# Patient Record
Sex: FEMALE | Race: WHITE | NOT HISPANIC OR LATINO | Employment: UNEMPLOYED | URBAN - METROPOLITAN AREA
[De-identification: names, ages, dates, MRNs, and addresses within clinical notes are randomized per-mention and may not be internally consistent; named-entity substitution may affect disease eponyms.]

---

## 2017-02-14 ENCOUNTER — ALLSCRIPTS OFFICE VISIT (OUTPATIENT)
Dept: OTHER | Facility: OTHER | Age: 37
End: 2017-02-14

## 2017-02-14 DIAGNOSIS — E04.1 NONTOXIC SINGLE THYROID NODULE: ICD-10-CM

## 2017-03-22 ENCOUNTER — HOSPITAL ENCOUNTER (OUTPATIENT)
Dept: ULTRASOUND IMAGING | Facility: HOSPITAL | Age: 37
Discharge: HOME | End: 2017-03-22
Payer: COMMERCIAL

## 2017-03-22 DIAGNOSIS — E04.1 NONTOXIC SINGLE THYROID NODULE: ICD-10-CM

## 2017-03-22 PROCEDURE — 76536 US EXAM OF HEAD AND NECK: CPT

## 2017-03-24 ENCOUNTER — GENERIC CONVERSION - ENCOUNTER (OUTPATIENT)
Dept: OTHER | Facility: OTHER | Age: 37
End: 2017-03-24

## 2017-03-25 ENCOUNTER — GENERIC CONVERSION - ENCOUNTER (OUTPATIENT)
Dept: OTHER | Facility: OTHER | Age: 37
End: 2017-03-25

## 2017-04-05 ENCOUNTER — GENERIC CONVERSION - ENCOUNTER (OUTPATIENT)
Dept: OTHER | Facility: OTHER | Age: 37
End: 2017-04-05

## 2017-04-05 LAB
AMBIG ABBREV CMP14 DEFAULT (HISTORICAL): NORMAL
AMBIG ABBREV LP DEFAULT (HISTORICAL): NORMAL
BASOPHILS # BLD AUTO: 0 %
BASOPHILS # BLD AUTO: 0 X10E3/UL (ref 0–0.2)
DEPRECATED RDW RBC AUTO: 13.6 % (ref 12.3–15.4)
EOSINOPHIL # BLD AUTO: 0.2 X10E3/UL (ref 0–0.4)
EOSINOPHIL # BLD AUTO: 2 %
HCT VFR BLD AUTO: 43.6 % (ref 34–46.6)
HGB BLD-MCNC: 15 G/DL (ref 11.1–15.9)
IMM.GRANULOCYTES (CD4/8) (HISTORICAL): 0 %
IMM.GRANULOCYTES (CD4/8) (HISTORICAL): 0 X10E3/UL (ref 0–0.1)
LYMPHOCYTES # BLD AUTO: 1.4 X10E3/UL (ref 0.7–3.1)
LYMPHOCYTES # BLD AUTO: 20 %
MCH RBC QN AUTO: 29.9 PG (ref 26.6–33)
MCHC RBC AUTO-ENTMCNC: 34.4 G/DL (ref 31.5–35.7)
MCV RBC AUTO: 87 FL (ref 79–97)
MONOCYTES # BLD AUTO: 0.4 X10E3/UL (ref 0.1–0.9)
MONOCYTES (HISTORICAL): 6 %
NEUTROPHILS # BLD AUTO: 5 X10E3/UL (ref 1.4–7)
NEUTROPHILS # BLD AUTO: 72 %
PLATELET # BLD AUTO: 172 X10E3/UL (ref 150–379)
RBC (HISTORICAL): 5.02 X10E6/UL (ref 3.77–5.28)
WBC # BLD AUTO: 7 X10E3/UL (ref 3.4–10.8)

## 2017-04-06 ENCOUNTER — GENERIC CONVERSION - ENCOUNTER (OUTPATIENT)
Dept: OTHER | Facility: OTHER | Age: 37
End: 2017-04-06

## 2017-04-06 LAB
25(OH)D3 SERPL-MCNC: 51.2 NG/ML (ref 30–100)
A/G RATIO (HISTORICAL): 2.5 (ref 1.2–2.2)
ALBUMIN SERPL BCP-MCNC: 4.8 G/DL (ref 3.5–5.5)
ALP SERPL-CCNC: 59 IU/L (ref 39–117)
ALT SERPL W P-5'-P-CCNC: 36 IU/L (ref 0–32)
AST SERPL W P-5'-P-CCNC: 24 IU/L (ref 0–40)
BILIRUB SERPL-MCNC: 0.6 MG/DL (ref 0–1.2)
BUN SERPL-MCNC: 9 MG/DL (ref 6–20)
BUN/CREA RATIO (HISTORICAL): 12 (ref 9–23)
CALCIUM SERPL-MCNC: 9.6 MG/DL (ref 8.7–10.2)
CHLORIDE SERPL-SCNC: 102 MMOL/L (ref 96–106)
CHOLEST SERPL-MCNC: 187 MG/DL (ref 100–199)
CO2 SERPL-SCNC: 22 MMOL/L (ref 18–29)
CREAT SERPL-MCNC: 0.74 MG/DL (ref 0.57–1)
EGFR AFRICAN AMERICAN (HISTORICAL): 120 ML/MIN/1.73
EGFR-AMERICAN CALC (HISTORICAL): 104 ML/MIN/1.73
GLUCOSE SERPL-MCNC: 96 MG/DL (ref 65–99)
HDLC SERPL-MCNC: 43 MG/DL
LDLC SERPL CALC-MCNC: 101 MG/DL (ref 0–99)
POTASSIUM SERPL-SCNC: 4 MMOL/L (ref 3.5–5.2)
SODIUM SERPL-SCNC: 144 MMOL/L (ref 134–144)
T4 FREE SERPL-MCNC: 7.4 UG/DL (ref 4.5–12)
TOT. GLOBULIN, SERUM (HISTORICAL): 1.9 G/DL (ref 1.5–4.5)
TOTAL PROTEIN (HISTORICAL): 6.7 G/DL (ref 6–8.5)
TRIGL SERPL-MCNC: 215 MG/DL (ref 0–149)
TSH SERPL DL<=0.05 MIU/L-ACNC: 1.21 UIU/ML (ref 0.45–4.5)
VLDLC SERPL CALC-MCNC: 43 MG/DL (ref 5–40)

## 2017-04-10 ENCOUNTER — ALLSCRIPTS OFFICE VISIT (OUTPATIENT)
Dept: OTHER | Facility: OTHER | Age: 37
End: 2017-04-10

## 2017-04-10 DIAGNOSIS — E55.9 VITAMIN D DEFICIENCY: ICD-10-CM

## 2017-04-10 DIAGNOSIS — E78.5 HYPERLIPIDEMIA: ICD-10-CM

## 2017-04-10 DIAGNOSIS — I10 ESSENTIAL (PRIMARY) HYPERTENSION: ICD-10-CM

## 2017-04-10 DIAGNOSIS — R53.83 OTHER FATIGUE: ICD-10-CM

## 2017-04-11 ENCOUNTER — GENERIC CONVERSION - ENCOUNTER (OUTPATIENT)
Dept: OTHER | Facility: OTHER | Age: 37
End: 2017-04-11

## 2017-05-08 ENCOUNTER — ALLSCRIPTS OFFICE VISIT (OUTPATIENT)
Dept: OTHER | Facility: OTHER | Age: 37
End: 2017-05-08

## 2017-05-11 ENCOUNTER — GENERIC CONVERSION - ENCOUNTER (OUTPATIENT)
Dept: OTHER | Facility: OTHER | Age: 37
End: 2017-05-11

## 2017-05-12 ENCOUNTER — ALLSCRIPTS OFFICE VISIT (OUTPATIENT)
Dept: OTHER | Facility: OTHER | Age: 37
End: 2017-05-12

## 2017-05-16 ENCOUNTER — ANESTHESIA EVENT (OUTPATIENT)
Dept: PERIOP | Facility: HOSPITAL | Age: 37
End: 2017-05-16
Payer: COMMERCIAL

## 2017-05-16 ENCOUNTER — APPOINTMENT (OUTPATIENT)
Dept: PREADMISSION TESTING | Facility: HOSPITAL | Age: 37
End: 2017-05-16
Payer: COMMERCIAL

## 2017-05-16 VITALS
RESPIRATION RATE: 16 BRPM | HEIGHT: 67 IN | BODY MASS INDEX: 41.44 KG/M2 | SYSTOLIC BLOOD PRESSURE: 118 MMHG | WEIGHT: 264 LBS | DIASTOLIC BLOOD PRESSURE: 70 MMHG | HEART RATE: 83 BPM | TEMPERATURE: 98 F

## 2017-05-16 RX ORDER — IBUPROFEN 200 MG
200 TABLET ORAL EVERY 6 HOURS PRN
COMMUNITY
End: 2018-02-23 | Stop reason: HOSPADM

## 2017-05-16 RX ORDER — ASCORBIC ACID 500 MG
500 TABLET ORAL DAILY
COMMUNITY
End: 2018-02-13 | Stop reason: SDUPTHER

## 2017-05-16 RX ORDER — DIAZEPAM 5 MG/1
5 TABLET ORAL EVERY 6 HOURS PRN
Status: ON HOLD | COMMUNITY
End: 2017-05-24

## 2017-05-16 RX ORDER — ALBUTEROL SULFATE 90 UG/1
2 AEROSOL, METERED RESPIRATORY (INHALATION) EVERY 6 HOURS PRN
COMMUNITY
End: 2018-07-12

## 2017-05-16 RX ORDER — SODIUM CHLORIDE 9 MG/ML
125 INJECTION, SOLUTION INTRAVENOUS CONTINUOUS
Status: CANCELLED | OUTPATIENT
Start: 2017-05-16

## 2017-05-16 RX ORDER — PANTOPRAZOLE SODIUM 20 MG/1
20 TABLET, DELAYED RELEASE ORAL DAILY
COMMUNITY
End: 2018-02-13 | Stop reason: SDUPTHER

## 2017-05-16 RX ORDER — ATORVASTATIN CALCIUM 10 MG/1
10 TABLET, FILM COATED ORAL DAILY
COMMUNITY
End: 2018-02-13 | Stop reason: SDUPTHER

## 2017-05-16 RX ORDER — MELATONIN
1000 DAILY
COMMUNITY
End: 2018-02-13 | Stop reason: SDUPTHER

## 2017-05-16 RX ORDER — METOPROLOL SUCCINATE 25 MG/1
25 TABLET, EXTENDED RELEASE ORAL DAILY
COMMUNITY
End: 2018-02-13 | Stop reason: SDUPTHER

## 2017-05-16 RX ORDER — AMITRIPTYLINE HYDROCHLORIDE 25 MG/1
25 TABLET, FILM COATED ORAL
COMMUNITY
End: 2018-02-13 | Stop reason: SDUPTHER

## 2017-05-16 RX ORDER — HYDROCORTISONE AND ACETIC ACID 20.75; 10.375 MG/ML; MG/ML
5 SOLUTION AURICULAR (OTIC) 3 TIMES DAILY
COMMUNITY
End: 2017-05-24 | Stop reason: HOSPADM

## 2017-05-16 RX ORDER — LANOLIN ALCOHOL/MO/W.PET/CERES
1000 CREAM (GRAM) TOPICAL DAILY
COMMUNITY
End: 2018-02-13 | Stop reason: SDUPTHER

## 2017-05-16 RX ORDER — SCOLOPAMINE TRANSDERMAL SYSTEM 1 MG/1
1 PATCH, EXTENDED RELEASE TRANSDERMAL ONCE AS NEEDED
Status: CANCELLED | OUTPATIENT
Start: 2017-05-16

## 2017-05-19 ENCOUNTER — GENERIC CONVERSION - ENCOUNTER (OUTPATIENT)
Dept: OTHER | Facility: OTHER | Age: 37
End: 2017-05-19

## 2017-05-24 ENCOUNTER — HOSPITAL ENCOUNTER (OUTPATIENT)
Facility: HOSPITAL | Age: 37
Setting detail: OUTPATIENT SURGERY
Discharge: HOME | End: 2017-05-24
Attending: OTOLARYNGOLOGY | Admitting: OTOLARYNGOLOGY
Payer: COMMERCIAL

## 2017-05-24 ENCOUNTER — ANESTHESIA (OUTPATIENT)
Dept: PERIOP | Facility: HOSPITAL | Age: 37
End: 2017-05-24
Payer: COMMERCIAL

## 2017-05-24 VITALS
RESPIRATION RATE: 18 BRPM | TEMPERATURE: 98.7 F | SYSTOLIC BLOOD PRESSURE: 106 MMHG | HEIGHT: 67 IN | BODY MASS INDEX: 41.44 KG/M2 | DIASTOLIC BLOOD PRESSURE: 67 MMHG | HEART RATE: 82 BPM | OXYGEN SATURATION: 100 % | WEIGHT: 264 LBS

## 2017-05-24 LAB — EXT PREGNANCY TEST URINE: NEGATIVE

## 2017-05-24 PROCEDURE — A9270 NON-COVERED ITEM OR SERVICE: HCPCS | Performed by: ANESTHESIOLOGY

## 2017-05-24 PROCEDURE — A9270 NON-COVERED ITEM OR SERVICE: HCPCS | Performed by: OTOLARYNGOLOGY

## 2017-05-24 PROCEDURE — 81025 URINE PREGNANCY TEST: CPT | Performed by: OTOLARYNGOLOGY

## 2017-05-24 RX ORDER — OXYCODONE HYDROCHLORIDE AND ACETAMINOPHEN 5; 325 MG/1; MG/1
2 TABLET ORAL EVERY 4 HOURS PRN
Qty: 28 TABLET | Refills: 0 | Status: SHIPPED | OUTPATIENT
Start: 2017-05-24 | End: 2017-06-03

## 2017-05-24 RX ORDER — ONDANSETRON 2 MG/ML
INJECTION INTRAMUSCULAR; INTRAVENOUS AS NEEDED
Status: DISCONTINUED | OUTPATIENT
Start: 2017-05-24 | End: 2017-05-24 | Stop reason: SURG

## 2017-05-24 RX ORDER — PROPOFOL 10 MG/ML
INJECTION, EMULSION INTRAVENOUS CONTINUOUS PRN
Status: DISCONTINUED | OUTPATIENT
Start: 2017-05-24 | End: 2017-05-24

## 2017-05-24 RX ORDER — FENTANYL CITRATE 50 UG/ML
INJECTION, SOLUTION INTRAMUSCULAR; INTRAVENOUS AS NEEDED
Status: DISCONTINUED | OUTPATIENT
Start: 2017-05-24 | End: 2017-05-24 | Stop reason: SURG

## 2017-05-24 RX ORDER — METOCLOPRAMIDE HYDROCHLORIDE 5 MG/ML
10 INJECTION INTRAMUSCULAR; INTRAVENOUS ONCE
Status: DISCONTINUED | OUTPATIENT
Start: 2017-05-24 | End: 2017-05-24 | Stop reason: HOSPADM

## 2017-05-24 RX ORDER — DEXTROSE MONOHYDRATE AND SODIUM CHLORIDE 5; .45 G/100ML; G/100ML
125 INJECTION, SOLUTION INTRAVENOUS CONTINUOUS
Status: DISCONTINUED | OUTPATIENT
Start: 2017-05-24 | End: 2017-05-24 | Stop reason: HOSPADM

## 2017-05-24 RX ORDER — SODIUM CHLORIDE 9 MG/ML
125 INJECTION, SOLUTION INTRAVENOUS CONTINUOUS
Status: DISCONTINUED | OUTPATIENT
Start: 2017-05-24 | End: 2017-05-24 | Stop reason: HOSPADM

## 2017-05-24 RX ORDER — SCOLOPAMINE TRANSDERMAL SYSTEM 1 MG/1
1 PATCH, EXTENDED RELEASE TRANSDERMAL ONCE AS NEEDED
Status: DISCONTINUED | OUTPATIENT
Start: 2017-05-24 | End: 2017-05-24 | Stop reason: HOSPADM

## 2017-05-24 RX ORDER — ONDANSETRON 2 MG/ML
4 INJECTION INTRAMUSCULAR; INTRAVENOUS EVERY 6 HOURS PRN
Status: DISCONTINUED | OUTPATIENT
Start: 2017-05-24 | End: 2017-05-24 | Stop reason: HOSPADM

## 2017-05-24 RX ORDER — LIDOCAINE HYDROCHLORIDE AND EPINEPHRINE 10; 10 MG/ML; UG/ML
INJECTION, SOLUTION INFILTRATION; PERINEURAL AS NEEDED
Status: DISCONTINUED | OUTPATIENT
Start: 2017-05-24 | End: 2017-05-24 | Stop reason: HOSPADM

## 2017-05-24 RX ORDER — ALBUTEROL SULFATE 2.5 MG/3ML
2.5 SOLUTION RESPIRATORY (INHALATION) ONCE AS NEEDED
Status: DISCONTINUED | OUTPATIENT
Start: 2017-05-24 | End: 2017-05-24 | Stop reason: HOSPADM

## 2017-05-24 RX ORDER — OXYCODONE HYDROCHLORIDE AND ACETAMINOPHEN 5; 325 MG/1; MG/1
2 TABLET ORAL EVERY 4 HOURS PRN
Status: DISCONTINUED | OUTPATIENT
Start: 2017-05-24 | End: 2017-05-24 | Stop reason: HOSPADM

## 2017-05-24 RX ORDER — CLINDAMYCIN PHOSPHATE 150 MG/ML
INJECTION, SOLUTION INTRAVENOUS AS NEEDED
Status: DISCONTINUED | OUTPATIENT
Start: 2017-05-24 | End: 2017-05-24 | Stop reason: SURG

## 2017-05-24 RX ORDER — CLINDAMYCIN PHOSPHATE 900 MG/50ML
900 INJECTION INTRAVENOUS ONCE
Status: DISCONTINUED | OUTPATIENT
Start: 2017-05-24 | End: 2017-05-24 | Stop reason: HOSPADM

## 2017-05-24 RX ORDER — ONDANSETRON 2 MG/ML
4 INJECTION INTRAMUSCULAR; INTRAVENOUS ONCE
Status: COMPLETED | OUTPATIENT
Start: 2017-05-24 | End: 2017-05-24

## 2017-05-24 RX ORDER — ACETAMINOPHEN 325 MG/1
650 TABLET ORAL EVERY 4 HOURS PRN
Status: DISCONTINUED | OUTPATIENT
Start: 2017-05-24 | End: 2017-05-24 | Stop reason: HOSPADM

## 2017-05-24 RX ORDER — EPHEDRINE SULFATE 50 MG/ML
INJECTION, SOLUTION INTRAVENOUS AS NEEDED
Status: DISCONTINUED | OUTPATIENT
Start: 2017-05-24 | End: 2017-05-24 | Stop reason: SURG

## 2017-05-24 RX ORDER — GLYCOPYRROLATE 0.2 MG/ML
INJECTION INTRAMUSCULAR; INTRAVENOUS AS NEEDED
Status: DISCONTINUED | OUTPATIENT
Start: 2017-05-24 | End: 2017-05-24 | Stop reason: SURG

## 2017-05-24 RX ORDER — MAGNESIUM HYDROXIDE 1200 MG/15ML
LIQUID ORAL AS NEEDED
Status: DISCONTINUED | OUTPATIENT
Start: 2017-05-24 | End: 2017-05-24 | Stop reason: HOSPADM

## 2017-05-24 RX ORDER — MIDAZOLAM HYDROCHLORIDE 1 MG/ML
INJECTION INTRAMUSCULAR; INTRAVENOUS AS NEEDED
Status: DISCONTINUED | OUTPATIENT
Start: 2017-05-24 | End: 2017-05-24 | Stop reason: SURG

## 2017-05-24 RX ORDER — ROCURONIUM BROMIDE 10 MG/ML
INJECTION, SOLUTION INTRAVENOUS AS NEEDED
Status: DISCONTINUED | OUTPATIENT
Start: 2017-05-24 | End: 2017-05-24 | Stop reason: SURG

## 2017-05-24 RX ORDER — PROPOFOL 10 MG/ML
INJECTION, EMULSION INTRAVENOUS AS NEEDED
Status: DISCONTINUED | OUTPATIENT
Start: 2017-05-24 | End: 2017-05-24 | Stop reason: SURG

## 2017-05-24 RX ORDER — FENTANYL CITRATE/PF 50 MCG/ML
50 SYRINGE (ML) INJECTION
Status: DISCONTINUED | OUTPATIENT
Start: 2017-05-24 | End: 2017-05-24 | Stop reason: HOSPADM

## 2017-05-24 RX ADMIN — SODIUM CHLORIDE 125 ML/HR: 0.9 INJECTION, SOLUTION INTRAVENOUS at 07:16

## 2017-05-24 RX ADMIN — ONDANSETRON HYDROCHLORIDE 4 MG: 2 INJECTION, SOLUTION INTRAVENOUS at 09:48

## 2017-05-24 RX ADMIN — FENTANYL CITRATE 100 MCG: 50 INJECTION, SOLUTION INTRAMUSCULAR; INTRAVENOUS at 08:28

## 2017-05-24 RX ADMIN — ONDANSETRON 4 MG: 2 INJECTION INTRAMUSCULAR; INTRAVENOUS at 10:29

## 2017-05-24 RX ADMIN — ROCURONIUM BROMIDE 35 MG: 10 INJECTION, SOLUTION INTRAVENOUS at 08:28

## 2017-05-24 RX ADMIN — EPHEDRINE SULFATE 10 MG: 50 INJECTION, SOLUTION INTRAMUSCULAR; INTRAVENOUS; SUBCUTANEOUS at 09:06

## 2017-05-24 RX ADMIN — EPHEDRINE SULFATE 10 MG: 50 INJECTION, SOLUTION INTRAMUSCULAR; INTRAVENOUS; SUBCUTANEOUS at 09:00

## 2017-05-24 RX ADMIN — MIDAZOLAM HYDROCHLORIDE 2 MG: 1 INJECTION, SOLUTION INTRAMUSCULAR; INTRAVENOUS at 08:18

## 2017-05-24 RX ADMIN — EPHEDRINE SULFATE 5 MG: 50 INJECTION, SOLUTION INTRAMUSCULAR; INTRAVENOUS; SUBCUTANEOUS at 08:50

## 2017-05-24 RX ADMIN — GLYCOPYRROLATE 0.4 MG: 0.2 INJECTION, SOLUTION INTRAMUSCULAR; INTRAVENOUS at 09:53

## 2017-05-24 RX ADMIN — NEOSTIGMINE METHYLSULFATE 2.5 MG: 1 INJECTION, SOLUTION INTRAMUSCULAR; INTRAVENOUS; SUBCUTANEOUS at 09:53

## 2017-05-24 RX ADMIN — FENTANYL CITRATE 50 MCG: 50 INJECTION, SOLUTION INTRAMUSCULAR; INTRAVENOUS at 10:40

## 2017-05-24 RX ADMIN — OXYCODONE HYDROCHLORIDE AND ACETAMINOPHEN 2 TABLET: 5; 325 TABLET ORAL at 11:23

## 2017-05-24 RX ADMIN — CLINDAMYCIN PHOSPHATE 900 MG: 150 INJECTION, SOLUTION INTRAMUSCULAR; INTRAVENOUS at 08:34

## 2017-05-24 RX ADMIN — SCOPALAMINE 1 PATCH: 1 PATCH, EXTENDED RELEASE TRANSDERMAL at 07:35

## 2017-05-24 RX ADMIN — DEXAMETHASONE SODIUM PHOSPHATE 8 MG: 10 INJECTION INTRAMUSCULAR; INTRAVENOUS at 09:46

## 2017-05-24 RX ADMIN — PROPOFOL 200 MG: 10 INJECTION, EMULSION INTRAVENOUS at 08:28

## 2017-05-24 RX ADMIN — ONDANSETRON HYDROCHLORIDE 4 MG: 2 INJECTION, SOLUTION INTRAVENOUS at 09:25

## 2017-05-24 RX ADMIN — EPHEDRINE SULFATE 10 MG: 50 INJECTION, SOLUTION INTRAMUSCULAR; INTRAVENOUS; SUBCUTANEOUS at 08:55

## 2017-05-24 RX ADMIN — PROPOFOL 50 MG: 10 INJECTION, EMULSION INTRAVENOUS at 09:40

## 2017-05-30 ENCOUNTER — GENERIC CONVERSION - ENCOUNTER (OUTPATIENT)
Dept: OTHER | Facility: OTHER | Age: 37
End: 2017-05-30

## 2017-06-02 ENCOUNTER — TRANSCRIBE ORDERS (OUTPATIENT)
Dept: ADMINISTRATIVE | Facility: HOSPITAL | Age: 37
End: 2017-06-02

## 2017-06-02 DIAGNOSIS — E04.1 NONTOXIC UNINODULAR GOITER: Primary | ICD-10-CM

## 2017-06-09 ENCOUNTER — HOSPITAL ENCOUNTER (OUTPATIENT)
Dept: ULTRASOUND IMAGING | Facility: HOSPITAL | Age: 37
Discharge: HOME | End: 2017-06-09
Attending: INTERNAL MEDICINE | Admitting: RADIOLOGY
Payer: COMMERCIAL

## 2017-06-09 DIAGNOSIS — E04.1 NONTOXIC UNINODULAR GOITER: ICD-10-CM

## 2017-06-09 PROCEDURE — 10022 HB FNA W/IMAGE: CPT

## 2017-06-09 PROCEDURE — 76942 ECHO GUIDE FOR BIOPSY: CPT

## 2017-06-09 PROCEDURE — 88173 CYTOPATH EVAL FNA REPORT: CPT | Performed by: RADIOLOGY

## 2017-06-09 RX ORDER — LIDOCAINE HYDROCHLORIDE 10 MG/ML
5 INJECTION, SOLUTION INFILTRATION; PERINEURAL ONCE
Status: COMPLETED | OUTPATIENT
Start: 2017-06-09 | End: 2017-06-09

## 2017-06-09 RX ADMIN — LIDOCAINE HYDROCHLORIDE 5 ML: 10 INJECTION, SOLUTION INFILTRATION; PERINEURAL at 12:55

## 2017-06-20 ENCOUNTER — ALLSCRIPTS OFFICE VISIT (OUTPATIENT)
Dept: OTHER | Facility: OTHER | Age: 37
End: 2017-06-20

## 2017-06-26 ENCOUNTER — ALLSCRIPTS OFFICE VISIT (OUTPATIENT)
Dept: OTHER | Facility: OTHER | Age: 37
End: 2017-06-26

## 2017-07-13 ENCOUNTER — ALLSCRIPTS OFFICE VISIT (OUTPATIENT)
Dept: OTHER | Facility: OTHER | Age: 37
End: 2017-07-13

## 2017-07-25 ENCOUNTER — GENERIC CONVERSION - ENCOUNTER (OUTPATIENT)
Dept: OTHER | Facility: OTHER | Age: 37
End: 2017-07-25

## 2017-07-27 ENCOUNTER — GENERIC CONVERSION - ENCOUNTER (OUTPATIENT)
Dept: OTHER | Facility: OTHER | Age: 37
End: 2017-07-27

## 2017-07-27 RX ORDER — NALTREXONE HYDROCHLORIDE 50 MG/1
50 TABLET, FILM COATED ORAL DAILY
Status: ON HOLD | COMMUNITY
End: 2017-08-02

## 2017-07-27 RX ORDER — CETIRIZINE HYDROCHLORIDE 10 MG/1
10 TABLET ORAL DAILY
COMMUNITY
End: 2019-09-27 | Stop reason: ALTCHOICE

## 2017-07-28 ENCOUNTER — GENERIC CONVERSION - ENCOUNTER (OUTPATIENT)
Dept: OTHER | Facility: OTHER | Age: 37
End: 2017-07-28

## 2017-08-01 ENCOUNTER — ANESTHESIA EVENT (OUTPATIENT)
Dept: PERIOP | Facility: HOSPITAL | Age: 37
End: 2017-08-01
Payer: COMMERCIAL

## 2017-08-01 RX ORDER — SODIUM CHLORIDE, SODIUM LACTATE, POTASSIUM CHLORIDE, CALCIUM CHLORIDE 600; 310; 30; 20 MG/100ML; MG/100ML; MG/100ML; MG/100ML
125 INJECTION, SOLUTION INTRAVENOUS CONTINUOUS
Status: CANCELLED | OUTPATIENT
Start: 2017-08-01

## 2017-08-02 ENCOUNTER — GENERIC CONVERSION - ENCOUNTER (OUTPATIENT)
Dept: OTHER | Facility: OTHER | Age: 37
End: 2017-08-02

## 2017-08-02 ENCOUNTER — HOSPITAL ENCOUNTER (OUTPATIENT)
Facility: HOSPITAL | Age: 37
Setting detail: OUTPATIENT SURGERY
Discharge: HOME | End: 2017-08-02
Attending: INTERNAL MEDICINE | Admitting: INTERNAL MEDICINE
Payer: COMMERCIAL

## 2017-08-02 ENCOUNTER — ANESTHESIA (OUTPATIENT)
Dept: PERIOP | Facility: HOSPITAL | Age: 37
End: 2017-08-02
Payer: COMMERCIAL

## 2017-08-02 VITALS
RESPIRATION RATE: 19 BRPM | SYSTOLIC BLOOD PRESSURE: 117 MMHG | DIASTOLIC BLOOD PRESSURE: 77 MMHG | HEIGHT: 67 IN | TEMPERATURE: 98.6 F | BODY MASS INDEX: 40.81 KG/M2 | HEART RATE: 76 BPM | OXYGEN SATURATION: 100 % | WEIGHT: 260 LBS

## 2017-08-02 DIAGNOSIS — K59.00 CONSTIPATION: ICD-10-CM

## 2017-08-02 DIAGNOSIS — K62.5 BRBPR (BRIGHT RED BLOOD PER RECTUM): ICD-10-CM

## 2017-08-02 DIAGNOSIS — K62.89 RECTAL PAIN: ICD-10-CM

## 2017-08-02 LAB — EXT PREGNANCY TEST URINE: NEGATIVE

## 2017-08-02 PROCEDURE — 81025 URINE PREGNANCY TEST: CPT | Performed by: NURSE ANESTHETIST, CERTIFIED REGISTERED

## 2017-08-02 PROCEDURE — 88305 TISSUE EXAM BY PATHOLOGIST: CPT | Performed by: INTERNAL MEDICINE

## 2017-08-02 RX ORDER — SODIUM CHLORIDE, SODIUM LACTATE, POTASSIUM CHLORIDE, CALCIUM CHLORIDE 600; 310; 30; 20 MG/100ML; MG/100ML; MG/100ML; MG/100ML
125 INJECTION, SOLUTION INTRAVENOUS CONTINUOUS
Status: DISCONTINUED | OUTPATIENT
Start: 2017-08-02 | End: 2017-08-02 | Stop reason: HOSPADM

## 2017-08-02 RX ORDER — PROPOFOL 10 MG/ML
INJECTION, EMULSION INTRAVENOUS AS NEEDED
Status: DISCONTINUED | OUTPATIENT
Start: 2017-08-02 | End: 2017-08-02 | Stop reason: SURG

## 2017-08-02 RX ORDER — LIDOCAINE HYDROCHLORIDE 10 MG/ML
INJECTION, SOLUTION INFILTRATION; PERINEURAL AS NEEDED
Status: DISCONTINUED | OUTPATIENT
Start: 2017-08-02 | End: 2017-08-02 | Stop reason: SURG

## 2017-08-02 RX ADMIN — PROPOFOL 100 MG: 10 INJECTION, EMULSION INTRAVENOUS at 08:25

## 2017-08-02 RX ADMIN — LIDOCAINE HYDROCHLORIDE 50 MG: 10 INJECTION, SOLUTION INFILTRATION; PERINEURAL at 08:25

## 2017-08-02 RX ADMIN — PROPOFOL 100 MG: 10 INJECTION, EMULSION INTRAVENOUS at 08:28

## 2017-08-02 RX ADMIN — PROPOFOL 50 MG: 10 INJECTION, EMULSION INTRAVENOUS at 08:32

## 2017-08-02 RX ADMIN — SODIUM CHLORIDE, SODIUM LACTATE, POTASSIUM CHLORIDE, AND CALCIUM CHLORIDE 125 ML/HR: .6; .31; .03; .02 INJECTION, SOLUTION INTRAVENOUS at 07:32

## 2017-08-07 ENCOUNTER — GENERIC CONVERSION - ENCOUNTER (OUTPATIENT)
Dept: OTHER | Facility: OTHER | Age: 37
End: 2017-08-07

## 2017-08-10 ENCOUNTER — ALLSCRIPTS OFFICE VISIT (OUTPATIENT)
Dept: OTHER | Facility: OTHER | Age: 37
End: 2017-08-10

## 2017-09-05 ENCOUNTER — GENERIC CONVERSION - ENCOUNTER (OUTPATIENT)
Dept: OTHER | Facility: OTHER | Age: 37
End: 2017-09-05

## 2017-10-28 ENCOUNTER — OFFICE VISIT (OUTPATIENT)
Dept: URGENT CARE | Facility: CLINIC | Age: 37
End: 2017-10-28
Payer: COMMERCIAL

## 2017-10-28 PROCEDURE — S9083 URGENT CARE CENTER GLOBAL: HCPCS

## 2017-10-28 PROCEDURE — G0382 LEV 3 HOSP TYPE B ED VISIT: HCPCS

## 2017-10-28 PROCEDURE — 99283 EMERGENCY DEPT VISIT LOW MDM: CPT

## 2017-10-30 NOTE — PROGRESS NOTES
Assessment  1  Acute frontal sinusitis (461 1) (J01 10)    Plan  Acute frontal sinusitis    · Azithromycin 250 MG Oral Tablet (Zithromax Z-Randal); TAKE 2 TABLETS ON DAY 1  THEN TAKE 1 TABLET A DAY FOR 4 DAYS    Discussion/Summary  Discussion Summary:   Discussed dx of sinusitis and will treat with zithromax instructed to follow up with PCP  Medication Side Effects Reviewed: Possible side effects of new medications were reviewed with the patient/guardian today  Understands and agrees with treatment plan: The treatment plan was reviewed with the patient/guardian  The patient/guardian understands and agrees with the treatment plan   Counseling Documentation With Imm: The patient was counseled regarding instructions for management,-- patient and family education,-- importance of compliance with treatment  total time of encounter was 25 minutes-- and-- 10 minutes was spent counseling  Follow Up Instructions: Follow Up with your Primary Care Provider in 1-2 days  If your symptoms worsen, go to the nearest [facility]  Chief Complaint  1  Cold Symptoms  Chief Complaint Free Text Note Form: C/o sinus pain, congestion, and headache,with sore throat x 1 week  History of Present Illness  HPI: 40year old female at urgent care today with chief complaint of sinus congestion, sinus pressure headaches bilaterally and sore throat and follow up with PCP in 1-2 days   Hospital Based Practices Required Assessment:   Pain Assessment   the patient states they have pain  The pain is located in the sinus  The patient describes the pain as aching  (on a scale of 0 to 10, the patient rates the pain at 2 )   Abuse And Domestic Violence Screen    Yes, the patient is safe at home  -- The patient states no one is hurting them  Depression And Suicide Screen  No, the patient has not had thoughts of hurting themself  No, the patient has not felt depressed in the past 7 days     Readiness To Learn: Receptive  Barriers To Learning: none  Preferred Learning: verbal   Education Completed: disease/condition-- and-- medications   Teaching Method: verbal   Person Taught: patient   Evaluation Of Learning: verbalized/demonstrated understanding   Cold Symptoms: Katia Moore presents with complaints of cold symptoms  Associated symptoms include nasal congestion,-- runny nose,-- post nasal drainage,-- scratchy throat,-- sore throat,-- facial pressure-- and-- headache, but-- no sneezing,-- no hoarseness,-- no dry cough,-- no productive cough,-- no facial pain,-- no plugged ear(s),-- no ear pain,-- no swollen lymph nodes,-- no wheezing,-- no shortness of breath,-- no fatigue,-- no weakness,-- no nausea,-- no vomiting,-- no diarrhea,-- no fever-- and-- no chills  Review of Systems  Focused-Female:   Constitutional: No fever, no chills, feels well, no tiredness, no recent weight gain or loss  ENT: sore throat-- and-- nasal discharge, but-- as noted in HPI  Cardiovascular: no complaints of slow or fast heart rate, no chest pain, no palpitations, no leg claudication or lower extremity edema  Respiratory: no complaints of shortness of breath, no wheezing, no dyspnea on exertion, no orthopnea or PND  Breasts: no complaints of breast pain, breast lump or nipple discharge  Gastrointestinal: no complaints of abdominal pain, no constipation, no nausea or diarrhea, no vomiting, no bloody stools  Genitourinary: no complaints of dysuria, no incontinence, no pelvic pain, no dysmenorrhea, no vaginal discharge or abnormal vaginal bleeding  Musculoskeletal: no complaints of arthralgia, no myalgia, no joint swelling or stiffness, no limb pain or swelling  Integumentary: no complaints of skin rash or lesion, no itching or dry skin, no skin wounds  Neurological: headache, but-- as noted in HPI  ROS Reviewed:   ROS reviewed  Active Problems  1   Acute exacerbation of chronic low back pain (724 2,338 19,338 29) (M54 5,G89 29)   2  Arthritis (716 90) (M19 90)   3  Asthma, mild intermittent (493 90) (J45 20)   4  Benign essential hypertension (401 1) (I10)   5  Breast hypertrophy (611 1) (N62)   6  Bright red blood per rectum (569 3) (K62 5)   7  Bulging lumbar disc (722 10) (M51 26)   8  Carpal tunnel syndrome, bilateral (354 0) (G56 03)   9  Cellulitis of trunk (682 2) (L03 319)   10  Chest pressure (786 59) (R07 89)   11  Chronic low back pain (724 2,338 29) (M54 5,G89 29)   12  Constipation, unspecified constipation type (564 00) (K59 00)   13  Depression (311) (F32 9)   14  Depression with anxiety (300 4) (F41 8)   15  Dermatitis, eczematoid (692 9) (L30 9)   16  Dysfunctional uterine bleeding (626 8) (N93 8)   17  Esophageal reflux (530 81) (K21 9)   18  Esophageal spasm (530 5) (K22 4)   19  Eustachian tube dysfunction (381 81) (H69 80)   20  Fatigue (780 79) (R53 83)   21  Fibromyalgia (729 1) (M79 7)   22  Flu vaccine need (V04 81) (Z23)   23  Hemorrhoids (455 6) (K64 9)   24  Hidradenitis suppurativa (705 83) (L73 2)   25  Hypercholesterolemia (272 0) (E78 00)   26  Hyperlipidemia (272 4) (E78 5)   27  Hypertension (401 9) (I10)   28  Irritable bowel syndrome (564 1) (K58 9)   29  Left ear pain (388 70) (H92 02)   30  Lumbosacral neuritis (724 4) (M54 17)   31  Mammographic microcalcification (793 81) (R92 0)   32  Morbid obesity (278 01) (E66 01)   33  Mouth ulcers (528 9) (K12 1)   34  Neck pain (723 1) (M54 2)   35  Need for diphtheria-tetanus-pertussis (Tdap) vaccine, adult/adolescent (V06 1) (Z23)   36  Nevus of back (216 5) (D22 5)   37  Obstructive sleep apnea (327 23) (G47 33)   38  Preop examination (V72 84) (Z01 818)   39  RBBB (right bundle branch block) (426 4) (I45 10)   40  Rectal pain (569 42) (K62 89)   41  Recurrent cellulitis (682 9) (L03 90)   42  Sleep disturbances (780 50) (G47 9)   43  Spondylolysis (738 4) (M43 00)   44  Thyroid disorder (246 9) (E07 9)   45   Thyroid nodule (241 0) (E04 1)   46  Vertigo (780 4) (R42)   47  Visit for screening mammogram (V76 12) (Z12 31)   48  Vitamin D insufficiency (268 9) (E55 9)    Past Medical History  1  History of Abscess of left arm (682 3) (L02 414)   2  History of Abscess of right breast (611 0) (N61 1)   3  History of Acute bronchitis (466 0) (J20 9)   4  History of Acute bronchitis, unspecified organism (466 0) (J20 9)   5  History of Acute bronchospasm (519 11) (J98 01)   6  History of Acute frontal sinusitis (461 1) (J01 10)   7  History of Acute maxillary sinusitis (461 0) (J01 00)   8  History of Acute maxillary sinusitis (461 0) (J01 00)   9  History of Acute recurrent maxillary sinusitis (461 0) (J01 01)   10  History of Acute sinusitis (461 9) (J01 90)   11  History of Acute upper respiratory infection (465 9) (J06 9)   12  History of Ankle pain, left (719 47) (M25 572)   13  History of Back pain (724 5) (M54 9)   14  History of Bronchitis (490) (J40)   15  History of Chest pain (786 50) (R07 9)   16  History of Chest tightness (786 59) (R07 89)   17  History of Chronic cough (786 2) (R05)   18  History of Chronic diarrhea (787 91) (K52 9)   19  History of Cough (786 2) (R05)   20  History of Edema of eyelid, right (374 82) (H02 843)   21  History of Epidermoid cyst of skin of shoulder (706 2) (L72 0)   22  History of Eustachian tube disorder (381 9) (H69 90)   23  History of H/O laminectomy (V45 89) (Z98 890)   24  History of acute pharyngitis (V12 69) (Z87 09)   25  History of allergic rhinitis (V12 69) (Z87 09)   26  History of chronic sinusitis (V12 69) (Z87 09)   27  History of pregnancy (V13 29)   28  History of sebaceous cyst (V13 3) (Z87 2)   29  History of sinusitis (V12 69) (Z87 09)   30  History of thyroid nodule (V12 29) (Z86 39)   31  History of varicella (V12 09) (Z86 19)   32  History of Hordeolum externum of right eye (373 11) (H00 013)   33  History of Influenza A (487 1) (J10 1)   34   History of Insomnia (780 52) (G47 00)   35  History of Low back pain (724 2) (M54 5)   36  History of Lyme disease (088 81) (A69 20)   37  History of Mastodynia (611 71) (N64 4)   38  History of Menarche (V21 8)   39  History of Otitis externa (380 10) (H60 90)   40  History of Otitis media, left (382 9) (H66 92)   41  History of Pain, joint, shoulder (719 41) (M25 519)   42  History of ROM (right otitis media) (382 9) (H66 91)   43  History of Upper respiratory infection (465 9) (J06 9)  Active Problems And Past Medical History Reviewed: The active problems and past medical history were reviewed and updated today  Family History  Mother    1  Family history of Hypertension (V17 49)   2  Family history of Pure Hypercholesterolemia  Father    3  Family history of    4  Family history of cardiac disorder (V17 49) (Z82 49)   5  Family history of hyperlipidemia (V18 19) (Z83 49)   6  Family history of hypertension (V17 49) (Z82 49)   7  Family history of Myocardial Infarction Arrhythmias  Brother    8  Family history of Crohn's (Granulomatous) Colitis   9  Family history of arthritis (V17 7) (Z82 61)  Paternal Grandmother    8  Family history of thyroid disease (V18 19) (Z83 49)  Family History Reviewed: The family history was reviewed and updated today  Social History   · Always uses seat belt   · Being A Social Drinker   · Marital History - Currently    · Never a smoker   · No drug use   · Sexually Active  Social History Reviewed: The social history was reviewed and updated today  The social history was reviewed and is unchanged  Surgical History  1  History of Back Surgery   2  History of Breast Surgery Puncture Aspiration Of Cyst   3  History of  Section   4  History of Cholecystectomy Laparoscopic   5  History of Shoulder Surgery Right   6  History of Tympanoplasty  Surgical History Reviewed: The surgical history was reviewed and updated today  Current Meds   1   Atorvastatin Calcium 40 MG Oral Tablet; TAKE 1 TABLET DAILY; Therapy: 72Ruz3195 to (Evaluate:06Feb2018)  Requested for: 10Aug2017; Last   Rx:10Aug2017 Ordered   2  Depo-Provera 400 MG/ML Intramuscular Suspension Recorded   3  Elavil 10 MG TABS; TAKE 1 TABLET AT BEDTIME; Therapy: (Sarah Greenberg) to Recorded   4  Hydrocortisone 2 5 % Rectal Cream; INSERT 1 APPLICATORFUL RECTALLY 3 TIMES   DAILY; Therapy: 76RVS6259 to (Last Rx:32Izc2169)  Requested for: 57Clc5573 Ordered   5  Linzess 145 MCG Oral Capsule; Take 145 mcg by mouth daily  Take 30 minutes before   her first meal of the day; Therapy: 84DMP8946 to Recorded   6  Metoprolol Succinate ER 25 MG Oral Tablet Extended Release 24 Hour; Take 1 tablet   daily; Therapy: 26JBE7931 to (Evaluate:06Feb2018)  Requested for: 10Aug2017; Last   Rx:10Aug2017 Ordered   7  Multi-Day Oral Tablet; Therapy: (Recorded:05Jun2014) to Recorded   8  OxyCODONE HCl - 5 MG Oral Tablet; Therapy: 99AXQ6051 to Recorded   9  Pantoprazole Sodium 40 MG Oral Tablet Delayed Release; TAKE 1 TABLET ONE TIME   DAILY AS DIRECTED; Therapy: 28TUN5386 to (Evaluate:06Feb2018)  Requested for: 10Aug2017; Last   Rx:10Aug2017 Ordered   10  ProAir  (90 Base) MCG/ACT Inhalation Aerosol Solution; INHALE 1 TO 2 PUFFS    EVERY 4 TO 6 HOURS AS NEEDED; Therapy: 53RJE5110 to (Yudelka Huerta)  Requested for: 28DUN3842; Last    Rx:10Xol9318 Ordered   11  PROzac 20 MG Oral Capsule; TAKE 3 CAPSULES DAILY; Therapy: 05HOU9057 to Recorded   12  Robaxin-750 750 MG Oral Tablet; Therapy: 49GKI4897 to Recorded   13  Valium 5 MG Oral Tablet; Therapy: 61SZT3374 to Recorded   14  Vitamin B-12 1000 MCG Oral Tablet; TAKE 3 TABLET Twice daily; Therapy: (Recorded:74Kid1798) to Recorded   15  Vitamin C 500 MG Oral Capsule; TAKE 1 CAPSULE DAILY; Therapy: (Recorded:50Uls2175) to Recorded   16  Vitamin D3 5000 UNIT Oral Capsule; Take 1 capsule twice daily; Therapy: (Recorded:01Idp9473) to Recorded   17   ZyrTEC Allergy 10 MG Oral Tablet Disintegrating; Therapy: (Recorded:65Ous0517) to Recorded  Medication List Reviewed: The medication list was reviewed and updated today  Allergies  1  Amoxicillin TABS   2  Neurontin CAPS   3  Amoxicillin CAPS   4  doxycycline   5  Penicillins    Physical Exam    Constitutional   General appearance: No acute distress, well appearing and well nourished  Eyes   Conjunctiva and lids: No swelling, erythema or discharge  Pupils and irises: Equal, round and reactive to light  Ears, Nose, Mouth, and Throat   External inspection of ears and nose: Normal     Otoscopic examination: Tympanic membranes translucent with normal light reflex  Canals patent without erythema  Nasal mucosa, septum, and turbinates: Abnormal   normal nasal septum,-- no intranasal masses or polyps-- and-- normal nasal turbinates  There was a purulent discharge from both nares  The bilateral nasal mucosa was boggy-- and-- edematous  Oropharynx: Abnormal  -- PND  Pulmonary   Respiratory effort: No increased work of breathing or signs of respiratory distress  Auscultation of lungs: Clear to auscultation  Cardiovascular   Palpation of heart: Normal PMI, no thrills  Auscultation of heart: Normal rate and rhythm, normal S1 and S2, without murmurs      Musculoskeletal   Gait and station: Normal     Psychiatric   Orientation to person, place, and time: Normal     Mood and affect: Normal        Signatures   Electronically signed by : Katie Mendez NP; Oct 28 2017 10:27AM EST                       (Author)    Electronically signed by : RACHID Spence ; Oct 29 2017  9:03AM EST                       (Co-author)

## 2017-11-28 ENCOUNTER — APPOINTMENT (OUTPATIENT)
Dept: LAB | Facility: CLINIC | Age: 37
End: 2017-11-28
Payer: COMMERCIAL

## 2017-11-28 ENCOUNTER — OFFICE VISIT (OUTPATIENT)
Dept: URGENT CARE | Facility: CLINIC | Age: 37
End: 2017-11-28
Payer: COMMERCIAL

## 2017-11-28 DIAGNOSIS — E78.5 HYPERLIPIDEMIA: ICD-10-CM

## 2017-11-28 DIAGNOSIS — J02.9 ACUTE PHARYNGITIS: ICD-10-CM

## 2017-11-28 DIAGNOSIS — E66.01 MORBID (SEVERE) OBESITY DUE TO EXCESS CALORIES (HCC): ICD-10-CM

## 2017-11-28 DIAGNOSIS — M65.30 TRIGGER FINGER: ICD-10-CM

## 2017-11-28 PROCEDURE — 87070 CULTURE OTHR SPECIMN AEROBIC: CPT

## 2017-11-28 PROCEDURE — S9083 URGENT CARE CENTER GLOBAL: HCPCS

## 2017-11-28 PROCEDURE — G0382 LEV 3 HOSP TYPE B ED VISIT: HCPCS

## 2017-11-28 PROCEDURE — 99283 EMERGENCY DEPT VISIT LOW MDM: CPT

## 2017-11-30 LAB — BACTERIA THROAT CULT: NORMAL

## 2017-12-05 NOTE — PROGRESS NOTES
Assessment    1  Acute sinus infection (461 9) (J01 90)    Plan  Acute sinus infection    · Sulfamethoxazole-Trimethoprim 800-160 MG Oral Tablet; TAKE 1 TABLET TWICE  DAILY WITH FOOD    Discussion/Summary  Discussion Summary:   Will start antibiotic intake as directed  Recommend over-the-counter Mucinex to help with symptoms  Drink plenty of fluids and stay hydrated  If symptoms are not improving over the next week, follow up with PCP  Medication Side Effects Reviewed: Possible side effects of new medications were reviewed with the patient/guardian today  Understands and agrees with treatment plan: The treatment plan was reviewed with the patient/guardian  The patient/guardian understands and agrees with the treatment plan   Follow Up Instructions: Follow Up with your Primary Care Provider in 7 days  If your symptoms worsen, go to the nearest [facility]  Chief Complaint    1  Sore Throat  Chief Complaint Free Text Note Form: Pt c/o a sore throat and bilateral ear pain  History of Present Illness  HPI: 49-year-old female here with complaint of ongoing sore throat, ear pain and sinus pressure  Patient was treated for sinusitis about a month ago with Zithromax and feels that she did not get any better  Denies any fever or chills  Sore Throat: Sheridan Raman presents with complaints of sore throat  Associated symptoms include dysphagia, nasal congestion, postnasal drainage, swollen glands, headache and cough, but no fever and no chills  Review of Systems  Focused-Female:   Constitutional: feeling poorly and feeling tired, but no fever and no chills  ENT: sore throat and nasal discharge  Cardiovascular: no complaints of slow or fast heart rate, no chest pain, no palpitations, no leg claudication or lower extremity edema  Respiratory: cough  ROS Reviewed:   ROS reviewed  Active Problems    1   Acute exacerbation of chronic low back pain (724 2,338 19,149 29) (M54 5,G89 29)   2  Acute frontal sinusitis (461 1) (J01 10)   3  Arthritis (716 90) (M19 90)   4  Asthma, mild intermittent (493 90) (J45 20)   5  Benign essential hypertension (401 1) (I10)   6  Breast hypertrophy (611 1) (N62)   7  Bright red blood per rectum (569 3) (K62 5)   8  Bulging lumbar disc (722 10) (M51 26)   9  Carpal tunnel syndrome, bilateral (354 0) (G56 03)   10  Cellulitis of trunk (682 2) (L03 319)   11  Chest pressure (786 59) (R07 89)   12  Chronic low back pain (724 2,338 29) (M54 5,G89 29)   13  Constipation, unspecified constipation type (564 00) (K59 00)   14  Depression (311) (F32 9)   15  Depression with anxiety (300 4) (F41 8)   16  Dermatitis, eczematoid (692 9) (L30 9)   17  Dysfunctional uterine bleeding (626 8) (N93 8)   18  Esophageal reflux (530 81) (K21 9)   19  Esophageal spasm (530 5) (K22 4)   20  Eustachian tube dysfunction (381 81) (H69 80)   21  Fatigue (780 79) (R53 83)   22  Fibromyalgia (729 1) (M79 7)   23  Flu vaccine need (V04 81) (Z23)   24  Hemorrhoids (455 6) (K64 9)   25  Hidradenitis suppurativa (705 83) (L73 2)   26  Hypercholesterolemia (272 0) (E78 00)   27  Hyperlipidemia (272 4) (E78 5)   28  Hypertension (401 9) (I10)   29  Irritable bowel syndrome (564 1) (K58 9)   30  Left ear pain (388 70) (H92 02)   31  Lumbosacral neuritis (724 4) (M54 17)   32  Mammographic microcalcification (793 81) (R92 0)   33  Morbid obesity (278 01) (E66 01)   34  Mouth ulcers (528 9) (K12 1)   35  Neck pain (723 1) (M54 2)   36  Need for diphtheria-tetanus-pertussis (Tdap) vaccine, adult/adolescent (V06 1) (Z23)   37  Nevus of back (216 5) (D22 5)   38  Obstructive sleep apnea (327 23) (G47 33)   39  Preop examination (V72 84) (Z01 818)   40  RBBB (right bundle branch block) (426 4) (I45 10)   41  Rectal pain (569 42) (K62 89)   42  Recurrent cellulitis (682 9) (L03 90)   43  Sleep disturbances (780 50) (G47 9)   44  Spondylolysis (738 4) (M43 00)   45   Thyroid disorder (246 9) (E07 9)   46  Thyroid nodule (241 0) (E04 1)   47  Vertigo (780 4) (R42)   48  Visit for screening mammogram (V76 12) (Z12 31)   49  Vitamin D insufficiency (268 9) (E55 9)    Past Medical History    1  History of Abscess of left arm (682 3) (L02 414)   2  History of Abscess of right breast (611 0) (N61 1)   3  History of Acute bronchitis (466 0) (J20 9)   4  History of Acute bronchitis, unspecified organism (466 0) (J20 9)   5  History of Acute bronchospasm (519 11) (J98 01)   6  History of Acute frontal sinusitis (461 1) (J01 10)   7  History of Acute maxillary sinusitis (461 0) (J01 00)   8  History of Acute maxillary sinusitis (461 0) (J01 00)   9  History of Acute recurrent maxillary sinusitis (461 0) (J01 01)   10  History of Acute sinusitis (461 9) (J01 90)   11  History of Acute upper respiratory infection (465 9) (J06 9)   12  History of Ankle pain, left (719 47) (M25 572)   13  History of Back pain (724 5) (M54 9)   14  History of Bronchitis (490) (J40)   15  History of Chest pain (786 50) (R07 9)   16  History of Chest tightness (786 59) (R07 89)   17  History of Chronic cough (786 2) (R05)   18  History of Chronic diarrhea (787 91) (K52 9)   19  History of Cough (786 2) (R05)   20  History of Edema of eyelid, right (374 82) (H02 843)   21  History of Epidermoid cyst of skin of shoulder (706 2) (L72 0)   22  History of Eustachian tube disorder (381 9) (H69 90)   23  History of H/O laminectomy (V45 89) (Z98 890)   24  History of acute pharyngitis (V12 69) (Z87 09)   25  History of allergic rhinitis (V12 69) (Z87 09)   26  History of chronic sinusitis (V12 69) (Z87 09)   27  History of pregnancy (V13 29)   28  History of sebaceous cyst (V13 3) (Z87 2)   29  History of sinusitis (V12 69) (Z87 09)   30  History of thyroid nodule (V12 29) (Z86 39)   31  History of varicella (V12 09) (Z86 19)   32  History of Hordeolum externum of right eye (373 11) (H00 013)   33  History of Influenza A (487 1) (J10 1)   34  History of Insomnia (780 52) (G47 00)   35  History of Low back pain (724 2) (M54 5)   36  History of Lyme disease (088 81) (A69 20)   37  History of Mastodynia (611 71) (N64 4)   38  History of Menarche (V21 8)   39  History of Otitis externa (380 10) (H60 90)   40  History of Otitis media, left (382 9) (H66 92)   41  History of Pain, joint, shoulder (719 41) (M25 519)   42  History of ROM (right otitis media) (382 9) (H66 91)   43  History of Upper respiratory infection (465 9) (J06 9)  Active Problems And Past Medical History Reviewed: The active problems and past medical history were reviewed and updated today  Family History  Mother    1  Family history of Hypertension (V17 49)   2  Family history of Pure Hypercholesterolemia  Father    3  Family history of    4  Family history of cardiac disorder (V17 49) (Z82 49)   5  Family history of hyperlipidemia (V18 19) (Z83 49)   6  Family history of hypertension (V17 49) (Z82 49)   7  Family history of Myocardial Infarction Arrhythmias  Brother    8  Family history of Crohn's (Granulomatous) Colitis   9  Family history of arthritis (V17 7) (Z82 61)  Paternal Grandmother    8  Family history of thyroid disease (V18 19) (Z83 49)  Family History Reviewed: The family history was reviewed and updated today  Social History    · Always uses seat belt   · Being A Social Drinker   · Marital History - Currently    · Never a smoker   · No drug use   · Non-smoker (V49 89) (Z78 9)   · Sexually Active  Social History Reviewed: The social history was reviewed and updated today  The social history was reviewed and is unchanged  Surgical History    1  History of Back Surgery   2  History of Breast Surgery Puncture Aspiration Of Cyst   3  History of  Section   4  History of Cholecystectomy Laparoscopic   5  History of Shoulder Surgery Right   6  History of Tympanoplasty  Surgical History Reviewed:    The surgical history was reviewed and updated today  Current Meds   1  Atorvastatin Calcium 40 MG Oral Tablet; TAKE 1 TABLET DAILY; Therapy: 83Lbo5264 to (Evaluate:33Awy5376)  Requested for: 10Aug2017; Last   Rx:10Aug2017 Ordered   2  Depo-Provera 400 MG/ML Intramuscular Suspension Recorded   3  Elavil 10 MG TABS; TAKE 1 TABLET AT BEDTIME; Therapy: (Soto Kong) to Recorded   4  Hydrocortisone 2 5 % Rectal Cream; INSERT 1 APPLICATORFUL RECTALLY 3 TIMES   DAILY; Therapy: 15EBJ2015 to (Last Rx:28Gqd5724)  Requested for: 44Apj3013 Ordered   5  Linzess 145 MCG Oral Capsule; Take 145 mcg by mouth daily  Take 30 minutes before   her first meal of the day; Therapy: 68IXF6590 to Recorded   6  Metoprolol Succinate ER 25 MG Oral Tablet Extended Release 24 Hour; Take 1 tablet   daily; Therapy: 68ITP1950 to (Evaluate:40Djt9725)  Requested for: 10Aug2017; Last   Rx:10Aug2017 Ordered   7  Multi-Day Oral Tablet; Therapy: (Recorded:05Jun2014) to Recorded   8  OxyCODONE HCl - 5 MG Oral Tablet; Therapy: 09WCZ3742 to Recorded   9  Pantoprazole Sodium 40 MG Oral Tablet Delayed Release; TAKE 1 TABLET ONE TIME   DAILY AS DIRECTED; Therapy: 91ZWD7647 to (Evaluate:11Dgd5572)  Requested for: 10Aug2017; Last   Rx:10Aug2017 Ordered   10  ProAir  (90 Base) MCG/ACT Inhalation Aerosol Solution; INHALE 1 TO 2 PUFFS    EVERY 4 TO 6 HOURS AS NEEDED; Therapy: 77FTT5568 to (Alexis Valencia)  Requested for: 13YCO3712; Last    Rx:62Olw6088 Ordered   11  PROzac 20 MG Oral Capsule; TAKE 3 CAPSULES DAILY; Therapy: 77GQS0818 to Recorded   12  Robaxin-750 750 MG Oral Tablet; Therapy: 83VII9072 to Recorded   13  Valium 5 MG Oral Tablet; Therapy: 24LOQ9179 to Recorded   14  Vitamin B-12 1000 MCG Oral Tablet; TAKE 3 TABLET Twice daily; Therapy: (Recorded:67Fty6073) to Recorded   15  Vitamin C 500 MG Oral Capsule; TAKE 1 CAPSULE DAILY; Therapy: (Recorded:04Wfk5141) to Recorded   16  Vitamin D3 5000 UNIT Oral Capsule;  Take 1 capsule twice daily; Therapy: (Recorded:14Cif9106) to Recorded   17  ZyrTEC Allergy 10 MG Oral Tablet Disintegrating; Therapy: (Recorded:01Fjg4508) to Recorded  Medication List Reviewed: The medication list was reviewed and updated today  Allergies    1  Amoxicillin TABS   2  Neurontin CAPS   3  Amoxicillin CAPS   4  doxycycline   5  Penicillins    Vitals  Signs   Recorded: 60GKE8080 12:11PM   Temperature: 98 4 F  Heart Rate: 81  Respiration: 18  Systolic: 689  Diastolic: 68  Weight: 576 lb 15 10 oz  BMI Calculated: 43 03  BSA Calculated: 2 29  O2 Saturation: 100    Physical Exam    Constitutional   General appearance: No acute distress, well appearing and well nourished  Ears, Nose, Mouth, and Throat   External inspection of ears and nose: Normal     Otoscopic examination: Tympanic membranes translucent with normal light reflex  Canals patent without erythema  Nasal mucosa, septum, and turbinates: Abnormal   There was a mucoid discharge from both nares  The bilateral nasal mucosa was edematous and red  Oropharynx: Abnormal   The posterior pharynx was erythematous, but did not have an exudate  Pulmonary   Respiratory effort: No increased work of breathing or signs of respiratory distress  Auscultation of lungs: Clear to auscultation  Cardiovascular   Auscultation of heart: Normal rate and rhythm, normal S1 and S2, without murmurs         Signatures   Electronically signed by : Tati Jules, [facility]; Nov 28 2017 12:44PM EST                       (Author)

## 2017-12-15 ENCOUNTER — GENERIC CONVERSION - ENCOUNTER (OUTPATIENT)
Dept: OTHER | Facility: OTHER | Age: 37
End: 2017-12-15

## 2017-12-20 DIAGNOSIS — E04.1 NONTOXIC SINGLE THYROID NODULE: ICD-10-CM

## 2017-12-20 DIAGNOSIS — R30.0 DYSURIA: ICD-10-CM

## 2017-12-26 ENCOUNTER — OFFICE VISIT (OUTPATIENT)
Dept: URGENT CARE | Facility: CLINIC | Age: 37
End: 2017-12-26
Payer: COMMERCIAL

## 2017-12-26 PROCEDURE — 99213 OFFICE O/P EST LOW 20 MIN: CPT

## 2018-01-10 ENCOUNTER — OFFICE VISIT (OUTPATIENT)
Dept: URGENT CARE | Facility: CLINIC | Age: 38
End: 2018-01-10
Payer: COMMERCIAL

## 2018-01-10 PROCEDURE — S9083 URGENT CARE CENTER GLOBAL: HCPCS

## 2018-01-10 PROCEDURE — G0382 LEV 3 HOSP TYPE B ED VISIT: HCPCS

## 2018-01-10 PROCEDURE — 99283 EMERGENCY DEPT VISIT LOW MDM: CPT

## 2018-01-11 NOTE — RESULT NOTES
Verified Results  (1) CBC/PLT/DIFF 05Apr2017 10:34AM [facility] courtesy copy of this report has been sent to  [facility], [phone]  Test Name Result Flag Reference   WBC 7 0 x10E3/uL  3 4-10 8   RBC 5 02 x10E6/uL  3 77-5 28   Hemoglobin 15 0 g/dL  11 1-15 9   Hematocrit 43 6 %  34 0-46  6   MCV 87 fL  79-97   MCH 29 9 pg  26 6-33 0   MCHC 34 4 g/dL  31 5-35 7   RDW 13 6 %  12 3-15 4   Platelets 509 C51L6/OE  150-379   Neutrophils 72 %     Lymphs 20 %     Monocytes 6 %     Eos 2 %     Basos 0 %     Neutrophils (Absolute) 5 0 x10E3/uL  1 4-7 0   Lymphs (Absolute) 1 4 x10E3/uL  0 7-3 1   Monocytes(Absolute) 0 4 x10E3/uL  0 1-0 9   Eos (Absolute) 0 2 x10E3/uL  0 0-0 4   Baso (Absolute) 0 0 x10E3/uL  0 0-0 2   Immature Granulocytes 0 %     Immature Grans (Abs) 0 0 x10E3/uL  0 0-0 1     (1) COMPREHENSIVE METABOLIC PANEL 11VYW7775 99:40MJ Litzy Oneill     Test Name Result Flag Reference   Glucose, Serum 96 mg/dL  65-99   BUN 9 mg/dL  6-20   Creatinine, Serum 0 74 mg/dL  0 57-1 00   eGFR If NonAfricn Am 104 mL/min/1 73  >59   eGFR If Africn Am 120 mL/min/1 73  >59   BUN/Creatinine Ratio 12  9-23   **Please note reference interval change**   Sodium, Serum 144 mmol/L  134-144   Potassium, Serum 4 0 mmol/L  3 5-5 2   Chloride, Serum 102 mmol/L     Carbon Dioxide, Total 22 mmol/L  18-29   Calcium, Serum 9 6 mg/dL  8 7-10 2   Protein, Total, Serum 6 7 g/dL  6 0-8 5   Albumin, Serum 4 8 g/dL  3 5-5 5   Globulin, Total 1 9 g/dL  1 5-4 5   A/G Ratio 2 5 H 1 2-2 2   **Please note reference interval change**   Bilirubin, Total 0 6 mg/dL  0 0-1 2   Alkaline Phosphatase, S 59 IU/L     AST (SGOT) 24 IU/L  0-40   ALT (SGPT) 36 IU/L H 0-32     (LC) Lipid Panel 01ZXH3975 10:34AM Litzy Oneill     Test Name Result Flag Reference   Cholesterol, Total 187 mg/dL  100-199   Triglycerides 215 mg/dL H 0-149   HDL Cholesterol 43 mg/dL  >39   VLDL Cholesterol John 43 mg/dL H 5-40   LDL Cholesterol Calc 101 mg/dL H 0-99     (1) TSH 05Apr2017 10:34AM [place] Rumpf     Test Name Result Flag Reference   TSH 1 210 uIU/mL  0 450-4 500     (1) VITAMIN D 25-HYDROXY 05Apr2017 10:34AM [place] [facility]     Test Name Result Flag Reference   Vitamin D, 25-Hydroxy 51 2 ng/mL  30 0-100 0   Vitamin D deficiency has been defined by the [address] practice guideline as a  level of serum 25-OH vitamin D less than 20 ng/mL (1,2)  The Endocrine Society went on to further define vitamin D  insufficiency as a level between 21 and 29 ng/mL (2)  1  IOM ([place] of [employer])  2010  Dietary reference     intakes for calcium and D  [facility]: The     Matchup  2  Shahida MF, Carolina PATTERSON, Vinicio KAY, et al      Evaluation, treatment, and prevention of vitamin D     deficiency: an Endocrine Society clinical practice     guideline  JC  2011 Jul; 96(7):1911-30  (1) THYROXINE 05Apr2017 10:34AM [place] [facility]     Test Name Result Flag Reference   Thyroxine (T4) 7 4 ug/dL  4 5-12 0     [facility]) Mathew Forrester HLX51 Default [id number] 10:34AM [place]     Test Name Result Flag Reference   Mathew Forrester CMP14 Default Comment     A hand-written panel/profile was received from your office  In  accordance with the [facility] Ambiguous Test Code Policy dated July 7845, we have completed your order by using the closest currently  or formerly recognized AMA panel  We have assigned Comprehensive  Metabolic Panel (14), Test Code #727039 to this request   If this  is not the testing you wished to receive on this specimen, please  contact the [address] [place] [address] Client Inquiry/Technical Services Department  to clarify the test order  We appreciate your business  [facility]) Mathew Forrester LP Default [id number] 10:34AM [place] [facility]     Test Name Result Flag Reference   Paolo Parks LP Default Comment     A hand-written panel/profile was received from your office   In  accordance with the [facility] Ambiguous Test Code Policy dated July 0397, we have completed your order by using the closest currently  or formerly recognized AMA panel  We have assigned Lipid Panel,  Test Code #704815 to this request  If this is not the testing you  wished to receive on this specimen, please contact the [address] [place] [address]  Client Inquiry/Technical Services Department to clarify the test  order  We appreciate your business

## 2018-01-11 NOTE — RESULT NOTES
Verified Results  [address] [place] [address] [id number] 02:23PM Maribel Motley Order Number: [id number]    - Patient Instructions: To schedule this appointment, please contact Central Scheduling at [phone]  Test Name Result Flag Reference   US THYROID (Report)     THYROID ULTRASOUND     INDICATION: Follow-up thyroid nodule  COMPARISON: 3/19/2015  TECHNIQUE:  Ultrasound of the thyroid was performed with a high frequency linear transducer in transverse and sagittal planes including volumetric imaging sweeps as well as traditional still imaging technique  FINDINGS:   Normal homogeneous smooth echotexture  Right gland: 6 1 x 1 6 x 2 1 cm  Slight interval increase in size of right lower pole nodule is noted now measuring 15 x 13 x 9 mm compared to 12 x 7 x 13 mm previously  Left gland: 5 6 x 1 5 x 1 8 cm  No dominant nodules  Isthmus: The isthmus is 0 4 cm in AP dimension  IMPRESSION:      Slight interval increase in size of right lower pole nodule  Workstation performed: [id number]     Signed by:    Mirian Hernandez MD   3/24/17

## 2018-01-12 ENCOUNTER — GENERIC CONVERSION - ENCOUNTER (OUTPATIENT)
Dept: OTHER | Facility: OTHER | Age: 38
End: 2018-01-12

## 2018-01-13 VITALS
HEIGHT: 67 IN | DIASTOLIC BLOOD PRESSURE: 58 MMHG | WEIGHT: 258 LBS | SYSTOLIC BLOOD PRESSURE: 106 MMHG | BODY MASS INDEX: 40.49 KG/M2

## 2018-01-13 VITALS
HEIGHT: 67 IN | BODY MASS INDEX: 40.89 KG/M2 | HEART RATE: 84 BPM | WEIGHT: 260.5 LBS | SYSTOLIC BLOOD PRESSURE: 112 MMHG | DIASTOLIC BLOOD PRESSURE: 80 MMHG

## 2018-01-13 VITALS
TEMPERATURE: 99.1 F | SYSTOLIC BLOOD PRESSURE: 122 MMHG | DIASTOLIC BLOOD PRESSURE: 80 MMHG | HEART RATE: 94 BPM | WEIGHT: 263.38 LBS | HEIGHT: 67 IN | OXYGEN SATURATION: 98 % | BODY MASS INDEX: 41.34 KG/M2

## 2018-01-13 VITALS
RESPIRATION RATE: 16 BRPM | SYSTOLIC BLOOD PRESSURE: 126 MMHG | WEIGHT: 258 LBS | DIASTOLIC BLOOD PRESSURE: 84 MMHG | HEIGHT: 67 IN | BODY MASS INDEX: 40.49 KG/M2 | TEMPERATURE: 98.4 F | HEART RATE: 96 BPM

## 2018-01-13 VITALS
TEMPERATURE: 99.2 F | WEIGHT: 257.25 LBS | BODY MASS INDEX: 40.38 KG/M2 | OXYGEN SATURATION: 99 % | SYSTOLIC BLOOD PRESSURE: 110 MMHG | HEIGHT: 67 IN | DIASTOLIC BLOOD PRESSURE: 90 MMHG | HEART RATE: 84 BPM

## 2018-01-13 NOTE — PROGRESS NOTES
Assessment   1  Chronic sinusitis, unspecified location (473 9) (J32 9)   2  Chronic sinusitis (473 9) (J32 9)    Plan   Chronic sinusitis    · PredniSONE 10 MG Oral Tablet; 3 tabs BID X 2 days, 2 Tabs BID X 2 days, 1 Tab    BID X 2 Days, then 1 Tab daily X 2 days    Discussion/Summary   Discussion Summary:    I am not convinced that this is a bacterial infection  Will try prednisone to reduce inflammation in the sinuses  Recommend follow up with ENT and possible CT scan of the sinuses  Medication Side Effects Reviewed: Possible side effects of new medications were reviewed with the patient/guardian today  Understands and agrees with treatment plan: The treatment plan was reviewed with the patient/guardian  The patient/guardian understands and agrees with the treatment plan      Chief Complaint   1  Cough  Chief Complaint Free Text Note Form: C/o cough x 3 weeks  History of Present Illness   HPI: 77-year-old female here with ongoing cough  Patient states that she has been treated with numerous antibiotics over the last couple months for sinus infection and feels that it gets a little bit better but never completely clears up  Last treatment was with Levaquin  Her Levaquin has been finished and cough has been ongoing  Patient takes Allegra at night before bed and has been taking Sudafed throughout the day  Cough: Scarlet Palma presents with complaints of cough  Associated symptoms include stuffy nose-- and-- postnasal drainage, but-- no dyspnea,-- no wheezing,-- no chills-- and-- no fever  Review of Systems   Focused-Female:      Constitutional: feeling poorly, but-- no fever-- and-- no chills  ENT: sore throat-- and-- nasal discharge, but-- as noted in HPI  Cardiovascular: no complaints of slow or fast heart rate, no chest pain, no palpitations, no leg claudication or lower extremity edema  Respiratory: cough, but-- as noted in HPI,-- no shortness of breath-- and-- no wheezing  ROS Reviewed:    ROS reviewed  Active Problems   1  Acute exacerbation of chronic low back pain (724 2,338 19,338 29) (M54 5,G89 29)   2  Acute frontal sinusitis (461 1) (J01 10)   3  Acute sinus infection (461 9) (J01 90)   4  Arthritis (716 90) (M19 90)   5  Asthma, mild intermittent (493 90) (J45 20)   6  Benign essential hypertension (401 1) (I10)   7  Breast hypertrophy (611 1) (N62)   8  Bright red blood per rectum (569 3) (K62 5)   9  Bulging lumbar disc (722 10) (M51 26)   10  Carpal tunnel syndrome, bilateral (354 0) (G56 03)   11  Cellulitis of trunk (682 2) (L03 319)   12  Chest pressure (786 59) (R07 89)   13  Chronic low back pain (724 2,338 29) (M54 5,G89 29)   14  Constipation, unspecified constipation type (564 00) (K59 00)   15  Depression (311) (F32 9)   16  Depression with anxiety (300 4) (F41 8)   17  Dermatitis, eczematoid (692 9) (L30 9)   18  Dysfunctional uterine bleeding (626 8) (N93 8)   19  Dysuria (788 1) (R30 0)   20  Esophageal reflux (530 81) (K21 9)   21  Esophageal spasm (530 5) (K22 4)   22  Eustachian tube dysfunction (381 81) (H69 80)   23  Fatigue (780 79) (R53 83)   24  Fibromyalgia (729 1) (M79 7)   25  Flu vaccine need (V04 81) (Z23)   26  Hemorrhoids (455 6) (K64 9)   27  Hidradenitis suppurativa (705 83) (L73 2)   28  Hypercholesterolemia (272 0) (E78 00)   29  Hyperlipidemia (272 4) (E78 5)   30  Hypertension (401 9) (I10)   31  Irritable bowel syndrome (564 1) (K58 9)   32  Joint pain in fingers of right hand (719 44) (M25 541)   33  Left ear pain (388 70) (H92 02)   34  Lipid screening (V77 91) (Z13 220)   35  Locking finger joint (718 94) (M24 849)   36  Lumbosacral neuritis (724 4) (M54 17)   37  Mammographic microcalcification (793 81) (R92 0)   38  Morbid obesity (278 01) (E66 01)   39  Mouth ulcers (528 9) (K12 1)   40  Neck pain (723 1) (M54 2)   41  Need for diphtheria-tetanus-pertussis (Tdap) vaccine, adult/adolescent (V06 1) (Z23)   42   Nevus of back (216 5) (D22 5)   43  Obstructive sleep apnea (327 23) (G47 33)   44  Preop examination (V72 84) (Z01 818)   45  RBBB (right bundle branch block) (426 4) (I45 10)   46  Rectal pain (569 42) (K62 89)   47  Recurrent cellulitis (682 9) (L03 90)   48  Sinusitis (473 9) (J32 9)   49  Sleep disturbances (780 50) (G47 9)   50  Sore throat (462) (J02 9)   51  Spondylolysis (738 4) (M43 00)   52  Thyroid disorder (246 9) (E07 9)   53  Thyroid nodule (241 0) (E04 1)   54  Vertigo (780 4) (R42)   55  Visit for screening mammogram (V76 12) (Z12 31)   56  Vitamin D insufficiency (268 9) (E55 9)    Past Medical History   1  History of Abscess of left arm (682 3) (L02 414)   2  History of Abscess of right breast (611 0) (N61 1)   3  History of Acute bronchitis (466 0) (J20 9)   4  History of Acute bronchitis, unspecified organism (466 0) (J20 9)   5  History of Acute bronchospasm (519 11) (J98 01)   6  History of Acute frontal sinusitis (461 1) (J01 10)   7  History of Acute maxillary sinusitis (461 0) (J01 00)   8  History of Acute maxillary sinusitis (461 0) (J01 00)   9  History of Acute recurrent maxillary sinusitis (461 0) (J01 01)   10  History of Acute sinusitis (461 9) (J01 90)   11  History of Acute upper respiratory infection (465 9) (J06 9)   12  History of Ankle pain, left (719 47) (M25 572)   13  History of Back pain (724 5) (M54 9)   14  History of Bronchitis (490) (J40)   15  History of Chest pain (786 50) (R07 9)   16  History of Chest tightness (786 59) (R07 89)   17  History of Chronic cough (786 2) (R05)   18  History of Chronic diarrhea (787 91) (K52 9)   19  History of Cough (786 2) (R05)   20  History of Edema of eyelid, right (374 82) (H02 843)   21  History of Epidermoid cyst of skin of shoulder (706 2) (L72 0)   22  History of Eustachian tube disorder (381 9) (H69 90)   23  History of H/O laminectomy (V45 89) (Z98 890)   24  History of acute pharyngitis (V12 69) (Z87 09)   25   History of allergic rhinitis (V12 69) (Z87 09)   26  History of pregnancy (V13 29)   27  History of sebaceous cyst (V13 3) (Z87 2)   28  History of sinusitis (V12 69) (Z87 09)   29  History of thyroid nodule (V12 29) (Z86 39)   30  History of varicella (V12 09) (Z86 19)   31  History of Hordeolum externum of right eye (373 11) (H00 013)   32  History of Influenza A (487 1) (J10 1)   33  History of Insomnia (780 52) (G47 00)   34  History of Low back pain (724 2) (M54 5)   35  History of Lyme disease (088 81) (A69 20)   36  History of Mastodynia (611 71) (N64 4)   37  History of Menarche (V21 8)   38  History of Otitis externa (380 10) (H60 90)   39  History of Otitis media, left (382 9) (H66 92)   40  History of Pain, joint, shoulder (719 41) (M25 519)   41  History of ROM (right otitis media) (382 9) (H66 91)   42  History of Upper respiratory infection (465 9) (J06 9)  Active Problems And Past Medical History Reviewed: The active problems and past medical history were reviewed and updated today  Family History   Mother    1  Family history of Hypertension (V17 49)   2  Family history of Pure Hypercholesterolemia  Father    3  Family history of    4  Family history of cardiac disorder (V17 49) (Z82 49)   5  Family history of hyperlipidemia (V18 19) (Z83 49)   6  Family history of hypertension (V17 49) (Z82 49)   7  Family history of Myocardial Infarction Arrhythmias  Brother    8  Family history of Crohn's (Granulomatous) Colitis   9  Family history of arthritis (V17 7) (Z82 61)  Paternal Grandmother    8  Family history of thyroid disease (V18 19) (Z83 49)  Family History Reviewed: The family history was reviewed and updated today  Social History    · Always uses seat belt   · Being A Social Drinker   · Marital History - Currently    · Never a smoker   · No drug use   · Non-smoker (V49 89) (Z78 9)   · Sexually Active  Social History Reviewed: The social history was reviewed and updated today   The social history was reviewed and is unchanged  Surgical History   1  History of Back Surgery   2  History of Breast Surgery Puncture Aspiration Of Cyst   3  History of  Section   4  History of Cholecystectomy Laparoscopic   5  History of Shoulder Surgery Right   6  History of Tympanoplasty  Surgical History Reviewed: The surgical history was reviewed and updated today  Current Meds    1  Atorvastatin Calcium 40 MG Oral Tablet; TAKE 1 TABLET DAILY; Therapy: 33Qff0838 to (Evaluate:13Xid0383)  Requested for: 51YAN4078; Last     Rx:2017 Ordered   2  Depo-Provera 400 MG/ML Intramuscular Suspension Recorded   3  Elavil 10 MG TABS; TAKE 1 TABLET AT BEDTIME; Therapy: (Lonne Plain) to Recorded   4  LevoFLOXacin 500 MG Oral Tablet; TAKE 1 TABLET DAILY UNTIL FINISHED; Therapy: 32WGY8722 to )  Requested for: 04Hxo8956; Last     Rx:20Qtx5304 Ordered   5  Linzess 145 MCG Oral Capsule; Take 145 mcg by mouth daily  Take 30 minutes before     her first meal of the day; Therapy: 76QFR7925 to Recorded   6  Metoprolol Succinate ER 25 MG Oral Tablet Extended Release 24 Hour; Take 1 tablet     daily; Therapy: 78EQI2792 to (Evaluate:2018)  Requested for: 37NSR2457; Last     Rx:2017 Ordered   7  Multi-Day Oral Tablet; Therapy: (Recorded:2014) to Recorded   8  OxyCODONE HCl - 5 MG Oral Tablet; Therapy: 96UHE5435 to Recorded   9  Pantoprazole Sodium 40 MG Oral Tablet Delayed Release; TAKE 1 TABLET ONE TIME     DAILY AS DIRECTED; Therapy: 27GSI5685 to (Evaluate:2018)  Requested for: 36GDV4145; Last     Rx:2017 Ordered   10  ProAir  (90 Base) MCG/ACT Inhalation Aerosol Solution; INHALE 1 TO 2 PUFFS      EVERY 4 TO 6 HOURS AS NEEDED; Therapy: 87YSU9455 to ([place])  Requested for: 67IYV3319; Last      Rx:78Ubs5403 Ordered   11  PROzac 20 MG Oral Capsule; TAKE 3 CAPSULES DAILY; Therapy: 00SHM4663 to Recorded   12  Robaxin-750 750 MG Oral Tablet; Therapy: 54SRQ2980 to Recorded   13  Valium 5 MG Oral Tablet; Therapy: 59AAL9634 to Recorded   14  Vitamin B-12 1000 MCG Oral Tablet; TAKE 3 TABLET Twice daily; Therapy: (Recorded:79Mek0108) to Recorded   15  Vitamin C 500 MG Oral Capsule; TAKE 1 CAPSULE DAILY; Therapy: (Recorded:28Gcd8236) to Recorded   16  Vitamin D3 5000 UNIT Oral Capsule; Take 1 capsule twice daily; Therapy: (Recorded:50Ohv2065) to Recorded   17  ZyrTEC Allergy 10 MG Oral Tablet Disintegrating; Therapy: (Recorded:73Egn8892) to Recorded  Medication List Reviewed: The medication list was reviewed and updated today  Allergies   1  Amoxicillin TABS   2  Neurontin CAPS   3  Amoxicillin CAPS   4  doxycycline   5  Penicillins    Vitals   Signs   Recorded: 09ACW1974 07:37PM   Temperature: 98 8 F  Heart Rate: 96  Respiration: 20  Systolic: 431  Diastolic: 60  BP Cuff Size: Large  Height: 5 ft 7 in  Weight: 270 lb   BMI Calculated: 42 29  BSA Calculated: 2 3  O2 Saturation: 100    Physical Exam        Constitutional      General appearance: No acute distress, well appearing and well nourished  Ears, Nose, Mouth, and Throat      External inspection of ears and nose: Normal        Otoscopic examination: Tympanic membranes translucent with normal light reflex  Canals patent without erythema  Nasal mucosa, septum, and turbinates: Abnormal   There was a mucoid discharge from both nares  The bilateral nasal mucosa was edematous  Oropharynx: Abnormal   The posterior pharynx was not erythematous-- and-- did not have an exudate  Pulmonary      Respiratory effort: No increased work of breathing or signs of respiratory distress  Auscultation of lungs: Clear to auscultation  Cardiovascular      Auscultation of heart: Normal rate and rhythm, normal S1 and S2, without murmurs         Future Appointments      Date/Time Provider Specialty Site   01/12/2018 11:20 AM Winter Perez, DO Orthopedic Surgery [facility] [address]     Signatures    Electronically signed by : Summer Nogueira, [facility]; Feliciano 10 2018  7:54PM EST                       (Author)     Electronically signed by : RACHID Altamirano ; Jan 12 2018 10:22AM EST                       (Co-author)

## 2018-01-14 VITALS
TEMPERATURE: 99.3 F | HEART RATE: 112 BPM | RESPIRATION RATE: 16 BRPM | OXYGEN SATURATION: 99 % | SYSTOLIC BLOOD PRESSURE: 110 MMHG | WEIGHT: 260 LBS | HEIGHT: 67 IN | BODY MASS INDEX: 40.81 KG/M2 | DIASTOLIC BLOOD PRESSURE: 70 MMHG

## 2018-01-14 VITALS
OXYGEN SATURATION: 98 % | SYSTOLIC BLOOD PRESSURE: 100 MMHG | BODY MASS INDEX: 41.79 KG/M2 | DIASTOLIC BLOOD PRESSURE: 70 MMHG | WEIGHT: 266.25 LBS | RESPIRATION RATE: 16 BRPM | TEMPERATURE: 97.7 F | HEIGHT: 67 IN | HEART RATE: 100 BPM

## 2018-01-14 VITALS
DIASTOLIC BLOOD PRESSURE: 80 MMHG | HEIGHT: 67 IN | WEIGHT: 258 LBS | HEART RATE: 96 BPM | BODY MASS INDEX: 40.49 KG/M2 | SYSTOLIC BLOOD PRESSURE: 122 MMHG

## 2018-01-14 NOTE — MISCELLANEOUS
Message   Recorded as Task   Date: 12/28/2016 11:32 AM, Created By: Jori Torres   Task Name: Call Back   Assigned To: [facility],Team   Regarding Patient: Suresh Orozco, Status: Active   CommentSerena Pillow - 28 Dec 2016 11:32 AM     TASK CREATED  Please call with results  Appears to be "foreign body reaction" without identified foreign body  Negative for any cancer  Kathy Faith - 28 Dec 2016 2:17 PM     TASK EDITED  Called and spoke to patient  Pathology results were given and understood  Confirmed tomorrows popv visit        Active Problems    1  Abscess of left arm (682 3) (L02 414)   2  Acute maxillary sinusitis (461 0) (J01 00)   3  Allergic rhinitis (477 9) (J30 9)   4  Arthritis (716 90) (M19 90)   5  Asthma (493 90) (J45 909)   6  Benign essential hypertension (401 1) (I10)   7  Breast hypertrophy (611 1) (N62)   8  Carpal tunnel syndrome, bilateral (354 0) (G56 03)   9  Chest pressure (786 59) (R07 89)   10  Chest tightness (786 59) (R07 89)   11  Chronic low back pain (724 2,338 29) (M54 5,G89 29)   12  Chronic sinusitis (473 9) (J32 9)   13  Depression (311) (F32 9)   14  Depression with anxiety (300 4) (F41 8)   15  Dermatitis, eczematoid (692 9) (L30 9)   16  Dysfunctional uterine bleeding (626 8) (N93 8)   17  Epidermoid cyst of skin of shoulder (706 2) (L72 0)   18  Esophageal reflux (530 81) (K21 9)   19  Esophageal spasm (530 5) (K22 4)   20  Eustachian tube dysfunction (381 81) (H69 80)   21  Fatigue (780 79) (R53 83)   22  Fibromyalgia (729 1) (M79 7)   23  Flu vaccine need (V04 81) (Z23)   24  Hidradenitis suppurativa (705 83) (L73 2)   25  Hypercholesterolemia (272 0) (E78 00)   26  Hyperlipidemia (272 4) (E78 5)   27  Hypertension (401 9) (I10)   28  Irritable bowel syndrome (564 1) (K58 9)   29  Left ear pain (388 70) (H92 02)   30  Mammographic microcalcification (793 81) (R92 0)   31  Morbid obesity (278 01) (E66 01)   32  Mouth ulcers (528 9) (K12 1)   33   Neck pain (723 1) (M54 2)   34  Need for diphtheria-tetanus-pertussis (Tdap) vaccine, adult/adolescent (V06 1) (Z23)   35  Nevus of back (216 5) (D22 5)   36  Obstructive sleep apnea (327 23) (G47 33)   37  Preop examination (V72 84) (Z01 818)   38  RBBB (right bundle branch block) (426 4) (I45 10)   39  Sebaceous cyst (706 2) (L72 3)   40  Sleep disturbances (780 50) (G47 9)   41  Thyroid disorder (246 9) (E07 9)   42  Thyroid nodule (241 0) (E04 1)   43  Vertigo (780 4) (R42)   44  Visit for screening mammogram (V76 12) (Z12 31)   45  Vitamin D insufficiency (268 9) (E55 9)    Current Meds   1  Atorvastatin Calcium 40 MG Oral Tablet; TAKE ONE TABLET BY MOUTH ONCE DAILY; Therapy: 54Hzz6658 to (Last Rx:01Aug2016)  Requested for: 01Aug2016 Ordered   2  Clindamycin HCl - 300 MG Oral Capsule; take 1 capsule 4 times daily; Therapy: 78JWL5055 to (Evaluate:21Dec2016)  Requested for: 31KPE5863; Last   Rx:48Nmw1758 Ordered   3  DrRx Medrol Dose Pack 4 MG #21; Therapy: (Recorded:28Nov2016) to Recorded   4  Elavil 10 MG TABS (Amitriptyline HCl); TAKE 1 TABLET AT BEDTIME; Therapy: (Veena Mace) to Recorded   5  Metoprolol Succinate ER 25 MG Oral Tablet Extended Release 24 Hour; TAKE 1 TABLET   ONCE DAILY; Therapy: 67HCC3716 to (Evaluate:28Jan2017)  Requested for: 01Aug2016; Last   Rx:01Aug2016 Ordered   6  Multi-Day Oral Tablet; Therapy: (Recorded:05Jun2014) to Recorded   7  Naltrexone HCl Powder; Therapy: (Recorded:28Nov2016) to Recorded   8  Pantoprazole Sodium 40 MG Oral Tablet Delayed Release (Protonix); Take 1 tablet   daily; Therapy: 16BFY9158 to (Evaluate:28Jan2017)  Requested for: 15Tsc5443; Last   Rx:01Aug2016 Ordered   9  ProAir  (90 Base) MCG/ACT Inhalation Aerosol Solution; INHALE 1 TO 2 PUFFS   EVERY 4 TO 6 HOURS AS NEEDED; Therapy: 13ADI2951 to (Benji Hathaway)  Requested for: 44FCE7374; Last   Rx:39Jhr7851 Ordered   10   PROzac 20 MG Oral Capsule (FLUoxetine HCl); TAKE 3 CAPSULES DAILY; Therapy: 81FBS6249 to Recorded   11  Vitamin B-12 1000 MCG Oral Tablet; TAKE 3 TABLET Twice daily; Therapy: (Recorded:24Hyl3215) to Recorded   12  Vitamin C 500 MG Oral Capsule; TAKE 1 CAPSULE DAILY; Therapy: (Recorded:49Cvv6902) to Recorded   13  Vitamin D3 5000 UNIT Oral Capsule; Take 1 capsule twice daily; Therapy: (Recorded:40Tzh4185) to Recorded    Allergies    1  Amoxicillin TABS   2  Neurontin CAPS   3  Amoxicillin CAPS   4  doxycycline   5   Penicillins    Signatures   Electronically signed by : Claudette Galan, ; Dec 28 2016  2:17PM EST                       (Author)

## 2018-01-14 NOTE — MISCELLANEOUS
History of Present Illness  TCM Communication  Luke: The patient is being contacted for follow-up after hospitalization and Spoke with patient on 9/5/2017  She is doing OK post spinal fusion  She will f/u with Neurosurgeon in 4 weeks  She was hospitalized at [facility]  The dates of hospitalization: 08/30/2017 till 9/02/2017, date of admission: 08/30/2017, date of discharge: 09/02/2017  Diagnosis: LS Neuritis  Surgery : Left L5/S1 TLIF  She was discharged to home  Medications reviewed and updated today  She did not schedule a follow up appointment  She refused a follow up appointment due to Patient has been advised to limit care travel at this time due to recent surgical procedure  Follow-up appointments with other specialists: Patient has appt to see Dr Oz Mosher in 4 weeks  Symptoms: fever and back pain left side, but no weakness, no dizziness, no headache, no fatigue, no cough, no shortness of breath, no chest pain, no back pain on right side, no arm pain left side, no arm pain on right side, no leg pain on left side, no leg pain on right side, no upper abdominal pain, no middle abdominal pain, no lower abdominal pain, no anorexia, no nausea, no vomiting, no loose stools, no constipation, no pain with urinating and no incisional pain  Patient states that she has been running a low grade fever and has been in touch with the neurosurgeon's staff  Counseling was provided to the patient  Communication performed and completed by Tk Nazario      Active Problems   1  Acute exacerbation of chronic low back pain (724 2,338 19,338 29) (M54 5,G89 29)  2  Arthritis (716 90) (M19 90)  3  Asthma, mild intermittent (493 90) (J45 20)  4  Benign essential hypertension (401 1) (I10)  5  Breast hypertrophy (611 1) (N62)  6  Bright red blood per rectum (569 3) (K62 5)  7  Bulging lumbar disc (722 10) (M51 26)  8  Carpal tunnel syndrome, bilateral (354 0) (G56 03)  9   Cellulitis of trunk (682  2) (L03 319)  10  Chest pressure (786 59) (R07 89)  11  Chronic low back pain (724 2,338 29) (M54 5,G89 29)  12  Constipation, unspecified constipation type (564 00) (K59 00)  13  Depression (311) (F32 9)  14  Depression with anxiety (300 4) (F41 8)  15  Dermatitis, eczematoid (692 9) (L30 9)  16  Dysfunctional uterine bleeding (626 8) (N93 8)  17  Esophageal reflux (530 81) (K21 9)  18  Esophageal spasm (530 5) (K22 4)  19  Eustachian tube dysfunction (381 81) (H69 80)  20  Fatigue (780 79) (R53 83)  21  Fibromyalgia (729 1) (M79 7)  22  Flu vaccine need (V04 81) (Z23)  23  Hemorrhoids (455 6) (K64 9)  24  Hidradenitis suppurativa (705 83) (L73 2)  25  Hypercholesterolemia (272 0) (E78 00)  26  Hyperlipidemia (272 4) (E78 5)  27  Hypertension (401 9) (I10)  28  Irritable bowel syndrome (564 1) (K58 9)  29  Left ear pain (388 70) (H92 02)  30  Lumbosacral neuritis (724 4) (M54 17)  31  Mammographic microcalcification (793 81) (R92 0)  32  Morbid obesity (278 01) (E66 01)  33  Mouth ulcers (528 9) (K12 1)  34  Neck pain (723 1) (M54 2)  35  Need for diphtheria-tetanus-pertussis (Tdap) vaccine, adult/adolescent (V06 1) (Z23)  36  Nevus of back (216 5) (D22 5)  37  Obstructive sleep apnea (327 23) (G47 33)  38  Preop examination (V72 84) (Z01 818)  39  RBBB (right bundle branch block) (426 4) (I45 10)  40  Rectal pain (569 42) (K62 89)  41  Recurrent cellulitis (682 9) (L03 90)  42  Sleep disturbances (780 50) (G47 9)  43  Thyroid disorder (246 9) (E07 9)  44  Thyroid nodule (241 0) (E04 1)  45  Vertigo (780 4) (R42)  46  Visit for screening mammogram (V76 12) (Z12 31)  47  Vitamin D insufficiency (268 9) (E55 9)    Past Medical History   1  History of Abscess of left arm (682 3) (L02 414)  2  History of Abscess of right breast (611 0) (N61 1)  3  History of Acute bronchitis (466 0) (J20 9)  4  History of Acute bronchitis, unspecified organism (466 0) (J20 9)  5  History of Acute bronchospasm (519 11) (J98 01)  6  History of Acute frontal sinusitis (461 1) (J01 10)  7  History of Acute maxillary sinusitis (461 0) (J01 00)  8  History of Acute maxillary sinusitis (461 0) (J01 00)  9  History of Acute recurrent maxillary sinusitis (461 0) (J01 01)  10  History of Acute sinusitis (461 9) (J01 90)  11  History of Acute upper respiratory infection (465 9) (J06 9)  12  History of Ankle pain, left (719 47) (M25 572)  13  History of Back pain (724 5) (M54 9)  14  History of Bronchitis (490) (J40)  15  History of Chest pain (786 50) (R07 9)  16  History of Chest tightness (786 59) (R07 89)  17  History of Chronic cough (786 2) (R05)  18  History of Chronic diarrhea (787 91) (K52 9)  19  History of Cough (786 2) (R05)  20  History of Edema of eyelid, right (374 82) (H02 843)  21  History of Epidermoid cyst of skin of shoulder (706 2) (L72 0)  22  History of Eustachian tube disorder (381 9) (H69 90)  23  History of acute pharyngitis (V12 69) (Z87 09)  24  History of allergic rhinitis (V12 69) (Z87 09)  25  History of chronic sinusitis (V12 69) (Z87 09)  26  History of pregnancy (V13 29)  27  History of sebaceous cyst (V13 3) (Z87 2)  28  History of sinusitis (V12 69) (Z87 09)  29  History of thyroid nodule (V12 29) (Z86 39)  30  History of varicella (V12 09) (Z86 19)  31  History of Hordeolum externum of right eye (373 11) (H00 013)  32  History of Influenza A (487 1) (J10 1)  33  History of Insomnia (780 52) (G47 00)  34  History of Low back pain (724 2) (M54 5)  35  History of Lyme disease (088 81) (A69 20)  36  History of Mastodynia (611 71) (N64 4)  37  History of Menarche (V21 8)  38  History of Otitis externa (380 10) (H60 90)  39  History of Otitis media, left (382 9) (H66 92)  40  History of Pain, joint, shoulder (719 41) (M25 519)  41  History of ROM (right otitis media) (382 9) (H66 91)  42  History of Upper respiratory infection (465 9) (J06 9)    Surgical History   1  History of Back Surgery  2   History of Breast Surgery Puncture Aspiration Of Cyst  3  History of  Section  4  History of Cholecystectomy Laparoscopic  5  History of Shoulder Surgery Right  6  History of Tympanoplasty    Family History  Mother   1  Family history of Hypertension (V17 49)  2  Family history of Pure Hypercholesterolemia  Father   3  Family history of   4  Family history of cardiac disorder (V17 49) (Z82 49)  5  Family history of hyperlipidemia (V18 19) (Z83 49)  6  Family history of hypertension (V17 49) (Z82 49)  7  Family history of Myocardial Infarction Arrhythmias  Brother   8  Family history of Crohn's (Granulomatous) Colitis  9  Family history of arthritis (V17 7) (Z82 61)  Paternal Grandmother   8  Family history of thyroid disease (V18 19) (Z83 49)    Social History    · Always uses seat belt   · Being A Social Drinker   · Marital History - Currently    · Never a smoker   · No drug use   · Sexually Active    Current Meds  1  Atorvastatin Calcium 40 MG Oral Tablet; TAKE 1 TABLET DAILY; Therapy: 97Ggi9301 to (Evaluate:87Ghb4716)  Requested for: 2017; Last   Rx:2017 Ordered  2  Depo-Provera 400 MG/ML Intramuscular Suspension Recorded  3  Elavil 10 MG TABS; TAKE 1 TABLET AT BEDTIME; Therapy: (Johanny Gloria) to Recorded  4  Hydrocortisone 2 5 % Rectal Cream; INSERT 1 APPLICATORFUL RECTALLY 3 TIMES   DAILY; Therapy: 14HNF9064 to (Last Rx:50Zcx3618)  Requested for: 19Mqn5570 Ordered  5  Linzess 145 MCG Oral Capsule; Take 145 mcg by mouth daily  Take 30 minutes before   her first meal of the day; Therapy: 91NKL1359 to Recorded  6  Metoprolol Succinate ER 25 MG Oral Tablet Extended Release 24 Hour; Take 1 tablet   daily; Therapy: 59FEX2089 to (Evaluate:70Mog7319)  Requested for: 2017; Last   Rx:2017 Ordered  7  Multi-Day Oral Tablet; Therapy: (Recorded:2014) to Recorded  8  OxyCODONE HCl - 5 MG Oral Tablet; Therapy: 32ODK7962 to Recorded  9   Pantoprazole Sodium 40 MG Oral Tablet Delayed Release; TAKE 1 TABLET ONE TIME   DAILY AS DIRECTED; Therapy: 41ITZ1887 to (Evaluate:90Lod4330)  Requested for: 10Aug2017; Last   Rx:10Aug2017 Ordered  10  ProAir  (90 Base) MCG/ACT Inhalation Aerosol Solution; INHALE 1 TO 2 PUFFS    EVERY 4 TO 6 HOURS AS NEEDED; Therapy: 67ZSQ1720 to (Ema Cheney)  Requested for: 78LFG5263; Last    Rx:57Qow3561 Ordered  11  PROzac 20 MG Oral Capsule; TAKE 3 CAPSULES DAILY; Therapy: 41DTW2633 to Recorded  12  Robaxin-750 750 MG Oral Tablet; Therapy: 01YKT6798 to Recorded  13  Valium 5 MG Oral Tablet; Therapy: 58JKE0664 to Recorded  14  Vitamin B-12 1000 MCG Oral Tablet; TAKE 3 TABLET Twice daily; Therapy: (Recorded:66Hec5348) to Recorded  15  Vitamin C 500 MG Oral Capsule; TAKE 1 CAPSULE DAILY; Therapy: (Recorded:35Cbo9928) to Recorded  16  Vitamin D3 5000 UNIT Oral Capsule; Take 1 capsule twice daily; Therapy: (Recorded:54Kqa3965) to Recorded  17  ZyrTEC Allergy 10 MG Oral Tablet Disintegrating; Therapy: (Recorded:80Wav0966) to Recorded    Allergies   1  Amoxicillin TABS  2  Neurontin CAPS  3  Amoxicillin CAPS  4  doxycycline  5  Penicillins    Health Management  Constipation, unspecified constipation type   COLONOSCOPY; every 5 years; Last 02Aug2017; Next Due: 36Vdd4263;  Active    Signatures   Electronically signed by : Ravindra Castle RN; Sep  5 2017  4:16PM EST                       (Author)    Electronically signed by : Luba Abdi DO; Sep  5 2017  7:37PM EST                       (Author)    Electronically signed by : Luba Abdi DO; Sep  5 2017  7:37PM EST                       (Author)

## 2018-01-15 NOTE — RESULT NOTES
Verified Results  ([address] [facility]) UA/M w/rflx Culture, Routine [id number] 10:14AM Iain Dusky     Test Name Result Flag Reference   Specific Gravity 1 007  1 005-1 030   pH 6 5  5 0-7 5   Urine-Color Yellow  Yellow   Appearance Clear  Clear   WBC Esterase 1+ A Negative   Protein Negative  Negative/Trace   Glucose Negative  Negative   Ketones Negative  Negative   Occult Blood Trace A Negative   Bilirubin Negative  Negative   Urobilinogen,Semi-Qn 0 2 EU/dL  0 2-1 0   Nitrite, Urine Negative  Negative   Microscopic Examination See below:     Urinalysis Reflex Comment     This specimen has reflexed to a Urine Culture  WBC 0-5 /hpf  0 -  5   RBC 0-2 /hpf  0 -  2   Epithelial Cells (non renal) 0-10 /hpf  0 - 10   Bacteria Few  None seen/Few   Urine Culture, Routine Final report     Result 1 Comment     Mixed urogenital anupama  10,000-25,000 colony forming units per mL     (1) CBC/PLT/DIFF 28Oct2016 10:14AM Iain Dusky     Test Name Result Flag Reference   WBC 4 9 x10E3/uL  3 4-10 8   RBC 4 56 x10E6/uL  3 77-5 28   Hemoglobin 13 6 g/dL  11 1-15 9   Hematocrit 39 1 %  34 0-46  6   MCV 86 fL  79-97   MCH 29 8 pg  26 6-33 0   MCHC 34 8 g/dL  31 5-35 7   RDW 14 0 %  12 3-15 4   Platelets 101 S84Y6/QQ  150-379   Neutrophils 63 %     Lymphs 27 %     Monocytes 6 %     Eos 3 %     Basos 1 %     Neutrophils (Absolute) 3 1 x10E3/uL  1 4-7 0   Lymphs (Absolute) 1 3 x10E3/uL  0 7-3 1   Monocytes(Absolute) 0 3 x10E3/uL  0 1-0 9   Eos (Absolute) 0 1 x10E3/uL  0 0-0 4   Baso (Absolute) 0 0 x10E3/uL  0 0-0 2   Immature Granulocytes 0 %     Immature Grans (Abs) 0 0 x10E3/uL  0 0-0 1     (1) COMPREHENSIVE METABOLIC PANEL 44MLC0945 66:52ZJ Iain Dusky     Test Name Result Flag Reference   Glucose, Serum 96 mg/dL  65-99   BUN 9 mg/dL  6-20   Creatinine, Serum 0 65 mg/dL  0 57-1 00   eGFR If NonAfricn Am 115 mL/min/1 73  >59   eGFR If Africn Am 132 mL/min/1 73  >59   BUN/Creatinine Ratio 14  8-20   Sodium, Serum 144 mmol/L  136-144   **Please note reference interval change**   Potassium, Serum 3 7 mmol/L  3 5-5 2   **Please note reference interval change**   Chloride, Serum 102 mmol/L     **Please note reference interval change**   Carbon Dioxide, Total 22 mmol/L  18-29   Calcium, Serum 9 4 mg/dL  8 7-10 2   Protein, Total, Serum 6 3 g/dL  6 0-8 5   Albumin, Serum 4 6 g/dL  3 5-5 5   Globulin, Total 1 7 g/dL  1 5-4 5   A/G Ratio 2 7 H 1 1-2 5   Bilirubin, Total 0 6 mg/dL  0 0-1 2   Alkaline Phosphatase, S 54 IU/L     AST (SGOT) 21 IU/L  0-40   ALT (SGPT) 25 IU/L  0-32     (LC) Lipid Panel 28Oct2016 10:14AM [facility]     Test Name Result Flag Reference   Cholesterol, Total 161 mg/dL  100-199   Triglycerides 132 mg/dL  0-149   HDL Cholesterol 38 mg/dL L >39   According to ATP-III Guidelines, HDL-C >59 mg/dL is considered a  negative risk factor for CHD  VLDL Cholesterol John 26 mg/dL  5-40   LDL Cholesterol Calc 97 mg/dL  0-99     (1) TSH 28Oct2016 10:14AM [facility]     Test Name Result Flag Reference   TSH 1 320 uIU/mL  0 450-4 500     (1) VITAMIN D 25-[facility]DROXY [facility]2016 10:[facility]     Test Name Result Flag Reference   Vitamin D, 25-Hydroxy 46 6 ng/mL  30 0-100 0   Vitamin D deficiency has been defined by the [address] [facility] [address] practice guideline as a  level of serum 25-OH vitamin D less than 20 ng/mL (1,2)  The Endocrine Society went on to further define vitamin D  insufficiency as a level between 21 and 29 ng/mL (2)  1  IOM ([place] of Medicine)  2010  Dietary reference     intakes for calcium and D  [address] [facility] The     [place] Overnight  2  Shahida MF, Caorlina PATTERSON, Vinicio KAY, et al      Evaluation, treatment, and prevention of vitamin D     deficiency: an Endocrine Society clinical practice     guideline  JCEM  2011 Jul; 96(7):1911-30       (1) THYROXINE 28Oct2016 10:14AM [facility]     Test Name Result Flag Reference   Thyroxine (T4) 7 0 ug/dL  4 5-12 0     (LC) Ambig Elle Has AIZ33 Default [id number] 10:14AM Yanes Dirk     Test Name Result Flag Reference   Clifford Posadas CMP14 Default Comment     A hand-written panel/profile was received from your office  In  accordance with the [facility] Ambiguous Test Code Policy dated July 4911, we have completed your order by using the closest currently  or formerly recognized AMA panel  We have assigned Comprehensive  Metabolic Panel (14), Test Code #373680 to this request   If this  is not the testing you wished to receive on this specimen, please  contact the [address] [place] [address] Client Inquiry/Technical Services Department  to clarify the test order  We appreciate your business  [facility]) Clifford Posadas LP Default [id number] 10:14AM Yanes Dirk     Test Name Result Flag Reference   Paolo Parks LP Default Comment     A hand-written panel/profile was received from your office  In  accordance with the [facility] Ambiguous Test Code Policy dated July 4297, we have completed your order by using the closest currently  or formerly recognized AMA panel  We have assigned Lipid Panel,  Test Code #470724 to this request  If this is not the testing you  wished to receive on this specimen, please contact the [address] [place] [address]  Client Inquiry/Technical Services Department to clarify the test  order  We appreciate your business

## 2018-01-16 NOTE — PROGRESS NOTES
Assessment    1  Acute recurrent maxillary sinusitis (461 0) (J01 01)   2  Acute bronchitis, unspecified organism (466 0) (J20 9)   3  Benign essential hypertension (401 1) (I10)    Plan  Acute recurrent maxillary sinusitis    · Azithromycin 250 MG Oral Tablet; TAKE 2 TABLETS ON DAY 1 THEN TAKE 1  TABLET A DAY FOR 4 DAYS   · Call [phone] if: The sinus pain is not better in 1 week ; Status:Complete;   Done:  [id number]   · Seek Immediate Medical Attention if: You have a severe headache that will not go away ;  Status:Complete;   Done: 03ZSO2732     Return in 7-10 days if not improved     Discussion/Summary    I advised the patient to increase fluids and rest  She can take over-the-counter medications but should avoid those with decongestants  A cough medicine such as Delsym is advised to cough worsens  I started her on antibiotic and if she is not improved in 7-10 days she will return to be a this for further evaluation  Possible side effects of new medications were reviewed with the patient/guardian today  The treatment plan was reviewed with the patient/guardian  The patient/guardian understands and agrees with the treatment plan      Chief Complaint    1  Cough   2  Ear Pain   3  Nasal Symptoms  Patient states that symptoms started 2 days ago  She says that pain is in both ears and in the glands of her neck  History of Present Illness  HPI: Sick for one day with sore throat, tickle, pressure in both ears and a new cough today  No fever  Night sweats for the past few weeks  Took Sudafed  No change  Her blood pressure is well developed well today considering that she is taking a decongestant   Nasal Symptoms:   Jalil Reyes presents with complaints of gradual onset of constant episodes of bilateral nasal symptoms  Episodes started about 2 days ago  Her symptoms are caused by no known event  Symptoms are made worse by bending forward  Symptoms are worsening     Associated symptoms include nasal congestion, clear nasal discharge, itchy nose, postnasal drainage, sneezing, cough, ear discomfort, facial pain, headache, hoarseness and sore throat, but no fever  Review of Systems    Constitutional: feeling poorly and feeling tired, but no fever and no chills  ENT: earache, sore throat, nasal discharge and hoarseness  Cardiovascular: no chest pain and no palpitations  Respiratory: cough  Gastrointestinal: no abdominal pain, no nausea and no diarrhea  Musculoskeletal: no arthralgias and no myalgias  Neurological: headache and dizziness  Active Problems    1  Acute maxillary sinusitis (461 0) (J01 00)   2  Acute pharyngitis (462) (J02 9)   3  Acute upper respiratory infection (465 9) (J06 9)   4  Allergic rhinitis (477 9) (J30 9)   5  Arthritis (716 90) (M19 90)   6  Asthma (493 90) (J45 909)   7  Breast hypertrophy (611 1) (N62)   8  Carpal tunnel syndrome, bilateral (354 0) (G56 01,G56 02)   9  Chest pressure (786 59) (R07 89)   10  Chest tightness (786 59) (R07 89)   11  Chronic low back pain (724 2,338 29) (M54 5,G89 29)   12  Chronic sinusitis (473 9) (J32 9)   13  Depression (311) (F32 9)   14  Depression with anxiety (300 4) (F41 8)   15  Dermatitis, eczematoid (692 9) (L30 9)   16  Dysfunctional uterine bleeding (626 8) (N93 8)   17  Esophageal reflux (530 81) (K21 9)   18  Esophageal spasm (530 5) (K22 4)   19  Eustachian tube dysfunction (381 81) (H69 80)   20  Fatigue (780 79) (R53 83)   21  Flu vaccine need (V04 81) (Z23)   22  Hidradenitis suppurativa (705 83) (L73 2)   23  Hypercholesterolemia (272 0) (E78 0)   24  Hyperlipidemia (272 4) (E78 5)   25  Hypertension (401 9) (I10)   26  Irritable bowel syndrome (564 1) (K58 9)   27  Mammographic microcalcification (793 81) (R92 0)   28  Morbid obesity (278 01) (E66 01)   29  Neck pain (723 1) (M54 2)   30  Obstructive sleep apnea (327 23) (G47 33)   31  Otitis externa (380 10) (H60 90)   32   RBBB (right bundle branch block) (426 4) (I45 10)   33  ROM (right otitis media) (382 9) (H66 91)   34  Sleep disturbances (780 50) (G47 9)   35  Thyroid disorder (246 9) (E07 9)   36  Thyroid nodule (241 0) (E04 1)   37  Vertigo (780 4) (R42)    Past Medical History    1  History of Acute bronchitis (466 0) (J20 9)   2  History of Acute bronchospasm (519 11) (J98 01)   3  History of Acute frontal sinusitis (461 1) (J01 10)   4  History of Acute sinusitis (461 9) (J01 90)   5  History of Ankle pain, left (719 47) (M25 572)   6  History of Back pain (724 5) (M54 9)   7  History of Bronchitis (490) (J40)   8  History of Cellulitis of trunk (682 2) (L03 319)   9  History of Chest pain (786 50) (R07 9)   10  History of Chronic cough (786 2) (R05)   11  History of Chronic diarrhea (787 91) (K52 9)   12  History of Cough (786 2) (R05)   13  History of Eustachian tube disorder (381 9) (H69 90)   14  History of pregnancy (V13 29)   15  History of sinusitis (V12 69) (Z87 09)   16  History of thyroid nodule (V12 29) (Z86 39)   17  History of varicella (V12 09) (Z86 19)   18  History of Influenza A (487 1) (J10 1)   19  History of Insomnia (780 52) (G47 00)   20  History of Low back pain (724 2) (M54 5)   21  History of Lyme disease (088 81) (A69 20)   22  History of Mastodynia (611 71) (N64 4)   23  History of Menarche (V21 8)   24  History of Otitis media, left (382 9) (H66 92)   25  History of Pain, joint, shoulder (719 41) (M25 519)   26  History of Upper respiratory infection (465 9) (J06 9)  Active Problems And Past Medical History Reviewed: The active problems and past medical history were reviewed and updated today  Family History    1  Family history of Hypercholesterolemia   2  Family history of Hypertension (V17 49)    3  Family history of    4  Family history of cardiac disorder (V17 49) (Z82 49)   5  Family history of hyperlipidemia (V18 19) (Z83 49)   6  Family history of hypertension (V17 49) (Z82 49)   7   Family history of Myocardial Infarction Arrhythmias    8  Family history of Crohn's (Granulomatous) Colitis   9  Family history of arthritis (V17 7) (Z82 61)  Family History Reviewed: The family history was reviewed and updated today  Social History    · Always uses seat belt   · Being A Social Drinker   · Marital History - Currently    · Never A Smoker   · No drug use   · Sexually Active  The social history was reviewed and updated today  The social history was reviewed and is unchanged  Surgical History    1  History of Breast Surgery Puncture Aspiration Of Cyst   2  History of  Section   3  History of Cholecystectomy Laparoscopic  Surgical History Reviewed: The surgical history was reviewed and updated today  Current Meds   1  Atorvastatin Calcium 40 MG Oral Tablet; TAKE ONE TABLET BY MOUTH ONCE DAILY; Therapy: 05Kpw7288 to (Last Job Kylah)  Requested for: 50Pxa1213 Ordered   2  Cyclobenzaprine HCl - 10 MG Oral Tablet; TAKE 1 TABLET 3 TIMES DAILY AS NEEDED; Therapy: 18JAZ8456 to (Melanie Garcia)  Requested for: 97ZBT9421; Last   Rx:2015 Ordered   3  Desoximetasone 0 25 % External Ointment; APPLY AND GENTLY MASSAGE INTO   AFFECTED AREA(S) TWICE DAILY; Therapy: 86Ggi7730 to (Last Job Kylah)  Requested for: 82Kju5553 Ordered   4  Metoprolol Succinate ER 25 MG Oral Tablet Extended Release 24 Hour; TAKE ONE   TABLET BY MOUTH ONCE DAILY; Therapy: 87CNI5518 to (Last Rx:2015)  Requested for: 38XEP6666 Ordered   5  Multi-Day Oral Tablet; Therapy: (Recorded:2014) to Recorded   6  Pantoprazole Sodium 40 MG Oral Tablet Delayed Release; Take 1 tablet daily; Therapy: 62VIX6702 to (Evaluate:50Wci3042)  Requested for: 40Xrl5581; Last   Rx:56Krg3578 Ordered   7  ProAir  (90 Base) MCG/ACT Inhalation Aerosol Solution; INHALE 1 TO 2 PUFFS   EVERY 4 TO 6 HOURS AS NEEDED; Therapy: 42RYH9039 to (Blanca Prudent)  Requested for: 39VQP5387; Last   Rx:04Hph0317 Ordered   8   PROzac 20 MG Oral Capsule; TAKE 1 CAPSULE Daily; Therapy: 88LPG9799 to Recorded   9  Vitamin B-12 1000 MCG Oral Tablet; TAKE 3 TABLET Twice daily; Therapy: (Recorded:35Tcz0864) to Recorded   10  Vitamin C 500 MG Oral Capsule; TAKE 1 CAPSULE DAILY; Therapy: (Recorded:22Cii9477) to Recorded   11  Vitamin D3 5000 UNIT Oral Capsule; Take 1 capsule twice daily; Therapy: (Recorded:73Tjd9981) to Recorded    The medication list was reviewed and updated today  Allergies    1  Amoxicillin TABS   2  Neurontin CAPS   3  Amoxicillin CAPS   4  doxycycline   5  Penicillins    Vitals   Recorded: 12CNT6172 01:39PM   Temperature 97 2 F   Heart Rate 76   Systolic 135   Diastolic 84   Height 5 ft 7 in   Weight 250 lb 6 08 oz   BMI Calculated 39 21   BSA Calculated 2 22   O2 Saturation 98     Physical Exam    Constitutional   General appearance: No acute distress, well appearing and well nourished  Head and Face   Head and face: Normal     Palpation of the face and sinuses: Abnormal   Examination of the Sinuses: right maxillary tenderness, left maxillary tenderness, right ethmoid tenderness and left ethmoid tenderness  Eyes   Conjunctiva and lids: No swelling, erythema or discharge  Pupils and irises: Equal, round, reactive to light  Ears, Nose, Mouth, and Throat   External inspection of ears and nose: Normal     Otoscopic examination: Tympanic membranes translucent with normal light reflex  Canals patent without erythema  Nasal mucosa, septum, and turbinates: Normal without edema or erythema  Lips, teeth, and gums: Normal, good dentition  Oropharynx: Normal with no erythema, edema, exudate or lesions  Neck   Neck: Abnormal   Pain on the lateral right neck  Pulmonary   Auscultation of lungs: Clear to auscultation  Cardiovascular   Auscultation of heart: Normal rate and rhythm, normal S1 and S2, no murmurs  Abdomen   Abdomen: Non-tender, no masses  Liver and spleen: No hepatomegaly or splenomegaly  Lymphatic   Palpation of lymph nodes in neck: No lymphadenopathy         Signatures   Electronically signed by : Dick Rodriguez DO; Jan 21 2016 11:12AM EST                       (Author)

## 2018-01-23 VITALS
DIASTOLIC BLOOD PRESSURE: 60 MMHG | RESPIRATION RATE: 20 BRPM | SYSTOLIC BLOOD PRESSURE: 123 MMHG | TEMPERATURE: 99.3 F | HEIGHT: 67 IN | HEIGHT: 67 IN | WEIGHT: 270 LBS | BODY MASS INDEX: 42.38 KG/M2 | SYSTOLIC BLOOD PRESSURE: 160 MMHG | TEMPERATURE: 98.8 F | HEART RATE: 96 BPM | HEART RATE: 122 BPM | OXYGEN SATURATION: 98 % | RESPIRATION RATE: 20 BRPM | DIASTOLIC BLOOD PRESSURE: 86 MMHG | BODY MASS INDEX: 42.38 KG/M2 | WEIGHT: 270 LBS | OXYGEN SATURATION: 100 %

## 2018-01-24 VITALS
HEIGHT: 67 IN | WEIGHT: 269.25 LBS | SYSTOLIC BLOOD PRESSURE: 122 MMHG | OXYGEN SATURATION: 98 % | TEMPERATURE: 98.4 F | RESPIRATION RATE: 17 BRPM | DIASTOLIC BLOOD PRESSURE: 86 MMHG | HEART RATE: 90 BPM | BODY MASS INDEX: 42.26 KG/M2

## 2018-01-24 VITALS
HEART RATE: 88 BPM | WEIGHT: 272 LBS | DIASTOLIC BLOOD PRESSURE: 74 MMHG | SYSTOLIC BLOOD PRESSURE: 106 MMHG | BODY MASS INDEX: 42.6 KG/M2

## 2018-01-24 NOTE — PROGRESS NOTES
Assessment    1  Benign essential hypertension (401 1) (I10)   2  Hyperlipidemia (272 4) (E78 5)   3  Thyroid disorder (246 9) (E07 9)   4  History of Epidermoid cyst of skin of shoulder (706 2) (L72 0)    Plan   Flu vaccine need    · Fluzone Quadrivalent 0 5 ML Intramuscular Suspension  PMH: Epidermoid cyst of skin of shoulder    · 2 - Velna Barker  (General Surgery) Physician Referral  Consult  Status: Active   Requested for: 54MMN5092  Care Summary provided  : Yes    Epidermoid cyst of skin of shoulder (706 2) (L72 0)          Discussion/Summary  The treatment plan was reviewed with the patient/guardian  The patient/guardian understands and agrees with the treatment plan   The patient was counseled regarding instructions for management, patient and family education, impressions  Chief Complaint  Pt presents today with her daughter for f/u visit for HTN, hyperlipidemia, fatigue thyroid disorder and review lab results  History of Present Illness  follow up visit  noticed lump in L arm/shoulder where she got tetanus booster shot in the summer, but past 2 weeks seems that it got bigger and has been hurting  states same thing happened in same arm years ago with tetanus booster and had to have the cyst removed  interval hx- going to have EMG testing of lower extremities for her chronic back problems, was told probably needs surgery, but is too young to do it now, so they are trying other means of managing sx      Review of Systems    Constitutional: No fever, no chills, feels well, no tiredness, no recent weight gain or weight loss  Eyes: No complaints of eye pain, no red eyes, no eyesight problems, no discharge, no dry eyes, no itching of eyes     ENT: constant pain and pressure in R ear, at times tinnitus, 6 months sinc etube came out of that ear and hole not closing, ENT wants to watch for about 6 months more and then perform surgery if not better, but no nosebleeds, no sore throat and no hoarseness  Cardiovascular: No complaints of slow heart rate, no fast heart rate, no chest pain, no palpitations, no leg claudication, no lower extremity edema  Respiratory: No complaints of shortness of breath, no wheezing, no cough, no SOB on exertion, no orthopnea, no PND  Gastrointestinal: No complaints of abdominal pain, no constipation, no nausea or vomiting, no diarrhea, no bloody stools  Musculoskeletal: as noted in HPI, no joint swelling, no limb pain and no limb swelling  Integumentary: No complaints of skin rash or lesions, no itching, no skin wounds, no breast pain or lump  Neurological: No complaints of headache, no confusion, no convulsions, no numbness, no dizziness or fainting, no tingling, no limb weakness, no difficulty walking  Psychiatric: Not suicidal, no sleep disturbance, no anxiety or depression, no change in personality, no emotional problems  Endocrine: No complaints of proptosis, no hot flashes, no muscle weakness, no deepening of the voice, no feelings of weakness  Hematologic/Lymphatic: No complaints of swollen glands, no swollen glands in the neck, does not bleed easily, does not bruise easily  Active Problems    1  Allergic rhinitis (477 9) (J30 9)   2  Arthritis (716 90) (M19 90)   3  Asthma (493 90) (J45 909)   4  Benign essential hypertension (401 1) (I10)   5  Breast hypertrophy (611 1) (N62)   6  Carpal tunnel syndrome, bilateral (354 0) (G56 03)   7  Chest pressure (786 59) (R07 89)   8  Chest tightness (786 59) (R07 89)   9  Chronic low back pain (724 2,338 29) (M54 5,G89 29)   10  Chronic sinusitis (473 9) (J32 9)   11  Depression (311) (F32 9)   12  Depression with anxiety (300 4) (F41 8)   13  Dermatitis, eczematoid (692 9) (L30 9)   14  Dysfunctional uterine bleeding (626 8) (N93 8)   15  Esophageal reflux (530 81) (K21 9)   16  Esophageal spasm (530 5) (K22 4)   17  Eustachian tube dysfunction (381 81) (H69 80)   18  Fatigue (780 79) (R53 83)   19  Fibromyalgia (729 1) (M79 7)   20  Flu vaccine need (V04 81) (Z23)   21  Hidradenitis suppurativa (705 83) (L73 2)   22  Hypercholesterolemia (272 0) (E78 00)   23  Hyperlipidemia (272 4) (E78 5)   24  Hypertension (401 9) (I10)   25  Irritable bowel syndrome (564 1) (K58 9)   26  Left ear pain (388 70) (H92 02)   27  Mammographic microcalcification (793 81) (R92 0)   28  Morbid obesity (278 01) (E66 01)   29  Mouth ulcers (528 9) (K12 1)   30  Neck pain (723 1) (M54 2)   31  Need for diphtheria-tetanus-pertussis (Tdap) vaccine, adult/adolescent (V06 1) (Z23)   32  Nevus of back (216 5) (D22 5)   33  Obstructive sleep apnea (327 23) (G47 33)   34  Preop examination (V72 84) (Z01 818)   35  RBBB (right bundle branch block) (426 4) (I45 10)   36  Sleep disturbances (780 50) (G47 9)   37  Thyroid disorder (246 9) (E07 9)   38  Thyroid nodule (241 0) (E04 1)   39  Vertigo (780 4) (R42)   40  Visit for screening mammogram (V76 12) (Z12 31)   41  Vitamin D insufficiency (268 9) (E55 9)    Past Medical History    1  History of Acute bronchitis (466 0) (J20 9)   2  History of Acute bronchitis, unspecified organism (466 0) (J20 9)   3  History of Acute bronchospasm (519 11) (J98 01)   4  History of Acute frontal sinusitis (461 1) (J01 10)   5  History of Acute maxillary sinusitis (461 0) (J01 00)   6  History of Acute recurrent maxillary sinusitis (461 0) (J01 01)   7  History of Acute sinusitis (461 9) (J01 90)   8  History of Acute upper respiratory infection (465 9) (J06 9)   9  History of Ankle pain, left (719 47) (M25 572)   10  History of Back pain (724 5) (M54 9)   11  History of Bronchitis (490) (J40)   12  History of Cellulitis of trunk (682 2) (L03 319)   13  History of Chest pain (786 50) (R07 9)   14  History of Chronic cough (786 2) (R05)   15  History of Chronic diarrhea (787 91) (K52 9)   16  History of Cough (786 2) (R05)   17  History of Edema of eyelid, right (374 82) (H02 843)   18   History of Eustachian tube disorder (381 9) (H69 90)   19  History of acute pharyngitis (V12 69) (Z87 09)   20  History of pregnancy (V13 29)   21  History of sinusitis (V12 69) (Z87 09)   22  History of thyroid nodule (V12 29) (Z86 39)   23  History of varicella (V12 09) (Z86 19)   24  History of Hordeolum externum of right eye (373 11) (H00 013)   25  History of Influenza A (487 1) (J10 1)   26  History of Insomnia (780 52) (G47 00)   27  History of Low back pain (724 2) (M54 5)   28  History of Lyme disease (088 81) (A69 20)   29  History of Mastodynia (611 71) (N64 4)   30  History of Menarche (V21 8)   31  History of Otitis externa (380 10) (H60 90)   32  History of Otitis media, left (382 9) (H66 92)   33  History of Pain, joint, shoulder (719 41) (M25 519)   34  History of ROM (right otitis media) (382 9) (H66 91)   35  History of Upper respiratory infection (465 9) (J06 9)    The active problems and past medical history were reviewed and updated today  Surgical History    1  History of Breast Surgery Puncture Aspiration Of Cyst   2  History of  Section   3  History of Cholecystectomy Laparoscopic   4  History of Shoulder Surgery Right    The surgical history was reviewed and updated today  Family History  Mother    1  Family history of Hypertension (V17 49)   2  Family history of Pure Hypercholesterolemia  Father    3  Family history of    4  Family history of cardiac disorder (V17 49) (Z82 49)   5  Family history of hyperlipidemia (V18 19) (Z83 49)   6  Family history of hypertension (V17 49) (Z82 49)   7  Family history of Myocardial Infarction Arrhythmias  Brother    8  Family history of Crohn's (Granulomatous) Colitis   9  Family history of arthritis (V17 7) (Z82 61)    The family history was reviewed and updated today         Social History    · Always uses seat belt   · Being A Social Drinker   · Marital History - Currently    · Never a smoker   · No drug use   · Sexually Active  The social history was reviewed and updated today  Current Meds   1  Atorvastatin Calcium 40 MG Oral Tablet; TAKE ONE TABLET BY MOUTH ONCE DAILY; Therapy: 65Yib1207 to (Last Rx:99Xqu0772)  Requested for: 01Aug2016 Ordered   2  DrRx Medrol Dose Pack 4 MG #21; Therapy: (Recorded:28Nov2016) to Recorded   3  Elavil 10 MG TABS; TAKE 1 TABLET AT BEDTIME; Therapy: (Paula Byers) to Recorded   4  Metoprolol Succinate ER 25 MG Oral Tablet Extended Release 24 Hour; TAKE 1 TABLET   ONCE DAILY; Therapy: 27ZPT7005 to (Evaluate:28Jan2017)  Requested for: 01Aug2016; Last   Rx:01Aug2016 Ordered   5  Multi-Day Oral Tablet; Therapy: (Recorded:05Jun2014) to Recorded   6  Naltrexone HCl Powder; Therapy: (Recorded:28Nov2016) to Recorded   7  Pantoprazole Sodium 40 MG Oral Tablet Delayed Release; Take 1 tablet daily; Therapy: 81YFR4868 to (Evaluate:28Jan2017)  Requested for: 01Aug2016; Last   Rx:01Aug2016 Ordered   8  ProAir  (90 Base) MCG/ACT Inhalation Aerosol Solution; INHALE 1 TO 2 PUFFS   EVERY 4 TO 6 HOURS AS NEEDED; Therapy: 71DUV1073 to (Brendon Cavanaugh)  Requested for: 48EWR0230; Last   Rx:35Qoa9221 Ordered   9  PROzac 20 MG Oral Capsule; TAKE 1 CAPSULE Daily; Therapy: 12BPY8545 to Recorded   10  Vitamin B-12 1000 MCG Oral Tablet; TAKE 3 TABLET Twice daily; Therapy: (Recorded:73Kbt9540) to Recorded   11  Vitamin C 500 MG Oral Capsule; TAKE 1 CAPSULE DAILY; Therapy: (Recorded:57Llz5897) to Recorded   12  Vitamin D3 5000 UNIT Oral Capsule; Take 1 capsule twice daily; Therapy: (Recorded:82Cdn1924) to Recorded    The medication list was reviewed and updated today  Allergies    1  Amoxicillin TABS   2  Neurontin CAPS   3  Amoxicillin CAPS   4  doxycycline   5   Penicillins    Vitals  Vital Signs    Recorded: 48RBD8020 12:59PM   Temperature 97 6 F   Heart Rate 93   Systolic 976   Diastolic 80   Height 5 ft 7 in   Weight 256 lb    BMI Calculated 40 1   BSA Calculated 2 25   O2 Saturation 98 Physical Exam    Constitutional   General appearance: Abnormal   appears healthy, comfortable, morbidly obese, rested, not exhausted, well hydrated and appearance reflects stated age  Eyes   Conjunctiva and lids: No swelling, erythema or discharge  Pupils and irises: Equal, round and reactive to light  Ears, Nose, Mouth, and Throat   External inspection of ears and nose: Normal     Otoscopic examination: Tympanic membranes translucent with normal light reflex  Canals patent without erythema  Oropharynx: Normal with no erythema, edema, exudate or lesions  Pulmonary   Respiratory effort: No increased work of breathing or signs of respiratory distress  Auscultation of lungs: Clear to auscultation  Cardiovascular   Auscultation of heart: Normal rate and rhythm, normal S1 and S2, without murmurs  Examination of extremities for edema and/or varicosities: Normal     Carotid pulses: Normal     Abdomen   Abdomen: Non-tender, no masses  Liver and spleen: No hepatomegaly or splenomegaly  Lymphatic   Palpation of lymph nodes in neck: No lymphadenopathy  Musculoskeletal   Gait and station: Normal     Digits and nails: Normal without clubbing or cyanosis  Skin   Skin and subcutaneous tissue: Normal without rashes or lesions  Examination of the skin for lesions: Abnormal   A single cyst(s)  on the left shoulder  Neurologic   Reflexes: 2+ and symmetric  Sensation: No sensory loss      Psychiatric   Mood and affect: Normal          Results/Data  (LC) UA/M w/rflx Culture, Routine 28Oct2016 10:14AM Sailaja He     Test Name Result Flag Reference   Specific Gravity 1 007  1 005-1 030   pH 6 5  5 0-7 5   Urine-Color Yellow  Yellow   Appearance Clear  Clear   WBC Esterase 1+ A Negative   Protein Negative  Negative/Trace   Glucose Negative  Negative   Ketones Negative  Negative   Occult Blood Trace A Negative   Bilirubin Negative  Negative   Urobilinogen,Semi-Qn 0 2 EU/dL  0 2-1 0   Nitrite, Urine Negative  Negative   Microscopic Examination See below:     Urinalysis Reflex Comment     This specimen has reflexed to a Urine Culture  WBC 0-5 /hpf  0 -  5   RBC 0-2 /hpf  0 -  2   Epithelial Cells (non renal) 0-10 /hpf  0 - 10   Bacteria Few  None seen/Few   Urine Culture, Routine Final report     Result 1 Comment     Mixed urogenital anupama  10,000-25,000 colony forming units per mL     (1) CBC/PLT/DIFF 28Oct2016 10:14AM [facility]     Test Name Result Flag Reference   WBC 4 9 x10E3/uL  3 4-10 8   RBC 4 56 x10E6/uL  3 77-5 28   Hemoglobin 13 6 g/dL  11 1-15 9   Hematocrit 39 1 %  34 0-46  6   MCV 86 fL  79-97   MCH 29 8 pg  26 6-33 0   MCHC 34 8 g/dL  31 5-35 7   RDW 14 0 %  12 3-15 4   Platelets 600 W72N0/EC  150-379   Neutrophils 63 %     Lymphs 27 %     Monocytes 6 %     Eos 3 %     Basos 1 %     Neutrophils (Absolute) 3 1 x10E3/uL  1 4-7 0   Lymphs (Absolute) 1 3 x10E3/uL  0 7-3 1   Monocytes(Absolute) 0 3 x10E3/uL  0 1-0 9   Eos (Absolute) 0 1 x10E3/uL  0 0-0 4   Baso (Absolute) 0 0 x10E3/uL  0 0-0 2   Immature Granulocytes 0 %     Immature Grans (Abs) 0 0 x10E3/uL  0 0-0 1     (1) COMPREHENSIVE METABOLIC PANEL [id number] 36:10MF [facility]     Test Name Result Flag Reference   Glucose, Serum 96 mg/dL  65-99   BUN 9 mg/dL  6-20   Creatinine, Serum 0 65 mg/dL  0 57-1 00   eGFR If NonAfricn Am 115 mL/min/1 73  >59   eGFR If Africn Am 132 mL/min/1 73  >59   BUN/Creatinine Ratio 14  8-20   Sodium, Serum 144 mmol/L  136-144   **Please note reference interval change**   Potassium, Serum 3 7 mmol/L  3 5-5 2   **Please note reference interval change**   Chloride, Serum 102 mmol/L     **Please note reference interval change**   Carbon Dioxide, Total 22 mmol/L  18-29   Calcium, Serum 9 4 mg/dL  8 7-10 2   Protein, Total, Serum 6 3 g/dL  6 0-8 5   Albumin, Serum 4 6 g/dL  3 5-5 5   Globulin, Total 1 7 g/dL  1 5-4 5   A/G Ratio 2 7 H 1 1-2 5   Bilirubin, Total 0 6 mg/dL  0 0-1 2   Alkaline Phosphatase, S 54 IU/L     AST (SGOT) 21 IU/L  0-40   ALT (SGPT) 25 IU/L  0-32     (LC) Lipid Panel 28Oct2016 10:14AM Samantha Christian     Test Name Result Flag Reference   Cholesterol, Total 161 mg/dL  100-199   Triglycerides 132 mg/dL  0-149   HDL Cholesterol 38 mg/dL L >39   According to ATP-III Guidelines, HDL-C >59 mg/dL is considered a  negative risk factor for CHD  VLDL Cholesterol John 26 mg/dL  5-40   LDL Cholesterol Calc 97 mg/dL  0-99     (1) TSH 28Oct2016 10:14AM [facility]st[facility] Coon     Test Name Result Flag Reference   TSH 1 320 uIU/mL  0 450-4 500     (1) VITAMIN D 25-HYDROXY 28Oct2016 10:14[facility]     Test Name Result Flag Reference   Vitamin D, 25-Hydroxy 46 6 ng/mL  30 0-100 0   Vitamin D deficiency has been defined by the [address] practice guideline as a  level of serum 25-OH vitamin D less than 20 ng/mL (1,2)  The Endocrine Society went on to further define vitamin D  insufficiency as a level between 21 and 29 ng/mL (2)  1  IOM ([place] of Medicine)  2010  Dietary reference     intakes for calcium and D  [facility]: The     [employer]  2  Shahida MF, Carolina PATTERSON, Vinicio KAY, et al      Evaluation, treatment, and prevention of vitamin D     deficiency: an Endocrine Society clinical practice     guideline  JCEM  2011 Jul; 96(7):1911-30  (1) THYROXINE 28Oct2016 10:14AM [facility] Coon     Test Name Result Flag Reference   Thyroxine (T4) 7 0 ug/dL  4 5-12 0     [facility]) Kamlesh Search CMP14 Default [id number] 10:14[facility]     Test Name Result Flag Reference   Kamlesh Search CMP14 Default Comment     A hand-written panel/profile was received from your office  In  accordance with the [facility] Ambiguous Test Code Policy dated July 6477, we have completed your order by using the closest currently  or formerly recognized AMA panel    We have assigned Comprehensive  Metabolic Panel (14), Test Code #547347 to this request   If this  is not the testing you wished to receive on this specimen, please  contact the [address] [place] [address] Client Inquiry/Technical Services Department  to clarify the test order  We appreciate your business  [facility]) Corina Wilmer  Default [id number] 10:14AM Derek Sharma     Test Name Result Flag Reference   Paolo Parks LP Default Comment     A hand-written panel/profile was received from your office  In  accordance with the [facility] Ambiguous Test Code Policy dated July 2689, we have completed your order by using the closest currently  or formerly recognized AMA panel  We have assigned Lipid Panel,  Test Code #606650 to this request  If this is not the testing you  wished to receive on this specimen, please contact the [address] [place] [address]  Client Inquiry/Technical Services Department to clarify the test  order  We appreciate your business       Future Appointments    Date/Time Provider Specialty Site   01/18/2017 03:30 PM Derek Sharma DO Family Medicine [facility]     Signatures   Electronically signed by : Sarmad Ramsay DO; Jan 2 2017 11:02PM EST                       (Author)

## 2018-01-24 NOTE — PROGRESS NOTES
Assessment    1  Sinusitis (473 9) (J32 9)    Plan  Dysuria    · (1) URINE CULTURE; Source:Urine, Clean Catch; Status:Canceled;    · Urine Dip Non-Automated- POC; Status:Resulted - Requires Verification,Retrospective  By Protocol Authorization;   Done: [id number] 07:06PM  Sinusitis    · LevoFLOXacin 500 MG Oral Tablet (Levaquin); TAKE 1 TABLET DAILY UNTIL  FINISHED    Discussion/Summary  Discussion Summary:   Patient is unable to take penicillin derivatives and therefore we placed on Levaquin  We discussed Using Z-Randal however she feels that this antibiotic does not help her when she gets the above sinus infection  should be recheck by her family doctor in 1-2 days to make sure progress is being made  Medication Side Effects Reviewed: Possible side effects of new medications were reviewed with the patient/guardian today  Understands and agrees with treatment plan: The treatment plan was reviewed with the patient/guardian  The patient/guardian understands and agrees with the treatment plan   Counseling Documentation With Imm: The patient was counseled regarding instructions for management, prognosis  Follow Up Instructions: Follow Up with your Primary Care Provider in 2-3 days  If your symptoms worsen, go to the nearest [place] [address] Emergency Department  Chief Complaint    1  Sore Throat  Chief Complaint Free Text Note Form: C/o sore throat and cough with congestion x 2 days  History of Present Illness  HPI: Patient is a 40-year-old white female who began on Friday with sinus pain and then sore throat and swollen lymph nodes  She has been to her family doctor and has gotten worse since that time but initially was not given antibiotics  At the present time she is complaining of sore throat along with sinus pressure in the frontal and maxillary sinuses she denies chills or fever   Vitals here are show temperature 99 1degrees patient is allergic to multiple medications including amoxicillin Neurontin doxycycline and penicillin   Hospital Based Practices Required Assessment:   Pain Assessment   the patient states they have pain  The pain is located in the bilateral frontal and maxillary sinuses  (on a scale of 0 to 10, the patient rates the pain at 6 )    Readiness To Learn: Receptive  Barriers To Learning: none  Education Completed: disease/condition, medications and treatment/procedure   Teaching Method: verbal   Person Taught: patient   Evaluation Of Learning: verbalized/demonstrated understanding      Review of Systems  Focused-Female:   Constitutional: feeling poorly and feeling tired, but no fever and no chills  ENT: hoarseness, but no earache and no nosebleeds  Cardiovascular: no complaints of slow or fast heart rate, no chest pain, no palpitations, no leg claudication or lower extremity edema  Respiratory: cough, but no shortness of breath, no orthopnea, no wheezing, no shortness of breath during exertion and no PND  Gastrointestinal: no complaints of abdominal pain, no constipation, no nausea or diarrhea, no vomiting, no bloody stools  Integumentary: no complaints of skin rash or lesion, no itching or dry skin, no skin wounds  Neurological: no complaints of headache, no confusion, no numbness or tingling, no dizziness or fainting  Active Problems    1  Acute exacerbation of chronic low back pain (724 2,338 19,338 29) (M54 5,G89 29)   2  Acute frontal sinusitis (461 1) (J01 10)   3  Acute sinus infection (461 9) (J01 90)   4  Arthritis (716 90) (M19 90)   5  Asthma, mild intermittent (493 90) (J45 20)   6  Benign essential hypertension (401 1) (I10)   7  Breast hypertrophy (611 1) (N62)   8  Bright red blood per rectum (569 3) (K62 5)   9  Bulging lumbar disc (722 10) (M51 26)   10  Carpal tunnel syndrome, bilateral (354 0) (G56 03)   11  Cellulitis of trunk (682 2) (L03 319)   12  Chest pressure (786 59) (R07 89)   13  Chronic low back pain (724 2,338 29) (M54 5,G89 29)   14  Constipation, unspecified constipation type (564 00) (K59 00)   15  Depression (311) (F32 9)   16  Depression with anxiety (300 4) (F41 8)   17  Dermatitis, eczematoid (692 9) (L30 9)   18  Dysfunctional uterine bleeding (626 8) (N93 8)   19  Esophageal reflux (530 81) (K21 9)   20  Esophageal spasm (530 5) (K22 4)   21  Eustachian tube dysfunction (381 81) (H69 80)   22  Fatigue (780 79) (R53 83)   23  Fibromyalgia (729 1) (M79 7)   24  Flu vaccine need (V04 81) (Z23)   25  Hemorrhoids (455 6) (K64 9)   26  Hidradenitis suppurativa (705 83) (L73 2)   27  Hypercholesterolemia (272 0) (E78 00)   28  Hyperlipidemia (272 4) (E78 5)   29  Hypertension (401 9) (I10)   30  Irritable bowel syndrome (564 1) (K58 9)   31  Left ear pain (388 70) (H92 02)   32  Lipid screening (V77 91) (Z13 220)   33  Lumbosacral neuritis (724 4) (M54 17)   34  Mammographic microcalcification (793 81) (R92 0)   35  Morbid obesity (278 01) (E66 01)   36  Mouth ulcers (528 9) (K12 1)   37  Neck pain (723 1) (M54 2)   38  Need for diphtheria-tetanus-pertussis (Tdap) vaccine, adult/adolescent (V06 1) (Z23)   39  Nevus of back (216 5) (D22 5)   40  Obstructive sleep apnea (327 23) (G47 33)   41  Preop examination (V72 84) (Z01 818)   42  RBBB (right bundle branch block) (426 4) (I45 10)   43  Rectal pain (569 42) (K62 89)   44  Recurrent cellulitis (682 9) (L03 90)   45  Sleep disturbances (780 50) (G47 9)   46  Sore throat (462) (J02 9)   47  Spondylolysis (738 4) (M43 00)   48  Thyroid disorder (246 9) (E07 9)   49  Thyroid nodule (241 0) (E04 1)   50  Trigger finger, acquired (727 03) (M65 30)   51  Vertigo (780 4) (R42)   52  Visit for screening mammogram (V76 12) (Z12 31)   53  Vitamin D insufficiency (268 9) (E55 9)    Past Medical History    1  History of Abscess of left arm (682 3) (L02 414)   2  History of Abscess of right breast (611 0) (N61 1)   3  History of Acute bronchitis (466 0) (J20 9)   4   History of Acute bronchitis, unspecified organism (466 0) (J20 9)   5  History of Acute bronchospasm (519 11) (J98 01)   6  History of Acute frontal sinusitis (461 1) (J01 10)   7  History of Acute maxillary sinusitis (461 0) (J01 00)   8  History of Acute maxillary sinusitis (461 0) (J01 00)   9  History of Acute recurrent maxillary sinusitis (461 0) (J01 01)   10  History of Acute sinusitis (461 9) (J01 90)   11  History of Acute upper respiratory infection (465 9) (J06 9)   12  History of Ankle pain, left (719 47) (M25 572)   13  History of Back pain (724 5) (M54 9)   14  History of Bronchitis (490) (J40)   15  History of Chest pain (786 50) (R07 9)   16  History of Chest tightness (786 59) (R07 89)   17  History of Chronic cough (786 2) (R05)   18  History of Chronic diarrhea (787 91) (K52 9)   19  History of Cough (786 2) (R05)   20  History of Edema of eyelid, right (374 82) (H02 843)   21  History of Epidermoid cyst of skin of shoulder (706 2) (L72 0)   22  History of Eustachian tube disorder (381 9) (H69 90)   23  History of H/O laminectomy (V45 89) (Z98 890)   24  History of acute pharyngitis (V12 69) (Z87 09)   25  History of allergic rhinitis (V12 69) (Z87 09)   26  History of chronic sinusitis (V12 69) (Z87 09)   27  History of pregnancy (V13 29)   28  History of sebaceous cyst (V13 3) (Z87 2)   29  History of sinusitis (V12 69) (Z87 09)   30  History of thyroid nodule (V12 29) (Z86 39)   31  History of varicella (V12 09) (Z86 19)   32  History of Hordeolum externum of right eye (373 11) (H00 013)   33  History of Influenza A (487 1) (J10 1)   34  History of Insomnia (780 52) (G47 00)   35  History of Low back pain (724 2) (M54 5)   36  History of Lyme disease (088 81) (A69 20)   37  History of Mastodynia (611 71) (N64 4)   38  History of Menarche (V21 8)   39  History of Otitis externa (380 10) (H60 90)   40  History of Otitis media, left (382 9) (H66 92)   41  History of Pain, joint, shoulder (719 41) (M25 519)   42   History of ROM (right otitis media) (382 9) (H66 91)   43  History of Upper respiratory infection (465 9) (J06 9)  Active Problems And Past Medical History Reviewed: The active problems and past medical history were reviewed and updated today  Family History  Mother    1  Family history of Hypertension (V17 49)   2  Family history of Pure Hypercholesterolemia  Father    3  Family history of    4  Family history of cardiac disorder (V17 49) (Z82 49)   5  Family history of hyperlipidemia (V18 19) (Z83 49)   6  Family history of hypertension (V17 49) (Z82 49)   7  Family history of Myocardial Infarction Arrhythmias  Brother    8  Family history of Crohn's (Granulomatous) Colitis   9  Family history of arthritis (V17 7) (Z82 61)  Paternal Grandmother    8  Family history of thyroid disease (V18 19) (Z83 49)  Family History Reviewed: The family history was reviewed and updated today  Social History    · Always uses seat belt   · Being A Social Drinker   · Marital History - Currently    · Never a smoker   · No drug use   · Non-smoker (V49 89) (Z78 9)   · Sexually Active  Social History Reviewed: The social history was reviewed and updated today  Surgical History    1  History of Back Surgery   2  History of Breast Surgery Puncture Aspiration Of Cyst   3  History of  Section   4  History of Cholecystectomy Laparoscopic   5  History of Shoulder Surgery Right   6  History of Tympanoplasty  Surgical History Reviewed: The surgical history was reviewed and updated today  Current Meds   1  Atorvastatin Calcium 40 MG Oral Tablet; TAKE 1 TABLET DAILY; Therapy: 69Tlj8159 to (Evaluate:15Adw7325)  Requested for: 2017; Last   Rx:2017 Ordered   2  Depo-Provera 400 MG/ML Intramuscular Suspension Recorded   3  Elavil 10 MG TABS; TAKE 1 TABLET AT BEDTIME; Therapy: (Gin Littlejohn) to Recorded   4  Linzess 145 MCG Oral Capsule; Take 145 mcg by mouth daily   Take 30 minutes before   her first meal of the day; Therapy: 06GNJ5985 to Recorded   5  Metoprolol Succinate ER 25 MG Oral Tablet Extended Release 24 Hour; Take 1 tablet   daily; Therapy: 63WZC1054 to (Evaluate:06Feb2018)  Requested for: 10Aug2017; Last   Rx:10Aug2017 Ordered   6  Multi-Day Oral Tablet; Therapy: (Recorded:05Jun2014) to Recorded   7  OxyCODONE HCl - 5 MG Oral Tablet; Therapy: 90AGU0375 to Recorded   8  Pantoprazole Sodium 40 MG Oral Tablet Delayed Release; TAKE 1 TABLET ONE TIME   DAILY AS DIRECTED; Therapy: 94NXK8093 to (Evaluate:06Feb2018)  Requested for: 10Aug2017; Last   Rx:10Aug2017 Ordered   9  ProAir  (90 Base) MCG/ACT Inhalation Aerosol Solution; INHALE 1 TO 2 PUFFS   EVERY 4 TO 6 HOURS AS NEEDED; Therapy: 45SAX5028 to (Penny Hollis)  Requested for: 65ZUH2769; Last   Rx:11Feb2015 Ordered   10  PROzac 20 MG Oral Capsule; TAKE 3 CAPSULES DAILY; Therapy: 86LKQ1172 to Recorded   11  Robaxin-750 750 MG Oral Tablet; Therapy: 92PIR9652 to Recorded   12  Valium 5 MG Oral Tablet; Therapy: 86MUR0475 to Recorded   13  Vitamin B-12 1000 MCG Oral Tablet; TAKE 3 TABLET Twice daily; Therapy: (Recorded:91Syf3155) to Recorded   14  Vitamin C 500 MG Oral Capsule; TAKE 1 CAPSULE DAILY; Therapy: (Recorded:97Pyt1105) to Recorded   15  Vitamin D3 5000 UNIT Oral Capsule; Take 1 capsule twice daily; Therapy: (Recorded:01Qwl9769) to Recorded   16  ZyrTEC Allergy 10 MG Oral Tablet Disintegrating; Therapy: (Recorded:69Prj0787) to Recorded    Allergies    1  Amoxicillin TABS   2  Neurontin CAPS   3  Amoxicillin CAPS   4  doxycycline   5   Penicillins    Vitals  Signs   Recorded: 82KHO8008 07:02PM   Temperature: 99 3 F  Heart Rate: 122  Respiration: 20  Blood Pressure: 160 mm Hg  Blood Pressure: 86 mm Hg  BP Cuff Size: Large  Height: 5 ft 7 in  Weight: 270 lb   BMI Calculated: 42 29 kg/m2  BSA Calculated: 2 3 m2  O2 Saturation: 98    Physical Exam    Constitutional   General appearance: No acute distress, well appearing and well nourished  Eyes   Conjunctiva and lids: No swelling, erythema or discharge  Pupils and irises: Equal, round and reactive to light  Ears, Nose, Mouth, and Throat   External inspection of ears and nose: Normal   percussion of the frontal and maxillary sinuses produces exquisite pain  Otoscopic examination: Tympanic membranes translucent with normal light reflex  Canals patent without erythema  Nasal mucosa, septum, and turbinates: Normal without edema or erythema  Oropharynx: Abnormal   mild injection posteriorly without exudate  Profuse postnasal drainage is noted  Pulmonary   Auscultation of lungs: Clear to auscultation  Cardiovascular   Auscultation of heart: Normal rate and rhythm, normal S1 and S2, without murmurs  Skin   Skin and subcutaneous tissue: Normal without rashes or lesions      Psychiatric   Orientation to person, place, and time: Normal     Mood and affect: Normal        Results/Data  Urine Dip Non-Automated- POC 76DUQ1264 07:06PM Keena Koch     Test Name Result Flag Reference   Color Yellow     Clarity Hazy     Leukocytes LG     Nitrite NEG     Blood LG     Bilirubin NEG     Urobilinogen 0 2     Protein NEG     Ph 7 5     Specific Gravity 1 010     Ketone NEG     Glucose NEG         Future Appointments    Date/Time Provider Specialty Site   01/12/2018 11:20 AM Casimiro Llanes DO Orthopedic Surgery [place] [address] [facility]     Signatures   Electronically signed by : Holland Crowe DO; Dec 26 2017  7:15PM EST                       (Author)

## 2018-02-13 DIAGNOSIS — I10 HYPERTENSION, UNSPECIFIED TYPE: ICD-10-CM

## 2018-02-13 DIAGNOSIS — K21.9 GASTROESOPHAGEAL REFLUX DISEASE WITHOUT ESOPHAGITIS: ICD-10-CM

## 2018-02-13 DIAGNOSIS — E78.5 HYPERLIPIDEMIA, UNSPECIFIED HYPERLIPIDEMIA TYPE: Primary | ICD-10-CM

## 2018-02-13 RX ORDER — PANTOPRAZOLE SODIUM 40 MG/1
TABLET, DELAYED RELEASE ORAL
COMMUNITY
Start: 2014-10-01 | End: 2018-02-13 | Stop reason: SDUPTHER

## 2018-02-13 RX ORDER — ALBUTEROL SULFATE 90 UG/1
2 AEROSOL, METERED RESPIRATORY (INHALATION) EVERY 6 HOURS
COMMUNITY
End: 2018-02-19 | Stop reason: SDUPTHER

## 2018-02-13 RX ORDER — LANOLIN ALCOHOL/MO/W.PET/CERES
1000 CREAM (GRAM) TOPICAL DAILY
COMMUNITY

## 2018-02-13 RX ORDER — CETIRIZINE HYDROCHLORIDE 10 MG/1
TABLET ORAL
COMMUNITY
End: 2018-02-13 | Stop reason: SDUPTHER

## 2018-02-13 RX ORDER — MULTIVIT-MIN/IRON/FOLIC ACID/K 18-600-40
1 CAPSULE ORAL DAILY
COMMUNITY

## 2018-02-13 RX ORDER — AMITRIPTYLINE HYDROCHLORIDE 10 MG/1
1 TABLET, FILM COATED ORAL
COMMUNITY
End: 2019-01-15

## 2018-02-13 RX ORDER — MEDROXYPROGESTERONE ACETATE 150 MG/ML
INJECTION, SUSPENSION INTRAMUSCULAR
COMMUNITY
Start: 2017-11-07 | End: 2018-02-13 | Stop reason: SDUPTHER

## 2018-02-13 RX ORDER — MAG HYDROX/ALUMINUM HYD/SIMETH 400-400-40
1 SUSPENSION, ORAL (FINAL DOSE FORM) ORAL DAILY
COMMUNITY

## 2018-02-13 RX ORDER — ATORVASTATIN CALCIUM 40 MG/1
1 TABLET, FILM COATED ORAL DAILY
COMMUNITY
Start: 2014-09-12 | End: 2018-02-13 | Stop reason: SDUPTHER

## 2018-02-13 RX ORDER — METOPROLOL SUCCINATE 25 MG/1
1 TABLET, EXTENDED RELEASE ORAL DAILY
COMMUNITY
Start: 2015-08-15 | End: 2018-02-13 | Stop reason: SDUPTHER

## 2018-02-13 RX ORDER — METHOCARBAMOL 750 MG/1
TABLET, FILM COATED ORAL
COMMUNITY
Start: 2017-09-05 | End: 2018-02-26

## 2018-02-13 RX ORDER — DIAZEPAM 5 MG
TABLET ORAL
COMMUNITY
Start: 2017-09-05 | End: 2018-02-26

## 2018-02-13 RX ORDER — FLUOXETINE HYDROCHLORIDE 20 MG/1
3 CAPSULE ORAL DAILY
COMMUNITY
Start: 2015-01-28 | End: 2018-07-12 | Stop reason: SDUPTHER

## 2018-02-13 RX ORDER — LINACLOTIDE 145 UG/1
CAPSULE, GELATIN COATED ORAL
COMMUNITY
Start: 2017-07-13 | End: 2018-02-26

## 2018-02-13 RX ORDER — METOPROLOL SUCCINATE 25 MG/1
25 TABLET, EXTENDED RELEASE ORAL DAILY
Qty: 90 TABLET | Refills: 0 | Status: SHIPPED | OUTPATIENT
Start: 2018-02-13 | End: 2018-03-16 | Stop reason: SDUPTHER

## 2018-02-13 RX ORDER — PANTOPRAZOLE SODIUM 40 MG/1
40 TABLET, DELAYED RELEASE ORAL DAILY
Qty: 90 TABLET | Refills: 0 | Status: SHIPPED | OUTPATIENT
Start: 2018-02-13 | End: 2018-03-16 | Stop reason: SDUPTHER

## 2018-02-13 RX ORDER — MEDROXYPROGESTERONE ACETATE 400 MG/ML
INJECTION, SUSPENSION INTRAMUSCULAR
COMMUNITY
End: 2019-11-13

## 2018-02-13 RX ORDER — ATORVASTATIN CALCIUM 40 MG/1
40 TABLET, FILM COATED ORAL DAILY
Qty: 90 TABLET | Refills: 0 | Status: SHIPPED | OUTPATIENT
Start: 2018-02-13 | End: 2018-03-16 | Stop reason: SDUPTHER

## 2018-02-19 ENCOUNTER — OFFICE VISIT (OUTPATIENT)
Dept: ENDOCRINOLOGY | Facility: CLINIC | Age: 38
End: 2018-02-19
Payer: COMMERCIAL

## 2018-02-19 VITALS
WEIGHT: 274.6 LBS | BODY MASS INDEX: 43.1 KG/M2 | HEART RATE: 68 BPM | HEIGHT: 67 IN | DIASTOLIC BLOOD PRESSURE: 80 MMHG | SYSTOLIC BLOOD PRESSURE: 120 MMHG

## 2018-02-19 DIAGNOSIS — E04.1 THYROID NODULE: Primary | ICD-10-CM

## 2018-02-19 PROCEDURE — 99213 OFFICE O/P EST LOW 20 MIN: CPT | Performed by: INTERNAL MEDICINE

## 2018-02-19 NOTE — PROGRESS NOTES
Victorino Mena 45 y o  female MRN: [id number]    Encounter: [id number]      Assessment/Plan     Problem List Items Addressed This Visit     Thyroid nodule - Primary    Relevant Orders    T4, free Lab Collect    TSH, 3rd generation Lab Collect    US thyroid        CC:   Thyroid nodule    History of Present Illness     HPI:   69-year-old woman here to follow-up on thyroid nodule  She underwent  FNAB of right lower pole thyroid nodule in  June which was negative for malignancy   No first degree family history of thyroid cancer - no h/o RT to neck  No obstructive symptoms -    has had spinal fusion surgery 5 months back - continues to have pain  Physical activity  Limited sec to pain   Review of Systems   Constitutional: Positive for fatigue and unexpected weight change  Eyes: Negative for visual disturbance  Respiratory: Negative for cough and shortness of breath  Cardiovascular: Negative for chest pain, palpitations and leg swelling  Gastrointestinal: Negative for abdominal distention, abdominal pain, diarrhea, nausea and vomiting  Endocrine: Negative for polyuria  Musculoskeletal: Positive for arthralgias, back pain and myalgias  Skin: Negative for color change, pallor and rash  Psychiatric/Behavioral: Negative for agitation, behavioral problems and confusion  All other systems reviewed and are negative        Historical Information   Past Medical History:   Diagnosis Date    Anxiety     Arthritis     Asthma     Chronic pain disorder     BACK SHOULDERS NECK    Depression     Disease of thyroid gland     nodule    Disorder of heart muscle (HCC)     weakened heart muscle    Fibromyalgia, primary     GERD (gastroesophageal reflux disease)     Hearing difficulty of right ear     Hyperlipidemia     Hypertension     Irritable bowel syndrome     Migraine     Motion sickness     Perforation of tympanic membrane     Right    PONV (postoperative nausea and vomiting)     RBBB     Sinusitis     Sleep apnea     no cpap    Wears glasses      Past Surgical History:   Procedure Laterality Date    BACK SURGERY      lumbar herniated disc    BREAST SURGERY      cyst removal    CARPAL TUNNEL RELEASE       SECTION      CHOLECYSTECTOMY      CYST REMOVAL      DENTAL SURGERY      OH COLONOSCOPY FLX DX W/COLLJ SPEC WHEN PFRMD N/A 2017    Procedure: COLONOSCOPY;  Surgeon: Katya Ely MD;  [facility]; Service: Gastroenterology    OH TYMPANOPLASTY Right 2017    Procedure: TYMPANOPLASTY WITH PERICHONDRIN GRAFT;  Surgeon:  Mikaela Ballard DO;  [facility];  Service: ENT    SHOULDER SURGERY Right     WISDOM TOOTH EXTRACTION       Social History   History   Alcohol Use    Yes     Comment: rarely     History   Drug Use No     History   Smoking Status    Never Smoker   Smokeless Tobacco    Never Used     Family History:   Family History   Problem Relation Age of Onset    Hypertension Mother     Hyperlipidemia Mother     Hypertension Father     Hyperlipidemia Father     Diabetes unspecified Maternal Grandmother        Meds/Allergies   Current Outpatient Prescriptions   Medication Sig Dispense Refill    albuterol (PROAIR HFA) 90 mcg/act inhaler Inhale 2 puffs every 6 (six) hours as needed for wheezing      amitriptyline (ELAVIL) 10 mg tablet Take 1 tablet by mouth      Ascorbic Acid (VITAMIN C) 500 MG CAPS Take 1 capsule by mouth daily      atorvastatin (LIPITOR) 40 mg tablet Take 1 tablet (40 mg total) by mouth daily 90 tablet 0    cetirizine (ZyrTEC) 10 mg tablet Take 10 mg by mouth daily      Cholecalciferol (VITAMIN D3) 5000 units CAPS Take 1 capsule by mouth 2 (two) times a day      cyanocobalamin (VITAMIN B-12) 1,000 mcg tablet Take 3 tablets by mouth 2 (two) times a day      FLUoxetine (PROZAC) 20 mg capsule Take 3 capsules by mouth daily      hydrocortisone (ANUSOL-HC, PROCTOSOL HC,) 2 5 % rectal cream Insert 1 Applicatorful into the rectum 3 (three) times a day      ibuprofen (MOTRIN) 200 mg tablet Take 200 mg by mouth every 6 (six) hours as needed for mild pain      medroxyPROGESTERone (DEPO-PROVERA) 400 MG/ML SUSP Inject into the shoulder, thigh, or buttocks      metoprolol succinate (TOPROL-XL) 25 mg 24 hr tablet Take 1 tablet (25 mg total) by mouth daily 90 tablet 0    Multiple Vitamins-Minerals (CENTRUM ADULTS PO) Take 1 tablet by mouth daily      pantoprazole (PROTONIX) 40 mg tablet Take 1 tablet (40 mg total) by mouth daily 90 tablet 0    diazepam (VALIUM) 5 mg tablet Take by mouth      Linaclotide (LINZESS) 145 MCG CAPS Take by mouth      methocarbamol (ROBAXIN-750) 750 mg tablet Take by mouth       No current facility-administered medications for this visit  Allergies   Allergen Reactions    Amoxicillin Swelling    Neurontin [Gabapentin] Other (See Comments)     suicidal    Penicillins Swelling    Doxycycline Hives    Other Hives     Adhesive tape       Objective   Vitals: Blood pressure 120/80, pulse 68, height 5' 7" (1 702 m), weight 125 kg (274 lb 9 6 oz)  Physical Exam   Constitutional: She is oriented to person, place, and time  She appears well-developed and well-nourished  No distress  HENT:   Head: Normocephalic and atraumatic  Mouth/Throat: No oropharyngeal exudate  Eyes: EOM are normal  No scleral icterus  Neck: Normal range of motion  Neck supple  No thyromegaly present  Cardiovascular: Normal rate, regular rhythm and normal heart sounds  No murmur heard  Pulmonary/Chest: Effort normal and breath sounds normal  No respiratory distress  She has no wheezes  She has no rales  Abdominal: Soft  Bowel sounds are normal  She exhibits no distension  There is no tenderness  There is no rebound  Musculoskeletal: Normal range of motion  She exhibits no edema  Lymphadenopathy:     She has no cervical adenopathy  Neurological: She is alert and oriented to person, place, and time     Skin: Skin is warm and dry  No rash noted  No erythema  No pallor  Psychiatric: She has a normal mood and affect  Her behavior is normal  Judgment and thought content normal        The history was obtained from the review of the chart, patient  Lab Results:   Lab Results   Component Value Date/Time    TSH 3RD GENERATON 1 210 04/05/2017 10:34 AM    Free T4 7 4 04/05/2017 10:34 AM     6/12/17  Final Diagnosis   A - B  Thyroid, Right, lower pole: (Thin prep and smear preparations)  Negative for malignancy ([place] Category II) - See note  Findings consistent with a benign follicular nodule  Groups of benign follicular cells and  colloid  Imaging Studies:   Results for orders placed during the hospital encounter of 03/22/17   US thyroid    Impression    Slight interval increase in size of right lower pole nodule  Workstation performed: [id number]         I have personally reviewed pertinent reports  Portions of the record may have been created with voice recognition software  Occasional wrong word or "sound a like" substitutions may have occurred due to the inherent limitations of voice recognition software  Read the chart carefully and recognize, using context, where substitutions have occurred

## 2018-02-19 NOTE — ASSESSMENT & PLAN NOTE
Will repeat thyroid ultrasound to monitor for any changes in the size or characteristics of the nodule    Will also check thyroid function test

## 2018-02-21 LAB
T4 FREE SERPL-MCNC: 1.15 NG/DL (ref 0.82–1.77)
TSH SERPL DL<=0.005 MIU/L-ACNC: 0.86 UIU/ML (ref 0.45–4.5)

## 2018-02-22 LAB
AMBIG ABBREV DEFAULT: NORMAL
CHOLEST SERPL-MCNC: 199 MG/DL (ref 100–199)
HDLC SERPL-MCNC: 42 MG/DL
LDLC SERPL CALC-MCNC: 134 MG/DL (ref 0–99)
SL AMB VLDL CHOLESTEROL CALC: 23 (ref 5–40)
TRIGL SERPL-MCNC: 115 MG/DL (ref 0–149)

## 2018-02-23 ENCOUNTER — APPOINTMENT (OUTPATIENT)
Dept: LAB | Facility: HOSPITAL | Age: 38
End: 2018-02-23
Attending: ORTHOPAEDIC SURGERY
Payer: COMMERCIAL

## 2018-02-23 ENCOUNTER — HOSPITAL ENCOUNTER (OUTPATIENT)
Dept: ULTRASOUND IMAGING | Facility: HOSPITAL | Age: 38
Discharge: HOME | End: 2018-02-23
Attending: INTERNAL MEDICINE
Payer: COMMERCIAL

## 2018-02-23 ENCOUNTER — OFFICE VISIT (OUTPATIENT)
Dept: OBGYN CLINIC | Facility: HOSPITAL | Age: 38
End: 2018-02-23
Payer: COMMERCIAL

## 2018-02-23 VITALS
WEIGHT: 274 LBS | HEIGHT: 67 IN | BODY MASS INDEX: 43 KG/M2 | DIASTOLIC BLOOD PRESSURE: 72 MMHG | HEART RATE: 93 BPM | SYSTOLIC BLOOD PRESSURE: 103 MMHG

## 2018-02-23 DIAGNOSIS — E04.1 THYROID NODULE: ICD-10-CM

## 2018-02-23 DIAGNOSIS — M65.331 TRIGGER FINGER, RIGHT MIDDLE FINGER: ICD-10-CM

## 2018-02-23 DIAGNOSIS — M65.331 TRIGGER FINGER, RIGHT MIDDLE FINGER: Primary | ICD-10-CM

## 2018-02-23 LAB
ALBUMIN SERPL BCP-MCNC: 4 G/DL (ref 3.5–5)
ALP SERPL-CCNC: 81 U/L (ref 46–116)
ALT SERPL W P-5'-P-CCNC: 46 U/L (ref 12–78)
ANION GAP SERPL CALCULATED.3IONS-SCNC: 11 MMOL/L (ref 4–13)
AST SERPL W P-5'-P-CCNC: 22 U/L (ref 5–45)
BASOPHILS # BLD AUTO: 0.03 THOUSANDS/ΜL (ref 0–0.1)
BASOPHILS NFR BLD AUTO: 1 % (ref 0–1)
BILIRUB SERPL-MCNC: 0.59 MG/DL (ref 0.2–1)
BUN SERPL-MCNC: 9 MG/DL (ref 5–25)
CALCIUM SERPL-MCNC: 9.6 MG/DL (ref 8.3–10.1)
CHLORIDE SERPL-SCNC: 106 MMOL/L (ref 100–108)
CO2 SERPL-SCNC: 27 MMOL/L (ref 21–32)
CREAT SERPL-MCNC: 0.74 MG/DL (ref 0.6–1.3)
EOSINOPHIL # BLD AUTO: 0.11 THOUSAND/ΜL (ref 0–0.61)
EOSINOPHIL NFR BLD AUTO: 2 % (ref 0–6)
ERYTHROCYTE [DISTWIDTH] IN BLOOD BY AUTOMATED COUNT: 13.8 % (ref 11.6–15.1)
GFR SERPL CREATININE-BSD FRML MDRD: 103 ML/MIN/1.73SQ M
GLUCOSE SERPL-MCNC: 136 MG/DL (ref 65–140)
HCT VFR BLD AUTO: 41.5 % (ref 34.8–46.1)
HGB BLD-MCNC: 14.4 G/DL (ref 11.5–15.4)
LYMPHOCYTES # BLD AUTO: 1.69 THOUSANDS/ΜL (ref 0.6–4.47)
LYMPHOCYTES NFR BLD AUTO: 26 % (ref 14–44)
MCH RBC QN AUTO: 30.1 PG (ref 26.8–34.3)
MCHC RBC AUTO-ENTMCNC: 34.7 G/DL (ref 31.4–37.4)
MCV RBC AUTO: 87 FL (ref 82–98)
MONOCYTES # BLD AUTO: 0.4 THOUSAND/ΜL (ref 0.17–1.22)
MONOCYTES NFR BLD AUTO: 6 % (ref 4–12)
NEUTROPHILS # BLD AUTO: 4.2 THOUSANDS/ΜL (ref 1.85–7.62)
NEUTS SEG NFR BLD AUTO: 65 % (ref 43–75)
NRBC BLD AUTO-RTO: 0 /100 WBCS
PLATELET # BLD AUTO: 158 THOUSANDS/UL (ref 149–390)
PMV BLD AUTO: 11 FL (ref 8.9–12.7)
POTASSIUM SERPL-SCNC: 3.6 MMOL/L (ref 3.5–5.3)
PROT SERPL-MCNC: 7.2 G/DL (ref 6.4–8.2)
RBC # BLD AUTO: 4.79 MILLION/UL (ref 3.81–5.12)
SODIUM SERPL-SCNC: 144 MMOL/L (ref 136–145)
WBC # BLD AUTO: 6.43 THOUSAND/UL (ref 4.31–10.16)

## 2018-02-23 PROCEDURE — 36415 COLL VENOUS BLD VENIPUNCTURE: CPT

## 2018-02-23 PROCEDURE — 76536 US EXAM OF HEAD AND NECK: CPT

## 2018-02-23 PROCEDURE — 80053 COMPREHEN METABOLIC PANEL: CPT

## 2018-02-23 PROCEDURE — 99213 OFFICE O/P EST LOW 20 MIN: CPT | Performed by: ORTHOPAEDIC SURGERY

## 2018-02-23 PROCEDURE — 85025 COMPLETE CBC W/AUTO DIFF WBC: CPT

## 2018-02-23 RX ORDER — CLINDAMYCIN PHOSPHATE 600 MG/50ML
600 INJECTION INTRAVENOUS ONCE
Status: CANCELLED | OUTPATIENT
Start: 2018-02-23

## 2018-02-23 NOTE — PROGRESS NOTES
Spoke to patient she is aware of results  She is having trigger finger surgery on 3/13  She will call next week to set up f/u appts with this office

## 2018-02-23 NOTE — H&P
Assessment   1  Trigger middle finger of right hand (727 03) (M65 331)     Plan   Joint pain in fingers of right hand, Locking finger joint    · Administered: Betamethasone Sod Phos & Acet 6 (3-3) MG/ML Injection Suspension    ([employer])     Discussion/Summary      Localized corticosteroid injection into the right middle A1 pulley for symptomatic relief  Roberta Anderson understands to follow up with us on an as-needed basis if and when her symptoms should occur again  If they do the possibility of future trigger release was also discussed with her  Patient was seen and examined with Dr Jorge Patino and formulated by him as well  Chief Complaint   Right middle finger pain and locking      History of Present Illness   HPI: 40-year-old female who is here for evaluation of right middle finger  She notes pain and locking that began a few months ago without any specific injury  Especially when she's waking up in the morning the pain seems to occur when her finger is in a flexed position and she has to manually force it back out to a extension  She has no prior history of similar symptoms  No numbness or tingling is present  No injury      Review of Systems        Constitutional: No fever, no chills, feels well, no tiredness, no recent weight gain or loss  Eyes: No complaints of eyesight problems, no red eyes  ENT: no loss of hearing, no nosebleeds, no sore throat  Cardiovascular: No complaints of chest pain, no palpitations, no leg claudication or lower extremity edema  Respiratory: no compliants of shortness of breath, no wheezing, no cough  Gastrointestinal: no complaints of abdominal pain, no constipation, no nausea or diarrhea, no vomiting, no bloody stools  Genitourinary: no complaints of dysuria, no incontinence  Musculoskeletal: as noted in HPI  Integumentary: no complaints of skin rash or lesion, no itching or dry skin, no skin wounds        Neurological: no complaints of headache, no confusion, no numbness or tingling, no dizziness  Endocrine: No complaints of muscle weakness, no feelings of weakness, no frequent urination, no excessive thirst       Psychiatric: No suicidal thoughts, no anxiety, no feelings of depression  ROS reviewed  Active Problems   1  Acute exacerbation of chronic low back pain (724 2,338 19,338 29) (M54 5,G89 29)   2  Acute frontal sinusitis (461 1) (J01 10)   3  Acute sinus infection (461 9) (J01 90)   4  Arthritis (716 90) (M19 90)   5  Asthma, mild intermittent (493 90) (J45 20)   6  Benign essential hypertension (401 1) (I10)   7  Breast hypertrophy (611 1) (N62)   8  Bright red blood per rectum (569 3) (K62 5)   9  Bulging lumbar disc (722 10) (M51 26)   10  Carpal tunnel syndrome, bilateral (354 0) (G56 03)   11  Cellulitis of trunk (682 2) (L03 319)   12  Chest pressure (786 59) (R07 89)   13  Chronic low back pain (724 2,338 29) (M54 5,G89 29)   14  Chronic sinusitis (473 9) (J32 9)   15  Chronic sinusitis, unspecified location (473 9) (J32 9)   16  Constipation, unspecified constipation type (564 00) (K59 00)   17  Depression (311) (F32 9)   18  Depression with anxiety (300 4) (F41 8)   19  Dermatitis, eczematoid (692 9) (L30 9)   20  Dysfunctional uterine bleeding (626 8) (N93 8)   21  Dysuria (788 1) (R30 0)   22  Esophageal reflux (530 81) (K21 9)   23  Esophageal spasm (530 5) (K22 4)   24  Eustachian tube dysfunction (381 81) (H69 80)   25  Fatigue (780 79) (R53 83)   26  Fibromyalgia (729 1) (M79 7)   27  Flu vaccine need (V04 81) (Z23)   28  Hemorrhoids (455 6) (K64 9)   29  Hidradenitis suppurativa (705 83) (L73 2)   30  Hypercholesterolemia (272 0) (E78 00)   31  Hyperlipidemia (272 4) (E78 5)   32  Hypertension (401 9) (I10)   33  Irritable bowel syndrome (564 1) (K58 9)   34  Joint pain in fingers of right hand (719 44) (M25 541)   35  Left ear pain (388 70) (H92 02)   36  Lipid screening (V77 91) (Z13 220)   37   Locking finger joint (718 94) (M24 849)   38  Lumbosacral neuritis (724 4) (M54 17)   39  Mammographic microcalcification (793 81) (R92 0)   40  Morbid obesity (278 01) (E66 01)   41  Mouth ulcers (528 9) (K12 1)   42  Neck pain (723 1) (M54 2)   43  Need for diphtheria-tetanus-pertussis (Tdap) vaccine, adult/adolescent (V06 1) (Z23)   44  Nevus of back (216 5) (D22 5)   45  Obstructive sleep apnea (327 23) (G47 33)   46  Preop examination (V72 84) (Z01 818)   47  RBBB (right bundle branch block) (426 4) (I45 10)   48  Rectal pain (569 42) (K62 89)   49  Recurrent cellulitis (682 9) (L03 90)   50  Sinusitis (473 9) (J32 9)   51  Sleep disturbances (780 50) (G47 9)   52  Sore throat (462) (J02 9)   53  Spondylolysis (738 4) (M43 00)   54  Thyroid disorder (246 9) (E07 9)   55  Thyroid nodule (241 0) (E04 1)   56  Vertigo (780 4) (R42)   57  Visit for screening mammogram (V76 12) (Z12 31)   58   Vitamin D insufficiency (268 9) (E55 9)     Past Medical History    · History of Abscess of left arm (682 3) (L02 414)   · History of Abscess of right breast (611 0) (N61 1)   · History of Acute bronchitis (466 0) (J20 9)   · History of Acute bronchitis, unspecified organism (466 0) (J20 9)   · History of Acute bronchospasm (519 11) (J98 01)   · History of Acute frontal sinusitis (461 1) (J01 10)   · History of Acute maxillary sinusitis (461 0) (J01 00)   · History of Acute maxillary sinusitis (461 0) (J01 00)   · History of Acute recurrent maxillary sinusitis (461 0) (J01 01)   · History of Acute sinusitis (461 9) (J01 90)   · History of Acute upper respiratory infection (465 9) (J06 9)   · History of Ankle pain, left (719 47) (M25 572)   · History of Back pain (724 5) (M54 9)   · History of Bronchitis (490) (J40)   · History of Chest pain (786 50) (R07 9)   · History of Chest tightness (786 59) (R07 89)   · History of Chronic cough (786 2) (R05)   · History of Chronic diarrhea (787 91) (K52 9)   · History of Cough (786 2) (R05)   · History of Edema of eyelid, right (374 82) (Y49 037)   · History of Epidermoid cyst of skin of shoulder (706 2) (L72 0)   · History of Eustachian tube disorder (381 9) (H69 90)   · History of H/O laminectomy (V45 89) (T61 298)   · History of acute pharyngitis (V12 69) (Z87 09)   · History of allergic rhinitis (V12 69) (Z87 09)   · History of pregnancy (V13 29)   · History of sebaceous cyst (V13 3) (Z87 2)   · History of sinusitis (V12 69) (Z87 09)   · History of thyroid nodule (V12 29) (Z86 39)   · History of varicella (V12 09) (Z86 19)   · History of Hordeolum externum of right eye (373 11) (H00 013)   · History of Influenza A (487 1) (J10 1)   · History of Insomnia (780 52) (G47 00)   · History of Low back pain (724 2) (M54 5)   · History of Lyme disease (088 81) (A69 20)   · History of Mastodynia (611 71) (N64 4)   · History of Menarche (V21 8)   · History of Otitis externa (380 10) (H60 90)   · History of Otitis media, left (382 9) (H66 92)   · History of Pain, joint, shoulder (719 41) (M25 519)   · History of ROM (right otitis media) (382 9) (H66 91)   · History of Upper respiratory infection (465 9) (J06 9)     The active problems and past medical history were reviewed and updated today  Surgical History    · History of Back Surgery   · History of Breast Surgery Puncture Aspiration Of Cyst   · History of  Section   · History of Cholecystectomy Laparoscopic   · History of Shoulder Surgery Right   · History of Tympanoplasty     The surgical history was reviewed and updated today         Family History   Mother    · Family history of Hypertension (V17 49)   · Family history of Pure Hypercholesterolemia  Father    · Family history of    · Family history of cardiac disorder (V17 49) (Z82 49)   · Family history of hyperlipidemia (V18 19) (Z83 49)   · Family history of hypertension (V17 49) (Z82 49)   · Family history of Myocardial Infarction Arrhythmias  Brother    · Family history of Crohn's (Granulomatous) Colitis   · Family history of arthritis (V17 7) (Z82 61)  Paternal Grandmother    · Family history of thyroid disease (V18 19) (Z83 49)     The family history was reviewed and updated today  Social History    · Always uses seat belt   · Being A Social Drinker   · Marital History - Currently    · Never a smoker   · No drug use   · Non-smoker (V49 89) (Z78 9)   · Sexually Active  The social history was reviewed and updated today  Current Meds    1  Atorvastatin Calcium 40 MG Oral Tablet; TAKE 1 TABLET DAILY; Therapy: 21Xdo3308 to (Evaluate:27Huc5117)  Requested for: 77UTZ5989; Last     Rx:10Aug2017 Ordered   2  Depo-Provera 400 MG/ML Intramuscular Suspension Recorded   3  Elavil 10 MG TABS; TAKE 1 TABLET AT BEDTIME; Therapy: (Sydnee Loya) to Recorded   4  LevoFLOXacin 500 MG Oral Tablet; TAKE 1 TABLET DAILY UNTIL FINISHED; Therapy: 51FCI9576 to [phone])  Requested for: 71Uat1876; Last     Rx:43Glq7569 Ordered   5  Metoprolol Succinate ER 25 MG Oral Tablet Extended Release 24 Hour; Take 1 tablet     daily; Therapy: 94EAE2243 to (Evaluate:60Ogb3317)  Requested for: 71ZRG6627; Last     Rx:10Aug2017 Ordered   6  Multi-Day Oral Tablet; Therapy: (Recorded:05Jun2014) to Recorded   7  Pantoprazole Sodium 40 MG Oral Tablet Delayed Release; TAKE 1 TABLET ONE TIME     DAILY AS DIRECTED; Therapy: 55VOC6529 to (Evaluate:06Feb2018)  Requested for: 19JLE6980; Last     Rx:10Aug2017 Ordered   8  PredniSONE 10 MG Oral Tablet; 3 tabs BID X 2 days, 2 Tabs BID X 2 days, 1 Tab BID     X 2 Days, then 1 Tab daily X 2 days; Therapy: 13ZWK5849 to (Last Rx:10Jan2018)  Requested for: 18YQO6347 Ordered   9  ProAir  (90 Base) MCG/ACT Inhalation Aerosol Solution; INHALE 1 TO 2 PUFFS     EVERY 4 TO 6 HOURS AS NEEDED; Therapy: 32QVW2522 to (Carmelita White)  Requested for: 07EEK5146; Last     Rx:07Ljj7776 Ordered   10   PROzac 20 MG Oral Capsule; TAKE 3 CAPSULES DAILY; Therapy: 94ERA4562 to Recorded   11  Vitamin B-12 1000 MCG Oral Tablet; TAKE 3 TABLET Twice daily; Therapy: (Recorded:78Xqv4023) to Recorded   12  Vitamin C 500 MG Oral Capsule; TAKE 1 CAPSULE DAILY; Therapy: (Recorded:37Daw9373) to Recorded   13  Vitamin D3 5000 UNIT Oral Capsule; Take 1 capsule twice daily; Therapy: (Recorded:70Tej4544) to Recorded   14  ZyrTEC Allergy 10 MG Oral Tablet Disintegrating; Therapy: (Recorded:92Xzg4580) to Recorded     The medication list was reviewed and updated today  Allergies   1  Amoxicillin TABS   2  Neurontin CAPS   3  Amoxicillin CAPS   4  doxycycline   5  Penicillins     Vitals     Recorded: 12Jan2018 11:24AM   Heart Rate 88   Systolic 367, Sitting   Diastolic 74, Sitting   Weight 272 lb    BMI Calculated 42 6   BSA Calculated 2 3      Physical Exam   Skin intact, no shortness of breath, no appreciable dysrhythmias present  Constitutional - General appearance: Normal       Musculoskeletal - Gait and station: Normal -- Muscle strength/tone: Normal       Cardiovascular - Pulses: Normal -- Examination of extremities for edema and/or varicosities: Normal       Psychiatric - Orientation to person, place, and time: Normal -- Mood and affect: Normal       Eyes      Conjunctiva and lids: Normal        Free Text - Jannette has no palpable triggering or locking today in the right middle finger  She has tenderness located along the the head of the middle finger as well as over the A1 pulley  There is mild swelling present to the right middle finger  Procedure      Procedure: Injection of the Right middle tendon sheath on the right      Indication: inflammation  ethyl chloride spray was used as a topical anesthetic  Procedure Note: A 25-gauge was used to inject 0 5 mL of 6mg/mL betamethasone  Post-Procedure: the patient tolerated the procedure well  Attending Note   Collaborating Physician Note: Collaborating Physician:  TOMAS interviewed and examined the patient,-- I discussed the case with the Advanced Practitioner and reviewed the note-- and-- I agree with the Advanced Practitioner note        Signatures    Electronically signed by : Giovany Anthony, Concha Castro; Jan 12 2018 12:02PM EST                       (Author)     Electronically signed by : Charis Rai DO; Jan 12 2018 12:09PM EST                       (Author)

## 2018-02-23 NOTE — PROGRESS NOTES
Assessment:  1  Trigger finger, right middle finger       Patient Active Problem List   Diagnosis    Thyroid nodule    Trigger finger, right middle finger           Plan       patient would like to proceed with surgical intervention            Subjective:     Patient ID:    Chief Complaint:Jannette Baugh 45 y o  female      HPI    Patient is here with regards to her right middle finger trigger finger  We gave her an injection about a month ago  It did improve some of the pain but she still having some clicking and pain now  She comes in today to be re-evaluated  Her pain at its worst is 8 out 10 and at its lowest level is 5/10  She has had a cortisone injection for treatment  Otherwise cannot take anti-inflammatories because she had a spinal fusion was told not to do so      The following portions of the patient's history were reviewed and updated as appropriate: allergies, current medications, past family history, past social history, past surgical history and problem list         Social History     Social History    Marital status: /Civil Union     Spouse name: N/A    Number of children: N/A    Years of education: N/A     Occupational History    Not on file       Social History Main Topics    Smoking status: Never Smoker    Smokeless tobacco: Never Used    Alcohol use Yes      Comment: rarely    Drug use: No    Sexual activity: Not on file     Other Topics Concern    Not on file     Social History Narrative    No narrative on file     Past Medical History:   Diagnosis Date    Anxiety     Arthritis     Asthma     Chronic pain disorder     BACK SHOULDERS NECK    Depression     Disease of thyroid gland     nodule    Disorder of heart muscle (HCC)     weakened heart muscle    Fibromyalgia, primary     GERD (gastroesophageal reflux disease)     Hearing difficulty of right ear     Hyperlipidemia     Hypertension     Irritable bowel syndrome     Migraine     Motion sickness     Perforation of tympanic membrane     Right    PONV (postoperative nausea and vomiting)     RBBB     Sinusitis     Sleep apnea     no cpap    Wears glasses      Past Surgical History:   Procedure Laterality Date    BACK SURGERY      lumbar herniated disc    BREAST SURGERY      cyst removal    CARPAL TUNNEL RELEASE       SECTION      CHOLECYSTECTOMY      CYST REMOVAL      DENTAL SURGERY      NM COLONOSCOPY FLX DX W/COLLJ SPEC WHEN PFRMD N/A 2017    Procedure: COLONOSCOPY;  Surgeon: Radha Hahn MD;  [facility]; Service: Gastroenterology    NM TYMPANOPLASTY Right 2017    Procedure: TYMPANOPLASTY WITH PERICHONDRIN GRAFT;  Surgeon:  Rosita Carreon DO;  [facility];  Service: ENT    SHOULDER SURGERY Right     WISDOM TOOTH EXTRACTION       Allergies   Allergen Reactions    Amoxicillin Swelling    Neurontin [Gabapentin] Other (See Comments)     suicidal    Penicillins Swelling    Doxycycline Hives    Other Hives     Adhesive tape     Current Outpatient Prescriptions on File Prior to Visit   Medication Sig Dispense Refill    albuterol (PROAIR HFA) 90 mcg/act inhaler Inhale 2 puffs every 6 (six) hours as needed for wheezing      amitriptyline (ELAVIL) 10 mg tablet Take 1 tablet by mouth      Ascorbic Acid (VITAMIN C) 500 MG CAPS Take 1 capsule by mouth daily      atorvastatin (LIPITOR) 40 mg tablet Take 1 tablet (40 mg total) by mouth daily 90 tablet 0    cetirizine (ZyrTEC) 10 mg tablet Take 10 mg by mouth daily      Cholecalciferol (VITAMIN D3) 5000 units CAPS Take 1 capsule by mouth 2 (two) times a day      cyanocobalamin (VITAMIN B-12) 1,000 mcg tablet Take 3 tablets by mouth 2 (two) times a day      diazepam (VALIUM) 5 mg tablet Take by mouth      FLUoxetine (PROZAC) 20 mg capsule Take 3 capsules by mouth daily      hydrocortisone (ANUSOL-HC, PROCTOSOL HC,) 2 5 % rectal cream Insert 1 Applicatorful into the rectum 3 (three) times a day      ibuprofen (MOTRIN) 200 mg tablet Take 200 mg by mouth every 6 (six) hours as needed for mild pain      Linaclotide (LINZESS) 145 MCG CAPS Take by mouth      medroxyPROGESTERone (DEPO-PROVERA) 400 MG/ML SUSP Inject into the shoulder, thigh, or buttocks      methocarbamol (ROBAXIN-750) 750 mg tablet Take by mouth      metoprolol succinate (TOPROL-XL) 25 mg 24 hr tablet Take 1 tablet (25 mg total) by mouth daily 90 tablet 0    Multiple Vitamins-Minerals (CENTRUM ADULTS PO) Take 1 tablet by mouth daily      pantoprazole (PROTONIX) 40 mg tablet Take 1 tablet (40 mg total) by mouth daily 90 tablet 0     No current facility-administered medications on file prior to visit  Objective:        Ortho Exam   positive triggering of the middle finger right hand  Pinpoint tenderness in this area as well  Portions of the record may have been created with voice recognition software   Occasional wrong word or "sound a like" substitutions may have occurred due to the inherent limitations of voice recognition software   Read the chart carefully and recognize, using context, where substitutions have occurred

## 2018-02-26 RX ORDER — BACLOFEN 10 MG/1
10 TABLET ORAL
COMMUNITY
End: 2018-07-12

## 2018-02-26 NOTE — PRE-PROCEDURE INSTRUCTIONS
Pre-Surgery Instructions:   Medication Instructions    albuterol (PROAIR HFA) 90 mcg/act inhaler Instructed patient per Anesthesia Guidelines   amitriptyline (ELAVIL) 10 mg tablet Instructed patient per Anesthesia Guidelines   Ascorbic Acid (VITAMIN C) 500 MG CAPS Patient was instructed by Physician and understands   atorvastatin (LIPITOR) 40 mg tablet Instructed patient per Anesthesia Guidelines   baclofen 10 mg tablet Instructed patient per Anesthesia Guidelines   cetirizine (ZyrTEC) 10 mg tablet Instructed patient per Anesthesia Guidelines   Cholecalciferol (VITAMIN D3) 5000 units CAPS Patient was instructed by Physician and understands   cyanocobalamin (VITAMIN B-12) 1,000 mcg tablet Patient was instructed by Physician and understands   FLUoxetine (PROZAC) 20 mg capsule Instructed patient per Anesthesia Guidelines   medroxyPROGESTERone (DEPO-PROVERA) 400 MG/ML SUSP Instructed patient per Anesthesia Guidelines   metoprolol succinate (TOPROL-XL) 25 mg 24 hr tablet Instructed patient per Anesthesia Guidelines   Multiple Vitamins-Minerals (CENTRUM ADULTS PO) Instructed patient per Anesthesia Guidelines   pantoprazole (PROTONIX) 40 mg tablet Patient was instructed by Physician and understands  Pre op and bathing instructions reviewed   Pt will get hibiclens

## 2018-03-03 ENCOUNTER — OFFICE VISIT (OUTPATIENT)
Dept: URGENT CARE | Facility: CLINIC | Age: 38
End: 2018-03-03
Payer: COMMERCIAL

## 2018-03-03 VITALS
SYSTOLIC BLOOD PRESSURE: 141 MMHG | HEART RATE: 89 BPM | BODY MASS INDEX: 42.69 KG/M2 | WEIGHT: 272 LBS | HEIGHT: 67 IN | OXYGEN SATURATION: 100 % | RESPIRATION RATE: 16 BRPM | DIASTOLIC BLOOD PRESSURE: 86 MMHG | TEMPERATURE: 97.4 F

## 2018-03-03 DIAGNOSIS — H66.93 BILATERAL OTITIS MEDIA, UNSPECIFIED OTITIS MEDIA TYPE: Primary | ICD-10-CM

## 2018-03-03 DIAGNOSIS — J01.90 ACUTE SINUSITIS, RECURRENCE NOT SPECIFIED, UNSPECIFIED LOCATION: ICD-10-CM

## 2018-03-03 PROCEDURE — 99283 EMERGENCY DEPT VISIT LOW MDM: CPT | Performed by: NURSE PRACTITIONER

## 2018-03-03 PROCEDURE — S9083 URGENT CARE CENTER GLOBAL: HCPCS | Performed by: NURSE PRACTITIONER

## 2018-03-03 PROCEDURE — G0382 LEV 3 HOSP TYPE B ED VISIT: HCPCS | Performed by: NURSE PRACTITIONER

## 2018-03-03 RX ORDER — AZITHROMYCIN 250 MG/1
250 TABLET, FILM COATED ORAL DAILY
Qty: 6 TABLET | Refills: 0 | Status: SHIPPED | COMMUNITY
Start: 2018-03-03 | End: 2018-03-08

## 2018-03-03 NOTE — PROGRESS NOTES
[facility]        NAME: Vaughn Poon is a 45 y o  female  : 1980    MRN: [id number]  DATE: March 3, 2018  TIME: 8:39 AM    Assessment and Plan   Bilateral otitis media, unspecified otitis media type [H66 93]  1  Bilateral otitis media, unspecified otitis media type  azithromycin (ZITHROMAX) 250 mg tablet   2  Acute sinusitis, recurrence not specified, unspecified location           Patient Instructions       Follow up with PCP in 3-5 days  Proceed to  ER if symptoms worsen  Chief Complaint     Chief Complaint   Patient presents with    Cold Like Symptoms     x 1 week    Cough         History of Present Illness       Cough   The current episode started in the past 7 days  The problem has been unchanged  The cough is productive of sputum  Associated symptoms include ear congestion, ear pain, nasal congestion, rhinorrhea and a sore throat  She has tried OTC cough suppressant for the symptoms  The treatment provided mild relief  Review of Systems   Review of Systems   Constitutional: Negative  HENT: Positive for congestion, ear pain, rhinorrhea and sore throat  Eyes: Negative  Respiratory: Positive for cough  Cardiovascular: Negative  Gastrointestinal: Negative  Genitourinary: Negative  Musculoskeletal: Negative  Psychiatric/Behavioral: Negative            Current Medications       Current Outpatient Prescriptions:     albuterol (PROAIR HFA) 90 mcg/act inhaler, Inhale 2 puffs every 6 (six) hours as needed for wheezing, Disp: , Rfl:     amitriptyline (ELAVIL) 10 mg tablet, Take 1 tablet by mouth, Disp: , Rfl:     Ascorbic Acid (VITAMIN C) 500 MG CAPS, Take 1 capsule by mouth daily, Disp: , Rfl:     atorvastatin (LIPITOR) 40 mg tablet, Take 1 tablet (40 mg total) by mouth daily, Disp: 90 tablet, Rfl: 0    azithromycin (ZITHROMAX) 250 mg tablet, Take 1 tablet (250 mg total) by mouth daily for 5 days 2 pills today, then one daily for 4 more days, Disp: 6 tablet, Rfl: 0    baclofen 10 mg tablet, Take 10 mg by mouth daily with dinner, Disp: , Rfl:     cetirizine (ZyrTEC) 10 mg tablet, Take 10 mg by mouth daily, Disp: , Rfl:     Cholecalciferol (VITAMIN D3) 5000 units CAPS, Take 1 capsule by mouth 2 (two) times a day, Disp: , Rfl:     cyanocobalamin (VITAMIN B-12) 1,000 mcg tablet, Take 3 tablets by mouth 2 (two) times a day, Disp: , Rfl:     FLUoxetine (PROZAC) 20 mg capsule, Take 3 capsules by mouth daily, Disp: , Rfl:     hydrocortisone (ANUSOL-HC, PROCTOSOL HC,) 2 5 % rectal cream, Insert 1 Applicatorful into the rectum 3 (three) times a day, Disp: , Rfl:     medroxyPROGESTERone (DEPO-PROVERA) 400 MG/ML SUSP, Inject into the shoulder, thigh, or buttocks, Disp: , Rfl:     metoprolol succinate (TOPROL-XL) 25 mg 24 hr tablet, Take 1 tablet (25 mg total) by mouth daily, Disp: 90 tablet, Rfl: 0    Multiple Vitamins-Minerals (CENTRUM ADULTS PO), Take 1 tablet by mouth daily, Disp: , Rfl:     pantoprazole (PROTONIX) 40 mg tablet, Take 1 tablet (40 mg total) by mouth daily, Disp: 90 tablet, Rfl: 0    Current Allergies     Allergies as of 03/03/2018 - Reviewed 03/03/2018   Allergen Reaction Noted    Amoxicillin Swelling 05/16/2017    Neurontin [gabapentin] Other (See Comments) 05/16/2017    Penicillins Swelling 05/16/2017    Aripiprazole  11/06/2013    Carbamazepine  10/16/2012    Doxycycline Hives     Lorazepam  06/11/2014    Other Hives 05/16/2017    Lamotrigine Rash 08/28/2012            The following portions of the patient's history were reviewed and updated as appropriate: allergies, current medications, past family history, past medical history, past social history, past surgical history and problem list      Past Medical History:   Diagnosis Date    Anxiety     Arthritis     Asthma     Chronic pain disorder     BACK SHOULDERS NECK    Depression     Disease of thyroid gland     nodule    Disorder of heart muscle (HCC)     weakened heart muscle    Fibromyalgia, primary     GERD (gastroesophageal reflux disease)     Hearing difficulty of right ear     Hyperlipidemia     Hypertension     Irritable bowel syndrome     Migraine     Motion sickness     Perforation of tympanic membrane     Right    PONV (postoperative nausea and vomiting)     RBBB     Sinusitis     Sleep apnea     no cpap    Wears glasses        Past Surgical History:   Procedure Laterality Date    BACK SURGERY      lumbar herniated disc    BREAST SURGERY Left 2006    cyst removal    CARPAL TUNNEL RELEASE       SECTION      CHOLECYSTECTOMY      CYST REMOVAL      DENTAL SURGERY      DE COLONOSCOPY FLX DX W/COLLJ SPEC WHEN PFRMD N/A 2017    Procedure: COLONOSCOPY;  Surgeon: Juan Ayala MD;  [facility]; Service: Gastroenterology    DE TYMPANOPLASTY Right 2017    Procedure: TYMPANOPLASTY WITH PERICHONDRIN GRAFT;  Surgeon: Breana Felder DO;  [facility];  Service: ENT    SHOULDER SURGERY Right     WISDOM TOOTH EXTRACTION         Family History   Problem Relation Age of Onset    Hypertension Mother     Hyperlipidemia Mother     Hypertension Father     Hyperlipidemia Father     Diabetes unspecified Maternal Grandmother          Medications have been verified  Objective   /86   Pulse 89   Temp (!) 97 4 °F (36 3 °C)   Resp 16   Ht 5' 7" (1 702 m)   Wt 123 kg (272 lb)   SpO2 100%   BMI 42 60 kg/m²        Physical Exam     Physical Exam   Constitutional: She is oriented to person, place, and time  She appears well-developed and well-nourished  HENT:   Head: Normocephalic and atraumatic  Right Ear: Tympanic membrane is erythematous and bulging  Left Ear: Tympanic membrane is erythematous and bulging  Nose: Mucosal edema and rhinorrhea present  Mouth/Throat: Posterior oropharyngeal edema and posterior oropharyngeal erythema present  No oropharyngeal exudate  Neck: Normal range of motion  Neck supple  Cardiovascular: Normal rate, regular rhythm and normal heart sounds  Pulmonary/Chest: Effort normal and breath sounds normal  No respiratory distress  Abdominal: Soft  Bowel sounds are normal    Neurological: She is alert and oriented to person, place, and time  She has normal reflexes  Skin: Skin is warm and dry  No rash noted  Psychiatric: She has a normal mood and affect  Nursing note and vitals reviewed

## 2018-03-03 NOTE — PATIENT INSTRUCTIONS
Otitis Media   WHAT YOU NEED TO KNOW:   Otitis media is an ear infection  DISCHARGE INSTRUCTIONS:   Medicines:  · Ibuprofen or acetaminophen  helps decrease your pain and fever  They are available without a doctor's order  Ask your healthcare provider which medicine is right for you  Ask how much to take and how often to take it  These medicines can cause stomach bleeding if not taken correctly  Ibuprofen can cause kidney damage  Do not take ibuprofen if you have kidney disease, an ulcer, or allergies to aspirin  Acetaminophen can cause liver damage  Do not drink alcohol if you take acetaminophen  · Ear drops  help treat your ear pain  · Antibiotics  help treat a bacterial infection that caused your ear infection  · Take your medicine as directed  Contact your healthcare provider if you think your medicine is not helping or if you have side effects  Tell him or her if you are allergic to any medicine  Keep a list of the medicines, vitamins, and herbs you take  Include the amounts, and when and why you take them  Bring the list or the pill bottles to follow-up visits  Carry your medicine list with you in case of an emergency  Heat or ice:   · Heat  may be used to decrease your pain  Place a warm, moist washcloth on your ear  Apply for 15 to 20 minutes, 3 to 4 times a day    · Ice  helps decrease swelling and pain  Use an ice pack or put crushed ice in a plastic bag  Cover the ice pack with a towel and place it on your ear for 15 to 20 minutes, 3 to 4 times a day for 2 days  Prevent otitis media:   · Wash your hands often  Use soap and water  Wash your hands after you use the bathroom, change a child's diapers, or sneeze  Wash your hands before you prepare or eat food  · Stay away from people who are ill  Some germs are easily and quickly spread through contact  Return to work or school: You may return to work or school when your fever is gone     Follow up with your healthcare provider as directed:  Write down your questions so you remember to ask them during your visits  Contact your healthcare provider if:   · Your ear pain gets worse or does not go away, even after treatment  · The outside of your ear is red or swollen  · You have vomiting or diarrhea  · You have fluid coming from your ear  · You have questions or concerns about your condition or care  Return to the emergency department if:   · You have a seizure  · You have a fever and a stiff neck  © 2017 [facility] [facility] Information is for End User's use only and may not be sold, redistributed or otherwise used for commercial purposes  All illustrations and images included in CareNotes® are the copyrighted property of A D A M [employer] Inc  or Brooks [employer]  The above information is an  only  It is not intended as medical advice for individual conditions or treatments  Talk to your doctor, nurse or pharmacist before following any medical regimen to see if it is safe and effective for you

## 2018-03-06 ENCOUNTER — TELEPHONE (OUTPATIENT)
Dept: ENDOCRINOLOGY | Facility: CLINIC | Age: 38
End: 2018-03-06

## 2018-03-06 NOTE — TELEPHONE ENCOUNTER
----- Message from Haylie Do MD sent at 2/22/2018  9:14 PM EST -----  Please call the patient regarding her result   Thyroid function tests within normal range

## 2018-03-06 NOTE — TELEPHONE ENCOUNTER
----- Message from Dena Ozuna MD sent at 3/1/2018  7:33 PM EST -----  Please call the patient regarding her abnormal result   Stable thyroid nodule

## 2018-03-13 ENCOUNTER — ANESTHESIA EVENT (OUTPATIENT)
Dept: PERIOP | Facility: HOSPITAL | Age: 38
End: 2018-03-13
Payer: COMMERCIAL

## 2018-03-13 ENCOUNTER — ANESTHESIA (OUTPATIENT)
Dept: PERIOP | Facility: HOSPITAL | Age: 38
End: 2018-03-13
Payer: COMMERCIAL

## 2018-03-13 ENCOUNTER — HOSPITAL ENCOUNTER (OUTPATIENT)
Facility: HOSPITAL | Age: 38
Setting detail: OUTPATIENT SURGERY
Discharge: HOME | End: 2018-03-13
Attending: ORTHOPAEDIC SURGERY | Admitting: ORTHOPAEDIC SURGERY
Payer: COMMERCIAL

## 2018-03-13 VITALS
HEART RATE: 69 BPM | HEIGHT: 67 IN | OXYGEN SATURATION: 100 % | DIASTOLIC BLOOD PRESSURE: 66 MMHG | TEMPERATURE: 97.3 F | RESPIRATION RATE: 18 BRPM | WEIGHT: 273 LBS | SYSTOLIC BLOOD PRESSURE: 116 MMHG | BODY MASS INDEX: 42.85 KG/M2

## 2018-03-13 DIAGNOSIS — M65.331 TRIGGER FINGER, RIGHT MIDDLE FINGER: Primary | ICD-10-CM

## 2018-03-13 LAB — EXT PREGNANCY TEST URINE: NEGATIVE

## 2018-03-13 PROCEDURE — 26055 INCISE FINGER TENDON SHEATH: CPT | Performed by: ORTHOPAEDIC SURGERY

## 2018-03-13 PROCEDURE — 81025 URINE PREGNANCY TEST: CPT | Performed by: STUDENT IN AN ORGANIZED HEALTH CARE EDUCATION/TRAINING PROGRAM

## 2018-03-13 RX ORDER — FENTANYL CITRATE 50 UG/ML
INJECTION, SOLUTION INTRAMUSCULAR; INTRAVENOUS AS NEEDED
Status: DISCONTINUED | OUTPATIENT
Start: 2018-03-13 | End: 2018-03-13 | Stop reason: SURG

## 2018-03-13 RX ORDER — MORPHINE SULFATE 4 MG/ML
2 INJECTION, SOLUTION INTRAMUSCULAR; INTRAVENOUS EVERY 4 HOURS PRN
Status: DISCONTINUED | OUTPATIENT
Start: 2018-03-13 | End: 2018-03-13 | Stop reason: HOSPADM

## 2018-03-13 RX ORDER — ONDANSETRON 2 MG/ML
4 INJECTION INTRAMUSCULAR; INTRAVENOUS EVERY 6 HOURS PRN
Status: DISCONTINUED | OUTPATIENT
Start: 2018-03-13 | End: 2018-03-13 | Stop reason: HOSPADM

## 2018-03-13 RX ORDER — CLINDAMYCIN PHOSPHATE 600 MG/50ML
600 INJECTION INTRAVENOUS ONCE
Status: COMPLETED | OUTPATIENT
Start: 2018-03-13 | End: 2018-03-13

## 2018-03-13 RX ORDER — OXYCODONE HYDROCHLORIDE AND ACETAMINOPHEN 5; 325 MG/1; MG/1
1 TABLET ORAL EVERY 6 HOURS PRN
Qty: 5 TABLET | Refills: 0 | Status: SHIPPED | OUTPATIENT
Start: 2018-03-13 | End: 2018-07-12

## 2018-03-13 RX ORDER — KETAMINE HYDROCHLORIDE 50 MG/ML
INJECTION, SOLUTION, CONCENTRATE INTRAMUSCULAR; INTRAVENOUS AS NEEDED
Status: DISCONTINUED | OUTPATIENT
Start: 2018-03-13 | End: 2018-03-13 | Stop reason: SURG

## 2018-03-13 RX ORDER — ONDANSETRON 2 MG/ML
4 INJECTION INTRAMUSCULAR; INTRAVENOUS ONCE AS NEEDED
Status: DISCONTINUED | OUTPATIENT
Start: 2018-03-13 | End: 2018-03-13 | Stop reason: HOSPADM

## 2018-03-13 RX ORDER — FENTANYL CITRATE/PF 50 MCG/ML
25 SYRINGE (ML) INJECTION
Status: DISCONTINUED | OUTPATIENT
Start: 2018-03-13 | End: 2018-03-13 | Stop reason: HOSPADM

## 2018-03-13 RX ORDER — ACETAMINOPHEN 325 MG/1
650 TABLET ORAL EVERY 6 HOURS PRN
Status: DISCONTINUED | OUTPATIENT
Start: 2018-03-13 | End: 2018-03-13 | Stop reason: HOSPADM

## 2018-03-13 RX ORDER — PROPOFOL 10 MG/ML
INJECTION, EMULSION INTRAVENOUS CONTINUOUS PRN
Status: DISCONTINUED | OUTPATIENT
Start: 2018-03-13 | End: 2018-03-13 | Stop reason: SURG

## 2018-03-13 RX ORDER — BUPIVACAINE HYDROCHLORIDE 2.5 MG/ML
INJECTION, SOLUTION INFILTRATION; PERINEURAL AS NEEDED
Status: DISCONTINUED | OUTPATIENT
Start: 2018-03-13 | End: 2018-03-13 | Stop reason: HOSPADM

## 2018-03-13 RX ORDER — GLYCOPYRROLATE 0.2 MG/ML
INJECTION INTRAMUSCULAR; INTRAVENOUS AS NEEDED
Status: DISCONTINUED | OUTPATIENT
Start: 2018-03-13 | End: 2018-03-13 | Stop reason: SURG

## 2018-03-13 RX ORDER — MIDAZOLAM HYDROCHLORIDE 1 MG/ML
INJECTION INTRAMUSCULAR; INTRAVENOUS AS NEEDED
Status: DISCONTINUED | OUTPATIENT
Start: 2018-03-13 | End: 2018-03-13 | Stop reason: SURG

## 2018-03-13 RX ORDER — ONDANSETRON 2 MG/ML
INJECTION INTRAMUSCULAR; INTRAVENOUS AS NEEDED
Status: DISCONTINUED | OUTPATIENT
Start: 2018-03-13 | End: 2018-03-13 | Stop reason: SURG

## 2018-03-13 RX ORDER — OXYCODONE HYDROCHLORIDE 5 MG/1
10 TABLET ORAL EVERY 4 HOURS PRN
Status: DISCONTINUED | OUTPATIENT
Start: 2018-03-13 | End: 2018-03-13 | Stop reason: HOSPADM

## 2018-03-13 RX ORDER — OXYCODONE HYDROCHLORIDE 5 MG/1
5 TABLET ORAL EVERY 4 HOURS PRN
Status: DISCONTINUED | OUTPATIENT
Start: 2018-03-13 | End: 2018-03-13 | Stop reason: HOSPADM

## 2018-03-13 RX ORDER — SODIUM CHLORIDE 9 MG/ML
INJECTION, SOLUTION INTRAVENOUS CONTINUOUS PRN
Status: DISCONTINUED | OUTPATIENT
Start: 2018-03-13 | End: 2018-03-13 | Stop reason: SURG

## 2018-03-13 RX ORDER — PROPOFOL 10 MG/ML
INJECTION, EMULSION INTRAVENOUS AS NEEDED
Status: DISCONTINUED | OUTPATIENT
Start: 2018-03-13 | End: 2018-03-13 | Stop reason: SURG

## 2018-03-13 RX ADMIN — PROPOFOL 50 MG: 10 INJECTION, EMULSION INTRAVENOUS at 07:42

## 2018-03-13 RX ADMIN — GLYCOPYRROLATE 0.1 MG: 0.2 INJECTION, SOLUTION INTRAMUSCULAR; INTRAVENOUS at 07:40

## 2018-03-13 RX ADMIN — SODIUM CHLORIDE: 0.9 INJECTION, SOLUTION INTRAVENOUS at 07:06

## 2018-03-13 RX ADMIN — FENTANYL CITRATE 50 MCG: 50 INJECTION INTRAMUSCULAR; INTRAVENOUS at 07:55

## 2018-03-13 RX ADMIN — KETAMINE HYDROCHLORIDE 30 MG: 50 INJECTION INTRAMUSCULAR; INTRAVENOUS at 07:42

## 2018-03-13 RX ADMIN — MIDAZOLAM HYDROCHLORIDE 2 MG: 1 INJECTION, SOLUTION INTRAMUSCULAR; INTRAVENOUS at 07:40

## 2018-03-13 RX ADMIN — ONDANSETRON 4 MG: 2 INJECTION INTRAMUSCULAR; INTRAVENOUS at 07:40

## 2018-03-13 RX ADMIN — CLINDAMYCIN PHOSPHATE 600 MG: 12 INJECTION, SOLUTION INTRAMUSCULAR; INTRAVENOUS at 07:40

## 2018-03-13 RX ADMIN — PROPOFOL 50 MCG/KG/MIN: 10 INJECTION, EMULSION INTRAVENOUS at 07:42

## 2018-03-13 RX ADMIN — FENTANYL CITRATE 50 MCG: 50 INJECTION INTRAMUSCULAR; INTRAVENOUS at 07:42

## 2018-03-13 NOTE — DISCHARGE INSTRUCTIONS
Keep dressings clean and dry  May remove dressing in 3 days  If incision is without drainage may get incision wet but must cleanse incision with rubbing alcohol after every shower or wet process  May apply band aide  No lifting until seen in office by Dr Brooke Naidu  Follow up in office in 10-14 days   You may have some numbness after surgery this is normal

## 2018-03-13 NOTE — ANESTHESIA PREPROCEDURE EVALUATION
Review of Systems/Medical History  Patient summary reviewed  Chart reviewed  History of anesthetic complications     Cardiovascular  Hyperlipidemia, Hypertension controlled, Dysrhythmias, ,    Pulmonary  Asthma: , Sleep apnea ,        GI/Hepatic    GERD well controlled,        Negative  ROS        Endo/Other  History of thyroid disease , hypothyroidism,   Obesity  morbid obesity   GYN  Negative gynecology ROS          Hematology  Negative hematology ROS      Musculoskeletal    Arthritis     Neurology    Neuromuscular disease , Headaches,    Psychology   Anxiety, Depression ,              Physical Exam    Airway    Mallampati score: II  TM Distance: >3 FB  Neck ROM: full     Dental   No notable dental hx     Cardiovascular  Cardiovascular exam normal    Pulmonary  Pulmonary exam normal     Other Findings        Anesthesia Plan  ASA Score- 3     Anesthesia Type- IV sedation with anesthesia and general with ASA Monitors  Additional Monitors:   Airway Plan: LMA  Plan Factors-  Patient did not smoke on day of surgery  Induction- intravenous  Postoperative Plan-     Informed Consent- Anesthetic plan and risks discussed with patient  I personally reviewed this patient with the CRNA  Discussed and agreed on the Anesthesia Plan with the CRNA  Sherman Ferrer

## 2018-03-13 NOTE — ANESTHESIA POSTPROCEDURE EVALUATION
Post-Op Assessment Note      CV Status:  Stable    Mental Status:  Alert    Hydration Status:  Stable    PONV Controlled:  None    Airway Patency:  Patent        Staff: CRNA           /74 (03/13/18 0806)    Temp 97 7 °F (36 5 °C) (03/13/18 0806)    Pulse 88 (03/13/18 0806)   Resp 16 (03/13/18 0806)    SpO2 100 % (03/13/18 0806)

## 2018-03-13 NOTE — OP NOTE
OPERATIVE REPORT  PATIENT NAME: Bubba Tate    :  1980  MRN: [id number]  Pt Location: AN OR ROOM 01    SURGERY DATE: 3/13/2018    Surgeon(s) and Role:     * Brown Mera DO - Primary  [facility] utilized during the case for assistance with positioning prepping draping retraction and closure and dressing application    Preop Diagnosis:  Trigger finger, right middle finger [M65 331]    Post-Op Diagnosis Codes:     * Trigger finger, right middle finger [M65 331]    Procedure(s) (LRB):  LONG TRIGGER FINGER RELEASE (Right)    Specimen(s):  * No specimens in log *    Estimated Blood Loss:   Minimal    Drains:       Anesthesia Type:   IV Sedation with Anesthesia    Operative Indications:  Trigger finger, right middle finger [M65 331]      Operative Findings:   thickened A1 pulley    Complications:   None    Procedure and Technique:  Patient was identified in the preoperative area  The consent H and P had been reviewed  The right middle finger was marked  The patient was brought back to the operative suite where she was given sedation by Anesthesia  A local anesthetic was given after a proper time-out into the area of the incision  The right upper extremity was prepped and draped in a sterile fashion a tourniquet applied  After proper time-out commenced identified the right upper extremity as the operative site an Esmarch was used to exsanguinate the upper extremity  An incision was made  Along the distal palmar crease  we dissected down to the A1 pulley or able to incise the pulley  There is definite inflamed tenosynovial   We made sure that both tendons were free and free of triggering  We irrigated let the tourniquet down after  3 minutes  We closed the incision with 3 O nylon Xeroform cut 4 x 4 and Tegaderm were applied  Anesthesia was reversed the patient was taken back to PACU in stable condition     I was present for the entire procedure and A qualified resident physician was not available    Patient Disposition:  PACU     SIGNATURE: Yolis Falcon DO  DATE: March 13, 2018  TIME: 7:30 AM

## 2018-03-13 NOTE — H&P (VIEW-ONLY)
Assessment:  1  Trigger finger, right middle finger       Patient Active Problem List   Diagnosis    Thyroid nodule    Trigger finger, right middle finger           Plan       patient would like to proceed with surgical intervention            Subjective:     Patient ID:    Chief Complaint:Jannette Baugh 45 y o  female      HPI    Patient is here with regards to her right middle finger trigger finger  We gave her an injection about a month ago  It did improve some of the pain but she still having some clicking and pain now  She comes in today to be re-evaluated  Her pain at its worst is 8 out 10 and at its lowest level is 5/10  She has had a cortisone injection for treatment  Otherwise cannot take anti-inflammatories because she had a spinal fusion was told not to do so      The following portions of the patient's history were reviewed and updated as appropriate: allergies, current medications, past family history, past social history, past surgical history and problem list         Social History     Social History    Marital status: /Civil Union     Spouse name: N/A    Number of children: N/A    Years of education: N/A     Occupational History    Not on file       Social History Main Topics    Smoking status: Never Smoker    Smokeless tobacco: Never Used    Alcohol use Yes      Comment: rarely    Drug use: No    Sexual activity: Not on file     Other Topics Concern    Not on file     Social History Narrative    No narrative on file     Past Medical History:   Diagnosis Date    Anxiety     Arthritis     Asthma     Chronic pain disorder     BACK SHOULDERS NECK    Depression     Disease of thyroid gland     nodule    Disorder of heart muscle (HCC)     weakened heart muscle    Fibromyalgia, primary     GERD (gastroesophageal reflux disease)     Hearing difficulty of right ear     Hyperlipidemia     Hypertension     Irritable bowel syndrome     Migraine     Motion sickness     Perforation of tympanic membrane     Right    PONV (postoperative nausea and vomiting)     RBBB     Sinusitis     Sleep apnea     no cpap    Wears glasses      Past Surgical History:   Procedure Laterality Date    BACK SURGERY      lumbar herniated disc    BREAST SURGERY      cyst removal    CARPAL TUNNEL RELEASE       SECTION      CHOLECYSTECTOMY      CYST REMOVAL      DENTAL SURGERY      [place] COLONOSCOPY FLX DX W/COLLJ SPEC WHEN PFRMD N/A 2017    Procedure: COLONOSCOPY;  Surgeon: Bibiana Juarez MD;  [facility]; Service: Gastroenterology    [place] TYMPANOPLASTY Right 2017    Procedure: TYMPANOPLASTY WITH PERICHONDRIN GRAFT;  Surgeon:  Rell Buckner DO;  [facility];  Service: ENT    SHOULDER SURGERY Right     WISDOM TOOTH EXTRACTION       Allergies   Allergen Reactions    Amoxicillin Swelling    Neurontin [Gabapentin] Other (See Comments)     suicidal    Penicillins Swelling    Doxycycline Hives    Other Hives     Adhesive tape     Current Outpatient Prescriptions on File Prior to Visit   Medication Sig Dispense Refill    albuterol (PROAIR HFA) 90 mcg/act inhaler Inhale 2 puffs every 6 (six) hours as needed for wheezing      amitriptyline (ELAVIL) 10 mg tablet Take 1 tablet by mouth      Ascorbic Acid (VITAMIN C) 500 MG CAPS Take 1 capsule by mouth daily      atorvastatin (LIPITOR) 40 mg tablet Take 1 tablet (40 mg total) by mouth daily 90 tablet 0    cetirizine (ZyrTEC) 10 mg tablet Take 10 mg by mouth daily      Cholecalciferol (VITAMIN D3) 5000 units CAPS Take 1 capsule by mouth 2 (two) times a day      cyanocobalamin (VITAMIN B-12) 1,000 mcg tablet Take 3 tablets by mouth 2 (two) times a day      diazepam (VALIUM) 5 mg tablet Take by mouth      FLUoxetine (PROZAC) 20 mg capsule Take 3 capsules by mouth daily      hydrocortisone (ANUSOL-HC, PROCTOSOL HC,) 2 5 % rectal cream Insert 1 Applicatorful into the rectum 3 (three) times a day      ibuprofen (MOTRIN) 200 mg tablet Take 200 mg by mouth every 6 (six) hours as needed for mild pain      Linaclotide (LINZESS) 145 MCG CAPS Take by mouth      medroxyPROGESTERone (DEPO-PROVERA) 400 MG/ML SUSP Inject into the shoulder, thigh, or buttocks      methocarbamol (ROBAXIN-750) 750 mg tablet Take by mouth      metoprolol succinate (TOPROL-XL) 25 mg 24 hr tablet Take 1 tablet (25 mg total) by mouth daily 90 tablet 0    Multiple Vitamins-Minerals (CENTRUM ADULTS PO) Take 1 tablet by mouth daily      pantoprazole (PROTONIX) 40 mg tablet Take 1 tablet (40 mg total) by mouth daily 90 tablet 0     No current facility-administered medications on file prior to visit  Objective:        Ortho Exam   positive triggering of the middle finger right hand  Pinpoint tenderness in this area as well  Portions of the record may have been created with voice recognition software   Occasional wrong word or "sound a like" substitutions may have occurred due to the inherent limitations of voice recognition software   Read the chart carefully and recognize, using context, where substitutions have occurred

## 2018-03-16 DIAGNOSIS — K21.9 GASTROESOPHAGEAL REFLUX DISEASE WITHOUT ESOPHAGITIS: ICD-10-CM

## 2018-03-16 DIAGNOSIS — I10 HYPERTENSION, UNSPECIFIED TYPE: ICD-10-CM

## 2018-03-16 DIAGNOSIS — E78.5 HYPERLIPIDEMIA, UNSPECIFIED HYPERLIPIDEMIA TYPE: ICD-10-CM

## 2018-03-16 RX ORDER — ATORVASTATIN CALCIUM 40 MG/1
TABLET, FILM COATED ORAL
Qty: 90 TABLET | Refills: 0 | Status: SHIPPED | OUTPATIENT
Start: 2018-03-16 | End: 2018-05-08 | Stop reason: SDUPTHER

## 2018-03-16 RX ORDER — PANTOPRAZOLE SODIUM 40 MG/1
TABLET, DELAYED RELEASE ORAL
Qty: 90 TABLET | Refills: 0 | Status: SHIPPED | OUTPATIENT
Start: 2018-03-16 | End: 2018-06-07 | Stop reason: SDUPTHER

## 2018-03-16 RX ORDER — METOPROLOL SUCCINATE 25 MG/1
TABLET, EXTENDED RELEASE ORAL
Qty: 90 TABLET | Refills: 0 | Status: SHIPPED | OUTPATIENT
Start: 2018-03-16 | End: 2018-06-07 | Stop reason: SDUPTHER

## 2018-03-22 ENCOUNTER — OFFICE VISIT (OUTPATIENT)
Dept: OBGYN CLINIC | Facility: MEDICAL CENTER | Age: 38
End: 2018-03-22

## 2018-03-22 ENCOUNTER — TELEPHONE (OUTPATIENT)
Dept: OBGYN CLINIC | Facility: HOSPITAL | Age: 38
End: 2018-03-22

## 2018-03-22 VITALS
SYSTOLIC BLOOD PRESSURE: 117 MMHG | DIASTOLIC BLOOD PRESSURE: 81 MMHG | HEART RATE: 92 BPM | BODY MASS INDEX: 42.38 KG/M2 | HEIGHT: 67 IN | WEIGHT: 270 LBS

## 2018-03-22 DIAGNOSIS — L08.9 SUPERFICIAL BACTERIAL SKIN INFECTION: ICD-10-CM

## 2018-03-22 DIAGNOSIS — B96.89 SUPERFICIAL BACTERIAL SKIN INFECTION: ICD-10-CM

## 2018-03-22 DIAGNOSIS — M65.331 TRIGGER FINGER, RIGHT MIDDLE FINGER: Primary | ICD-10-CM

## 2018-03-22 PROCEDURE — 99024 POSTOP FOLLOW-UP VISIT: CPT | Performed by: ORTHOPAEDIC SURGERY

## 2018-03-22 RX ORDER — SULFAMETHOXAZOLE AND TRIMETHOPRIM 800; 160 MG/1; MG/1
1 TABLET ORAL EVERY 12 HOURS SCHEDULED
Status: DISCONTINUED | OUTPATIENT
Start: 2018-03-22 | End: 2018-03-22

## 2018-03-22 RX ORDER — SULFAMETHOXAZOLE AND TRIMETHOPRIM 800; 160 MG/1; MG/1
1 TABLET ORAL EVERY 12 HOURS SCHEDULED
Qty: 14 TABLET | Refills: 0 | Status: SHIPPED | OUTPATIENT
Start: 2018-03-22 | End: 2018-03-29

## 2018-03-22 NOTE — PROGRESS NOTES
Assessment:  No diagnosis found  Patient Active Problem List   Diagnosis    Thyroid nodule    Trigger finger, right middle finger           Plan      Follow up next Monday after starting the antibiotics and will recheck the wound    I believe she has the start of an infection but not a significant abscess yet  We are going to place her on antibiotics to make sure she does not form an abscess or developed tenosynovitis  She will also do Betadine soaks twice a day with sterile water            Subjective:     Patient ID:    Chief Complaint:Jannette Baugh 45 y o  female      HPI     patient comes in today with regards to her right middle finger  She reports that on Monday she started having increasing pain and swelling around the incision  Patient took the stitches out last night and noticed that she had pus coming out of the area  She reports increasing pain and slight decrease in motion because she feels stiff  Social History     Social History    Marital status: /Civil Union     Spouse name: N/A    Number of children: N/A    Years of education: N/A     Occupational History    Not on file       Social History Main Topics    Smoking status: Never Smoker    Smokeless tobacco: Never Used    Alcohol use Yes      Comment: rarely    Drug use: No    Sexual activity: Not on file     Other Topics Concern    Not on file     Social History Narrative    No narrative on file     Past Medical History:   Diagnosis Date    Anxiety     Arthritis     Asthma     Chronic pain disorder     BACK SHOULDERS NECK    Depression     Disease of thyroid gland     nodule    Disorder of heart muscle (HCC)     weakened heart muscle    Fibromyalgia, primary     GERD (gastroesophageal reflux disease)     Hearing difficulty of right ear     Hyperlipidemia     Hypertension     Irritable bowel syndrome     Migraine     Motion sickness     Perforation of tympanic membrane     Right    PONV (postoperative nausea and vomiting)     RBBB     Sinusitis     Sleep apnea     no cpap    Wears glasses      Past Surgical History:   Procedure Laterality Date    BACK SURGERY      lumbar herniated disc    BREAST SURGERY Left 2006    cyst removal    CARPAL TUNNEL RELEASE       SECTION      CHOLECYSTECTOMY      CYST REMOVAL      DENTAL SURGERY      [place] COLONOSCOPY FLX DX W/COLLJ SPEC WHEN PFRMD N/A 2017    Procedure: COLONOSCOPY;  Surgeon: Dulce Cronin MD;  [facility]; Service: Gastroenterology    W[place] INCISE FINGER TENDON SHEATH Right 3/13/2018    Procedure: LONG TRIGGER FINGER RELEASE;  Surgeon: Avinash Donahue DO;  [facility];  Service: Orthopedics    [place] TYMPANOPLASTY Right 2017    Procedure: TYMPANOPLASTY WITH PERICHONDRIN GRAFT;  Surgeon: Camron Moreira DO;  [facility];  Service: ENT    SHOULDER SURGERY Right     WISDOM TOOTH EXTRACTION       Allergies   Allergen Reactions    Amoxicillin Swelling    Neurontin [Gabapentin] Other (See Comments)     suicidal    Penicillins Swelling    Aripiprazole      Other reaction(s): Other (See Comments)  Other reaction(s): low dose 2 mg ; curving in and locking in of hands/dystonia    Carbamazepine      Other reaction(s): Unknown Reaction    Doxycycline Hives    Lorazepam      Other reaction(s):  Other (See Comments)  Other reaction(s): higher dose > anxiety and depression    Other Hives     Adhesive tape    Lamotrigine Rash     Current Outpatient Prescriptions on File Prior to Visit   Medication Sig Dispense Refill    albuterol (PROAIR HFA) 90 mcg/act inhaler Inhale 2 puffs every 6 (six) hours as needed for wheezing      amitriptyline (ELAVIL) 10 mg tablet Take 1 tablet by mouth      Ascorbic Acid (VITAMIN C) 500 MG CAPS Take 1 capsule by mouth daily      atorvastatin (LIPITOR) 40 mg tablet TAKE 1 TABLET BY MOUTH  DAILY 90 tablet 0    baclofen 10 mg tablet Take 10 mg by mouth daily with dinner      cetirizine (ZyrTEC) 10 mg tablet Take 10 mg by mouth daily      Cholecalciferol (VITAMIN D3) 5000 units CAPS Take 1 capsule by mouth 2 (two) times a day      cyanocobalamin (VITAMIN B-12) 1,000 mcg tablet Take 3 tablets by mouth 2 (two) times a day      FLUoxetine (PROZAC) 20 mg capsule Take 3 capsules by mouth daily      hydrocortisone (ANUSOL-HC, PROCTOSOL HC,) 2 5 % rectal cream Insert 1 Applicatorful into the rectum 3 (three) times a day      medroxyPROGESTERone (DEPO-PROVERA) 400 MG/ML SUSP Inject into the shoulder, thigh, or buttocks      metoprolol succinate (TOPROL-XL) 25 mg 24 hr tablet TAKE 1 TABLET BY MOUTH  DAILY 90 tablet 0    Multiple Vitamins-Minerals (CENTRUM ADULTS PO) Take 1 tablet by mouth daily      oxyCODONE-acetaminophen (PERCOCET) 5-325 mg per tablet Take 1 tablet by mouth every 6 (six) hours as needed for severe pain for up to 5 doses Max Daily Amount: 4 tablets 5 tablet 0    pantoprazole (PROTONIX) 40 mg tablet TAKE 1 TABLET BY MOUTH  DAILY 90 tablet 0     No current facility-administered medications on file prior to visit  Objective:    Review of Systems    Ortho Exam    I examined the hand of there is some erythema around the incision some mild swelling around the incision I did not and was not able to express any fluid  She does not have a sausage like digit she does not have tenderness on the flexor tendon of the middle finger  She has mild tenderness but not significant tenderness with passive extension of the finger  There is some maceration of the skin right next to the incision    Physical Exam

## 2018-03-22 NOTE — TELEPHONE ENCOUNTER
Bactrim DS 1 tablet BID for 10 days, #20 o refills, called to [address] [facility]  Patient aware

## 2018-03-26 ENCOUNTER — TELEPHONE (OUTPATIENT)
Dept: FAMILY MEDICINE CLINIC | Facility: CLINIC | Age: 38
End: 2018-03-26

## 2018-03-26 NOTE — TELEPHONE ENCOUNTER
Patient called asking that most recent office visit, previous sleep study from about two years ago and all records pertaining thereto be sent to  Pulmonology at fax #[phone] for an appointment on 4/4/18  In research, I found the patient has seen Dr Zach Garcia Md, pulmonology, and Radha Ashby, neurology, in the past       I faxed our note to  Pulmonology and advised the patient to call the other two providers to have them fax their notes and test results accordingly  Patient was very pleasant and understood

## 2018-04-09 ENCOUNTER — OFFICE VISIT (OUTPATIENT)
Dept: OBGYN CLINIC | Facility: CLINIC | Age: 38
End: 2018-04-09

## 2018-04-09 VITALS
SYSTOLIC BLOOD PRESSURE: 126 MMHG | WEIGHT: 270 LBS | DIASTOLIC BLOOD PRESSURE: 84 MMHG | HEIGHT: 67 IN | HEART RATE: 80 BPM | BODY MASS INDEX: 42.38 KG/M2

## 2018-04-09 DIAGNOSIS — M65.331 TRIGGER FINGER, RIGHT MIDDLE FINGER: Primary | ICD-10-CM

## 2018-04-09 PROCEDURE — 99024 POSTOP FOLLOW-UP VISIT: CPT | Performed by: ORTHOPAEDIC SURGERY

## 2018-04-09 NOTE — PROGRESS NOTES
Assessment:  No diagnosis found  Patient Active Problem List   Diagnosis    Thyroid nodule    Trigger finger, right middle finger           Plan      Activity as tolerated work on scar tissue on her own follow-up on a p r n  basis            Subjective:     Patient ID:    Chief Complaint:Jannette Baugh 45 y o  female      HPI    Patient had a trigger finger that had some suture abscess formation took her suture out herself and basically resolved her issue she came in for follow-up and already was looking clinically better at her last visit she comes in today to have her final visit to make sure it is completely resolved  Does have some stiffness but otherwise has great range of motion  There is also some scar tissue around the incision  Social History     Social History    Marital status: /Civil Union     Spouse name: N/A    Number of children: N/A    Years of education: N/A     Occupational History    Not on file       Social History Main Topics    Smoking status: Never Smoker    Smokeless tobacco: Never Used    Alcohol use Yes      Comment: rarely    Drug use: No    Sexual activity: Not on file     Other Topics Concern    Not on file     Social History Narrative    No narrative on file     Past Medical History:   Diagnosis Date    Anxiety     Arthritis     Asthma     Chronic pain disorder     BACK SHOULDERS NECK    Depression     Disease of thyroid gland     nodule    Disorder of heart muscle (HCC)     weakened heart muscle    Fibromyalgia, primary     GERD (gastroesophageal reflux disease)     Hearing difficulty of right ear     Hyperlipidemia     Hypertension     Irritable bowel syndrome     Migraine     Motion sickness     Perforation of tympanic membrane     Right    PONV (postoperative nausea and vomiting)     RBBB     Sinusitis     Sleep apnea     no cpap    Wears glasses      Past Surgical History:   Procedure Laterality Date    BACK SURGERY      lumbar herniated disc    BREAST SURGERY Left 2006    cyst removal    CARPAL TUNNEL RELEASE       SECTION      CHOLECYSTECTOMY      CYST REMOVAL      DENTAL SURGERY      [place] COLONOSCOPY FLX DX W/COLLJ SPEC WHEN PFRMD N/A 2017    Procedure: COLONOSCOPY;  Surgeon: Robin Ga MD;  [facility]; Service: Gastroenterology    [place] INCISE FINGER TENDON SHEATH Right 3/13/2018    Procedure: LONG TRIGGER FINGER RELEASE;  Surgeon: Christy Scruggs DO;  [facility];  Service: Orthopedics    [place] TYMPANOPLASTY Right 2017    Procedure: TYMPANOPLASTY WITH PERICHONDRIN GRAFT;  Surgeon: Petty FillerDO ana;  [facility];  Service: ENT    SHOULDER SURGERY Right     WISDOM TOOTH EXTRACTION       Allergies   Allergen Reactions    Amoxicillin Swelling    Neurontin [Gabapentin] Other (See Comments)     suicidal    Penicillins Swelling    Aripiprazole      Other reaction(s): Other (See Comments)  Other reaction(s): low dose 2 mg ; curving in and locking in of hands/dystonia    Carbamazepine      Other reaction(s): Unknown Reaction    Doxycycline Hives    Lorazepam      Other reaction(s):  Other (See Comments)  Other reaction(s): higher dose > anxiety and depression    Other Hives     Adhesive tape    Lamotrigine Rash     Current Outpatient Prescriptions on File Prior to Visit   Medication Sig Dispense Refill    albuterol (PROAIR HFA) 90 mcg/act inhaler Inhale 2 puffs every 6 (six) hours as needed for wheezing      amitriptyline (ELAVIL) 10 mg tablet Take 1 tablet by mouth      Ascorbic Acid (VITAMIN C) 500 MG CAPS Take 1 capsule by mouth daily      atorvastatin (LIPITOR) 40 mg tablet TAKE 1 TABLET BY MOUTH  DAILY 90 tablet 0    baclofen 10 mg tablet Take 10 mg by mouth daily with dinner      cetirizine (ZyrTEC) 10 mg tablet Take 10 mg by mouth daily      Cholecalciferol (VITAMIN D3) 5000 units CAPS Take 1 capsule by mouth 2 (two) times a day      cyanocobalamin (VITAMIN B-12) 1,000 mcg tablet Take 3 tablets by mouth 2 (two) times a day      FLUoxetine (PROZAC) 20 mg capsule Take 3 capsules by mouth daily      hydrocortisone (ANUSOL-HC, PROCTOSOL HC,) 2 5 % rectal cream Insert 1 Applicatorful into the rectum 3 (three) times a day      medroxyPROGESTERone (DEPO-PROVERA) 400 MG/ML SUSP Inject into the shoulder, thigh, or buttocks      metoprolol succinate (TOPROL-XL) 25 mg 24 hr tablet TAKE 1 TABLET BY MOUTH  DAILY 90 tablet 0    Multiple Vitamins-Minerals (CENTRUM ADULTS PO) Take 1 tablet by mouth daily      oxyCODONE-acetaminophen (PERCOCET) 5-325 mg per tablet Take 1 tablet by mouth every 6 (six) hours as needed for severe pain for up to 5 doses Max Daily Amount: 4 tablets 5 tablet 0    pantoprazole (PROTONIX) 40 mg tablet TAKE 1 TABLET BY MOUTH  DAILY 90 tablet 0     No current facility-administered medications on file prior to visit  Objective:    Review of Systems    Ortho Exam    Physical Exam  Range of motion within normal limits

## 2018-04-24 ENCOUNTER — OFFICE VISIT (OUTPATIENT)
Dept: URGENT CARE | Facility: CLINIC | Age: 38
End: 2018-04-24
Payer: COMMERCIAL

## 2018-04-24 VITALS
OXYGEN SATURATION: 99 % | DIASTOLIC BLOOD PRESSURE: 80 MMHG | HEART RATE: 74 BPM | SYSTOLIC BLOOD PRESSURE: 130 MMHG | TEMPERATURE: 97.6 F | RESPIRATION RATE: 16 BRPM

## 2018-04-24 DIAGNOSIS — H66.91 RIGHT OTITIS MEDIA, UNSPECIFIED OTITIS MEDIA TYPE: Primary | ICD-10-CM

## 2018-04-24 PROCEDURE — S9083 URGENT CARE CENTER GLOBAL: HCPCS | Performed by: NURSE PRACTITIONER

## 2018-04-24 PROCEDURE — 99283 EMERGENCY DEPT VISIT LOW MDM: CPT | Performed by: NURSE PRACTITIONER

## 2018-04-24 PROCEDURE — G0382 LEV 3 HOSP TYPE B ED VISIT: HCPCS | Performed by: NURSE PRACTITIONER

## 2018-04-24 RX ORDER — BACLOFEN 10 MG/1
10 TABLET ORAL 2 TIMES DAILY
COMMUNITY
Start: 2018-04-05 | End: 2018-07-12

## 2018-04-24 RX ORDER — AZITHROMYCIN 250 MG/1
250 TABLET, FILM COATED ORAL DAILY
Qty: 6 TABLET | Refills: 0 | Status: SHIPPED | COMMUNITY
Start: 2018-04-24 | End: 2018-04-29

## 2018-04-24 NOTE — PROGRESS NOTES
[facility]        NAME: Carri Soto is a 45 y o  female  : 1980    MRN: [id number]  DATE: 2018  TIME: 6:43 PM    Assessment and Plan   Right otitis media, unspecified otitis media type [H66 91]  1  Right otitis media, unspecified otitis media type  azithromycin (ZITHROMAX) 250 mg tablet         Patient Instructions       Follow up with PCP in 3-5 days  Proceed to  ER if symptoms worsen  Chief Complaint     Chief Complaint   Patient presents with    Sore Throat     x 2 days    Cold Like Symptoms    Cough    Nasal Congestion    Earache         History of Present Illness       Sore Throat    Episode onset: 2 days  The problem has been unchanged  There has been no fever  Associated symptoms include congestion, coughing, ear pain, headaches, a plugged ear sensation (right), shortness of breath and swollen glands  Pertinent negatives include no hoarse voice or trouble swallowing  Cough   Associated symptoms include ear pain, headaches, a sore throat and shortness of breath  Pertinent negatives include no fever  Earache    Associated symptoms include coughing, headaches and a sore throat  Review of Systems   Review of Systems   Constitutional: Negative  Negative for fever  HENT: Positive for congestion, ear pain and sore throat  Negative for hoarse voice and trouble swallowing  Eyes: Negative  Respiratory: Positive for cough and shortness of breath  Gastrointestinal: Negative  Endocrine: Negative  Genitourinary: Negative  Musculoskeletal: Negative  Neurological: Positive for headaches  Psychiatric/Behavioral: Negative            Current Medications       Current Outpatient Prescriptions:     albuterol (PROAIR HFA) 90 mcg/act inhaler, Inhale 2 puffs every 6 (six) hours as needed for wheezing, Disp: , Rfl:     amitriptyline (ELAVIL) 10 mg tablet, Take 1 tablet by mouth, Disp: , Rfl:     Ascorbic Acid (VITAMIN C) 500 MG CAPS, Take 1 capsule by mouth daily, Disp: , Rfl:     atorvastatin (LIPITOR) 40 mg tablet, TAKE 1 TABLET BY MOUTH  DAILY, Disp: 90 tablet, Rfl: 0    baclofen 10 mg tablet, Take 10 mg by mouth daily with dinner, Disp: , Rfl:     baclofen 10 mg tablet, Take 10 mg by mouth 2 (two) times a day, Disp: , Rfl:     cetirizine (ZyrTEC) 10 mg tablet, Take 10 mg by mouth daily, Disp: , Rfl:     Cholecalciferol (VITAMIN D3) 5000 units CAPS, Take 1 capsule by mouth 2 (two) times a day, Disp: , Rfl:     cyanocobalamin (VITAMIN B-12) 1,000 mcg tablet, Take 3 tablets by mouth 2 (two) times a day, Disp: , Rfl:     FLUoxetine (PROZAC) 20 mg capsule, Take 3 capsules by mouth daily, Disp: , Rfl:     hydrocortisone (ANUSOL-HC, PROCTOSOL HC,) 2 5 % rectal cream, Insert 1 Applicatorful into the rectum 3 (three) times a day, Disp: , Rfl:     medroxyPROGESTERone (DEPO-PROVERA) 400 MG/ML SUSP, Inject into the shoulder, thigh, or buttocks, Disp: , Rfl:     metoprolol succinate (TOPROL-XL) 25 mg 24 hr tablet, TAKE 1 TABLET BY MOUTH  DAILY, Disp: 90 tablet, Rfl: 0    Multiple Vitamins-Minerals (CENTRUM ADULTS PO), Take 1 tablet by mouth daily, Disp: , Rfl:     oxyCODONE-acetaminophen (PERCOCET) 5-325 mg per tablet, Take 1 tablet by mouth every 6 (six) hours as needed for severe pain for up to 5 doses Max Daily Amount: 4 tablets, Disp: 5 tablet, Rfl: 0    pantoprazole (PROTONIX) 40 mg tablet, TAKE 1 TABLET BY MOUTH  DAILY, Disp: 90 tablet, Rfl: 0    azithromycin (ZITHROMAX) 250 mg tablet, Take 1 tablet (250 mg total) by mouth daily for 5 days 2 pills today, then one daily for 4 more days, Disp: 6 tablet, Rfl: 0    Current Allergies     Allergies as of 04/24/2018 - Reviewed 04/24/2018   Allergen Reaction Noted    Amoxicillin Swelling and Anaphylaxis 05/16/2017    Neurontin [gabapentin] Other (See Comments) 05/16/2017    Penicillins Swelling and Anaphylaxis 05/16/2017    Aripiprazole  11/06/2013    Carbamazepine  10/16/2012    Doxycycline Hives     Lorazepam  2014    Other Hives 2017    Lamotrigine Rash 2012            The following portions of the patient's history were reviewed and updated as appropriate: allergies, current medications, past family history, past medical history, past social history, past surgical history and problem list      Past Medical History:   Diagnosis Date    Allergic     Anxiety     Arthritis     Asthma     Chronic pain disorder     BACK SHOULDERS NECK    Contraception     Depression     Disease of thyroid gland     nodule    Disorder of heart muscle (HCC)     weakened heart muscle    Fibromyalgia, primary     GERD (gastroesophageal reflux disease)     Hearing difficulty of right ear     Hyperlipidemia     Hypertension     Irritable bowel syndrome     Migraine     Motion sickness     Perforation of tympanic membrane     Right    PONV (postoperative nausea and vomiting)     RBBB     Sinusitis     Sleep apnea     no cpap    Wears glasses        Past Surgical History:   Procedure Laterality Date    BACK SURGERY      lumbar herniated disc    BREAST SURGERY Left 2006    cyst removal    CARPAL TUNNEL RELEASE       SECTION      CHOLECYSTECTOMY      CYST REMOVAL      DENTAL SURGERY      IN COLONOSCOPY FLX DX W/COLLJ SPEC WHEN PFRMD N/A 2017    Procedure: COLONOSCOPY;  Surgeon: Steven Guido MD;  [facility]; Service: Gastroenterology    IN INCISE FINGER TENDON SHEATH Right 3/13/2018    Procedure: LONG TRIGGER FINGER RELEASE;  Surgeon: Richelle Benson DO;  [facility];  Service: Orthopedics    IN TYMPANOPLASTY Right 2017    Procedure: TYMPANOPLASTY WITH PERICHONDRIN GRAFT;  Surgeon:  Yolis Canseco DO;  [facility];  Service: ENT    SHOULDER SURGERY Right     WISDOM TOOTH EXTRACTION         Family History   Problem Relation Age of Onset    Hypertension Mother     Hyperlipidemia Mother     Hypertension Father     Hyperlipidemia Father  Diabetes unspecified Maternal Grandmother          Medications have been verified  Objective   /80   Pulse 74   Temp 97 6 °F (36 4 °C)   Resp 16   SpO2 99%        Physical Exam     Physical Exam   Constitutional: She is oriented to person, place, and time  She appears well-developed and well-nourished  HENT:   Head: Normocephalic and atraumatic  Right Ear: Tympanic membrane is erythematous and bulging  Nose: Mucosal edema and rhinorrhea present  Right sinus exhibits maxillary sinus tenderness  Left sinus exhibits maxillary sinus tenderness  Mouth/Throat: Posterior oropharyngeal edema and posterior oropharyngeal erythema present  No oropharyngeal exudate  Eyes: Conjunctivae and EOM are normal  Pupils are equal, round, and reactive to light  Neck: Normal range of motion  Neck supple  Cardiovascular: Normal rate, regular rhythm and normal heart sounds  Pulmonary/Chest: Effort normal and breath sounds normal  No respiratory distress  She has no wheezes  She has no rales  Abdominal: Soft  Bowel sounds are normal    Lymphadenopathy:     She has cervical adenopathy  Neurological: She is alert and oriented to person, place, and time  She has normal reflexes  Skin: Skin is warm and dry  Psychiatric: She has a normal mood and affect

## 2018-04-24 NOTE — PATIENT INSTRUCTIONS
Otitis Media   WHAT YOU NEED TO KNOW:   Otitis media is an ear infection  DISCHARGE INSTRUCTIONS:   Medicines:  · Ibuprofen or acetaminophen  helps decrease your pain and fever  They are available without a doctor's order  Ask your healthcare provider which medicine is right for you  Ask how much to take and how often to take it  These medicines can cause stomach bleeding if not taken correctly  Ibuprofen can cause kidney damage  Do not take ibuprofen if you have kidney disease, an ulcer, or allergies to aspirin  Acetaminophen can cause liver damage  Do not drink alcohol if you take acetaminophen  · Ear drops  help treat your ear pain  · Antibiotics  help treat a bacterial infection that caused your ear infection  · Take your medicine as directed  Contact your healthcare provider if you think your medicine is not helping or if you have side effects  Tell him or her if you are allergic to any medicine  Keep a list of the medicines, vitamins, and herbs you take  Include the amounts, and when and why you take them  Bring the list or the pill bottles to follow-up visits  Carry your medicine list with you in case of an emergency  Heat or ice:   · Heat  may be used to decrease your pain  Place a warm, moist washcloth on your ear  Apply for 15 to 20 minutes, 3 to 4 times a day    · Ice  helps decrease swelling and pain  Use an ice pack or put crushed ice in a plastic bag  Cover the ice pack with a towel and place it on your ear for 15 to 20 minutes, 3 to 4 times a day for 2 days  Prevent otitis media:   · Wash your hands often  Use soap and water  Wash your hands after you use the bathroom, change a child's diapers, or sneeze  Wash your hands before you prepare or eat food  · Stay away from people who are ill  Some germs are easily and quickly spread through contact  Return to work or school: You may return to work or school when your fever is gone     Follow up with your healthcare provider as directed:  Write down your questions so you remember to ask them during your visits  Contact your healthcare provider if:   · Your ear pain gets worse or does not go away, even after treatment  · The outside of your ear is red or swollen  · You have vomiting or diarrhea  · You have fluid coming from your ear  · You have questions or concerns about your condition or care  Return to the emergency department if:   · You have a seizure  · You have a fever and a stiff neck  © 2017 [facility] Information is for End User's use only and may not be sold, redistributed or otherwise used for commercial purposes  All illustrations and images included in CareNotes® are the copyrighted property of A D A M [employer]  or Brooks Ramirez  The above information is an  only  It is not intended as medical advice for individual conditions or treatments  Talk to your doctor, nurse or pharmacist before following any medical regimen to see if it is safe and effective for you

## 2018-05-08 DIAGNOSIS — E78.5 HYPERLIPIDEMIA, UNSPECIFIED HYPERLIPIDEMIA TYPE: ICD-10-CM

## 2018-05-08 RX ORDER — ATORVASTATIN CALCIUM 40 MG/1
40 TABLET, FILM COATED ORAL DAILY
Qty: 30 TABLET | Refills: 0 | Status: SHIPPED | OUTPATIENT
Start: 2018-05-08 | End: 2018-06-08 | Stop reason: SDUPTHER

## 2018-05-08 NOTE — TELEPHONE ENCOUNTER
Pt called and lm on vm stating her  is lossing his insurance and and will get another in 2 month but she needs a30 day supply of medication sent to [facility] to hold her while they have no insurance

## 2018-05-09 ENCOUNTER — TELEPHONE (OUTPATIENT)
Dept: FAMILY MEDICINE CLINIC | Facility: CLINIC | Age: 38
End: 2018-05-09

## 2018-05-09 NOTE — TELEPHONE ENCOUNTER
Message was left on voicemail for this patient  [facility] need clarification of prescriptions Metotoprolol and Pantoprazole  They need working fax number, supervising doctor name and information  [phone]  Reference #[id number]  Thank you

## 2018-06-07 DIAGNOSIS — I10 HYPERTENSION, UNSPECIFIED TYPE: ICD-10-CM

## 2018-06-07 DIAGNOSIS — K21.9 GASTROESOPHAGEAL REFLUX DISEASE WITHOUT ESOPHAGITIS: ICD-10-CM

## 2018-06-07 RX ORDER — METOPROLOL SUCCINATE 25 MG/1
TABLET, EXTENDED RELEASE ORAL
Qty: 90 TABLET | Refills: 0 | Status: SHIPPED | OUTPATIENT
Start: 2018-06-07 | End: 2018-08-07 | Stop reason: SDUPTHER

## 2018-06-07 RX ORDER — PANTOPRAZOLE SODIUM 40 MG/1
TABLET, DELAYED RELEASE ORAL
Qty: 90 TABLET | Refills: 0 | Status: SHIPPED | OUTPATIENT
Start: 2018-06-07 | End: 2018-07-12 | Stop reason: SDUPTHER

## 2018-06-08 DIAGNOSIS — E78.5 HYPERLIPIDEMIA, UNSPECIFIED HYPERLIPIDEMIA TYPE: ICD-10-CM

## 2018-06-10 RX ORDER — ATORVASTATIN CALCIUM 40 MG/1
TABLET, FILM COATED ORAL
Qty: 30 TABLET | Refills: 0 | Status: SHIPPED | OUTPATIENT
Start: 2018-06-10 | End: 2018-07-12 | Stop reason: SDUPTHER

## 2018-06-26 ENCOUNTER — TELEPHONE (OUTPATIENT)
Dept: OBGYN CLINIC | Facility: HOSPITAL | Age: 38
End: 2018-06-26

## 2018-06-26 NOTE — TELEPHONE ENCOUNTER
Dr Edy Yee  Patient called today asking about some issues she is having since have trigger finger release back on 3 13 18  She states that she is still having pain 8/10  The mornings are the worse or when she is trying to lift something heavy  When trying to make a fist her finger does not close all the way and it sends a shooting pain down her wrist     She has tried ibuprofen with no relief  Patient was offered an appt form 6 27 18 but her new insurance does not start until 7 1 18  She is sched for 7 9 18 to come back in to see you  Is there anything she can do in the mean time?

## 2018-07-09 ENCOUNTER — OFFICE VISIT (OUTPATIENT)
Dept: OBGYN CLINIC | Facility: CLINIC | Age: 38
End: 2018-07-09
Payer: COMMERCIAL

## 2018-07-09 VITALS
HEART RATE: 85 BPM | DIASTOLIC BLOOD PRESSURE: 89 MMHG | WEIGHT: 270 LBS | SYSTOLIC BLOOD PRESSURE: 128 MMHG | BODY MASS INDEX: 42.38 KG/M2 | HEIGHT: 67 IN

## 2018-07-09 DIAGNOSIS — M65.331 TRIGGER FINGER, RIGHT MIDDLE FINGER: ICD-10-CM

## 2018-07-09 DIAGNOSIS — G56.03 CARPAL TUNNEL SYNDROME, BILATERAL: Primary | ICD-10-CM

## 2018-07-09 PROCEDURE — 99213 OFFICE O/P EST LOW 20 MIN: CPT | Performed by: ORTHOPAEDIC SURGERY

## 2018-07-09 RX ORDER — AZITHROMYCIN 250 MG/1
TABLET, FILM COATED ORAL
COMMUNITY
Start: 2017-10-28 | End: 2018-07-12

## 2018-07-09 RX ORDER — METHYLPREDNISOLONE 4 MG/1
TABLET ORAL
Qty: 21 TABLET | Refills: 1 | Status: SHIPPED | OUTPATIENT
Start: 2018-07-09 | End: 2018-07-27

## 2018-07-09 NOTE — PROGRESS NOTES
Assessment:  1  Carpal tunnel syndrome, bilateral     2  Trigger finger, right middle finger       Patient Active Problem List   Diagnosis    Thyroid nodule    Trigger finger, right middle finger    Carpal tunnel syndrome, bilateral           Plan       occupational therapy  We are going to try occupational therapy for both the scar tissue of the middle finger and to work on carpal tunnel  If she continues to have symptoms we will get an EMG and look at possible open carpal tunnel releases as well as revision trigger finger release            Subjective:     Patient ID:    Chief Complaint:Jannette Baugh 45 y o  female      HPI     patient comes in today with regards to her right hand but also her left hand  She had trigger finger release done back in March on her middle finger had some scar tissue in that scar tissue is bothering her she is getting some pain where the scar tissue was but also along the middle finger but she also gets pain into the thumb it is waking her up at night she did have carpal tunnel releases on both hands in the past but has similar symptoms in the left hand as well  Patient also reports loss of strength and hand especially with opening jars  The following portions of the patient's history were reviewed and updated as appropriate: allergies, current medications, past family history, past social history, past surgical history and problem list     All organ systems normal    Social History     Social History    Marital status: /Civil Union     Spouse name: N/A    Number of children: N/A    Years of education: N/A     Occupational History    Not on file       Social History Main Topics    Smoking status: Never Smoker    Smokeless tobacco: Never Used    Alcohol use Yes      Comment: rarely    Drug use: No    Sexual activity: Yes      Comment: per allscripts     Other Topics Concern    Not on file     Social History Narrative    No narrative on file     Past Medical History:   Diagnosis Date    Allergic     Anxiety     Arthritis     Asthma     Back pain     history of back pain documented resolved 2/13/15    Chest pain     last assessed 14 documented resolved 2/13/15    Chronic pain disorder     BACK SHOULDERS NECK    Contraception     Depression     Disease of thyroid gland     nodule    Disorder of heart muscle (HCC)     weakened heart muscle    Fibromyalgia, primary     GERD (gastroesophageal reflux disease)     Hearing difficulty of right ear     Hyperlipidemia     Hypertension     Irritable bowel syndrome     Migraine     Motion sickness     Perforation of tympanic membrane     Right    PONV (postoperative nausea and vomiting)     RBBB     Sinusitis     Sleep apnea     no cpap    Wears glasses      Past Surgical History:   Procedure Laterality Date    BACK SURGERY      lumbar herniated disc    BREAST SURGERY Left 2006    cyst removal    CARPAL TUNNEL RELEASE       SECTION      CHOLECYSTECTOMY      CYST REMOVAL      DENTAL SURGERY      MD COLONOSCOPY FLX DX W/COLLJ SPEC WHEN PFRMD N/A 2017    Procedure: COLONOSCOPY;  Surgeon: Tin Otoole MD;  [facility]; Service: Gastroenterology    MD INCISE FINGER TENDON SHEATH Right 3/13/2018    Procedure: LONG TRIGGER FINGER RELEASE;  Surgeon: Claudette Fraser DO;  [facility];  Service: Orthopedics    MD TYMPANOPLASTY Right 2017    Procedure: TYMPANOPLASTY WITH PERICHONDRIN GRAFT;  Surgeon: Estelita Clark DO;  [facility];  Service: ENT    SHOULDER SURGERY Right     WISDOM TOOTH EXTRACTION       Allergies   Allergen Reactions    Amoxicillin Swelling, Anaphylaxis and Angioedema    Neurontin [Gabapentin] Other (See Comments)     Other reaction(s): SUICIDE IDEATION  Other reaction(s):  Other (See Comments)  suicidal  suicidal    Penicillin V Angioedema and Swelling     Other reaction(s): Throat closure    Penicillins Swelling and Anaphylaxis    Aripiprazole Other (See Comments)     Other reaction(s): low dose 2 mg ; curving in and locking in of hands/dystonia  Other reaction(s): Other (See Comments)  Other reaction(s): low dose 2 mg ; curving in and locking in of hands/dystonia    Carbamazepine Other (See Comments)     Other reaction(s): Unknown Reaction    Doxycycline Hives    Lorazepam Other (See Comments)     Other reaction(s): higher dose > anxiety and depression  Other reaction(s):  Other (See Comments)  Other reaction(s): higher dose > anxiety and depression    Other Hives     Adhesive tape    Lamotrigine Rash     Current Outpatient Prescriptions on File Prior to Visit   Medication Sig Dispense Refill    albuterol (PROAIR HFA) 90 mcg/act inhaler Inhale 2 puffs every 6 (six) hours as needed for wheezing      amitriptyline (ELAVIL) 10 mg tablet Take 1 tablet by mouth      Ascorbic Acid (VITAMIN C) 500 MG CAPS Take 1 capsule by mouth daily      atorvastatin (LIPITOR) 40 mg tablet TAKE ONE TABLET BY MOUTH ONCE DAILY 30 tablet 0    baclofen 10 mg tablet Take 10 mg by mouth daily with dinner      baclofen 10 mg tablet Take 10 mg by mouth 2 (two) times a day      cetirizine (ZyrTEC) 10 mg tablet Take 10 mg by mouth daily      Cholecalciferol (VITAMIN D3) 5000 units CAPS Take 1 capsule by mouth 2 (two) times a day      cyanocobalamin (VITAMIN B-12) 1,000 mcg tablet Take 3 tablets by mouth 2 (two) times a day      FLUoxetine (PROZAC) 20 mg capsule Take 3 capsules by mouth daily      hydrocortisone (ANUSOL-HC, PROCTOSOL HC,) 2 5 % rectal cream Insert 1 Applicatorful into the rectum 3 (three) times a day      medroxyPROGESTERone (DEPO-PROVERA) 400 MG/ML SUSP Inject into the shoulder, thigh, or buttocks      metoprolol succinate (TOPROL-XL) 25 mg 24 hr tablet TAKE 1 TABLET BY MOUTH  DAILY 90 tablet 0    Multiple Vitamins-Minerals (CENTRUM ADULTS PO) Take 1 tablet by mouth daily      oxyCODONE-acetaminophen (PERCOCET) 5-325 mg per tablet Take 1 tablet by mouth every 6 (six) hours as needed for severe pain for up to 5 doses Max Daily Amount: 4 tablets 5 tablet 0    pantoprazole (PROTONIX) 40 mg tablet TAKE 1 TABLET BY MOUTH  DAILY 90 tablet 0     No current facility-administered medications on file prior to visit  Objective:        Ortho Exam      Tinel sign is positive with pain it refers pain into the thumb and the middle finger  There is also pain with palpation right over the scar tissue from the trigger finger release  Do work ends test also causes pain  There is numbness in the right thumb  Portions of the record may have been created with voice recognition software   Occasional wrong word or "sound a like" substitutions may have occurred due to the inherent limitations of voice recognition software   Read the chart carefully and recognize, using context, where substitutions have occurred

## 2018-07-12 ENCOUNTER — OFFICE VISIT (OUTPATIENT)
Dept: FAMILY MEDICINE CLINIC | Facility: CLINIC | Age: 38
End: 2018-07-12
Payer: COMMERCIAL

## 2018-07-12 VITALS
BODY MASS INDEX: 43.91 KG/M2 | HEART RATE: 93 BPM | TEMPERATURE: 98.8 F | OXYGEN SATURATION: 98 % | WEIGHT: 273.2 LBS | DIASTOLIC BLOOD PRESSURE: 80 MMHG | SYSTOLIC BLOOD PRESSURE: 118 MMHG | HEIGHT: 66 IN

## 2018-07-12 DIAGNOSIS — Z13.31 POSITIVE DEPRESSION SCREENING: ICD-10-CM

## 2018-07-12 DIAGNOSIS — I10 HYPERTENSION, UNSPECIFIED TYPE: ICD-10-CM

## 2018-07-12 DIAGNOSIS — E78.5 HYPERLIPIDEMIA, UNSPECIFIED HYPERLIPIDEMIA TYPE: ICD-10-CM

## 2018-07-12 DIAGNOSIS — E66.01 MORBID OBESITY WITH BMI OF 40.0-44.9, ADULT (HCC): Primary | ICD-10-CM

## 2018-07-12 DIAGNOSIS — F41.8 DEPRESSION WITH ANXIETY: ICD-10-CM

## 2018-07-12 DIAGNOSIS — K21.9 GASTROESOPHAGEAL REFLUX DISEASE WITHOUT ESOPHAGITIS: ICD-10-CM

## 2018-07-12 PROBLEM — J32.9 CHRONIC SINUSITIS: Status: ACTIVE | Noted: 2018-01-10

## 2018-07-12 PROBLEM — M75.30 CALCIFIC TENDINITIS OF SHOULDER: Status: ACTIVE | Noted: 2018-07-12

## 2018-07-12 PROBLEM — M51.26 BULGING LUMBAR DISC: Status: ACTIVE | Noted: 2017-08-10

## 2018-07-12 PROBLEM — M51.369 BULGING LUMBAR DISC: Status: ACTIVE | Noted: 2017-08-10

## 2018-07-12 PROBLEM — G89.29 CHRONIC RIGHT-SIDED LOW BACK PAIN WITHOUT SCIATICA: Status: ACTIVE | Noted: 2017-06-06

## 2018-07-12 PROBLEM — M43.06 PARS DEFECT OF LUMBAR SPINE: Status: ACTIVE | Noted: 2017-06-20

## 2018-07-12 PROBLEM — K62.5 BRIGHT RED BLOOD PER RECTUM: Status: ACTIVE | Noted: 2017-07-13

## 2018-07-12 PROBLEM — M43.06 SPONDYLOLYSIS OF LUMBAR REGION: Status: ACTIVE | Noted: 2017-08-30

## 2018-07-12 PROBLEM — J45.20 ASTHMA, MILD INTERMITTENT: Status: ACTIVE | Noted: 2017-02-19

## 2018-07-12 PROBLEM — K64.9 HEMORRHOIDS: Status: ACTIVE | Noted: 2017-06-22

## 2018-07-12 PROBLEM — Z98.890 H/O LAMINECTOMY: Status: ACTIVE | Noted: 2017-07-03

## 2018-07-12 PROBLEM — M54.50 CHRONIC RIGHT-SIDED LOW BACK PAIN WITHOUT SCIATICA: Status: ACTIVE | Noted: 2017-06-06

## 2018-07-12 PROBLEM — M51.36 BULGING LUMBAR DISC: Status: ACTIVE | Noted: 2017-08-10

## 2018-07-12 PROBLEM — M71.9 DISORDER OF BURSAE OF SHOULDER REGION: Status: ACTIVE | Noted: 2018-07-12

## 2018-07-12 PROCEDURE — 3074F SYST BP LT 130 MM HG: CPT | Performed by: PHYSICIAN ASSISTANT

## 2018-07-12 PROCEDURE — 3079F DIAST BP 80-89 MM HG: CPT | Performed by: PHYSICIAN ASSISTANT

## 2018-07-12 PROCEDURE — 99215 OFFICE O/P EST HI 40 MIN: CPT | Performed by: PHYSICIAN ASSISTANT

## 2018-07-12 RX ORDER — FLUOXETINE HYDROCHLORIDE 40 MG/1
80 CAPSULE ORAL
Qty: 180 CAPSULE | Refills: 1 | Status: SHIPPED | OUTPATIENT
Start: 2018-07-12 | End: 2018-07-12

## 2018-07-12 RX ORDER — PANTOPRAZOLE SODIUM 20 MG/1
20 TABLET, DELAYED RELEASE ORAL DAILY
Qty: 90 TABLET | Refills: 1 | Status: SHIPPED | OUTPATIENT
Start: 2018-07-12 | End: 2018-08-07

## 2018-07-12 RX ORDER — ATORVASTATIN CALCIUM 40 MG/1
40 TABLET, FILM COATED ORAL DAILY
Qty: 90 TABLET | Refills: 1 | Status: SHIPPED | OUTPATIENT
Start: 2018-07-12 | End: 2018-07-12

## 2018-07-12 RX ORDER — PANTOPRAZOLE SODIUM 40 MG/1
40 TABLET, DELAYED RELEASE ORAL DAILY
Qty: 90 TABLET | Refills: 1 | Status: CANCELLED | OUTPATIENT
Start: 2018-07-12

## 2018-07-12 RX ORDER — METOPROLOL SUCCINATE 25 MG/1
25 TABLET, EXTENDED RELEASE ORAL DAILY
Qty: 90 TABLET | Refills: 1 | Status: CANCELLED | OUTPATIENT
Start: 2018-07-12

## 2018-07-12 RX ORDER — ATORVASTATIN CALCIUM 40 MG/1
40 TABLET, FILM COATED ORAL DAILY
Qty: 30 TABLET | Refills: 0 | Status: SHIPPED | OUTPATIENT
Start: 2018-07-12 | End: 2018-07-12 | Stop reason: SDUPTHER

## 2018-07-12 RX ORDER — ATORVASTATIN CALCIUM 40 MG/1
40 TABLET, FILM COATED ORAL DAILY
Qty: 90 TABLET | Refills: 1 | Status: SHIPPED | OUTPATIENT
Start: 2018-07-12 | End: 2019-01-15 | Stop reason: SDUPTHER

## 2018-07-12 RX ORDER — FLUOXETINE HYDROCHLORIDE 40 MG/1
80 CAPSULE ORAL DAILY
Qty: 60 CAPSULE | Refills: 0 | Status: SHIPPED | OUTPATIENT
Start: 2018-07-12 | End: 2018-08-07 | Stop reason: SDUPTHER

## 2018-07-12 NOTE — PATIENT INSTRUCTIONS
Try maalox when acid reflux burning acts up  Obesity   AMBULATORY CARE:   Obesity  is when your body mass index (BMI) is greater than 30  Your healthcare provider will use your height and weight to measure your BMI  The risks of obesity include  many health problems, such as injuries or physical disability  You may need tests to check for the following:  · Diabetes     · High blood pressure or high cholesterol     · Heart disease     · Gallbladder or liver disease     · Cancer of the colon, breast, prostate, liver, or kidney     · Sleep apnea     · Arthritis or gout  Seek care immediately if:   · You have a severe headache, confusion, or difficulty speaking  · You have weakness on one side of your body  · You have chest pain, sweating, or shortness of breath  Contact your healthcare provider if:   · You have symptoms of gallbladder or liver disease, such as pain in your upper abdomen  · You have knee or hip pain and discomfort while walking  · You have symptoms of diabetes, such as intense hunger and thirst, and frequent urination  · You have symptoms of sleep apnea, such as snoring or daytime sleepiness  · You have questions or concerns about your condition or care  Treatment for obesity  focuses on helping you lose weight to improve your health  Even a small decrease in BMI can reduce the risk for many health problems  Your healthcare provider will help you set a weight-loss goal   · Lifestyle changes  are the first step in treating obesity  These include making healthy food choices and getting regular physical activity  Your healthcare provider may suggest a weight-loss program that involves coaching, education, and therapy  · Medicine  may help you lose weight when it is used with a healthy diet and physical activity  · Surgery  can help you lose weight if you are very obese and have other health problems  There are several types of weight-loss surgery   Ask your healthcare provider for more information  Be successful losing weight:   · Set small, realistic goals  An example of a small goal is to walk for 20 minutes 5 days a week  Anther goal is to lose 5% of your body weight  · Tell friends, family members, and coworkers about your goals  and ask for their support  Ask a friend to lose weight with you, or join a weight-loss support group  · Identify foods or triggers that may cause you to overeat , and find ways to avoid them  Remove tempting high-calorie foods from your home and workplace  Place a bowl of fresh fruit on your kitchen counter  If stress causes you to eat, then find other ways to cope with stress  · Keep a diary to track what you eat and drink  Also write down how many minutes of physical activity you do each day  Weigh yourself once a week and record it in your diary  Eating changes: You will need to eat 500 to 1,000 fewer calories each day than you currently eat to lose 1 to 2 pounds a week  The following changes will help you cut calories:  · Eat smaller portions  Use small plates, no larger than 9 inches in diameter  Fill your plate half full of fruits and vegetables  Measure your food using measuring cups until you know what a serving size looks like  · Eat 3 meals and 1 or 2 snacks each day  Plan your meals in advance  Tatyana Culp and eat at home most of the time  Eat slowly  · Eat fruits and vegetables at every meal   They are low in calories and high in fiber, which makes you feel full  Do not add butter, margarine, or cream sauce to vegetables  Use herbs to season steamed vegetables  · Eat less fat and fewer fried foods  Eat more baked or grilled chicken and fish  These protein sources are lower in calories and fat than red meat  Limit fast food  Dress your salads with olive oil and vinegar instead of bottled dressing  · Limit the amount of sugar you eat  Do not drink sugary beverages  Limit alcohol    Activity changes:  Physical activity is good for your body in many ways  It helps you burn calories and build strong muscles  It decreases stress and depression, and improves your mood  It can also help you sleep better  Talk to your healthcare provider before you begin an exercise program   · Exercise for at least 30 minutes 5 days a week  Start slowly  Set aside time each day for physical activity that you enjoy and that is convenient for you  It is best to do both weight training and an activity that increases your heart rate, such as walking, bicycling, or swimming  · Find ways to be more active  Do yard work and housecleaning  Walk up the stairs instead of using elevators  Spend your leisure time going to events that require walking, such as outdoor festivals or fairs  This extra physical activity can help you lose weight and keep it off  Follow up with your healthcare provider as directed: You may need to meet with a dietitian  Write down your questions so you remember to ask them during your visits  © 2017 [employer] Information is for End User's use only and may not be sold, redistributed or otherwise used for commercial purposes  All illustrations and images included in CareNotes® are the copyrighted property of [employer]  or [employer]  The above information is an  only  It is not intended as medical advice for individual conditions or treatments  Talk to your doctor, nurse or pharmacist before following any medical regimen to see if it is safe and effective for you  Heart Healthy Diet   AMBULATORY CARE:   A heart healthy diet  is an eating plan low in total fat, unhealthy fats, and sodium (salt)  A heart healthy diet helps decrease your risk for heart disease and stroke  Limit the amount of fat you eat to 25% to 35% of your total daily calories  Limit sodium to less than 2,300 mg each day  Healthy fats:  Healthy fats can help improve cholesterol levels   The risk for heart disease is decreased when cholesterol levels are normal  Choose healthy fats, such as the following:  · Unsaturated fat  is found in foods such as soybean, canola, olive, corn, and safflower oils  It is also found in soft tub margarine that is made with liquid vegetable oil  · Omega-3 fat  is found in certain fish, such as salmon, tuna, and trout, and in walnuts and flaxseed  Unhealthy fats:  Unhealthy fats can cause unhealthy cholesterol levels in your blood and increase your risk of heart disease  Limit unhealthy fats, such as the following:  · Cholesterol  is found in animal foods, such as eggs and lobster, and in dairy products made from whole milk  Limit cholesterol to less than 300 milligrams (mg) each day  You may need to limit cholesterol to 200 mg each day if you have heart disease  · Saturated fat  is found in meats, such as phan and hamburger  It is also found in chicken or turkey skin, whole milk, and butter  Limit saturated fat to less than 7% of your total daily calories  Limit saturated fat to less than 6% if you have heart disease or are at increased risk for it  · Trans fat  is found in packaged foods, such as potato chips and cookies  It is also in hard margarine, some fried foods, and shortening  Avoid trans fats as much as possible    Heart healthy foods and drinks to include:  Ask your dietitian or healthcare provider how many servings to have from each of the following food groups:  · Grains:      ¨ Whole-wheat breads, cereals, and pastas, and brown rice    ¨ Low-fat, low-sodium crackers and chips    · Vegetables:      ¨ Broccoli, green beans, green peas, and spinach    ¨ Collards, kale, and lima beans    ¨ Carrots, sweet potatoes, tomatoes, and peppers    ¨ Canned vegetables with no salt added    · Fruits:      ¨ Bananas, peaches, pears, and pineapple    ¨ Grapes, raisins, and dates    ¨ Oranges, tangerines, grapefruit, orange juice, and grapefruit juice    ¨ Apricots, mangoes, melons, and papaya    ¨ Raspberries and strawberries    ¨ Canned fruit with no added sugar    · Low-fat dairy products:      ¨ Nonfat (skim) milk, 1% milk, and low-fat almond, cashew, or soy milks fortified with calcium    ¨ Low-fat cheese, regular or frozen yogurt, and cottage cheese    · Meats and proteins , such as lean cuts of beef and pork (loin, leg, round), skinless chicken and turkey, legumes, soy products, egg whites, and nuts  Foods and drinks to limit or avoid:  Ask your dietitian or healthcare provider about these and other foods that are high in unhealthy fat, sodium, and sugar:  · Snack or packaged foods , such as frozen dinners, cookies, macaroni and cheese, and cereals with more than 300 mg of sodium per serving    · Canned or dry mixes  for cakes, soups, sauces, or gravies    · Vegetables with added sodium , such as instant potatoes, vegetables with added sauces, or regular canned vegetables    · Other foods high in sodium , such as ketchup, barbecue sauce, salad dressing, pickles, olives, soy sauce, and miso    · High-fat dairy foods  such as whole or 2% milk, cream cheese, or sour cream, and cheeses     · High-fat protein foods  such as high-fat cuts of beef (T-bone steaks, ribs), chicken or turkey with skin, and organ meats, such as liver    · Cured or smoked meats , such as hot dogs, phan, and sausage    · Unhealthy fats and oils , such as butter, stick margarine, shortening, and cooking oils such as coconut or palm oil    · Food and drinks high in sugar , such as soft drinks (soda), sports drinks, sweetened tea, candy, cake, cookies, pies, and doughnuts  Other diet guidelines to follow:   · Eat more foods containing omega-3 fats  Eat fish high in omega-3 fats at least 2 times a week  · Limit alcohol  Too much alcohol can damage your heart and raise your blood pressure  Women should limit alcohol to 1 drink a day  Men should limit alcohol to 2 drinks a day   A drink of alcohol is 12 ounces of beer, 5 ounces of wine, or 1½ ounces of liquor  · Choose low-sodium foods  High-sodium foods can lead to high blood pressure  Add little or no salt to food you prepare  Use herbs and spices in place of salt  · Eat more fiber  to help lower cholesterol levels  Eat at least 5 servings of fruits and vegetables each day  Eat 3 ounces of whole-grain foods each day  Legumes (beans) are also a good source of fiber  Lifestyle guidelines:   · Do not smoke  Nicotine and other chemicals in cigarettes and cigars can cause lung and heart damage  Ask your healthcare provider for information if you currently smoke and need help to quit  E-cigarettes or smokeless tobacco still contain nicotine  Talk to your healthcare provider before you use these products  · Exercise regularly  to help you maintain a healthy weight and improve your blood pressure and cholesterol levels  Ask your healthcare provider about the best exercise plan for you  Do not start an exercise program without asking your healthcare provider  Follow up with your healthcare provider as directed:  Write down your questions so you remember to ask them during your visits  © 2017 [facility] [facility] Information is for End User's use only and may not be sold, redistributed or otherwise used for commercial purposes  All illustrations and images included in CareNotes® are the copyrighted property of [employer]  or [employer]  The above information is an  only  It is not intended as medical advice for individual conditions or treatments  Talk to your doctor, nurse or pharmacist before following any medical regimen to see if it is safe and effective for you

## 2018-07-12 NOTE — PROGRESS NOTES
Routine Follow-up    Jannette Baugh 45 y o  female   Date:  7/12/2018      Assessment and Plan:    Jannette was seen today for well check  Diagnoses and all orders for this visit:    Morbid obesity with BMI of 40 0-44 9, adult ([facility] [address] )  -     Ambulatory referral to Weight Management; Future  -     Comprehensive metabolic panel; Future  - encourage wt loss, routine exercise regimen, well balanced diet     Hypertension, unspecified type  -     Comprehensive metabolic panel; Future  - stable    Gastroesophageal reflux disease without esophagitis  -     pantoprazole (PROTONIX) 20 mg tablet; Take 1 tablet (20 mg total) by mouth daily    Hyperlipidemia, unspecified hyperlipidemia type  -     Lipid panel; Future  -     atorvastatin (LIPITOR) 40 mg tablet; Take 1 tablet (40 mg total) by mouth daily    Depression with anxiety  -     FLUoxetine (PROzac) 40 MG capsule; Take 2 capsules (80 mg total) by mouth daily  -     Ambulatory referral to Psychology; Future    Positive depression screening  -     Ambulatory referral to Psychology; Future; wishes to return [facility]          HPI:  Chief Complaint   Patient presents with    Well Check     HPI   Patient is a 46 yo female with PMH below who presents for routine check up  She sees several specialists and has been seeing more than one doctor for a second opinion on her back  She has been following up with ortho for trigger finger due to scar tissue s/p release and was given referral to start OT for carpal tunnel  She was referred to sleep medicine for MARCELINO and encouraged wt loss  She follows with endo for hx of thyroid nodule  She follows with neurosurgery for history of chronic lumbar back pain  Her husbands insurance just recently switched and would like to try wt management referral again because was not covered before  She reports that she recently had a R foot fx after metal chair fell on foot and she was having difficulty walking   She has been following up with [address]  She had been going to [facility] for hx of depression and anxiety and has been taking prozac 80 mg nightly  She had been going to [facility] counseling in the past and would like to return  She will be seeing Dr Yolis Valencia, cardiology for follow up  She has no other concerns today  ROS: Review of Systems   Constitutional: Negative  Respiratory: Negative  Cardiovascular: Negative  Gastrointestinal: Negative  Genitourinary: Negative  Musculoskeletal: Positive for arthralgias, back pain and myalgias  Negative for gait problem, neck pain and neck stiffness  Skin: Negative  Neurological: Negative  Hematological: Negative  Psychiatric/Behavioral: Positive for dysphoric mood  Negative for agitation, confusion, self-injury, sleep disturbance and suicidal ideas  The patient is nervous/anxious          Past Medical History:   Diagnosis Date    Chronic pain disorder     BACK SHOULDERS NECK    Disease of thyroid gland     nodule    Fibromyalgia, primary     GERD (gastroesophageal reflux disease)     Hearing difficulty of right ear     Irritable bowel syndrome     Perforation of tympanic membrane     Right    RBBB     Wears glasses      Patient Active Problem List   Diagnosis    Thyroid nodule    Trigger finger, right middle finger    Carpal tunnel syndrome, bilateral    Asthma, mild intermittent    Benign essential hypertension    Breast hypertrophy    Bulging lumbar disc    Bright red blood per rectum    Calcific tendinitis of shoulder    Chronic low back pain    Chronic right-sided low back pain without sciatica    Chronic sinusitis    Depression with anxiety    Dermatitis, eczematoid    Disorder of bursae of shoulder region    Esophageal reflux    Fibromyalgia    H/O laminectomy    Hemorrhoids    Hypercholesterolemia    Mammographic microcalcification    Morbid obesity (HCC)    Obstructive sleep apnea    Pars defect of lumbar spine    RBBB (right bundle branch block)    Spondylolysis of lumbar region    Vitamin D insufficiency       Past Surgical History:   Procedure Laterality Date    BACK SURGERY      lumbar herniated disc    BREAST SURGERY Left 2006    cyst removal    CARPAL TUNNEL RELEASE       SECTION      CHOLECYSTECTOMY      CYST REMOVAL      DENTAL SURGERY      OK COLONOSCOPY FLX DX W/COLLJ SPEC WHEN PFRMD N/A 2017    Procedure: COLONOSCOPY;  Surgeon: Mikayla Bruce MD;  [facility]; Service: Gastroenterology    OK INCISE FINGER TENDON SHEATH Right 3/13/2018    Procedure: LONG TRIGGER FINGER RELEASE;  Surgeon: He Peralta DO;  [facility];  Service: Orthopedics    OK TYMPANOPLASTY Right 2017    Procedure: TYMPANOPLASTY WITH PERICHONDRIN GRAFT;  Surgeon: Ana Villela DO;  [facility];  Service: ENT    SHOULDER SURGERY Right     WISDOM TOOTH EXTRACTION         Social History     Social History    Marital status: /Civil Union     Spouse name: N/A    Number of children: N/A    Years of education: N/A     Social History Main Topics    Smoking status: Never Smoker    Smokeless tobacco: Never Used    Alcohol use Yes      Comment: rarely    Drug use: No    Sexual activity: Yes      Comment: per allscripts     Other Topics Concern    None     Social History Narrative    None       Family History   Problem Relation Age of Onset    Hypertension Mother     Hyperlipidemia Mother     Hypertension Father     Hyperlipidemia Father     Heart disease Father         cardiac disorder-myocardial infarction arrhythmias    Diabetes unspecified Maternal Grandmother     Crohn's disease Brother     Arthritis Brother     Thyroid disease Paternal Grandmother        Allergies   Allergen Reactions    Amoxicillin Swelling, Anaphylaxis and Angioedema    Neurontin [Gabapentin] Other (See Comments)     Other reaction(s): SUICIDE IDEATION  Other reaction(s):  Other (See Comments)  suicidal  suicidal    Penicillin V Angioedema and Swelling     Other reaction(s): Throat closure    Penicillins Swelling and Anaphylaxis    Aripiprazole Other (See Comments)     Other reaction(s): low dose 2 mg ; curving in and locking in of hands/dystonia  Other reaction(s): Other (See Comments)  Other reaction(s): low dose 2 mg ; curving in and locking in of hands/dystonia    Carbamazepine Other (See Comments)     Other reaction(s): Unknown Reaction    Doxycycline Hives    Lorazepam Other (See Comments)     Other reaction(s): higher dose > anxiety and depression  Other reaction(s):  Other (See Comments)  Other reaction(s): higher dose > anxiety and depression    Other Hives     Adhesive tape    Lamotrigine Rash         Current Outpatient Prescriptions:     amitriptyline (ELAVIL) 10 mg tablet, Take 1 tablet by mouth, Disp: , Rfl:     Ascorbic Acid (VITAMIN C) 500 MG CAPS, Take 1 capsule by mouth daily, Disp: , Rfl:     atorvastatin (LIPITOR) 40 mg tablet, Take 1 tablet (40 mg total) by mouth daily, Disp: 90 tablet, Rfl: 1    cetirizine (ZyrTEC) 10 mg tablet, Take 10 mg by mouth daily, Disp: , Rfl:     Cholecalciferol (VITAMIN D3) 5000 units CAPS, Take 1 capsule by mouth 2 (two) times a day, Disp: , Rfl:     cyanocobalamin (VITAMIN B-12) 1,000 mcg tablet, Take 3 tablets by mouth 2 (two) times a day, Disp: , Rfl:     FLUoxetine (PROzac) 40 MG capsule, Take 2 capsules (80 mg total) by mouth daily, Disp: 60 capsule, Rfl: 0    medroxyPROGESTERone (DEPO-PROVERA) 400 MG/ML SUSP, Inject into the shoulder, thigh, or buttocks, Disp: , Rfl:     Methylprednisolone 4 MG TBPK, Use as directed on package, Disp: 21 tablet, Rfl: 1    metoprolol succinate (TOPROL-XL) 25 mg 24 hr tablet, TAKE 1 TABLET BY MOUTH  DAILY, Disp: 90 tablet, Rfl: 0    Multiple Vitamins-Minerals (CENTRUM ADULTS PO), Take 1 tablet by mouth daily, Disp: , Rfl:     pantoprazole (PROTONIX) 20 mg tablet, Take 1 tablet (20 mg total) by mouth daily, Disp: 90 tablet, Rfl: 1      Physical Exam:  /80 (BP Location: Left arm, Patient Position: Sitting, Cuff Size: Standard)   Pulse 93   Temp 98 8 °F (37 1 °C) (Tympanic)   Ht 5' 6" (1 676 m)   Wt 124 kg (273 lb 3 2 oz)   SpO2 98%   BMI 44 10 kg/m²     Physical Exam   Constitutional: She is oriented to person, place, and time  She appears well-developed  No distress  Morbid obesity with central adiposity    HENT:   Head: Normocephalic and atraumatic  Right Ear: External ear normal    Left Ear: External ear normal    Mouth/Throat: Oropharynx is clear and moist    Eyes: Conjunctivae are normal  Pupils are equal, round, and reactive to light  Neck: Normal range of motion  Neck supple  No JVD present  No thyromegaly present  Cardiovascular: Normal rate, regular rhythm, normal heart sounds and intact distal pulses  No murmur heard  Pulmonary/Chest: Effort normal and breath sounds normal  No respiratory distress  She has no wheezes  She has no rales  Abdominal: Soft  Bowel sounds are normal  She exhibits distension (central adiposity)  Musculoskeletal: Normal range of motion  She exhibits no edema  Lymphadenopathy:     She has no cervical adenopathy  Neurological: She is alert and oriented to person, place, and time  No cranial nerve deficit  Skin: Skin is warm and dry  No rash noted  She is not diaphoretic  No erythema  Psychiatric: She has a normal mood and affect   Her behavior is normal          Labs:  Lab Results   Component Value Date    WBC 6 43 02/23/2018    HGB 14 4 02/23/2018    HCT 41 5 02/23/2018    MCV 87 02/23/2018     02/23/2018     Lab Results   Component Value Date     02/23/2018    K 3 6 02/23/2018     02/23/2018    CO2 27 02/23/2018    ANIONGAP 11 02/23/2018    BUN 9 02/23/2018    CREATININE 0 74 02/23/2018    GLUCOSE 136 02/23/2018    CALCIUM 9 6 02/23/2018    AST 22 02/23/2018    ALT 46 02/23/2018    ALKPHOS 81 02/23/2018    PROT 7 2 02/23/2018    BILITOT 0 59 02/23/2018    EGFR 103 02/23/2018

## 2018-07-16 ENCOUNTER — APPOINTMENT (OUTPATIENT)
Dept: OCCUPATIONAL THERAPY | Facility: CLINIC | Age: 38
End: 2018-07-16
Payer: COMMERCIAL

## 2018-07-18 ENCOUNTER — EVALUATION (OUTPATIENT)
Dept: OCCUPATIONAL THERAPY | Facility: CLINIC | Age: 38
End: 2018-07-18
Payer: COMMERCIAL

## 2018-07-18 DIAGNOSIS — M65.331 TRIGGER FINGER, RIGHT MIDDLE FINGER: ICD-10-CM

## 2018-07-18 DIAGNOSIS — G56.03 CARPAL TUNNEL SYNDROME, BILATERAL: Primary | ICD-10-CM

## 2018-07-18 PROCEDURE — G8984 CARRY CURRENT STATUS: HCPCS

## 2018-07-18 PROCEDURE — 97110 THERAPEUTIC EXERCISES: CPT

## 2018-07-18 PROCEDURE — G8985 CARRY GOAL STATUS: HCPCS

## 2018-07-18 PROCEDURE — 97166 OT EVAL MOD COMPLEX 45 MIN: CPT

## 2018-07-18 NOTE — PROGRESS NOTES
OT Evaluation     Today's date: 2018  Patient name: Rogerio Moreira  : 1980  MRN: [id number]  Referring provider: Tin Cardenas DO  Dx:   Encounter Diagnosis     ICD-10-CM    1  Carpal tunnel syndrome, bilateral G56 03 Ambulatory referral to Occupational Therapy   2  Trigger finger, right middle finger [id number] [id number] Ambulatory referral to Occupational Therapy                  Assessment  Impairments: abnormal or restricted ROM, activity intolerance, impaired physical strength and pain with function    Assessment details: Patient presents with decreased ROM and strength with B/L wrist and digits  Her pain level fluctuates from a 3/10 to 9/10 depending upon activity level  Overall function score is 41% with severe difficulty for vacuuming, raking, pushing up on hands, carrying a shopping bag, and mild difficulty with turning a key  Understanding of Dx/Px/POC: good   Prognosis: good    Goals  STG's In 2 weeks:  1  Patient will improve AROM B/L wrist extension to > 60, UD to > 34, RD to > 30, right MCP flexion of D2-5 to > 88, right PIP flexion D 2-5 to >  95, DIP flexion of D2-5 to > 85   2  Patient will improve B/L  strength to > 40# for opening jars  3  Patient will report a decrease in pain in B/L wrist and hands to 3/10 with activity  LTG's In 4 weeks:  1  Patient will improve B/L thumb AROM of MP flexion to > 60, IP flexion to > 78, and thumb abduction to > 70 to improve grasping items  2  Patient will improve B/L  strength to > 58#, right sided 3 point pinch >16#, B/L 2 point pinch to > 12#, and B/L lateral pinch to > 15#    3  Patient will increase right wrist flex/ext, UD/RD, and sup/pron to 4/5 for lifting and carrying objects  4  Patient will report a pain level of 0-1/10 in B/L wrist and hands with activity        Plan  Patient would benefit from: skilled occupational therapy  Planned modality interventions: thermotherapy: hydrocollator packs, cryotherapy, fluidotherapy and ultrasound  Planned therapy interventions: manual therapy, therapeutic exercise, stretching, strengthening, home exercise program and patient education  Frequency: 2x week  Duration in weeks: 4  Plan of Care beginning date: 2018  Plan of Care expiration date: 8/15/2018  Treatment plan discussed with: patient        Subjective Evaluation    History of Present Illness  Date of onset: 3/18/2018  Mechanism of injury: Etiology of injury for diagnosis is unknown  Patient has been having pain in B/L wrists and hands for several months  Slight tingling is present in B/L hands  Patient made an appointment with orthopedic doctor on 18 and received script for therapy      Recurrent probem    Quality of life: good    Pain  Current pain rating: 3  At best pain rating: 3  At worst pain ratin  Quality: sharp and tight  Relieving factors: rest and change in position  Aggravating factors: lifting, overhead activity and eating    Social Support  Lives with: spouse    Employment status: not working  Hand dominance: right    Treatments  Current treatment: occupational therapy  Patient Goals  Patient goals for therapy: increased strength, independence with ADLs/IADLs, decreased pain and increased motion          Objective     Active Range of Motion     Left Wrist   Wrist flexion: 80 degrees   Wrist extension: 56 degrees   Radial deviation: 24 degrees   Ulnar deviation: 30 degrees     Right Wrist   Wrist flexion: 82 degrees   Wrist extension: 55 degrees   Radial deviation: 28 degrees   Ulnar deviation: 32 degrees     Left Thumb   Flexion     MP: 57 degrees    DIP: 76 degrees  Radial abduction    CMC: 70 degrees  Opposition: Opposition intact    Right Thumb   Flexion     MP: 44    DIP: 60  Radial Abduction    CMC: 50  Opposition: Opposition intact    Left Digits    Flexion   Index     MCP: 90    PIP: 100    DIP: 85  Middle     MCP: 90    PIP: 100    DIP: 85  Ring     MCP: 70    PIP: 100    DIP: 85  Little     MCP: 60    PIP: 100    DIP: 75    Right Digits   Flexion   Index     MCP: 75    PIP: 95    DIP: 60  Middle     MCP: 75    PIP: 95    DIP: 80  Ring     MCP: 65    PIP: 95    DIP: 80  Little     MCP: 65    PIP: 95    DIP: 80    Additional Active Range of Motion Details  B/L digit extension is 0 degrees in all joints of digit 1 - 5    Strength/Myotome Testing     Left Wrist/Hand   Wrist extension: 4  Wrist flexion: 4  Radial deviation: 4  Ulnar deviation: 4     (2nd hand position)     Trial 1: 35    Trial 2: 38    Trial 3: 35    Thumb Strength  Key/Lateral Pinch     Trail 1: 14  Tip/Two-Point Pinch     Trial 1: 9  Palmar/Three-Point Pinch     Trial 1: 17    Right Wrist/Hand   Wrist extension: 4  Wrist flexion: 3+  Radial deviation: 4-  Ulnar deviation: 4-     (2nd hand position)     Trial 1: 22    Trial 2: 22    Trial 3: 25    Thumb Strength   Key/Lateral Pinch     Trial 1: 12  Tip/Two-Point Pinch     Trial 1: 9  Palmar/Three-Point Pinch     Trial 1: 13          Precautions: N/A    Daily Treatment Diary     Manual  7/18            Massage to wrist and hand                                                                     Exercise Diary  7/18            Nerve gliding  Median nerve  6 position        3X both hand            Tendon gliding Ex  5 positions 5X both hands            AROM with stretch 10 sec   Both hands  Wrist flex  Wrist ext  UD  RD  Sup/pron  Rotation  Clock/counter  Finger opposition  Thumb radial abduction  Thumb MP/IP flex/ext       2  2  2  2  2      2    2    2              Putty-yellow             clothespin             digiflex                                                                                                                                                                                                       Modalities              US volar wrist   3 Mhz             Fluidotherapy             Hot pack

## 2018-07-24 ENCOUNTER — APPOINTMENT (OUTPATIENT)
Dept: OCCUPATIONAL THERAPY | Facility: CLINIC | Age: 38
End: 2018-07-24
Payer: COMMERCIAL

## 2018-07-26 ENCOUNTER — OFFICE VISIT (OUTPATIENT)
Dept: OCCUPATIONAL THERAPY | Facility: CLINIC | Age: 38
End: 2018-07-26
Payer: COMMERCIAL

## 2018-07-26 DIAGNOSIS — G56.03 CARPAL TUNNEL SYNDROME, BILATERAL: Primary | ICD-10-CM

## 2018-07-26 DIAGNOSIS — M65.331 TRIGGER FINGER, RIGHT MIDDLE FINGER: ICD-10-CM

## 2018-07-26 PROCEDURE — 97035 APP MDLTY 1+ULTRASOUND EA 15: CPT

## 2018-07-26 PROCEDURE — 97140 MANUAL THERAPY 1/> REGIONS: CPT

## 2018-07-26 PROCEDURE — 97110 THERAPEUTIC EXERCISES: CPT

## 2018-07-26 NOTE — PROGRESS NOTES
Daily Note     Today's date: 2018  Patient name: Wenceslao Jeffries  : 1980  MRN: [id number]  Referring provider: Lopez Gallardo DO  Dx:   Encounter Diagnosis     ICD-10-CM    1  Carpal tunnel syndrome, bilateral G56 03    2  Trigger finger, right middle finger [id number] [id number]                   Subjective: My right hand falls asleep but it hurts more with grasping things      Objective: See treatment diary below    Precautions: N/A    Daily Treatment Diary     Manual  18           Massage to wrist and hand  15 min                                                                   Exercise Diary  18           Nerve gliding  Median nerve  6 position     BUE/hands   3X both hand x5           Tendon gliding Ex  5 positions  BUE/hands 5X both hands x5           AROM with stretch 10 sec  Both hands  Wrist flex  Wrist ext  UD  RD  Sup/pron  Rotation  Clock/counter  Finger opposition  Thumb radial abduction  Thumb MP/IP flex/ext       2  2  2  2  2      2    2    2         x10  x10  x10  x10        x5    x5                 Putty-yellow  BUE/hands  x10   X 5 pinch           clothespin             digiflex                                                                                                                                                                                                       Modalities   18           US volar wrist   3 Mhz,1 0w/cm  8min BUE           Fluidotherapy             Hot pack               Assessment: Tolerated treatment well and with c/o pain levels of 8/10 in RUE, 4/10 in LUE  Patient exhibited good technique with therapeutic exercises and would benefit from continued OT      Plan: Continue per plan of care  Progress treatment as tolerated

## 2018-07-27 ENCOUNTER — OFFICE VISIT (OUTPATIENT)
Dept: CARDIOLOGY CLINIC | Facility: CLINIC | Age: 38
End: 2018-07-27
Payer: COMMERCIAL

## 2018-07-27 VITALS
HEIGHT: 66 IN | BODY MASS INDEX: 43.07 KG/M2 | HEART RATE: 82 BPM | SYSTOLIC BLOOD PRESSURE: 112 MMHG | WEIGHT: 268 LBS | DIASTOLIC BLOOD PRESSURE: 60 MMHG

## 2018-07-27 DIAGNOSIS — I10 BENIGN ESSENTIAL HYPERTENSION: ICD-10-CM

## 2018-07-27 DIAGNOSIS — R07.89 CHEST TIGHTNESS OR PRESSURE: Primary | ICD-10-CM

## 2018-07-27 DIAGNOSIS — I45.10 RBBB: ICD-10-CM

## 2018-07-27 PROCEDURE — 99213 OFFICE O/P EST LOW 20 MIN: CPT | Performed by: INTERNAL MEDICINE

## 2018-07-27 PROCEDURE — 93000 ELECTROCARDIOGRAM COMPLETE: CPT | Performed by: INTERNAL MEDICINE

## 2018-07-27 RX ORDER — TIZANIDINE 4 MG/1
TABLET ORAL
COMMUNITY
End: 2018-10-26

## 2018-07-27 NOTE — PROGRESS NOTES
Cardiology Follow Up    Trever Baugh  1980  [id number]  [address] [facility] Maurice  [address]    1  Chest tightness or pressure  POCT ECG   2  RBBB  POCT ECG   3  Benign essential hypertension  POCT ECG         Discussion/Summary: All of her assessed cardiac problems are stable  I have reviewed her medications and made no changes  No cardiac testing is ordered  RTO 1 year  Interval History: She has not had any cardiac problems since her last OV  She is more active and is now at [facility]  She denies CP, significant SOB, palpitations  ECG shows NSR which is not new  She has had CP in the past  Cardiac cath in 2009 showed patent coronary arteries      Patient Active Problem List   Diagnosis    Thyroid nodule    Trigger finger, right middle finger    Carpal tunnel syndrome, bilateral    Asthma, mild intermittent    Benign essential hypertension    Breast hypertrophy    Bulging lumbar disc    Bright red blood per rectum    Calcific tendinitis of shoulder    Chronic low back pain    Chronic right-sided low back pain without sciatica    Chronic sinusitis    Depression with anxiety    Dermatitis, eczematoid    Disorder of bursae of shoulder region    Esophageal reflux    Fibromyalgia    H/O laminectomy    Hemorrhoids    Hypercholesterolemia    Mammographic microcalcification    Morbid obesity (HCC)    Obstructive sleep apnea    Pars defect of lumbar spine    RBBB (right bundle branch block)    Spondylolysis of lumbar region    Vitamin D insufficiency    Atypical chest pain     Past Medical History:   Diagnosis Date    Chronic pain disorder     BACK SHOULDERS NECK    Disease of thyroid gland     nodule    Fibromyalgia, primary     GERD (gastroesophageal reflux disease)     Hearing difficulty of right ear     Irritable bowel syndrome     Perforation of tympanic membrane     Right    RBBB     Wears glasses      Social History     Social History    Marital status: /Civil Union     Spouse name: N/A    Number of children: N/A    Years of education: N/A     Occupational History    Not on file  Social History Main Topics    Smoking status: Never Smoker    Smokeless tobacco: Never Used    Alcohol use Yes      Comment: rarely    Drug use: No    Sexual activity: Yes      Comment: per allscripts     Other Topics Concern    Not on file     Social History Narrative    No narrative on file      Family History   Problem Relation Age of Onset    Hypertension Mother     Hyperlipidemia Mother     Hypertension Father     Hyperlipidemia Father     Heart disease Father         cardiac disorder-myocardial infarction arrhythmias    Diabetes unspecified Maternal Grandmother     Crohn's disease Brother     Arthritis Brother     Thyroid disease Paternal Grandmother      Past Surgical History:   Procedure Laterality Date    BACK SURGERY      lumbar herniated disc    BREAST SURGERY Left 2006    cyst removal    CARPAL TUNNEL RELEASE       SECTION      CHOLECYSTECTOMY      CYST REMOVAL      DENTAL SURGERY      [place] COLONOSCOPY FLX DX W/COLLJ SPEC WHEN PFRMD N/A 2017    Procedure: COLONOSCOPY;  Surgeon: Yany Thorpe MD;  [facility]; Service: Gastroenterology    NY INCISE FINGER TENDON SHEATH Right 3/13/2018    Procedure: LONG TRIGGER FINGER RELEASE;  Surgeon: Susanna Samaniego DO;  [facility];  Service: Orthopedics    [place] TYMPANOPLASTY Right 2017    Procedure: TYMPANOPLASTY WITH PERICHONDRIN GRAFT;  Surgeon:  Av Beal DO;  [facility];  Service: ENT    SHOULDER SURGERY Right     WISDOM TOOTH EXTRACTION         Current Outpatient Prescriptions:     amitriptyline (ELAVIL) 10 mg tablet, Take 1 tablet by mouth, Disp: , Rfl:     Ascorbic Acid (VITAMIN C) 500 MG CAPS, Take 1 capsule by mouth daily, Disp: , Rfl:     atorvastatin (LIPITOR) 40 mg tablet, Take 1 tablet (40 mg total) by mouth daily, Disp: 90 tablet, Rfl: 1    cetirizine (ZyrTEC) 10 mg tablet, Take 10 mg by mouth daily, Disp: , Rfl:     Cholecalciferol (VITAMIN D3) 5000 units CAPS, Take 1 capsule by mouth 2 (two) times a day, Disp: , Rfl:     cyanocobalamin (VITAMIN B-12) 1,000 mcg tablet, Take 3 tablets by mouth 2 (two) times a day, Disp: , Rfl:     FLUoxetine (PROzac) 40 MG capsule, Take 2 capsules (80 mg total) by mouth daily, Disp: 60 capsule, Rfl: 0    medroxyPROGESTERone (DEPO-PROVERA) 400 MG/ML SUSP, Inject into the shoulder, thigh, or buttocks, Disp: , Rfl:     metoprolol succinate (TOPROL-XL) 25 mg 24 hr tablet, TAKE 1 TABLET BY MOUTH  DAILY, Disp: 90 tablet, Rfl: 0    Multiple Vitamins-Minerals (CENTRUM ADULTS PO), Take 1 tablet by mouth daily, Disp: , Rfl:     pantoprazole (PROTONIX) 20 mg tablet, Take 1 tablet (20 mg total) by mouth daily, Disp: 90 tablet, Rfl: 1    tiZANidine (ZANAFLEX) 4 mg tablet, Take 1 tablet every 6-8 hours by oral route as needed  , Disp: , Rfl:   Allergies   Allergen Reactions    Amoxicillin Swelling, Anaphylaxis and Angioedema    Neurontin [Gabapentin] Other (See Comments)     Other reaction(s): SUICIDE IDEATION  Other reaction(s): Other (See Comments)  suicidal  suicidal    Penicillin V Angioedema and Swelling     Other reaction(s): Throat closure    Penicillins Swelling and Anaphylaxis    Aripiprazole Other (See Comments)     Other reaction(s): low dose 2 mg ; curving in and locking in of hands/dystonia  Other reaction(s): Other (See Comments)  Other reaction(s): low dose 2 mg ; curving in and locking in of hands/dystonia    Carbamazepine Other (See Comments)     Other reaction(s): Unknown Reaction    Doxycycline Hives    Lorazepam Other (See Comments)     Other reaction(s): higher dose > anxiety and depression  Other reaction(s):  Other (See Comments)  Other reaction(s): higher dose > anxiety and depression    Other Hives     Adhesive tape    Lamotrigine Rash     Vitals:    07/27/18 0823   BP: 112/60   BP Location: Right arm   Patient Position: Sitting   Cuff Size: Large   Pulse: 82   Weight: 122 kg (268 lb)   Height: 5' 6" (1 676 m)     Weight (last 2 days)     Date/Time   Weight    07/27/18 0823  122 (268)             Blood pressure 112/60, pulse 82, height 5' 6" (1 676 m), weight 122 kg (268 lb)  , Body mass index is 43 26 kg/m²      Labs:  Admission on 03/13/2018, Discharged on 03/13/2018   Component Date Value    EXT Preg Test, Ur 03/13/2018 Negative    Appointment on 02/23/2018   Component Date Value    Sodium 02/23/2018 144     Potassium 02/23/2018 3 6     Chloride 02/23/2018 106     CO2 02/23/2018 27     Anion Gap 02/23/2018 11     BUN 02/23/2018 9     Creatinine 02/23/2018 0 74     Glucose 02/23/2018 136     Calcium 02/23/2018 9 6     AST 02/23/2018 22     ALT 02/23/2018 46     Alkaline Phosphatase 02/23/2018 81     Total Protein 02/23/2018 7 2     Albumin 02/23/2018 4 0     Total Bilirubin 02/23/2018 0 59     eGFR 02/23/2018 103     WBC 02/23/2018 6 43     RBC 02/23/2018 4 79     Hemoglobin 02/23/2018 14 4     Hematocrit 02/23/2018 41 5     MCV 02/23/2018 87     MCH 02/23/2018 30 1     MCHC 02/23/2018 34 7     RDW 02/23/2018 13 8     MPV 02/23/2018 11 0     Platelets 58/02/4306 158     nRBC 02/23/2018 0     Neutrophils Relative 02/23/2018 65     Lymphocytes Relative 02/23/2018 26     Monocytes Relative 02/23/2018 6     Eosinophils Relative 02/23/2018 2     Basophils Relative 02/23/2018 1     Neutrophils Absolute 02/23/2018 4 20     Lymphocytes Absolute 02/23/2018 1 69     Monocytes Absolute 02/23/2018 0 40     Eosinophils Absolute 02/23/2018 0 11     Basophils Absolute 02/23/2018 0 03    Orders Only on 02/20/2018   Component Date Value    Cholesterol, Total 02/20/2018 199     Triglycerides 02/20/2018 115     HDL 02/20/2018 42     SL AMB VLDL CHOLESTEROL * 02/20/2018 23     LDL Direct 02/20/2018 134*    DOMINGO GARRETT DEFAULT 02/20/2018 Comment    Orders Only on 02/20/2018   Component Date Value    Free t4 02/20/2018 1 15     TSH 02/20/2018 0 857      Imaging: No results found  Review of Systems:  Review of Systems   Constitutional: Negative for diaphoresis, fatigue, fever and unexpected weight change  HENT: Negative  Respiratory: Negative for cough, shortness of breath and wheezing  Cardiovascular: Negative for chest pain, palpitations and leg swelling  Gastrointestinal: Negative for abdominal pain, diarrhea and nausea  Musculoskeletal: Negative for gait problem and myalgias  Skin: Negative for rash  Neurological: Negative for dizziness and numbness  Psychiatric/Behavioral: Negative  Physical Exam:  Physical Exam   Constitutional: She is oriented to person, place, and time  She appears well-developed and well-nourished  HENT:   Head: Normocephalic and atraumatic  Eyes: Pupils are equal, round, and reactive to light  Neck: Normal range of motion  Neck supple  No JVD present  Cardiovascular: Regular rhythm, S1 normal, S2 normal and normal pulses  Pulses:       Carotid pulses are 2+ on the right side, and 2+ on the left side  Pulmonary/Chest: Effort normal and breath sounds normal  She has no wheezes  She has no rales  Abdominal: Soft  Bowel sounds are normal  There is no tenderness  Musculoskeletal: Normal range of motion  She exhibits no edema or tenderness  Neurological: She is alert and oriented to person, place, and time  She has normal reflexes  No cranial nerve deficit  Skin: Skin is warm  Psychiatric: She has a normal mood and affect

## 2018-07-30 ENCOUNTER — OFFICE VISIT (OUTPATIENT)
Dept: OCCUPATIONAL THERAPY | Facility: CLINIC | Age: 38
End: 2018-07-30
Payer: COMMERCIAL

## 2018-07-30 DIAGNOSIS — G56.03 CARPAL TUNNEL SYNDROME, BILATERAL: Primary | ICD-10-CM

## 2018-07-30 PROCEDURE — 97110 THERAPEUTIC EXERCISES: CPT

## 2018-07-30 PROCEDURE — 97035 APP MDLTY 1+ULTRASOUND EA 15: CPT

## 2018-07-30 NOTE — PROGRESS NOTES
Daily Note     Today's date: 2018  Patient name: Delvis Madison  : 1980  MRN: [id number]  Referring provider: Ariela Agudelo DO  Dx:   Encounter Diagnosis     ICD-10-CM    1  Carpal tunnel syndrome, bilateral G56 03                   Subjective: "My right hand hurts more than my left hand  It is 7/10 "      Objective: See treatment diary below  Daily Treatment Diary     Manual  18          Massage to wrist and hand  15 min                                                                   Exercise Diary  18          Nerve gliding  Median nerve  6 position     BUE/hands   3X both hand x5 5 x          Tendon gliding Ex  5 positions  BUE/hands 5X both hands x5 5 x          AROM with stretch 10 sec  Both hands  Wrist flex  Wrist ext  UD  RD  Sup/pron  Rotation  Clock/counter  Finger opposition  Thumb radial abduction  Thumb MP/IP flex/ext  MCP flex       2  2  2  2  2      2    2    2         x10  x10  x10  x10        x5    x5                 5 X  5X      10/10      5 x    5 x    5 x          Putty-yellow  BUE/hands  x10   X 5 pinch 10 x   5 x   pinch          Clothespin  Lateral pinch  3 pt  pinch     10 x  10 x          digiflex   Red 10x          Prayer stretch   5 X          Flex Bar  twist   Red 5x                                                                                                                                                                          Modalities   18          US volar wrist   3 Mhz,1 0w/cm  8min BUE 8 min  B/L UE          Fluidotherapy             Hot pack                   Assessment: Tolerated treatment well  Patient would benefit from continued OT      Plan: Continue per plan of care

## 2018-08-02 ENCOUNTER — OFFICE VISIT (OUTPATIENT)
Dept: OCCUPATIONAL THERAPY | Facility: CLINIC | Age: 38
End: 2018-08-02
Payer: COMMERCIAL

## 2018-08-02 DIAGNOSIS — G56.03 CARPAL TUNNEL SYNDROME, BILATERAL: Primary | ICD-10-CM

## 2018-08-02 PROCEDURE — 97035 APP MDLTY 1+ULTRASOUND EA 15: CPT

## 2018-08-02 PROCEDURE — 97022 WHIRLPOOL THERAPY: CPT

## 2018-08-02 PROCEDURE — 97110 THERAPEUTIC EXERCISES: CPT

## 2018-08-02 NOTE — PROGRESS NOTES
Daily Note     Today's date: 2018  Patient name: Damon Barrera  : 1980  MRN: [id number]  Referring provider: Stuart Nguyen DO  Dx:   Encounter Diagnosis     ICD-10-CM    1  Carpal tunnel syndrome, bilateral G56 03                   Subjective: " My right hand aches all day long "    Objective: See treatment diary below    Daily Treatment Diary     Manual  18         Massage to wrist and hand  15 min                                                                   Exercise Diary  18         Nerve gliding  Median nerve  6 position     BUE/hands   3X both hand x5 5 x 5 x         Tendon gliding Ex  5 positions  BUE/hands 5X both hands x5 5 x          AROM with stretch 10 sec  Both hands  Wrist flex  Wrist ext  UD  RD  Sup/pron  Rotation  Clock/counter  Finger opposition  Thumb radial abduction  Thumb MP/IP flex/ext  MCP flex       2  2  2  2  2      2    2    2         x10  x10  x10  x10        x5    x5                 5 X  5X      10/10      5 x    5 x    5 x       10  10  10  10      10/10           Putty-yellow  BUE/hands    Shape into cone  Roll & squeeze  Push outward    x10   X 5 pinch 10 x   5 x   pinch     10 x  10 x  10 x  10 x               Clothespin  Lateral pinch  3 pt  pinch     10 x  10 x          digiflex   Red 10x Red  10x         Prayer stretch   5 X 5x         Flex Bar  Twist  Up/down   Red 5x   10  10/10                                                                                                                                                                           Modalities   18         US volar wrist   3 Mhz,1 0w/cm  8min BUE 8 min  B/L UE 8 min  B/L UE         Fluidotherapy    10'          Hot pack                   Assessment: Tolerated treatment well  Patient would benefit from continued OT      Plan: Progress treatment as tolerated

## 2018-08-07 ENCOUNTER — TELEPHONE (OUTPATIENT)
Dept: FAMILY MEDICINE CLINIC | Facility: CLINIC | Age: 38
End: 2018-08-07

## 2018-08-07 ENCOUNTER — OFFICE VISIT (OUTPATIENT)
Dept: OCCUPATIONAL THERAPY | Facility: CLINIC | Age: 38
End: 2018-08-07
Payer: COMMERCIAL

## 2018-08-07 DIAGNOSIS — G56.03 CARPAL TUNNEL SYNDROME, BILATERAL: Primary | ICD-10-CM

## 2018-08-07 DIAGNOSIS — E78.5 HYPERLIPIDEMIA, UNSPECIFIED HYPERLIPIDEMIA TYPE: ICD-10-CM

## 2018-08-07 DIAGNOSIS — K21.9 GERD WITHOUT ESOPHAGITIS: Primary | ICD-10-CM

## 2018-08-07 DIAGNOSIS — M65.331 TRIGGER FINGER, RIGHT MIDDLE FINGER: ICD-10-CM

## 2018-08-07 DIAGNOSIS — I10 HYPERTENSION, UNSPECIFIED TYPE: ICD-10-CM

## 2018-08-07 DIAGNOSIS — F41.8 DEPRESSION WITH ANXIETY: ICD-10-CM

## 2018-08-07 PROCEDURE — 97035 APP MDLTY 1+ULTRASOUND EA 15: CPT

## 2018-08-07 PROCEDURE — 97022 WHIRLPOOL THERAPY: CPT

## 2018-08-07 PROCEDURE — 97110 THERAPEUTIC EXERCISES: CPT

## 2018-08-07 RX ORDER — ATORVASTATIN CALCIUM 40 MG/1
TABLET, FILM COATED ORAL
Qty: 30 TABLET | Refills: 0 | OUTPATIENT
Start: 2018-08-07

## 2018-08-07 RX ORDER — FLUOXETINE HYDROCHLORIDE 40 MG/1
80 CAPSULE ORAL DAILY
Qty: 60 CAPSULE | Refills: 2 | Status: SHIPPED | OUTPATIENT
Start: 2018-08-07 | End: 2018-11-13 | Stop reason: SDUPTHER

## 2018-08-07 RX ORDER — OMEPRAZOLE 20 MG/1
20 CAPSULE, DELAYED RELEASE ORAL DAILY
Qty: 90 CAPSULE | Refills: 0 | Status: SHIPPED | OUTPATIENT
Start: 2018-08-07 | End: 2018-08-08

## 2018-08-07 RX ORDER — FLUOXETINE HYDROCHLORIDE 40 MG/1
CAPSULE ORAL
Qty: 60 CAPSULE | Refills: 0 | Status: CANCELLED | OUTPATIENT
Start: 2018-08-07

## 2018-08-07 RX ORDER — METOPROLOL SUCCINATE 25 MG/1
25 TABLET, EXTENDED RELEASE ORAL DAILY
Qty: 90 TABLET | Refills: 1 | Status: SHIPPED | OUTPATIENT
Start: 2018-08-07 | End: 2019-01-15 | Stop reason: SDUPTHER

## 2018-08-07 NOTE — PROGRESS NOTES
Daily Note     Today's date: 2018  Patient name: Lynette Rojas  : 1980  MRN: [id number]  Referring provider: Aparna Storey DO  Dx:   Encounter Diagnosis     ICD-10-CM    1  Carpal tunnel syndrome, bilateral G56 03    2  Trigger finger, right middle finger M65 331                   Subjective: I think my hands, especially my right, has been falling asleep more than it used to      Objective: See treatment diary below    Daily Treatment Diary     Manual  18        Massage to wrist and hand  15 min                                                                   Exercise Diary  18        Nerve gliding  Median nerve  6 position     BUE/hands   3X both hand x5 5 x 5 x x5        Tendon gliding Ex  5 positions  BUE/hands 5X both hands x5 5 x  x5        AROM with stretch 10 sec  Both hands  Wrist flex  Wrist ext  UD  RD  Sup/pron  Rotation  Clock/counter  Finger opposition  Thumb radial abduction  Thumb MP/IP flex/ext  MCP flex       2  2  2  2  2      2    2    2         x10  x10  x10  x10        x5    x5                 5 X  5X      10/10      5 x    5 x    5 x       10  10  10  10      10/10         10  10  10  10  10    10/10  5    10        5        Putty-yellow  BUE/hands    Shape into cone  Roll & squeeze  Push outward    x10   X 5 pinch 10 x   5 x   pinch     10 x  10 x  10 x  10 x               Clothespin  Lateral pinch  3 pt  pinch     10 x  10 x            digiflex   Red 10x Red  10x Red x 14        Prayer stretch   5 X 5x x5        Flex Bar  Twist  Up/down   Red 5x   10  10/10   Red  10  10/10                                                                                                                                                                        Modalities   18        US volar wrist   3 Mhz,1 0w/cm  8min BUE 8 min  B/L UE 8 min    B/L UE 8 min BUE        Fluidotherapy    10'  10 min Hot pack                 Assessment: Tolerated treatment well  Pt c/o thumb pain in most all movements completed  decreased wrist pain with movement, thought increases with tension applied  Patient exhibited good technique with therapeutic exercises and would benefit from continued OT      Plan: Continue per plan of care  Progress treatment as tolerated

## 2018-08-07 NOTE — TELEPHONE ENCOUNTER
We can try prilosec in exchange for protonix to see if covered, take the same way in morning before breakfast

## 2018-08-07 NOTE — TELEPHONE ENCOUNTER
Pt called and said that her insurance will no longer pay for her pantoprazole  She would like to know what to take  She said that she has tried OTC meds in the past and they did not work

## 2018-08-07 NOTE — TELEPHONE ENCOUNTER
Patient called asking that Metoprolol be sent to [facility]  Also, Pantorazole - for acid reflux - is not covered by her insurance  The pharmacy listed above suggested over the counter  But patient states that did not work for you in the past   Please advise  Patient can be reached at [phone]  Thank you

## 2018-08-07 NOTE — TELEPHONE ENCOUNTER
I called the pt to tell her that a script was sent for Prilosec  She said that if her insurance pays for it she will try it

## 2018-08-08 DIAGNOSIS — E78.5 HYPERLIPIDEMIA, UNSPECIFIED HYPERLIPIDEMIA TYPE: ICD-10-CM

## 2018-08-08 DIAGNOSIS — K21.9 GASTROESOPHAGEAL REFLUX DISEASE WITHOUT ESOPHAGITIS: Primary | ICD-10-CM

## 2018-08-08 RX ORDER — ATORVASTATIN CALCIUM 40 MG/1
TABLET, FILM COATED ORAL
Qty: 30 TABLET | Refills: 0 | OUTPATIENT
Start: 2018-08-08

## 2018-08-08 NOTE — TELEPHONE ENCOUNTER
Wrong information given in first message  Pt Omeprazole is not covered by her insurance and needs something else sent to [facility] [place] for that please  I apologized to pt about the mistake and that we will get this figured out  Pt understood and stated once we send something to [facility] [place] and she knows it is covered she is going to call back and ask for a 6 month supply sent to mail order

## 2018-08-08 NOTE — TELEPHONE ENCOUNTER
After looking into insurance: the website for formulary is here [employer]  com/pdfs/PREFERRED%20BRAND%20NAME%20AND%20GENERIC%20DRUG%20LIST_EFFECTIVE%2007012018  pdf        I will show this to Daryn Pringle tomorrow when she is in the office

## 2018-08-08 NOTE — TELEPHONE ENCOUNTER
I sent lansaprozole (prevacid) to pharmacy now; if this nor protonix, prilosec is covered, then a prior Thyra Everts will be needed for one of them to be covered

## 2018-08-09 DIAGNOSIS — K21.9 GERD WITHOUT ESOPHAGITIS: Primary | ICD-10-CM

## 2018-08-09 DIAGNOSIS — K21.9 GASTROESOPHAGEAL REFLUX DISEASE WITHOUT ESOPHAGITIS: Primary | ICD-10-CM

## 2018-08-09 RX ORDER — DEXLANSOPRAZOLE 30 MG/1
30 CAPSULE, DELAYED RELEASE ORAL DAILY
Qty: 90 CAPSULE | Refills: 0 | Status: SHIPPED | OUTPATIENT
Start: 2018-08-09 | End: 2018-08-09

## 2018-08-09 RX ORDER — OMEPRAZOLE 20 MG/1
20 CAPSULE, DELAYED RELEASE ORAL DAILY
Qty: 90 CAPSULE | Refills: 0 | Status: SHIPPED | OUTPATIENT
Start: 2018-08-09 | End: 2018-11-13 | Stop reason: SDUPTHER

## 2018-08-09 NOTE — TELEPHONE ENCOUNTER
Insurance did not cover medication  I called insurance at [phone] and spoke to Maribel Carpenter, she stated pt insurance is a step program and she needs to use OTC first  I questioned how will they know that she needs OTC, Maribel Carpenter said that they will go to the pharm and they will know what to do  I called [facility] and spoke to the male pharmacist and he stated we sent a rx, preferably omeprazole and they will have her buy it OTC but document it for the insurance  I called pt and she understood and will go around lunch to do this

## 2018-08-10 ENCOUNTER — OFFICE VISIT (OUTPATIENT)
Dept: OCCUPATIONAL THERAPY | Facility: CLINIC | Age: 38
End: 2018-08-10
Payer: COMMERCIAL

## 2018-08-10 ENCOUNTER — TELEPHONE (OUTPATIENT)
Dept: OBGYN CLINIC | Facility: HOSPITAL | Age: 38
End: 2018-08-10

## 2018-08-10 DIAGNOSIS — M65.331 TRIGGER FINGER, RIGHT MIDDLE FINGER: ICD-10-CM

## 2018-08-10 DIAGNOSIS — G56.03 CARPAL TUNNEL SYNDROME, BILATERAL: Primary | ICD-10-CM

## 2018-08-10 PROCEDURE — 97140 MANUAL THERAPY 1/> REGIONS: CPT

## 2018-08-10 PROCEDURE — 97110 THERAPEUTIC EXERCISES: CPT

## 2018-08-10 PROCEDURE — 97035 APP MDLTY 1+ULTRASOUND EA 15: CPT

## 2018-08-10 NOTE — PROGRESS NOTES
Daily Note     Today's date: 8/10/2018  Patient name: Michelle Murcia  : 1980  MRN: [id number]  Referring provider: Vernon Ruby DO  Dx:   Encounter Diagnosis     ICD-10-CM    1  Carpal tunnel syndrome, bilateral G56 03    2  Trigger finger, right middle finger M65 331                   Subjective:  Pt reports calling her doctor to schedule her EMG      Objective: See treatment diary below    Manual  7/18 7/26/18 7/30/18 8/2/18 8/7/18  8/10/18      Massage to wrist and hand  15 min     10 min                                                              Exercise Diary  7/18 7/26/18 7/30/18 8/2/18 8/7/18  8/10/18      Nerve gliding  Median nerve  6 position     BUE/hands   3X both hand x5 5 x 5 x x5  5      Tendon gliding Ex  5 positions  BUE/hands 5X both hands x5 5 x  x5  5      AROM with stretch 10 sec  Both hands  Wrist flex  Wrist ext  UD  RD  Sup/pron  Rotation  Clock/counter  Finger opposition  Thumb radial abduction  Thumb MP/IP flex/ext  MCP flex       2  2  2  2  2      2    2    2         x10  x10  x10  x10        x5    x5                 5 X  5X      10/10      5 x    5 x    5 x       10  10  10  10      10/10         10  10  10  10  10    10/10  5    10        5              Putty-yellow  BUE/hands    Shape into cone  Roll & squeeze  Push outward    x10   X 5 pinch 10 x   5 x   pinch     10 x  10 x  10 x  10 x         Review HEP      Clothespin  Lateral pinch  3 pt  pinch     10 x  10 x        x15  x15      digiflex   Red 10x Red  10x Red x 14  Red x10      Prayer stretch   5 X 5x x5        Flex Bar  Twist  Up/down   Red 5x   10  10/10   Red  10  10/10  Red   10  10/10                                                                                                                                                                      Modalities   7/26/18 7/30/18 8/2/18 8/7/18  8/10/18      US volar wrist   3 Mhz,1 0w/cm  8min BUE 8 min  B/L UE 8 min    B/L UE 8 min BUE  8 min BUE Fluidotherapy    10'  10 min        Hot pack             Assessment: Tolerated treatment well and with continued complaints of RUE/hand numbness with activity  Patient exhibited good technique with therapeutic exercises      Plan: Continue per plan of care

## 2018-08-10 NOTE — TELEPHONE ENCOUNTER
Caller: patient  Callback# [phone]  Dr Diallo Anchors        Patient called stated OT is not helping her trigger finger and carpal tunnel  She is now requesting EMG   Please advise thanks

## 2018-08-16 ENCOUNTER — EVALUATION (OUTPATIENT)
Dept: OCCUPATIONAL THERAPY | Facility: CLINIC | Age: 38
End: 2018-08-16
Payer: COMMERCIAL

## 2018-08-16 DIAGNOSIS — G56.03 CARPAL TUNNEL SYNDROME, BILATERAL: Primary | ICD-10-CM

## 2018-08-16 PROCEDURE — 97110 THERAPEUTIC EXERCISES: CPT

## 2018-08-16 NOTE — PROGRESS NOTES
OT Discharge  Today's date: 2018  Patient name: Leo Crespo  : 1980  MRN: [id number]  Referring provider: Madelyn Bosworth, DO  Dx:   Encounter Diagnosis     ICD-10-CM    1  Carpal tunnel syndrome, bilateral G56 03                   Assessment    Assessment details: Leo Crespo is a 45 y o  female with a diagnosis of carpal tunnel syndrome  A reassessment was done today  Patient has improved with AROM in wrist and digits for all planes, particularly the left extremity  Also, B/L  and pinch strength has improved  Strength is 4/5 for right wrist and forearm while the left is 5/5 strength  Pain level has improved for left UE, but fluctuates from 3 to 9/10 in right dominant hand  Patient also reports more numbness and pins and needles feeling in right hand in digits 1-3  Understanding of Dx/Px/POC: good   Prognosis: good    Goals  Goals  STG's In 2 weeks:  1  Patient will improve AROM B/L wrist extension to > 60, UD to > 34, RD to > 30, right MCP flexion of D2-5 to > 88, right PIP flexion D 2-5 to >  95, DIP flexion of D2-5 to > 85 - MET  2  Patient will improve B/L  strength to > 40# for opening jars  PARTIALLY MET  3  Patient will report a decrease in pain in B/L wrist and hands to 3/10 with activity  PARTIALLY MET  LTG's In 4 weeks:  1  Patient will improve B/L thumb AROM of MP flexion to > 60, IP flexion to > 78, and thumb abduction to > 70 to improve grasping items  PARTIALLY MET  2  Patient will improve B/L  strength to > 58#, right sided 3 point pinch >16#, B/L 2 point pinch to > 12#, and B/L lateral pinch to > 15#   PARTIALLY MET  3  Patient will increase right wrist flex/ext, UD/RD, and sup/pron to 4/5 for lifting and carrying objects  MET  4  Patient will report a pain level of 0-1/10 in B/L wrist and hands with activity   NOT MET      Plan  Planned therapy interventions: home exercise program  Plan of Care beginning date: 2018  Plan of Care expiration date: 2018  Plan details: D/C OT services  Patient is independent with HEP  She is pleased with her progression in ROM and strength  Patient is awaiting EMG test which is scheduled on 10/25/18  Subjective Evaluation    History of Present Illness  Mechanism of injury: Etiology of injury for diagnosis is unknown  Patient has been having pain in B/L wrists and hands for several months  Slight tingling is present in B/L hands  Patient made an appointment with orthopedic doctor on 18 and received script for therapy        Quality of life: good    Pain  Current pain rating: 3  At best pain rating: 3  At worst pain ratin  Quality: pressure and needle-like  Aggravating factors: lifting and keyboarding  Progression: improved    Social Support  Lives with: spouse and young children    Hand dominance: right    Treatments  Current treatment: occupational therapy        Objective     Active Range of Motion     Left Wrist   Wrist flexion: 85 degrees   Wrist extension: 70 degrees   Radial deviation: 28 degrees   Ulnar deviation: 38 degrees     Right Wrist   Wrist flexion: 82 degrees   Wrist extension: 55 degrees   Radial deviation: 26 degrees   Ulnar deviation: 35 degrees     Left Thumb   Flexion     MP: 65 degrees    DIP: 76 degrees  Extension     MP: 0 degrees    DIP: 0 degrees  Radial abduction    CMC: 86 degrees    Right Thumb   Flexion     MP: 50    DIP: 64  Extension     MP: 0    DIP: 0  Radial Abduction    CMC: 82    Left Digits    Flexion   Index     MCP: 90    PIP: 100  Middle     MCP: 90    PIP: 100  Ring     MCP: 85    PIP: 100  Little     MCP: 75    PIP: 100    Right Digits   Flexion   Index     MCP: 90    PIP: 100  Middle     MCP: 80    PIP: 100  Ring     MCP: 75    PIP: 100  Little     MCP: 75    PIP: 100    Strength/Myotome Testing     Left Wrist/Hand   Wrist extension: 5  Wrist flexion: 5  Radial deviation: 5  Ulnar deviation: 5     (2nd hand position)     Trial 1: 50    Trial 2: 42    Trial 3: 35    Thumb Strength  Key/Lateral Pinch     Trail 1: 16  Tip/Two-Point Pinch     Trial 1: 16  Palmar/Three-Point Pinch     Trial 1: 17    Right Wrist/Hand   Wrist extension: 4  Wrist flexion: 4  Radial deviation: 4  Ulnar deviation: 4     (2nd hand position)     Trial 1: 38    Trial 2: 38    Trial 3: 42    Thumb Strength   Key/Lateral Pinch     Trial 1: 15  Tip/Two-Point Pinch     Trial 1: 11  Palmar/Three-Point Pinch     Trial 1: 13      Precautions:     Daily Treatment Diary     Manual                                                                                   Exercise Diary  8/16/            Red putty    pinches   10  10            Flexor stretch on table Hold 10 sec  6 x            Prayer stretch Hold 10 sec  4x            Thumb MP flex stretch Hold 10 sec   3x

## 2018-09-02 ENCOUNTER — OFFICE VISIT (OUTPATIENT)
Dept: URGENT CARE | Facility: CLINIC | Age: 38
End: 2018-09-02
Payer: COMMERCIAL

## 2018-09-02 VITALS
TEMPERATURE: 99.7 F | OXYGEN SATURATION: 99 % | HEART RATE: 81 BPM | SYSTOLIC BLOOD PRESSURE: 139 MMHG | RESPIRATION RATE: 18 BRPM | DIASTOLIC BLOOD PRESSURE: 86 MMHG

## 2018-09-02 DIAGNOSIS — J06.9 ACUTE URI: ICD-10-CM

## 2018-09-02 DIAGNOSIS — H69.82 EUSTACHIAN TUBE DYSFUNCTION, LEFT: Primary | ICD-10-CM

## 2018-09-02 PROCEDURE — G0382 LEV 3 HOSP TYPE B ED VISIT: HCPCS | Performed by: PHYSICIAN ASSISTANT

## 2018-09-02 RX ORDER — AZITHROMYCIN 250 MG/1
TABLET, FILM COATED ORAL
Qty: 6 TABLET | Refills: 0
Start: 2018-09-02 | End: 2018-09-06

## 2018-09-02 RX ORDER — AZITHROMYCIN 250 MG/1
TABLET, FILM COATED ORAL
Qty: 6 TABLET | Refills: 0 | Status: SHIPPED | OUTPATIENT
Start: 2018-09-02 | End: 2018-09-02 | Stop reason: CLARIF

## 2018-09-02 NOTE — PATIENT INSTRUCTIONS
Azithromycin (By injection)   Azithromycin ([id number]-[id number]-[id number]-[id number]-sin)  Treats infections caused by bacteria  This medicine is a macrolide antibiotic  Brand Name(s): Amerinet Choice Zithromax, Azithromycin Novaplus, PremierPro Rx Azithromycin, Zithromax   There may be other brand names for this medicine  When This Medicine Should Not Be Used: This medicine is not right for everyone  You should not receive it if you had an allergic reaction to azithromycin, erythromycin, or to similar medicines, or if you had liver problems from azithromycin  How to Use This Medicine:   Injectable  · Your doctor will prescribe your dose and schedule  This medicine is given through a needle placed in a vein  This medicine is given slowly, so the needle will remain in place for about an hour  · A nurse or other health provider will give you this medicine  · Your doctor may give you a few doses of this medicine until your condition improves, and then you may be switched to an oral medicine that works the same way  Talk to your doctor if you have any concerns about this  Drugs and Foods to Avoid:   Ask your doctor or pharmacist before using any other medicine, including over-the-counter medicines, vitamins, and herbal products  · Some medicines can affect how azithromycin works  Tell your doctor if you are using any of the following:   ¨ Cyclosporine, digoxin, nelfinavir, phenytoin  ¨ Blood thinner medicine (including warfarin)  ¨ Ergot medicine  ¨ Medicine for a heart rhythm problem (including amiodarone, disopyramide, dofetilide, procainamide, quinidine, sotalol)  Warnings While Using This Medicine:   · Tell your doctor if you are pregnant or breastfeeding, or if you have kidney disease, liver disease, heart disease, heart rhythm problems (such as QT prolongation), heart failure, or myasthenia gravis    · This medicine may cause the following problems:   ¨ Serious skin reactions  ¨ Liver problems  ¨ Infantile hypertrophic pyloric stenosis (tube is too narrow where food passes out of the stomach) in  babies  ¨ Heart rhythm changes  · This medicine can cause diarrhea  Call your doctor if the diarrhea becomes severe, does not stop, or is bloody  Do not take any medicine to stop diarrhea until you have talked to your doctor  Diarrhea can occur 2 months or more after you stop taking this medicine  Diarrhea may occur 2 months or more after you stop using this medicine  Possible Side Effects While Using This Medicine:   Call your doctor right away if you notice any of these side effects:  · Allergic reaction: Itching or hives, swelling in your face or hands, swelling or tingling in your mouth or throat, chest tightness, trouble breathing  · Blistering, peeling, red skin rash  · Chest pain, fast, pounding, or uneven heartbeat  · Dark urine, pale stools, nausea, vomiting, loss of appetite, stomach pain, yellow skin or eyes  · Double vision, muscle weakness  · Fainting, dizziness, lightheadedness  · Pain, redness, itching, burning, or swelling where the needle is placed  · Severe diarrhea that may contain blood, black stools  · Trouble breathing  If you notice other side effects that you think are caused by this medicine, tell your doctor  Call your doctor for medical advice about side effects  You may report side effects to FDA at 2-275-FDA-0575  © 2017 [facility] [place] Information is for End User's use only and may not be sold, redistributed or otherwise used for commercial purposes  The above information is an  only  It is not intended as medical advice for individual conditions or treatments  Talk to your doctor, nurse or pharmacist before following any medical regimen to see if it is safe and effective for you  Eustachian Tube Dysfunction   WHAT YOU NEED TO KNOW:   What is eustachian tube dysfunction? Eustachian tube dysfunction (ETD) is a condition that prevents your eustachian tubes from opening properly  It can also cause them to become blocked  Eustachian tubes connect your middle ear to the back of your nose and throat  These tubes open and allow air to flow in and out when you sneeze, swallow, or yawn  What causes or increases my risk of ETD? ETD may be caused by swelling or buildup of mucus in your eustachian tubes  Allergies, a cold, or sinus infection can increase your risk for ETD  Smoking also increases your risk for ETD  What are the signs and symptoms of ETD? · Fullness or pressure in your ears    · Muffled hearing    · Pain in one or both ears    · Ringing in your ears    · Popping or clicking feeling in your ears    · Trouble keeping your balance  How is ETD diagnosed? Your healthcare provider will ask about your symptoms  He will examine your ears, your nose, and the back of your throat  He may also do a hearing test    How is ETD treated? Your ETD may get better on its own without any treatment  You may need any of the following:  · Exercises  such as swallowing, yawning, or chewing gum may help to open your eustachian tubes  Your healthcare provider may also recommend that you take a deep breath and then blow with your mouth shut and your nostrils pinched closed  · Air pressure devices  push air into your nose and eustachian tubes to help relieve air pressure in your ear  · Treatment for allergies  such as decongestants, antihistamines, and nasal steroids may improve ETD  They may help decrease swelling of the eustachian tubes  · Ear tubes  may help to keep your middle ear open  During this procedure, your healthcare provider will cut a small hole in your eardrum  · A myringotomy  is procedure to make a small cut in your eardrum and suction out fluid from your middle ear  · Tuboplasty  is a procedure to widen your eustachian tubes  When should I contact my healthcare provider? · Your symptoms do not improve or get worse  · You have a fever       · You have any hearing loss      · You have questions or concerns about your condition or care  CARE AGREEMENT:   You have the right to help plan your care  Learn about your health condition and how it may be treated  Discuss treatment options with your caregivers to decide what care you want to receive  You always have the right to refuse treatment  The above information is an  only  It is not intended as medical advice for individual conditions or treatments  Talk to your doctor, nurse or pharmacist before following any medical regimen to see if it is safe and effective for you  © 2017 [facility] Information is for End User's use only and may not be sold, redistributed or otherwise used for commercial purposes  All illustrations and images included in CareNotes® are the copyrighted property of [employer] D A M [employer]  or [employer]

## 2018-09-02 NOTE — PROGRESS NOTES
[facility]        NAME: Rodri Beckwith is a 45 y o  female  : 1980    MRN: [id number]  DATE: 2018  TIME: 12:44 PM    Assessment and Plan   Eustachian tube dysfunction, left [H69 82]  1  Eustachian tube dysfunction, left  azithromycin (ZITHROMAX) 250 mg tablet    DISCONTINUED: azithromycin (ZITHROMAX) 250 mg tablet   2  Acute URI           Patient Instructions       Follow up with PCP in 3-5 days  Proceed to  ER if symptoms worsen  Chief Complaint     Chief Complaint   Patient presents with    Sore Throat     Pt c/o a sore throat and left ear pain for four days  History of Present Illness       Patient comes in complaining of 4 day history of left ear pain and sore throat cough  Cough she states there is mucus there but unable to bring the mucus up  She states she did have a fever to as high as 101  There is no chest pain shortness of breath dyspnea on exertion  Review of Systems   Review of Systems   Constitutional: Positive for fever  Negative for chills  HENT: Positive for congestion, ear pain and sore throat  Negative for ear discharge, postnasal drip and trouble swallowing  Eyes: Negative for redness  Respiratory: Positive for cough  Negative for shortness of breath and wheezing  Cardiovascular: Negative for chest pain  Gastrointestinal: Negative for abdominal pain  Musculoskeletal: Negative for myalgias  Neurological: Negative for dizziness and headaches  Hematological: Negative for adenopathy           Current Medications       Current Outpatient Prescriptions:     amitriptyline (ELAVIL) 10 mg tablet, Take 1 tablet by mouth, Disp: , Rfl:     Ascorbic Acid (VITAMIN C) 500 MG CAPS, Take 1 capsule by mouth daily, Disp: , Rfl:     atorvastatin (LIPITOR) 40 mg tablet, Take 1 tablet (40 mg total) by mouth daily, Disp: 90 tablet, Rfl: 1    azithromycin (ZITHROMAX) 250 mg tablet, Take 2 tablets today then 1 tablet daily x 4 days, Disp: 6 tablet, Rfl: 0    cetirizine (ZyrTEC) 10 mg tablet, Take 10 mg by mouth daily, Disp: , Rfl:     Cholecalciferol (VITAMIN D3) 5000 units CAPS, Take 1 capsule by mouth 2 (two) times a day, Disp: , Rfl:     cyanocobalamin (VITAMIN B-12) 1,000 mcg tablet, Take 3 tablets by mouth 2 (two) times a day, Disp: , Rfl:     FLUoxetine (PROzac) 40 MG capsule, Take 2 capsules (80 mg total) by mouth daily, Disp: 60 capsule, Rfl: 2    medroxyPROGESTERone (DEPO-PROVERA) 400 MG/ML SUSP, Inject into the shoulder, thigh, or buttocks, Disp: , Rfl:     metoprolol succinate (TOPROL-XL) 25 mg 24 hr tablet, Take 1 tablet (25 mg total) by mouth daily, Disp: 90 tablet, Rfl: 1    Multiple Vitamins-Minerals (CENTRUM ADULTS PO), Take 1 tablet by mouth daily, Disp: , Rfl:     omeprazole (PriLOSEC) 20 mg delayed release capsule, Take 1 capsule (20 mg total) by mouth daily, Disp: 90 capsule, Rfl: 0    tiZANidine (ZANAFLEX) 4 mg tablet, Take 1 tablet every 6-8 hours by oral route as needed  , Disp: , Rfl:     Current Allergies     Allergies as of 09/02/2018 - Reviewed 09/02/2018   Allergen Reaction Noted    Amoxicillin Swelling, Anaphylaxis, and Angioedema 02/13/2009    Neurontin [gabapentin] Other (See Comments) 10/16/2014    Penicillin v Angioedema and Swelling 06/21/2012    Penicillins Swelling and Anaphylaxis 05/16/2017    Aripiprazole Other (See Comments) 11/06/2013    Carbamazepine Other (See Comments) 10/16/2012    Doxycycline Hives 04/10/2013    Lorazepam Other (See Comments) 06/11/2014    Other Hives 05/16/2017    Lamotrigine Rash 08/28/2012            The following portions of the patient's history were reviewed and updated as appropriate: allergies, current medications, past family history, past medical history, past social history, past surgical history and problem list      Past Medical History:   Diagnosis Date    Chronic pain disorder     BACK SHOULDERS NECK    Disease of thyroid gland     nodule    Fibromyalgia, primary     GERD (gastroesophageal reflux disease)     Hearing difficulty of right ear     Irritable bowel syndrome     Perforation of tympanic membrane     Right    RBBB     Wears glasses        Past Surgical History:   Procedure Laterality Date    BACK SURGERY      lumbar herniated disc    BREAST SURGERY Left 2006    cyst removal    CARPAL TUNNEL RELEASE       SECTION      CHOLECYSTECTOMY      CYST REMOVAL      DENTAL SURGERY      KS COLONOSCOPY FLX DX W/COLLJ SPEC WHEN PFRMD N/A 2017    Procedure: COLONOSCOPY;  Surgeon: Erasto Bills MD;  [facility]; Service: Gastroenterology    KS INCISE FINGER TENDON SHEATH Right 3/13/2018    Procedure: LONG TRIGGER FINGER RELEASE;  Surgeon: Isiah Duffy DO;  [facility];  Service: Orthopedics    KS TYMPANOPLASTY Right 2017    Procedure: TYMPANOPLASTY WITH PERICHONDRIN GRAFT;  Surgeon: Jim Rodriguez DO;  [facility];  Service: ENT    SHOULDER SURGERY Right     WISDOM TOOTH EXTRACTION         Family History   Problem Relation Age of Onset    Hypertension Mother     Hyperlipidemia Mother     Hypertension Father     Hyperlipidemia Father     Heart disease Father         cardiac disorder-myocardial infarction arrhythmias    Diabetes unspecified Maternal Grandmother     Crohn's disease Brother     Arthritis Brother     Thyroid disease Paternal Grandmother          Medications have been verified  Objective   /86   Pulse 81   Temp 99 7 °F (37 6 °C)   Resp 18   SpO2 99%        Physical Exam     Physical Exam   Constitutional: She is oriented to person, place, and time  She appears well-developed and well-nourished  HENT:   Head: Normocephalic and atraumatic  Right Ear: External ear normal    Left Ear: External ear normal    Nose: Nose normal    Mouth/Throat: Oropharynx is clear and moist    Eyes: Conjunctivae are normal    Neck: Neck supple     Cardiovascular: Normal rate, regular rhythm and normal heart sounds  Pulmonary/Chest: Effort normal and breath sounds normal    Lymphadenopathy:     She has no cervical adenopathy  Neurological: She is alert and oriented to person, place, and time  Skin: Skin is warm and dry  No rash noted  Psychiatric: She has a normal mood and affect  Her behavior is normal  Judgment and thought content normal    Nursing note and vitals reviewed

## 2018-10-16 ENCOUNTER — OFFICE VISIT (OUTPATIENT)
Dept: URGENT CARE | Facility: CLINIC | Age: 38
End: 2018-10-16
Payer: COMMERCIAL

## 2018-10-16 VITALS
RESPIRATION RATE: 18 BRPM | BODY MASS INDEX: 42.38 KG/M2 | SYSTOLIC BLOOD PRESSURE: 134 MMHG | TEMPERATURE: 97.1 F | DIASTOLIC BLOOD PRESSURE: 74 MMHG | WEIGHT: 270 LBS | HEIGHT: 67 IN | OXYGEN SATURATION: 97 % | HEART RATE: 91 BPM

## 2018-10-16 DIAGNOSIS — J01.90 ACUTE SINUSITIS, RECURRENCE NOT SPECIFIED, UNSPECIFIED LOCATION: Primary | ICD-10-CM

## 2018-10-16 PROCEDURE — G0382 LEV 3 HOSP TYPE B ED VISIT: HCPCS | Performed by: PHYSICIAN ASSISTANT

## 2018-10-16 RX ORDER — AZITHROMYCIN 250 MG/1
TABLET, FILM COATED ORAL
Qty: 6 TABLET | Refills: 0 | Status: SHIPPED | COMMUNITY
Start: 2018-10-16 | End: 2018-10-21

## 2018-10-16 NOTE — PROGRESS NOTES
[facility]    NAME: Joanna Lipscomb is a 45 y o  female  : 1980    MRN: [id number]  DATE: 2018  TIME: 10:40 AM    Assessment and Plan   Acute sinusitis, recurrence not specified, unspecified location [J01 90]  1  Acute sinusitis, recurrence not specified, unspecified location  azithromycin (ZITHROMAX) 250 mg tablet       Patient Instructions     Patient Instructions   I have prescribed an antibiotic for the infection  Please take the antibiotic as prescribed and finish the entire prescription  I recommend that the patient takes an over the counter probiotic or eats yogurt with live cultures in it [place]) to keep good bacteria in the gut and help prevent diarrhea  Wash hands frequently to prevent the spread of infection  Can use over the counter cough and cold medications to help with symptoms  Ibuprofen and/or tylenol as needed for pain or fever  If not improving over the next 7-10 days, follow up with PCP  Chief Complaint     Chief Complaint   Patient presents with    Sore Throat     x 2 days, sinus pressure, denita ear pain       History of Present Illness   HPI    Review of Systems   Review of Systems   Constitutional: Negative for activity change, appetite change, chills, diaphoresis, fatigue, fever and unexpected weight change  HENT: Positive for congestion, ear pain, sinus pain, sinus pressure and sore throat  Negative for dental problem, hearing loss, sneezing, tinnitus, trouble swallowing and voice change  Eyes: Negative for photophobia, redness and visual disturbance  Respiratory: Positive for cough  Negative for apnea, chest tightness, shortness of breath, wheezing and stridor  Cardiovascular: Negative for chest pain, palpitations and leg swelling  Gastrointestinal: Negative for abdominal distention, abdominal pain, blood in stool, constipation, diarrhea, nausea and vomiting     Endocrine: Negative for cold intolerance, heat intolerance, polydipsia, polyphagia and polyuria  Genitourinary: Negative for difficulty urinating, dysuria, flank pain, frequency, hematuria and urgency  Musculoskeletal: Negative for arthralgias, back pain, gait problem, joint swelling, myalgias, neck pain and neck stiffness  Skin: Negative for pallor, rash and wound  Neurological: Negative for dizziness, tremors, seizures, speech difficulty, weakness and headaches  Hematological: Negative for adenopathy  Does not bruise/bleed easily  Psychiatric/Behavioral: Negative for agitation, confusion, dysphoric mood and sleep disturbance  The patient is not nervous/anxious  All other systems reviewed and are negative        Current Medications     Current Outpatient Prescriptions:     amitriptyline (ELAVIL) 10 mg tablet, Take 1 tablet by mouth, Disp: , Rfl:     Ascorbic Acid (VITAMIN C) 500 MG CAPS, Take 1 capsule by mouth daily, Disp: , Rfl:     atorvastatin (LIPITOR) 40 mg tablet, Take 1 tablet (40 mg total) by mouth daily, Disp: 90 tablet, Rfl: 1    azithromycin (ZITHROMAX) 250 mg tablet, Take 2 tablets today then 1 tablet daily for 4 days, Disp: 6 tablet, Rfl: 0    cetirizine (ZyrTEC) 10 mg tablet, Take 10 mg by mouth daily, Disp: , Rfl:     Cholecalciferol (VITAMIN D3) 5000 units CAPS, Take 1 capsule by mouth 2 (two) times a day, Disp: , Rfl:     cyanocobalamin (VITAMIN B-12) 1,000 mcg tablet, Take 3 tablets by mouth 2 (two) times a day, Disp: , Rfl:     FLUoxetine (PROzac) 40 MG capsule, Take 2 capsules (80 mg total) by mouth daily, Disp: 60 capsule, Rfl: 2    medroxyPROGESTERone (DEPO-PROVERA) 400 MG/ML SUSP, Inject into the shoulder, thigh, or buttocks, Disp: , Rfl:     metoprolol succinate (TOPROL-XL) 25 mg 24 hr tablet, Take 1 tablet (25 mg total) by mouth daily, Disp: 90 tablet, Rfl: 1    Multiple Vitamins-Minerals (CENTRUM ADULTS PO), Take 1 tablet by mouth daily, Disp: , Rfl:     omeprazole (PriLOSEC) 20 mg delayed release capsule, Take 1 capsule (20 mg total) by mouth daily, Disp: 90 capsule, Rfl: 0    tiZANidine (ZANAFLEX) 4 mg tablet, Take 1 tablet every 6-8 hours by oral route as needed  , Disp: , Rfl:     Current Allergies     Allergies as of 10/16/2018 - Reviewed 10/16/2018   Allergen Reaction Noted    Amoxicillin Swelling, Anaphylaxis, and Angioedema 2009    Neurontin [gabapentin] Other (See Comments) 10/16/2014    Penicillin v Angioedema and Swelling 2012    Penicillins Swelling and Anaphylaxis 2017    Aripiprazole Other (See Comments) 2013    Carbamazepine Other (See Comments) 10/16/2012    Doxycycline Hives 04/10/2013    Lorazepam Other (See Comments) 2014    Other Hives 2017    Lamotrigine Rash 2012          The following portions of the patient's history were reviewed and updated as appropriate: allergies, current medications, past family history, past medical history, past social history, past surgical history and problem list    Past Medical History:   Diagnosis Date    Chronic pain disorder     BACK SHOULDERS NECK    Disease of thyroid gland     nodule    Fibromyalgia, primary     GERD (gastroesophageal reflux disease)     Hearing difficulty of right ear     Irritable bowel syndrome     Perforation of tympanic membrane     Right    RBBB     Wears glasses      Past Surgical History:   Procedure Laterality Date    BACK SURGERY      lumbar herniated disc    BREAST SURGERY Left 2006    cyst removal    CARPAL TUNNEL RELEASE       SECTION      CHOLECYSTECTOMY      CYST REMOVAL      DENTAL SURGERY      AK COLONOSCOPY FLX DX W/COLLJ SPEC WHEN PFRMD N/A 2017    Procedure: COLONOSCOPY;  Surgeon: Adam Gtz MD;  [facility];   Service: Gastroenterology    AK INCISE FINGER TENDON SHEATH Right 3/13/2018    Procedure: LONG TRIGGER FINGER RELEASE;  Surgeon: Ashli Kaye DO;  [facility];  Service: Orthopedics    AK TYMPANOPLASTY Right 2017    Procedure: TYMPANOPLASTY WITH PERICHONDRIN GRAFT;  Surgeon: Marcelina Trevino DO;  [facility];  Service: ENT    SHOULDER SURGERY Right     WISDOM TOOTH EXTRACTION       Family History   Problem Relation Age of Onset    Hypertension Mother     Hyperlipidemia Mother     Hypertension Father     Hyperlipidemia Father     Heart disease Father         cardiac disorder-myocardial infarction arrhythmias    Diabetes unspecified Maternal Grandmother     Crohn's disease Brother     Arthritis Brother     Thyroid disease Paternal Grandmother      Social History     Social History    Marital status: /Civil Union     Spouse name: N/A    Number of children: N/A    Years of education: N/A     Occupational History    Not on file  Social History Main Topics    Smoking status: Never Smoker    Smokeless tobacco: Never Used    Alcohol use Yes      Comment: rarely    Drug use: No    Sexual activity: Yes      Comment: per allscripts     Other Topics Concern    Not on file     Social History Narrative    No narrative on file     Medications have been verified  Objective   /74   Pulse 91   Temp (!) 97 1 °F (36 2 °C)   Resp 18   Ht 5' 7" (1 702 m)   Wt 122 kg (270 lb)   SpO2 97%   BMI 42 29 kg/m²      Physical Exam   Physical Exam   Constitutional: She appears well-developed and well-nourished  No distress  HENT:   Head: Normocephalic  Right Ear: Tympanic membrane and external ear normal    Left Ear: Tympanic membrane and external ear normal    Nose: Mucosal edema present  Right sinus exhibits maxillary sinus tenderness  Mouth/Throat: Posterior oropharyngeal erythema present  No oropharyngeal exudate  Neck: Normal range of motion  Neck supple  Cardiovascular: Normal rate, regular rhythm and normal heart sounds  No murmur heard  Pulmonary/Chest: Effort normal and breath sounds normal  No respiratory distress  She has no wheezes  She has no rales  Abdominal: Soft   Bowel sounds are normal  There is no tenderness  Musculoskeletal: Normal range of motion  Lymphadenopathy:     She has no cervical adenopathy  Skin: Skin is warm  No rash noted  Vitals reviewed

## 2018-10-23 ENCOUNTER — OFFICE VISIT (OUTPATIENT)
Dept: GASTROENTEROLOGY | Facility: AMBULARY SURGERY CENTER | Age: 38
End: 2018-10-23
Payer: COMMERCIAL

## 2018-10-23 VITALS
HEIGHT: 67 IN | HEART RATE: 100 BPM | WEIGHT: 281 LBS | DIASTOLIC BLOOD PRESSURE: 80 MMHG | RESPIRATION RATE: 18 BRPM | BODY MASS INDEX: 44.1 KG/M2 | TEMPERATURE: 100.1 F | SYSTOLIC BLOOD PRESSURE: 124 MMHG

## 2018-10-23 DIAGNOSIS — K60.2 ANAL FISSURE, UNSPECIFIED: Primary | ICD-10-CM

## 2018-10-23 DIAGNOSIS — K62.89 ANAL PAIN: ICD-10-CM

## 2018-10-23 DIAGNOSIS — K64.9 HEMORRHOIDS, UNSPECIFIED HEMORRHOID TYPE: ICD-10-CM

## 2018-10-23 LAB
ALBUMIN SERPL-MCNC: 4.2 G/DL (ref 3.6–5.1)
ALBUMIN/GLOB SERPL: 1.8 (CALC) (ref 1–2.5)
ALP SERPL-CCNC: 69 U/L (ref 33–115)
ALT SERPL-CCNC: 27 U/L (ref 6–29)
AST SERPL-CCNC: 16 U/L (ref 10–30)
BILIRUB SERPL-MCNC: 0.5 MG/DL (ref 0.2–1.2)
BUN SERPL-MCNC: 10 MG/DL (ref 7–25)
BUN/CREAT SERPL: NORMAL (CALC) (ref 6–22)
CALCIUM SERPL-MCNC: 9.3 MG/DL (ref 8.6–10.2)
CHLORIDE SERPL-SCNC: 106 MMOL/L (ref 98–110)
CHOLEST SERPL-MCNC: 174 MG/DL
CHOLEST/HDLC SERPL: 4.1 (CALC)
CO2 SERPL-SCNC: 28 MMOL/L (ref 20–32)
CREAT SERPL-MCNC: 0.67 MG/DL (ref 0.5–1.1)
GLOBULIN SER CALC-MCNC: 2.3 G/DL (CALC) (ref 1.9–3.7)
GLUCOSE SERPL-MCNC: 99 MG/DL (ref 65–99)
HDLC SERPL-MCNC: 42 MG/DL
LDLC SERPL CALC-MCNC: 107 MG/DL (CALC)
NONHDLC SERPL-MCNC: 132 MG/DL (CALC)
POTASSIUM SERPL-SCNC: 3.9 MMOL/L (ref 3.5–5.3)
PROT SERPL-MCNC: 6.5 G/DL (ref 6.1–8.1)
SL AMB EGFR AFRICAN AMERICAN: 129 ML/MIN/1.73M2
SL AMB EGFR NON AFRICAN AMERICAN: 112 ML/MIN/1.73M2
SODIUM SERPL-SCNC: 141 MMOL/L (ref 135–146)
TRIGL SERPL-MCNC: 140 MG/DL

## 2018-10-23 PROCEDURE — 99214 OFFICE O/P EST MOD 30 MIN: CPT | Performed by: PHYSICIAN ASSISTANT

## 2018-10-23 RX ORDER — LIDOCAINE 5 G/100G
CREAM RECTAL; TOPICAL 2 TIMES DAILY
Qty: 28.3 G | Refills: 1 | Status: SHIPPED | OUTPATIENT
Start: 2018-10-23 | End: 2019-01-15

## 2018-10-23 NOTE — LETTER
October 23, 2018     Merline Rainwater, [address]  [address]  [address] [place] [address]    Patient: Kwaku Parsons   YOB: 1980   Date of Visit: 10/23/2018       Dear Dr Olegario Ibrahim: Thank you for referring Mickey Rizo to me for evaluation  Below are my notes for this consultation  If you have questions, please do not hesitate to call me  I look forward to following your patient along with you  Sincerely,        Chandra Emmanuel PA-C        CC: No Recipients  Chandra Emmanuel PA-C  10/23/2018 12:27 PM  Sign at close encounter  [facility] - Outpatient Follow-up Note  Kwaku Parsons 45 y o  female MRN: [id number]  Encounter: [id number]    ASSESSMENT AND PLAN:      Anorectal pain  -Possibly hemorrhoidal versus secondary to small anal fissure given skin tag externally indicating possible chronic process and pain  -She will use rectal cream 2 times daily  -she will start sitz baths  -she will add metamucil daily to avoid straining  -trial of lidocaine cream as well  -if persistent symptoms after may trial nitroglycerin ointment  ______________________________________________________________________    SUBJECTIVE:  Jannette presents for follow-up  She states the over the last month after falling on the stairs she has had anal pain and irritation without bleeding, it has been worsened over the last week  It hurts when she is sitting  She has a chronic history of external hemorrhoids she states that periodically irritate her and have cause bleeding in the past  She does state she intermittently strains with bms but overall has daily bms  She had a colonoscopy 8/2017 with Dr Manjeet Alston that was unremarkable with the exception of a single polyp  She has noticed a round bump in the rectal area  REVIEW OF SYSTEMS IS OTHERWISE NEGATIVE        Historical Information   Past Medical History:   Diagnosis Date    Chronic pain disorder     BACK SHOULDERS NECK    Disease of thyroid gland     nodule    Fibromyalgia, primary     GERD (gastroesophageal reflux disease)     Hearing difficulty of right ear     Irritable bowel syndrome     Perforation of tympanic membrane     Right    RBBB     Wears glasses      Past Surgical History:   Procedure Laterality Date    BACK SURGERY      lumbar herniated disc    BREAST SURGERY Left 2006    cyst removal    CARPAL TUNNEL RELEASE       SECTION      CHOLECYSTECTOMY      CYST REMOVAL      DENTAL SURGERY      MA COLONOSCOPY FLX DX W/COLLJ SPEC WHEN PFRMD N/A 2017    Procedure: COLONOSCOPY;  Surgeon: Batool Lagos MD;  [facility]; Service: Gastroenterology    MA INCISE FINGER TENDON SHEATH Right 3/13/2018    Procedure: LONG TRIGGER FINGER RELEASE;  Surgeon: Sammy Allen DO;  [facility];  Service: Orthopedics    MA TYMPANOPLASTY Right 2017    Procedure: TYMPANOPLASTY WITH PERICHONDRIN GRAFT;  Surgeon:  Jericho Palomino DO;  [facility];  Service: ENT    SHOULDER SURGERY Right     WISDOM TOOTH EXTRACTION       Social History   History   Alcohol Use No     History   Drug Use No     History   Smoking Status    Never Smoker   Smokeless Tobacco    Never Used     Family History   Problem Relation Age of Onset    Hypertension Mother     Hyperlipidemia Mother     Hypertension Father     Hyperlipidemia Father     Heart disease Father         cardiac disorder-myocardial infarction arrhythmias    Diabetes unspecified Maternal Grandmother     Crohn's disease Brother     Arthritis Brother     Thyroid disease Paternal Grandmother        Meds/Allergies       Current Outpatient Prescriptions:     amitriptyline (ELAVIL) 10 mg tablet    Ascorbic Acid (VITAMIN C) 500 MG CAPS    atorvastatin (LIPITOR) 40 mg tablet    cetirizine (ZyrTEC) 10 mg tablet    Cholecalciferol (VITAMIN D3) 5000 units CAPS    cyanocobalamin (VITAMIN B-12) 1,000 mcg tablet    FLUoxetine (PROzac) 40 MG capsule    medroxyPROGESTERone (DEPO-PROVERA) 400 MG/ML SUSP    metoprolol succinate (TOPROL-XL) 25 mg 24 hr tablet    Multiple Vitamins-Minerals (CENTRUM ADULTS PO)    omeprazole (PriLOSEC) 20 mg delayed release capsule    tiZANidine (ZANAFLEX) 4 mg tablet    Allergies   Allergen Reactions    Amoxicillin Swelling, Anaphylaxis and Angioedema    Neurontin [Gabapentin] Other (See Comments)     Other reaction(s): SUICIDE IDEATION  Other reaction(s): Other (See Comments)  suicidal  suicidal    Penicillin V Angioedema and Swelling     Other reaction(s): Throat closure    Penicillins Swelling and Anaphylaxis    Aripiprazole Other (See Comments)     Other reaction(s): low dose 2 mg ; curving in and locking in of hands/dystonia  Other reaction(s): Other (See Comments)  Other reaction(s): low dose 2 mg ; curving in and locking in of hands/dystonia    Carbamazepine Other (See Comments)     Other reaction(s): Unknown Reaction    Doxycycline Hives    Lorazepam Other (See Comments)     Other reaction(s): higher dose > anxiety and depression  Other reaction(s): Other (See Comments)  Other reaction(s): higher dose > anxiety and depression    Other Hives     Adhesive tape    Lamotrigine Rash       Objective     Blood pressure 124/80, pulse 100, temperature 100 1 °F (37 8 °C), temperature source Tympanic, resp  rate 18, height 5' 7" (1 702 m), weight 127 kg (281 lb)  Body mass index is 44 01 kg/m²      PHYSICAL EXAM:      General Appearance:   Obese, Alert, cooperative, no distress   HEENT:   Normocephalic, atraumatic, anicteric      Neck:  Supple, symmetrical, trachea midline   Lungs:   Clear to auscultation bilaterally   Heart[de-identified]   Regular rate and rhythm   Abdomen:   Soft, non-tender, non-distended; normal bowel sounds   Genitalia:   Deferred    Rectal:   Small external hemorrhoid at  6 oclock, skin tag posterior, pain with rectal exam, no fissure identified                     Lab Results:   No visits with results within 1 Day(s) from this visit  Latest known visit with results is:   Office Visit on 07/27/2018   Component Date Value    INTERPRETATIONS 07/27/2018         Radiology Results:   No results found

## 2018-10-23 NOTE — PROGRESS NOTES
[facility] - Outpatient Follow-up Note  Renaldo Hale 45 y o  female MRN: [id number]  Encounter: [id number]    ASSESSMENT AND PLAN:      Anorectal pain  -Possibly hemorrhoidal versus secondary to small anal fissure given skin tag externally indicating possible chronic process and pain  -She will use rectal cream 2 times daily  -she will start sitz baths  -she will add metamucil daily to avoid straining  -trial of lidocaine cream as well  -if persistent symptoms after may trial nitroglycerin ointment  ______________________________________________________________________    SUBJECTIVE:  Jannette presents for follow-up  She states the over the last month after falling on the stairs she has had anal pain and irritation without bleeding, it has been worsened over the last week  It hurts when she is sitting  She has a chronic history of external hemorrhoids she states that periodically irritate her and have cause bleeding in the past  She does state she intermittently strains with bms but overall has daily bms  She had a colonoscopy 2017 with Dr Eric Nguyen that was unremarkable with the exception of a single polyp  She has noticed a round bump in the rectal area  REVIEW OF SYSTEMS IS OTHERWISE NEGATIVE        Historical Information   Past Medical History:   Diagnosis Date    Chronic pain disorder     BACK SHOULDERS NECK    Disease of thyroid gland     nodule    Fibromyalgia, primary     GERD (gastroesophageal reflux disease)     Hearing difficulty of right ear     Irritable bowel syndrome     Perforation of tympanic membrane     Right    RBBB     Wears glasses      Past Surgical History:   Procedure Laterality Date    BACK SURGERY      lumbar herniated disc    BREAST SURGERY Left 2006    cyst removal    CARPAL TUNNEL RELEASE       SECTION      CHOLECYSTECTOMY      CYST REMOVAL      DENTAL SURGERY      VT COLONOSCOPY FLX DX W/COLLJ SPEC WHEN PFRMD N/A 2017 Procedure: COLONOSCOPY;  Surgeon: Ana Maria Justice MD;  [facility]; Service: Gastroenterology    [place] INCISE FINGER TENDON SHEATH Right 3/13/2018    Procedure: LONG TRIGGER FINGER RELEASE;  Surgeon: Shelby España DO;  [facility];  Service: Orthopedics    IA TYMPANOPLASTY Right 5/24/2017    Procedure: TYMPANOPLASTY WITH PERICHONDRIN GRAFT;  Surgeon: Sean Sharp DO;  [facility];  Service: ENT    SHOULDER SURGERY Right     WISDOM TOOTH EXTRACTION       Social History   History   Alcohol Use No     History   Drug Use No     History   Smoking Status    Never Smoker   Smokeless Tobacco    Never Used     Family History   Problem Relation Age of Onset    Hypertension Mother     Hyperlipidemia Mother     Hypertension Father     Hyperlipidemia Father     Heart disease Father         cardiac disorder-myocardial infarction arrhythmias    Diabetes unspecified Maternal Grandmother     Crohn's disease Brother     Arthritis Brother     Thyroid disease Paternal Grandmother        Meds/Allergies       Current Outpatient Prescriptions:     amitriptyline (ELAVIL) 10 mg tablet    Ascorbic Acid (VITAMIN C) 500 MG CAPS    atorvastatin (LIPITOR) 40 mg tablet    cetirizine (ZyrTEC) 10 mg tablet    Cholecalciferol (VITAMIN D3) 5000 units CAPS    cyanocobalamin (VITAMIN B-12) 1,000 mcg tablet    FLUoxetine (PROzac) 40 MG capsule    medroxyPROGESTERone (DEPO-PROVERA) 400 MG/ML SUSP    metoprolol succinate (TOPROL-XL) 25 mg 24 hr tablet    Multiple Vitamins-Minerals (CENTRUM ADULTS PO)    omeprazole (PriLOSEC) 20 mg delayed release capsule    tiZANidine (ZANAFLEX) 4 mg tablet    Allergies   Allergen Reactions    Amoxicillin Swelling, Anaphylaxis and Angioedema    Neurontin [Gabapentin] Other (See Comments)     Other reaction(s): SUICIDE IDEATION  Other reaction(s):  Other (See Comments)  suicidal  suicidal    Penicillin V Angioedema and Swelling     Other reaction(s): Throat closure  Penicillins Swelling and Anaphylaxis    Aripiprazole Other (See Comments)     Other reaction(s): low dose 2 mg ; curving in and locking in of hands/dystonia  Other reaction(s): Other (See Comments)  Other reaction(s): low dose 2 mg ; curving in and locking in of hands/dystonia    Carbamazepine Other (See Comments)     Other reaction(s): Unknown Reaction    Doxycycline Hives    Lorazepam Other (See Comments)     Other reaction(s): higher dose > anxiety and depression  Other reaction(s): Other (See Comments)  Other reaction(s): higher dose > anxiety and depression    Other Hives     Adhesive tape    Lamotrigine Rash       Objective     Blood pressure 124/80, pulse 100, temperature 100 1 °F (37 8 °C), temperature source Tympanic, resp  rate 18, height 5' 7" (1 702 m), weight 127 kg (281 lb)  Body mass index is 44 01 kg/m²  PHYSICAL EXAM:      General Appearance:   Obese, Alert, cooperative, no distress   HEENT:   Normocephalic, atraumatic, anicteric      Neck:  Supple, symmetrical, trachea midline   Lungs:   Clear to auscultation bilaterally   Heart[de-identified]   Regular rate and rhythm   Abdomen:   Soft, non-tender, non-distended; normal bowel sounds   Genitalia:   Deferred    Rectal:   Small external hemorrhoid at  6 oclock, skin tag posterior, pain with rectal exam, no fissure identified                     Lab Results:   No visits with results within 1 Day(s) from this visit  Latest known visit with results is:   Office Visit on 07/27/2018   Component Date Value    INTERPRETATIONS 07/27/2018         Radiology Results:   No results found

## 2018-10-24 ENCOUNTER — HOSPITAL ENCOUNTER (OUTPATIENT)
Dept: NEUROLOGY | Facility: AMBULATORY SURGERY CENTER | Age: 38
Discharge: HOME | End: 2018-10-24
Payer: COMMERCIAL

## 2018-10-24 DIAGNOSIS — G56.03 CARPAL TUNNEL SYNDROME, BILATERAL: ICD-10-CM

## 2018-10-24 LAB
25(OH)D3 SERPL-MCNC: 52 NG/ML (ref 30–100)
BASOPHILS # BLD AUTO: 53 CELLS/UL (ref 0–200)
BASOPHILS NFR BLD AUTO: 0.6 %
CRP SERPL-MCNC: 8.9 MG/L
EOSINOPHIL # BLD AUTO: 229 CELLS/UL (ref 15–500)
EOSINOPHIL NFR BLD AUTO: 2.6 %
ERYTHROCYTE [DISTWIDTH] IN BLOOD BY AUTOMATED COUNT: 13.3 % (ref 11–15)
ERYTHROCYTE [SEDIMENTATION RATE] IN BLOOD BY WESTERGREN METHOD: 34 MM/H
HCT VFR BLD AUTO: 38.1 % (ref 35–45)
HGB BLD-MCNC: 12.9 G/DL (ref 11.7–15.5)
LYMPHOCYTES # BLD AUTO: 1452 CELLS/UL (ref 850–3900)
LYMPHOCYTES NFR BLD AUTO: 16.5 %
MCH RBC QN AUTO: 29.9 PG (ref 27–33)
MCHC RBC AUTO-ENTMCNC: 33.9 G/DL (ref 32–36)
MCV RBC AUTO: 88.4 FL (ref 80–100)
MONOCYTES # BLD AUTO: 449 CELLS/UL (ref 200–950)
MONOCYTES NFR BLD AUTO: 5.1 %
NEUTROPHILS # BLD AUTO: 6618 CELLS/UL (ref 1500–7800)
NEUTROPHILS NFR BLD AUTO: 75.2 %
PLATELET # BLD AUTO: 195 THOUSAND/UL (ref 140–400)
PMV BLD REES-ECKER: 11.1 FL (ref 7.5–12.5)
RBC # BLD AUTO: 4.31 MILLION/UL (ref 3.8–5.1)
TSH SERPL-ACNC: 0.85 MIU/L
WBC # BLD AUTO: 8.8 THOUSAND/UL (ref 3.8–10.8)

## 2018-10-24 PROCEDURE — 95886 MUSC TEST DONE W/N TEST COMP: CPT

## 2018-10-24 PROCEDURE — 95886 MUSC TEST DONE W/N TEST COMP: CPT | Performed by: PSYCHIATRY & NEUROLOGY

## 2018-10-24 PROCEDURE — 95912 NRV CNDJ TEST 11-12 STUDIES: CPT

## 2018-10-24 PROCEDURE — 95912 NRV CNDJ TEST 11-12 STUDIES: CPT | Performed by: PSYCHIATRY & NEUROLOGY

## 2018-10-25 ENCOUNTER — TELEPHONE (OUTPATIENT)
Dept: GASTROENTEROLOGY | Facility: AMBULARY SURGERY CENTER | Age: 38
End: 2018-10-25

## 2018-10-25 NOTE — TELEPHONE ENCOUNTER
Pt called stating that she was informed to use hemorrhoid cream, however the cream has not been helping, pt has been having severe pain  Pt wants to know if she can get prescribe a medication to relief her pain   Please call pt to discuss she can be reached at [phone]

## 2018-10-26 ENCOUNTER — OFFICE VISIT (OUTPATIENT)
Dept: FAMILY MEDICINE CLINIC | Facility: CLINIC | Age: 38
End: 2018-10-26
Payer: COMMERCIAL

## 2018-10-26 ENCOUNTER — HOSPITAL ENCOUNTER (OUTPATIENT)
Dept: RADIOLOGY | Facility: HOSPITAL | Age: 38
Discharge: HOME | End: 2018-10-26
Payer: COMMERCIAL

## 2018-10-26 VITALS
RESPIRATION RATE: 17 BRPM | BODY MASS INDEX: 43.85 KG/M2 | WEIGHT: 280 LBS | TEMPERATURE: 98.8 F | HEART RATE: 101 BPM | OXYGEN SATURATION: 98 % | SYSTOLIC BLOOD PRESSURE: 120 MMHG | DIASTOLIC BLOOD PRESSURE: 78 MMHG

## 2018-10-26 DIAGNOSIS — M54.41 ACUTE MIDLINE LOW BACK PAIN WITH BILATERAL SCIATICA: ICD-10-CM

## 2018-10-26 DIAGNOSIS — S39.92XS TAILBONE INJURY, SEQUELA: ICD-10-CM

## 2018-10-26 DIAGNOSIS — Z23 FLU VACCINE NEED: ICD-10-CM

## 2018-10-26 DIAGNOSIS — M54.41 ACUTE MIDLINE LOW BACK PAIN WITH BILATERAL SCIATICA: Primary | ICD-10-CM

## 2018-10-26 DIAGNOSIS — M54.42 ACUTE MIDLINE LOW BACK PAIN WITH BILATERAL SCIATICA: ICD-10-CM

## 2018-10-26 DIAGNOSIS — M54.42 ACUTE MIDLINE LOW BACK PAIN WITH BILATERAL SCIATICA: Primary | ICD-10-CM

## 2018-10-26 PROCEDURE — 90686 IIV4 VACC NO PRSV 0.5 ML IM: CPT

## 2018-10-26 PROCEDURE — 99214 OFFICE O/P EST MOD 30 MIN: CPT | Performed by: PHYSICIAN ASSISTANT

## 2018-10-26 PROCEDURE — 90471 IMMUNIZATION ADMIN: CPT

## 2018-10-26 PROCEDURE — 72202 X-RAY EXAM SI JOINTS 3/> VWS: CPT

## 2018-10-26 PROCEDURE — 72220 X-RAY EXAM SACRUM TAILBONE: CPT

## 2018-10-26 RX ORDER — BACLOFEN 10 MG/1
10 TABLET ORAL 3 TIMES DAILY PRN
Qty: 30 TABLET | Refills: 0 | Status: SHIPPED | OUTPATIENT
Start: 2018-10-26 | End: 2019-01-15

## 2018-10-26 RX ORDER — PREDNISONE 20 MG/1
TABLET ORAL
Qty: 13 TABLET | Refills: 0 | Status: SHIPPED | OUTPATIENT
Start: 2018-10-26 | End: 2018-11-01

## 2018-10-26 NOTE — TELEPHONE ENCOUNTER
Called patient and left voicemail to call back to discuss  We need to clarify if she is using the lidocaine anorectal cream or something different  Also, need to ensure she is using Sitz baths  She may benefit from a nitroglycerin cream  If severe, we may need to refer her to colorectal for her symptoms

## 2018-10-26 NOTE — PROGRESS NOTES
Jannette Baugh 45 y o  female   Date:  10/26/2018    Assessment and Plan:    Jannette was seen today for back pain  Diagnoses and all orders for this visit:    Acute midline low back pain with bilateral sciatica  -     XR sacrum and coccyx; Future  -     XR sacroiliac joints 3+ views; Future  -     predniSONE 20 mg tablet; Take 3 tabs x 2 days, then 2 tabs x 2 days, then 1 tab x 2 days, then 1/2 tab x 2 days  -     baclofen 10 mg tablet; Take 1 tablet (10 mg total) by mouth 3 (three) times a day as needed for muscle spasms  - keep follow up with primary back doctor, will purse xray as they pursue further imaging with CT/MRI    Tailbone injury, sequela  -     XR sacrum and coccyx; Future  -     XR sacroiliac joints 3+ views; Future  -     predniSONE 20 mg tablet; Take 3 tabs x 2 days, then 2 tabs x 2 days, then 1 tab x 2 days, then 1/2 tab x 2 days  -     baclofen 10 mg tablet; Take 1 tablet (10 mg total) by mouth 3 (three) times a day as needed for muscle spasms  - concern for coccyx injury and possible SI joint irritation  - avoid sitting for too long, use cushion                HPI:  Chief Complaint   Patient presents with    Back Pain     1 5 months ago pt fell on steps and was never checked out  Pt stated the pain has gotten so bad she can not function  HPI   Patient is a 44 yo female with hx of lumbar disc disease and spondylosis, for which she followed with Dr Ricky Arteaga at [facility], presents for increased lower back and tailbone pain since fall about 1 5 months ago  She fell on her buttock while going down steps  She did not seek medical attention for this until now  She is seeing her back doctor on Nov 8th Dr Ricky Arteaga with [facility]  However, the pain in her tailbone has worsened and cannot wait  She reports that the pain with aggravated with sitting for too long  She also reports pain across her lumbar region and extends into buttock region  She denies any weakness in her legs currently   She also reports seeing GI recently for hemorrhoids and rectal exam worsened her tailbone pain  ROS: Review of Systems   Constitutional: Negative  Respiratory: Negative  Cardiovascular: Negative  Gastrointestinal: Negative  Hemorrhoids     Genitourinary: Negative  Musculoskeletal: Positive for back pain and myalgias  Negative for joint swelling  Skin: Negative  Neurological: Negative for dizziness, weakness and numbness         Past Medical History:   Diagnosis Date    Chronic pain disorder     BACK SHOULDERS NECK    Disease of thyroid gland     nodule    Fibromyalgia, primary     GERD (gastroesophageal reflux disease)     Hearing difficulty of right ear     Irritable bowel syndrome     Perforation of tympanic membrane     Right    RBBB     Wears glasses      Patient Active Problem List   Diagnosis    Thyroid nodule    Trigger finger, right middle finger    Carpal tunnel syndrome, bilateral    Asthma, mild intermittent    Benign essential hypertension    Breast hypertrophy    Bulging lumbar disc    Bright red blood per rectum    Calcific tendinitis of shoulder    Chronic low back pain    Chronic right-sided low back pain without sciatica    Chronic sinusitis    Depression with anxiety    Dermatitis, eczematoid    Disorder of bursae of shoulder region    Esophageal reflux    Fibromyalgia    H/O laminectomy    Hemorrhoids    Hypercholesterolemia    Mammographic microcalcification    Morbid obesity (HCC)    Obstructive sleep apnea    Pars defect of lumbar spine    RBBB (right bundle branch block)    Spondylolysis of lumbar region    Vitamin D insufficiency    Atypical chest pain    Anal pain    Anal fissure, unspecified       Past Surgical History:   Procedure Laterality Date    BACK SURGERY      lumbar herniated disc    BREAST SURGERY Left 2006    cyst removal    CARPAL TUNNEL RELEASE       SECTION      CHOLECYSTECTOMY      CYST REMOVAL      DENTAL SURGERY      HI COLONOSCOPY FLX DX W/COLLJ SPEC WHEN PFRMD N/A 8/2/2017    Procedure: COLONOSCOPY;  Surgeon: Mandy Christine MD;  [facility]; Service: Gastroenterology    HI INCISE FINGER TENDON SHEATH Right 3/13/2018    Procedure: LONG TRIGGER FINGER RELEASE;  Surgeon: Donovan Jordan DO;  [facility];  Service: Orthopedics    HI TYMPANOPLASTY Right 5/24/2017    Procedure: TYMPANOPLASTY WITH PERICHONDRIN GRAFT;  Surgeon: Mary Barajas DO;  [facility];  Service: ENT    SHOULDER SURGERY Right     WISDOM TOOTH EXTRACTION         Social History     Social History    Marital status: /Civil Union     Spouse name: N/A    Number of children: N/A    Years of education: N/A     Social History Main Topics    Smoking status: Never Smoker    Smokeless tobacco: Never Used    Alcohol use No    Drug use: No    Sexual activity: Yes      Comment: per allscripts     Other Topics Concern    Not on file     Social History Narrative    No narrative on file       Family History   Problem Relation Age of Onset    Hypertension Mother     Hyperlipidemia Mother     Hypertension Father     Hyperlipidemia Father     Heart disease Father         cardiac disorder-myocardial infarction arrhythmias    Diabetes unspecified Maternal Grandmother     Crohn's disease Brother     Arthritis Brother     Thyroid disease Paternal Grandmother        Allergies   Allergen Reactions    Amoxicillin Swelling, Anaphylaxis and Angioedema    Neurontin [Gabapentin] Other (See Comments)     Other reaction(s): SUICIDE IDEATION  Other reaction(s): Other (See Comments)  suicidal  suicidal    Penicillin V Angioedema and Swelling     Other reaction(s): Throat closure    Penicillins Swelling and Anaphylaxis    Aripiprazole Other (See Comments)     Other reaction(s): low dose 2 mg ; curving in and locking in of hands/dystonia  Other reaction(s):  Other (See Comments)  Other reaction(s): low dose 2 mg ; curving in and locking in of hands/dystonia    Carbamazepine Other (See Comments)     Other reaction(s): Unknown Reaction    Doxycycline Hives    Lorazepam Other (See Comments)     Other reaction(s): higher dose > anxiety and depression  Other reaction(s): Other (See Comments)  Other reaction(s): higher dose > anxiety and depression    Other Hives     Adhesive tape    Lamotrigine Rash         Current Outpatient Prescriptions:     amitriptyline (ELAVIL) 10 mg tablet, Take 1 tablet by mouth, Disp: , Rfl:     Ascorbic Acid (VITAMIN C) 500 MG CAPS, Take 1 capsule by mouth daily, Disp: , Rfl:     atorvastatin (LIPITOR) 40 mg tablet, Take 1 tablet (40 mg total) by mouth daily, Disp: 90 tablet, Rfl: 1    cetirizine (ZyrTEC) 10 mg tablet, Take 10 mg by mouth daily, Disp: , Rfl:     Cholecalciferol (VITAMIN D3) 5000 units CAPS, Take 1 capsule by mouth 2 (two) times a day, Disp: , Rfl:     cyanocobalamin (VITAMIN B-12) 1,000 mcg tablet, Take 3 tablets by mouth 2 (two) times a day, Disp: , Rfl:     FLUoxetine (PROzac) 40 MG capsule, Take 2 capsules (80 mg total) by mouth daily, Disp: 60 capsule, Rfl: 2    Lidocaine, Anorectal, 5 % CREA, Apply topically 2 (two) times a day, Disp: 28 3 g, Rfl: 1    medroxyPROGESTERone (DEPO-PROVERA) 400 MG/ML SUSP, Inject into the shoulder, thigh, or buttocks, Disp: , Rfl:     metoprolol succinate (TOPROL-XL) 25 mg 24 hr tablet, Take 1 tablet (25 mg total) by mouth daily, Disp: 90 tablet, Rfl: 1    Multiple Vitamins-Minerals (CENTRUM ADULTS PO), Take 1 tablet by mouth daily, Disp: , Rfl:     omeprazole (PriLOSEC) 20 mg delayed release capsule, Take 1 capsule (20 mg total) by mouth daily, Disp: 90 capsule, Rfl: 0    baclofen 10 mg tablet, Take 1 tablet (10 mg total) by mouth 3 (three) times a day as needed for muscle spasms, Disp: 30 tablet, Rfl: 0    predniSONE 20 mg tablet, Take 3 tabs x 2 days, then 2 tabs x 2 days, then 1 tab x 2 days, then 1/2 tab x 2 days  , Disp: 13 tablet, Rfl: 0      Physical Exam:  /78   Pulse 101   Temp 98 8 °F (37 1 °C)   Resp 17   Wt 127 kg (280 lb)   SpO2 98%   BMI 43 85 kg/m²     Physical Exam   Constitutional: She is oriented to person, place, and time  She appears well-developed and well-nourished  No distress  obese   Cardiovascular: Normal rate, regular rhythm, normal heart sounds and intact distal pulses  Pulmonary/Chest: Effort normal and breath sounds normal  No respiratory distress  She has no wheezes  She has no rales  Musculoskeletal: She exhibits no deformity  Tenderness along lumbar region; tailbone pain aggravated with bending forward while standing; 5/5 strength LE    Neurological: She is alert and oriented to person, place, and time  Skin: Skin is warm and dry  No rash noted  Psychiatric: She has a normal mood and affect           Labs:  Lab Results   Component Value Date    WBC 6 43 02/23/2018    HGB 14 4 02/23/2018    HCT 41 5 02/23/2018    MCV 87 02/23/2018     02/23/2018     Lab Results   Component Value Date     02/23/2018    K 3 9 10/23/2018     10/23/2018    CO2 28 10/23/2018    ANIONGAP 7 09/19/2014    BUN 10 10/23/2018    CREATININE 0 74 02/23/2018    GLUCOSE 96 04/05/2017    CALCIUM 9 3 10/23/2018    AST 22 02/23/2018    ALT 46 02/23/2018    ALKPHOS 69 10/23/2018    PROT 6 7 04/05/2017    BILITOT 0 6 04/05/2017    EGFR 103 02/23/2018

## 2018-10-29 ENCOUNTER — TELEPHONE (OUTPATIENT)
Dept: FAMILY MEDICINE CLINIC | Facility: CLINIC | Age: 38
End: 2018-10-29

## 2018-10-29 DIAGNOSIS — K62.89 ANAL OR RECTAL PAIN: ICD-10-CM

## 2018-10-29 DIAGNOSIS — K60.2 ANAL FISSURE, UNSPECIFIED: Primary | ICD-10-CM

## 2018-10-29 NOTE — TELEPHONE ENCOUNTER
Patient called asking for results of xrays she had done on Friday 10/26/18  She can be reached at her home number  Thank you

## 2018-10-29 NOTE — TELEPHONE ENCOUNTER
Spoke with Jannette, she has persistent severe anal area pain with intermittent spotting of bright red blood  I am concerned there may be a fissure and will trial nitroglycerin ointment and refer her to colorectal surgery  She is using the lidocaine without relief, as well as sitz baths daily and stool softener

## 2018-11-01 ENCOUNTER — ANESTHESIA EVENT (OUTPATIENT)
Dept: PERIOP | Facility: HOSPITAL | Age: 38
End: 2018-11-01
Payer: COMMERCIAL

## 2018-11-01 ENCOUNTER — TELEPHONE (OUTPATIENT)
Dept: OTHER | Facility: HOSPITAL | Age: 38
End: 2018-11-01

## 2018-11-01 NOTE — PRE-PROCEDURE INSTRUCTIONS
Pre-Surgery Instructions:   Medication Instructions    amitriptyline (ELAVIL) 10 mg tablet Instructed patient per Anesthesia Guidelines   Ascorbic Acid (VITAMIN C) 500 MG CAPS Patient was instructed by Physician and understands   atorvastatin (LIPITOR) 40 mg tablet Instructed patient per Anesthesia Guidelines   baclofen 10 mg tablet Instructed patient per Anesthesia Guidelines   cetirizine (ZyrTEC) 10 mg tablet Instructed patient per Anesthesia Guidelines   Cholecalciferol (VITAMIN D3) 5000 units CAPS Patient was instructed by Physician and understands   cyanocobalamin (VITAMIN B-12) 1,000 mcg tablet Patient was instructed by Physician and understands   FLUoxetine (PROzac) 40 MG capsule Instructed patient per Anesthesia Guidelines   Lidocaine, Anorectal, 5 % CREA Instructed patient per Anesthesia Guidelines   medroxyPROGESTERone (DEPO-PROVERA) 400 MG/ML SUSP Instructed patient per Anesthesia Guidelines   metoprolol succinate (TOPROL-XL) 25 mg 24 hr tablet Instructed patient per Anesthesia Guidelines   omeprazole (PriLOSEC) 20 mg delayed release capsule Instructed patient per Anesthesia Guidelines  Pre op and bathing instructions reviewed

## 2018-11-02 ENCOUNTER — ANESTHESIA (OUTPATIENT)
Dept: PERIOP | Facility: HOSPITAL | Age: 38
End: 2018-11-02
Payer: COMMERCIAL

## 2018-11-02 ENCOUNTER — HOSPITAL ENCOUNTER (OUTPATIENT)
Facility: HOSPITAL | Age: 38
Setting detail: OUTPATIENT SURGERY
Discharge: HOME | End: 2018-11-02
Attending: COLON & RECTAL SURGERY | Admitting: COLON & RECTAL SURGERY
Payer: COMMERCIAL

## 2018-11-02 VITALS
RESPIRATION RATE: 18 BRPM | HEIGHT: 67 IN | SYSTOLIC BLOOD PRESSURE: 127 MMHG | HEART RATE: 99 BPM | WEIGHT: 280 LBS | DIASTOLIC BLOOD PRESSURE: 63 MMHG | OXYGEN SATURATION: 95 % | TEMPERATURE: 97.5 F | BODY MASS INDEX: 43.95 KG/M2

## 2018-11-02 DIAGNOSIS — K62.89 ANAL PAIN: Primary | ICD-10-CM

## 2018-11-02 LAB — EXT PREGNANCY TEST URINE: NEGATIVE

## 2018-11-02 PROCEDURE — 46270 REMOVE ANAL FIST SUBQ: CPT | Performed by: COLON & RECTAL SURGERY

## 2018-11-02 PROCEDURE — 46050 I&D PERIANAL ABSCESS SUPFC: CPT | Performed by: COLON & RECTAL SURGERY

## 2018-11-02 PROCEDURE — 81025 URINE PREGNANCY TEST: CPT | Performed by: STUDENT IN AN ORGANIZED HEALTH CARE EDUCATION/TRAINING PROGRAM

## 2018-11-02 RX ORDER — MIDAZOLAM HYDROCHLORIDE 1 MG/ML
INJECTION INTRAMUSCULAR; INTRAVENOUS AS NEEDED
Status: DISCONTINUED | OUTPATIENT
Start: 2018-11-02 | End: 2018-11-02 | Stop reason: SURG

## 2018-11-02 RX ORDER — PROPOFOL 10 MG/ML
INJECTION, EMULSION INTRAVENOUS CONTINUOUS PRN
Status: DISCONTINUED | OUTPATIENT
Start: 2018-11-02 | End: 2018-11-02 | Stop reason: SURG

## 2018-11-02 RX ORDER — HYDROMORPHONE HCL/PF 1 MG/ML
1 SYRINGE (ML) INJECTION EVERY 6 HOURS PRN
Status: DISCONTINUED | OUTPATIENT
Start: 2018-11-02 | End: 2018-11-02 | Stop reason: HOSPADM

## 2018-11-02 RX ORDER — PROPOFOL 10 MG/ML
INJECTION, EMULSION INTRAVENOUS AS NEEDED
Status: DISCONTINUED | OUTPATIENT
Start: 2018-11-02 | End: 2018-11-02 | Stop reason: SURG

## 2018-11-02 RX ORDER — ROCURONIUM BROMIDE 10 MG/ML
INJECTION, SOLUTION INTRAVENOUS AS NEEDED
Status: DISCONTINUED | OUTPATIENT
Start: 2018-11-02 | End: 2018-11-02 | Stop reason: SURG

## 2018-11-02 RX ORDER — ONDANSETRON 2 MG/ML
4 INJECTION INTRAMUSCULAR; INTRAVENOUS ONCE AS NEEDED
Status: DISCONTINUED | OUTPATIENT
Start: 2018-11-02 | End: 2018-11-02 | Stop reason: HOSPADM

## 2018-11-02 RX ORDER — MAGNESIUM HYDROXIDE 1200 MG/15ML
LIQUID ORAL AS NEEDED
Status: DISCONTINUED | OUTPATIENT
Start: 2018-11-02 | End: 2018-11-02 | Stop reason: HOSPADM

## 2018-11-02 RX ORDER — FENTANYL CITRATE 50 UG/ML
INJECTION, SOLUTION INTRAMUSCULAR; INTRAVENOUS AS NEEDED
Status: DISCONTINUED | OUTPATIENT
Start: 2018-11-02 | End: 2018-11-02 | Stop reason: SURG

## 2018-11-02 RX ORDER — FENTANYL CITRATE/PF 50 MCG/ML
25 SYRINGE (ML) INJECTION
Status: COMPLETED | OUTPATIENT
Start: 2018-11-02 | End: 2018-11-02

## 2018-11-02 RX ORDER — SCOLOPAMINE TRANSDERMAL SYSTEM 1 MG/1
1 PATCH, EXTENDED RELEASE TRANSDERMAL
Status: DISCONTINUED | OUTPATIENT
Start: 2018-11-02 | End: 2018-11-02 | Stop reason: HOSPADM

## 2018-11-02 RX ORDER — OXYCODONE HYDROCHLORIDE AND ACETAMINOPHEN 5; 325 MG/1; MG/1
1 TABLET ORAL EVERY 4 HOURS PRN
Status: DISCONTINUED | OUTPATIENT
Start: 2018-11-02 | End: 2018-11-02 | Stop reason: HOSPADM

## 2018-11-02 RX ORDER — ONDANSETRON 2 MG/ML
INJECTION INTRAMUSCULAR; INTRAVENOUS AS NEEDED
Status: DISCONTINUED | OUTPATIENT
Start: 2018-11-02 | End: 2018-11-02 | Stop reason: SURG

## 2018-11-02 RX ORDER — SODIUM CHLORIDE 9 MG/ML
75 INJECTION, SOLUTION INTRAVENOUS CONTINUOUS
Status: DISCONTINUED | OUTPATIENT
Start: 2018-11-02 | End: 2018-11-02 | Stop reason: HOSPADM

## 2018-11-02 RX ORDER — METOCLOPRAMIDE HYDROCHLORIDE 5 MG/ML
10 INJECTION INTRAMUSCULAR; INTRAVENOUS ONCE AS NEEDED
Status: DISCONTINUED | OUTPATIENT
Start: 2018-11-02 | End: 2018-11-02 | Stop reason: HOSPADM

## 2018-11-02 RX ORDER — BUPIVACAINE HYDROCHLORIDE AND EPINEPHRINE 2.5; 5 MG/ML; UG/ML
INJECTION, SOLUTION EPIDURAL; INFILTRATION; INTRACAUDAL; PERINEURAL AS NEEDED
Status: DISCONTINUED | OUTPATIENT
Start: 2018-11-02 | End: 2018-11-02 | Stop reason: HOSPADM

## 2018-11-02 RX ORDER — GLYCOPYRROLATE 0.2 MG/ML
INJECTION INTRAMUSCULAR; INTRAVENOUS AS NEEDED
Status: DISCONTINUED | OUTPATIENT
Start: 2018-11-02 | End: 2018-11-02 | Stop reason: SURG

## 2018-11-02 RX ORDER — LIDOCAINE HYDROCHLORIDE 10 MG/ML
INJECTION, SOLUTION INFILTRATION; PERINEURAL AS NEEDED
Status: DISCONTINUED | OUTPATIENT
Start: 2018-11-02 | End: 2018-11-02 | Stop reason: SURG

## 2018-11-02 RX ORDER — OXYCODONE HYDROCHLORIDE AND ACETAMINOPHEN 5; 325 MG/1; MG/1
1 TABLET ORAL EVERY 4 HOURS PRN
Qty: 40 TABLET | Refills: 0 | Status: SHIPPED | OUTPATIENT
Start: 2018-11-02 | End: 2018-11-12

## 2018-11-02 RX ORDER — SODIUM CHLORIDE 9 MG/ML
INJECTION, SOLUTION INTRAVENOUS CONTINUOUS PRN
Status: DISCONTINUED | OUTPATIENT
Start: 2018-11-02 | End: 2018-11-02 | Stop reason: SURG

## 2018-11-02 RX ORDER — SUCCINYLCHOLINE CHLORIDE 20 MG/ML
INJECTION INTRAMUSCULAR; INTRAVENOUS AS NEEDED
Status: DISCONTINUED | OUTPATIENT
Start: 2018-11-02 | End: 2018-11-02 | Stop reason: SURG

## 2018-11-02 RX ADMIN — GLYCOPYRROLATE 0.4 MG: 0.2 INJECTION, SOLUTION INTRAMUSCULAR; INTRAVENOUS at 13:16

## 2018-11-02 RX ADMIN — ONDANSETRON 4 MG: 2 INJECTION INTRAMUSCULAR; INTRAVENOUS at 12:37

## 2018-11-02 RX ADMIN — PROPOFOL 50 MG: 10 INJECTION, EMULSION INTRAVENOUS at 12:54

## 2018-11-02 RX ADMIN — MIDAZOLAM HYDROCHLORIDE 2 MG: 1 INJECTION, SOLUTION INTRAMUSCULAR; INTRAVENOUS at 12:37

## 2018-11-02 RX ADMIN — FENTANYL CITRATE 25 MCG: 50 INJECTION, SOLUTION INTRAMUSCULAR; INTRAVENOUS at 13:56

## 2018-11-02 RX ADMIN — SUCCINYLCHOLINE CHLORIDE 160 MG: 20 INJECTION, SOLUTION INTRAMUSCULAR; INTRAVENOUS at 12:46

## 2018-11-02 RX ADMIN — SODIUM CHLORIDE: 0.9 INJECTION, SOLUTION INTRAVENOUS at 12:30

## 2018-11-02 RX ADMIN — LIDOCAINE HYDROCHLORIDE 80 MG: 10 INJECTION, SOLUTION INFILTRATION; PERINEURAL at 12:46

## 2018-11-02 RX ADMIN — FENTANYL CITRATE 100 MCG: 50 INJECTION INTRAMUSCULAR; INTRAVENOUS at 12:54

## 2018-11-02 RX ADMIN — ROCURONIUM BROMIDE 25 MG: 10 INJECTION INTRAVENOUS at 13:02

## 2018-11-02 RX ADMIN — PROPOFOL 200 MG: 10 INJECTION, EMULSION INTRAVENOUS at 12:46

## 2018-11-02 RX ADMIN — GLYCOPYRROLATE 0.2 MG: 0.2 INJECTION, SOLUTION INTRAMUSCULAR; INTRAVENOUS at 12:37

## 2018-11-02 RX ADMIN — FENTANYL CITRATE 25 MCG: 50 INJECTION, SOLUTION INTRAMUSCULAR; INTRAVENOUS at 14:15

## 2018-11-02 RX ADMIN — SCOPALAMINE 1 PATCH: 1 PATCH, EXTENDED RELEASE TRANSDERMAL at 12:09

## 2018-11-02 RX ADMIN — DEXAMETHASONE SODIUM PHOSPHATE 10 MG: 10 INJECTION INTRAMUSCULAR; INTRAVENOUS at 12:56

## 2018-11-02 RX ADMIN — FENTANYL CITRATE 25 MCG: 50 INJECTION, SOLUTION INTRAMUSCULAR; INTRAVENOUS at 14:04

## 2018-11-02 RX ADMIN — PROPOFOL 150 MCG/KG/MIN: 10 INJECTION, EMULSION INTRAVENOUS at 12:47

## 2018-11-02 RX ADMIN — SODIUM CHLORIDE: 0.9 INJECTION, SOLUTION INTRAVENOUS at 13:29

## 2018-11-02 RX ADMIN — FENTANYL CITRATE 25 MCG: 50 INJECTION, SOLUTION INTRAMUSCULAR; INTRAVENOUS at 13:49

## 2018-11-02 RX ADMIN — NEOSTIGMINE METHYLSULFATE 3 MG: 1 INJECTION, SOLUTION INTRAMUSCULAR; INTRAVENOUS; SUBCUTANEOUS at 13:16

## 2018-11-02 NOTE — ANESTHESIA PREPROCEDURE EVALUATION
Review of Systems/Medical History  Patient summary reviewed  Chart reviewed  History of anesthetic complications PONV    Cardiovascular  Hyperlipidemia, Hypertension controlled, No CAD , No history of CABG, Dysrhythmias ,    Pulmonary  Not a smoker , No COPD , Asthma , Sleep apnea CPAP,        GI/Hepatic    GERD well controlled,        Negative  ROS        Endo/Other  History of thyroid disease , hypothyroidism,   Obesity  morbid obesity   GYN  Negative gynecology ROS          Hematology  Negative hematology ROS      Musculoskeletal    Arthritis     Neurology    Neuromuscular disease , Headaches,    Psychology   Anxiety, Depression ,              Physical Exam    Airway    Mallampati score: II  TM Distance: >3 FB       Dental   No notable dental hx     Cardiovascular      Pulmonary      Other Findings  Grade 1 view previously    Lab Results   Component Value Date    WBC 8 8 10/23/2018    HGB 12 9 10/23/2018     10/23/2018     Lab Results   Component Value Date     04/05/2017    K 3 9 10/23/2018    BUN 10 10/23/2018    CREATININE 0 74 02/23/2018    GLUCOSE 96 04/05/2017     Lab Results   Component Value Date    PTT 30 09/19/2014      Lab Results   Component Value Date    INR 0 99 09/19/2014         No results found for: HGBA1C      Anesthesia Plan  ASA Score- 3     Anesthesia Type- general with ASA Monitors  Additional Monitors:   Airway Plan: ETT  Comment:  HUMPHREY Richards , have personally seen and evaluated the patient prior to anesthetic care  I have reviewed the pre-anesthetic record, and other medical records if appropriate to the anesthetic care  If a CRNA is involved in the case, I have reviewed the CRNA assessment, if present, and agree  Risks/benefits and alternatives discussed with patient including possible PONV, sore throat, and possibility of rare anesthetic and surgical emergencies          Plan Factors- Patient instructed to abstain from smoking on day of procedure  Patient did not smoke on day of surgery  Induction- intravenous  Postoperative Plan- Plan for postoperative opioid use  Planned trial extubation    Informed Consent- Anesthetic plan and risks discussed with patient  I personally reviewed this patient with the CRNA  Discussed and agreed on the Anesthesia Plan with the CRNA  Amie Vela

## 2018-11-02 NOTE — ANESTHESIA POSTPROCEDURE EVALUATION
Post-Op Assessment Note      CV Status:  Stable    Mental Status:  Alert and awake    Hydration Status:  Euvolemic    PONV Controlled:  Controlled    Airway Patency:  Patent    Post Op Vitals Reviewed: Yes          Staff: CRNA           BP (!) 182/88 (11/02/18 1334)    Temp 98 4 °F (36 9 °C) (Simultaneous filing  User may not have seen previous data ) (11/02/18 1334)    Pulse (!) 118 (Simultaneous filing  User may not have seen previous data ) (11/02/18 1334)   Resp 17 (11/02/18 1334)    SpO2 100 % (Simultaneous filing   User may not have seen previous data ) (11/02/18 1334)

## 2018-11-02 NOTE — OP NOTE
OPERATIVE REPORT  PATIENT NAME: Dimitris Oswald    :  1980  MRN: [id number]  Pt [facility]    SURGERY DATE: 2018    Surgeon(s) and Role:     * Ian Wolf MD - Primary    Preop Diagnosis:  Anal pain [K62 89]    Post-Op Diagnosis Codes:     * Anal pain [K62 89]    Procedure(s) (LRB):  EXAM UNDER ANESTHESIA (EUA) (N/A)  INCISION AND DRAINAGE (I&D) BUTTOCK (N/A)  FISTULOTOMY (N/A)    Specimen(s):  * No specimens in log *    Estimated Blood Loss:   Minimal    Drains:  None       Anesthesia Type:   General    Operative Indications:  Anal pain [K62 89]      Operative Findings:  Anterior intersphincteric abscess with fistula    Complications:   None    Procedure and Technique:  After preoperative identification the preoperative holding area, the patient was taken to the operating room placed in the supine position  Following induction of satisfactory general endotracheal anesthesia, the patient was placed in the prone donny-knife position  Buttocks were taped apart  Patient was prepped and draped in usual sterile fashion  Time-out was undertaken procedure begun  There was noted be some purulent drainage from the anus itself  Digital rectal exam was performed  The anterior midline there is a minimal fullness  Anoscope was placed  In the anterior midline at the dentate line there was a 3 mm opening  Showed preserved was placed within this  This tracked distal into the abscess cavity  The skin was divided over top of this  No external anal sphincter was divided with this maneuver  There was granulation tissue coding the cavity  No deeper abscess was noted  The edges were marsupialized using 3 0 chromic  Hemostasis was noted be excellent  Local field block was obtained using Exparel in 0 5% bupivacaine with epinephrine  Gelfoam was placed within the anal canal   Dry sterile dressing was applied    Patient was awakened from anesthesia and transferred to recovery room in stable condition   I was present for the entire procedure    Patient Disposition:  PACU     SIGNATURE: Marisol Tomas MD  DATE: November 2, 2018  TIME: 1:27 PM

## 2018-11-02 NOTE — H&P (VIEW-ONLY)
Jannette was seen today for rectal pain  Diagnoses and all orders for this visit:    Anal pain    Anal or rectal pain  -     Ambulatory referral to Colorectal Surgery       Anal pain  Patient presents for evaluation approximately 1 week of anal pain  This appears to be worse in the anterior portion  She notes that she has some mild skin changes which were not present before  Denies any trauma  Examination shows mild fullness in the anterior portion of the anus  Anoscope shows a anterior midline opening at the dentate line  Overall findings are suggestive of intersphincteric abscess  I have recommended surgical correction of this  Surgery would consist of examination under anesthesia, possible fistulotomy if superficial, possible incision and drainage of large abscess is present, possible seton if significant transsphincteric fistula was noted  Risks of anal surgery, including but not limited to pain, bleeding, failure to heal properly, fecal incontinence, persistent or recurrent anal disease, infection, fistula, abscess were reviewed  Questions were answered  HPI   Jannette Baugh is here today for rectal pain and lump at anus, she states is has been going on non stop for a week, sitting seems to bother her the most  She reports 2 soft and formed bowel movements daily, she has occasional rectal bleeding  She denies abdominal pain  She had a colonoscopy on 8/2/17 and the pathology revealed :   sigmoid colon polyp at 35 cm Hyperplastic polyp  Negative for dysplasia and malignancy    Past Medical History:   Diagnosis Date    Chronic pain disorder     BACK SHOULDERS NECK    Disease of thyroid gland     nodule    Fibromyalgia, primary     GERD (gastroesophageal reflux disease)     Hearing difficulty of right ear     Irritable bowel syndrome     Perforation of tympanic membrane     Right    RBBB     Wears glasses      Past Surgical History:   Procedure Laterality Date    BACK SURGERY lumbar herniated disc    BREAST SURGERY Left 2006    cyst removal    CARPAL TUNNEL RELEASE       SECTION      CHOLECYSTECTOMY      CYST REMOVAL      DENTAL SURGERY      IL COLONOSCOPY FLX DX W/COLLJ SPEC WHEN PFRMD N/A 2017    Procedure: COLONOSCOPY;  Surgeon: Batool Lagos MD;  [facility]; Service: Gastroenterology    IL INCISE FINGER TENDON SHEATH Right 3/13/2018    Procedure: LONG TRIGGER FINGER RELEASE;  Surgeon: Sammy Allen DO;  [facility];  Service: Orthopedics    IL TYMPANOPLASTY Right 2017    Procedure: TYMPANOPLASTY WITH PERICHONDRIN GRAFT;  Surgeon:  Jericho Palomino DO;  [facility];  Service: ENT    SHOULDER SURGERY Right     WISDOM TOOTH EXTRACTION         Current Outpatient Prescriptions:     amitriptyline (ELAVIL) 10 mg tablet, Take 1 tablet by mouth, Disp: , Rfl:     Ascorbic Acid (VITAMIN C) 500 MG CAPS, Take 1 capsule by mouth daily, Disp: , Rfl:     atorvastatin (LIPITOR) 40 mg tablet, Take 1 tablet (40 mg total) by mouth daily, Disp: 90 tablet, Rfl: 1    baclofen 10 mg tablet, Take 1 tablet (10 mg total) by mouth 3 (three) times a day as needed for muscle spasms, Disp: 30 tablet, Rfl: 0    cetirizine (ZyrTEC) 10 mg tablet, Take 10 mg by mouth daily, Disp: , Rfl:     Cholecalciferol (VITAMIN D3) 5000 units CAPS, Take 1 capsule by mouth 2 (two) times a day, Disp: , Rfl:     cyanocobalamin (VITAMIN B-12) 1,000 mcg tablet, Take 3 tablets by mouth 2 (two) times a day, Disp: , Rfl:     FLUoxetine (PROzac) 40 MG capsule, Take 2 capsules (80 mg total) by mouth daily, Disp: 60 capsule, Rfl: 2    Lidocaine, Anorectal, 5 % CREA, Apply topically 2 (two) times a day, Disp: 28 3 g, Rfl: 1    medroxyPROGESTERone (DEPO-PROVERA) 400 MG/ML SUSP, Inject into the shoulder, thigh, or buttocks, Disp: , Rfl:     metoprolol succinate (TOPROL-XL) 25 mg 24 hr tablet, Take 1 tablet (25 mg total) by mouth daily, Disp: 90 tablet, Rfl: 1    Multiple Vitamins-Minerals (CENTRUM ADULTS PO), Take 1 tablet by mouth daily, Disp: , Rfl:     nitroglycerin (RECTIV) 0 4 %, Insert into the rectum every 12 (twelve) hours, Disp: 30 g, Rfl: 0    omeprazole (PriLOSEC) 20 mg delayed release capsule, Take 1 capsule (20 mg total) by mouth daily, Disp: 90 capsule, Rfl: 0    predniSONE 20 mg tablet, Take 3 tabs x 2 days, then 2 tabs x 2 days, then 1 tab x 2 days, then 1/2 tab x 2 days  , Disp: 13 tablet, Rfl: 0  Allergies as of 10/31/2018 - Reviewed 10/31/2018   Allergen Reaction Noted    Amoxicillin Swelling, Anaphylaxis, and Angioedema 02/13/2009    Neurontin [gabapentin] Other (See Comments) 10/16/2014    Penicillin v Angioedema and Swelling 06/21/2012    Penicillins Swelling and Anaphylaxis 05/16/2017    Aripiprazole Other (See Comments) 11/06/2013    Carbamazepine Other (See Comments) 10/16/2012    Doxycycline Hives 04/10/2013    Lorazepam Other (See Comments) 06/11/2014    Other Hives 05/16/2017    Lamotrigine Rash 08/28/2012     Review of Systems   Gastrointestinal: Positive for rectal pain  Vitals:    10/31/18 1115   BP: 118/72     Physical Exam   Constitutional: She appears well-developed and well-nourished  HENT:   Head: Normocephalic and atraumatic  Eyes: Pupils are equal, round, and reactive to light  EOM are normal    Neck: Normal range of motion  Neck supple  Cardiovascular: Normal rate and regular rhythm  Pulmonary/Chest: Effort normal and breath sounds normal    Genitourinary:   Genitourinary Comments: , anterior midline tenderness without fluctuance   Lower Endoscopy  Date/Time: 10/31/2018 11:55 AM  Performed by: Abraham Michaud  Authorized by: Abraham Michaud     Verbal consent obtained?: Yes    Patient identity confirmed:  Verbally with patient  Indications: rectal pain    Patient sedated: No    Scope type:   Anoscope  External exam performed: Yes    Perianal skin tags: Yes    Perianal erythema: No    External hemorrhoids: No Digital exam performed: Yes    Laxity of anal sphincter: No    Occult blood in stool: No    Internal hemorrhoids: No    Intraluminal mass: No    Anal fistulae: Yes    Procedure termination:  Procedure complete  Patient tolerance:  Patient tolerated the procedure well with no immediate complications   Pits present at anterior midline dentate line  This is suggestive of intersphincteric abscess/fistula

## 2018-11-04 ENCOUNTER — TELEPHONE (OUTPATIENT)
Dept: OTHER | Facility: HOSPITAL | Age: 38
End: 2018-11-04

## 2018-11-05 ENCOUNTER — TELEPHONE (OUTPATIENT)
Dept: OTHER | Facility: HOSPITAL | Age: 38
End: 2018-11-05

## 2018-11-13 DIAGNOSIS — F41.8 DEPRESSION WITH ANXIETY: ICD-10-CM

## 2018-11-13 DIAGNOSIS — K21.9 GERD WITHOUT ESOPHAGITIS: ICD-10-CM

## 2018-11-13 RX ORDER — FLUOXETINE HYDROCHLORIDE 40 MG/1
80 CAPSULE ORAL DAILY
Qty: 180 CAPSULE | Refills: 0 | Status: SHIPPED | OUTPATIENT
Start: 2018-11-13 | End: 2018-11-14 | Stop reason: SDUPTHER

## 2018-11-13 RX ORDER — OMEPRAZOLE 20 MG/1
20 CAPSULE, DELAYED RELEASE ORAL DAILY
Qty: 90 CAPSULE | Refills: 0 | Status: SHIPPED | OUTPATIENT
Start: 2018-11-13 | End: 2018-11-13 | Stop reason: SDUPTHER

## 2018-11-13 RX ORDER — OMEPRAZOLE 20 MG/1
20 CAPSULE, DELAYED RELEASE ORAL DAILY
Qty: 14 CAPSULE | Refills: 0 | Status: SHIPPED | OUTPATIENT
Start: 2018-11-13 | End: 2018-12-03 | Stop reason: SDUPTHER

## 2018-11-14 DIAGNOSIS — F41.8 DEPRESSION WITH ANXIETY: ICD-10-CM

## 2018-11-14 RX ORDER — FLUOXETINE HYDROCHLORIDE 40 MG/1
80 CAPSULE ORAL DAILY
Qty: 30 CAPSULE | Refills: 0 | Status: SHIPPED | OUTPATIENT
Start: 2018-11-14 | End: 2019-01-15 | Stop reason: SDUPTHER

## 2018-11-30 PROBLEM — K60.3 ANAL FISTULA: Status: ACTIVE | Noted: 2018-11-30

## 2018-11-30 PROBLEM — K60.30 ANAL FISTULA: Status: ACTIVE | Noted: 2018-11-30

## 2018-12-03 DIAGNOSIS — K21.9 GERD WITHOUT ESOPHAGITIS: ICD-10-CM

## 2018-12-04 RX ORDER — OMEPRAZOLE 20 MG/1
20 CAPSULE, DELAYED RELEASE ORAL DAILY
Qty: 90 CAPSULE | Refills: 0 | Status: SHIPPED | OUTPATIENT
Start: 2018-12-04 | End: 2019-01-15

## 2018-12-07 ENCOUNTER — OFFICE VISIT (OUTPATIENT)
Dept: BARIATRICS | Facility: CLINIC | Age: 38
End: 2018-12-07
Payer: COMMERCIAL

## 2018-12-07 VITALS
TEMPERATURE: 99.6 F | HEART RATE: 99 BPM | WEIGHT: 280.38 LBS | SYSTOLIC BLOOD PRESSURE: 126 MMHG | RESPIRATION RATE: 16 BRPM | HEIGHT: 67 IN | BODY MASS INDEX: 44.01 KG/M2 | DIASTOLIC BLOOD PRESSURE: 84 MMHG

## 2018-12-07 DIAGNOSIS — I10 BENIGN ESSENTIAL HYPERTENSION: ICD-10-CM

## 2018-12-07 DIAGNOSIS — F41.8 DEPRESSION WITH ANXIETY: ICD-10-CM

## 2018-12-07 DIAGNOSIS — E66.01 MORBID OBESITY WITH BMI OF 40.0-44.9, ADULT (HCC): Primary | ICD-10-CM

## 2018-12-07 DIAGNOSIS — E78.00 HYPERCHOLESTEROLEMIA: ICD-10-CM

## 2018-12-07 DIAGNOSIS — E66.01 MORBID OBESITY (HCC): ICD-10-CM

## 2018-12-07 PROCEDURE — 99243 OFF/OP CNSLTJ NEW/EST LOW 30: CPT | Performed by: FAMILY MEDICINE

## 2018-12-07 RX ORDER — DIAZEPAM 5 MG/1
5 TABLET ORAL DAILY PRN
COMMUNITY
Start: 2017-09-01 | End: 2019-01-15

## 2018-12-07 RX ORDER — DEXAMETHASONE SODIUM PHOSPHATE 4 MG/ML
INJECTION, SOLUTION INTRA-ARTICULAR; INTRALESIONAL; INTRAMUSCULAR; INTRAVENOUS; SOFT TISSUE
COMMUNITY
End: 2019-01-15

## 2018-12-07 RX ORDER — ALBUTEROL SULFATE 90 UG/1
2 AEROSOL, METERED RESPIRATORY (INHALATION) EVERY 6 HOURS PRN
COMMUNITY
End: 2019-08-28 | Stop reason: SDUPTHER

## 2018-12-07 NOTE — PROGRESS NOTES
Assessment/Plan:    No problem-specific Assessment & Plan notes found for this encounter  Diagnoses and all orders for this visit:    Morbid obesity with BMI of 40 0-44 9, adult [facility])  -    Interested in learning more about surgical options, will schedule apt at info seminar  Otherwise she would like to continue with our conservative program    Plan- start a 1,500-1,800 calorie meal plan sample menu provided  Discussed potentially weaning or changing some of the wt gaining medicines she takes for other conditions, for example discussing with her OBGYN about changing her depo shot, considering OCPs  Goals:  Food log (ie ) www myfitnesspal com,sparkpeople  com,loseit com,calorieking  com,etc  baritastic  No sugary beverages  At least 64oz of water daily  Increase physical activity by 10 minutes daily  Gradually increase physical activity to a goal of 5 days per week for 30 minutes of MODERATE intensity PLUS 2 days per week of FULL BODY resistance training  No sugary beverages  At least 64oz of water daily  ,   Increase physical activity by 10 minutes daily,   Goal protein intake of 60-80 grams per day, 5-10 servings of fruits and vegetables per day and   7822-2818 calories per day    Benign essential hypertension  Stable on current medicines    Depression with anxiety  Stable on zoloft, discussed to talk to her psychiatrist about switching to a non weight gaining medicine    Hypercholesterolemia  On statin      -Discussed options of [facility]-Intensive Lifestyle Intervention Program, Very Low Calorie Diet-VLCD, Conservative Program, Julio-En-Y Gastric Bypass and Vertical Sleeve Gastrectomy and the role of weight loss medications   -Initial weight loss goal of 5-10% weight loss for improved health  -Screening labs- recent one reviewed on oct  -Patient is interested in pursuing Conservative Program, Julio-En-Y Gastric Bypass and Vertical Sleeve Gastrectomy        Follow up in approximately 1 month with info seminar and 2 months with Non-Surgical Physician/Advanced Practitioner  45 minute visit, >50% face-to-face time spent counseling patient on surgical and nonsurgical interventions for the treatment of excess weight  Discussed the advantages and long-term outcomes with regards to bariatric surgery  Discussed in detail nonsurgical options including intensive lifestyle intervention program, very low-calorie diet program and conservative program   Discussed the role of weight loss medications  Counseled patient on diet behavior and exercise modification for weight loss  Subjective:   Chief Complaint   Patient presents with    Consult     Pt is here for MWM consult  Patient ID: Jose Mckeon  is a 45 y o  female with excess weight/obesity here to pursue weight management  Past Medical History:   Diagnosis Date    Anxiety     Arthritis     Chronic pain disorder     BACK SHOULDERS NECK    Depression     Disease of thyroid gland     nodule    Fibromyalgia, primary     GERD (gastroesophageal reflux disease)     Hearing difficulty of right ear     Hyperlipidemia     Hypertension     Irritable bowel syndrome     Perforation of tympanic membrane     Right    PONV (postoperative nausea and vomiting)     RBBB     Right bundle branch blockage     Sleep apnea     no cpap    Wears glasses        HPI:  New consult  Obesity/Excess Weight:  Severity: Severe  Onset: 8-9 yr old    Pt started losing wt 3 years but had a bulging disc requiring surgery so she gained wt again, she went down to 240    Modifiers: Diet and Exercise  Contributing factors: Poor Food Choices, Insufficient Physical Activity and Stress/Emotional Eating  Associated symptoms: comorbid conditions, increased joint pain and sleeping issues    Goals: 220lb  Hydration: 4-5 bottles of 16 oz   Alcohol: rarely  Exercise: light walking once a week, but mostly sedentary    B: cup of coffee with butter paean creamer 4-5 tbs  S: bowl of cereal (big bowl maybe 2 service) with fat free milk   L: sandwich ham and cheese, fiber one brownie  S: no  D: spaggetti  S: 5 crackers and cheese, 1 donut glazed  Highest wt: 310  Lowest wt: 220    Most recent labs from October reviewed  The following portions of the patient's history were reviewed and updated as appropriate: allergies, current medications, past family history, past medical history, past social history, past surgical history and problem list     Review of Systems   Constitutional: Negative  HENT: Negative  Eyes: Negative  Respiratory: Negative  Cardiovascular: Negative  Gastrointestinal: Negative  Recent fistula repair, follows with Dr London Boo   Endocrine: Negative  Genitourinary: Negative  Musculoskeletal: Positive for arthralgias and back pain  Skin: Negative  Neurological: Negative  Psychiatric/Behavioral: Positive for sleep disturbance  Negative for suicidal ideas  The patient is nervous/anxious  Depression- on prozac       Objective:    /84 (BP Location: Left arm, Patient Position: Sitting, Cuff Size: Large)   Pulse 99   Temp 99 6 °F (37 6 °C) (Tympanic)   Resp 16   Ht 5' 6 5" (1 689 m)   Wt 127 kg (280 lb 6 oz)   BMI 44 58 kg/m²   WC: 54      Physical Exam     Constitutional   General appearance: Abnormal   well developed and obese  Eyes No conjunctival pallor  Ears, Nose, Mouth, and Throat Oral mucosa moist    Pulmonary   Respiratory effort: No increased work of breathing or signs of respiratory distress  Auscultation of lungs: Clear to auscultation, equal breath sounds bilaterally, no wheezes, no rales, no rhonci  Cardiovascular   Auscultation of heart: Normal rate and rhythm, normal S1 and S2, without murmurs  Examination of extremities for edema and/or varicosities: Normal   no edema  Abdomen   Abdomen: Abnormal   The abdomen was obese  Bowel sounds were normal  The abdomen was soft and nontender  Musculoskeletal   Gait and station: Normal     Psychiatric   Orientation to person, place and time: Normal     Affect: appropriate

## 2018-12-11 ENCOUNTER — TELEPHONE (OUTPATIENT)
Dept: FAMILY MEDICINE CLINIC | Facility: CLINIC | Age: 38
End: 2018-12-11

## 2018-12-11 NOTE — TELEPHONE ENCOUNTER
PT called for [facility]- fully booked   Advised to go to [address] [place] [address] Urgent [facility]

## 2019-01-14 RX ORDER — MEDROXYPROGESTERONE ACETATE 150 MG/ML
INJECTION, SUSPENSION INTRAMUSCULAR
Refills: 3 | COMMUNITY
Start: 2018-10-10 | End: 2019-01-15

## 2019-01-14 RX ORDER — AMITRIPTYLINE HYDROCHLORIDE 50 MG/1
TABLET, FILM COATED ORAL
Refills: 5 | COMMUNITY
Start: 2018-12-29 | End: 2019-11-13 | Stop reason: SDUPTHER

## 2019-01-15 ENCOUNTER — OFFICE VISIT (OUTPATIENT)
Dept: FAMILY MEDICINE CLINIC | Facility: CLINIC | Age: 39
End: 2019-01-15
Payer: COMMERCIAL

## 2019-01-15 ENCOUNTER — TELEPHONE (OUTPATIENT)
Dept: FAMILY MEDICINE CLINIC | Facility: CLINIC | Age: 39
End: 2019-01-15

## 2019-01-15 VITALS
SYSTOLIC BLOOD PRESSURE: 124 MMHG | BODY MASS INDEX: 46.98 KG/M2 | WEIGHT: 282 LBS | HEART RATE: 88 BPM | DIASTOLIC BLOOD PRESSURE: 84 MMHG | OXYGEN SATURATION: 98 % | RESPIRATION RATE: 17 BRPM | TEMPERATURE: 98.5 F | HEIGHT: 65 IN

## 2019-01-15 DIAGNOSIS — I10 HYPERTENSION, UNSPECIFIED TYPE: ICD-10-CM

## 2019-01-15 DIAGNOSIS — K21.9 GERD WITHOUT ESOPHAGITIS: ICD-10-CM

## 2019-01-15 DIAGNOSIS — R09.81 SINUS CONGESTION: Primary | ICD-10-CM

## 2019-01-15 DIAGNOSIS — F41.8 DEPRESSION WITH ANXIETY: ICD-10-CM

## 2019-01-15 DIAGNOSIS — E78.5 HYPERLIPIDEMIA, UNSPECIFIED HYPERLIPIDEMIA TYPE: ICD-10-CM

## 2019-01-15 PROCEDURE — 3074F SYST BP LT 130 MM HG: CPT | Performed by: PHYSICIAN ASSISTANT

## 2019-01-15 PROCEDURE — 3008F BODY MASS INDEX DOCD: CPT | Performed by: PHYSICIAN ASSISTANT

## 2019-01-15 PROCEDURE — 99214 OFFICE O/P EST MOD 30 MIN: CPT | Performed by: PHYSICIAN ASSISTANT

## 2019-01-15 PROCEDURE — 3079F DIAST BP 80-89 MM HG: CPT | Performed by: PHYSICIAN ASSISTANT

## 2019-01-15 RX ORDER — METOPROLOL SUCCINATE 25 MG/1
25 TABLET, EXTENDED RELEASE ORAL DAILY
Qty: 90 TABLET | Refills: 1 | Status: SHIPPED | OUTPATIENT
Start: 2019-01-15 | End: 2019-08-13 | Stop reason: SDUPTHER

## 2019-01-15 RX ORDER — PANTOPRAZOLE SODIUM 20 MG/1
20 TABLET, DELAYED RELEASE ORAL DAILY
Qty: 60 TABLET | Refills: 0 | Status: SHIPPED | OUTPATIENT
Start: 2019-01-15 | End: 2019-01-17

## 2019-01-15 RX ORDER — FLUOXETINE HYDROCHLORIDE 40 MG/1
80 CAPSULE ORAL DAILY
Qty: 90 CAPSULE | Refills: 1 | Status: SHIPPED | OUTPATIENT
Start: 2019-01-15 | End: 2019-05-16 | Stop reason: SDUPTHER

## 2019-01-15 RX ORDER — AZELASTINE 1 MG/ML
2 SPRAY, METERED NASAL 2 TIMES DAILY
Qty: 1 BOTTLE | Refills: 0 | Status: SHIPPED | OUTPATIENT
Start: 2019-01-15 | End: 2019-06-04

## 2019-01-15 RX ORDER — ATORVASTATIN CALCIUM 40 MG/1
40 TABLET, FILM COATED ORAL DAILY
Qty: 90 TABLET | Refills: 1 | Status: SHIPPED | OUTPATIENT
Start: 2019-01-15 | End: 2019-08-13 | Stop reason: SDUPTHER

## 2019-01-15 RX ORDER — OMEPRAZOLE 20 MG/1
20 CAPSULE, DELAYED RELEASE ORAL DAILY
Qty: 90 CAPSULE | Refills: 1 | Status: CANCELLED | OUTPATIENT
Start: 2019-01-15

## 2019-01-15 RX ORDER — AZELASTINE 1 MG/ML
2 SPRAY, METERED NASAL 2 TIMES DAILY
Qty: 1 BOTTLE | Refills: 0 | Status: SHIPPED | OUTPATIENT
Start: 2019-01-15 | End: 2019-01-15 | Stop reason: SDUPTHER

## 2019-01-15 NOTE — TELEPHONE ENCOUNTER
Mukul Fishman from [facility] calling in regards to Pantoprazole  States that Pantoprazole is not covered by patients insurance  Is wondering if prescription can be switched to Omeprazole or if prior authorization should be completed  If prior authorization should be completed contact Mukul Fishman for prior authorization information

## 2019-01-15 NOTE — PROGRESS NOTES
Called pharmaceutical prescription service to cancel the Astelin nasal spray as it was sent electronically by mistake  Per representative no script was received but will make note if script does come through today

## 2019-01-15 NOTE — PROGRESS NOTES
Routine Follow-up    Jannette Baugh 45 y o  female   Date:  1/15/2019      Assessment and Plan:    Jannette was seen today for follow-up and earache  Diagnoses and all orders for this visit:    Sinus congestion/ear congestion  -     azelastine (ASTELIN) 0 1 % nasal spray; 2 sprays into each nostril 2 (two) times a day    Hypertension, unspecified type  -     metoprolol succinate (TOPROL-XL) 25 mg 24 hr tablet; Take 1 tablet (25 mg total) by mouth daily  -     Comprehensive metabolic panel; Future    Depression with anxiety  -     FLUoxetine (PROzac) 40 MG capsule; Take 2 capsules (80 mg total) by mouth daily    Hyperlipidemia, unspecified hyperlipidemia type  -     atorvastatin (LIPITOR) 40 mg tablet; Take 1 tablet (40 mg total) by mouth daily  -     Lipid panel; Future    GERD without esophagitis  -     Discontinue: pantoprazole (PROTONIX) 20 mg tablet; Take 1 tablet (20 mg total) by mouth daily  - she feels the prilosec is not working, protonix not covered; trial nexium     Other orders  -     Cancel: omeprazole (PriLOSEC) 20 mg delayed release capsule; Take 1 capsule (20 mg total) by mouth daily            HPI:  Chief Complaint   Patient presents with    Follow-up    Earache     blocked x weeks     HPI   Patient is a 44 yo female who presents for routine follow up  Her BP is stable  Her last cholesterol panel was well controlled and her fasting glucose was 99  Since last appointment she did have procedure for anal fistula which is healing and her tailbone fx is better  She did establish with weight management and given information about medical and surgical options  She feels well on prozac for depression/anxiety  She reports chronic congestion and feeling of ears being blocked x 2 weeks  ROS: Review of Systems   Constitutional: Negative  HENT: Positive for congestion  Negative for ear discharge, postnasal drip, sinus pain, sore throat and trouble swallowing  Ear pain: ears feel blocked, congestion  Respiratory: Negative  Cardiovascular: Negative  Genitourinary: Negative  Skin: Negative  Neurological: Negative  Hematological: Negative  Psychiatric/Behavioral: Negative          Past Medical History:   Diagnosis Date    Anxiety     Arthritis     Chronic pain disorder     BACK SHOULDERS NECK    Depression     Disease of thyroid gland     nodule    Fibromyalgia, primary     GERD (gastroesophageal reflux disease)     Hearing difficulty of right ear     Hyperlipidemia     Hypertension     Irritable bowel syndrome     Perforation of tympanic membrane     Right    PONV (postoperative nausea and vomiting)     RBBB     Right bundle branch blockage     Sleep apnea     no cpap    Wears glasses      Patient Active Problem List   Diagnosis    Thyroid nodule    Trigger finger, right middle finger    Carpal tunnel syndrome, bilateral    Asthma, mild intermittent    Benign essential hypertension    Breast hypertrophy    Bulging lumbar disc    Bright red blood per rectum    Calcific tendinitis of shoulder    Chronic low back pain    Chronic right-sided low back pain without sciatica    Chronic sinusitis    Depression with anxiety    Dermatitis, eczematoid    Disorder of bursae of shoulder region    Esophageal reflux    Fibromyalgia    H/O laminectomy    Hemorrhoids    Hypercholesterolemia    Mammographic microcalcification    Morbid obesity (HCC)    Obstructive sleep apnea    Pars defect of lumbar spine    RBBB (right bundle branch block)    Spondylolysis of lumbar region    Vitamin D insufficiency    Atypical chest pain    Anal pain    Anal fissure, unspecified    Anal fistula       Past Surgical History:   Procedure Laterality Date    ANAL FISTULOTOMY N/A 11/2/2018    Procedure: FISTULOTOMY;  Surgeon: Marvin Stewart MD;  [facility];  Service: Colorectal    BACK SURGERY      lumbar herniated disc    BREAST SURGERY Left 2006    cyst removal    CARPAL TUNNEL RELEASE       SECTION      CHOLECYSTECTOMY      COLONOSCOPY      CYST REMOVAL      DENTAL SURGERY      INCISION AND DRAINAGE OF WOUND N/A 2018    Procedure: INCISION AND DRAINAGE (I&D) BUTTOCK;  Surgeon: Ian Wolf MD;  [facility];  Service: Colorectal    MT COLONOSCOPY FLX DX W/COLLJ SPEC WHEN PFRMD N/A 2017    Procedure: COLONOSCOPY;  Surgeon: Anjelica Su MD;  [facility]; Service: Gastroenterology    MT INCISE FINGER TENDON SHEATH Right 3/13/2018    Procedure: LONG TRIGGER FINGER RELEASE;  Surgeon: Argentina Griffith DO;  [facility];  Service: Orthopedics    MT SURG DIAGNOSTIC EXAM, ANORECTAL N/A 2018    Procedure: EXAM UNDER ANESTHESIA (EUA); Surgeon: Ian Wolf MD;  [facility];  Service: Colorectal    MT TYMPANOPLASTY Right 2017    Procedure: TYMPANOPLASTY WITH PERICHONDRIN GRAFT;  Surgeon:  Kell Jordan DO;  [facility];  Service: ENT    SHOULDER SURGERY Right     WISDOM TOOTH EXTRACTION         Social History     Social History    Marital status: /Civil Union     Spouse name: N/A    Number of children: N/A    Years of education: N/A     Social History Main Topics    Smoking status: Never Smoker    Smokeless tobacco: Never Used    Alcohol use Yes      Comment: social    Drug use: No    Sexual activity: Yes      Comment: per allscripts     Other Topics Concern    Not on file     Social History Narrative    No narrative on file       Family History   Problem Relation Age of Onset    Hypertension Mother     Hyperlipidemia Mother     Diabetes Mother     Hypertension Father     Hyperlipidemia Father     Heart disease Father         cardiac disorder-myocardial infarction arrhythmias    Diabetes unspecified Maternal Grandmother     Crohn's disease Brother     Arthritis Brother     Thyroid disease Paternal Grandmother        Allergies   Allergen Reactions    Amoxicillin Swelling, Anaphylaxis and Angioedema    Neurontin [Gabapentin] Other (See Comments)     Other reaction(s): SUICIDE IDEATION  Other reaction(s): Other (See Comments)  suicidal  suicidal    Penicillin V Angioedema and Swelling     Other reaction(s): Throat closure    Penicillins Swelling and Anaphylaxis    Aripiprazole Other (See Comments)     Other reaction(s): low dose 2 mg ; curving in and locking in of hands/dystonia  Other reaction(s): Other (See Comments)  Other reaction(s): low dose 2 mg ; curving in and locking in of hands/dystonia    Lorazepam Other (See Comments)     Other reaction(s): higher dose > anxiety and depression  Other reaction(s):  Other (See Comments)  Other reaction(s): higher dose > anxiety and depression    Carbamazepine Other (See Comments)     Other reaction(s): Unknown Reaction    Doxycycline Hives    Lamotrigine Rash    Other Hives     Adhesive tape         Current Outpatient Prescriptions:     albuterol (PROVENTIL HFA,VENTOLIN HFA) 90 mcg/act inhaler, Inhale 2 puffs every 6 (six) hours as needed, Disp: , Rfl:     amitriptyline (ELAVIL) 50 mg tablet, , Disp: , Rfl: 5    Ascorbic Acid (VITAMIN C) 500 MG CAPS, Take 1 capsule by mouth daily, Disp: , Rfl:     atorvastatin (LIPITOR) 40 mg tablet, Take 1 tablet (40 mg total) by mouth daily, Disp: 90 tablet, Rfl: 1    cetirizine (ZyrTEC) 10 mg tablet, Take 10 mg by mouth daily, Disp: , Rfl:     Cholecalciferol (VITAMIN D3) 5000 units CAPS, Take 1 capsule by mouth daily  , Disp: , Rfl:     cyanocobalamin (VITAMIN B-12) 1,000 mcg tablet, Take 3 tablets by mouth 2 (two) times a day, Disp: , Rfl:     FLUoxetine (PROzac) 40 MG capsule, Take 2 capsules (80 mg total) by mouth daily, Disp: 90 capsule, Rfl: 1    medroxyPROGESTERone (DEPO-PROVERA) 400 MG/ML SUSP, Inject into a muscle q 3 months , Disp: , Rfl:     metoprolol succinate (TOPROL-XL) 25 mg 24 hr tablet, Take 1 tablet (25 mg total) by mouth daily, Disp: 90 tablet, Rfl: 1    Multiple Vitamins-Minerals (CENTRUM ADULTS PO), Take 1 tablet by mouth daily, Disp: , Rfl:     azelastine (ASTELIN) 0 1 % nasal spray, 2 sprays into each nostril 2 (two) times a day, Disp: 1 Bottle, Rfl: 0    esomeprazole (NexIUM) 20 mg capsule, Take 1 capsule (20 mg total) by mouth daily in the early morning, Disp: 90 capsule, Rfl: 0      Physical Exam:  /84   Pulse 88   Temp 98 5 °F (36 9 °C)   Resp 17   Ht 5' 4 96" (1 65 m)   Wt 128 kg (282 lb)   SpO2 98%   BMI 46 98 kg/m²     Physical Exam   Constitutional: She is oriented to person, place, and time  She appears well-developed and well-nourished  No distress  Morbid obesity    HENT:   Head: Normocephalic and atraumatic  Right Ear: Tympanic membrane and ear canal normal    Left Ear: Tympanic membrane and ear canal normal    Nose: Mucosal edema present  Mouth/Throat: Uvula is midline, oropharynx is clear and moist and mucous membranes are normal    Slight clear effusion behind b/l TM     Cardiovascular: Normal rate, regular rhythm and normal heart sounds  No murmur heard  Pulmonary/Chest: Effort normal and breath sounds normal  No respiratory distress  She has no wheezes  Abdominal: Soft  Bowel sounds are normal  Distention: central adiposity  Musculoskeletal: She exhibits no edema or deformity  Neurological: She is alert and oriented to person, place, and time  No cranial nerve deficit  Skin: Skin is warm and dry  No rash noted  Psychiatric: She has a normal mood and affect   Her behavior is normal          Labs:  Lab Results   Component Value Date    WBC 8 8 10/23/2018    HGB 12 9 10/23/2018    HCT 38 1 10/23/2018    MCV 88 4 10/23/2018     10/23/2018     Lab Results   Component Value Date     04/05/2017    K 3 9 10/23/2018     10/23/2018    CO2 28 10/23/2018    ANIONGAP 7 09/19/2014    BUN 10 10/23/2018    CREATININE 0 74 02/23/2018    GLUCOSE 96 04/05/2017    CALCIUM 9 3 10/23/2018    AST 22 02/23/2018    ALT 46 02/23/2018    ALKPHOS 69 10/23/2018    PROT 6 7 04/05/2017    BILITOT 0 6 04/05/2017    EGFR 103 02/23/2018

## 2019-01-16 NOTE — TELEPHONE ENCOUNTER
I tried calling patient but got VM; please call patient to inform her of this - option is to increase prilosec or trial nexium in replace of protonix which is not covered  Please inquire if she has ever tried nexium

## 2019-01-16 NOTE — TELEPHONE ENCOUNTER
Attempted to contact patient  Phone was answered - no response from patient just background noise  Will try again at a later time

## 2019-01-17 DIAGNOSIS — K21.9 GASTROESOPHAGEAL REFLUX DISEASE WITHOUT ESOPHAGITIS: Primary | ICD-10-CM

## 2019-01-17 NOTE — TELEPHONE ENCOUNTER
Called and informed patient of providers recommendations of Nexium and that prescription was sent to the pharmacy  Patient understood and had no questions

## 2019-01-17 NOTE — TELEPHONE ENCOUNTER
Patient returned phone call  Did inform patient that Pantoprazole was not covered under her insurance  Asked if she has ever tried Nexium before, patient does not think she has  Patient is willing to try the Nexium  Would like script sent to [address]

## 2019-02-05 ENCOUNTER — TELEPHONE (OUTPATIENT)
Dept: ENDOCRINOLOGY | Facility: CLINIC | Age: 39
End: 2019-02-05

## 2019-02-05 NOTE — TELEPHONE ENCOUNTER
Patient has a pending appointment with you on 2/25 and she is wondering if she needs an ultra sound before that  I told her I'd call her either way  Her number is [phone]  Thank you

## 2019-02-06 DIAGNOSIS — E04.1 THYROID NODULE: Primary | ICD-10-CM

## 2019-02-08 ENCOUNTER — HOSPITAL ENCOUNTER (OUTPATIENT)
Dept: ULTRASOUND IMAGING | Facility: HOSPITAL | Age: 39
Discharge: HOME | End: 2019-02-08
Payer: COMMERCIAL

## 2019-02-08 DIAGNOSIS — E04.1 THYROID NODULE: ICD-10-CM

## 2019-02-08 PROCEDURE — 76536 US EXAM OF HEAD AND NECK: CPT

## 2019-02-13 ENCOUNTER — OFFICE VISIT (OUTPATIENT)
Dept: URGENT CARE | Facility: CLINIC | Age: 39
End: 2019-02-13
Payer: COMMERCIAL

## 2019-02-13 VITALS
OXYGEN SATURATION: 99 % | HEIGHT: 65 IN | WEIGHT: 282 LBS | DIASTOLIC BLOOD PRESSURE: 87 MMHG | SYSTOLIC BLOOD PRESSURE: 129 MMHG | HEART RATE: 94 BPM | BODY MASS INDEX: 46.98 KG/M2 | TEMPERATURE: 98.2 F | RESPIRATION RATE: 16 BRPM

## 2019-02-13 DIAGNOSIS — J01.90 ACUTE SINUSITIS, RECURRENCE NOT SPECIFIED, UNSPECIFIED LOCATION: Primary | ICD-10-CM

## 2019-02-13 PROCEDURE — G0382 LEV 3 HOSP TYPE B ED VISIT: HCPCS | Performed by: PHYSICIAN ASSISTANT

## 2019-02-13 RX ORDER — AZITHROMYCIN 250 MG/1
TABLET, FILM COATED ORAL
Qty: 6 TABLET | Refills: 0 | Status: SHIPPED | COMMUNITY
Start: 2019-02-13 | End: 2019-02-18

## 2019-02-13 NOTE — PROGRESS NOTES
[id number] [facility] 149 Initial Consult    Carl Vasquez Patient Status:  Inpatient    1966 MRN [id number]   [facility] 4SW-A Attending Tierra Vera MD   Hosp Day # 1 PCP Yanelis Degroot MD     Date of Consult: 1/10/2019 [facility] Now    NAME: Mason Madsen is a 44 y o  female  : 1980    MRN: [id number]  DATE: 2019  TIME: 4:36 PM    Assessment and Plan   Acute sinusitis, recurrence not specified, unspecified location [J01 90]  1  Acute sinusitis, recurrence not specified, unspecified location  azithromycin (ZITHROMAX) 250 mg tablet       Patient Instructions     Patient Instructions   I have prescribed an antibiotic for the infection  Please take the antibiotic as prescribed and finish the entire prescription  I recommend that the patient takes an over the counter probiotic or eats yogurt with live cultures in it [place]) to keep good bacteria in the gut and help prevent diarrhea  Wash hands frequently to prevent the spread of infection  Can use over the counter cough and cold medications to help with symptoms  Ibuprofen and/or tylenol as needed for pain or fever  If not improving over the next 7-10 days, follow up with PCP  Chief Complaint     Chief Complaint   Patient presents with    Cough     x 1 week    Sinusitis    Earache       History of Present Illness   80-year-old female here with complaint of sinus pressure and nasal congestion for the last week  Also has a cough  No fever or chills  Review of Systems   Review of Systems   Constitutional: Negative for activity change, appetite change, chills, diaphoresis, fatigue, fever and unexpected weight change  HENT: Positive for congestion, postnasal drip, sinus pressure and sore throat  Negative for dental problem, hearing loss, sneezing, tinnitus, trouble swallowing and voice change  Eyes: Negative for photophobia, redness and visual disturbance  Respiratory: Positive for cough  Negative for apnea, chest tightness, shortness of breath, wheezing and stridor  Cardiovascular: Negative for chest pain, palpitations and leg swelling     Gastrointestinal: Negative for abdominal distention, abdominal pain, blood in stool, Naval constipation, diarrhea, nausea and vomiting  Endocrine: Negative for cold intolerance, heat intolerance, polydipsia, polyphagia and polyuria  Genitourinary: Negative for difficulty urinating, dysuria, flank pain, frequency, hematuria and urgency  Musculoskeletal: Negative for arthralgias, back pain, gait problem, joint swelling, myalgias, neck pain and neck stiffness  Skin: Negative for pallor, rash and wound  Neurological: Negative for dizziness, tremors, seizures, speech difficulty, weakness and headaches  Hematological: Negative for adenopathy  Does not bruise/bleed easily  Psychiatric/Behavioral: Negative for agitation, confusion, dysphoric mood and sleep disturbance  The patient is not nervous/anxious  All other systems reviewed and are negative        Current Medications     Current Outpatient Medications:     albuterol (PROVENTIL HFA,VENTOLIN HFA) 90 mcg/act inhaler, Inhale 2 puffs every 6 (six) hours as needed, Disp: , Rfl:     amitriptyline (ELAVIL) 50 mg tablet, , Disp: , Rfl: 5    Ascorbic Acid (VITAMIN C) 500 MG CAPS, Take 1 capsule by mouth daily, Disp: , Rfl:     atorvastatin (LIPITOR) 40 mg tablet, Take 1 tablet (40 mg total) by mouth daily, Disp: 90 tablet, Rfl: 1    azelastine (ASTELIN) 0 1 % nasal spray, 2 sprays into each nostril 2 (two) times a day, Disp: 1 Bottle, Rfl: 0    cetirizine (ZyrTEC) 10 mg tablet, Take 10 mg by mouth daily, Disp: , Rfl:     Cholecalciferol (VITAMIN D3) 5000 units CAPS, Take 1 capsule by mouth daily  , Disp: , Rfl:     cyanocobalamin (VITAMIN B-12) 1,000 mcg tablet, Take 3 tablets by mouth 2 (two) times a day, Disp: , Rfl:     esomeprazole (NexIUM) 20 mg capsule, Take 1 capsule (20 mg total) by mouth daily in the early morning, Disp: 90 capsule, Rfl: 0    FLUoxetine (PROzac) 40 MG capsule, Take 2 capsules (80 mg total) by mouth daily, Disp: 90 capsule, Rfl: 1    medroxyPROGESTERone (DEPO-PROVERA) 400 MG/ML SUSP, Inject into a muscle q 3 months , Disp: , Rfl:     metoprolol succinate (TOPROL-XL) 25 mg 24 hr tablet, Take 1 tablet (25 mg total) by mouth daily, Disp: 90 tablet, Rfl: 1    Multiple Vitamins-Minerals (CENTRUM ADULTS PO), Take 1 tablet by mouth daily, Disp: , Rfl:     azithromycin (ZITHROMAX) 250 mg tablet, Take 2 tablets today then 1 tablet daily for 4 days, Disp: 6 tablet, Rfl: 0    Current Allergies     Allergies as of 02/13/2019 - Reviewed 02/13/2019   Allergen Reaction Noted    Amoxicillin Swelling, Anaphylaxis, and Angioedema 02/13/2009    Neurontin [gabapentin] Other (See Comments) 10/16/2014    Penicillin v Angioedema and Swelling 06/21/2012    Penicillins Swelling and Anaphylaxis 05/16/2017    Aripiprazole Other (See Comments) 11/06/2013    Lorazepam Other (See Comments) 06/11/2014    Carbamazepine Other (See Comments) 10/16/2012    Doxycycline Hives 04/10/2013    Lamotrigine Rash 08/28/2012    Other Hives 05/16/2017          The following portions of the patient's history were reviewed and updated as appropriate: allergies, current medications, past family history, past medical history, past social history, past surgical history and problem list    Past Medical History:   Diagnosis Date    Anxiety     Arthritis     Chronic pain disorder     BACK SHOULDERS NECK    Depression     Disease of thyroid gland     nodule    Fibromyalgia, primary     GERD (gastroesophageal reflux disease)     Hearing difficulty of right ear     Hyperlipidemia     Hypertension     Irritable bowel syndrome     Perforation of tympanic membrane     Right    PONV (postoperative nausea and vomiting)     RBBB     Right bundle branch blockage     Sleep apnea     no cpap    Wears glasses      Past Surgical History:   Procedure Laterality Date    ANAL FISTULOTOMY N/A 11/2/2018    Procedure: FISTULOTOMY;  Surgeon: Maurice Rodrigues MD;  [facility];  Service: Colorectal    BACK SURGERY      lumbar herniated disc    BREAST SURGERY Left 2006    cyst removal    CARPAL TUNNEL RELEASE       SECTION      CHOLECYSTECTOMY      COLONOSCOPY      CYST REMOVAL      DENTAL SURGERY      INCISION AND DRAINAGE OF WOUND N/A 2018    Procedure: INCISION AND DRAINAGE (I&D) BUTTOCK;  Surgeon: Butch Mas MD;  [facility];  Service: Colorectal    SD COLONOSCOPY FLX DX W/COLLJ SPEC WHEN PFRMD N/A 2017    Procedure: COLONOSCOPY;  Surgeon: Darrick Rothman MD;  [facility]; Service: Gastroenterology    SD INCISE FINGER TENDON SHEATH Right 3/13/2018    Procedure: LONG TRIGGER FINGER RELEASE;  Surgeon: Lisset Saldaña DO;  [facility];  Service: Orthopedics    SD SURG DIAGNOSTIC EXAM, ANORECTAL N/A 2018    Procedure: EXAM UNDER ANESTHESIA (EUA); Surgeon: Butch Mas MD;  [facility];  Service: Colorectal    SD TYMPANOPLASTY Right 2017    Procedure: TYMPANOPLASTY WITH PERICHONDRIN GRAFT;  Surgeon:  Erwin Rodrigues DO;  [facility];  Service: ENT    SHOULDER SURGERY Right     WISDOM TOOTH EXTRACTION       Family History   Problem Relation Age of Onset    Hypertension Mother     Hyperlipidemia Mother     Diabetes Mother     Hypertension Father     Hyperlipidemia Father     Heart disease Father         cardiac disorder-myocardial infarction arrhythmias    Diabetes unspecified Maternal Grandmother     Crohn's disease Brother     Arthritis Brother     Thyroid disease Paternal Grandmother      Social History     Socioeconomic History    Marital status: /Civil Union     Spouse name: Not on file    Number of children: Not on file    Years of education: Not on file    Highest education level: Not on file   Occupational History    Not on file   Social Needs    Financial resource strain: Not on file    Food insecurity:     Worry: Not on file     Inability: Not on file    Transportation needs:     Medical: Not on file     Non-medical: Not on bound      Palliative Performance Scale:   %      Ambulation    Activity Level   Self-Care    Intake                          Consciosness  100   Full         Normal                      Full               Normal                         Full           No D Continuous  •  norepinephrine (LEVOPHED) 4 mg/250 ml premix infusion, 0.5-30 mcg/min, Intravenous, Continuous PRN  •  vasopressin (PITRESSIN) 20 Units in sodium chloride 0.9 % 50 mL infusion for shock, 0.04 Units/min, Intravenous, Continuous PRN  •  acetam file   Tobacco Use    Smoking status: Never Smoker    Smokeless tobacco: Never Used   Substance and Sexual Activity    Alcohol use: Yes     Comment: social    Drug use: No    Sexual activity: Yes     Comment: per allscripts   Lifestyle    Physical activity:     Days per week: Not on file     Minutes per session: Not on file    Stress: Not on file   Relationships    Social connections:     Talks on phone: Not on file     Gets together: Not on file     Attends Confucianist service: Not on file     Active member of club or organization: Not on file     Attends meetings of clubs or organizations: Not on file     Relationship status: Not on file    Intimate partner violence:     Fear of current or ex partner: Not on file     Emotionally abused: Not on file     Physically abused: Not on file     Forced sexual activity: Not on file   Other Topics Concern    Not on file   Social History Narrative    Not on file     Medications have been verified  Objective   /87   Pulse 94   Temp 98 2 °F (36 8 °C)   Resp 16   Ht 5' 5" (1 651 m)   Wt 128 kg (282 lb)   SpO2 99%   BMI 46 93 kg/m²      Physical Exam   Physical Exam   Constitutional: She appears well-developed and well-nourished  No distress  HENT:   Head: Normocephalic  Right Ear: Tympanic membrane and external ear normal    Left Ear: Tympanic membrane and external ear normal    Nose: Mucosal edema present  Right sinus exhibits maxillary sinus tenderness  Left sinus exhibits maxillary sinus tenderness  Mouth/Throat: Posterior oropharyngeal erythema present  No oropharyngeal exudate  Neck: Normal range of motion  Neck supple  Cardiovascular: Normal rate, regular rhythm and normal heart sounds  No murmur heard  Pulmonary/Chest: Effort normal and breath sounds normal  No respiratory distress  She has no wheezes  She has no rales  Abdominal: Soft  Bowel sounds are normal  There is no tenderness  Musculoskeletal: Normal range of motion  Lymphadenopathy:     She has no cervical adenopathy  Skin: Skin is warm  No rash noted  Eloisejamesonsiobhanj Facundo Agent's Phone Number: [phone]          [facility]

## 2019-03-14 ENCOUNTER — OFFICE VISIT (OUTPATIENT)
Dept: URGENT CARE | Facility: CLINIC | Age: 39
End: 2019-03-14
Payer: COMMERCIAL

## 2019-03-14 VITALS
HEART RATE: 100 BPM | OXYGEN SATURATION: 99 % | HEIGHT: 65 IN | WEIGHT: 282 LBS | RESPIRATION RATE: 16 BRPM | TEMPERATURE: 98.5 F | DIASTOLIC BLOOD PRESSURE: 80 MMHG | SYSTOLIC BLOOD PRESSURE: 135 MMHG | BODY MASS INDEX: 46.98 KG/M2

## 2019-03-14 DIAGNOSIS — J06.9 ACUTE URI: Primary | ICD-10-CM

## 2019-03-14 PROCEDURE — G0382 LEV 3 HOSP TYPE B ED VISIT: HCPCS | Performed by: PHYSICIAN ASSISTANT

## 2019-03-14 RX ORDER — METHYLPREDNISOLONE 4 MG/1
TABLET ORAL
Qty: 21 TABLET | Refills: 0 | Status: SHIPPED | OUTPATIENT
Start: 2019-03-14 | End: 2019-04-15

## 2019-03-14 RX ORDER — BROMPHENIRAMINE MALEATE, PSEUDOEPHEDRINE HYDROCHLORIDE, AND DEXTROMETHORPHAN HYDROBROMIDE 2; 30; 10 MG/5ML; MG/5ML; MG/5ML
5 SYRUP ORAL 4 TIMES DAILY PRN
Qty: 120 ML | Refills: 0 | Status: SHIPPED | OUTPATIENT
Start: 2019-03-14 | End: 2019-05-15

## 2019-03-14 NOTE — PROGRESS NOTES
[facility] Now    NAME: Renaldo Hale is a 44 y o  female  : 1980    MRN: [id number]  DATE: 2019  TIME: 3:26 PM    Assessment and Plan   Acute URI [J06 9]  1  Acute URI  brompheniramine-pseudoephedrine-DM 30-2-10 MG/5ML syrup    methylprednisolone (MEDROL) 4 mg tablet       Patient Instructions     Patient Instructions   Infection appears viral   Recommend symptomatic treatment  Can take ibuprofen or tylenol as needed for pain or fever  Over the counter cough and cold medications to help with symptoms  Use salt water gargles for sore throat and throat lozenges  Cough drops as needed  Wash hands frequently to prevent the spread of infection  If not improving over the next 7-10 days, follow up with PCP  Symptoms may persist for 10-14 days  Chief Complaint     Chief Complaint   Patient presents with    Sinusitis     x 2 days       History of Present Illness   78-year-old female here with 2 days of sinus congestion, sore throat, postnasal drip  Patient states that she frequently gets sinus infections  No fever or chills  Has been taking over-the-counter Sudafed and with minimal relief  Review of Systems   Review of Systems   Constitutional: Negative for activity change, appetite change, chills, diaphoresis, fatigue, fever and unexpected weight change  HENT: Positive for congestion, postnasal drip, sinus pressure and sore throat  Negative for dental problem, hearing loss, sneezing, tinnitus, trouble swallowing and voice change  Eyes: Negative for photophobia, redness and visual disturbance  Respiratory: Positive for cough  Negative for apnea, chest tightness, shortness of breath, wheezing and stridor  Cardiovascular: Negative for chest pain, palpitations and leg swelling  Gastrointestinal: Negative for abdominal distention, abdominal pain, blood in stool, constipation, diarrhea, nausea and vomiting     Endocrine: Negative for cold intolerance, heat intolerance, polydipsia, polyphagia and polyuria  Genitourinary: Negative for difficulty urinating, dysuria, flank pain, frequency, hematuria and urgency  Musculoskeletal: Negative for arthralgias, back pain, gait problem, joint swelling, myalgias, neck pain and neck stiffness  Skin: Negative for pallor, rash and wound  Neurological: Negative for dizziness, tremors, seizures, speech difficulty, weakness and headaches  Hematological: Negative for adenopathy  Does not bruise/bleed easily  Psychiatric/Behavioral: Negative for agitation, confusion, dysphoric mood and sleep disturbance  The patient is not nervous/anxious  All other systems reviewed and are negative        Current Medications     Current Outpatient Medications:     albuterol (PROVENTIL HFA,VENTOLIN HFA) 90 mcg/act inhaler, Inhale 2 puffs every 6 (six) hours as needed, Disp: , Rfl:     amitriptyline (ELAVIL) 50 mg tablet, , Disp: , Rfl: 5    Ascorbic Acid (VITAMIN C) 500 MG CAPS, Take 1 capsule by mouth daily, Disp: , Rfl:     atorvastatin (LIPITOR) 40 mg tablet, Take 1 tablet (40 mg total) by mouth daily, Disp: 90 tablet, Rfl: 1    azelastine (ASTELIN) 0 1 % nasal spray, 2 sprays into each nostril 2 (two) times a day, Disp: 1 Bottle, Rfl: 0    brompheniramine-pseudoephedrine-DM 30-2-10 MG/5ML syrup, Take 5 mL by mouth 4 (four) times a day as needed for allergies, Disp: 120 mL, Rfl: 0    cetirizine (ZyrTEC) 10 mg tablet, Take 10 mg by mouth daily, Disp: , Rfl:     Cholecalciferol (VITAMIN D3) 5000 units CAPS, Take 1 capsule by mouth daily  , Disp: , Rfl:     cyanocobalamin (VITAMIN B-12) 1,000 mcg tablet, Take 3 tablets by mouth 2 (two) times a day, Disp: , Rfl:     esomeprazole (NexIUM) 20 mg capsule, Take 1 capsule (20 mg total) by mouth daily in the early morning, Disp: 90 capsule, Rfl: 0    FLUoxetine (PROzac) 40 MG capsule, Take 2 capsules (80 mg total) by mouth daily, Disp: 90 capsule, Rfl: 1    medroxyPROGESTERone (DEPO-PROVERA) 400 MG/ML SUSP, Inject into a muscle q 3 months , Disp: , Rfl:     methylprednisolone (MEDROL) 4 mg tablet, Medrol dosepak, take as directed, Disp: 21 tablet, Rfl: 0    metoprolol succinate (TOPROL-XL) 25 mg 24 hr tablet, Take 1 tablet (25 mg total) by mouth daily, Disp: 90 tablet, Rfl: 1    Multiple Vitamins-Minerals (CENTRUM ADULTS PO), Take 1 tablet by mouth daily, Disp: , Rfl:     Current Allergies     Allergies as of 03/14/2019 - Reviewed 03/14/2019   Allergen Reaction Noted    Amoxicillin Swelling, Anaphylaxis, and Angioedema 02/13/2009    Neurontin [gabapentin] Other (See Comments) 10/16/2014    Penicillin v Angioedema and Swelling 06/21/2012    Penicillins Swelling and Anaphylaxis 05/16/2017    Aripiprazole Other (See Comments) 11/06/2013    Lorazepam Other (See Comments) 06/11/2014    Carbamazepine Other (See Comments) 10/16/2012    Doxycycline Hives 04/10/2013    Lamotrigine Rash 08/28/2012    Other Hives 05/16/2017          The following portions of the patient's history were reviewed and updated as appropriate: allergies, current medications, past family history, past medical history, past social history, past surgical history and problem list    Past Medical History:   Diagnosis Date    Anxiety     Arthritis     Chronic pain disorder     BACK SHOULDERS NECK    Depression     Disease of thyroid gland     nodule    Fibromyalgia, primary     GERD (gastroesophageal reflux disease)     Hearing difficulty of right ear     Hyperlipidemia     Hypertension     Irritable bowel syndrome     Perforation of tympanic membrane     Right    PONV (postoperative nausea and vomiting)     RBBB     Right bundle branch blockage     Sleep apnea     no cpap    Wears glasses      Past Surgical History:   Procedure Laterality Date    ANAL FISTULOTOMY N/A 11/2/2018    Procedure: FISTULOTOMY;  Surgeon: Stefanie Allred MD;  [facility];  Service: Colorectal    BACK SURGERY      lumbar herniated disc    BREAST SURGERY Left 2006    cyst removal    CARPAL TUNNEL RELEASE       SECTION      CHOLECYSTECTOMY      COLONOSCOPY      CYST REMOVAL      DENTAL SURGERY      INCISION AND DRAINAGE OF WOUND N/A 2018    Procedure: INCISION AND DRAINAGE (I&D) BUTTOCK;  Surgeon: Leanna Higginbotham MD;  [facility];  Service: Colorectal    OH COLONOSCOPY FLX DX W/COLLJ SPEC WHEN PFRMD N/A 2017    Procedure: COLONOSCOPY;  Surgeon: Janae Hinds MD;  [facility]; Service: Gastroenterology    OH INCISE FINGER TENDON SHEATH Right 3/13/2018    Procedure: LONG TRIGGER FINGER RELEASE;  Surgeon: Yolis Falcon DO;  [facility];  Service: Orthopedics    OH SURG DIAGNOSTIC EXAM, ANORECTAL N/A 2018    Procedure: EXAM UNDER ANESTHESIA (EUA); Surgeon: Leanna Higginbotham MD;  [facility];  Service: Colorectal    OH TYMPANOPLASTY Right 2017    Procedure: TYMPANOPLASTY WITH PERICHONDRIN GRAFT;  Surgeon:  Michelle Licona DO;  [facility];  Service: ENT    SHOULDER SURGERY Right     WISDOM TOOTH EXTRACTION       Family History   Problem Relation Age of Onset    Hypertension Mother     Hyperlipidemia Mother     Diabetes Mother     Hypertension Father     Hyperlipidemia Father     Heart disease Father         cardiac disorder-myocardial infarction arrhythmias    Diabetes unspecified Maternal Grandmother     Crohn's disease Brother     Arthritis Brother     Thyroid disease Paternal Grandmother      Social History     Socioeconomic History    Marital status: /Civil Union     Spouse name: Not on file    Number of children: Not on file    Years of education: Not on file    Highest education level: Not on file   Occupational History    Not on file   Social Needs    Financial resource strain: Not on file    Food insecurity:     Worry: Not on file     Inability: Not on file    Transportation needs:     Medical: Not on file Non-medical: Not on file   Tobacco Use    Smoking status: Never Smoker    Smokeless tobacco: Never Used   Substance and Sexual Activity    Alcohol use: Yes     Comment: social    Drug use: No    Sexual activity: Yes     Comment: per allscripts   Lifestyle    Physical activity:     Days per week: Not on file     Minutes per session: Not on file    Stress: Not on file   Relationships    Social connections:     Talks on phone: Not on file     Gets together: Not on file     Attends Spiritism service: Not on file     Active member of club or organization: Not on file     Attends meetings of clubs or organizations: Not on file     Relationship status: Not on file    Intimate partner violence:     Fear of current or ex partner: Not on file     Emotionally abused: Not on file     Physically abused: Not on file     Forced sexual activity: Not on file   Other Topics Concern    Not on file   Social History Narrative    Not on file     Medications have been verified  Objective   /80   Pulse 100   Temp 98 5 °F (36 9 °C)   Resp 16   Ht 5' 5" (1 651 m)   Wt 128 kg (282 lb)   SpO2 99%   BMI 46 93 kg/m²      Physical Exam   Physical Exam   Constitutional: She appears well-developed and well-nourished  No distress  HENT:   Head: Normocephalic  Right Ear: Tympanic membrane and external ear normal    Left Ear: Tympanic membrane and external ear normal    Nose: Mucosal edema and rhinorrhea (Clear) present  Mouth/Throat: Posterior oropharyngeal erythema present  No oropharyngeal exudate  Neck: Normal range of motion  Neck supple  Cardiovascular: Normal rate, regular rhythm and normal heart sounds  No murmur heard  Pulmonary/Chest: Effort normal and breath sounds normal  No respiratory distress  She has no wheezes  She has no rales  Abdominal: Soft  Bowel sounds are normal  There is no tenderness  Musculoskeletal: Normal range of motion  Lymphadenopathy:     She has no cervical adenopathy  Skin: Skin is warm  No rash noted  Nursing note and vitals reviewed

## 2019-04-15 ENCOUNTER — OFFICE VISIT (OUTPATIENT)
Dept: FAMILY MEDICINE CLINIC | Facility: CLINIC | Age: 39
End: 2019-04-15
Payer: COMMERCIAL

## 2019-04-15 VITALS
BODY MASS INDEX: 45.8 KG/M2 | TEMPERATURE: 97.6 F | SYSTOLIC BLOOD PRESSURE: 124 MMHG | DIASTOLIC BLOOD PRESSURE: 90 MMHG | OXYGEN SATURATION: 98 % | HEIGHT: 66 IN | WEIGHT: 285 LBS | HEART RATE: 91 BPM | RESPIRATION RATE: 18 BRPM

## 2019-04-15 DIAGNOSIS — R07.89 ATYPICAL CHEST PAIN: ICD-10-CM

## 2019-04-15 DIAGNOSIS — F41.1 GAD (GENERALIZED ANXIETY DISORDER): Primary | ICD-10-CM

## 2019-04-15 PROCEDURE — 99214 OFFICE O/P EST MOD 30 MIN: CPT | Performed by: PHYSICIAN ASSISTANT

## 2019-04-15 RX ORDER — FAMOTIDINE 20 MG/1
20 TABLET, FILM COATED ORAL DAILY
Qty: 90 TABLET | Refills: 0 | Status: SHIPPED | OUTPATIENT
Start: 2019-04-15 | End: 2019-06-04

## 2019-04-15 RX ORDER — BUSPIRONE HYDROCHLORIDE 5 MG/1
5 TABLET ORAL 2 TIMES DAILY
Qty: 60 TABLET | Refills: 1 | Status: SHIPPED | OUTPATIENT
Start: 2019-04-15 | End: 2019-05-15

## 2019-04-18 DIAGNOSIS — K21.9 GASTROESOPHAGEAL REFLUX DISEASE WITHOUT ESOPHAGITIS: ICD-10-CM

## 2019-04-18 RX ORDER — PROPRANOLOL HYDROCHLORIDE 10 MG/1
TABLET ORAL
Qty: 90 CAPSULE | Refills: 0 | Status: SHIPPED | OUTPATIENT
Start: 2019-04-18 | End: 2019-05-15 | Stop reason: SDUPTHER

## 2019-04-22 ENCOUNTER — HOSPITAL ENCOUNTER (OUTPATIENT)
Dept: NON INVASIVE DIAGNOSTICS | Facility: HOSPITAL | Age: 39
Discharge: HOME | End: 2019-04-22
Payer: COMMERCIAL

## 2019-04-22 ENCOUNTER — TRANSCRIBE ORDERS (OUTPATIENT)
Dept: ADMINISTRATIVE | Facility: HOSPITAL | Age: 39
End: 2019-04-22

## 2019-04-22 ENCOUNTER — HOSPITAL ENCOUNTER (OUTPATIENT)
Dept: RADIOLOGY | Facility: HOSPITAL | Age: 39
Discharge: HOME | End: 2019-04-22
Payer: COMMERCIAL

## 2019-04-22 DIAGNOSIS — R07.89 ATYPICAL CHEST PAIN: ICD-10-CM

## 2019-04-22 PROCEDURE — 71046 X-RAY EXAM CHEST 2 VIEWS: CPT

## 2019-04-22 PROCEDURE — 93005 ELECTROCARDIOGRAM TRACING: CPT

## 2019-04-23 ENCOUNTER — APPOINTMENT (OUTPATIENT)
Dept: LAB | Facility: HOSPITAL | Age: 39
End: 2019-04-23
Payer: COMMERCIAL

## 2019-04-23 ENCOUNTER — TRANSCRIBE ORDERS (OUTPATIENT)
Dept: ADMINISTRATIVE | Facility: HOSPITAL | Age: 39
End: 2019-04-23

## 2019-04-23 DIAGNOSIS — M79.7 FIBROMYALGIA: ICD-10-CM

## 2019-04-23 DIAGNOSIS — M25.511 RIGHT SHOULDER PAIN, UNSPECIFIED CHRONICITY: ICD-10-CM

## 2019-04-23 DIAGNOSIS — I10 HYPERTENSION, UNSPECIFIED TYPE: ICD-10-CM

## 2019-04-23 DIAGNOSIS — E78.5 HYPERLIPIDEMIA, UNSPECIFIED HYPERLIPIDEMIA TYPE: ICD-10-CM

## 2019-04-23 DIAGNOSIS — M94.0 COSTOCHONDRITIS, ACUTE: ICD-10-CM

## 2019-04-23 DIAGNOSIS — M94.0 COSTOCHONDRITIS, ACUTE: Primary | ICD-10-CM

## 2019-04-23 DIAGNOSIS — M25.512 LEFT SHOULDER PAIN, UNSPECIFIED CHRONICITY: ICD-10-CM

## 2019-04-23 DIAGNOSIS — Z79.899 ENCOUNTER FOR LONG-TERM (CURRENT) USE OF OTHER MEDICATIONS: ICD-10-CM

## 2019-04-23 DIAGNOSIS — E66.01 MORBID OBESITY WITH BMI OF 40.0-44.9, ADULT (HCC): ICD-10-CM

## 2019-04-23 LAB
ALBUMIN SERPL BCP-MCNC: 4.3 G/DL (ref 3.5–5.7)
ALP SERPL-CCNC: 59 U/L (ref 40–150)
ALT SERPL W P-5'-P-CCNC: 27 U/L (ref 7–52)
ANION GAP SERPL CALCULATED.3IONS-SCNC: 7 MMOL/L (ref 4–13)
AST SERPL W P-5'-P-CCNC: 17 U/L (ref 13–39)
ATRIAL RATE: 92 BPM
BASOPHILS # BLD AUTO: 0 THOUSANDS/ΜL (ref 0–0.1)
BASOPHILS NFR BLD AUTO: 1 % (ref 0–1)
BILIRUB SERPL-MCNC: 0.6 MG/DL (ref 0.2–1)
BUN SERPL-MCNC: 11 MG/DL (ref 7–25)
CALCIUM SERPL-MCNC: 9.4 MG/DL (ref 8.6–10.5)
CHLORIDE SERPL-SCNC: 107 MMOL/L (ref 98–107)
CHOLEST SERPL-MCNC: 198 MG/DL (ref 0–200)
CO2 SERPL-SCNC: 28 MMOL/L (ref 21–31)
CREAT SERPL-MCNC: 0.63 MG/DL (ref 0.6–1.2)
CRP SERPL QL: <3 MG/L
EOSINOPHIL # BLD AUTO: 0.2 THOUSAND/ΜL (ref 0–0.61)
EOSINOPHIL NFR BLD AUTO: 3 % (ref 0–6)
ERYTHROCYTE [DISTWIDTH] IN BLOOD BY AUTOMATED COUNT: 14.2 % (ref 11.6–15.1)
ERYTHROCYTE [SEDIMENTATION RATE] IN BLOOD: 13 MM/HOUR (ref 0–20)
GFR SERPL CREATININE-BSD FRML MDRD: 113 ML/MIN/1.73SQ M
GLUCOSE P FAST SERPL-MCNC: 125 MG/DL (ref 65–99)
HCT VFR BLD AUTO: 40.2 % (ref 37–47)
HDLC SERPL-MCNC: 43 MG/DL (ref 40–60)
HGB BLD-MCNC: 14.2 G/DL (ref 11.5–15.4)
LDLC SERPL CALC-MCNC: 115 MG/DL (ref 0–100)
LYMPHOCYTES # BLD AUTO: 1.7 THOUSANDS/ΜL (ref 0.6–4.47)
LYMPHOCYTES NFR BLD AUTO: 23 % (ref 14–44)
MCH RBC QN AUTO: 30.3 PG (ref 26.8–34.3)
MCHC RBC AUTO-ENTMCNC: 35.3 G/DL (ref 31.4–37.4)
MCV RBC AUTO: 86 FL (ref 82–98)
MONOCYTES # BLD AUTO: 0.4 THOUSAND/ΜL (ref 0.17–1.22)
MONOCYTES NFR BLD AUTO: 6 % (ref 4–12)
NEUTROPHILS # BLD AUTO: 4.9 THOUSANDS/ΜL (ref 1.85–7.62)
NEUTS SEG NFR BLD AUTO: 68 % (ref 43–75)
NONHDLC SERPL-MCNC: 155 MG/DL
PLATELET # BLD AUTO: 187 THOUSANDS/UL (ref 149–390)
PMV BLD AUTO: 8.6 FL (ref 8.9–12.7)
POTASSIUM SERPL-SCNC: 3.9 MMOL/L (ref 3.5–5.5)
PROT SERPL-MCNC: 6.4 G/DL (ref 6.4–8.9)
QRS AXIS: -26 DEGREES
QRSD INTERVAL: 118 MS
QT INTERVAL: 392 MS
QTC INTERVAL: 484 MS
RBC # BLD AUTO: 4.68 MILLION/UL (ref 3.81–5.12)
SODIUM SERPL-SCNC: 142 MMOL/L (ref 134–143)
T WAVE AXIS: -3 DEGREES
TRIGL SERPL-MCNC: 199 MG/DL (ref 44–166)
VENTRICULAR RATE: 92 BPM
WBC # BLD AUTO: 7.3 THOUSAND/UL (ref 4.31–10.16)

## 2019-04-23 PROCEDURE — 93010 ELECTROCARDIOGRAM REPORT: CPT | Performed by: INTERNAL MEDICINE

## 2019-04-23 PROCEDURE — 80053 COMPREHEN METABOLIC PANEL: CPT

## 2019-04-23 PROCEDURE — 85025 COMPLETE CBC W/AUTO DIFF WBC: CPT

## 2019-04-23 PROCEDURE — 36415 COLL VENOUS BLD VENIPUNCTURE: CPT

## 2019-04-23 PROCEDURE — 80061 LIPID PANEL: CPT

## 2019-04-23 PROCEDURE — 85652 RBC SED RATE AUTOMATED: CPT

## 2019-04-23 PROCEDURE — 86140 C-REACTIVE PROTEIN: CPT

## 2019-05-14 ENCOUNTER — TELEPHONE (OUTPATIENT)
Dept: FAMILY MEDICINE CLINIC | Facility: CLINIC | Age: 39
End: 2019-05-14

## 2019-05-15 ENCOUNTER — OFFICE VISIT (OUTPATIENT)
Dept: FAMILY MEDICINE CLINIC | Facility: CLINIC | Age: 39
End: 2019-05-15
Payer: COMMERCIAL

## 2019-05-15 VITALS
DIASTOLIC BLOOD PRESSURE: 82 MMHG | RESPIRATION RATE: 17 BRPM | HEART RATE: 81 BPM | OXYGEN SATURATION: 98 % | SYSTOLIC BLOOD PRESSURE: 122 MMHG | BODY MASS INDEX: 46 KG/M2 | TEMPERATURE: 97.6 F | WEIGHT: 285 LBS

## 2019-05-15 DIAGNOSIS — E66.01 MORBID OBESITY WITH BMI OF 45.0-49.9, ADULT (HCC): ICD-10-CM

## 2019-05-15 DIAGNOSIS — K21.9 GASTROESOPHAGEAL REFLUX DISEASE WITHOUT ESOPHAGITIS: ICD-10-CM

## 2019-05-15 DIAGNOSIS — R73.01 IMPAIRED FASTING BLOOD SUGAR: Primary | ICD-10-CM

## 2019-05-15 PROCEDURE — 99213 OFFICE O/P EST LOW 20 MIN: CPT | Performed by: PHYSICIAN ASSISTANT

## 2019-05-16 DIAGNOSIS — F41.8 DEPRESSION WITH ANXIETY: ICD-10-CM

## 2019-05-16 RX ORDER — FLUOXETINE HYDROCHLORIDE 40 MG/1
80 CAPSULE ORAL DAILY
Qty: 20 CAPSULE | Refills: 0 | Status: SHIPPED | OUTPATIENT
Start: 2019-05-16 | End: 2019-08-13 | Stop reason: SDUPTHER

## 2019-05-16 RX ORDER — FLUOXETINE HYDROCHLORIDE 40 MG/1
80 CAPSULE ORAL DAILY
Qty: 180 CAPSULE | Refills: 1 | Status: SHIPPED | OUTPATIENT
Start: 2019-05-16 | End: 2019-05-16 | Stop reason: SDUPTHER

## 2019-05-21 ENCOUNTER — APPOINTMENT (OUTPATIENT)
Dept: LAB | Facility: HOSPITAL | Age: 39
End: 2019-05-21
Payer: COMMERCIAL

## 2019-05-21 ENCOUNTER — TRANSCRIBE ORDERS (OUTPATIENT)
Dept: ADMINISTRATIVE | Facility: HOSPITAL | Age: 39
End: 2019-05-21

## 2019-05-21 DIAGNOSIS — R73.01 IMPAIRED FASTING BLOOD SUGAR: ICD-10-CM

## 2019-05-21 LAB
ANION GAP SERPL CALCULATED.3IONS-SCNC: 3 MMOL/L (ref 4–13)
BUN SERPL-MCNC: 10 MG/DL (ref 7–25)
CALCIUM SERPL-MCNC: 9.8 MG/DL (ref 8.6–10.5)
CHLORIDE SERPL-SCNC: 105 MMOL/L (ref 98–107)
CO2 SERPL-SCNC: 34 MMOL/L (ref 21–31)
CREAT SERPL-MCNC: 0.7 MG/DL (ref 0.6–1.2)
EST. AVERAGE GLUCOSE BLD GHB EST-MCNC: 103 MG/DL
GFR SERPL CREATININE-BSD FRML MDRD: 109 ML/MIN/1.73SQ M
GLUCOSE P FAST SERPL-MCNC: 115 MG/DL (ref 65–99)
HBA1C MFR BLD: 5.2 % (ref 4.2–6.3)
POTASSIUM SERPL-SCNC: 3.8 MMOL/L (ref 3.5–5.5)
SODIUM SERPL-SCNC: 142 MMOL/L (ref 134–143)

## 2019-05-21 PROCEDURE — 83036 HEMOGLOBIN GLYCOSYLATED A1C: CPT

## 2019-05-21 PROCEDURE — 80048 BASIC METABOLIC PNL TOTAL CA: CPT

## 2019-05-21 PROCEDURE — 36415 COLL VENOUS BLD VENIPUNCTURE: CPT

## 2019-06-04 ENCOUNTER — OFFICE VISIT (OUTPATIENT)
Dept: FAMILY MEDICINE CLINIC | Facility: CLINIC | Age: 39
End: 2019-06-04
Payer: COMMERCIAL

## 2019-06-04 VITALS
DIASTOLIC BLOOD PRESSURE: 84 MMHG | SYSTOLIC BLOOD PRESSURE: 122 MMHG | TEMPERATURE: 98.6 F | RESPIRATION RATE: 17 BRPM | HEART RATE: 88 BPM | BODY MASS INDEX: 44.73 KG/M2 | OXYGEN SATURATION: 99 % | WEIGHT: 285 LBS | HEIGHT: 67 IN

## 2019-06-04 DIAGNOSIS — Z11.1 ENCOUNTER FOR PPD TEST: ICD-10-CM

## 2019-06-04 DIAGNOSIS — Z02.89 PHYSICAL EXAMINATION OF EMPLOYEE: Primary | ICD-10-CM

## 2019-06-04 DIAGNOSIS — K21.9 GASTROESOPHAGEAL REFLUX DISEASE WITHOUT ESOPHAGITIS: ICD-10-CM

## 2019-06-04 DIAGNOSIS — Z11.1 ENCOUNTER FOR PPD SKIN TEST READING: ICD-10-CM

## 2019-06-04 PROCEDURE — 99395 PREV VISIT EST AGE 18-39: CPT | Performed by: PHYSICIAN ASSISTANT

## 2019-06-04 PROCEDURE — 86580 TB INTRADERMAL TEST: CPT

## 2019-06-06 ENCOUNTER — CLINICAL SUPPORT (OUTPATIENT)
Dept: FAMILY MEDICINE CLINIC | Facility: CLINIC | Age: 39
End: 2019-06-06

## 2019-06-06 DIAGNOSIS — Z11.1 SCREENING FOR TUBERCULOSIS: Primary | ICD-10-CM

## 2019-06-06 LAB
INDURATION: 0 MM
TB SKIN TEST: NEGATIVE

## 2019-07-11 DIAGNOSIS — K21.9 GASTROESOPHAGEAL REFLUX DISEASE WITHOUT ESOPHAGITIS: Primary | ICD-10-CM

## 2019-07-11 RX ORDER — FAMOTIDINE 20 MG/1
TABLET, FILM COATED ORAL
Qty: 90 TABLET | Refills: 0 | Status: SHIPPED | OUTPATIENT
Start: 2019-07-11 | End: 2019-10-04 | Stop reason: SDUPTHER

## 2019-07-20 DIAGNOSIS — K21.9 GASTROESOPHAGEAL REFLUX DISEASE WITHOUT ESOPHAGITIS: ICD-10-CM

## 2019-07-21 RX ORDER — PROPRANOLOL HYDROCHLORIDE 10 MG/1
TABLET ORAL
Qty: 30 CAPSULE | Refills: 1 | Status: SHIPPED | OUTPATIENT
Start: 2019-07-21 | End: 2019-09-22 | Stop reason: SDUPTHER

## 2019-08-01 ENCOUNTER — TELEPHONE (OUTPATIENT)
Dept: FAMILY MEDICINE CLINIC | Facility: CLINIC | Age: 39
End: 2019-08-01

## 2019-08-01 NOTE — TELEPHONE ENCOUNTER
Patient called stating when she comes off Nexium she gets horrible reflux  I see you reordered Nexium on 7/21/19 for a total of 60 days, which the patient did not know about  Should she pick that up and continue it? Patient feels that within a couple days coming off Nexium the reflux is horrible  Also, there are no follow ups scheduled  When do you want to see her again? Thank you!

## 2019-08-02 DIAGNOSIS — K21.9 GASTROESOPHAGEAL REFLUX DISEASE WITHOUT ESOPHAGITIS: Primary | ICD-10-CM

## 2019-08-02 DIAGNOSIS — R73.01 IMPAIRED FASTING BLOOD SUGAR: Primary | ICD-10-CM

## 2019-08-02 DIAGNOSIS — E78.5 HYPERLIPIDEMIA, UNSPECIFIED HYPERLIPIDEMIA TYPE: ICD-10-CM

## 2019-08-02 NOTE — TELEPHONE ENCOUNTER
Spoke with patient who was thankful and very willing to have GI  Can you please put in a referral to [facility] please? Patient has [address] phone number  Patient scheduled for 11/1/19  Thank you!

## 2019-08-02 NOTE — TELEPHONE ENCOUNTER
We can continue nexium,  what was sent   However, should consider GI for scope - is she agreeable/    Lets do follow up in end of Oct, beginning of Nov as this is when she is due for next choelsterol labs, orders in chart

## 2019-08-09 ENCOUNTER — OFFICE VISIT (OUTPATIENT)
Dept: SURGERY | Facility: CLINIC | Age: 39
End: 2019-08-09
Payer: COMMERCIAL

## 2019-08-09 VITALS
TEMPERATURE: 97.6 F | HEIGHT: 67 IN | DIASTOLIC BLOOD PRESSURE: 84 MMHG | BODY MASS INDEX: 45.52 KG/M2 | HEART RATE: 78 BPM | SYSTOLIC BLOOD PRESSURE: 136 MMHG | WEIGHT: 290 LBS | RESPIRATION RATE: 17 BRPM

## 2019-08-09 DIAGNOSIS — N60.81 SEBACEOUS CYST OF BREAST, RIGHT: Primary | ICD-10-CM

## 2019-08-09 PROBLEM — N60.82 SEBACEOUS CYST OF BREAST, LEFT: Status: ACTIVE | Noted: 2019-08-09

## 2019-08-09 PROCEDURE — 99213 OFFICE O/P EST LOW 20 MIN: CPT | Performed by: FAMILY MEDICINE

## 2019-08-09 RX ORDER — CLINDAMYCIN HYDROCHLORIDE 300 MG/1
300 CAPSULE ORAL 4 TIMES DAILY
Qty: 28 CAPSULE | Refills: 0 | Status: SHIPPED | OUTPATIENT
Start: 2019-08-09 | End: 2019-08-16

## 2019-08-09 NOTE — PROGRESS NOTES
Assessment/Plan:   Aracely Sales is a 44 y  o female who is here for The encounter diagnosis was Sebaceous cyst of breast, right      -Start Clindamycin 300 mg PO QID x7days  -Ok to wash with soap and water  -Keep affected area clean and dry  -Follow up if symptoms worsen or change       ______________________________________________________  CC:Cyst (Seb cyst under Right breast)    HPI:  Aracely Sales is a 44 y  o female who was referred for evaluation of Cyst (Seb cyst under Right breast)  Patient reports small painful mass under breast x 1 week, has had several previous with spontaneous drainage or excision  This one is painful more than others  Was draining pus yesterday, today only serious fluid  ROS:  General ROS: negative  negative for - chills, fatigue, fever or night sweats, weight loss  Respiratory ROS: no cough, shortness of breath, or wheezing  Cardiovascular ROS: no chest pain or dyspnea on exertion  Genito-Urinary ROS: no dysuria, trouble voiding, or hematuria  Musculoskeletal ROS: negative for - gait disturbance, joint pain or muscle pain  Neurological ROS: no TIA or stroke symptoms  Gastrointestinal: see HPI  No abdominal pain, melena, BRB unless specified in HPI  Skin ROS: no new rashes or lesions   Lymphatic ROS: no new adenopathy noted by pt     GYN ROS: see HPI, no new GYN history or bleeding noted  Psy ROS: no new mental or behavioral disturbances       Patient Active Problem List   Diagnosis    Thyroid nodule    Trigger finger, right middle finger    Carpal tunnel syndrome, bilateral    Asthma, mild intermittent    Benign essential hypertension    Breast hypertrophy    Bulging lumbar disc    Bright red blood per rectum    Calcific tendinitis of shoulder    Chronic low back pain    Chronic right-sided low back pain without sciatica    Chronic sinusitis    Depression with anxiety    Dermatitis, eczematoid    Disorder of bursae of shoulder region    Esophageal reflux    Fibromyalgia    H/O laminectomy    Hemorrhoids    Hypercholesterolemia    Mammographic microcalcification    Morbid obesity (HCC)    Obstructive sleep apnea    Pars defect of lumbar spine    RBBB (right bundle branch block)    Spondylolysis of lumbar region    Vitamin D insufficiency    Atypical chest pain    Anal pain    Anal fissure, unspecified    Anal fistula    Sebaceous cyst of breast, left         Allergies:  Amoxicillin; Neurontin [gabapentin];  Penicillin v; Penicillins; Aripiprazole; Lorazepam; Carbamazepine; Doxycycline; Lamotrigine; and Other      Current Outpatient Medications:     albuterol (PROVENTIL HFA,VENTOLIN HFA) 90 mcg/act inhaler, Inhale 2 puffs every 6 (six) hours as needed, Disp: , Rfl:     amitriptyline (ELAVIL) 50 mg tablet, , Disp: , Rfl: 5    Ascorbic Acid (VITAMIN C) 500 MG CAPS, Take 1 capsule by mouth daily, Disp: , Rfl:     atorvastatin (LIPITOR) 40 mg tablet, Take 1 tablet (40 mg total) by mouth daily, Disp: 90 tablet, Rfl: 1    cetirizine (ZyrTEC) 10 mg tablet, Take 10 mg by mouth daily, Disp: , Rfl:     Cholecalciferol (VITAMIN D3) 5000 units CAPS, Take 1 capsule by mouth daily  , Disp: , Rfl:     cyanocobalamin (VITAMIN B-12) 1,000 mcg tablet, Take 3 tablets by mouth 2 (two) times a day, Disp: , Rfl:     famotidine (PEPCID) 20 mg tablet, TAKE ONE TABLET BY MOUTH ONCE DAILY, Disp: 90 tablet, Rfl: 0    FLUoxetine (PROzac) 40 MG capsule, Take 2 capsules (80 mg total) by mouth daily, Disp: 20 capsule, Rfl: 0    medroxyPROGESTERone (DEPO-PROVERA) 400 MG/ML SUSP, Inject into a muscle q 3 months , Disp: , Rfl:     metoprolol succinate (TOPROL-XL) 25 mg 24 hr tablet, Take 1 tablet (25 mg total) by mouth daily, Disp: 90 tablet, Rfl: 1    Multiple Vitamins-Minerals (CENTRUM ADULTS PO), Take 1 tablet by mouth daily, Disp: , Rfl:     NEXIUM 24HR 20 MG capsule, TAKE  ONE CAPSULE BY MOUTH EVERY MORNING, Disp: 30 capsule, Rfl: 1    Past Medical History:   Diagnosis Date    Anxiety     Arthritis     Chronic pain disorder     BACK SHOULDERS NECK    Depression     Disease of thyroid gland     nodule    Fibromyalgia, primary     GERD (gastroesophageal reflux disease)     Hearing difficulty of right ear     Hyperlipidemia     Hypertension     Irritable bowel syndrome     Perforation of tympanic membrane     Right    PONV (postoperative nausea and vomiting)     RBBB     Right bundle branch blockage     Sleep apnea     no cpap    Wears glasses        Past Surgical History:   Procedure Laterality Date    ANAL FISTULOTOMY N/A 2018    Procedure: FISTULOTOMY;  Surgeon: Elin Pompa MD;  [facility];  Service: Colorectal    BACK SURGERY      lumbar herniated disc    BREAST SURGERY Left 2006    cyst removal    CARPAL TUNNEL RELEASE       SECTION      CHOLECYSTECTOMY      COLONOSCOPY      CYST REMOVAL      DENTAL SURGERY      INCISION AND DRAINAGE OF WOUND N/A 2018    Procedure: INCISION AND DRAINAGE (I&D) BUTTOCK;  Surgeon: Elin Pompa MD;  [facility];  Service: Colorectal    UT COLONOSCOPY FLX DX W/COLLJ SPEC WHEN PFRMD N/A 2017    Procedure: COLONOSCOPY;  Surgeon: Nina Perez MD;  [facility]; Service: Gastroenterology    UT INCISE FINGER TENDON SHEATH Right 3/13/2018    Procedure: LONG TRIGGER FINGER RELEASE;  Surgeon: Ruthie Turner DO;  [facility];  Service: Orthopedics    UT SURG DIAGNOSTIC EXAM, ANORECTAL N/A 2018    Procedure: EXAM UNDER ANESTHESIA (EUA); Surgeon: Elin Pompa MD;  [facility];  Service: Colorectal    UT TYMPANOPLASTY Right 2017    Procedure: TYMPANOPLASTY WITH PERICHONDRIN GRAFT;  Surgeon:  Ralph Wilcox DO;  [facility];  Service: ENT    SHOULDER SURGERY Right     WISDOM TOOTH EXTRACTION         Family History   Problem Relation Age of Onset    Hypertension Mother     Hyperlipidemia Mother     Diabetes Mother     Hypertension Father     Hyperlipidemia Father     Heart disease Father         cardiac disorder-myocardial infarction arrhythmias    Diabetes unspecified Maternal Grandmother     Crohn's disease Brother     Arthritis Brother     Thyroid disease Paternal Grandmother         reports that she has never smoked  She has never used smokeless tobacco  She reports that she drinks alcohol  She reports that she does not use drugs  Labs:   Lab Results   Component Value Date    WBC 7 30 04/23/2019    HGB 14 2 04/23/2019    HCT 40 2 04/23/2019    MCV 86 04/23/2019     04/23/2019     Lab Results   Component Value Date     04/05/2017    K 3 8 05/21/2019     05/21/2019    CO2 34 (H) 05/21/2019    ANIONGAP 7 09/19/2014    BUN 10 05/21/2019    CREATININE 0 70 05/21/2019    GLUCOSE 96 04/05/2017    GLUF 115 (H) 05/21/2019    CALCIUM 9 8 05/21/2019    AST 17 04/23/2019    ALT 27 04/23/2019    ALKPHOS 59 04/23/2019    PROT 6 7 04/05/2017    BILITOT 0 6 04/05/2017    EGFR 109 05/21/2019         Imaging: No new pertinent imaging studies  PHYSICAL EXAM    Vitals:    08/09/19 1024   BP: 136/84   Pulse: 78   Resp: 17   Temp: 97 6 °F (36 4 °C)          PHYSICAL EXAM  General Appearance:    Alert, cooperative, no distress   Head:    Normocephalic without obvious abnormality   Eyes:    conjunctiva/corneas clear, EOM's intact        Neck:   Supple, no adenopathy, no JVD   Back:     Symmetric, no spinal or CVA tenderness   Lungs:     Clear to auscultation bilaterally, no wheezing or rhonchi   Heart:    Regular rate and rhythm, S1 and S2 normal, no murmur   Skin:     3 cm x 3 cm hard, warm, erythematous drained sebaceous cyst with 3 mm central ulceration, tender to palpation, no drainage currently   Extremities:   Extremities normal  No clubbing, cyanosis or edema   Psych:   Normal Affect   Neurologic:   CNII-XII intact   Strength symmetric, speech intact                                 Lelan Serge, DO  Date: 8/9/2019 Time: 11:01 AM

## 2019-08-13 DIAGNOSIS — E78.5 HYPERLIPIDEMIA, UNSPECIFIED HYPERLIPIDEMIA TYPE: ICD-10-CM

## 2019-08-13 DIAGNOSIS — I10 HYPERTENSION, UNSPECIFIED TYPE: ICD-10-CM

## 2019-08-13 DIAGNOSIS — F41.8 DEPRESSION WITH ANXIETY: ICD-10-CM

## 2019-08-14 RX ORDER — ATORVASTATIN CALCIUM 40 MG/1
40 TABLET, FILM COATED ORAL DAILY
Qty: 90 TABLET | Refills: 1 | Status: SHIPPED | OUTPATIENT
Start: 2019-08-14 | End: 2020-02-05 | Stop reason: SDUPTHER

## 2019-08-14 RX ORDER — METOPROLOL SUCCINATE 25 MG/1
25 TABLET, EXTENDED RELEASE ORAL DAILY
Qty: 90 TABLET | Refills: 1 | Status: SHIPPED | OUTPATIENT
Start: 2019-08-14 | End: 2020-01-21 | Stop reason: SDUPTHER

## 2019-08-14 RX ORDER — FLUOXETINE HYDROCHLORIDE 40 MG/1
80 CAPSULE ORAL DAILY
Qty: 180 CAPSULE | Refills: 1 | Status: SHIPPED | OUTPATIENT
Start: 2019-08-14 | End: 2019-11-13

## 2019-08-25 ENCOUNTER — OFFICE VISIT (OUTPATIENT)
Dept: URGENT CARE | Facility: HOSPITAL | Age: 39
End: 2019-08-25
Payer: COMMERCIAL

## 2019-08-25 VITALS
BODY MASS INDEX: 45.99 KG/M2 | HEART RATE: 90 BPM | TEMPERATURE: 98.4 F | DIASTOLIC BLOOD PRESSURE: 86 MMHG | RESPIRATION RATE: 18 BRPM | OXYGEN SATURATION: 97 % | HEIGHT: 67 IN | WEIGHT: 293 LBS | SYSTOLIC BLOOD PRESSURE: 128 MMHG

## 2019-08-25 DIAGNOSIS — J02.9 SORE THROAT: ICD-10-CM

## 2019-08-25 DIAGNOSIS — J01.01 ACUTE RECURRENT MAXILLARY SINUSITIS: Primary | ICD-10-CM

## 2019-08-25 DIAGNOSIS — R05.9 COUGH: ICD-10-CM

## 2019-08-25 LAB — S PYO AG THROAT QL: NEGATIVE

## 2019-08-25 PROCEDURE — G0382 LEV 3 HOSP TYPE B ED VISIT: HCPCS | Performed by: EMERGENCY MEDICINE

## 2019-08-25 PROCEDURE — 87070 CULTURE OTHR SPECIMN AEROBIC: CPT | Performed by: EMERGENCY MEDICINE

## 2019-08-25 PROCEDURE — 87880 STREP A ASSAY W/OPTIC: CPT | Performed by: EMERGENCY MEDICINE

## 2019-08-25 RX ORDER — AZITHROMYCIN 250 MG/1
TABLET, FILM COATED ORAL
Qty: 6 TABLET | Refills: 0 | Status: SHIPPED | OUTPATIENT
Start: 2019-08-25 | End: 2019-08-29

## 2019-08-25 RX ORDER — MEDROXYPROGESTERONE ACETATE 150 MG/ML
INJECTION, SUSPENSION INTRAMUSCULAR
Refills: 3 | COMMUNITY
Start: 2019-07-08 | End: 2019-11-13

## 2019-08-25 NOTE — PATIENT INSTRUCTIONS
1   If no improvement, see your family doctor  Sinusitis   WHAT YOU NEED TO KNOW:   Sinusitis is inflammation or infection of your sinuses  It is most often caused by a virus  Acute sinusitis may last up to 12 weeks  Chronic sinusitis lasts longer than 12 weeks  Recurrent sinusitis means you have 4 or more times in 1 year  DISCHARGE INSTRUCTIONS:   Return to the emergency department if:   · Your eye and eyelid are red, swollen, and painful  · You cannot open your eye  · You have vision changes, such as double vision  · Your eyeball bulges out or you cannot move your eye  · You are more sleepy than normal, or you notice changes in your ability to think, move, or talk  · You have a stiff neck, a fever, or a bad headache  · You have swelling of your forehead or scalp  Contact your healthcare provider if:   · Your symptoms do not improve after 3 days  · Your symptoms do not go away after 10 days  · You have nausea and are vomiting  · Your nose is bleeding  · You have questions or concerns about your condition or care  Medicines: Your symptoms may go away on their own  Your healthcare provider may recommend watchful waiting for up to 10 days before starting antibiotics  You may  need any of the following:  · Acetaminophen  decreases pain and fever  It is available without a doctor's order  Ask how much to take and how often to take it  Follow directions  Read the labels of all other medicines you are using to see if they also contain acetaminophen, or ask your doctor or pharmacist  Acetaminophen can cause liver damage if not taken correctly  Do not use more than 4 grams (4,000 milligrams) total of acetaminophen in one day  · NSAIDs , such as ibuprofen, help decrease swelling, pain, and fever  This medicine is available with or without a doctor's order  NSAIDs can cause stomach bleeding or kidney problems in certain people   If you take blood thinner medicine, always ask your healthcare provider if NSAIDs are safe for you  Always read the medicine label and follow directions  · Nasal steroid sprays  may help decrease inflammation in your nose and sinuses  · Decongestants  help reduce swelling and drain mucus in the nose and sinuses  They may help you breathe easier  · Antihistamines  help dry mucus in the nose and relieve sneezing  · Antibiotics  help treat or prevent a bacterial infection  · Take your medicine as directed  Contact your healthcare provider if you think your medicine is not helping or if you have side effects  Tell him or her if you are allergic to any medicine  Keep a list of the medicines, vitamins, and herbs you take  Include the amounts, and when and why you take them  Bring the list or the pill bottles to follow-up visits  Carry your medicine list with you in case of an emergency  Self-care:   · Rinse your sinuses  Use a sinus rinse device to rinse your nasal passages with a saline (salt water) solution or distilled water  Do not use tap water  This will help thin the mucus in your nose and rinse away pollen and dirt  It will also help reduce swelling so you can breathe normally  Ask your healthcare provider how often to do this  · Breathe in steam   Heat a bowl of water until you see steam  Lean over the bowl and make a tent over your head with a large towel  Breathe deeply for about 20 minutes  Be careful not to get too close to the steam or burn yourself  Do this 3 times a day  You can also breathe deeply when you take a hot shower  · Sleep with your head elevated  Place an extra pillow under your head before you go to sleep to help your sinuses drain  · Drink liquids as directed  Ask your healthcare provider how much liquid to drink each day and which liquids are best for you  Liquids will thin the mucus in your nose and help it drain  Avoid drinks that contain alcohol or caffeine       · Do not smoke, and avoid secondhand smoke   Nicotine and other chemicals in cigarettes and cigars can make your symptoms worse  Ask your healthcare provider for information if you currently smoke and need help to quit  E-cigarettes or smokeless tobacco still contain nicotine  Talk to your healthcare provider before you use these products  Prevent the spread of germs that cause sinusitis:  Wash your hands often with soap and water  Wash your hands after you use the bathroom, change a child's diaper, or sneeze  Wash your hands before you prepare or eat food  Follow up with your healthcare provider as directed: You may be referred to an ear, nose, and throat specialist  Write down your questions so you remember to ask them during your visits  © 2017 [employer]  Information is for End User's use only and may not be sold, redistributed or otherwise used for commercial purposes  All illustrations and images included in CareNotes® are the copyrighted property of [employer]  or [employer]  The above information is an  only  It is not intended as medical advice for individual conditions or treatments  Talk to your doctor, nurse or pharmacist before following any medical regimen to see if it is safe and effective for you

## 2019-08-25 NOTE — PROGRESS NOTES
[facility]        NAME: Pablo Heart is a 44 y o  female  : 1980    MRN: [id number]  DATE: 2019  TIME: 8:56 AM    Assessment and Plan   Acute recurrent maxillary sinusitis [J01 01]  1  Acute recurrent maxillary sinusitis     2  Cough  azithromycin (ZITHROMAX) 250 mg tablet    Throat culture   3  Sore throat  POCT rapid strepA    Throat culture     Z Pack  OTC decongestant  Rapid strep negative  Throat culture pending    Patient Instructions     Patient Instructions     1  If no improvement, see your family doctor  Sinusitis   WHAT YOU NEED TO KNOW:   Sinusitis is inflammation or infection of your sinuses  It is most often caused by a virus  Acute sinusitis may last up to 12 weeks  Chronic sinusitis lasts longer than 12 weeks  Recurrent sinusitis means you have 4 or more times in 1 year  DISCHARGE INSTRUCTIONS:   Return to the emergency department if:   · Your eye and eyelid are red, swollen, and painful  · You cannot open your eye  · You have vision changes, such as double vision  · Your eyeball bulges out or you cannot move your eye  · You are more sleepy than normal, or you notice changes in your ability to think, move, or talk  · You have a stiff neck, a fever, or a bad headache  · You have swelling of your forehead or scalp  Contact your healthcare provider if:   · Your symptoms do not improve after 3 days  · Your symptoms do not go away after 10 days  · You have nausea and are vomiting  · Your nose is bleeding  · You have questions or concerns about your condition or care  Medicines: Your symptoms may go away on their own  Your healthcare provider may recommend watchful waiting for up to 10 days before starting antibiotics  You may  need any of the following:  · Acetaminophen  decreases pain and fever  It is available without a doctor's order  Ask how much to take and how often to take it  Follow directions   Read the labels of all other medicines you are using to see if they also contain acetaminophen, or ask your doctor or pharmacist  Acetaminophen can cause liver damage if not taken correctly  Do not use more than 4 grams (4,000 milligrams) total of acetaminophen in one day  · NSAIDs , such as ibuprofen, help decrease swelling, pain, and fever  This medicine is available with or without a doctor's order  NSAIDs can cause stomach bleeding or kidney problems in certain people  If you take blood thinner medicine, always ask your healthcare provider if NSAIDs are safe for you  Always read the medicine label and follow directions  · Nasal steroid sprays  may help decrease inflammation in your nose and sinuses  · Decongestants  help reduce swelling and drain mucus in the nose and sinuses  They may help you breathe easier  · Antihistamines  help dry mucus in the nose and relieve sneezing  · Antibiotics  help treat or prevent a bacterial infection  · Take your medicine as directed  Contact your healthcare provider if you think your medicine is not helping or if you have side effects  Tell him or her if you are allergic to any medicine  Keep a list of the medicines, vitamins, and herbs you take  Include the amounts, and when and why you take them  Bring the list or the pill bottles to follow-up visits  Carry your medicine list with you in case of an emergency  Self-care:   · Rinse your sinuses  Use a sinus rinse device to rinse your nasal passages with a saline (salt water) solution or distilled water  Do not use tap water  This will help thin the mucus in your nose and rinse away pollen and dirt  It will also help reduce swelling so you can breathe normally  Ask your healthcare provider how often to do this  · Breathe in steam   Heat a bowl of water until you see steam  Lean over the bowl and make a tent over your head with a large towel  Breathe deeply for about 20 minutes   Be careful not to get too close to the steam or burn yourself  Do this 3 times a day  You can also breathe deeply when you take a hot shower  · Sleep with your head elevated  Place an extra pillow under your head before you go to sleep to help your sinuses drain  · Drink liquids as directed  Ask your healthcare provider how much liquid to drink each day and which liquids are best for you  Liquids will thin the mucus in your nose and help it drain  Avoid drinks that contain alcohol or caffeine  · Do not smoke, and avoid secondhand smoke  Nicotine and other chemicals in cigarettes and cigars can make your symptoms worse  Ask your healthcare provider for information if you currently smoke and need help to quit  E-cigarettes or smokeless tobacco still contain nicotine  Talk to your healthcare provider before you use these products  Prevent the spread of germs that cause sinusitis:  Wash your hands often with soap and water  Wash your hands after you use the bathroom, change a child's diaper, or sneeze  Wash your hands before you prepare or eat food  Follow up with your healthcare provider as directed: You may be referred to an ear, nose, and throat specialist  Write down your questions so you remember to ask them during your visits  © 2017 [employer]  Information is for End User's use only and may not be sold, redistributed or otherwise used for commercial purposes  All illustrations and images included in CareNotes® are the copyrighted property of [employer]  or [employer]  The above information is an  only  It is not intended as medical advice for individual conditions or treatments  Talk to your doctor, nurse or pharmacist before following any medical regimen to see if it is safe and effective for you  Follow up with PCP in 3-5 days  Proceed to  ER if symptoms worsen      Chief Complaint     Chief Complaint   Patient presents with    Cold Like Symptoms     Patient c/o cough, sore throat, and ear pressure since Thursday         History of Present Illness       This is a 58-year-old female who presents with onset of sore throat, ear pain, and congestion for the last 4 days  She denies fever  She has a cough with no sputum  She has postnasal drip  She has a yellow greenish nasal discharge and facial pain  Patient states she has never had sinus surgery  She does have a perforated tympanic membrane on the right  She is a  worker  She has had multiple sinus infections and is generally treated with either Levaquin or Z-Randal because of a severe allergy to the penicillins  Review of Systems   Review of Systems   Constitutional: Negative for fever  HENT: Positive for congestion, ear pain, postnasal drip, rhinorrhea, sinus pain and sore throat  Negative for dental problem, ear discharge and facial swelling  Eyes: Negative for redness  Respiratory: Positive for cough  Negative for shortness of breath and wheezing  Gastrointestinal: Negative for abdominal pain  Genitourinary: Negative for difficulty urinating  Musculoskeletal: Negative for myalgias  Skin: Negative for rash           Current Medications       Current Outpatient Medications:     albuterol (PROVENTIL HFA,VENTOLIN HFA) 90 mcg/act inhaler, Inhale 2 puffs every 6 (six) hours as needed, Disp: , Rfl:     amitriptyline (ELAVIL) 50 mg tablet, , Disp: , Rfl: 5    Ascorbic Acid (VITAMIN C) 500 MG CAPS, Take 1 capsule by mouth daily, Disp: , Rfl:     atorvastatin (LIPITOR) 40 mg tablet, Take 1 tablet (40 mg total) by mouth daily, Disp: 90 tablet, Rfl: 1    cetirizine (ZyrTEC) 10 mg tablet, Take 10 mg by mouth daily, Disp: , Rfl:     Cholecalciferol (VITAMIN D3) 5000 units CAPS, Take 1 capsule by mouth daily  , Disp: , Rfl:     cyanocobalamin (VITAMIN B-12) 1,000 mcg tablet, Take 3 tablets by mouth 2 (two) times a day, Disp: , Rfl:     famotidine (PEPCID) 20 mg tablet, TAKE ONE TABLET BY MOUTH ONCE DAILY, Disp: 90 tablet, Rfl: 0    FLUoxetine (PROzac) 40 MG capsule, Take 2 capsules (80 mg total) by mouth daily, Disp: 180 capsule, Rfl: 1    medroxyPROGESTERone (DEPO-PROVERA) 150 mg/mL injection, , Disp: , Rfl: 3    metoprolol succinate (TOPROL-XL) 25 mg 24 hr tablet, Take 1 tablet (25 mg total) by mouth daily, Disp: 90 tablet, Rfl: 1    Multiple Vitamins-Minerals (CENTRUM ADULTS PO), Take 1 tablet by mouth daily, Disp: , Rfl:     NEXIUM 24HR 20 MG capsule, TAKE  ONE CAPSULE BY MOUTH EVERY MORNING, Disp: 30 capsule, Rfl: 1    azithromycin (ZITHROMAX) 250 mg tablet, Take 2 tablets today then 1 tablet daily x 4 days, Disp: 6 tablet, Rfl: 0    medroxyPROGESTERone (DEPO-PROVERA) 400 MG/ML SUSP, Inject into a muscle q 3 months , Disp: , Rfl:     Current Allergies     Allergies as of 08/25/2019 - Reviewed 08/25/2019   Allergen Reaction Noted    Amoxicillin Swelling, Anaphylaxis, and Angioedema 02/13/2009    Neurontin [gabapentin] Other (See Comments) 10/16/2014    Penicillin v Angioedema and Swelling 06/21/2012    Penicillins Swelling and Anaphylaxis 05/16/2017    Aripiprazole Other (See Comments) 11/06/2013    Lorazepam Other (See Comments) 06/11/2014    Carbamazepine Other (See Comments) 10/16/2012    Doxycycline Hives 04/10/2013    Lamotrigine Rash 08/28/2012    Other Hives 05/16/2017            The following portions of the patient's history were reviewed and updated as appropriate: allergies, current medications, past family history, past medical history, past social history, past surgical history and problem list      Past Medical History:   Diagnosis Date    Anxiety     Arthritis     Chronic pain disorder     BACK SHOULDERS NECK    Depression     Disease of thyroid gland     nodule    Fibromyalgia, primary     GERD (gastroesophageal reflux disease)     Hearing difficulty of right ear     Hyperlipidemia     Hypertension     Irritable bowel syndrome     Perforation of tympanic membrane Right    PONV (postoperative nausea and vomiting)     RBBB     Right bundle branch blockage     Sleep apnea     no cpap    Wears glasses        Past Surgical History:   Procedure Laterality Date    ANAL FISTULOTOMY N/A 2018    Procedure: FISTULOTOMY;  Surgeon: Beverly Kelly MD;  [facility];  Service: Colorectal    BACK SURGERY      lumbar herniated disc    BREAST SURGERY Left 2006    cyst removal    CARPAL TUNNEL RELEASE       SECTION      CHOLECYSTECTOMY      COLONOSCOPY      CYST REMOVAL      DENTAL SURGERY      INCISION AND DRAINAGE OF WOUND N/A 2018    Procedure: INCISION AND DRAINAGE (I&D) BUTTOCK;  Surgeon: Beverly Kelly MD;  [facility];  Service: Colorectal    ND COLONOSCOPY FLX DX W/COLLJ SPEC WHEN PFRMD N/A 2017    Procedure: COLONOSCOPY;  Surgeon: Natividad Chairez MD;  [facility]; Service: Gastroenterology    ND INCISE FINGER TENDON SHEATH Right 3/13/2018    Procedure: LONG TRIGGER FINGER RELEASE;  Surgeon: Ben Wheeler DO;  [facility];  Service: Orthopedics    ND SURG DIAGNOSTIC EXAM, ANORECTAL N/A 2018    Procedure: EXAM UNDER ANESTHESIA (EUA); Surgeon: Beverly Kelly MD;  [facility];  Service: Colorectal    ND TYMPANOPLASTY Right 2017    Procedure: TYMPANOPLASTY WITH PERICHONDRIN GRAFT;  Surgeon: Forest Bravo DO;  [facility];  Service: ENT    SHOULDER SURGERY Right     WISDOM TOOTH EXTRACTION         Family History   Problem Relation Age of Onset    Hypertension Mother     Hyperlipidemia Mother     Diabetes Mother     Hypertension Father     Hyperlipidemia Father     Heart disease Father         cardiac disorder-myocardial infarction arrhythmias    Diabetes unspecified Maternal Grandmother     Crohn's disease Brother     Arthritis Brother     Thyroid disease Paternal Grandmother          Medications have been verified          Objective   /86 Pulse 90   Temp 98 4 °F (36 9 °C) (Tympanic)   Resp 18   Ht 5' 7" (1 702 m)   Wt 133 kg (293 lb 12 8 oz)   SpO2 97%   BMI 46 02 kg/m²        Physical Exam     Physical Exam   Constitutional: She is oriented to person, place, and time  She appears well-developed and well-nourished  HENT:   Head: Normocephalic and atraumatic  Right Ear: External ear normal    Left Ear: External ear normal    Nose: Nose normal    Mouth/Throat: Oropharynx is clear and moist    Right Tympanic membrane is not visualized secondary to cerumen  Left tympanic membrane is intact  There is no discharge  There is tenderness to palpation to the frontal sinus and maxillary sinus on the right  Eyes: Pupils are equal, round, and reactive to light  Conjunctivae and EOM are normal    Neck: Normal range of motion  Neck supple  No thyromegaly present  Cardiovascular: Normal rate, regular rhythm and normal heart sounds  No murmur heard  Pulmonary/Chest: Effort normal and breath sounds normal  She has no wheezes  Abdominal: Soft  Musculoskeletal: Normal range of motion  Lymphadenopathy:     She has no cervical adenopathy  Neurological: She is alert and oriented to person, place, and time  Skin: Skin is warm and dry  Psychiatric: She has a normal mood and affect  Nursing note and vitals reviewed

## 2019-08-27 LAB — BACTERIA THROAT CULT: NORMAL

## 2019-08-28 ENCOUNTER — OFFICE VISIT (OUTPATIENT)
Dept: FAMILY MEDICINE CLINIC | Facility: CLINIC | Age: 39
End: 2019-08-28
Payer: COMMERCIAL

## 2019-08-28 VITALS
BODY MASS INDEX: 45.04 KG/M2 | SYSTOLIC BLOOD PRESSURE: 132 MMHG | WEIGHT: 287 LBS | TEMPERATURE: 98.5 F | DIASTOLIC BLOOD PRESSURE: 88 MMHG | RESPIRATION RATE: 18 BRPM | OXYGEN SATURATION: 98 % | HEART RATE: 94 BPM | HEIGHT: 67 IN

## 2019-08-28 DIAGNOSIS — R05.9 COUGH: ICD-10-CM

## 2019-08-28 DIAGNOSIS — R59.0 LEFT CERVICAL LYMPHADENOPATHY: ICD-10-CM

## 2019-08-28 DIAGNOSIS — H92.02 LEFT EAR PAIN: Primary | ICD-10-CM

## 2019-08-28 DIAGNOSIS — J01.00 ACUTE MAXILLARY SINUSITIS, RECURRENCE NOT SPECIFIED: ICD-10-CM

## 2019-08-28 PROCEDURE — 99213 OFFICE O/P EST LOW 20 MIN: CPT | Performed by: FAMILY MEDICINE

## 2019-08-28 RX ORDER — LEVOFLOXACIN 500 MG/1
500 TABLET, FILM COATED ORAL EVERY 24 HOURS
Qty: 7 TABLET | Refills: 0 | Status: SHIPPED | OUTPATIENT
Start: 2019-08-28 | End: 2019-09-04

## 2019-08-28 RX ORDER — ALBUTEROL SULFATE 90 UG/1
2 AEROSOL, METERED RESPIRATORY (INHALATION) EVERY 6 HOURS PRN
Qty: 1 INHALER | Refills: 0 | Status: SHIPPED | OUTPATIENT
Start: 2019-08-28 | End: 2020-01-03 | Stop reason: SDUPTHER

## 2019-08-28 RX ORDER — ALBUTEROL SULFATE 90 UG/1
2 AEROSOL, METERED RESPIRATORY (INHALATION) EVERY 6 HOURS PRN
Qty: 1 INHALER | Refills: 0 | Status: CANCELLED | OUTPATIENT
Start: 2019-08-28

## 2019-08-28 NOTE — PROGRESS NOTES
Assessment/Plan:         Diagnoses and all orders for this visit:    Left ear pain  -     levofloxacin (LEVAQUIN) 500 mg tablet; Take 1 tablet (500 mg total) by mouth every 24 hours for 7 days (Patient not taking: Reported on 9/5/2019)    Acute maxillary sinusitis, recurrence not specified  -     levofloxacin (LEVAQUIN) 500 mg tablet; Take 1 tablet (500 mg total) by mouth every 24 hours for 7 days (Patient not taking: Reported on 9/5/2019)    Cough  -     albuterol (PROVENTIL HFA,VENTOLIN HFA) 90 mcg/act inhaler; Inhale 2 puffs every 6 (six) hours as needed for shortness of breath (cough)    Left cervical lymphadenopathy  -     levofloxacin (LEVAQUIN) 500 mg tablet; Take 1 tablet (500 mg total) by mouth every 24 hours for 7 days (Patient not taking: Reported on 9/5/2019)          Subjective:   Chief Complaint   Patient presents with    Sinusitis     Seen at Urgent Care on 8/25 and was prescribed Zithromax    Earache     Left ear pain started today    Cough     Started two days ago - worse at night        Patient ID: Zaki Thao is a 44 y o  female  Same day sick appt  Pt reports, "Started last Thursday with ST, went to [facility] on Sunday, said was sinus infection and gave me z-ariel, but was up all night last night with pain in left ear and all down side of neck"   started working in June at a , so has had some ill contacts      The following portions of the patient's history were reviewed and updated as appropriate: allergies, current medications, past family history, past medical history, past social history, past surgical history and problem list     Review of Systems   Constitutional: Negative  HENT: Positive for congestion, ear pain (per hpi), rhinorrhea (one day was green), sinus pressure, sinus pain and sore throat (slight still remaining)  Negative for facial swelling  Respiratory: Positive for cough   Negative for apnea, choking, chest tightness, shortness of breath, wheezing and stridor  Unable to expectorate   Cardiovascular: Negative  Gastrointestinal: Negative  Hematological: Negative  Objective:      /88 (BP Location: Left arm, Patient Position: Sitting, Cuff Size: Adult)   Pulse 94   Temp 98 5 °F (36 9 °C) (Tympanic)   Resp 18   Ht 5' 7" (1 702 m)   Wt 130 kg (287 lb)   SpO2 98%   BMI 44 95 kg/m²          Physical Exam   Constitutional: She appears well-developed  She is cooperative  Non-toxic appearance  She does not have a sickly appearance  She does not appear ill  No distress  HENT:   Head: Normocephalic and atraumatic  Right Ear: Tympanic membrane and ear canal normal    Left Ear: Tympanic membrane and ear canal normal    Nose: Mucosal edema and rhinorrhea present  Right sinus exhibits maxillary sinus tenderness  Left sinus exhibits maxillary sinus tenderness  Mouth/Throat: Uvula is midline, oropharynx is clear and moist and mucous membranes are normal  No oropharyngeal exudate, posterior oropharyngeal edema or tonsillar abscesses  Tonsils are 0 on the right  Tonsils are 0 on the left  Mildly injected post pharynx and postnasal drainage   Eyes: Conjunctivae and lids are normal    Neck: Trachea normal  Neck supple  No thyroid mass and no thyromegaly present  Cardiovascular: Normal rate, regular rhythm and normal heart sounds  Pulmonary/Chest: Effort normal and breath sounds normal    Lymphadenopathy:     She has cervical adenopathy  Right cervical: No superficial cervical, no deep cervical and no posterior cervical adenopathy present  Left cervical: Superficial cervical adenopathy present  No deep cervical and no posterior cervical adenopathy present  Neurological: She is alert  Skin: Skin is warm and dry  Capillary refill takes less than 2 seconds  She is not diaphoretic  No pallor  Nursing note and vitals reviewed

## 2019-09-22 DIAGNOSIS — K21.9 GASTROESOPHAGEAL REFLUX DISEASE WITHOUT ESOPHAGITIS: ICD-10-CM

## 2019-09-23 RX ORDER — PROPRANOLOL HYDROCHLORIDE 10 MG/1
TABLET ORAL
Qty: 30 CAPSULE | Refills: 0 | Status: SHIPPED | OUTPATIENT
Start: 2019-09-23 | End: 2019-10-08 | Stop reason: SDUPTHER

## 2019-09-23 NOTE — TELEPHONE ENCOUNTER
You referred patient to [facility]  You told her to stay on Nexium until see by [facility]   She see them on 10/8

## 2019-09-23 NOTE — TELEPHONE ENCOUNTER
How is patient reflux controlled?  We were considering a short course of nexium and then switching to pepcid or zantac

## 2019-10-04 DIAGNOSIS — K21.9 GASTROESOPHAGEAL REFLUX DISEASE WITHOUT ESOPHAGITIS: ICD-10-CM

## 2019-10-07 RX ORDER — FAMOTIDINE 20 MG/1
TABLET, FILM COATED ORAL
Qty: 90 TABLET | Refills: 0 | Status: SHIPPED | OUTPATIENT
Start: 2019-10-07 | End: 2019-11-13

## 2019-10-08 ENCOUNTER — OFFICE VISIT (OUTPATIENT)
Dept: GASTROENTEROLOGY | Facility: AMBULARY SURGERY CENTER | Age: 39
End: 2019-10-08
Payer: COMMERCIAL

## 2019-10-08 VITALS
SYSTOLIC BLOOD PRESSURE: 120 MMHG | DIASTOLIC BLOOD PRESSURE: 70 MMHG | HEART RATE: 102 BPM | BODY MASS INDEX: 45.99 KG/M2 | RESPIRATION RATE: 18 BRPM | TEMPERATURE: 99.8 F | WEIGHT: 293 LBS | HEIGHT: 67 IN

## 2019-10-08 DIAGNOSIS — K21.9 GASTROESOPHAGEAL REFLUX DISEASE WITHOUT ESOPHAGITIS: ICD-10-CM

## 2019-10-08 PROCEDURE — 99214 OFFICE O/P EST MOD 30 MIN: CPT | Performed by: PHYSICIAN ASSISTANT

## 2019-10-08 RX ORDER — BUPROPION HYDROCHLORIDE 150 MG/1
150 TABLET ORAL DAILY
Refills: 0 | COMMUNITY
Start: 2019-09-24 | End: 2020-01-03

## 2019-10-08 RX ORDER — TOPIRAMATE 50 MG/1
TABLET, FILM COATED ORAL
Refills: 0 | COMMUNITY
Start: 2019-09-24 | End: 2020-01-21

## 2019-10-08 NOTE — ASSESSMENT & PLAN NOTE
Patient reports quite long-standing symptoms which historically have been well controlled on PPI therapy, there've been attempts made to switch to H2 blocker therapy but these appear to have been unsuccessful  She would probably benefit from a daily PPI at least for the time being until assessment can be made for underlying Jack's or malignancy    - Will schedule patient for EGD    - Patient was explained about  the risks and benefits of the procedure  Risks including but not limited to bleeding, infection, perforation were explained in detail  Also explained about less than 100% sensitivity with the exam and other alternatives  - Will start Nexium once daily, 40 mg to be taken half hour before breakfast    -  Patient was explained about the lifestyle and dietary modifications  Advised to avoid fatty foods, chocolates, caffeine, alcohol and any other triggering foods  Avoid eating for at least 3 hours before going to bed

## 2019-10-08 NOTE — PROGRESS NOTES
Follow-up Note -  [facility]  Jannette Baugh 1980 44 y o  female         Reason:  Follow up; GERD, dysphagia    HPI:  68-year-old female with history of anxiety/depression, shoulder pain who presents for follow-up; She tells me that she has generally had issues with acid reflux since her daughter was born about 13 years ago; she says in the beginning wasn't so bad, but got worse so she had been on Protonix for many years and was well controlled on that medication  She says that about 9 months ago her PCP had taken her off the Protonix out of concern for any additional long-term side effects and was tried on Pepcid  Patient said that she got a lot of bad heartburn after this was done, so she was started on Nexium for a couple of weeks, she said she did better within days, but then was advised to go back to the Pepcid and got bad heartburn again  She says it went back-and-forth like this a few times, and each time she would go back to an H2 blocker, she would get reflux symptoms again  She says that her symptoms were characterized by substernal burning sensation, she said that lately she has had some feeling like food gets stuck on the way down, although slight choking episodes  This generally only happens with solid food, not liquids  Her last colonoscopy was in August 2017 with Dr Olga Eduardo, showing one hyperplastic sigmoid colon polyp  She has never had an EGD  REVIEW OF SYSTEMS:      CONSTITUTIONAL: Denies any fever, chills, or rigors  Good appetite, and no recent weight loss  HEENT: No earache or tinnitus  Denies hearing loss or visual disturbances  CARDIOVASCULAR: No chest pain or palpitations  RESPIRATORY: Denies any cough, hemoptysis, shortness of breath or dyspnea on exertion  GASTROINTESTINAL: As noted in the History of Present Illness  GENITOURINARY: No problems with urination  Denies any hematuria or dysuria    NEUROLOGIC: No dizziness or vertigo, denies headaches  MUSCULOSKELETAL: Denies any muscle or joint pain  SKIN: Denies skin rashes or itching  ENDOCRINE: Denies excessive thirst  Denies intolerance to heat or cold  PSYCHOSOCIAL: Denies depression or anxiety  Denies any recent memory loss  Past Medical History:   Diagnosis Date    Allergic rhinitis     Anxiety     Arthritis     Chronic pain disorder     BACK SHOULDERS NECK    Depression     Disease of thyroid gland     nodule    Fibromyalgia, primary     GERD (gastroesophageal reflux disease)     Hearing difficulty of right ear     Hyperlipidemia     Hypertension     Irritable bowel syndrome     Perforation of tympanic membrane     Right    PONV (postoperative nausea and vomiting)     RBBB     Right bundle branch blockage     Sleep apnea     no cpap    Wears glasses       Past Surgical History:   Procedure Laterality Date    ANAL FISTULOTOMY N/A 2018    Procedure: FISTULOTOMY;  Surgeon: Colin Fallon MD;  [facility];  Service: Colorectal    BACK SURGERY      lumbar herniated disc    BREAST SURGERY Left 2006    cyst removal    CARPAL TUNNEL RELEASE       SECTION      CHOLECYSTECTOMY      COLONOSCOPY      CYST REMOVAL      DENTAL SURGERY      INCISION AND DRAINAGE OF WOUND N/A 2018    Procedure: INCISION AND DRAINAGE (I&D) BUTTOCK;  Surgeon: Colin Fallon MD;  [facility];  Service: Colorectal    CA COLONOSCOPY FLX DX W/COLLJ SPEC WHEN PFRMD N/A 2017    Procedure: COLONOSCOPY;  Surgeon: Papa Ervin MD;  [facility]; Service: Gastroenterology    CA INCISE FINGER TENDON SHEATH Right 3/13/2018    Procedure: LONG TRIGGER FINGER RELEASE;  Surgeon: Nessa Mustafa DO;  [facility];  Service: Orthopedics    CA SURG DIAGNOSTIC EXAM, ANORECTAL N/A 2018    Procedure: EXAM UNDER ANESTHESIA (EUA);   Surgeon: Colin Fallon MD;  [facility];  Service: Colorectal    CA TYMPANOPLASTY Right 5/24/2017    Procedure: TYMPANOPLASTY WITH PERICHONDRIN GRAFT;  Surgeon:  Meli Whitfield DO;  [facility];  Service: ENT    SHOULDER SURGERY Right     WISDOM TOOTH EXTRACTION       Social History     Socioeconomic History    Marital status: /Civil Union     Spouse name: Not on file    Number of children: Not on file    Years of education: Not on file    Highest education level: Not on file   Occupational History    Not on file   Social Needs    Financial resource strain: Not on file    Food insecurity:     Worry: Not on file     Inability: Not on file    Transportation needs:     Medical: Not on file     Non-medical: Not on file   Tobacco Use    Smoking status: Never Smoker    Smokeless tobacco: Never Used   Substance and Sexual Activity    Alcohol use: Yes     Comment: social    Drug use: No    Sexual activity: Yes     Comment: per allscripts   Lifestyle    Physical activity:     Days per week: Not on file     Minutes per session: Not on file    Stress: Not on file   Relationships    Social connections:     Talks on phone: Not on file     Gets together: Not on file     Attends Yarsanism service: Not on file     Active member of club or organization: Not on file     Attends meetings of clubs or organizations: Not on file     Relationship status: Not on file    Intimate partner violence:     Fear of current or ex partner: Not on file     Emotionally abused: Not on file     Physically abused: Not on file     Forced sexual activity: Not on file   Other Topics Concern    Not on file   Social History Narrative    Not on file     Family History   Problem Relation Age of Onset    Hypertension Mother     Hyperlipidemia Mother     Diabetes Mother     Hypertension Father     Hyperlipidemia Father     Heart disease Father         cardiac disorder-myocardial infarction arrhythmias    Diabetes unspecified Maternal Grandmother     Crohn's disease Brother     Arthritis Brother     Thyroid disease Paternal Grandmother      Amoxicillin; Neurontin [gabapentin];  Penicillin v; Penicillins; Aripiprazole; Lorazepam; Carbamazepine; Doxycycline; Lamotrigine; and Other  Current Outpatient Medications   Medication Sig Dispense Refill    albuterol (PROVENTIL HFA,VENTOLIN HFA) 90 mcg/act inhaler Inhale 2 puffs every 6 (six) hours as needed for shortness of breath (cough) 1 Inhaler 0    amitriptyline (ELAVIL) 50 mg tablet   5    Ascorbic Acid (VITAMIN C) 500 MG CAPS Take 1 capsule by mouth daily      atorvastatin (LIPITOR) 40 mg tablet Take 1 tablet (40 mg total) by mouth daily 90 tablet 1    buPROPion (WELLBUTRIN XL) 150 mg 24 hr tablet   0    Cholecalciferol (VITAMIN D3) 5000 units CAPS Take 1 capsule by mouth daily        cyanocobalamin (VITAMIN B-12) 1,000 mcg tablet Take 3 tablets by mouth 2 (two) times a day      esomeprazole (NEXIUM 24HR) 20 mg capsule Take 1 capsule (20 mg total) by mouth every morning 30 capsule 3    fexofenadine (ALLEGRA) 180 MG tablet Take 1 tablet (180 mg total) by mouth daily 30 tablet 11    FLUoxetine (PROzac) 40 MG capsule Take 2 capsules (80 mg total) by mouth daily (Patient taking differently: Take 40 mg by mouth daily ) 180 capsule 1    medroxyPROGESTERone (DEPO-PROVERA) 400 MG/ML SUSP Inject into a muscle q 3 months       metoprolol succinate (TOPROL-XL) 25 mg 24 hr tablet Take 1 tablet (25 mg total) by mouth daily 90 tablet 1    Multiple Vitamins-Minerals (CENTRUM ADULTS PO) Take 1 tablet by mouth daily      topiramate (TOPAMAX) 50 MG tablet   0    famotidine (PEPCID) 20 mg tablet TAKE ONE TABLET BY MOUTH ONCE DAILY (Patient not taking: Reported on 10/8/2019) 90 tablet 0    medroxyPROGESTERone (DEPO-PROVERA) 150 mg/mL injection   3    predniSONE 20 mg tablet 3 tabs by mouth daily x 3 days, 2 tabs by mouth daily x 3 days, then 1 tab by mouth daily x 3 days (Patient not taking: Reported on 9/26/2019) 18 tablet 0     No current facility-administered medications for this visit  Blood pressure 120/70, pulse 102, temperature 99 8 °F (37 7 °C), temperature source Tympanic, resp  rate 18, height 5' 7" (1 702 m), weight 134 kg (294 lb 6 4 oz)  PHYSICAL EXAM:      General Appearance:   Alert, cooperative, no distress, appears stated age    HEENT:   Normocephalic, atraumatic, anicteric      Neck:  Supple, symmetrical, trachea midline, no adenopathy;    thyroid: no enlargement/tenderness/nodules; no carotid  bruit or JVD    Lungs:   Clear to auscultation bilaterally; no rales, rhonchi or wheezing; respirations unlabored    Heart[de-identified]   S1 and S2 normal; regular rate and rhythm; no murmur, rub, or gallop  Abdomen:   Soft, non-tender, non-distended; normal bowel sounds; no masses, no organomegaly    Extremities: No edema, erythema, wounds, rashes   Rectal:   Deferred                      Lab Results   Component Value Date    WBC 7 30 04/23/2019    HGB 14 2 04/23/2019    HCT 40 2 04/23/2019    MCV 86 04/23/2019     04/23/2019     Lab Results   Component Value Date    GLUCOSE 96 04/05/2017    CALCIUM 9 8 05/21/2019     04/05/2017    K 3 8 05/21/2019    CO2 34 (H) 05/21/2019     05/21/2019    BUN 10 05/21/2019    CREATININE 0 70 05/21/2019     Lab Results   Component Value Date    ALT 27 04/23/2019    AST 17 04/23/2019    ALKPHOS 59 04/23/2019    BILITOT 0 6 04/05/2017     Lab Results   Component Value Date    INR 0 99 09/19/2014    PROTIME 12 9 09/19/2014       Xr Chest Pa & Lateral    Result Date: 4/24/2019  Impression: No acute cardiopulmonary disease  Workstation performed: [id number]       ASSESSMENT & PLAN:    Esophageal reflux  Patient reports quite long-standing symptoms which historically have been well controlled on PPI therapy, there've been attempts made to switch to H2 blocker therapy but these appear to have been unsuccessful    She would probably benefit from a daily PPI at least for the time being until assessment can be made for underlying Jack's or malignancy    - Will schedule patient for EGD    - Patient was explained about  the risks and benefits of the procedure  Risks including but not limited to bleeding, infection, perforation were explained in detail  Also explained about less than 100% sensitivity with the exam and other alternatives  - Will start Nexium once daily, 40 mg to be taken half hour before breakfast    -  Patient was explained about the lifestyle and dietary modifications  Advised to avoid fatty foods, chocolates, caffeine, alcohol and any other triggering foods  Avoid eating for at least 3 hours before going to bed

## 2019-11-06 ENCOUNTER — APPOINTMENT (OUTPATIENT)
Dept: LAB | Facility: HOSPITAL | Age: 39
End: 2019-11-06
Payer: COMMERCIAL

## 2019-11-06 DIAGNOSIS — E78.5 HYPERLIPIDEMIA, UNSPECIFIED HYPERLIPIDEMIA TYPE: ICD-10-CM

## 2019-11-06 DIAGNOSIS — R73.01 IMPAIRED FASTING BLOOD SUGAR: ICD-10-CM

## 2019-11-06 LAB
ALBUMIN SERPL BCP-MCNC: 4.1 G/DL (ref 3.5–5)
ALP SERPL-CCNC: 72 U/L (ref 46–116)
ALT SERPL W P-5'-P-CCNC: 41 U/L (ref 12–78)
ANION GAP SERPL CALCULATED.3IONS-SCNC: 9 MMOL/L (ref 4–13)
AST SERPL W P-5'-P-CCNC: 18 U/L (ref 5–45)
BILIRUB SERPL-MCNC: 0.55 MG/DL (ref 0.2–1)
BUN SERPL-MCNC: 10 MG/DL (ref 5–25)
CALCIUM SERPL-MCNC: 9.4 MG/DL (ref 8.3–10.1)
CHLORIDE SERPL-SCNC: 111 MMOL/L (ref 100–108)
CHOLEST SERPL-MCNC: 180 MG/DL (ref 50–200)
CO2 SERPL-SCNC: 23 MMOL/L (ref 21–32)
CREAT SERPL-MCNC: 0.76 MG/DL (ref 0.6–1.3)
GFR SERPL CREATININE-BSD FRML MDRD: 99 ML/MIN/1.73SQ M
GLUCOSE P FAST SERPL-MCNC: 144 MG/DL (ref 65–99)
HDLC SERPL-MCNC: 36 MG/DL
LDLC SERPL CALC-MCNC: 100 MG/DL (ref 0–100)
NONHDLC SERPL-MCNC: 144 MG/DL
POTASSIUM SERPL-SCNC: 3.6 MMOL/L (ref 3.5–5.3)
PROT SERPL-MCNC: 7.3 G/DL (ref 6.4–8.2)
SODIUM SERPL-SCNC: 143 MMOL/L (ref 136–145)
TRIGL SERPL-MCNC: 219 MG/DL

## 2019-11-06 PROCEDURE — 80061 LIPID PANEL: CPT

## 2019-11-06 PROCEDURE — 80053 COMPREHEN METABOLIC PANEL: CPT

## 2019-11-06 PROCEDURE — 36415 COLL VENOUS BLD VENIPUNCTURE: CPT

## 2019-11-13 ENCOUNTER — OFFICE VISIT (OUTPATIENT)
Dept: FAMILY MEDICINE CLINIC | Facility: CLINIC | Age: 39
End: 2019-11-13
Payer: COMMERCIAL

## 2019-11-13 VITALS
RESPIRATION RATE: 17 BRPM | BODY MASS INDEX: 45.96 KG/M2 | TEMPERATURE: 98.6 F | DIASTOLIC BLOOD PRESSURE: 80 MMHG | SYSTOLIC BLOOD PRESSURE: 118 MMHG | HEIGHT: 66 IN | OXYGEN SATURATION: 98 % | HEART RATE: 98 BPM | WEIGHT: 286 LBS

## 2019-11-13 DIAGNOSIS — E66.01 MORBID OBESITY WITH BMI OF 45.0-49.9, ADULT (HCC): ICD-10-CM

## 2019-11-13 DIAGNOSIS — G47.33 OSA (OBSTRUCTIVE SLEEP APNEA): ICD-10-CM

## 2019-11-13 DIAGNOSIS — Z23 NEED FOR INFLUENZA VACCINATION: Primary | ICD-10-CM

## 2019-11-13 DIAGNOSIS — R53.82 CHRONIC FATIGUE: ICD-10-CM

## 2019-11-13 DIAGNOSIS — E04.1 THYROID NODULE: ICD-10-CM

## 2019-11-13 DIAGNOSIS — R40.0 DAYTIME SLEEPINESS: ICD-10-CM

## 2019-11-13 DIAGNOSIS — M79.7 FIBROMYALGIA: ICD-10-CM

## 2019-11-13 DIAGNOSIS — R73.01 IMPAIRED FASTING BLOOD SUGAR: ICD-10-CM

## 2019-11-13 DIAGNOSIS — R68.2 DRY MOUTH: ICD-10-CM

## 2019-11-13 LAB — SL AMB POCT HEMOGLOBIN AIC: 5.2 (ref ?–6.5)

## 2019-11-13 PROCEDURE — 83036 HEMOGLOBIN GLYCOSYLATED A1C: CPT | Performed by: PHYSICIAN ASSISTANT

## 2019-11-13 PROCEDURE — 99214 OFFICE O/P EST MOD 30 MIN: CPT | Performed by: PHYSICIAN ASSISTANT

## 2019-11-13 PROCEDURE — 3044F HG A1C LEVEL LT 7.0%: CPT | Performed by: PHYSICIAN ASSISTANT

## 2019-11-13 PROCEDURE — 90686 IIV4 VACC NO PRSV 0.5 ML IM: CPT

## 2019-11-13 PROCEDURE — 90471 IMMUNIZATION ADMIN: CPT

## 2019-11-13 PROCEDURE — 1036F TOBACCO NON-USER: CPT | Performed by: PHYSICIAN ASSISTANT

## 2019-11-13 RX ORDER — MEDROXYPROGESTERONE ACETATE 150 MG/ML
150 INJECTION, SUSPENSION INTRAMUSCULAR
COMMUNITY
End: 2020-06-17

## 2019-11-13 RX ORDER — AMITRIPTYLINE HYDROCHLORIDE 50 MG/1
50 TABLET, FILM COATED ORAL
Qty: 90 TABLET | Refills: 1 | Status: SHIPPED | OUTPATIENT
Start: 2019-11-13 | End: 2020-06-17 | Stop reason: SDUPTHER

## 2019-11-13 RX ORDER — FLUOXETINE HYDROCHLORIDE 20 MG/1
CAPSULE ORAL
Refills: 0 | COMMUNITY
Start: 2019-10-22 | End: 2020-01-03

## 2019-11-13 NOTE — PROGRESS NOTES
Routine Follow-up    Jannette Baugh 44 y o  female   Date:  11/13/2019      Assessment and Plan:    Jannette was seen today for follow-up  Diagnoses and all orders for this visit:    Need for influenza vaccination  -     influenza vaccine, [id number], quadrivalent, 0 5 mL, preservative-free, for adult and pediatric patients 6 mos+ (AFLURIA, FLUARIX, FLULAVAL, FLUZONE)    Fibromyalgia  -     amitriptyline (ELAVIL) 50 mg tablet; Take 1 tablet (50 mg total) by mouth daily at bedtime    Chronic fatigue  -     Ambulatory referral to Sleep Medicine; Future  - related to untreated sleep apnea vs depression or both    Daytime sleepiness  -     Ambulatory referral to Sleep Medicine; Future    MARCELINO (obstructive sleep apnea)  -     Ambulatory referral to Sleep Medicine; Future    Morbid obesity with BMI of 45 0-49 9, adult ([facility] )  -     Ambulatory referral to Weight Management; Future  - wishes to consult with bariatric surgeon  - depression does not help her to get motivated to improve lifestyle choices    Impaired fasting blood sugar  -     POCT hemoglobin A1c  - a1c 5 2  - low carb, wt loss    Dry mouth  - ? Related to psych meds, coming off prozac from psychiatry   - could be secondary to antihistamine for allergies  - also mostly happens at night, sleeps with mouth open    Thyroid nodule  -     US thyroid; Future  - last ultrasound Feb 2019 and recommend follow up in 12-24 months           BMI Counseling: Body mass index is 45 81 kg/m²  The BMI is above normal  Nutrition recommendations include decreasing portion sizes, limiting drinks that contain sugar, moderation in carbohydrate intake and reducing intake of saturated and trans fat  Exercise recommendations include exercising 3-5 times per week   Patient referred to weight management and bariatric surgery           HPI:  Chief Complaint   Patient presents with    Follow-up     Review blood work results      HPI   Patient is a 45 yo female who presents for routine follow up  She is seeing psychiatry and being weaned from the prozac and is going through a rough time with medication changes  She does not feel it is working and is crying a lot since coming of prozac  She sees them again next week  She has dry mouth - unsure if related to psych meds or antihistamine she takes for allergies  She is tired constantly  If she lets herself lay down, she could nap for hours  She was tired before her med changes but now it is worse  She has hx of MARCELINO but was having difficulty with mask  She sees Dr Bobbi Pineda for Gyn and still on depo shot  She feels a lot of her problems could be improved with weight loss  She would consider talking to bariatric team  Her depression makes it harder for her to lose weight and stay active but also her weight makes her depressed  She saw that she had high sugar level of labs so improved her sugar intake and already lost 4 lbs in 1 weeks  She is on allegra and switched from zyrtec by ENT but sees no difference  She had stable thyroid nodule in Feb 2019 and recommended to repeat US in 12-24 months  ROS: Review of Systems   Constitutional: Positive for fatigue  Negative for chills and fever  Unexpected weight change: struggles losing weight  HENT: Positive for congestion (allergies)  Negative for sore throat and trouble swallowing  Dry mouth    Respiratory: Negative  Cardiovascular: Negative  Gastrointestinal:        Controlled on nexium   Genitourinary: Negative  Skin: Negative  Allergic/Immunologic: Positive for environmental allergies  Psychiatric/Behavioral: Positive for dysphoric mood  Negative for suicidal ideas  The patient is nervous/anxious          Past Medical History:   Diagnosis Date    Allergic rhinitis     Anxiety     Arthritis     Chronic pain disorder     BACK SHOULDERS NECK    Depression     Disease of thyroid gland     nodule    Fibromyalgia, primary     GERD (gastroesophageal reflux disease)     Hearing difficulty of right ear     Hyperlipidemia     Hypertension     Irritable bowel syndrome     Perforation of tympanic membrane     Right    PONV (postoperative nausea and vomiting)     RBBB     Right bundle branch blockage     Sleep apnea     no cpap    Wears glasses      Patient Active Problem List   Diagnosis    Thyroid nodule    Trigger finger, right middle finger    Carpal tunnel syndrome, bilateral    Asthma, mild intermittent    Benign essential hypertension    Breast hypertrophy    Bulging lumbar disc    Bright red blood per rectum    Calcific tendinitis of shoulder    Chronic low back pain    Chronic right-sided low back pain without sciatica    Chronic sinusitis    Depression with anxiety    Dermatitis, eczematoid    Disorder of bursae of shoulder region    Esophageal reflux    Fibromyalgia    H/O laminectomy    Hemorrhoids    Hypercholesterolemia    Mammographic microcalcification    Morbid obesity (HCC)    Obstructive sleep apnea    Pars defect of lumbar spine    RBBB (right bundle branch block)    Spondylolysis of lumbar region    Vitamin D insufficiency    Atypical chest pain    Anal pain    Anal fissure, unspecified    Anal fistula    Sebaceous cyst of breast, left    Left ear pain    Acute maxillary sinusitis    Cough    Left cervical lymphadenopathy       Past Surgical History:   Procedure Laterality Date    ANAL FISTULOTOMY N/A 2018    Procedure: FISTULOTOMY;  Surgeon: Einar Buerger, MD;  [facility];  Service: Colorectal    BACK SURGERY      lumbar herniated disc    BREAST SURGERY Left 2006    cyst removal    CARPAL TUNNEL RELEASE       SECTION      CHOLECYSTECTOMY      COLONOSCOPY      CYST REMOVAL      DENTAL SURGERY      INCISION AND DRAINAGE OF WOUND N/A 2018    Procedure: INCISION AND DRAINAGE (I&D) BUTTOCK;  Surgeon: Einar Buerger, MD;  [facility];  Service: Colorectal    [place] COLONOSCOPY FLX DX W/COLLJ [facility] WHEN PFRMD N/A 8/2/2017    Procedure: COLONOSCOPY;  Surgeon: iNta Olvera MD;  [facility]; Service: Gastroenterology    CA INCISE FINGER TENDON SHEATH Right 3/13/2018    Procedure: LONG TRIGGER FINGER RELEASE;  Surgeon: Luz Elena Weiner DO;  [facility];  Service: Orthopedics    CA SURG DIAGNOSTIC EXAM, ANORECTAL N/A 11/2/2018    Procedure: EXAM UNDER ANESTHESIA (EUA); Surgeon: Jaciel Giang MD;  [facility];  Service: Colorectal    CA TYMPANOPLASTY Right 5/24/2017    Procedure: TYMPANOPLASTY WITH PERICHONDRIN GRAFT;  Surgeon:  Marshall Guillen DO;  [facility];  Service: ENT    SHOULDER SURGERY Right     WISDOM TOOTH EXTRACTION         Social History     Socioeconomic History    Marital status: /Civil Union     Spouse name: None    Number of children: None    Years of education: None    Highest education level: None   Occupational History    None   Social Needs    Financial resource strain: None    Food insecurity:     Worry: None     Inability: None    Transportation needs:     Medical: No     Non-medical: No   Tobacco Use    Smoking status: Never Smoker    Smokeless tobacco: Never Used   Substance and Sexual Activity    Alcohol use: Yes     Comment: social    Drug use: No    Sexual activity: Yes     Partners: Male     Birth control/protection: Injection     Comment: per allscripts   Lifestyle    Physical activity:     Days per week: 2 days     Minutes per session: None    Stress: None   Relationships    Social connections:     Talks on phone: None     Gets together: None     Attends Catholic service: None     Active member of club or organization: None     Attends meetings of clubs or organizations: None     Relationship status: None    Intimate partner violence:     Fear of current or ex partner: None     Emotionally abused: None     Physically abused: None     Forced sexual activity: None   Other Topics Concern    None Social History Narrative    None       Family History   Problem Relation Age of Onset    Hypertension Mother     Hyperlipidemia Mother     Diabetes Mother     Hypertension Father     Hyperlipidemia Father     Heart disease Father         cardiac disorder-myocardial infarction arrhythmias    Diabetes unspecified Maternal Grandmother     Crohn's disease Brother     Arthritis Brother     Thyroid disease Paternal Grandmother        Allergies   Allergen Reactions    Amoxicillin Swelling, Anaphylaxis and Angioedema    Neurontin [Gabapentin] Other (See Comments)     Other reaction(s): SUICIDE IDEATION  Other reaction(s): Other (See Comments)  suicidal  suicidal    Penicillin V Angioedema and Swelling     Other reaction(s): Throat closure    Penicillins Swelling and Anaphylaxis    Aripiprazole Other (See Comments)     Other reaction(s): low dose 2 mg ; curving in and locking in of hands/dystonia  Other reaction(s): Other (See Comments)  Other reaction(s): low dose 2 mg ; curving in and locking in of hands/dystonia    Lorazepam Other (See Comments)     Other reaction(s): higher dose > anxiety and depression  Other reaction(s):  Other (See Comments)  Other reaction(s): higher dose > anxiety and depression    Carbamazepine Other (See Comments)     Other reaction(s): Unknown Reaction    Doxycycline Hives    Lamotrigine Rash    Other Hives     Adhesive tape         Current Outpatient Medications:     albuterol (PROVENTIL HFA,VENTOLIN HFA) 90 mcg/act inhaler, Inhale 2 puffs every 6 (six) hours as needed for shortness of breath (cough), Disp: 1 Inhaler, Rfl: 0    Ascorbic Acid (VITAMIN C) 500 MG CAPS, Take 1 capsule by mouth daily, Disp: , Rfl:     atorvastatin (LIPITOR) 40 mg tablet, Take 1 tablet (40 mg total) by mouth daily, Disp: 90 tablet, Rfl: 1    buPROPion (WELLBUTRIN XL) 150 mg 24 hr tablet, , Disp: , Rfl: 0    Cholecalciferol (VITAMIN D3) 5000 units CAPS, Take 1 capsule by mouth daily  , Disp: , Rfl:     cyanocobalamin (VITAMIN B-12) 1,000 mcg tablet, Take 3 tablets by mouth 2 (two) times a day, Disp: , Rfl:     esomeprazole (NEXIUM 24HR) 20 mg capsule, Take 1 capsule (20 mg total) by mouth every morning, Disp: 30 capsule, Rfl: 3    fexofenadine (ALLEGRA) 180 MG tablet, Take 1 tablet (180 mg total) by mouth daily, Disp: 30 tablet, Rfl: 11    FLUoxetine (PROzac) 20 mg capsule, , Disp: , Rfl: 0    medroxyPROGESTERone (DEPO-PROVERA) 150 mg/mL injection, Inject 150 mg into a muscle every 3 (three) months, Disp: , Rfl:     medroxyPROGESTERone (DEPO-PROVERA) 400 MG/ML SUSP, Inject into a muscle once a week, Disp: , Rfl:     metoprolol succinate (TOPROL-XL) 25 mg 24 hr tablet, Take 1 tablet (25 mg total) by mouth daily, Disp: 90 tablet, Rfl: 1    Multiple Vitamins-Minerals (CENTRUM ADULTS PO), Take 1 tablet by mouth daily, Disp: , Rfl:     topiramate (TOPAMAX) 50 MG tablet, , Disp: , Rfl: 0    amitriptyline (ELAVIL) 50 mg tablet, Take 1 tablet (50 mg total) by mouth daily at bedtime, Disp: 90 tablet, Rfl: 1      Physical Exam:  /80 (BP Location: Left arm, Patient Position: Sitting)   Pulse 98   Temp 98 6 °F (37 °C) (Tympanic)   Resp 17   Ht 5' 6 25" (1 683 m)   Wt 130 kg (286 lb)   SpO2 98%   BMI 45 81 kg/m²     Physical Exam   Constitutional: She is oriented to person, place, and time  She appears well-developed and well-nourished  No distress  mordbily obese, central adiposity   HENT:   Head: Normocephalic and atraumatic  Nose: Nose normal    Mouth/Throat: Oropharynx is clear and moist  No oropharyngeal exudate  Eyes: Pupils are equal, round, and reactive to light  Conjunctivae are normal    Neck: Normal range of motion  Neck supple  Cardiovascular: Normal rate, regular rhythm and normal heart sounds  Pulmonary/Chest: Effort normal and breath sounds normal  No respiratory distress  She has no wheezes  Musculoskeletal: She exhibits no edema or deformity     Lymphadenopathy: She has no cervical adenopathy  Neurological: She is alert and oriented to person, place, and time  No cranial nerve deficit  Skin: Skin is warm and dry  No rash noted  No pallor  Psychiatric: Her speech is normal and behavior is normal  Thought content normal  Her mood appears not anxious  Her affect is not labile  She exhibits a depressed mood           Labs:  Lab Results   Component Value Date    WBC 7 30 04/23/2019    HGB 14 2 04/23/2019    HCT 40 2 04/23/2019    MCV 86 04/23/2019     04/23/2019     Lab Results   Component Value Date     04/05/2017    K 3 6 11/06/2019     (H) 11/06/2019    CO2 23 11/06/2019    ANIONGAP 7 09/19/2014    BUN 10 11/06/2019    CREATININE 0 76 11/06/2019    GLUCOSE 96 04/05/2017    GLUF 144 (H) 11/06/2019    CALCIUM 9 4 11/06/2019    AST 18 11/06/2019    ALT 41 11/06/2019    ALKPHOS 72 11/06/2019    PROT 6 7 04/05/2017    BILITOT 0 6 04/05/2017    EGFR 99 11/06/2019

## 2019-12-04 ENCOUNTER — ANESTHESIA EVENT (OUTPATIENT)
Dept: GASTROENTEROLOGY | Facility: HOSPITAL | Age: 39
End: 2019-12-04

## 2019-12-05 ENCOUNTER — ANESTHESIA (OUTPATIENT)
Dept: GASTROENTEROLOGY | Facility: HOSPITAL | Age: 39
End: 2019-12-05

## 2019-12-05 ENCOUNTER — HOSPITAL ENCOUNTER (OUTPATIENT)
Dept: GASTROENTEROLOGY | Facility: HOSPITAL | Age: 39
Setting detail: OUTPATIENT SURGERY
Discharge: HOME | End: 2019-12-05
Attending: INTERNAL MEDICINE | Admitting: INTERNAL MEDICINE
Payer: COMMERCIAL

## 2019-12-05 VITALS
OXYGEN SATURATION: 96 % | TEMPERATURE: 97 F | BODY MASS INDEX: 45.52 KG/M2 | DIASTOLIC BLOOD PRESSURE: 79 MMHG | WEIGHT: 290 LBS | HEART RATE: 97 BPM | SYSTOLIC BLOOD PRESSURE: 138 MMHG | RESPIRATION RATE: 18 BRPM | HEIGHT: 67 IN

## 2019-12-05 DIAGNOSIS — K21.9 GASTROESOPHAGEAL REFLUX DISEASE WITHOUT ESOPHAGITIS: ICD-10-CM

## 2019-12-05 LAB
EXT PREGNANCY TEST URINE: NEGATIVE
EXT. CONTROL: NORMAL

## 2019-12-05 PROCEDURE — 88305 TISSUE EXAM BY PATHOLOGIST: CPT | Performed by: PATHOLOGY

## 2019-12-05 PROCEDURE — 43239 EGD BIOPSY SINGLE/MULTIPLE: CPT | Performed by: INTERNAL MEDICINE

## 2019-12-05 PROCEDURE — 88342 IMHCHEM/IMCYTCHM 1ST ANTB: CPT | Performed by: PATHOLOGY

## 2019-12-05 PROCEDURE — 81025 URINE PREGNANCY TEST: CPT | Performed by: INTERNAL MEDICINE

## 2019-12-05 RX ORDER — PROPOFOL 10 MG/ML
INJECTION, EMULSION INTRAVENOUS AS NEEDED
Status: DISCONTINUED | OUTPATIENT
Start: 2019-12-05 | End: 2019-12-05 | Stop reason: SURG

## 2019-12-05 RX ORDER — SODIUM CHLORIDE, SODIUM LACTATE, POTASSIUM CHLORIDE, CALCIUM CHLORIDE 600; 310; 30; 20 MG/100ML; MG/100ML; MG/100ML; MG/100ML
100 INJECTION, SOLUTION INTRAVENOUS CONTINUOUS
Status: DISCONTINUED | OUTPATIENT
Start: 2019-12-05 | End: 2019-12-09 | Stop reason: HOSPADM

## 2019-12-05 RX ORDER — SODIUM CHLORIDE 9 MG/ML
INJECTION, SOLUTION INTRAVENOUS CONTINUOUS PRN
Status: DISCONTINUED | OUTPATIENT
Start: 2019-12-05 | End: 2019-12-05 | Stop reason: SURG

## 2019-12-05 RX ORDER — CETIRIZINE HYDROCHLORIDE 10 MG/1
10 TABLET ORAL DAILY
COMMUNITY
Start: 2019-11-02

## 2019-12-05 RX ORDER — PROPOFOL 10 MG/ML
INJECTION, EMULSION INTRAVENOUS CONTINUOUS PRN
Status: DISCONTINUED | OUTPATIENT
Start: 2019-12-05 | End: 2019-12-05 | Stop reason: SURG

## 2019-12-05 RX ORDER — LIDOCAINE HYDROCHLORIDE 20 MG/ML
INJECTION, SOLUTION EPIDURAL; INFILTRATION; INTRACAUDAL; PERINEURAL AS NEEDED
Status: DISCONTINUED | OUTPATIENT
Start: 2019-12-05 | End: 2019-12-05 | Stop reason: SURG

## 2019-12-05 RX ORDER — GLYCOPYRROLATE 0.2 MG/ML
INJECTION INTRAMUSCULAR; INTRAVENOUS AS NEEDED
Status: DISCONTINUED | OUTPATIENT
Start: 2019-12-05 | End: 2019-12-05 | Stop reason: SURG

## 2019-12-05 RX ADMIN — GLYCOPYRROLATE 0.2 MG: 0.2 INJECTION, SOLUTION INTRAMUSCULAR; INTRAVENOUS at 09:41

## 2019-12-05 RX ADMIN — LIDOCAINE HYDROCHLORIDE 70 MG: 20 INJECTION, SOLUTION EPIDURAL; INFILTRATION; INTRACAUDAL; PERINEURAL at 09:41

## 2019-12-05 RX ADMIN — PROPOFOL 50 MG: 10 INJECTION, EMULSION INTRAVENOUS at 09:43

## 2019-12-05 RX ADMIN — PROPOFOL 150 MCG/KG/MIN: 10 INJECTION, EMULSION INTRAVENOUS at 09:41

## 2019-12-05 RX ADMIN — PROPOFOL 100 MG: 10 INJECTION, EMULSION INTRAVENOUS at 09:41

## 2019-12-05 RX ADMIN — SODIUM CHLORIDE: 9 INJECTION, SOLUTION INTRAVENOUS at 09:34

## 2019-12-05 NOTE — H&P
History and Physical - [facility]  Mariam Oziel Baugh 44 y o  female MRN: [id number]    HPI: Arina Casas is a 44y o  year old female who presents for evaluation of GERD symptoms  Review of Systems    Historical Information   Past Medical History:   Diagnosis Date    Allergic rhinitis     Anxiety     Arthritis     Chronic pain disorder     BACK SHOULDERS NECK    Depression     Disease of thyroid gland     nodule    Fibromyalgia, primary     GERD (gastroesophageal reflux disease)     Hearing difficulty of right ear     Hyperlipidemia     Hypertension     Irritable bowel syndrome     Perforation of tympanic membrane     Right    PONV (postoperative nausea and vomiting)     RBBB     Right bundle branch blockage     Sleep apnea     no cpap    Wears glasses      Past Surgical History:   Procedure Laterality Date    ANAL FISTULOTOMY N/A 2018    Procedure: FISTULOTOMY;  Surgeon: Dat Starr MD;  [facility];  Service: Colorectal    BACK SURGERY      lumbar herniated disc    BREAST SURGERY Left 2006    cyst removal    CARPAL TUNNEL RELEASE       SECTION      CHOLECYSTECTOMY      COLONOSCOPY      CYST REMOVAL      DENTAL SURGERY      INCISION AND DRAINAGE OF WOUND N/A 2018    Procedure: INCISION AND DRAINAGE (I&D) BUTTOCK;  Surgeon: Dat Starr MD;  [facility];  Service: Colorectal    TN COLONOSCOPY FLX DX W/COLLJ SPEC WHEN PFRMD N/A 2017    Procedure: COLONOSCOPY;  Surgeon: Olegario Goldstein MD;  [facility]; Service: Gastroenterology    TN INCISE FINGER TENDON SHEATH Right 3/13/2018    Procedure: LONG TRIGGER FINGER RELEASE;  Surgeon: Ramana Pablo DO;  [facility];  Service: Orthopedics    TN SURG DIAGNOSTIC EXAM, ANORECTAL N/A 2018    Procedure: EXAM UNDER ANESTHESIA (EUA);   Surgeon: Dat Starr MD;  [facility];  Service: Colorectal    TN TYMPANOPLASTY Right 5/24/2017    Procedure: TYMPANOPLASTY WITH PERICHONDRIN GRAFT;  Surgeon: Malachi Jeter DO;  [facility];  Service: ENT    SHOULDER SURGERY Right     WISDOM TOOTH EXTRACTION       Social History   Social History     Substance and Sexual Activity   Alcohol Use Yes    Comment: social     Social History     Substance and Sexual Activity   Drug Use No     Social History     Tobacco Use   Smoking Status Never Smoker   Smokeless Tobacco Never Used     Family History   Problem Relation Age of Onset    Hypertension Mother     Hyperlipidemia Mother     Diabetes Mother     Hypertension Father     Hyperlipidemia Father     Heart disease Father         cardiac disorder-myocardial infarction arrhythmias    Diabetes unspecified Maternal Grandmother     Crohn's disease Brother     Arthritis Brother     Thyroid disease Paternal Grandmother        Meds/Allergies       (Not in a hospital admission)    Allergies   Allergen Reactions    Amoxicillin Swelling, Anaphylaxis and Angioedema    Neurontin [Gabapentin] Other (See Comments)     Other reaction(s): SUICIDE IDEATION  Other reaction(s): Other (See Comments)  suicidal  suicidal    Penicillin V Angioedema and Swelling     Other reaction(s): Throat closure    Penicillins Swelling and Anaphylaxis    Aripiprazole Other (See Comments)     Other reaction(s): low dose 2 mg ; curving in and locking in of hands/dystonia  Other reaction(s): Other (See Comments)  Other reaction(s): low dose 2 mg ; curving in and locking in of hands/dystonia    Lorazepam Other (See Comments)     Other reaction(s): higher dose > anxiety and depression  Other reaction(s):  Other (See Comments)  Other reaction(s): higher dose > anxiety and depression    Carbamazepine Other (See Comments)     Other reaction(s): Unknown Reaction    Doxycycline Hives    Lamotrigine Rash    Other Hives     Adhesive tape       Objective     /75   Pulse 98   Temp (!) 97 °F (36 1 °C) (Temporal) Resp 20   Ht 5' 7" (1 702 m)   Wt 132 kg (290 lb)   SpO2 100%   BMI 45 42 kg/m²       PHYSICAL EXAM    Gen: NAD  CV: RRR  CHEST: Clear  ABD: soft, NT/ND  EXT: no edema  Neuro: AAO      ASSESSMENT/PLAN:  This is a 44y o  year old female here for evaluation of GERD symptoms  PLAN:   Procedure:  EGD

## 2019-12-05 NOTE — ANESTHESIA PREPROCEDURE EVALUATION
Review of Systems/Medical History  Patient summary reviewed  Chart reviewed  History of anesthetic complications PONV    Cardiovascular  Hyperlipidemia, Hypertension controlled, Dysrhythmias ,    Pulmonary  Asthma , Sleep apnea (does not wear CPAP) ,        GI/Hepatic    GERD ,        Negative  ROS        Endo/Other  History of thyroid disease , hypothyroidism,   Obesity (BMI 45)  morbid obesity   GYN  Negative gynecology ROS Not currently pregnant ,          Hematology  Negative hematology ROS      Musculoskeletal    Arthritis     Neurology    Neuromuscular disease , Headaches,    Psychology   Anxiety, Depression ,              Physical Exam    Airway    Mallampati score: II  TM Distance: >3 FB  Neck ROM: full     Dental   No notable dental hx     Cardiovascular  Cardiovascular exam normal    Pulmonary  Pulmonary exam normal     Other Findings      Lab Results   Component Value Date    WBC 7 30 04/23/2019    HGB 14 2 04/23/2019     04/23/2019     Lab Results   Component Value Date    SODIUM 143 11/06/2019    K 3 6 11/06/2019    BUN 10 11/06/2019    CREATININE 0 76 11/06/2019    EGFR 99 11/06/2019    GLUCOSE 96 04/05/2017     Lab Results   Component Value Date    HGBA1C 5 2 11/13/2019     Anesthesia Plan  ASA Score- 3     Anesthesia Type- IV sedation with anesthesia with ASA Monitors  Additional Monitors:   Airway Plan:         Plan Factors-  Patient did not smoke on day of surgery  Induction- intravenous  Postoperative Plan-     Informed Consent- Anesthetic plan and risks discussed with patient  I personally reviewed this patient with the CRNA  Discussed and agreed on the Anesthesia Plan with the CRNA  [place]

## 2019-12-18 ENCOUNTER — TELEPHONE (OUTPATIENT)
Dept: GASTROENTEROLOGY | Facility: AMBULARY SURGERY CENTER | Age: 39
End: 2019-12-18

## 2019-12-18 ENCOUNTER — TELEPHONE (OUTPATIENT)
Dept: GASTROENTEROLOGY | Facility: CLINIC | Age: 39
End: 2019-12-18

## 2019-12-18 NOTE — TELEPHONE ENCOUNTER
----- Message from Maribell Small MD sent at 12/18/2019 11:14 AM EST -----  Please inform the patient that the gastric biopsies are negative for H pylori, duodenal biopsies do not show any evidence of celiac disease  Polyp in the stomach are hyperplastic and fundic gland, would recommend repeat EGD in 2 years given hyperplastic polyp  No dysplasia or intestinal metaplasia  Continue taking PPI on daily basis  Follow-up in the office in 3-4 months

## 2019-12-18 NOTE — TELEPHONE ENCOUNTER
Patients GI provider:  Dr Eloy Gutiérrez   Number to return call: [phone]       Reason for call: Pt calling to get biopsy results    Scheduled procedure/appointment date if applicable: Apt/procedure na

## 2020-01-03 ENCOUNTER — OFFICE VISIT (OUTPATIENT)
Dept: FAMILY MEDICINE CLINIC | Facility: CLINIC | Age: 40
End: 2020-01-03
Payer: COMMERCIAL

## 2020-01-03 VITALS
SYSTOLIC BLOOD PRESSURE: 134 MMHG | OXYGEN SATURATION: 98 % | WEIGHT: 289 LBS | BODY MASS INDEX: 46.45 KG/M2 | TEMPERATURE: 98.1 F | HEART RATE: 120 BPM | DIASTOLIC BLOOD PRESSURE: 90 MMHG | HEIGHT: 66 IN | RESPIRATION RATE: 18 BRPM

## 2020-01-03 DIAGNOSIS — F41.1 GAD (GENERALIZED ANXIETY DISORDER): ICD-10-CM

## 2020-01-03 DIAGNOSIS — R05.9 COUGH: ICD-10-CM

## 2020-01-03 DIAGNOSIS — B97.89 ACUTE VIRAL BRONCHIOLITIS: ICD-10-CM

## 2020-01-03 DIAGNOSIS — F33.1 MODERATE EPISODE OF RECURRENT MAJOR DEPRESSIVE DISORDER (HCC): Primary | ICD-10-CM

## 2020-01-03 DIAGNOSIS — J21.8 ACUTE VIRAL BRONCHIOLITIS: ICD-10-CM

## 2020-01-03 PROBLEM — J01.00 ACUTE MAXILLARY SINUSITIS: Status: RESOLVED | Noted: 2019-08-28 | Resolved: 2020-01-03

## 2020-01-03 PROBLEM — Q79.60 EHLERS-DANLOS SYNDROME: Status: ACTIVE | Noted: 2019-11-26

## 2020-01-03 PROCEDURE — 99214 OFFICE O/P EST MOD 30 MIN: CPT | Performed by: PHYSICIAN ASSISTANT

## 2020-01-03 RX ORDER — METHYLPREDNISOLONE 4 MG/1
TABLET ORAL
Qty: 21 EACH | Refills: 0 | Status: SHIPPED | OUTPATIENT
Start: 2020-01-03 | End: 2020-02-28

## 2020-01-03 RX ORDER — ALBUTEROL SULFATE 90 UG/1
2 AEROSOL, METERED RESPIRATORY (INHALATION) EVERY 6 HOURS PRN
Qty: 1 INHALER | Refills: 0 | Status: SHIPPED | OUTPATIENT
Start: 2020-01-03 | End: 2021-11-17 | Stop reason: SDUPTHER

## 2020-01-03 NOTE — PROGRESS NOTES
Routine Follow-up    Jannette Baugh 44 y o  female   Date:  1/3/2020      Assessment and Plan:    Jannette was seen today for depression, cough and facial pain  Diagnoses and all orders for this visit:    Moderate episode of recurrent major depressive disorder ([facility] [address] )  -     Ambulatory referral to Psychiatry; Future  -     Ambulatory referral to Psychiatry; Future  - patient has had multiple medication changes without befefit, she was recently weaned off prozac which she had been on for years, then wellbutrin and topamax was added and she sees no benefit and feels like she is in a fog  - she would like to switch psychiatrists, her previous one left the practice  - will take 1/2 wellbutrin x 1 week then stop, she would like to come off topamax as well  - follow up in 1 month, try to reestablish with new psychiaty  - can consider effexor down the road or patient may wish to go back on prozac    MILADYS (generalized anxiety disorder)  -     Ambulatory referral to Psychiatry; Future  -     Ambulatory referral to Psychiatry; Future    Cough  -     albuterol (PROVENTIL HFA,VENTOLIN HFA) 90 mcg/act inhaler; Inhale 2 puffs every 6 (six) hours as needed for shortness of breath (cough)    Acute viral bronchiolitis  -     methylPREDNISolone 4 MG tablet therapy pack; Use as directed on package    Depression Screening Follow-up Plan: Patient's depression screening was positive with a PHQ-2 score of 4  Their PHQ-9 score was 16  Patient assessed for underlying major depression  They have no active suicidal ideations  Brief counseling provided and recommend additional follow-up/re-evaluation next office visit  I have spent 25 minutes with Patient  today in which greater than 50% of this time was spent in counseling/coordination of care regarding Risks and benefits of tx options, Intructions for management, Risk factor reductions and Impressions               HPI:  Chief Complaint   Patient presents with    Depression     wants to talk about meds- physic had her on so many things and was having side effects-- now she saw him and he wants to add two more and wants to talk to you     Cough     x 2 days    Facial Pain     since naomi DEVRIES  Patient is a 43 yo female with PMH of major depression and anxiety who sees psychiatry currently but wishes to discuss her depression and current regimen  She has been seeing psychiatry in [place], Dr Geronimo Staley for the past 6 months  Before them, she was seeing Maxx Smith through [facility] for about 5 years  She was just on prozac at that time  She was still dealing with anxiety  She has now off the prozac for 3 weeks, was being weaned off  She had been on this for many years and felt it was not helping and she reports psychiatry didn't like prozac  She regrets doing that now, wishes she would have just left it do  She is still on Wellbutrin and Topamax from new psychiatrist without changes but she recalls that psychiatry wanted to add effexor and lamictal  She was uncomfortable with adding yet another 2 medications  She feels like she would have no idea if she is having side effects to which medications  The ones she is on currently are not working and she wants to come off these  She feels her head is in a fog and "I do not enjoy anything"  She explains she is very negative and cannot not function  She does not want to leave her house  She also used to be on trazadone intermittently for at night anxiety  She is also on elavil for fibromyalgia but feels this has not helped her mood, only fibromylagia  She started with sore throat and sinus congestion since as, 1 5 weeks ago  She started with painful breathing and chest tightness 2 days ago  Her cough feels wet but she feels she cannot bring it out  No wheezing  She does not have fevers but is sweating at night over last 3-4 days  She has ear pain when is first started  Seh is taking mucinex   Her daughter has it too but patient cannot get rid of it  She has had inhalers in the past when would get sick, often had bronchitis  Her inhaler is   ROS: Review of Systems   Constitutional: Positive for diaphoresis (sweating at night)  Negative for appetite change and fever  HENT: Positive for congestion, postnasal drip and sore throat  Negative for ear discharge, ear pain and trouble swallowing  Eyes: Negative  Respiratory: Positive for cough and chest tightness  Negative for shortness of breath  Cardiovascular: Negative for chest pain, palpitations and leg swelling  Skin: Negative  Neurological: Negative for dizziness, syncope and weakness  Psychiatric/Behavioral: Positive for dysphoric mood and sleep disturbance  Negative for self-injury and suicidal ideas  The patient is nervous/anxious          Past Medical History:   Diagnosis Date    Allergic rhinitis     Anxiety     Arthritis     Chronic pain disorder     BACK SHOULDERS NECK    Depression     Disease of thyroid gland     nodule    Fibromyalgia, primary     GERD (gastroesophageal reflux disease)     Hearing difficulty of right ear     Hyperlipidemia     Hypertension     Irritable bowel syndrome     Perforation of tympanic membrane     Right    PONV (postoperative nausea and vomiting)     RBBB     Right bundle branch blockage     Sleep apnea     no cpap    Wears glasses      Patient Active Problem List   Diagnosis    Thyroid nodule    Trigger finger, right middle finger    Carpal tunnel syndrome, bilateral    Asthma, mild intermittent    Benign essential hypertension    Breast hypertrophy    Bulging lumbar disc    Bright red blood per rectum    Calcific tendinitis of shoulder    Chronic low back pain    Chronic right-sided low back pain without sciatica    Chronic sinusitis    Depression with anxiety    Dermatitis, eczematoid    Disorder of bursae of shoulder region    Esophageal reflux    Fibromyalgia    H/O laminectomy    Hemorrhoids    Hypercholesterolemia    Mammographic microcalcification    Morbid obesity (HCC)    Obstructive sleep apnea    Pars defect of lumbar spine    RBBB (right bundle branch block)    Spondylolysis of lumbar region    Vitamin D insufficiency    Atypical chest pain    Anal pain    Anal fissure, unspecified    Anal fistula    Sebaceous cyst of breast, left    Left ear pain    Left cervical lymphadenopathy    Eliz-Danlos syndrome       Past Surgical History:   Procedure Laterality Date    ANAL FISTULOTOMY N/A 2018    Procedure: FISTULOTOMY;  Surgeon: Dat Starr MD;  [facility];  Service: Colorectal    BACK SURGERY      lumbar herniated disc    BREAST SURGERY Left 2006    cyst removal    CARPAL TUNNEL RELEASE       SECTION      CHOLECYSTECTOMY      COLONOSCOPY      CYST REMOVAL      DENTAL SURGERY      INCISION AND DRAINAGE OF WOUND N/A 2018    Procedure: INCISION AND DRAINAGE (I&D) BUTTOCK;  Surgeon: Dat Starr MD;  [facility];  Service: Colorectal    MD COLONOSCOPY FLX DX W/COLLJ SPEC WHEN PFRMD N/A 2017    Procedure: COLONOSCOPY;  Surgeon: Olegario Goldstein MD;  [facility]; Service: Gastroenterology    MD INCISE FINGER TENDON SHEATH Right 3/13/2018    Procedure: LONG TRIGGER FINGER RELEASE;  Surgeon: Ramana Pablo DO;  [facility];  Service: Orthopedics    MD SURG DIAGNOSTIC EXAM, ANORECTAL N/A 2018    Procedure: EXAM UNDER ANESTHESIA (EUA); Surgeon: Dat Starr MD;  [facility];  Service: Colorectal    MD TYMPANOPLASTY Right 2017    Procedure: TYMPANOPLASTY WITH PERICHONDRIN GRAFT;  Surgeon:  Schuyler Mauro DO;  [facility];  Service: ENT    SHOULDER SURGERY Right     WISDOM TOOTH EXTRACTION         Social History     Socioeconomic History    Marital status: /Civil Union     Spouse name: None    Number of children: None    Years of education: None    Highest education level: None   Occupational History    None   Social Needs    Financial resource strain: None    Food insecurity:     Worry: None     Inability: None    Transportation needs:     Medical: No     Non-medical: No   Tobacco Use    Smoking status: Never Smoker    Smokeless tobacco: Never Used   Substance and Sexual Activity    Alcohol use: Yes     Comment: social    Drug use: No    Sexual activity: Yes     Partners: Male     Birth control/protection: Injection     Comment: per allscripts   Lifestyle    Physical activity:     Days per week: 2 days     Minutes per session: None    Stress: None   Relationships    Social connections:     Talks on phone: None     Gets together: None     Attends Baptism service: None     Active member of club or organization: None     Attends meetings of clubs or organizations: None     Relationship status: None    Intimate partner violence:     Fear of current or ex partner: None     Emotionally abused: None     Physically abused: None     Forced sexual activity: None   Other Topics Concern    None   Social History Narrative    None       Family History   Problem Relation Age of Onset    Hypertension Mother     Hyperlipidemia Mother     Diabetes Mother     Hypertension Father     Hyperlipidemia Father     Heart disease Father         cardiac disorder-myocardial infarction arrhythmias    Diabetes unspecified Maternal Grandmother     Crohn's disease Brother     Arthritis Brother     Thyroid disease Paternal Grandmother        Allergies   Allergen Reactions    Amoxicillin Swelling, Anaphylaxis and Angioedema    Neurontin [Gabapentin] Other (See Comments)     Other reaction(s): SUICIDE IDEATION  Other reaction(s):  Other (See Comments)  suicidal  suicidal    Penicillin V Angioedema and Swelling     Other reaction(s): Throat closure    Penicillins Swelling and Anaphylaxis    Aripiprazole Other (See Comments)     Other reaction(s): low dose 2 mg ; curving in and locking in of hands/dystonia  Other reaction(s): Other (See Comments)  Other reaction(s): low dose 2 mg ; curving in and locking in of hands/dystonia    Lorazepam Other (See Comments)     Other reaction(s): higher dose > anxiety and depression  Other reaction(s):  Other (See Comments)  Other reaction(s): higher dose > anxiety and depression    Carbamazepine Other (See Comments)     Other reaction(s): Unknown Reaction    Doxycycline Hives    Lamotrigine Rash    Other Hives     Adhesive tape         Current Outpatient Medications:     albuterol (PROVENTIL HFA,VENTOLIN HFA) 90 mcg/act inhaler, Inhale 2 puffs every 6 (six) hours as needed for shortness of breath (cough), Disp: 1 Inhaler, Rfl: 0    amitriptyline (ELAVIL) 50 mg tablet, Take 1 tablet (50 mg total) by mouth daily at bedtime, Disp: 90 tablet, Rfl: 1    Ascorbic Acid (VITAMIN C) 500 MG CAPS, Take 1 capsule by mouth daily, Disp: , Rfl:     atorvastatin (LIPITOR) 40 mg tablet, Take 1 tablet (40 mg total) by mouth daily, Disp: 90 tablet, Rfl: 1    cetirizine (ZyrTEC) 10 mg tablet, Take 10 mg by mouth daily, Disp: , Rfl:     Cholecalciferol (VITAMIN D3) 5000 units CAPS, Take 1 capsule by mouth daily  , Disp: , Rfl:     cyanocobalamin (VITAMIN B-12) 1,000 mcg tablet, Take 3 tablets by mouth 2 (two) times a day, Disp: , Rfl:     esomeprazole (NEXIUM 24HR) 20 mg capsule, Take 1 capsule (20 mg total) by mouth every morning, Disp: 30 capsule, Rfl: 3    medroxyPROGESTERone (DEPO-PROVERA) 150 mg/mL injection, Inject 150 mg into a muscle every 3 (three) months, Disp: , Rfl:     metoprolol succinate (TOPROL-XL) 25 mg 24 hr tablet, Take 1 tablet (25 mg total) by mouth daily, Disp: 90 tablet, Rfl: 1    Multiple Vitamins-Minerals (CENTRUM ADULTS PO), Take 1 tablet by mouth daily, Disp: , Rfl:     topiramate (TOPAMAX) 50 MG tablet, , Disp: , Rfl: 0    methylPREDNISolone 4 MG tablet therapy pack, Use as directed on package, Disp: 21 each, Rfl: 0      Physical Exam:  /90   Pulse (!) 120   Temp 98 1 °F (36 7 °C)   Resp 18   Ht 5' 6" (1 676 m)   Wt 131 kg (289 lb)   SpO2 98%   BMI 46 65 kg/m²     Physical Exam   Constitutional: She is oriented to person, place, and time  She appears well-developed and well-nourished  No distress  obese   HENT:   Head: Normocephalic and atraumatic  Right Ear: Tympanic membrane and ear canal normal    Left Ear: Tympanic membrane and ear canal normal    Nose: Mucosal edema present  Right sinus exhibits no maxillary sinus tenderness and no frontal sinus tenderness  Left sinus exhibits no maxillary sinus tenderness and no frontal sinus tenderness  Mouth/Throat: Uvula is midline and oropharynx is clear and moist    Eyes: Pupils are equal, round, and reactive to light  Conjunctivae are normal  Right eye exhibits no discharge  Left eye exhibits no discharge  Neck: Normal range of motion  Neck supple  No thyromegaly present  Cardiovascular: Normal rate and regular rhythm  Pulmonary/Chest: Effort normal  No respiratory distress  She has wheezes (apical)  She has no rales  Musculoskeletal: She exhibits no edema or deformity  Neurological: She is alert and oriented to person, place, and time  Skin: Skin is warm and dry  No rash noted  Psychiatric: Her speech is normal and behavior is normal  Judgment and thought content normal  Her mood appears anxious  Depressed: tearful at times           Labs:  Lab Results   Component Value Date    WBC 7 30 04/23/2019    HGB 14 2 04/23/2019    HCT 40 2 04/23/2019    MCV 86 04/23/2019     04/23/2019     Lab Results   Component Value Date     04/05/2017    K 3 6 11/06/2019     (H) 11/06/2019    CO2 23 11/06/2019    ANIONGAP 7 09/19/2014    BUN 10 11/06/2019    CREATININE 0 76 11/06/2019    GLUCOSE 96 04/05/2017    GLUF 144 (H) 11/06/2019    CALCIUM 9 4 11/06/2019    AST 18 11/06/2019    ALT 41 11/06/2019    ALKPHOS 72 11/06/2019    PROT 6 7 04/05/2017    BILITOT 0 6 04/05/2017    EGFR 99 11/06/2019

## 2020-01-06 ENCOUNTER — TELEPHONE (OUTPATIENT)
Dept: FAMILY MEDICINE CLINIC | Facility: CLINIC | Age: 40
End: 2020-01-06

## 2020-01-06 DIAGNOSIS — J20.9 ACUTE BRONCHITIS, UNSPECIFIED ORGANISM: Primary | ICD-10-CM

## 2020-01-06 RX ORDER — AZITHROMYCIN 250 MG/1
TABLET, FILM COATED ORAL
Qty: 6 TABLET | Refills: 0 | Status: SHIPPED | OUTPATIENT
Start: 2020-01-06 | End: 2020-01-11

## 2020-01-06 NOTE — TELEPHONE ENCOUNTER
Was in on Friday was told to call back today if not any better and you would call something in , still with cough and chest pain    [facility] pharmacy

## 2020-01-14 ENCOUNTER — TELEPHONE (OUTPATIENT)
Dept: PSYCHIATRY | Facility: CLINIC | Age: 40
End: 2020-01-14

## 2020-01-14 NOTE — TELEPHONE ENCOUNTER
Behavorial Health Outpatient Intake Questions    Referred by: Kieran Butler    Please advised interviewee that they need to answer all questions truthfully to allow for best care and any misrepresentations of information may affect their ability to be seen at this clinic   => Was this discussed? Yes     Behavorial Health Outpatient Intake History -     Presenting Problem (in patient's words): depression and anxiety    Has the patient ever seen or is currently seeing a psychiatrist? Yes   If yes who/when? Dr Abdi Notice last seen a month ago    If seen as outpatient, have they been seen here (and by whom)? If not seen here, which provider(s) did the patient see and for how long? 5 months    Has the patient ever seen or currently see a therapist? Yes If yes who/when? 6 months ago Yes[facility] with [facility] [place]    Has a member of the patient's family been in therapy here? No  If yes, with whom? Has the patient been hospitalized for mental health? No   If yes, how long ago was last hospitalization and where was it? Substance Abuse:No concerns of substance abuse are reported  Does the patient have ICM or CTT? No    Is the patient taking injectable psychiatric medications? No    => If yes, patient cannot be seen here  Communications  Are there any developmental disabilities? No    Does the patient have hearing impairment? No       History-    Has the patient served in the [place] [address]? No    If yes, have you had combat services? No    Was the patient activated into federal active duty as a member of the [employer] or reserve? No    Legal History-     Does the patient have any history of arrests, correction/[facility] time, or DUIs? No  If Yes-  1) What types of charges? 2) When were they last incarcerated? 3) Are they currently on parole or probation? Minor Child-    Who has custody of the child? Is there a custody agreement?      If there is a custody agreement remind parent that they must bring a copy to the first appt or they will not be seen  Intake Team, please check with provider before scheduling if flags come up such as:  - complex case  - legal history (other than DUI)  - communication barrier concerns are present  - if, in your judgment, this needs further review    ACCEPTED as a patient Yes  => Appointment Date: 2/25/20     Referred Elsewhere? Name of Insurance Co: [address] [place] [address] ID# Opal Thompson Phone # [phone]  If ins is primary or secondary  If patient is a minor, parents information such as Name, D  O B of guarantor

## 2020-01-15 ENCOUNTER — TELEPHONE (OUTPATIENT)
Dept: FAMILY MEDICINE CLINIC | Facility: CLINIC | Age: 40
End: 2020-01-15

## 2020-01-21 ENCOUNTER — OFFICE VISIT (OUTPATIENT)
Dept: FAMILY MEDICINE CLINIC | Facility: CLINIC | Age: 40
End: 2020-01-21
Payer: COMMERCIAL

## 2020-01-21 VITALS
SYSTOLIC BLOOD PRESSURE: 124 MMHG | TEMPERATURE: 98.3 F | WEIGHT: 290 LBS | HEIGHT: 67 IN | DIASTOLIC BLOOD PRESSURE: 90 MMHG | BODY MASS INDEX: 45.52 KG/M2 | HEART RATE: 100 BPM | OXYGEN SATURATION: 98 % | RESPIRATION RATE: 18 BRPM

## 2020-01-21 DIAGNOSIS — F33.1 MODERATE EPISODE OF RECURRENT MAJOR DEPRESSIVE DISORDER (HCC): Primary | ICD-10-CM

## 2020-01-21 DIAGNOSIS — I10 HYPERTENSION, UNSPECIFIED TYPE: ICD-10-CM

## 2020-01-21 DIAGNOSIS — F41.1 GAD (GENERALIZED ANXIETY DISORDER): ICD-10-CM

## 2020-01-21 PROCEDURE — 3008F BODY MASS INDEX DOCD: CPT | Performed by: PHYSICIAN ASSISTANT

## 2020-01-21 PROCEDURE — 1036F TOBACCO NON-USER: CPT | Performed by: PHYSICIAN ASSISTANT

## 2020-01-21 PROCEDURE — 99213 OFFICE O/P EST LOW 20 MIN: CPT | Performed by: PHYSICIAN ASSISTANT

## 2020-01-21 RX ORDER — VENLAFAXINE HYDROCHLORIDE 37.5 MG/1
37.5 CAPSULE, EXTENDED RELEASE ORAL DAILY
Qty: 90 CAPSULE | Refills: 0 | Status: SHIPPED | OUTPATIENT
Start: 2020-01-21 | End: 2020-02-25

## 2020-01-21 RX ORDER — METOPROLOL SUCCINATE 50 MG/1
50 TABLET, EXTENDED RELEASE ORAL DAILY
Qty: 90 TABLET | Refills: 1 | Status: SHIPPED | OUTPATIENT
Start: 2020-01-21 | End: 2020-03-18 | Stop reason: SDUPTHER

## 2020-01-21 NOTE — PROGRESS NOTES
Routine Follow-up    Jannette Baugh 36 y o  female   Date:  1/21/2020    Assessment and Plan:    Jannette was seen today for follow-up and nevus  Diagnoses and all orders for this visit:    Moderate episode of recurrent major depressive disorder (HCC)  -     venlafaxine (EFFEXOR-XR) 37 5 mg 24 hr capsule; Take 1 capsule (37 5 mg total) by mouth daily    MILADYS (generalized anxiety disorder)  -     venlafaxine (EFFEXOR-XR) 37 5 mg 24 hr capsule; Take 1 capsule (37 5 mg total) by mouth daily   - patient recently weaned from wellbutrin and will wean from topamax over next 2 weeks  - start wellbutrin once off topamax  - continue to monitor, caution in concurrent use of elavil for fibromyalgia  - appreciate upcoming psych appt     Hypertension, unspecified type  -     metoprolol succinate (TOPROL-XL) 50 mg 24 hr tablet; Take 1 tablet (50 mg total) by mouth daily  - increase metoprolol to help improve HR and in setting of anxiety               HPI:  Chief Complaint   Patient presents with    Follow-up    Nevus     Front of neck - does hurt      HPI   Patient is a 45 yo female who presents for follow up  She was recently seen on 1/3 and was weaned from wellbutrin  She was recently started on wellbutrin and topamax with psychiatrist and felt in a fog and that her anxiety and depression was not well controlled  She would like to try to come off the topamax as well  Over the weekend, she was having heart palpitations, she got herself worked up and then felt she couldn't breath  She was suicidal on neurontin in the past  She had success with zoloft and prozac in the past but after so many years, her anxiety was getting bad and didn't feel effective over time  Her brother and cousin are on effexor with good relief and would like to try this  She does have an upcoming appt with psychiatry on 2/25/2020 but feels she cannot wait until then  ROS: Review of Systems   Constitutional: Negative      Respiratory: Positive for shortness of breath (panic attack over the weekend)  Negative for cough and wheezing  Cardiovascular: Negative  Skin: Negative  Neurological: Negative  Psychiatric/Behavioral: Positive for dysphoric mood  Negative for suicidal ideas  The patient is nervous/anxious (and panic attacks)          Past Medical History:   Diagnosis Date    Allergic rhinitis     Anxiety     Arthritis     Chronic pain disorder     BACK SHOULDERS NECK    Depression     Disease of thyroid gland     nodule    Fibromyalgia, primary     GERD (gastroesophageal reflux disease)     Hearing difficulty of right ear     Hyperlipidemia     Hypertension     Irritable bowel syndrome     Perforation of tympanic membrane     Right    PONV (postoperative nausea and vomiting)     RBBB     Right bundle branch blockage     Sleep apnea     no cpap    Wears glasses      Patient Active Problem List   Diagnosis    Thyroid nodule    Trigger finger, right middle finger    Carpal tunnel syndrome, bilateral    Asthma, mild intermittent    Benign essential hypertension    Breast hypertrophy    Bulging lumbar disc    Bright red blood per rectum    Calcific tendinitis of shoulder    Chronic low back pain    Chronic right-sided low back pain without sciatica    Chronic sinusitis    Depression with anxiety    Dermatitis, eczematoid    Disorder of bursae of shoulder region    Esophageal reflux    Fibromyalgia    H/O laminectomy    Hemorrhoids    Hypercholesterolemia    Mammographic microcalcification    Morbid obesity (HCC)    Obstructive sleep apnea    Pars defect of lumbar spine    RBBB (right bundle branch block)    Spondylolysis of lumbar region    Vitamin D insufficiency    Atypical chest pain    Anal pain    Anal fissure, unspecified    Anal fistula    Sebaceous cyst of breast, left    Left ear pain    Left cervical lymphadenopathy    Eliz-Danlos syndrome       Past Surgical History:   Procedure Laterality Date    ANAL FISTULOTOMY N/A 2018    Procedure: FISTULOTOMY;  Surgeon: Kathi Pimentel MD;  [facility];  Service: Colorectal    BACK SURGERY      lumbar herniated disc    BREAST SURGERY Left 2006    cyst removal    CARPAL TUNNEL RELEASE       SECTION      CHOLECYSTECTOMY      COLONOSCOPY      CYST REMOVAL      DENTAL SURGERY      INCISION AND DRAINAGE OF WOUND N/A 2018    Procedure: INCISION AND DRAINAGE (I&D) BUTTOCK;  Surgeon: Kathi Pimentel MD;  [facility];  Service: Colorectal    DE COLONOSCOPY FLX DX W/COLLJ SPEC WHEN PFRMD N/A 2017    Procedure: COLONOSCOPY;  Surgeon: Steven Storey MD;  [facility]; Service: Gastroenterology    DE INCISE FINGER TENDON SHEATH Right 3/13/2018    Procedure: LONG TRIGGER FINGER RELEASE;  Surgeon: Camille Sharma DO;  [facility];  Service: Orthopedics    DE SURG DIAGNOSTIC EXAM, ANORECTAL N/A 2018    Procedure: EXAM UNDER ANESTHESIA (EUA); Surgeon: Kathi Pimentel MD;  [facility];  Service: Colorectal    DE TYMPANOPLASTY Right 2017    Procedure: TYMPANOPLASTY WITH PERICHONDRIN GRAFT;  Surgeon:  Lyric Simmons DO;  [facility];  Service: ENT    SHOULDER SURGERY Right     WISDOM TOOTH EXTRACTION         Social History     Socioeconomic History    Marital status: /Civil Union     Spouse name: None    Number of children: None    Years of education: None    Highest education level: None   Occupational History    None   Social Needs    Financial resource strain: None    Food insecurity:     Worry: None     Inability: None    Transportation needs:     Medical: No     Non-medical: No   Tobacco Use    Smoking status: Never Smoker    Smokeless tobacco: Never Used   Substance and Sexual Activity    Alcohol use: Yes     Comment: social    Drug use: No    Sexual activity: Yes     Partners: Male     Birth control/protection: Injection Comment: per allscripts   Lifestyle    Physical activity:     Days per week: 2 days     Minutes per session: None    Stress: None   Relationships    Social connections:     Talks on phone: None     Gets together: None     Attends Bahai service: None     Active member of club or organization: None     Attends meetings of clubs or organizations: None     Relationship status: None    Intimate partner violence:     Fear of current or ex partner: None     Emotionally abused: None     Physically abused: None     Forced sexual activity: None   Other Topics Concern    None   Social History Narrative    None       Family History   Problem Relation Age of Onset    Hypertension Mother     Hyperlipidemia Mother     Diabetes Mother     Hypertension Father     Hyperlipidemia Father     Heart disease Father         cardiac disorder-myocardial infarction arrhythmias    Diabetes unspecified Maternal Grandmother     Crohn's disease Brother     Arthritis Brother     Thyroid disease Paternal Grandmother        Allergies   Allergen Reactions    Amoxicillin Swelling, Anaphylaxis and Angioedema    Neurontin [Gabapentin] Other (See Comments)     Other reaction(s): SUICIDE IDEATION  Other reaction(s): Other (See Comments)  suicidal  suicidal    Penicillin V Angioedema and Swelling     Other reaction(s): Throat closure    Penicillins Swelling and Anaphylaxis    Aripiprazole Other (See Comments)     Other reaction(s): low dose 2 mg ; curving in and locking in of hands/dystonia  Other reaction(s): Other (See Comments)  Other reaction(s): low dose 2 mg ; curving in and locking in of hands/dystonia    Lorazepam Other (See Comments)     Other reaction(s): higher dose > anxiety and depression  Other reaction(s):  Other (See Comments)  Other reaction(s): higher dose > anxiety and depression    Carbamazepine Other (See Comments)     Other reaction(s): Unknown Reaction    Doxycycline Hives    Lamotrigine Rash    Other Hives     Adhesive tape         Current Outpatient Medications:     albuterol (PROVENTIL HFA,VENTOLIN HFA) 90 mcg/act inhaler, Inhale 2 puffs every 6 (six) hours as needed for shortness of breath (cough), Disp: 1 Inhaler, Rfl: 0    amitriptyline (ELAVIL) 50 mg tablet, Take 1 tablet (50 mg total) by mouth daily at bedtime, Disp: 90 tablet, Rfl: 1    Ascorbic Acid (VITAMIN C) 500 MG CAPS, Take 1 capsule by mouth daily, Disp: , Rfl:     atorvastatin (LIPITOR) 40 mg tablet, Take 1 tablet (40 mg total) by mouth daily, Disp: 90 tablet, Rfl: 1    cetirizine (ZyrTEC) 10 mg tablet, Take 10 mg by mouth daily, Disp: , Rfl:     Cholecalciferol (VITAMIN D3) 5000 units CAPS, Take 1 capsule by mouth daily  , Disp: , Rfl:     cyanocobalamin (VITAMIN B-12) 1,000 mcg tablet, Take 3 tablets by mouth 2 (two) times a day, Disp: , Rfl:     esomeprazole (NEXIUM 24HR) 20 mg capsule, Take 1 capsule (20 mg total) by mouth every morning, Disp: 30 capsule, Rfl: 3    medroxyPROGESTERone (DEPO-PROVERA) 150 mg/mL injection, Inject 150 mg into a muscle every 3 (three) months, Disp: , Rfl:     metoprolol succinate (TOPROL-XL) 50 mg 24 hr tablet, Take 1 tablet (50 mg total) by mouth daily, Disp: 90 tablet, Rfl: 1    Multiple Vitamins-Minerals (CENTRUM ADULTS PO), Take 1 tablet by mouth daily, Disp: , Rfl:     methylPREDNISolone 4 MG tablet therapy pack, Use as directed on package (Patient not taking: Reported on 1/21/2020), Disp: 21 each, Rfl: 0    venlafaxine (EFFEXOR-XR) 37 5 mg 24 hr capsule, Take 1 capsule (37 5 mg total) by mouth daily, Disp: 90 capsule, Rfl: 0      Physical Exam:  /90 (BP Location: Left arm, Patient Position: Sitting)   Pulse 100   Temp 98 3 °F (36 8 °C) (Tympanic)   Resp 18   Ht 5' 6 5" (1 689 m)   Wt 132 kg (290 lb)   SpO2 98%   BMI 46 11 kg/m²     Physical Exam   Constitutional: She is oriented to person, place, and time  She appears well-developed and well-nourished  No distress     Cardiovascular: Normal rate, regular rhythm and normal heart sounds  Pulmonary/Chest: Effort normal and breath sounds normal  No respiratory distress  She has no wheezes  Musculoskeletal: She exhibits no edema or deformity  Neurological: She is alert and oriented to person, place, and time  Skin: Skin is warm and dry  No rash noted  Psychiatric: Her speech is normal and behavior is normal  Her mood appears anxious  Her affect is not angry and not labile  She is not actively hallucinating  Cognition and memory are normal  She does not express impulsivity  She expresses no homicidal and no suicidal ideation  She is attentive           Labs:  Lab Results   Component Value Date    WBC 7 30 04/23/2019    HGB 14 2 04/23/2019    HCT 40 2 04/23/2019    MCV 86 04/23/2019     04/23/2019     Lab Results   Component Value Date     04/05/2017    K 3 6 11/06/2019     (H) 11/06/2019    CO2 23 11/06/2019    ANIONGAP 7 09/19/2014    BUN 10 11/06/2019    CREATININE 0 76 11/06/2019    GLUCOSE 96 04/05/2017    GLUF 144 (H) 11/06/2019    CALCIUM 9 4 11/06/2019    AST 18 11/06/2019    ALT 41 11/06/2019    ALKPHOS 72 11/06/2019    PROT 6 7 04/05/2017    BILITOT 0 6 04/05/2017    EGFR 99 11/06/2019

## 2020-02-04 DIAGNOSIS — K21.9 GASTROESOPHAGEAL REFLUX DISEASE WITHOUT ESOPHAGITIS: ICD-10-CM

## 2020-02-04 NOTE — TELEPHONE ENCOUNTER
Called and spoke with patient  Patient will check with GI  Patient states that pharmacy was having a difficult time running medication  Will check with GI on that as well

## 2020-02-05 DIAGNOSIS — E78.5 HYPERLIPIDEMIA, UNSPECIFIED HYPERLIPIDEMIA TYPE: ICD-10-CM

## 2020-02-05 RX ORDER — ATORVASTATIN CALCIUM 40 MG/1
40 TABLET, FILM COATED ORAL DAILY
Qty: 90 TABLET | Refills: 1 | Status: SHIPPED | OUTPATIENT
Start: 2020-02-05 | End: 2020-06-17 | Stop reason: SDUPTHER

## 2020-02-25 ENCOUNTER — OFFICE VISIT (OUTPATIENT)
Dept: PSYCHIATRY | Facility: CLINIC | Age: 40
End: 2020-02-25
Payer: COMMERCIAL

## 2020-02-25 DIAGNOSIS — F41.8 DEPRESSION WITH ANXIETY: Primary | ICD-10-CM

## 2020-02-25 PROCEDURE — 3074F SYST BP LT 130 MM HG: CPT | Performed by: HOSPITALIST

## 2020-02-25 PROCEDURE — 99214 OFFICE O/P EST MOD 30 MIN: CPT | Performed by: HOSPITALIST

## 2020-02-25 PROCEDURE — 3080F DIAST BP >= 90 MM HG: CPT | Performed by: HOSPITALIST

## 2020-02-25 PROCEDURE — 1036F TOBACCO NON-USER: CPT | Performed by: HOSPITALIST

## 2020-02-25 RX ORDER — VENLAFAXINE 75 MG/1
75 TABLET ORAL 2 TIMES DAILY
Qty: 30 TABLET | Refills: 1 | Status: SHIPPED | OUTPATIENT
Start: 2020-02-25 | End: 2020-03-17

## 2020-02-25 NOTE — BH TREATMENT PLAN
TREATMENT PLAN (Medication Management Only)        [facility]    Name and Date of Birth:  Arina Casas 36 y o  1980  Date of Treatment Plan: February 25, 2020  Diagnosis/Diagnoses:    1  Depression with anxiety      Strengths/Personal Resources for Self-Care: ability to communicate well, ability to understand psychiatric illness, motivation for treatment, willingness to work on problems  Area/Areas of need (in own words): anxiety, depression  1  Long Term Goal: alleviate depression  Target Date: 2 months - 4/25/2020  Person/Persons responsible for completion of goal: Dr Ramiro James  2  Short Term Objective (s) - How will we reach this goal?:   A  Provider new recommended medication/dosage changes and/or continue medication(s): Medication management and adjustments  B  Attend psychotherapy regularly  C  Exercise regularly   Target Date: 6 months - 8/25/2020  Person/Persons Responsible for Completion of Goal: Dr Ramiro Bhatia  Progress Towards Goals: starting treatment  Treatment Modality: medication management every 1 months  Review due 90 to 120 days from date of this plan: 6 months next due 8/25/2020  Expected length of service: maintenance  My Physician/PA/NP and I have developed this plan together and I agree to work on the goals and objectives  I understand the treatment goals that were developed for my treatment

## 2020-02-28 ENCOUNTER — OFFICE VISIT (OUTPATIENT)
Dept: CARDIOLOGY CLINIC | Facility: CLINIC | Age: 40
End: 2020-02-28
Payer: COMMERCIAL

## 2020-02-28 VITALS
WEIGHT: 291 LBS | BODY MASS INDEX: 45.67 KG/M2 | DIASTOLIC BLOOD PRESSURE: 100 MMHG | HEIGHT: 67 IN | SYSTOLIC BLOOD PRESSURE: 118 MMHG | HEART RATE: 95 BPM

## 2020-02-28 DIAGNOSIS — E78.00 HYPERCHOLESTEROLEMIA: ICD-10-CM

## 2020-02-28 DIAGNOSIS — I45.10 RBBB (RIGHT BUNDLE BRANCH BLOCK): ICD-10-CM

## 2020-02-28 DIAGNOSIS — Z01.810 PREOP CARDIOVASCULAR EXAM: ICD-10-CM

## 2020-02-28 DIAGNOSIS — I10 BENIGN ESSENTIAL HYPERTENSION: ICD-10-CM

## 2020-02-28 DIAGNOSIS — E66.01 MORBID OBESITY (HCC): ICD-10-CM

## 2020-02-28 DIAGNOSIS — R07.89 ATYPICAL CHEST PAIN: Primary | ICD-10-CM

## 2020-02-28 PROCEDURE — 99213 OFFICE O/P EST LOW 20 MIN: CPT | Performed by: INTERNAL MEDICINE

## 2020-02-28 PROCEDURE — 93000 ELECTROCARDIOGRAM COMPLETE: CPT | Performed by: INTERNAL MEDICINE

## 2020-02-28 PROCEDURE — 3074F SYST BP LT 130 MM HG: CPT | Performed by: INTERNAL MEDICINE

## 2020-02-28 PROCEDURE — 1036F TOBACCO NON-USER: CPT | Performed by: INTERNAL MEDICINE

## 2020-02-28 PROCEDURE — 3080F DIAST BP >= 90 MM HG: CPT | Performed by: INTERNAL MEDICINE

## 2020-02-28 PROCEDURE — 3008F BODY MASS INDEX DOCD: CPT | Performed by: INTERNAL MEDICINE

## 2020-02-28 RX ORDER — CLOTRIMAZOLE AND BETAMETHASONE DIPROPIONATE 10; .64 MG/G; MG/G
CREAM TOPICAL
COMMUNITY
Start: 2020-02-22 | End: 2020-06-17

## 2020-02-28 NOTE — LETTER
February 29, 2020     Referral [address] [facility] [id number]    Patient: Lynette Mora   YOB: 1980   Date of Visit: 2/28/2020       Dear Dr Nessa Navarro:    Thank you for referring Beba Palma to me for evaluation  Below are my notes for this consultation  If you have questions, please do not hesitate to call me  I look forward to following your patient along with you  Sincerely,        Jono Ayala MD        CC: MD Jono Spann MD  2/29/2020 11:49 AM  Sign at close encounter                                             Cardiology Follow Up    Jannette Baugh  1980  [id number]  [address]  [place] [address]    1  Atypical chest pain  POCT ECG   2  Hypercholesterolemia     3  Benign essential hypertension     4  RBBB (right bundle branch block)     5  Morbid obesity ([address] )     6  Preop cardiovascular exam           Discussion/Summary: All of her assessed cardiac problems are stable  I have reviewed her medications and made no changes  No further cardiac testing is needed at this time  Her cardiac risk for upcoming surgery is low  Interval History: She continues to get atypical CP and L arm pain  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 and with have surgery next week  She has had chronic atypical chest pains  Cardiac cath in 2009 showed widely patent coronary arteries       ECG today - NSR, no evidence of prior infarct, no ischemia    Patient Active Problem List   Diagnosis    Thyroid nodule    Trigger finger, right middle finger    Carpal tunnel syndrome, bilateral    Asthma, mild intermittent    Benign essential hypertension    Breast hypertrophy    Bulging lumbar disc    Bright red blood per rectum    Calcific tendinitis of shoulder    Chronic low back pain    Chronic right-sided low back pain without sciatica    Chronic sinusitis    Depression with anxiety    Dermatitis, eczematoid    Disorder of bursae of shoulder region    Esophageal reflux    Fibromyalgia    H/O laminectomy    Hemorrhoids    Hypercholesterolemia    Mammographic microcalcification    Morbid obesity (HCC)    Obstructive sleep apnea    Pars defect of lumbar spine    RBBB (right bundle branch block)    Spondylolysis of lumbar region    Vitamin D insufficiency    Atypical chest pain    Anal pain    Anal fissure, unspecified    Anal fistula    Sebaceous cyst of breast, left    Left ear pain    Left cervical lymphadenopathy    Eliz-Danlos syndrome    Preop cardiovascular exam     Past Medical History:   Diagnosis Date    Allergic rhinitis     Anxiety     Arthritis     Chronic pain disorder     BACK SHOULDERS NECK    Depression     Disease of thyroid gland     nodule    Fibromyalgia, primary     GERD (gastroesophageal reflux disease)     Hearing difficulty of right ear     Hyperlipidemia     Hypertension     Irritable bowel syndrome     Perforation of tympanic membrane     Right    PONV (postoperative nausea and vomiting)     RBBB     Right bundle branch blockage     Sleep apnea     no cpap    Wears glasses      Social History     Socioeconomic History    Marital status: /Civil Union     Spouse name: Not on file    Number of children: Not on file    Years of education: Not on file    Highest education level: Not on file   Occupational History    Not on file   Social Needs    Financial resource strain: Not on file    Food insecurity:     Worry: Not on file     Inability: Not on file    Transportation needs:     Medical: No     Non-medical: No   Tobacco Use    Smoking status: Never Smoker    Smokeless tobacco: Never Used   Substance and Sexual Activity    Alcohol use: Yes     Comment: social    Drug use: No    Sexual activity: Yes     Partners: Male     Birth control/protection: Injection     Comment: per ricky Lifestyle    Physical activity:     Days per week: 2 days     Minutes per session: Not on file    Stress: Not on file   Relationships    Social connections:     Talks on phone: Not on file     Gets together: Not on file     Attends Yazidism service: Not on file     Active member of club or organization: Not on file     Attends meetings of clubs or organizations: Not on file     Relationship status: Not on file    Intimate partner violence:     Fear of current or ex partner: Not on file     Emotionally abused: Not on file     Physically abused: Not on file     Forced sexual activity: Not on file   Other Topics Concern    Not on file   Social History Narrative    Not on file      Family History   Problem Relation Age of Onset    Hypertension Mother     Hyperlipidemia Mother     Diabetes Mother     Hypertension Father     Hyperlipidemia Father     Heart disease Father         cardiac disorder-myocardial infarction arrhythmias    Diabetes unspecified Maternal Grandmother     Crohn's disease Brother     Arthritis Brother     Thyroid disease Paternal Grandmother      Past Surgical History:   Procedure Laterality Date    ANAL FISTULOTOMY N/A 2018    Procedure: FISTULOTOMY;  Surgeon: Yogesh Rangel MD;  [facility];  Service: Colorectal    BACK SURGERY      lumbar herniated disc    BREAST SURGERY Left 2006    cyst removal    CARPAL TUNNEL RELEASE       SECTION      CHOLECYSTECTOMY      COLONOSCOPY      CYST REMOVAL      DENTAL SURGERY      INCISION AND DRAINAGE OF WOUND N/A 2018    Procedure: INCISION AND DRAINAGE (I&D) BUTTOCK;  Surgeon: Yogesh Rangel MD;  [facility];  Service: Colorectal    MS COLONOSCOPY FLX DX W/COLLJ SPEC WHEN PFRMD N/A 2017    Procedure: COLONOSCOPY;  Surgeon: Hallie Connor MD;  [facility];   Service: Gastroenterology    MS INCISE FINGER TENDON SHEATH Right 3/13/2018    Procedure: LONG TRIGGER FINGER RELEASE;  Surgeon: Stephanie Tracy DO;  [facility];  Service: Orthopedics    DC SURG DIAGNOSTIC EXAM, ANORECTAL N/A 11/2/2018    Procedure: EXAM UNDER ANESTHESIA (EUA); Surgeon: Ananya Lance MD;  [facility];  Service: Colorectal    DC TYMPANOPLASTY Right 5/24/2017    Procedure: TYMPANOPLASTY WITH PERICHONDRIN GRAFT;  Surgeon:  Paradise Serrano DO;  [facility];  Service: ENT    SHOULDER SURGERY Right     WISDOM TOOTH EXTRACTION         Current Outpatient Medications:     albuterol (PROVENTIL HFA,VENTOLIN HFA) 90 mcg/act inhaler, Inhale 2 puffs every 6 (six) hours as needed for shortness of breath (cough), Disp: 1 Inhaler, Rfl: 0    amitriptyline (ELAVIL) 50 mg tablet, Take 1 tablet (50 mg total) by mouth daily at bedtime, Disp: 90 tablet, Rfl: 1    Ascorbic Acid (VITAMIN C) 500 MG CAPS, Take 1 capsule by mouth daily, Disp: , Rfl:     atorvastatin (LIPITOR) 40 mg tablet, Take 1 tablet (40 mg total) by mouth daily, Disp: 90 tablet, Rfl: 1    cetirizine (ZyrTEC) 10 mg tablet, Take 10 mg by mouth daily, Disp: , Rfl:     Cholecalciferol (VITAMIN D3) 5000 units CAPS, Take 1 capsule by mouth daily  , Disp: , Rfl:     cyanocobalamin (VITAMIN B-12) 1,000 mcg tablet, Take 3 tablets by mouth 2 (two) times a day, Disp: , Rfl:     esomeprazole (NEXIUM 24HR) 20 mg capsule, Take 1 capsule (20 mg total) by mouth every morning, Disp: 30 capsule, Rfl: 3    medroxyPROGESTERone (DEPO-PROVERA) 150 mg/mL injection, Inject 150 mg into a muscle every 3 (three) months, Disp: , Rfl:     metoprolol succinate (TOPROL-XL) 50 mg 24 hr tablet, Take 1 tablet (50 mg total) by mouth daily, Disp: 90 tablet, Rfl: 1    Multiple Vitamins-Minerals (CENTRUM ADULTS PO), Take 1 tablet by mouth daily, Disp: , Rfl:     venlafaxine (EFFEXOR) 75 mg tablet, Take 1 tablet (75 mg total) by mouth 2 (two) times a day, Disp: 30 tablet, Rfl: 1    clotrimazole-betamethasone (LOTRISONE) 1-0 05 % cream, , Disp: , Rfl: Allergies   Allergen Reactions    Amoxicillin Swelling, Anaphylaxis and Angioedema    Neurontin [Gabapentin] Other (See Comments)     Other reaction(s): SUICIDE IDEATION  Other reaction(s): Other (See Comments)  suicidal  suicidal    Penicillin V Angioedema and Swelling     Other reaction(s): Throat closure    Penicillins Swelling and Anaphylaxis    Aripiprazole Other (See Comments)     Other reaction(s): low dose 2 mg ; curving in and locking in of hands/dystonia  Other reaction(s): Other (See Comments)  Other reaction(s): low dose 2 mg ; curving in and locking in of hands/dystonia    Lorazepam Other (See Comments)     Other reaction(s): higher dose > anxiety and depression  Other reaction(s): Other (See Comments)  Other reaction(s): higher dose > anxiety and depression    Carbamazepine Other (See Comments)     Other reaction(s): Unknown Reaction    Doxycycline Hives    Lamotrigine Rash    Other Hives     Adhesive tape     Vitals:    02/28/20 1636   BP: 118/100   BP Location: Left arm   Cuff Size: Large   Pulse: 95   Weight: 132 kg (291 lb)   Height: 5' 7" (1 702 m)     Weight (last 2 days)     Date/Time   Weight    02/28/20 1636   132 (291)             Blood pressure 118/100, pulse 95, height 5' 7" (1 702 m), weight 132 kg (291 lb)  , Body mass index is 45 58 kg/m²      Labs:  Hospital Outpatient Visit on 12/05/2019   Component Date Value    EXT Preg Test, Ur 12/05/2019 Negative     Control 12/05/2019 Valid     Case Report 12/05/2019                      Value:Surgical Pathology Report                         Case: [id number]                                   Authorizing Provider:  Peyman Ghotra MD       Collected:           12/05/2019 0944              Ordering Location:     [facility]        Received:            12/05/2019 [address] [facility]                                                           Pathologist:           Tomer Lambert MD Specimens:   A) - Duodenum                                                                                       B) - Stomach, gastric                                                                               C) - Polyp, Stomach/Small Intestine, gastric polyp                                         Addendum 12/05/2019                      Value: This result contains rich text formatting which cannot be displayed here   Final Diagnosis 12/05/2019                      Value: This result contains rich text formatting which cannot be displayed here   Additional Information 12/05/2019                      Value: This result contains rich text formatting which cannot be displayed here  Emaline Folds Gross Description 12/05/2019                      Value: This result contains rich text formatting which cannot be displayed here   Clinical Information 12/05/2019                      Value:R/o celiac   Office Visit on 11/13/2019   Component Date Value    Hemoglobin A1C 11/13/2019 5 2    Appointment on 11/06/2019   Component Date Value    Cholesterol 11/06/2019 180     Triglycerides 11/06/2019 219*    HDL, Direct 11/06/2019 36*    LDL Calculated 11/06/2019 100     Non-HDL-Chol (CHOL-HDL) 11/06/2019 144     Sodium 11/06/2019 143     Potassium 11/06/2019 3 6     Chloride 11/06/2019 111*    CO2 11/06/2019 23     ANION GAP 11/06/2019 9     BUN 11/06/2019 10     Creatinine 11/06/2019 0 76     Glucose, Fasting 11/06/2019 144*    Calcium 11/06/2019 9 4     AST 11/06/2019 18     ALT 11/06/2019 41     Alkaline Phosphatase 11/06/2019 72     Total Protein 11/06/2019 7 3     Albumin 11/06/2019 4 1     Total Bilirubin 11/06/2019 0 55     eGFR 11/06/2019 99      Imaging: No results found  Review of Systems:  Review of Systems   Constitutional: Negative for diaphoresis, fatigue, fever and unexpected weight change  HENT: Negative      Respiratory: Negative for cough, shortness of breath and wheezing  Cardiovascular: Positive for chest pain  Negative for palpitations and leg swelling  Gastrointestinal: Negative for abdominal pain, diarrhea and nausea  Musculoskeletal: Negative for gait problem and myalgias  Skin: Negative for rash  Neurological: Negative for dizziness and numbness  Psychiatric/Behavioral: Negative  Physical Exam:  Physical Exam   Constitutional: She is oriented to person, place, and time  She appears well-developed and well-nourished  HENT:   Head: Normocephalic and atraumatic  Eyes: Pupils are equal, round, and reactive to light  Neck: Normal range of motion  Neck supple  No JVD present  Cardiovascular: Regular rhythm, S1 normal, S2 normal and normal pulses  Pulses:       Carotid pulses are 2+ on the right side, and 2+ on the left side  Pulmonary/Chest: Effort normal and breath sounds normal  She has no wheezes  She has no rales  Abdominal: Soft  Bowel sounds are normal  There is no tenderness  Musculoskeletal: Normal range of motion  She exhibits no edema or tenderness  Neurological: She is alert and oriented to person, place, and time  She has normal reflexes  No cranial nerve deficit  Skin: Skin is warm  Psychiatric: She has a normal mood and affect

## 2020-02-29 PROBLEM — Z01.810 PREOP CARDIOVASCULAR EXAM: Status: ACTIVE | Noted: 2020-02-29

## 2020-02-29 NOTE — PROGRESS NOTES
Cardiology Follow Up    Jannette Baugh  1980  [id number]  [facility]  [facility]  [address] [place], [place] [address]   [phone]    1  Atypical chest pain  POCT ECG   2  Hypercholesterolemia     3  Benign essential hypertension     4  RBBB (right bundle branch block)     5  Morbid obesity ([address] )     6  Preop cardiovascular exam           Discussion/Summary: All of her assessed cardiac problems are stable  I have reviewed her medications and made no changes  No further cardiac testing is needed at this time  Her cardiac risk for upcoming surgery is low  Interval History: She continues to get atypical CP and L arm pain  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 and with have surgery next week  She has had chronic atypical chest pains  Cardiac cath in 2009 showed widely patent coronary arteries       ECG today - NSR, no evidence of prior infarct, no ischemia    Patient Active Problem List   Diagnosis    Thyroid nodule    Trigger finger, right middle finger    Carpal tunnel syndrome, bilateral    Asthma, mild intermittent    Benign essential hypertension    Breast hypertrophy    Bulging lumbar disc    Bright red blood per rectum    Calcific tendinitis of shoulder    Chronic low back pain    Chronic right-sided low back pain without sciatica    Chronic sinusitis    Depression with anxiety    Dermatitis, eczematoid    Disorder of bursae of shoulder region    Esophageal reflux    Fibromyalgia    H/O laminectomy    Hemorrhoids    Hypercholesterolemia    Mammographic microcalcification    Morbid obesity (HCC)    Obstructive sleep apnea    Pars defect of lumbar spine    RBBB (right bundle branch block)    Spondylolysis of lumbar region    Vitamin D insufficiency    Atypical chest pain    Anal pain    Anal fissure, unspecified    Anal fistula    Sebaceous cyst of breast, left    Left ear pain    Left cervical lymphadenopathy    Eliz-Danlos syndrome    Preop cardiovascular exam     Past Medical History:   Diagnosis Date    Allergic rhinitis     Anxiety     Arthritis     Chronic pain disorder     BACK SHOULDERS NECK    Depression     Disease of thyroid gland     nodule    Fibromyalgia, primary     GERD (gastroesophageal reflux disease)     Hearing difficulty of right ear     Hyperlipidemia     Hypertension     Irritable bowel syndrome     Perforation of tympanic membrane     Right    PONV (postoperative nausea and vomiting)     RBBB     Right bundle branch blockage     Sleep apnea     no cpap    Wears glasses      Social History     Socioeconomic History    Marital status: /Civil Union     Spouse name: Not on file    Number of children: Not on file    Years of education: Not on file    Highest education level: Not on file   Occupational History    Not on file   Social Needs    Financial resource strain: Not on file    Food insecurity:     Worry: Not on file     Inability: Not on file    Transportation needs:     Medical: No     Non-medical: No   Tobacco Use    Smoking status: Never Smoker    Smokeless tobacco: Never Used   Substance and Sexual Activity    Alcohol use: Yes     Comment: social    Drug use: No    Sexual activity: Yes     Partners: Male     Birth control/protection: Injection     Comment: per allscripts   Lifestyle    Physical activity:     Days per week: 2 days     Minutes per session: Not on file    Stress: Not on file   Relationships    Social connections:     Talks on phone: Not on file     Gets together: Not on file     Attends Restorationist service: Not on file     Active member of club or organization: Not on file     Attends meetings of clubs or organizations: Not on file     Relationship status: Not on file    Intimate partner violence:     Fear of current or ex partner: Not on file     Emotionally abused: Not on file     Physically abused: Not on file     Forced sexual activity: Not on file   Other Topics Concern    Not on file   Social History Narrative    Not on file      Family History   Problem Relation Age of Onset    Hypertension Mother     Hyperlipidemia Mother     Diabetes Mother     Hypertension Father     Hyperlipidemia Father     Heart disease Father         cardiac disorder-myocardial infarction arrhythmias    Diabetes unspecified Maternal Grandmother     Crohn's disease Brother     Arthritis Brother     Thyroid disease Paternal Grandmother      Past Surgical History:   Procedure Laterality Date    ANAL FISTULOTOMY N/A 2018    Procedure: FISTULOTOMY;  Surgeon: Joel Brito MD;  [facility];  Service: Colorectal    BACK SURGERY      lumbar herniated disc    BREAST SURGERY Left 2006    cyst removal    CARPAL TUNNEL RELEASE       SECTION      CHOLECYSTECTOMY      COLONOSCOPY      CYST REMOVAL      DENTAL SURGERY      INCISION AND DRAINAGE OF WOUND N/A 2018    Procedure: INCISION AND DRAINAGE (I&D) BUTTOCK;  Surgeon: Joel Brito MD;  [facility];  Service: Colorectal    DC COLONOSCOPY FLX DX W/COLLJ SPEC WHEN PFRMD N/A 2017    Procedure: COLONOSCOPY;  Surgeon: Pelon Huber MD;  [facility]; Service: Gastroenterology    DC INCISE FINGER TENDON SHEATH Right 3/13/2018    Procedure: LONG TRIGGER FINGER RELEASE;  Surgeon: Melly Faust DO;  [facility];  Service: Orthopedics    DC SURG DIAGNOSTIC EXAM, ANORECTAL N/A 2018    Procedure: EXAM UNDER ANESTHESIA (EUA); Surgeon: Joel Brito MD;  [facility];  Service: Colorectal    DC TYMPANOPLASTY Right 2017    Procedure: TYMPANOPLASTY WITH PERICHONDRIN GRAFT;  Surgeon:  Mary Mccoy DO;  [facility];  Service: ENT    SHOULDER SURGERY Right     WISDOM TOOTH EXTRACTION         Current Outpatient Medications:     albuterol (Azzie Alstrom HFA) 90 mcg/act inhaler, Inhale 2 puffs every 6 (six) hours as needed for shortness of breath (cough), Disp: 1 Inhaler, Rfl: 0    amitriptyline (ELAVIL) 50 mg tablet, Take 1 tablet (50 mg total) by mouth daily at bedtime, Disp: 90 tablet, Rfl: 1    Ascorbic Acid (VITAMIN C) 500 MG CAPS, Take 1 capsule by mouth daily, Disp: , Rfl:     atorvastatin (LIPITOR) 40 mg tablet, Take 1 tablet (40 mg total) by mouth daily, Disp: 90 tablet, Rfl: 1    cetirizine (ZyrTEC) 10 mg tablet, Take 10 mg by mouth daily, Disp: , Rfl:     Cholecalciferol (VITAMIN D3) 5000 units CAPS, Take 1 capsule by mouth daily  , Disp: , Rfl:     cyanocobalamin (VITAMIN B-12) 1,000 mcg tablet, Take 3 tablets by mouth 2 (two) times a day, Disp: , Rfl:     esomeprazole (NEXIUM 24HR) 20 mg capsule, Take 1 capsule (20 mg total) by mouth every morning, Disp: 30 capsule, Rfl: 3    medroxyPROGESTERone (DEPO-PROVERA) 150 mg/mL injection, Inject 150 mg into a muscle every 3 (three) months, Disp: , Rfl:     metoprolol succinate (TOPROL-XL) 50 mg 24 hr tablet, Take 1 tablet (50 mg total) by mouth daily, Disp: 90 tablet, Rfl: 1    Multiple Vitamins-Minerals (CENTRUM ADULTS PO), Take 1 tablet by mouth daily, Disp: , Rfl:     venlafaxine (EFFEXOR) 75 mg tablet, Take 1 tablet (75 mg total) by mouth 2 (two) times a day, Disp: 30 tablet, Rfl: 1    clotrimazole-betamethasone (LOTRISONE) 1-0 05 % cream, , Disp: , Rfl:   Allergies   Allergen Reactions    Amoxicillin Swelling, Anaphylaxis and Angioedema    Neurontin [Gabapentin] Other (See Comments)     Other reaction(s): SUICIDE IDEATION  Other reaction(s): Other (See Comments)  suicidal  suicidal    Penicillin V Angioedema and Swelling     Other reaction(s): Throat closure    Penicillins Swelling and Anaphylaxis    Aripiprazole Other (See Comments)     Other reaction(s): low dose 2 mg ; curving in and locking in of hands/dystonia  Other reaction(s):  Other (See Comments)  Other reaction(s): low dose 2 mg ; curving in and locking in of hands/dystonia    Lorazepam Other (See Comments)     Other reaction(s): higher dose > anxiety and depression  Other reaction(s): Other (See Comments)  Other reaction(s): higher dose > anxiety and depression    Carbamazepine Other (See Comments)     Other reaction(s): Unknown Reaction    Doxycycline Hives    Lamotrigine Rash    Other Hives     Adhesive tape     Vitals:    02/28/20 1636   BP: 118/100   BP Location: Left arm   Cuff Size: Large   Pulse: 95   Weight: 132 kg (291 lb)   Height: 5' 7" (1 702 m)     Weight (last 2 days)     Date/Time   Weight    02/28/20 1636   132 (291)             Blood pressure 118/100, pulse 95, height 5' 7" (1 702 m), weight 132 kg (291 lb)  , Body mass index is 45 58 kg/m²  Labs:  Hospital Outpatient Visit on 12/05/2019   Component Date Value    EXT Preg Test, Ur 12/05/2019 Negative     Control 12/05/2019 Valid     Case Report 12/05/2019                      Value:Surgical Pathology Report                         Case: [id number]                                   Authorizing Provider:  Dora Roper MD       Collected:           12/05/2019 0944              Ordering Location:     [facility]        Received:            12/05/2019 [address] [facility]                                                           Pathologist:           Lem Saint, MD                                                        Specimens:   A) - Duodenum                                                                                       B) - Stomach, gastric                                                                               C) - Polyp, Stomach/Small Intestine, gastric polyp                                         Addendum 12/05/2019                      Value: This result contains rich text formatting which cannot be displayed here   Final Diagnosis 12/05/2019                      Value: This result contains rich text formatting which cannot be displayed here   Additional Information 12/05/2019                      Value: This result contains rich text formatting which cannot be displayed here  Faustino Bailey Gross Description 12/05/2019                      Value: This result contains rich text formatting which cannot be displayed here   Clinical Information 12/05/2019                      Value:R/o celiac   Office Visit on 11/13/2019   Component Date Value    Hemoglobin A1C 11/13/2019 5 2    Appointment on 11/06/2019   Component Date Value    Cholesterol 11/06/2019 180     Triglycerides 11/06/2019 219*    HDL, Direct 11/06/2019 36*    LDL Calculated 11/06/2019 100     Non-HDL-Chol (CHOL-HDL) 11/06/2019 144     Sodium 11/06/2019 143     Potassium 11/06/2019 3 6     Chloride 11/06/2019 111*    CO2 11/06/2019 23     ANION GAP 11/06/2019 9     BUN 11/06/2019 10     Creatinine 11/06/2019 0 76     Glucose, Fasting 11/06/2019 144*    Calcium 11/06/2019 9 4     AST 11/06/2019 18     ALT 11/06/2019 41     Alkaline Phosphatase 11/06/2019 72     Total Protein 11/06/2019 7 3     Albumin 11/06/2019 4 1     Total Bilirubin 11/06/2019 0 55     eGFR 11/06/2019 99      Imaging: No results found  Review of Systems:  Review of Systems   Constitutional: Negative for diaphoresis, fatigue, fever and unexpected weight change  HENT: Negative  Respiratory: Negative for cough, shortness of breath and wheezing  Cardiovascular: Positive for chest pain  Negative for palpitations and leg swelling  Gastrointestinal: Negative for abdominal pain, diarrhea and nausea  Musculoskeletal: Negative for gait problem and myalgias  Skin: Negative for rash  Neurological: Negative for dizziness and numbness  Psychiatric/Behavioral: Negative  Physical Exam:  Physical Exam   Constitutional: She is oriented to person, place, and time  She appears well-developed and well-nourished     HENT:   Head: Normocephalic and atraumatic  Eyes: Pupils are equal, round, and reactive to light  Neck: Normal range of motion  Neck supple  No JVD present  Cardiovascular: Regular rhythm, S1 normal, S2 normal and normal pulses  Pulses:       Carotid pulses are 2+ on the right side, and 2+ on the left side  Pulmonary/Chest: Effort normal and breath sounds normal  She has no wheezes  She has no rales  Abdominal: Soft  Bowel sounds are normal  There is no tenderness  Musculoskeletal: Normal range of motion  She exhibits no edema or tenderness  Neurological: She is alert and oriented to person, place, and time  She has normal reflexes  No cranial nerve deficit  Skin: Skin is warm  Psychiatric: She has a normal mood and affect

## 2020-03-02 ENCOUNTER — TELEPHONE (OUTPATIENT)
Dept: FAMILY MEDICINE CLINIC | Facility: CLINIC | Age: 40
End: 2020-03-02

## 2020-03-02 ENCOUNTER — TRANSCRIBE ORDERS (OUTPATIENT)
Dept: LAB | Facility: CLINIC | Age: 40
End: 2020-03-02

## 2020-03-02 ENCOUNTER — OFFICE VISIT (OUTPATIENT)
Dept: GASTROENTEROLOGY | Facility: AMBULARY SURGERY CENTER | Age: 40
End: 2020-03-02
Payer: COMMERCIAL

## 2020-03-02 ENCOUNTER — APPOINTMENT (OUTPATIENT)
Dept: LAB | Facility: CLINIC | Age: 40
End: 2020-03-02
Payer: COMMERCIAL

## 2020-03-02 VITALS
TEMPERATURE: 99.3 F | SYSTOLIC BLOOD PRESSURE: 118 MMHG | HEIGHT: 67 IN | BODY MASS INDEX: 45.99 KG/M2 | HEART RATE: 107 BPM | DIASTOLIC BLOOD PRESSURE: 70 MMHG | RESPIRATION RATE: 18 BRPM | WEIGHT: 293 LBS

## 2020-03-02 DIAGNOSIS — K31.7 HYPERPLASTIC POLYP OF STOMACH: ICD-10-CM

## 2020-03-02 DIAGNOSIS — R32 URINARY INCONTINENCE, UNSPECIFIED TYPE: ICD-10-CM

## 2020-03-02 DIAGNOSIS — K21.9 GASTROESOPHAGEAL REFLUX DISEASE WITHOUT ESOPHAGITIS: Primary | ICD-10-CM

## 2020-03-02 DIAGNOSIS — K21.9 GASTROESOPHAGEAL REFLUX DISEASE WITHOUT ESOPHAGITIS: ICD-10-CM

## 2020-03-02 LAB
BACTERIA UR QL AUTO: ABNORMAL /HPF
BILIRUB UR QL STRIP: NEGATIVE
CLARITY UR: CLEAR
COLOR UR: YELLOW
GLUCOSE UR STRIP-MCNC: NEGATIVE MG/DL
HGB UR QL STRIP.AUTO: ABNORMAL
KETONES UR STRIP-MCNC: NEGATIVE MG/DL
LEUKOCYTE ESTERASE UR QL STRIP: ABNORMAL
MAGNESIUM SERPL-MCNC: 1.8 MG/DL (ref 1.6–2.6)
NITRITE UR QL STRIP: NEGATIVE
NON-SQ EPI CELLS URNS QL MICRO: ABNORMAL /HPF
PH UR STRIP.AUTO: 6.5 [PH]
PROT UR STRIP-MCNC: NEGATIVE MG/DL
RBC #/AREA URNS AUTO: ABNORMAL /HPF
SP GR UR STRIP.AUTO: 1.01 (ref 1–1.03)
UROBILINOGEN UR QL STRIP.AUTO: 0.2 E.U./DL
VIT B12 SERPL-MCNC: 863 PG/ML (ref 100–900)
WBC #/AREA URNS AUTO: ABNORMAL /HPF

## 2020-03-02 PROCEDURE — 82607 VITAMIN B-12: CPT

## 2020-03-02 PROCEDURE — 36415 COLL VENOUS BLD VENIPUNCTURE: CPT

## 2020-03-02 PROCEDURE — 3078F DIAST BP <80 MM HG: CPT | Performed by: INTERNAL MEDICINE

## 2020-03-02 PROCEDURE — 99214 OFFICE O/P EST MOD 30 MIN: CPT | Performed by: INTERNAL MEDICINE

## 2020-03-02 PROCEDURE — 83735 ASSAY OF MAGNESIUM: CPT

## 2020-03-02 PROCEDURE — 1036F TOBACCO NON-USER: CPT | Performed by: INTERNAL MEDICINE

## 2020-03-02 PROCEDURE — 3008F BODY MASS INDEX DOCD: CPT | Performed by: INTERNAL MEDICINE

## 2020-03-02 PROCEDURE — 81001 URINALYSIS AUTO W/SCOPE: CPT | Performed by: INTERNAL MEDICINE

## 2020-03-02 PROCEDURE — 3008F BODY MASS INDEX DOCD: CPT | Performed by: HOSPITALIST

## 2020-03-02 PROCEDURE — 3074F SYST BP LT 130 MM HG: CPT | Performed by: INTERNAL MEDICINE

## 2020-03-02 NOTE — TELEPHONE ENCOUNTER
Patient called for a few days now she has had right sided pain, unsure if it is a muscle pull  Advised SL NATASHA Jacobs for care today

## 2020-03-02 NOTE — PROGRESS NOTES
[facility]  Progress Note - Yaw Baugh 36 y o  female MRN: [id number]    Unit/Bed#:  Encounter: [id number]    Assessment/Plan:    1  GERD-EGD notable for moderate gastritis and bile reflux  There was also evidence of hyperplastic polyp on EGD  Would recommend repeat EGD in 1 year interval   -patient is asymptomatic on Nexium 20 mg daily, will continue this as her symptoms are well controlled, she has tried switching to H2 blocker in the past with recurrence of symptoms   -avoid the use of NSAIDs   -continue to work on weight loss  - Patient was explained about the lifestyle and dietary modifications  Advised to avoid fatty foods, chocolates, caffeine, alcohol and any other triggering foods  Avoid eating for at least 3 hours before going to bed   -given longstanding use of PPI, will check vitamin B12 and magnesium level  2  History of hyperplastic colon polyp-repeat colonoscopy in 2022  3  Urinary incontinence/back pain for 1 week-back pain may be musculoskeletal however given occasional urinary leakage, will check UA  Subjective: This is a 61-year-old female with history of morbid obesity, anxiety, depression, presents for follow-up of her GERD symptoms  She underwent EGD by me several months ago which was notable for moderate gastritis, bile reflux and innumerable gastric polyps which were hyperplastic  She has been taking Nexium 40 mg on daily basis  Gastric biopsies were negative for H pylori, no evidence of celiac disease  Patient reports that she has been doing very well from standpoint of acid reflux symptoms  She states that Nexium 20 mg seems to be helping  She has tried switching in the past when H2 blocker with recurrent symptoms  She denies dysphagia, odynophagia, nausea or vomiting  No loss of appetite or unintentional weight loss  No change in bowel habits or hematochezia  No family history of colon cancer    She underwent colonoscopy in 2017 and was noted to have a hyperplastic polyp  She was recommended a repeat at 5 year interval       Objective:     Vitals: Blood pressure 118/70, pulse (!) 107, temperature 99 3 °F (37 4 °C), temperature source Tympanic, resp  rate 18, height 5' 7" (1 702 m), weight 133 kg (294 lb 3 2 oz)  ,Body mass index is 46 08 kg/m²  [unfilled]    Physical Exam:    GEN: wn/wd, NAD  HEENT: MMM, no cervical or supraclavicular LAD, anciteric  CV: RRR, no m/r/g  CHEST: CTA b/l, no w/r/r  ABD: +BS, soft, NT/ND, no hepatosplenomegaly  EXT: no c/c/e  SKIN: no rashes  NEURO: aaox3      Invasive Devices     None                         Lab, Imaging and other studies:     No visits with results within 1 Day(s) from this visit  Latest known visit with results is:   Office Visit on 02/28/2020   Component Date Value    Interpretation 02/29/2020           I have personally reviewed pertinent films in PACS    No current facility-administered medications for this visit

## 2020-03-02 NOTE — LETTER
March 2, 2020     Faiza Thompson PA-C  [address] [facility] [address]    Patient: Michael Maguire   YOB: 1980   Date of Visit: 3/2/2020       Dear Dr Agustin Chang: Thank you for referring Bao Villagran to me for evaluation  Below are my notes for this consultation  If you have questions, please do not hesitate to call me  I look forward to following your patient along with you  Sincerely,        Karlos Cornejo MD        CC: No Recipients  Karlos Cornejo MD  3/2/2020  2:42 PM  Sign at close encounter  [facility]  Progress Note - Ana Laura Baugh 36 y o  female MRN: [id number]    Unit/Bed#:  Encounter: [id number]    Assessment/Plan:    1  GERD-EGD notable for moderate gastritis and bile reflux  There was also evidence of hyperplastic polyp on EGD  Would recommend repeat EGD in 1 year interval   -patient is asymptomatic on Nexium 20 mg daily, will continue this as her symptoms are well controlled, she has tried switching to H2 blocker in the past with recurrence of symptoms   -avoid the use of NSAIDs   -continue to work on weight loss  - Patient was explained about the lifestyle and dietary modifications  Advised to avoid fatty foods, chocolates, caffeine, alcohol and any other triggering foods  Avoid eating for at least 3 hours before going to bed   -given longstanding use of PPI, will check vitamin B12 and magnesium level  2  History of hyperplastic colon polyp-repeat colonoscopy in 2022  3  Urinary incontinence/back pain for 1 week-back pain may be musculoskeletal however given occasional urinary leakage, will check UA  Subjective: This is a 42-year-old female with history of morbid obesity, anxiety, depression, presents for follow-up of her GERD symptoms  She underwent EGD by me several months ago which was notable for moderate gastritis, bile reflux and innumerable gastric polyps which were hyperplastic    She has been taking Nexium 40 mg on daily basis  Gastric biopsies were negative for H pylori, no evidence of celiac disease  Patient reports that she has been doing very well from standpoint of acid reflux symptoms  She states that Nexium 20 mg seems to be helping  She has tried switching in the past when H2 blocker with recurrent symptoms  She denies dysphagia, odynophagia, nausea or vomiting  No loss of appetite or unintentional weight loss  No change in bowel habits or hematochezia  No family history of colon cancer  She underwent colonoscopy in 2017 and was noted to have a hyperplastic polyp  She was recommended a repeat at 5 year interval       Objective:     Vitals: Blood pressure 118/70, pulse (!) 107, temperature 99 3 °F (37 4 °C), temperature source Tympanic, resp  rate 18, height 5' 7" (1 702 m), weight 133 kg (294 lb 3 2 oz)  ,Body mass index is 46 08 kg/m²  [unfilled]    Physical Exam:    GEN: wn/wd, NAD  HEENT: MMM, no cervical or supraclavicular LAD, anciteric  CV: RRR, no m/r/g  CHEST: CTA b/l, no w/r/r  ABD: +BS, soft, NT/ND, no hepatosplenomegaly  EXT: no c/c/e  SKIN: no rashes  NEURO: aaox3      Invasive Devices     None                         Lab, Imaging and other studies:     No visits with results within 1 Day(s) from this visit  Latest known visit with results is:   Office Visit on 02/28/2020   Component Date Value    Interpretation 02/29/2020           I have personally reviewed pertinent films in PACS    No current facility-administered medications for this visit

## 2020-03-05 ENCOUNTER — TELEPHONE (OUTPATIENT)
Dept: GASTROENTEROLOGY | Facility: CLINIC | Age: 40
End: 2020-03-05

## 2020-03-05 NOTE — TELEPHONE ENCOUNTER
----- Message from Naomi Andrade MD sent at 3/3/2020 10:04 AM EST -----  Vitamin B12 and magnesium levels are normal  21-Nov-2019

## 2020-03-05 NOTE — TELEPHONE ENCOUNTER
----- Message from Solis Durham MD sent at 3/3/2020 12:33 PM EST -----  I saw this patient yesterday for follow-up which she was complaining of urinary symptoms therefore I had ordered a UA for her  Her UA came back positive for small leukocytes and blood  Would you mind addressing this in terms of her need for abx etc? She was also c/o vague back pain and urinary incontinence   thanks

## 2020-03-05 NOTE — TELEPHONE ENCOUNTER
Called and relayed results to patient along with advising to follow up with her PCP in regards to the urine labs

## 2020-03-08 DIAGNOSIS — R82.90 ABNORMAL URINE FINDINGS: Primary | ICD-10-CM

## 2020-03-08 NOTE — TELEPHONE ENCOUNTER
I was sent these labs results from GI from 6 days ago  I was out on vacation until 3/9 and I am just getting this now  I will place repeat urine studies to be complete at her earliest convenience

## 2020-03-17 ENCOUNTER — OFFICE VISIT (OUTPATIENT)
Dept: PSYCHIATRY | Facility: CLINIC | Age: 40
End: 2020-03-17
Payer: COMMERCIAL

## 2020-03-17 DIAGNOSIS — F41.8 DEPRESSION WITH ANXIETY: ICD-10-CM

## 2020-03-17 PROCEDURE — 1036F TOBACCO NON-USER: CPT | Performed by: HOSPITALIST

## 2020-03-17 PROCEDURE — 3074F SYST BP LT 130 MM HG: CPT | Performed by: HOSPITALIST

## 2020-03-17 PROCEDURE — 99214 OFFICE O/P EST MOD 30 MIN: CPT | Performed by: HOSPITALIST

## 2020-03-17 PROCEDURE — 3078F DIAST BP <80 MM HG: CPT | Performed by: HOSPITALIST

## 2020-03-17 RX ORDER — VENLAFAXINE HYDROCHLORIDE 150 MG/1
150 CAPSULE, EXTENDED RELEASE ORAL DAILY
Qty: 30 CAPSULE | Refills: 1 | Status: SHIPPED | OUTPATIENT
Start: 2020-03-17 | End: 2020-04-30 | Stop reason: SDUPTHER

## 2020-03-17 NOTE — PSYCH
MEDICATION MANAGEMENT NOTE        [facility]      Name and Date of Birth:  Taniya Collier 36 y o  1980    Date of Visit: March 17, 2020    SUBJECTIVE:  CC: Edinson Brunson presents today for outpatient psychiatric follow up for  Anxiety and depression     Jannette  reports she continues to feel quite anxious however has noted that her more severe symptoms have dissipated  Despite having stressor recent surgery and limited functioning she has been able to manage without exacerbations or any panic attacks  She feels she continues to have overwhelming worry and ruminating thoughts of her daily stressors  She denies overwhelming depression  Reports adequate energy and motivation but she needs to push herself  Sleep and appetite are stable  Denies feeling hopeless or helpless  Denies any suicidal or homicidal ideations  There is no evidence of psychosis or siva  Jannette denies any side effects from medications unless noted above    Since our last visit, overall symptoms have been Mildly improved  HPI ROS:  Review Of Systems as noted above      Psychiatric History  Reviewed    History Review:  The following portions of the patient's history were reviewed and updated as appropriate: allergies, current medications, past family history, past medical history, past social history, past surgical history and problem list      Lab Review: Labs were reviewed      OBJECTIVE:     MENTAL STATUS EXAM  Appearance:  age appropriate, dressed casually, overweight   Behavior:  Pleasant & cooperative   Speech:  Normal volume, regular rate and rhythm   Mood:  anxious   Affect:  mood congruent, restricted, constricted   Language: intact and appropriate for age, education, and intellect   Thought Process:  Linear and goal directed   Associations: intact associations   Thought Content:  normal and appropriate   Perceptual Disturbances: no auditory or visual hallcunations   Risk Potential / Abnormal Thoughts: Suicidal ideation - None  Homicidal ideation - None  Potential for aggression - No       Consciousness:  Alert & Awake   Sensorium:  Grossly oriented   Attention: attention span and concentration are age appropriate       Fund of Knowledge:  Memory: awareness of current events: yes  recent and remote memory grossly intact   Insight:  good   Judgment: good   Muscle Strength Muscle Tone: normal  normal   Gait/Station: normal gait/station with good balance   Motor Activity: no abnormal movements       Risks, Benefits And Possible Side Effects Of Medications:    AGREE: Risks, benefits, and possible side effects of medications explained to Jannette and she (or legal representative) verbalizes understanding and agreement for treatment  Controlled Medication Discussion:     Not applicable    Recent labs:  Appointment on 03/02/2020   Component Date Value    Vitamin B-12 03/02/2020 863     Magnesium 03/02/2020 1 8    Office Visit on 03/02/2020   Component Date Value    Color, UA 03/02/2020 Yellow     Clarity, UA 03/02/2020 Clear     Specific Gravity, UA 03/02/2020 1 015     pH, UA 03/02/2020 6 5     Leukocytes, UA 03/02/2020 Small*    Nitrite, UA 03/02/2020 Negative     Protein, UA 03/02/2020 Negative     Glucose, UA 03/02/2020 Negative     Ketones, UA 03/02/2020 Negative     Urobilinogen, UA 03/02/2020 0 2     Bilirubin, UA 03/02/2020 Negative     Blood, UA 03/02/2020 Small*    RBC, UA 03/02/2020 None Seen     WBC, UA 03/02/2020 0-1*    Epithelial Cells 03/02/2020 Occasional     Bacteria, UA 03/02/2020 Occasional    Office Visit on 02/28/2020   Component Date Value    Interpretation 02/29/2020           Assessment/Plan:        Diagnoses and all orders for this visit:    Depression with anxiety  -     venlafaxine (EFFEXOR-XR) 150 mg 24 hr capsule; Take 1 capsule (150 mg total) by mouth daily            Increase Effexor XR from 75 mg daily to 150 mg daily    Follow up 2-3 weeks  Patient has been educated about their diagnosis and treatment modalities  They voiced understanding and agreement with the following plan:  Discussed medications and if treatment adjustment was needed/desired  Discussed self monitoring of symptoms, and symptom monitoring tools  Patient has been informed of 24 hours and weekend coverage for urgent situations accessed by calling the main clinic phone number  Treatment Plan:   Initial done 2/25/2020 by Dr Merlene Hahn  Follow up needed 8/25/2020      Psychotherapy in session:  Time spent performing psychotherapy: 30 Minutes  Education and counseling regarding diagnosis, medication and treatment plan  Discussed and support provided regarding patients current stressors

## 2020-03-18 ENCOUNTER — TELEPHONE (OUTPATIENT)
Dept: FAMILY MEDICINE CLINIC | Facility: CLINIC | Age: 40
End: 2020-03-18

## 2020-03-18 DIAGNOSIS — I10 HYPERTENSION, UNSPECIFIED TYPE: ICD-10-CM

## 2020-03-18 RX ORDER — METOPROLOL SUCCINATE 50 MG/1
50 TABLET, EXTENDED RELEASE ORAL DAILY
Qty: 90 TABLET | Refills: 1 | Status: SHIPPED | OUTPATIENT
Start: 2020-03-18 | End: 2020-08-12 | Stop reason: SDUPTHER

## 2020-03-18 NOTE — TELEPHONE ENCOUNTER
Last refill for Metoprolol was 1/27/20 # 90 with one refill but was sent to [facility]  Needs script sent to mail order

## 2020-03-18 NOTE — TELEPHONE ENCOUNTER
Pt left message on machine 3/17 @ 5:45pm     Refill on Metoprolol  To her mail order    And to cancel her appt next week she is seeing specialist and doesn't want to come in because of the [place]

## 2020-03-31 ENCOUNTER — TELEMEDICINE (OUTPATIENT)
Dept: PSYCHIATRY | Facility: CLINIC | Age: 40
End: 2020-03-31
Payer: COMMERCIAL

## 2020-03-31 DIAGNOSIS — F41.8 DEPRESSION WITH ANXIETY: Primary | ICD-10-CM

## 2020-03-31 PROCEDURE — 99214 OFFICE O/P EST MOD 30 MIN: CPT | Performed by: HOSPITALIST

## 2020-04-17 NOTE — ANESTHESIA POSTPROCEDURE EVALUATION
Post-Op Assessment Note    CV Status:  Stable  Pain Score: 0    Pain management: adequate     Mental Status:  Alert and awake   Hydration Status:  Euvolemic   PONV Controlled:  Controlled   Airway Patency:  Patent   Post Op Vitals Reviewed: Yes      Staff: CRNA           BP      Temp     Pulse  80   Resp   18   SpO2   99 Dupixent Counseling: I discussed with the patient the risks of dupilumab including but not limited to eye infection and irritation, cold sores, injection site reactions, worsening of asthma, allergic reactions and increased risk of parasitic infection.  Live vaccines should be avoided while taking dupilumab. Dupilumab will also interact with certain medications such as warfarin and cyclosporine. The patient understands that monitoring is required and they must alert us or the primary physician if symptoms of infection or other concerning signs are noted.

## 2020-04-30 ENCOUNTER — TELEMEDICINE (OUTPATIENT)
Dept: PSYCHIATRY | Facility: CLINIC | Age: 40
End: 2020-04-30
Payer: COMMERCIAL

## 2020-04-30 DIAGNOSIS — M79.7 FIBROMYALGIA: ICD-10-CM

## 2020-04-30 DIAGNOSIS — F41.8 DEPRESSION WITH ANXIETY: Primary | ICD-10-CM

## 2020-04-30 PROCEDURE — 99214 OFFICE O/P EST MOD 30 MIN: CPT | Performed by: HOSPITALIST

## 2020-04-30 RX ORDER — VENLAFAXINE HYDROCHLORIDE 37.5 MG/1
37.5 CAPSULE, EXTENDED RELEASE ORAL DAILY
Qty: 30 CAPSULE | Refills: 2 | Status: SHIPPED | OUTPATIENT
Start: 2020-04-30 | End: 2020-08-21 | Stop reason: SDUPTHER

## 2020-04-30 RX ORDER — VENLAFAXINE HYDROCHLORIDE 150 MG/1
150 CAPSULE, EXTENDED RELEASE ORAL DAILY
Qty: 30 CAPSULE | Refills: 2 | Status: SHIPPED | OUTPATIENT
Start: 2020-04-30 | End: 2020-08-21 | Stop reason: SDUPTHER

## 2020-05-11 ENCOUNTER — TELEMEDICINE (OUTPATIENT)
Dept: BEHAVIORAL/MENTAL HEALTH CLINIC | Facility: CLINIC | Age: 40
End: 2020-05-11
Payer: COMMERCIAL

## 2020-05-11 DIAGNOSIS — F41.8 DEPRESSION WITH ANXIETY: Primary | ICD-10-CM

## 2020-05-11 PROCEDURE — 99215 OFFICE O/P EST HI 40 MIN: CPT | Performed by: SOCIAL WORKER

## 2020-05-13 DIAGNOSIS — K21.9 GASTROESOPHAGEAL REFLUX DISEASE WITHOUT ESOPHAGITIS: ICD-10-CM

## 2020-06-04 ENCOUNTER — TELEMEDICINE (OUTPATIENT)
Dept: PSYCHIATRY | Facility: CLINIC | Age: 40
End: 2020-06-04

## 2020-06-04 DIAGNOSIS — F41.8 DEPRESSION WITH ANXIETY: Primary | ICD-10-CM

## 2020-06-04 PROCEDURE — NC001 PR NO CHARGE: Performed by: HOSPITALIST

## 2020-06-15 RX ORDER — NAPROXEN 500 MG/1
TABLET ORAL
COMMUNITY
End: 2020-06-17

## 2020-06-15 RX ORDER — TOPIRAMATE 50 MG/1
TABLET, FILM COATED ORAL DAILY
COMMUNITY
End: 2020-06-17

## 2020-06-17 ENCOUNTER — TELEMEDICINE (OUTPATIENT)
Dept: FAMILY MEDICINE CLINIC | Facility: CLINIC | Age: 40
End: 2020-06-17
Payer: COMMERCIAL

## 2020-06-17 DIAGNOSIS — Z12.31 BREAST CANCER SCREENING BY MAMMOGRAM: Primary | ICD-10-CM

## 2020-06-17 DIAGNOSIS — Z13.29 THYROID DISORDER SCREEN: ICD-10-CM

## 2020-06-17 DIAGNOSIS — R82.90 ABNORMAL URINE FINDING: ICD-10-CM

## 2020-06-17 DIAGNOSIS — E78.5 HYPERLIPIDEMIA, UNSPECIFIED HYPERLIPIDEMIA TYPE: ICD-10-CM

## 2020-06-17 DIAGNOSIS — M79.7 FIBROMYALGIA: ICD-10-CM

## 2020-06-17 DIAGNOSIS — I10 HYPERTENSION, UNSPECIFIED TYPE: ICD-10-CM

## 2020-06-17 DIAGNOSIS — E04.1 THYROID NODULE: ICD-10-CM

## 2020-06-17 PROCEDURE — 99213 OFFICE O/P EST LOW 20 MIN: CPT | Performed by: PHYSICIAN ASSISTANT

## 2020-06-17 RX ORDER — ATORVASTATIN CALCIUM 40 MG/1
40 TABLET, FILM COATED ORAL DAILY
Qty: 90 TABLET | Refills: 1 | Status: SHIPPED | OUTPATIENT
Start: 2020-06-17 | End: 2021-01-18 | Stop reason: SDUPTHER

## 2020-06-17 RX ORDER — AMITRIPTYLINE HYDROCHLORIDE 50 MG/1
50 TABLET, FILM COATED ORAL
Qty: 90 TABLET | Refills: 1 | Status: SHIPPED | OUTPATIENT
Start: 2020-06-17 | End: 2020-08-19 | Stop reason: SDUPTHER

## 2020-06-18 ENCOUNTER — TELEMEDICINE (OUTPATIENT)
Dept: BEHAVIORAL/MENTAL HEALTH CLINIC | Facility: CLINIC | Age: 40
End: 2020-06-18
Payer: COMMERCIAL

## 2020-06-18 DIAGNOSIS — F41.8 DEPRESSION WITH ANXIETY: Primary | ICD-10-CM

## 2020-06-18 PROCEDURE — 90834 PSYTX W PT 45 MINUTES: CPT | Performed by: SOCIAL WORKER

## 2020-07-09 ENCOUNTER — SOCIAL WORK (OUTPATIENT)
Dept: BEHAVIORAL/MENTAL HEALTH CLINIC | Facility: CLINIC | Age: 40
End: 2020-07-09
Payer: COMMERCIAL

## 2020-07-09 DIAGNOSIS — F41.8 DEPRESSION WITH ANXIETY: Primary | ICD-10-CM

## 2020-07-09 PROCEDURE — 90834 PSYTX W PT 45 MINUTES: CPT | Performed by: SOCIAL WORKER

## 2020-07-09 PROCEDURE — 1036F TOBACCO NON-USER: CPT | Performed by: SOCIAL WORKER
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
## 2020-07-12 ENCOUNTER — OFFICE VISIT (OUTPATIENT)
Dept: URGENT CARE | Facility: CLINIC | Age: 40
End: 2020-07-12
Payer: COMMERCIAL

## 2020-07-12 ENCOUNTER — APPOINTMENT (EMERGENCY)
Dept: RADIOLOGY | Facility: HOSPITAL | Age: 40
DRG: 872 | End: 2020-07-12
Payer: COMMERCIAL

## 2020-07-12 ENCOUNTER — HOSPITAL ENCOUNTER (INPATIENT)
Facility: HOSPITAL | Age: 40
LOS: 2 days | Discharge: HOME | DRG: 872 | End: 2020-07-14
Attending: EMERGENCY MEDICINE | Admitting: INTERNAL MEDICINE
Payer: COMMERCIAL

## 2020-07-12 VITALS
DIASTOLIC BLOOD PRESSURE: 98 MMHG | HEART RATE: 120 BPM | RESPIRATION RATE: 20 BRPM | OXYGEN SATURATION: 100 % | WEIGHT: 293 LBS | HEIGHT: 67 IN | SYSTOLIC BLOOD PRESSURE: 143 MMHG | TEMPERATURE: 97 F | BODY MASS INDEX: 45.99 KG/M2

## 2020-07-12 DIAGNOSIS — H66.91 RIGHT OTITIS MEDIA, UNSPECIFIED OTITIS MEDIA TYPE: ICD-10-CM

## 2020-07-12 DIAGNOSIS — H66.90 OTITIS MEDIA: ICD-10-CM

## 2020-07-12 DIAGNOSIS — H60.90 OTITIS EXTERNA: Primary | ICD-10-CM

## 2020-07-12 DIAGNOSIS — A41.9 SEPSIS (HCC): ICD-10-CM

## 2020-07-12 DIAGNOSIS — H60.311 ACUTE DIFFUSE OTITIS EXTERNA OF RIGHT EAR: Primary | ICD-10-CM

## 2020-07-12 PROBLEM — R65.20 SEVERE SEPSIS (HCC): Status: ACTIVE | Noted: 2020-07-12

## 2020-07-12 PROBLEM — J45.909 ASTHMA: Status: ACTIVE | Noted: 2020-07-12

## 2020-07-12 LAB
ANION GAP SERPL CALCULATED.3IONS-SCNC: 7 MMOL/L (ref 4–13)
BASOPHILS # BLD MANUAL: 0 THOUSAND/UL (ref 0–0.1)
BASOPHILS NFR MAR MANUAL: 0 % (ref 0–1)
BUN SERPL-MCNC: 6 MG/DL (ref 5–25)
CALCIUM SERPL-MCNC: 10 MG/DL (ref 8.3–10.1)
CHLORIDE SERPL-SCNC: 105 MMOL/L (ref 100–108)
CO2 SERPL-SCNC: 26 MMOL/L (ref 21–32)
CREAT SERPL-MCNC: 0.79 MG/DL (ref 0.6–1.3)
EOSINOPHIL # BLD MANUAL: 0 THOUSAND/UL (ref 0–0.4)
EOSINOPHIL NFR BLD MANUAL: 0 % (ref 0–6)
ERYTHROCYTE [DISTWIDTH] IN BLOOD BY AUTOMATED COUNT: 13.8 % (ref 11.6–15.1)
GFR SERPL CREATININE-BSD FRML MDRD: 94 ML/MIN/1.73SQ M
GLUCOSE SERPL-MCNC: 179 MG/DL (ref 65–140)
HCT VFR BLD AUTO: 46 % (ref 34.8–46.1)
HGB BLD-MCNC: 15.9 G/DL (ref 11.5–15.4)
LACTATE SERPL-SCNC: 2.4 MMOL/L (ref 0.5–2)
LACTATE SERPL-SCNC: 2.5 MMOL/L (ref 0.5–2)
LACTATE SERPL-SCNC: 2.6 MMOL/L (ref 0.5–2)
LYMPHOCYTES # BLD AUTO: 0 % (ref 14–44)
LYMPHOCYTES # BLD AUTO: 0 THOUSAND/UL (ref 0.6–4.47)
MCH RBC QN AUTO: 29.9 PG (ref 26.8–34.3)
MCHC RBC AUTO-ENTMCNC: 34.6 G/DL (ref 31.4–37.4)
MCV RBC AUTO: 87 FL (ref 82–98)
MONOCYTES # BLD AUTO: 0.28 THOUSAND/UL (ref 0–1.22)
MONOCYTES NFR BLD: 2 % (ref 4–12)
NEUTROPHILS # BLD MANUAL: 13.33 THOUSAND/UL (ref 1.85–7.62)
NEUTS BAND NFR BLD MANUAL: 17 % (ref 0–8)
NEUTS SEG NFR BLD AUTO: 79 % (ref 43–75)
NRBC BLD AUTO-RTO: 0 /100 WBCS
PLATELET # BLD AUTO: 176 THOUSANDS/UL (ref 149–390)
PLATELET BLD QL SMEAR: ADEQUATE
PMV BLD AUTO: 10.5 FL (ref 8.9–12.7)
POLYCHROMASIA BLD QL SMEAR: PRESENT
POTASSIUM SERPL-SCNC: 3.6 MMOL/L (ref 3.5–5.3)
RBC # BLD AUTO: 5.32 MILLION/UL (ref 3.81–5.12)
RBC MORPH BLD: PRESENT
SODIUM SERPL-SCNC: 138 MMOL/L (ref 136–145)
VARIANT LYMPHS # BLD AUTO: 2 %
WBC # BLD AUTO: 13.89 THOUSAND/UL (ref 4.31–10.16)

## 2020-07-12 PROCEDURE — 85007 BL SMEAR W/DIFF WBC COUNT: CPT | Performed by: STUDENT IN AN ORGANIZED HEALTH CARE EDUCATION/TRAINING PROGRAM

## 2020-07-12 PROCEDURE — 80048 BASIC METABOLIC PNL TOTAL CA: CPT | Performed by: STUDENT IN AN ORGANIZED HEALTH CARE EDUCATION/TRAINING PROGRAM

## 2020-07-12 PROCEDURE — 99223 1ST HOSP IP/OBS HIGH 75: CPT | Performed by: INTERNAL MEDICINE

## 2020-07-12 PROCEDURE — 70480 CT ORBIT/EAR/FOSSA W/O DYE: CPT

## 2020-07-12 PROCEDURE — 83605 ASSAY OF LACTIC ACID: CPT | Performed by: EMERGENCY MEDICINE

## 2020-07-12 PROCEDURE — 87040 BLOOD CULTURE FOR BACTERIA: CPT | Performed by: EMERGENCY MEDICINE

## 2020-07-12 PROCEDURE — 36415 COLL VENOUS BLD VENIPUNCTURE: CPT | Performed by: STUDENT IN AN ORGANIZED HEALTH CARE EDUCATION/TRAINING PROGRAM

## 2020-07-12 PROCEDURE — 99284 EMERGENCY DEPT VISIT MOD MDM: CPT

## 2020-07-12 PROCEDURE — 85027 COMPLETE CBC AUTOMATED: CPT | Performed by: STUDENT IN AN ORGANIZED HEALTH CARE EDUCATION/TRAINING PROGRAM

## 2020-07-12 PROCEDURE — 99254 IP/OBS CNSLTJ NEW/EST MOD 60: CPT | Performed by: OTOLARYNGOLOGY

## 2020-07-12 PROCEDURE — 99291 CRITICAL CARE FIRST HOUR: CPT | Performed by: EMERGENCY MEDICINE

## 2020-07-12 PROCEDURE — G0383 LEV 4 HOSP TYPE B ED VISIT: HCPCS | Performed by: FAMILY MEDICINE

## 2020-07-12 PROCEDURE — 83605 ASSAY OF LACTIC ACID: CPT | Performed by: INTERNAL MEDICINE

## 2020-07-12 RX ORDER — OXYCODONE HYDROCHLORIDE 5 MG/1
2.5 TABLET ORAL EVERY 4 HOURS PRN
Status: DISCONTINUED | OUTPATIENT
Start: 2020-07-12 | End: 2020-07-14 | Stop reason: HOSPADM

## 2020-07-12 RX ORDER — OXYCODONE HYDROCHLORIDE 5 MG/1
5 TABLET ORAL EVERY 4 HOURS PRN
Status: DISCONTINUED | OUTPATIENT
Start: 2020-07-12 | End: 2020-07-14 | Stop reason: HOSPADM

## 2020-07-12 RX ORDER — ATORVASTATIN CALCIUM 40 MG/1
40 TABLET, FILM COATED ORAL DAILY
Status: DISCONTINUED | OUTPATIENT
Start: 2020-07-13 | End: 2020-07-14 | Stop reason: HOSPADM

## 2020-07-12 RX ORDER — SODIUM CHLORIDE 9 MG/ML
125 INJECTION, SOLUTION INTRAVENOUS CONTINUOUS
Status: DISCONTINUED | OUTPATIENT
Start: 2020-07-12 | End: 2020-07-14

## 2020-07-12 RX ORDER — ONDANSETRON 2 MG/ML
4 INJECTION INTRAMUSCULAR; INTRAVENOUS EVERY 4 HOURS PRN
Status: DISCONTINUED | OUTPATIENT
Start: 2020-07-12 | End: 2020-07-14 | Stop reason: HOSPADM

## 2020-07-12 RX ORDER — CIPROFLOXACIN 500 MG/1
500 TABLET, FILM COATED ORAL ONCE
Status: COMPLETED | OUTPATIENT
Start: 2020-07-12 | End: 2020-07-12

## 2020-07-12 RX ORDER — ACETAMINOPHEN 325 MG/1
650 TABLET ORAL EVERY 6 HOURS PRN
Status: DISCONTINUED | OUTPATIENT
Start: 2020-07-12 | End: 2020-07-14 | Stop reason: HOSPADM

## 2020-07-12 RX ORDER — ACETAMINOPHEN 325 MG/1
650 TABLET ORAL ONCE
Status: COMPLETED | OUTPATIENT
Start: 2020-07-12 | End: 2020-07-12

## 2020-07-12 RX ORDER — HEPARIN SODIUM 5000 [USP'U]/ML
5000 INJECTION, SOLUTION INTRAVENOUS; SUBCUTANEOUS EVERY 8 HOURS SCHEDULED
Status: DISCONTINUED | OUTPATIENT
Start: 2020-07-12 | End: 2020-07-14 | Stop reason: HOSPADM

## 2020-07-12 RX ORDER — PANTOPRAZOLE SODIUM 40 MG/1
40 TABLET, DELAYED RELEASE ORAL
Status: DISCONTINUED | OUTPATIENT
Start: 2020-07-13 | End: 2020-07-14 | Stop reason: HOSPADM

## 2020-07-12 RX ORDER — AMITRIPTYLINE HYDROCHLORIDE 50 MG/1
50 TABLET, FILM COATED ORAL
Status: DISCONTINUED | OUTPATIENT
Start: 2020-07-12 | End: 2020-07-14 | Stop reason: HOSPADM

## 2020-07-12 RX ORDER — MELATONIN
2000 DAILY
Status: DISCONTINUED | OUTPATIENT
Start: 2020-07-13 | End: 2020-07-14 | Stop reason: HOSPADM

## 2020-07-12 RX ORDER — LEVOFLOXACIN 5 MG/ML
750 INJECTION, SOLUTION INTRAVENOUS ONCE
Status: COMPLETED | OUTPATIENT
Start: 2020-07-12 | End: 2020-07-12

## 2020-07-12 RX ORDER — DEXAMETHASONE SODIUM PHOSPHATE 10 MG/ML
10 INJECTION, SOLUTION INTRAMUSCULAR; INTRAVENOUS ONCE
Status: COMPLETED | OUTPATIENT
Start: 2020-07-12 | End: 2020-07-12

## 2020-07-12 RX ORDER — VENLAFAXINE HYDROCHLORIDE 150 MG/1
150 CAPSULE, EXTENDED RELEASE ORAL DAILY
Status: DISCONTINUED | OUTPATIENT
Start: 2020-07-13 | End: 2020-07-14 | Stop reason: HOSPADM

## 2020-07-12 RX ORDER — LORATADINE 10 MG/1
10 TABLET ORAL DAILY
Status: DISCONTINUED | OUTPATIENT
Start: 2020-07-13 | End: 2020-07-14 | Stop reason: HOSPADM

## 2020-07-12 RX ORDER — MULTIVITAMIN/IRON/FOLIC ACID 18MG-0.4MG
1 TABLET ORAL DAILY
Status: DISCONTINUED | OUTPATIENT
Start: 2020-07-13 | End: 2020-07-14 | Stop reason: HOSPADM

## 2020-07-12 RX ORDER — LEVOFLOXACIN 5 MG/ML
750 INJECTION, SOLUTION INTRAVENOUS EVERY 24 HOURS
Status: DISCONTINUED | OUTPATIENT
Start: 2020-07-13 | End: 2020-07-14 | Stop reason: HOSPADM

## 2020-07-12 RX ORDER — HYDRALAZINE HYDROCHLORIDE 20 MG/ML
5 INJECTION INTRAMUSCULAR; INTRAVENOUS EVERY 6 HOURS PRN
Status: DISCONTINUED | OUTPATIENT
Start: 2020-07-12 | End: 2020-07-14 | Stop reason: HOSPADM

## 2020-07-12 RX ORDER — CIPROFLOXACIN AND DEXAMETHASONE 3; 1 MG/ML; MG/ML
4 SUSPENSION/ DROPS AURICULAR (OTIC) 2 TIMES DAILY
Status: DISCONTINUED | OUTPATIENT
Start: 2020-07-12 | End: 2020-07-14 | Stop reason: HOSPADM

## 2020-07-12 RX ORDER — VENLAFAXINE HYDROCHLORIDE 37.5 MG/1
37.5 CAPSULE, EXTENDED RELEASE ORAL DAILY
Status: DISCONTINUED | OUTPATIENT
Start: 2020-07-13 | End: 2020-07-14 | Stop reason: HOSPADM

## 2020-07-12 RX ORDER — CLINDAMYCIN HYDROCHLORIDE 300 MG/1
300 CAPSULE ORAL 4 TIMES DAILY
COMMUNITY
End: 2020-07-14 | Stop reason: HOSPADM

## 2020-07-12 RX ORDER — ALBUTEROL SULFATE 90 UG/1
2 AEROSOL, METERED RESPIRATORY (INHALATION) EVERY 6 HOURS PRN
Status: DISCONTINUED | OUTPATIENT
Start: 2020-07-12 | End: 2020-07-14 | Stop reason: HOSPADM

## 2020-07-12 RX ORDER — METOPROLOL SUCCINATE 50 MG/1
50 TABLET, EXTENDED RELEASE ORAL DAILY
Status: DISCONTINUED | OUTPATIENT
Start: 2020-07-13 | End: 2020-07-14 | Stop reason: HOSPADM

## 2020-07-12 RX ORDER — ASCORBIC ACID 500 MG
500 TABLET ORAL DAILY
Status: DISCONTINUED | OUTPATIENT
Start: 2020-07-13 | End: 2020-07-14 | Stop reason: HOSPADM

## 2020-07-12 RX ORDER — PREDNISONE 10 MG/1
TABLET ORAL
Qty: 15 TABLET | Refills: 0 | Status: SHIPPED | OUTPATIENT
Start: 2020-07-12 | End: 2020-07-14 | Stop reason: HOSPADM

## 2020-07-12 RX ADMIN — CIPROFLOXACIN HYDROCHLORIDE 500 MG: 500 TABLET, FILM COATED ORAL at 15:18

## 2020-07-12 RX ADMIN — SODIUM CHLORIDE 500 ML: 0.9 INJECTION, SOLUTION INTRAVENOUS at 22:29

## 2020-07-12 RX ADMIN — SODIUM CHLORIDE 1000 ML: 0.9 INJECTION, SOLUTION INTRAVENOUS at 18:06

## 2020-07-12 RX ADMIN — AMITRIPTYLINE HYDROCHLORIDE 50 MG: 50 TABLET, FILM COATED ORAL at 21:09

## 2020-07-12 RX ADMIN — DEXAMETHASONE SODIUM PHOSPHATE 10 MG: 10 INJECTION, SOLUTION INTRAMUSCULAR; INTRAVENOUS at 22:30

## 2020-07-12 RX ADMIN — CIPROFLOXACIN AND DEXAMETHASONE 4 DROP: 3; 1 SUSPENSION/ DROPS AURICULAR (OTIC) at 20:44

## 2020-07-12 RX ADMIN — HEPARIN SODIUM 5000 UNITS: 5000 INJECTION INTRAVENOUS; SUBCUTANEOUS at 21:09

## 2020-07-12 RX ADMIN — OXYCODONE HYDROCHLORIDE 5 MG: 5 TABLET ORAL at 18:41

## 2020-07-12 RX ADMIN — ACETAMINOPHEN 650 MG: 325 TABLET, FILM COATED ORAL at 15:00

## 2020-07-12 RX ADMIN — HEPARIN SODIUM 5000 UNITS: 5000 INJECTION INTRAVENOUS; SUBCUTANEOUS at 17:45

## 2020-07-12 RX ADMIN — LEVOFLOXACIN 750 MG: 5 INJECTION, SOLUTION INTRAVENOUS at 16:42

## 2020-07-12 RX ADMIN — MORPHINE SULFATE 2 MG: 2 INJECTION, SOLUTION INTRAMUSCULAR; INTRAVENOUS at 17:44

## 2020-07-12 RX ADMIN — SODIUM CHLORIDE 1000 ML: 0.9 INJECTION, SOLUTION INTRAVENOUS at 16:30

## 2020-07-12 RX ADMIN — SODIUM CHLORIDE 125 ML/HR: 0.9 INJECTION, SOLUTION INTRAVENOUS at 18:06

## 2020-07-12 RX ADMIN — SODIUM CHLORIDE 125 ML/HR: 0.9 INJECTION, SOLUTION INTRAVENOUS at 23:30

## 2020-07-12 NOTE — ASSESSMENT & PLAN NOTE
- presented with leukocytosis/tachycardia coupled with lactic acidosis in the setting of otitis externa/media  - continue IV fluid resuscitation/hydration -> trend lactic acid until level < 2   - monitor vitals and maintain hemodynamics - monitor for fever curve - check serum procalcitonin  - see plan for otic infection below   - blood cultures drawn in the ER

## 2020-07-12 NOTE — ASSESSMENT & PLAN NOTE
- CT imaging revealed "Acute right otitis externa and otitis media without ossicular erosion    Moderate amount of periauricular, periparotid and perimastoid soft tissue inflammation without a discrete fluid collection" - incidental notation of right posterior parotid enhancement w/ lymphadenopathy possibly suggestive of parotitis noted  - continue IV Levaquin as initiated in the ER - significant PCN allergy noted  - PRN pain control  - await ENT input

## 2020-07-12 NOTE — PROGRESS NOTES
[facility]        NAME: Marie Sandoval is a 36 y o  female  : 1980    MRN: [id number]  DATE: 2020  TIME: 8:28 AM    Assessment and Plan   Acute diffuse otitis externa of right ear [H60 311]  1  Acute diffuse otitis externa of right ear  predniSONE 10 mg tablet         Patient Instructions   Right otitis externa  Continue and antibiotic ear drops as prescribed  Start prednisone taper as directed 6, 5, 4, 3, 2, 1  Follow-up with ear nose and throat tomorrow  If there is worsening pain, swelling or fevers go to the ER tonight  Follow up with PCP in 3-5 days  Proceed to  ER if symptoms worsen  Chief Complaint     Chief Complaint   Patient presents with    Earache     right ear pain for 2 days, seen at , given ear drops and antibiotic with worsening         History of Present Illness       Patient presents for evaluation of right ear pain and swelling for the last 2-3 days  She was evaluated at [facility] urgent care 2 days ago for the same problem and was started on clindamycin orally and antibiotic ear drops  Despite this she has had progressive swelling of the ear canal, ear and right cheek  She denies any significant sore throat or drainage into her throat  No fevers or chills  Review of Systems   Review of Systems   Constitutional: Negative for chills, fatigue and fever  HENT: Positive for ear discharge, ear pain and hearing loss  Respiratory: Negative for cough, shortness of breath and wheezing            Current Medications       Current Outpatient Medications:     albuterol (PROVENTIL HFA,VENTOLIN HFA) 90 mcg/act inhaler, Inhale 2 puffs every 6 (six) hours as needed for shortness of breath (cough), Disp: 1 Inhaler, Rfl: 0    amitriptyline (ELAVIL) 50 mg tablet, Take 1 tablet (50 mg total) by mouth daily at bedtime, Disp: 90 tablet, Rfl: 1    Ascorbic Acid (VITAMIN C) 500 MG CAPS, Take 1 capsule by mouth daily, Disp: , Rfl:     atorvastatin (LIPITOR) 40 mg tablet, Take 1 tablet (40 mg total) by mouth daily, Disp: 90 tablet, Rfl: 1    cetirizine (ZyrTEC) 10 mg tablet, Take 10 mg by mouth daily, Disp: , Rfl:     Cholecalciferol (VITAMIN D3) 5000 units CAPS, Take 1 capsule by mouth daily  , Disp: , Rfl:     clindamycin (CLEOCIN) 300 MG capsule, Take 300 mg by mouth 4 (four) times a day, Disp: , Rfl:     cyanocobalamin (VITAMIN B-12) 1,000 mcg tablet, Take 3 tablets by mouth 2 (two) times a day, Disp: , Rfl:     esomeprazole (NexIUM 24HR) 20 mg capsule, Take 1 capsule (20 mg total) by mouth every morning, Disp: 30 capsule, Rfl: 6    levonorgestrel (MIRENA) 20 MCG/24HR IUD, 1 each by Intrauterine route once, Disp: , Rfl:     metoprolol succinate (TOPROL-XL) 50 mg 24 hr tablet, Take 1 tablet (50 mg total) by mouth daily, Disp: 90 tablet, Rfl: 1    Multiple Vitamins-Minerals (CENTRUM ADULTS PO), Take 1 tablet by mouth daily, Disp: , Rfl:     venlafaxine (EFFEXOR-XR) 150 mg 24 hr capsule, Take 1 capsule (150 mg total) by mouth daily Take with 37 5 mg tablet for total 187 5mg, Disp: 30 capsule, Rfl: 2    venlafaxine (EFFEXOR-XR) 37 5 mg 24 hr capsule, Take 1 capsule (37 5 mg total) by mouth daily Take with 150 mg tablet for total 187 5mg, Disp: 30 capsule, Rfl: 2    predniSONE 10 mg tablet, 6 tablets by mouth on day 1, then 5 tablets on day 2, 4 tablets on day 3, 3 tablets on day 4, 2 tablet on day 5, 1 tablet on day 6 , Disp: 15 tablet, Rfl: 0    Current Allergies     Allergies as of 07/12/2020 - Reviewed 07/12/2020   Allergen Reaction Noted    Amoxicillin Swelling, Anaphylaxis, and Angioedema 02/13/2009    Neurontin [gabapentin] Other (See Comments) 10/16/2014    Penicillin v Angioedema and Swelling 06/21/2012    Penicillins Swelling and Anaphylaxis 05/16/2017    Aripiprazole Other (See Comments) 11/06/2013    Lorazepam Other (See Comments) 06/11/2014    Carbamazepine Other (See Comments) 10/16/2012    Doxycycline Hives 04/10/2013    Lamotrigine Rash 2012    Other Hives 2017            The following portions of the patient's history were reviewed and updated as appropriate: allergies, current medications, past family history, past medical history, past social history, past surgical history and problem list      Past Medical History:   Diagnosis Date    Allergic rhinitis     Anxiety     Arthritis     Chronic pain disorder     BACK SHOULDERS NECK    Depression     Disease of thyroid gland     nodule    Fibromyalgia, primary     GERD (gastroesophageal reflux disease)     Hearing difficulty of right ear     Hyperlipidemia     Hypertension     Irritable bowel syndrome     Perforation of tympanic membrane     Right    PONV (postoperative nausea and vomiting)     RBBB     Right bundle branch blockage     Sleep apnea     no cpap    Wears glasses        Past Surgical History:   Procedure Laterality Date    ANAL FISTULOTOMY N/A 2018    Procedure: FISTULOTOMY;  Surgeon: Cristofer Olvera MD;  [facility];  Service: Colorectal    BACK SURGERY      lumbar herniated disc    BREAST SURGERY Left 2006    cyst removal    CARPAL TUNNEL RELEASE       SECTION      CHOLECYSTECTOMY      COLONOSCOPY      CYST REMOVAL      DENTAL SURGERY      INCISION AND DRAINAGE OF WOUND N/A 2018    Procedure: INCISION AND DRAINAGE (I&D) BUTTOCK;  Surgeon: Cristofer Olvera MD;  [facility];  Service: Colorectal    RI COLONOSCOPY FLX DX W/COLLJ SPEC WHEN PFRMD N/A 2017    Procedure: COLONOSCOPY;  Surgeon: Jeremie Granados MD;  [facility]; Service: Gastroenterology    RI INCISE FINGER TENDON SHEATH Right 3/13/2018    Procedure: LONG TRIGGER FINGER RELEASE;  Surgeon: Elise Herrera DO;  [facility];  Service: Orthopedics    RI SURG DIAGNOSTIC EXAM, ANORECTAL N/A 2018    Procedure: EXAM UNDER ANESTHESIA (EUA);   Surgeon: Cristofer Olvera MD;  [facility];  Service: Colorectal    UT TYMPANOPLASTY Right 5/24/2017    Procedure: TYMPANOPLASTY WITH PERICHONDRIN GRAFT;  Surgeon: Zak Mckeon DO;  [facility];  Service: ENT    SHOULDER SURGERY Right     WISDOM TOOTH EXTRACTION         Family History   Problem Relation Age of Onset    Hypertension Mother     Hyperlipidemia Mother     Diabetes Mother     Hypertension Father     Hyperlipidemia Father     Heart disease Father         cardiac disorder-myocardial infarction arrhythmias    Diabetes unspecified Maternal Grandmother     Crohn's disease Brother     Arthritis Brother     Thyroid disease Paternal Grandmother          Medications have been verified  Objective   /98   Pulse (!) 120   Temp (!) 97 °F (36 1 °C) (Temporal)   Resp 20   Ht 5' 7" (1 702 m)   Wt 133 kg (294 lb)   SpO2 100%   BMI 46 05 kg/m²        Physical Exam     Physical Exam   Constitutional: She appears well-developed and well-nourished  No distress  HENT:   Left Ear: External ear normal    Mouth/Throat: Oropharynx is clear and moist  No oropharyngeal exudate  Right ear lobe with mild swelling and erythema  Ear canal swollen and occluded  Mild purulent drainage  Diffuse tenderness around the ear and pain with ear lobe movement  Palpable swelling in the pre-auricular region  No submandibular node enlargement  Cardiovascular: Normal rate, regular rhythm and normal heart sounds  Exam reveals no gallop and no friction rub  No murmur heard  Pulmonary/Chest: Effort normal and breath sounds normal  No stridor  No respiratory distress  She has no wheezes  She has no rales  Skin: Skin is warm and dry  Capillary refill takes less than 2 seconds  No rash noted  She is not diaphoretic  Psychiatric: She has a normal mood and affect

## 2020-07-12 NOTE — ED ATTENDING ATTESTATION
7/12/2020  IQuyen DO, saw and evaluated the patient  I have discussed the patient with the resident/non-physician practitioner and agree with the resident's/non-physician practitioner's findings, Plan of Care, and MDM as documented in the resident's/non-physician practitioner's note, except where noted  All available labs and Radiology studies were reviewed  I was present for key portions of any procedure(s) performed by the resident/non-physician practitioner and I was immediately available to provide assistance  At this point I agree with the current assessment done in the Emergency Department  I have conducted an independent evaluation of this patient a history and physical is as follows:    Patient presents for further evaluation of right ear pain and swelling for the past couple of days associated with shaking chills and sweats since last night  She was seen at urgent care and diagnosed with otitis externa for which she is given otic drops  She was seen again when symptoms or worsening and was given oral clindamycin  She has had 4 doses of the clindamycin and 6 doses of the otic drops but symptoms continued to worsen  She denies swelling anterior to the ear along the mandible and pain with movement of the mandible  She has not measured a fever but felt tactile fever this morning for which she took ibuprofen at 0900 hours  She does not report any inciting cause such as swimming, trapped water in the ear or insect bite  Patient states that her smart watch indicates she has been in fat burn mode for the past 6 hours  No recent travel or sick contacts  ROS: Denies HA, CP, SOB, abdominal pain, n/v/d  12 system ROS o/w negative       PE:  Mild distress, appears uncomfortable, alert; PERRL, EOMI; MMM, no posterior oropharyngeal exudate, edema or erythema; unable to examine right external auditory canal or TM due to edema and pain, entire right pinna is swollen as well as anterior to the pinna, palpable lymph nodes pre-auricular and submandibular on the right are tender; HRR, no murmur, monitor shows sinus tachycardia at 120 bpm; lungs CTA w/o w/r/r, POx 100% on RA (nl); abdomen s/nt/nd, nl BS in all 4 quadrant, obese; (-) LE edema or calf TTP, FROM extremities x4; skin p/w, diaphoretic; CNs GI/NF, oriented  DDx:  Right ear pain/swelling with tachycardia/diaphoresis/chills - acute otitis externa extending to mastoiditis, sepsis, doubt encephalitis  A/P: Will check septic workup, CT head, treat symptoms, reevaluate for further work up and disposition  ED Course  ED Course as of Jul 12 1611   Sun Jul 12, 2020   1553 Sepsis alert called  LACTIC ACID(!!): 2 4         Critical Care Time  CriticalCare Time  Performed by: Agusto Garcia DO  Authorized by:  Agusto Garcia DO     Critical care provider statement:     Critical care time (minutes):  30    Critical care time was exclusive of:  Separately billable procedures and treating other patients and teaching time    Critical care was necessary to treat or prevent imminent or life-threatening deterioration of the following conditions:  Sepsis    Critical care was time spent personally by me on the following activities:  Obtaining history from patient or surrogate, development of treatment plan with patient or surrogate, discussions with consultants, evaluation of patient's response to treatment, examination of patient, ordering and performing treatments and interventions, ordering and review of laboratory studies, ordering and review of radiographic studies, re-evaluation of patient's condition and review of old charts    I assumed direction of critical care for this patient from another provider in my specialty: no

## 2020-07-12 NOTE — ED PROVIDER NOTES
History  Chief Complaint   Patient presents with   Carol Gutierrez     Seen at Urgent Care on Fri for R ear infection, has been using ABX and drops, swelling and pain has increased  Pt also reports dizziness, +nausea, no vomiting/diarrhea  63-year-old female presents for right ear infection  Patient states that the infection started on Friday and she went to urgent care and was given Cipro drops  On Saturday the pain and swelling of her right ear got worse and was given oral clindamycin  Today patient return to the urgent care with increased swelling and pain on the right ear and was given prednisone in the Urgent Care and sent to the ED  Patient states the pain feels like pressure and it is worse with palpation and opening the jaw  Patient has tried Advil her pain has not really helped  She admits to fever, chills, diaphoresis, nausea, decreased hearing in right ear, dizziness  Patient denies cough, eye pain, vomiting, sore throat  Earache   Location:  Right  Behind ear:  Swelling  Quality:  Pressure and throbbing  Severity:  Moderate  Onset quality:  Gradual  Timing:  Constant  Progression:  Worsening  Chronicity:  New  Relieved by:  Nothing  Worsened by:  Palpation  Associated symptoms: ear discharge    Associated symptoms: no cough        Prior to Admission Medications   Prescriptions Last Dose Informant Patient Reported? Taking?    Ascorbic Acid (VITAMIN C) 500 MG CAPS  Self Yes Yes   Sig: Take 1 capsule by mouth daily   Cholecalciferol (VITAMIN D3) 5000 units CAPS  Self Yes Yes   Sig: Take 1 capsule by mouth daily     Multiple Vitamins-Minerals (CENTRUM ADULTS PO)  Self Yes Yes   Sig: Take 1 tablet by mouth daily   albuterol (PROVENTIL HFA,VENTOLIN HFA) 90 mcg/act inhaler  Self No Yes   Sig: Inhale 2 puffs every 6 (six) hours as needed for shortness of breath (cough)   amitriptyline (ELAVIL) 50 mg tablet   No Yes   Sig: Take 1 tablet (50 mg total) by mouth daily at bedtime   atorvastatin (LIPITOR) 40 mg tablet   No Yes   Sig: Take 1 tablet (40 mg total) by mouth daily   cetirizine (ZyrTEC) 10 mg tablet  Self Yes Yes   Sig: Take 10 mg by mouth daily   clindamycin (CLEOCIN) 300 MG capsule   Yes Yes   Sig: Take 300 mg by mouth 4 (four) times a day   cyanocobalamin (VITAMIN B-12) 1,000 mcg tablet  Self Yes Yes   Sig: Take 3 tablets by mouth 2 (two) times a day   esomeprazole (NexIUM 24HR) 20 mg capsule   No Yes   Sig: Take 1 capsule (20 mg total) by mouth every morning   levonorgestrel (MIRENA) 20 MCG/24HR IUD   Yes No   Si each by Intrauterine route once   metoprolol succinate (TOPROL-XL) 50 mg 24 hr tablet   No Yes   Sig: Take 1 tablet (50 mg total) by mouth daily   predniSONE 10 mg tablet   No No   Si tablets by mouth on day 1, then 5 tablets on day 2, 4 tablets on day 3, 3 tablets on day 4, 2 tablet on day 5, 1 tablet on day 6    venlafaxine (EFFEXOR-XR) 150 mg 24 hr capsule   No Yes   Sig: Take 1 capsule (150 mg total) by mouth daily Take with 37 5 mg tablet for total 187 5mg   venlafaxine (EFFEXOR-XR) 37 5 mg 24 hr capsule   No Yes   Sig: Take 1 capsule (37 5 mg total) by mouth daily Take with 150 mg tablet for total 187 5mg      Facility-Administered Medications: None       Past Medical History:   Diagnosis Date    Allergic rhinitis     Anxiety     Arthritis     Chronic pain disorder     BACK SHOULDERS NECK    Depression     Disease of thyroid gland     nodule    Fibromyalgia, primary     GERD (gastroesophageal reflux disease)     Hearing difficulty of right ear     Hyperlipidemia     Hypertension     Irritable bowel syndrome     Perforation of tympanic membrane     Right    PONV (postoperative nausea and vomiting)     RBBB     Right bundle branch blockage     Sleep apnea     no cpap    Wears glasses        Past Surgical History:   Procedure Laterality Date    ANAL FISTULOTOMY N/A 2018    Procedure: FISTULOTOMY;  Surgeon: Brittnee Melvin MD;  [facility]; Service: Colorectal    BACK SURGERY      lumbar herniated disc    BREAST SURGERY Left 2006    cyst removal    CARPAL TUNNEL RELEASE       SECTION      CHOLECYSTECTOMY      COLONOSCOPY      CYST REMOVAL      DENTAL SURGERY      INCISION AND DRAINAGE OF WOUND N/A 2018    Procedure: INCISION AND DRAINAGE (I&D) BUTTOCK;  Surgeon: Berna Khan MD;  [facility];  Service: Colorectal    MD COLONOSCOPY FLX DX W/COLLJ SPEC WHEN PFRMD N/A 2017    Procedure: COLONOSCOPY;  Surgeon: Claribel Leal MD;  [facility]; Service: Gastroenterology    MD INCISE FINGER TENDON SHEATH Right 3/13/2018    Procedure: LONG TRIGGER FINGER RELEASE;  Surgeon: Wendy Gutierrez DO;  [facility];  Service: Orthopedics    MD SURG DIAGNOSTIC EXAM, ANORECTAL N/A 2018    Procedure: EXAM UNDER ANESTHESIA (EUA); Surgeon: Berna Khan MD;  [facility];  Service: Colorectal    MD TYMPANOPLASTY Right 2017    Procedure: TYMPANOPLASTY WITH PERICHONDRIN GRAFT;  Surgeon: Gayla Carlos DO;  [facility];  Service: ENT    SHOULDER SURGERY Right     WISDOM TOOTH EXTRACTION         Family History   Problem Relation Age of Onset    Hypertension Mother     Hyperlipidemia Mother     Diabetes Mother     Hypertension Father     Hyperlipidemia Father     Heart disease Father         cardiac disorder-myocardial infarction arrhythmias    Diabetes unspecified Maternal Grandmother     Crohn's disease Brother     Arthritis Brother     Thyroid disease Paternal Grandmother      I have reviewed and agree with the history as documented      E-Cigarette/Vaping    E-Cigarette Use Never User      E-Cigarette/Vaping Substances    Nicotine No     THC No     CBD No     Flavoring No     Other No      Social History     Tobacco Use    Smoking status: Never Smoker    Smokeless tobacco: Never Used   Substance Use Topics    Alcohol use: Not Currently     Comment: social    Drug use: No        Review of Systems   HENT: Positive for ear discharge and ear pain  Negative for sinus pain  Eyes: Negative for pain and discharge  Respiratory: Negative for cough, shortness of breath and stridor  Neurological: Positive for dizziness  Physical Exam  ED Triage Vitals [07/12/20 1316]   Temperature Pulse Respirations Blood Pressure SpO2   99 8 °F (37 7 °C) (!) 119 18 162/79 100 %      Temp Source Heart Rate Source Patient Position - Orthostatic VS BP Location FiO2 (%)   Oral Monitor Lying Right arm --      Pain Score       Worst Possible Pain             Orthostatic Vital Signs  Vitals:    07/12/20 1316 07/12/20 1630 07/12/20 1645 07/12/20 1728   BP: 162/79 123/79 139/80 145/99   Pulse: (!) 119 (!) 116 (!) 111 (!) 125   Patient Position - Orthostatic VS: Lying Lying Lying        Physical Exam   Constitutional: She appears well-developed and well-nourished  HENT:   Head:       Right ear canal swollen shut no discharge  Tenderness to palpation of ear, right TMJ, right mastoid process  Eyes: Pupils are equal, round, and reactive to light  EOM are normal    Cardiovascular: Regular rhythm, normal heart sounds and intact distal pulses     Tachycardic       ED Medications  Medications   levofloxacin (LEVAQUIN) IVPB (premix) 750 mg 150 mL (750 mg Intravenous New Bag 7/12/20 1642)   sodium chloride 0 9 % bolus 1,000 mL (0 mL Intravenous Stopped 7/12/20 1731)     Followed by   sodium chloride 0 9 % bolus 1,000 mL (has no administration in time range)   sodium chloride 0 9 % infusion (has no administration in time range)   hydrALAZINE (APRESOLINE) injection 5 mg (has no administration in time range)   levofloxacin (LEVAQUIN) IVPB (premix) 750 mg 150 mL (has no administration in time range)   acetaminophen (TYLENOL) tablet 650 mg (has no administration in time range)   ondansetron (ZOFRAN) injection 4 mg (has no administration in time range)   albuterol (PROVENTIL HFA,VENTOLIN HFA) inhaler 2 puff (has no administration in time range)   amitriptyline (ELAVIL) tablet 50 mg (has no administration in time range)   ascorbic acid (VITAMIN C) tablet 500 mg (has no administration in time range)   atorvastatin (LIPITOR) tablet 40 mg (has no administration in time range)   loratadine (CLARITIN) tablet 10 mg (has no administration in time range)   cholecalciferol (VITAMIN D3) tablet 2,000 Units (has no administration in time range)   pantoprazole (PROTONIX) EC tablet 40 mg (has no administration in time range)   metoprolol succinate (TOPROL-XL) 24 hr tablet 50 mg (has no administration in time range)   multivitamin-minerals (CENTRUM ADULTS) tablet 1 tablet (has no administration in time range)   venlafaxine (EFFEXOR-XR) 24 hr capsule 150 mg (has no administration in time range)   venlafaxine (EFFEXOR-XR) 24 hr capsule 37 5 mg (has no administration in time range)   morphine injection 2 mg (has no administration in time range)   heparin (porcine) subcutaneous injection 5,000 Units (has no administration in time range)   acetaminophen (TYLENOL) tablet 650 mg (650 mg Oral Given 7/12/20 1500)   ciprofloxacin (CIPRO) tablet 500 mg (500 mg Oral Given 7/12/20 1518)       Diagnostic Studies  Results Reviewed     Procedure Component Value Units Date/Time    Lactic acid, plasma [[id number]]     Lab Status:  No result Specimen:  Blood     CBC and differential [[id number]]  (Abnormal) Collected:  07/12/20 1506    Lab Status:  Final result Specimen:  Blood from Arm, Right Updated:  07/12/20 5595     WBC 13 89 Thousand/uL      RBC 5 32 Million/uL      Hemoglobin 15 9 g/dL      Hematocrit 46 0 %      MCV 87 fL      MCH 29 9 pg      MCHC 34 6 g/dL      RDW 13 8 %      MPV 10 5 fL      Platelets 476 Thousands/uL      nRBC 0 /100 WBCs     Narrative: This is an appended report  These results have been appended to a previously verified report      Lactic acid [[id number]]  (Abnormal) Collected:  07/12/20 1506    Lab Status:  Final result Specimen:  Blood from Arm, Right Updated:  07/12/20 1545     LACTIC ACID 2 4 mmol/L     Narrative:       Result may be elevated if tourniquet was used during collection  Basic metabolic panel [[id number]]  (Abnormal) Collected:  07/12/20 1506    Lab Status:  Final result Specimen:  Blood from Arm, Right Updated:  07/12/20 1532     Sodium 138 mmol/L      Potassium 3 6 mmol/L      Chloride 105 mmol/L      CO2 26 mmol/L      ANION GAP 7 mmol/L      BUN 6 mg/dL      Creatinine 0 79 mg/dL      Glucose 179 mg/dL      Calcium 10 0 mg/dL      eGFR 94 ml/min/1 73sq m     Narrative:       [place] guidelines for Chronic Kidney Disease (CKD):     Stage 1 with normal or high GFR (GFR > 90 mL/min/1 73 square meters)    Stage 2 Mild CKD (GFR = 60-89 mL/min/1 73 square meters)    Stage 3A Moderate CKD (GFR = 45-59 mL/min/1 73 square meters)    Stage 3B Moderate CKD (GFR = 30-44 mL/min/1 73 square meters)    Stage 4 Severe CKD (GFR = 15-29 mL/min/1 73 square meters)    Stage 5 End Stage CKD (GFR <15 mL/min/1 73 square meters)  Note: GFR calculation is accurate only with a steady state creatinine    Blood culture #1 [[id number]] Collected:  07/12/20 1517    Lab Status: In process Specimen:  Blood from Arm, Left Updated:  07/12/20 1523    Blood culture #2 [[id number]] Collected:  07/12/20 1506    Lab Status: In process Specimen:  Blood from Arm, Right Updated:  07/12/20 1514    POCT pregnancy, urine [[id number]]     Lab Status:  No result                  CT orbits/temporal bones/skull base wo contrast   Final Result by Birgit Garduno MD (07/12 1604)      Acute right otitis externa and otitis media without ossicular erosion  Moderate amount of periauricular, periparotid and perimastoid soft tissue inflammation without a discrete fluid collection  Mild enhancement of the right posterior parotid with    intraparotid lymph nodes suggests prostatitis                 Workstation performed: [id number]               Procedures  Procedures      ED Course       US AUDIT      Most Recent Value   Initial Alcohol Screen: US AUDIT-C    1  How often do you have a drink containing alcohol?  0 Filed at: 07/12/2020 1314   2  How many drinks containing alcohol do you have on a typical day you are drinking? 0 Filed at: 07/12/2020 1314   3b  FEMALE Any Age, or MALE 65+: How often do you have 4 or more drinks on one occassion? 0 Filed at: 07/12/2020 1314   Audit-C Score  0 Filed at: 07/12/2020 1314                  KARYN/DAST-10      Most Recent Value   How many times in the past year have you    Used an illegal drug or used a prescription medication for non-medical reasons? Never Filed at: 07/12/2020 1314              Initial Sepsis Screening     Row Name 07/12/20 1636 07/12/20 1546             Is the patient's history suggestive of a new or worsening infection?  --  (!) Yes (Proceed)  -AC       Suspected source of infection  -- Right ear  -SC  soft tissue  -AC       Are two or more of the following signs & symptoms of infection both present and new to the patient? (!) Yes (Proceed)  -SC  (!) Yes (Proceed)  -AC       Indicate SIRS criteria  Tachycardia > 90 bpm;Leukocytosis (WBC > 11361 IJL)  -SC  Leukocytosis (WBC > 33736 IJL); Tachycardia > 90 bpm  -       If the answer is yes to both questions, suspicion of sepsis is present  --  --       If severe sepsis is present AND tissue hypoperfusion perists in the hour after fluid resuscitation or lactate > 4, the patient meets criteria for SEPTIC SHOCK  --  --       Are any of the following organ dysfunction criteria present within 6 hours of suspected infection and SIRS criteria that are NOT considered to be chronic conditions?   (!) Yes  -SC  (!) Yes  -AC       Organ dysfunction  Lactate > 2 0 mmol/L  -SC  Lactate > 2 0 mmol/L  -       Date of presentation of severe sepsis  07/12/20  -SC  07/12/20  -       Time of presentation of severe sepsis  1638 -SC  1546  -       Tissue hypoperfusion persists in the hour after crystalloid fluid administration, evidenced, by either:  --  --       Was hypotension present within one hour of the conclusion of crystalloid fluid administration? No  -SC  --       Date of presentation of septic shock  --  --       Time of presentation of septic shock  --  --         User Key  (r) = Recorded By, (t) = Taken By, (c) = Cosigned By    [address] Name Provider Type    [address],  Physician    [address], DO Resident                      MDM  Number of Diagnoses or Management Options  Diagnosis management comments: 80-year-old female with otitis externa of the right ear as of Friday that has progressed to severe sepsis  Patient was seen in urgent care on Friday and was given Cipro drops, Saturday condition worsened was given clindamycin, Sunday she was told to go to the ED by urgent care  WBC: 13 89 lactic acid:  2 4 heart rate:  119     Sepsis alert called  Patient started on levofloxacin IV  Admit for inpatient IV antibiotics            Disposition  Final diagnoses:   Otitis externa   Sepsis ([facility] [address] )     Time reflects when diagnosis was documented in both MDM as applicable and the Disposition within this note     Time User Action Codes Description Comment    7/12/2020  4:32 PM Mukesh Hal Add [H60 90] Otitis externa     7/12/2020  4:32 PM Mukesh Hal Add [A41 9] Sepsis ([facility] [address] )     7/12/2020  5:24 PM Roselind Shine Add [H66 90] Otitis media     7/12/2020  5:24 PM Roselind Shine Modify [H66 90] Otitis media       ED Disposition     ED Disposition Condition Date/Time Comment    Admit Stable Sun Jul 12, 2020  4:32 PM Case was discussed with Dr Radha Ross and the patient's admission status was agreed to be Admission Status: inpatient status to the service of [facility]        Follow-up Information     Follow up With Specialties Details Why [address] PA-C Family Medicine, Physician Assistant [address]  [phone]            Current Discharge Medication List      CONTINUE these medications which have NOT CHANGED    Details   albuterol (PROVENTIL HFA,VENTOLIN HFA) 90 mcg/act inhaler Inhale 2 puffs every 6 (six) hours as needed for shortness of breath (cough)  Qty: 1 Inhaler, Refills: 0    Comments: Substitution to a formulary equivalent within the same pharmaceutical class is authorized    Associated Diagnoses: Cough      amitriptyline (ELAVIL) 50 mg tablet Take 1 tablet (50 mg total) by mouth daily at bedtime  Qty: 90 tablet, Refills: 1    Associated Diagnoses: Fibromyalgia      Ascorbic Acid (VITAMIN C) 500 MG CAPS Take 1 capsule by mouth daily      atorvastatin (LIPITOR) 40 mg tablet Take 1 tablet (40 mg total) by mouth daily  Qty: 90 tablet, Refills: 1    Associated Diagnoses: Hyperlipidemia, unspecified hyperlipidemia type      cetirizine (ZyrTEC) 10 mg tablet Take 10 mg by mouth daily      Cholecalciferol (VITAMIN D3) 5000 units CAPS Take 1 capsule by mouth daily        clindamycin (CLEOCIN) 300 MG capsule Take 300 mg by mouth 4 (four) times a day      cyanocobalamin (VITAMIN B-12) 1,000 mcg tablet Take 3 tablets by mouth 2 (two) times a day      esomeprazole (NexIUM 24HR) 20 mg capsule Take 1 capsule (20 mg total) by mouth every morning  Qty: 30 capsule, Refills: 6    Associated Diagnoses: Gastroesophageal reflux disease without esophagitis      metoprolol succinate (TOPROL-XL) 50 mg 24 hr tablet Take 1 tablet (50 mg total) by mouth daily  Qty: 90 tablet, Refills: 1    Associated Diagnoses: Hypertension, unspecified type      Multiple Vitamins-Minerals (CENTRUM ADULTS PO) Take 1 tablet by mouth daily      !! venlafaxine (EFFEXOR-XR) 150 mg 24 hr capsule Take 1 capsule (150 mg total) by mouth daily Take with 37 5 mg tablet for total 187 5mg  Qty: 30 capsule, Refills: 2    Associated Diagnoses: Depression with anxiety      !! venlafaxine (EFFEXOR-XR) 37 5 mg 24 hr capsule Take 1 capsule (37 5 mg total) by mouth daily Take with 150 mg tablet for total 187 5mg  Qty: 30 capsule, Refills: 2    Associated Diagnoses: Depression with anxiety      levonorgestrel (MIRENA) 20 MCG/24HR IUD 1 each by Intrauterine route once      predniSONE 10 mg tablet 6 tablets by mouth on day 1, then 5 tablets on day 2, 4 tablets on day 3, 3 tablets on day 4, 2 tablet on day 5, 1 tablet on day 6  Qty: 15 tablet, Refills: 0    Associated Diagnoses: Acute diffuse otitis externa of right ear       !! - Potential duplicate medications found  Please discuss with provider  No discharge procedures on file  PDMP Review     None           ED Provider  Attending physically available and evaluated Jannette Baugh I managed the patient along with the ED Attending      Electronically Signed by         Lizette Nick DO  07/12/20 5787

## 2020-07-12 NOTE — PLAN OF CARE
Problem: Potential for Falls  Goal: Patient will remain free of falls  Description  INTERVENTIONS:  - Assess patient frequently for physical needs  -  Identify cognitive and physical deficits and behaviors that affect risk of falls    -  [place] fall precautions as indicated by assessment   - Educate patient/family on patient safety including physical limitations  - Instruct patient to call for assistance with activity based on assessment  - Modify environment to reduce risk of injury  - Consider OT/PT consult to assist with strengthening/mobility  Outcome: Progressing     Problem: PAIN - ADULT  Goal: Verbalizes/displays adequate comfort level or baseline comfort level  Description  Interventions:  - Encourage patient to monitor pain and request assistance  - Assess pain using appropriate pain scale  - Administer analgesics based on type and severity of pain and evaluate response  - Implement non-pharmacological measures as appropriate and evaluate response  - Consider cultural and social influences on pain and pain management  - Notify physician/advanced practitioner if interventions unsuccessful or patient reports new pain  Outcome: Progressing     Problem: INFECTION - ADULT  Goal: Absence or prevention of progression during hospitalization  Description  INTERVENTIONS:  - Assess and monitor for signs and symptoms of infection  - Monitor lab/diagnostic results  - Monitor all insertion sites, i e  indwelling lines, tubes, and drains  - Monitor endotracheal if appropriate and nasal secretions for changes in amount and color  - Amite appropriate cooling/warming therapies per order  - Administer medications as ordered  - Instruct and encourage patient and family to use good hand hygiene technique  - Identify and instruct in appropriate isolation precautions for identified infection/condition  Outcome: Progressing  Goal: Absence of fever/infection during neutropenic period  Description  INTERVENTIONS:  - Monitor WBC    Outcome: Progressing     Problem: SAFETY ADULT  Goal: Maintain or return to baseline ADL function  Description  INTERVENTIONS:  -  Assess patient's ability to carry out ADLs; assess patient's baseline for ADL function and identify physical deficits which impact ability to perform ADLs (bathing, care of mouth/teeth, toileting, grooming, dressing, etc )  - Assess/evaluate cause of self-care deficits   - Assess range of motion  - Assess patient's mobility; develop plan if impaired  - Assess patient's need for assistive devices and provide as appropriate  - Encourage maximum independence but intervene and supervise when necessary  - Involve family in performance of ADLs  - Assess for home care needs following discharge   - Consider OT consult to assist with ADL evaluation and planning for discharge  - Provide patient education as appropriate  Outcome: Progressing  Goal: Maintain or return mobility status to optimal level  Description  INTERVENTIONS:  - Assess patient's baseline mobility status (ambulation, transfers, stairs, etc )    - Identify cognitive and physical deficits and behaviors that affect mobility  - Identify mobility aids required to assist with transfers and/or ambulation (gait belt, sit-to-stand, lift, walker, cane, etc )  - Rule fall precautions as indicated by assessment  - Record patient progress and toleration of activity level on Mobility SBAR; progress patient to next Phase/Stage  - Instruct patient to call for assistance with activity based on assessment  - Consider rehabilitation consult to assist with strengthening/weightbearing, etc   Outcome: Progressing

## 2020-07-12 NOTE — PATIENT INSTRUCTIONS
Right otitis externa  Continue and antibiotic ear drops as prescribed  Start prednisone taper as directed 6, 5, 4, 3, 2, 1  Follow-up with ear nose and throat tomorrow  If there is worsening pain, swelling or fevers go to the ER tonight

## 2020-07-12 NOTE — H&P
History & Physical - [facility] Internal Medicine  Patient: Mayco Alcocer 36 y o  female MRN: [id number]  Unit/Bed#: [facility] 853-27 Encounter: [id number]  Primary Care Provider: Carolin Hernadez PA-C  Date & Time of Admission: 7/12/2020  1:11 PM        Assessment & Plan:    * Severe sepsis on admission  Assessment & Plan  - presented with leukocytosis/tachycardia coupled with lactic acidosis in the setting of otitis externa/media  - continue IV fluid resuscitation/hydration -> trend lactic acid until level < 2   - monitor vitals and maintain hemodynamics - monitor for fever curve - check serum procalcitonin  - see plan for otic infection below   - blood cultures drawn in the ER    Otitis externa/media  Assessment & Plan  - CT imaging revealed "Acute right otitis externa and otitis media without ossicular erosion    Moderate amount of periauricular, periparotid and perimastoid soft tissue inflammation without a discrete fluid collection" - incidental notation of right posterior parotid enhancement w/ lymphadenopathy possibly suggestive of parotitis noted  - continue IV Levaquin as initiated in the ER - significant PCN allergy noted  - PRN pain control  - await ENT input    Morbid obesity  Assessment & Plan  - BMI of 46 05  - lifestyle/diet modifications    Depression with anxiety  Assessment & Plan  - w/ associated fibromyalgia  - continue Elavil/Effexor-XR    Essential hypertension  Assessment & Plan  - low-sodium diet  - continue Toprol-XL  - PRN IV Hydralazine on board for BP spikes    Hyperlipidemia  Assessment & Plan  - continue Lipitor    Obstructive sleep apnea  Assessment & Plan  - encourage weight loss  - not chronically on a CPAP - maintain oxygenation    Asthma  Assessment & Plan  - stable/asymptomatic  - PRN Albuterol on board      DVT Prophylaxis:  Heparin SC    Code Status:  Full code    Discussion with: Patient at bedside    Anticipated Length of Stay:  Patient will be admitted on an Inpatient basis with an anticipated length of stay of greater than 2 midnights  Justification for Hospital Stay:  Severe sepsis secondary to otitis externa/media requiring intravenous antibiotics/fluids and infectious workup along with ENT evaluation  Total Time for Visit, including Counseling / Coordination of Care: 72 minutes  Greater than 50% of this total time spent on direct patient counseling and coordination of care  Chief Complaint:  Right ear pain/infection      History of Present Illness:    Chauncey Wiggins is a 36 y o  female who presents with complaints of progressively worsening your pain and sensation of in her swelling over the last two weeks  Notably she states that a few weeks ago, it initially started with itching which became more frequent as time went on  Last week, she started noticing a feeling of swelling inside the ER with an onset of an achy pain  She went to the [facility] urgent care center in [place] two days ago and was prescribed Cipro ear drops  The next day (yesterday), she noted the pain and feeling of swelling worsened despite the otic drops, along with right jaw tenderness, at which point, she called back the urgent care center and was prescribed an oral antibiotic  Unfortunately, started experiencing rigors/chills yesterday along with worsening pain, which prompted the ER visit today  In the ER, she underwent CT imaging which did reveal evidence of otitis media/externa along with incidental right-sided parotitis  She also met severe sepsis criteria with an elevated WBC count coupled with tachycardia and lactic acidosis  She was started on intravenous fluids and Levaquin and admitted to the hospital   Upon my encounter, she was given a dose of intravenous analgesic which has somewhat improved her pain  Review of Systems:    Review of Systems - A thorough 12 point review systems was conducted    Pertinent positives and negatives are mentioned in the history of present illness  Past Medical and Surgical History:     Past Medical History:   Diagnosis Date    Allergic rhinitis     Anxiety     Arthritis     Chronic pain disorder     BACK SHOULDERS NECK    Depression     Disease of thyroid gland     nodule    Fibromyalgia, primary     GERD (gastroesophageal reflux disease)     Hearing difficulty of right ear     Hyperlipidemia     Hypertension     Irritable bowel syndrome     Perforation of tympanic membrane     Right    PONV (postoperative nausea and vomiting)     RBBB     Right bundle branch blockage     Sleep apnea     no cpap    Wears glasses        Past Surgical History:   Procedure Laterality Date    ANAL FISTULOTOMY N/A 2018    Procedure: FISTULOTOMY;  Surgeon: Leanna Higginbotham MD;  [facility];  Service: Colorectal    BACK SURGERY      lumbar herniated disc    BREAST SURGERY Left 2006    cyst removal    CARPAL TUNNEL RELEASE       SECTION      CHOLECYSTECTOMY      COLONOSCOPY      CYST REMOVAL      DENTAL SURGERY      INCISION AND DRAINAGE OF WOUND N/A 2018    Procedure: INCISION AND DRAINAGE (I&D) BUTTOCK;  Surgeon: Leanna Higginbotham MD;  [facility];  Service: Colorectal    VT COLONOSCOPY FLX DX W/COLLJ SPEC WHEN PFRMD N/A 2017    Procedure: COLONOSCOPY;  Surgeon: Janae Hinds MD;  [facility]; Service: Gastroenterology    VT INCISE FINGER TENDON SHEATH Right 3/13/2018    Procedure: LONG TRIGGER FINGER RELEASE;  Surgeon: Yolis Falcon DO;  [facility];  Service: Orthopedics    VT SURG DIAGNOSTIC EXAM, ANORECTAL N/A 2018    Procedure: EXAM UNDER ANESTHESIA (EUA); Surgeon: Leanna Higginbotham MD;  [facility];  Service: Colorectal    VT TYMPANOPLASTY Right 2017    Procedure: TYMPANOPLASTY WITH PERICHONDRIN GRAFT;  Surgeon:  Michelle Licona DO;  [facility];  Service: ENT    SHOULDER SURGERY Right     WISDOM TOOTH EXTRACTION Medications & Allergies:    Prior to Admission medications    Medication Sig Start Date End Date Taking?  Authorizing Provider   albuterol (PROVENTIL HFA,VENTOLIN HFA) 90 mcg/act inhaler Inhale 2 puffs every 6 (six) hours as needed for shortness of breath (cough) 1/3/20  Yes Bro Ronquillo PA-C   amitriptyline (ELAVIL) 50 mg tablet Take 1 tablet (50 mg total) by mouth daily at bedtime 6/17/20  Yes Bro Ronquillo PA-C   Ascorbic Acid (VITAMIN C) 500 MG CAPS Take 1 capsule by mouth daily   Yes Historical Provider, MD   atorvastatin (LIPITOR) 40 mg tablet Take 1 tablet (40 mg total) by mouth daily 6/17/20  Yes Bro Ronquillo PA-C   cetirizine (ZyrTEC) 10 mg tablet Take 10 mg by mouth daily 11/2/19  Yes Historical Provider, MD   Cholecalciferol (VITAMIN D3) 5000 units CAPS Take 1 capsule by mouth daily     Yes Historical Provider, MD   clindamycin (CLEOCIN) 300 MG capsule Take 300 mg by mouth 4 (four) times a day   Yes Historical Provider, MD   cyanocobalamin (VITAMIN B-12) 1,000 mcg tablet Take 3 tablets by mouth 2 (two) times a day   Yes Historical Provider, MD   esomeprazole (NexIUM 24HR) 20 mg capsule Take 1 capsule (20 mg total) by mouth every morning 5/13/20  Yes Patrice Frausto PA-C   metoprolol succinate (TOPROL-XL) 50 mg 24 hr tablet Take 1 tablet (50 mg total) by mouth daily 3/18/20  Yes Bro Ronquillo PA-C   Multiple Vitamins-Minerals (CENTRUM ADULTS PO) Take 1 tablet by mouth daily   Yes Historical Provider, MD   venlafaxine (EFFEXOR-XR) 150 mg 24 hr capsule Take 1 capsule (150 mg total) by mouth daily Take with 37 5 mg tablet for total 187 5mg 4/30/20  Yes Pao Art MD   venlafaxine (EFFEXOR-XR) 37 5 mg 24 hr capsule Take 1 capsule (37 5 mg total) by mouth daily Take with 150 mg tablet for total 187 5mg 4/30/20  Yes Pao Art MD   levonorgestrel (MIRENA) 20 MCG/24HR IUD 1 each by Intrauterine route once    Historical Provider, MD   predniSONE 10 mg tablet 6 tablets by mouth on day 1, then 5 tablets on day 2, 4 tablets on day 3, 3 tablets on day 4, 2 tablet on day 5, 1 tablet on day 6  7/12/20   Madelin Taylor MD         Allergies: Allergies   Allergen Reactions    Amoxicillin Swelling, Anaphylaxis and Angioedema    Neurontin [Gabapentin] Other (See Comments)     Other reaction(s): SUICIDE IDEATION  Other reaction(s): Other (See Comments)  suicidal  suicidal    Penicillin V Angioedema and Swelling     Other reaction(s): Throat closure    Penicillins Swelling and Anaphylaxis    Aripiprazole Other (See Comments)     Other reaction(s): low dose 2 mg ; curving in and locking in of hands/dystonia  Other reaction(s): Other (See Comments)  Other reaction(s): low dose 2 mg ; curving in and locking in of hands/dystonia    Lorazepam Other (See Comments)     Other reaction(s): higher dose > anxiety and depression  Other reaction(s): Other (See Comments)  Other reaction(s): higher dose > anxiety and depression    Carbamazepine Other (See Comments)     Other reaction(s): Unknown Reaction    Doxycycline Hives    Lamotrigine Rash    Other Hives     Adhesive tape         Social History:    Substance Use History:   Social History     Substance and Sexual Activity   Alcohol Use Not Currently    Comment: social     Social History     Tobacco Use   Smoking Status Never Smoker   Smokeless Tobacco Never Used     Social History     Substance and Sexual Activity   Drug Use No         Family History:    Per medical chart, significant for hypertension and hyperlipidemia in mother and father, diabetes mellitus in mother and paternal grandmother, heart disease in father, and Crohn's disease in brother        Physical Exam:     Vitals:   Blood Pressure: 145/99 (07/12/20 1728)  Pulse: (!) 125 (07/12/20 1728)  Temperature: 98 1 °F (36 7 °C) (07/12/20 1728)  Temp Source: Oral (07/12/20 1316)  Respirations: 18 (07/12/20 1728)  Height: 5' 7" (170 2 cm) (07/12/20 1728)  Weight - Scale: 130 kg (286 lb 3 2 oz) (07/12/20 1728)  SpO2: 98 % (07/12/20 1728)      GENERAL:  Obese - improved distress from right ear pain  HEAD:  Normocephalic - atraumatic - right ear/tragus tenderness to palpation/tug without current drainage  EYES: PERRL - EOMI   MOUTH:  Mucosa moist  NECK:  Supple - full range of motion  CARDIAC:  Tachycardic - S1/S2 positive  PULMONARY:  Fairly clear to auscultation - nonlabored respirations  ABDOMEN:  Soft - nontender/nondistended - active bowel sounds  MUSCULOSKELETAL:  Motor strength/range of motion intact  NEUROLOGIC:  Alert/oriented at baseline  SKIN:  Chronic wrinkles/blemishes - diaphoretic  PSYCHIATRIC:  Mood/affect stable      Additional Data:     Labs & Recent Cultures:    Results from last 7 days   Lab Units 07/12/20  1506   WBC Thousand/uL 13 89*   HEMOGLOBIN g/dL 15 9*   HEMATOCRIT % 46 0   PLATELETS Thousands/uL 176   BANDS PCT % 17*   LYMPHO PCT % 0*   MONO PCT % 2*   EOS PCT % 0     Results from last 7 days   Lab Units 07/12/20  1506   SODIUM mmol/L 138   POTASSIUM mmol/L 3 6   CHLORIDE mmol/L 105   CO2 mmol/L 26   BUN mg/dL 6   CREATININE mg/dL 0 79   ANION GAP mmol/L 7   CALCIUM mg/dL 10 0   GLUCOSE RANDOM mg/dL 179*                 Results from last 7 days   Lab Units 07/12/20  1506   LACTIC ACID mmol/L 2 4*                 Imaging:     Ct Orbits/temporal Bones/skull Base Wo Contrast    Result Date: 7/12/2020  Narrative: CT TEMPORAL BONES WITHOUT CONTRAST INDICATION:   R mastoiditis  COMPARISON:  None  TECHNIQUE: Using a multi-detector scanner, 0 625 mm axial scans of the temporal bone were acquired using a high-resolution bone technique  Targeted axial and coronal reconstructions were obtained of each side  Both axial and coronal images were reviewed  Soft tissue reconstructions were performed as well  Radiation dose length product (DLP) for this visit:  892 69 mGy-cm     This examination, like all CT scans performed in the [facility], was performed utilizing techniques to minimize radiation dose exposure, including the use of iterative  reconstruction and automated exposure control  IV Contrast: None IMAGE QUALITY:  Diagnostic  FINDINGS: RIGHT TEMPORAL BONE: MIDDLE EAR: The right middle ear cavity is opacified  The aditus ad antrum remains patent  OSSICLES:Normal  COCHLEA: Normal  VESTIBULE: Normal  VESTIBULAR AND COCHLEAR AQUEDUCT: Normal FACIAL NERVE CANAL: Normal  SEMICIRCULAR CANALS: Normal  INTERNAL AUDITORY CANAL: The IVC is hypoplastic but otherwise unremarkable  EXTERNAL AUDITORY CANAL: There is diffuse right external auditory canal thickening and near total occlusion with periauricular and periparotid soft tissue inflammatory changes  CAROTID CANAL: Normal  JUGULAR FORAMEN: Normal  TEMPOROMANDIBULAR JOINT: Normal  MASTOID AIR CELLS: Normal  PERIAURICULAR SOFT TISSUES:  Inflammatory changes involving the periparotid and periauricular soft tissues as well as the superficial right parotid gland with multiple subcentimeter intraparotid nodules  There is a retroauricular, perimastoid soft tissue inflammation but no discrete fluid collection or mastoid wall erosion  LEFT TEMPORAL BONE: MIDDLE EAR: Normal  OSSICLES: Normal  COCHLEA: Normal  VESTIBULE: Normal  VESTIBULAR AND COCHLEAR AQUEDUCT: Normal FACIAL NERVE CANAL: Normal  SEMICIRCULAR CANALS: Normal  INTERNAL AUDITORY CANAL: Normal  EXTERNAL AUDITORY CANAL: Minimal soft tissue debris in the deep left external auditory canal likely represent cerumen  CAROTID CANAL: Normal  JUGULAR FORAMEN: Normal  TEMPOROMANDIBULAR JOINT: Normal  MASTOID AIR CELLS: Normal  PERIAURICULAR SOFT TISSUES:  Normal  OTHER FINDINGS:     Impression: Acute right otitis externa and otitis media without ossicular erosion  Moderate amount of periauricular, periparotid and perimastoid soft tissue inflammation without a discrete fluid collection    Mild enhancement of the right posterior parotid with intraparotid lymph nodes suggests prostatitis  Workstation performed: [id number]                 ** Please Note: This note is constructed using a voice recognition dictation system  An occasional wrong word/phrase or sound-a-like substitution may have been picked up by dictation device due to the inherent limitations of voice recognition software  Read the chart carefully and recognize, using reasonable context, where substitutions may have occurred  **

## 2020-07-13 LAB
BASOPHILS # BLD AUTO: 0.01 THOUSANDS/ΜL (ref 0–0.1)
BASOPHILS NFR BLD AUTO: 0 % (ref 0–1)
EOSINOPHIL # BLD AUTO: 0 THOUSAND/ΜL (ref 0–0.61)
EOSINOPHIL NFR BLD AUTO: 0 % (ref 0–6)
ERYTHROCYTE [DISTWIDTH] IN BLOOD BY AUTOMATED COUNT: 13.7 % (ref 11.6–15.1)
HCT VFR BLD AUTO: 38.6 % (ref 34.8–46.1)
HGB BLD-MCNC: 13 G/DL (ref 11.5–15.4)
IMM GRANULOCYTES # BLD AUTO: 0.03 THOUSAND/UL (ref 0–0.2)
IMM GRANULOCYTES NFR BLD AUTO: 0 % (ref 0–2)
LACTATE SERPL-SCNC: 1.6 MMOL/L (ref 0.5–2)
LYMPHOCYTES # BLD AUTO: 0.78 THOUSANDS/ΜL (ref 0.6–4.47)
LYMPHOCYTES NFR BLD AUTO: 8 % (ref 14–44)
MCH RBC QN AUTO: 29.7 PG (ref 26.8–34.3)
MCHC RBC AUTO-ENTMCNC: 33.7 G/DL (ref 31.4–37.4)
MCV RBC AUTO: 88 FL (ref 82–98)
MONOCYTES # BLD AUTO: 0.45 THOUSAND/ΜL (ref 0.17–1.22)
MONOCYTES NFR BLD AUTO: 4 % (ref 4–12)
NEUTROPHILS # BLD AUTO: 9.06 THOUSANDS/ΜL (ref 1.85–7.62)
NEUTS SEG NFR BLD AUTO: 88 % (ref 43–75)
NRBC BLD AUTO-RTO: 0 /100 WBCS
PLATELET # BLD AUTO: 155 THOUSANDS/UL (ref 149–390)
PMV BLD AUTO: 11.3 FL (ref 8.9–12.7)
PROCALCITONIN SERPL-MCNC: <0.05 NG/ML
RBC # BLD AUTO: 4.37 MILLION/UL (ref 3.81–5.12)
WBC # BLD AUTO: 10.33 THOUSAND/UL (ref 4.31–10.16)

## 2020-07-13 PROCEDURE — 99232 SBSQ HOSP IP/OBS MODERATE 35: CPT | Performed by: INTERNAL MEDICINE

## 2020-07-13 PROCEDURE — 85025 COMPLETE CBC W/AUTO DIFF WBC: CPT | Performed by: INTERNAL MEDICINE

## 2020-07-13 PROCEDURE — 84145 PROCALCITONIN (PCT): CPT | Performed by: INTERNAL MEDICINE

## 2020-07-13 PROCEDURE — 99231 SBSQ HOSP IP/OBS SF/LOW 25: CPT | Performed by: OTOLARYNGOLOGY

## 2020-07-13 PROCEDURE — 83605 ASSAY OF LACTIC ACID: CPT | Performed by: PHYSICIAN ASSISTANT

## 2020-07-13 RX ORDER — SIMETHICONE 80 MG
80 TABLET,CHEWABLE ORAL EVERY 6 HOURS PRN
Status: DISCONTINUED | OUTPATIENT
Start: 2020-07-13 | End: 2020-07-14 | Stop reason: HOSPADM

## 2020-07-13 RX ADMIN — HEPARIN SODIUM 5000 UNITS: 5000 INJECTION INTRAVENOUS; SUBCUTANEOUS at 22:42

## 2020-07-13 RX ADMIN — VENLAFAXINE HYDROCHLORIDE 37.5 MG: 37.5 CAPSULE, EXTENDED RELEASE ORAL at 08:18

## 2020-07-13 RX ADMIN — CIPROFLOXACIN AND DEXAMETHASONE 4 DROP: 3; 1 SUSPENSION/ DROPS AURICULAR (OTIC) at 08:17

## 2020-07-13 RX ADMIN — ACETAMINOPHEN 650 MG: 325 TABLET, FILM COATED ORAL at 10:11

## 2020-07-13 RX ADMIN — HEPARIN SODIUM 5000 UNITS: 5000 INJECTION INTRAVENOUS; SUBCUTANEOUS at 05:39

## 2020-07-13 RX ADMIN — SODIUM CHLORIDE 125 ML/HR: 0.9 INJECTION, SOLUTION INTRAVENOUS at 08:16

## 2020-07-13 RX ADMIN — OXYCODONE HYDROCHLORIDE AND ACETAMINOPHEN 500 MG: 500 TABLET ORAL at 08:16

## 2020-07-13 RX ADMIN — ATORVASTATIN CALCIUM 40 MG: 40 TABLET, FILM COATED ORAL at 08:16

## 2020-07-13 RX ADMIN — MELATONIN 2000 UNITS: at 08:16

## 2020-07-13 RX ADMIN — PANTOPRAZOLE SODIUM 40 MG: 40 TABLET, DELAYED RELEASE ORAL at 05:39

## 2020-07-13 RX ADMIN — HEPARIN SODIUM 5000 UNITS: 5000 INJECTION INTRAVENOUS; SUBCUTANEOUS at 13:31

## 2020-07-13 RX ADMIN — AMITRIPTYLINE HYDROCHLORIDE 50 MG: 50 TABLET, FILM COATED ORAL at 22:42

## 2020-07-13 RX ADMIN — CIPROFLOXACIN AND DEXAMETHASONE 4 DROP: 3; 1 SUSPENSION/ DROPS AURICULAR (OTIC) at 17:39

## 2020-07-13 RX ADMIN — METOPROLOL SUCCINATE 50 MG: 50 TABLET, EXTENDED RELEASE ORAL at 08:16

## 2020-07-13 RX ADMIN — LORATADINE 10 MG: 10 TABLET ORAL at 08:16

## 2020-07-13 RX ADMIN — SODIUM CHLORIDE 125 ML/HR: 0.9 INJECTION, SOLUTION INTRAVENOUS at 16:40

## 2020-07-13 RX ADMIN — Medication 1 TABLET: at 08:16

## 2020-07-13 RX ADMIN — OXYCODONE HYDROCHLORIDE 5 MG: 5 TABLET ORAL at 22:41

## 2020-07-13 RX ADMIN — VENLAFAXINE HYDROCHLORIDE 150 MG: 150 CAPSULE, EXTENDED RELEASE ORAL at 08:18

## 2020-07-13 RX ADMIN — LEVOFLOXACIN 750 MG: 5 INJECTION, SOLUTION INTRAVENOUS at 16:40

## 2020-07-13 NOTE — SOCIAL WORK
Met with pt, explained CM program/CM role  Resides with  and dghter in a house, 3 DIDI, bedroom 2nd floor  I PTA for ADL's/ambulation, she drives  PCP CASSIDY Saunders PA  Has prescription plan, uses [facility]  Denies utilization DME/HC/IP Rehab  Hx depression, anxiety, followed by psychiatry and counselor  Denies drug and/or alcohol abuse  Primary contact  Teresa Chi [phone]  No POA/LW  Family will transport home    CM reviewed d/c planning process including the following: identifying help at home, patient preference for d/c planning needs, Discharge Lounge, [facility] Meds to Bed program, availability of treatment team to discuss questions or concerns patient and/or family may have regarding understanding medications and recognizing signs and symptoms once discharged  CM also encouraged patient to follow up with all recommended appointments after discharge  Patient advised of importance for patient and family to participate in managing patients medical well being  Patient/caregiver received discharge checklist  Content reviewed  Patient/caregiver encouraged to participate in discharge plan of care prior to discharge home

## 2020-07-13 NOTE — PLAN OF CARE
Problem: Potential for Falls  Goal: Patient will remain free of falls  Description  INTERVENTIONS:  - Assess patient frequently for physical needs  -  Identify cognitive and physical deficits and behaviors that affect risk of falls    -  [place] fall precautions as indicated by assessment   - Educate patient/family on patient safety including physical limitations  - Instruct patient to call for assistance with activity based on assessment  - Modify environment to reduce risk of injury  - Consider OT/PT consult to assist with strengthening/mobility  7/13/2020 0740 by Lorena Otero RN  Outcome: Progressing  7/13/2020 0740 by Lorena Otero RN  Outcome: Progressing

## 2020-07-13 NOTE — CONSULTS
Consultation - ENT   Ashli Baugh 36 y o  female MRN: [id number]  Unit/Bed#: [facility] 912-01 Encounter: [id number]      Assessment/Plan   Severe otitis externa  [place] placed as otic drops have no access to canal with the level of inflammation present  Wick hydrated with Ciprodex  Continue Levaquin, Ciprodex drops bid and decadron 8 mg IV once  Will need follow up as outpatient at our office in 7-10 days     History of Present Illness   Physician Requesting Consult: Rosario Hinton MD  Reason for Consult / Principal Problem: otitis    HPI: Luz Jara is a 36y o  year old female who presents with 10 days history of right ear pain, initially mild and with pruritus, eventually progressed and was seen at [facility] and prescribed Cipro eardrops with no response  In addition developed pain on opening of mouth and swelling in the preauricular area  Admitted to [facility] [address] service  ENT was consulted to floor         Consults    Review of Systems    Revision of Systems:    Complete review done, only positive for the symptoms described in the H&P section above    Historical Information   Past Medical History:   Diagnosis Date    Allergic rhinitis     Anxiety     Arthritis     Chronic pain disorder     BACK SHOULDERS NECK    Depression     Disease of thyroid gland     nodule    Fibromyalgia, primary     GERD (gastroesophageal reflux disease)     Hearing difficulty of right ear     Hyperlipidemia     Hypertension     Irritable bowel syndrome     Perforation of tympanic membrane     Right    PONV (postoperative nausea and vomiting)     RBBB     Right bundle branch blockage     Sleep apnea     no cpap    Wears glasses      Past Surgical History:   Procedure Laterality Date    ANAL FISTULOTOMY N/A 11/2/2018    Procedure: FISTULOTOMY;  Surgeon: Isaías Bragg MD;  [facility];  Service: Colorectal    BACK SURGERY      lumbar herniated disc    BREAST SURGERY Left 2006    cyst removal    CARPAL TUNNEL RELEASE       SECTION      CHOLECYSTECTOMY      COLONOSCOPY      CYST REMOVAL      DENTAL SURGERY      INCISION AND DRAINAGE OF WOUND N/A 2018    Procedure: INCISION AND DRAINAGE (I&D) BUTTOCK;  Surgeon: Kailee De Los Santos MD;  [facility];  Service: Colorectal    NJ COLONOSCOPY FLX DX W/COLLJ SPEC WHEN PFRMD N/A 2017    Procedure: COLONOSCOPY;  Surgeon: Hong Rogers MD;  [facility]; Service: Gastroenterology    NJ INCISE FINGER TENDON SHEATH Right 3/13/2018    Procedure: LONG TRIGGER FINGER RELEASE;  Surgeon: Charis Rai DO;  [facility];  Service: Orthopedics    NJ SURG DIAGNOSTIC EXAM, ANORECTAL N/A 2018    Procedure: EXAM UNDER ANESTHESIA (EUA); Surgeon: Kailee De Los Santos MD;  [facility];  Service: Colorectal    NJ TYMPANOPLASTY Right 2017    Procedure: TYMPANOPLASTY WITH PERICHONDRIN GRAFT;  Surgeon:  Dora Victoria DO;  [facility];  Service: ENT    SHOULDER SURGERY Right     WISDOM TOOTH EXTRACTION       Social History   Social History     Substance and Sexual Activity   Alcohol Use Not Currently    Comment: social     Social History     Substance and Sexual Activity   Drug Use No     Social History     Tobacco Use   Smoking Status Never Smoker   Smokeless Tobacco Never Used     Family History:   Family History   Problem Relation Age of Onset    Hypertension Mother     Hyperlipidemia Mother     Diabetes Mother     Hypertension Father     Hyperlipidemia Father     Heart disease Father         cardiac disorder-myocardial infarction arrhythmias    Diabetes unspecified Maternal Grandmother     Crohn's disease Brother     Arthritis Brother     Thyroid disease Paternal Grandmother        Meds/Allergies   Current Facility-Administered Medications   Medication Dose Route Frequency    acetaminophen (TYLENOL) tablet 650 mg  650 mg Oral Q6H PRN    albuterol (PROVENTIL HFA,VENTOLIN HFA) inhaler 2 puff  2 puff Inhalation Q6H PRN    amitriptyline (ELAVIL) tablet 50 mg  50 mg Oral HS    [START ON 7/13/2020] ascorbic acid (VITAMIN C) tablet 500 mg  500 mg Oral Daily    [START ON 7/13/2020] atorvastatin (LIPITOR) tablet 40 mg  40 mg Oral Daily    [START ON 7/13/2020] cholecalciferol (VITAMIN D3) tablet 2,000 Units  2,000 Units Oral Daily    ciprofloxacin-dexamethasone (CIPRODEX) 0 3-0 1 % otic suspension 4 drop  4 drop Right Ear BID    heparin (porcine) subcutaneous injection 5,000 Units  5,000 Units Subcutaneous Q8H [address]    hydrALAZINE (APRESOLINE) injection 5 mg  5 mg Intravenous Q6H PRN    [START ON 7/13/2020] levofloxacin (LEVAQUIN) IVPB (premix) 750 mg 150 mL  750 mg Intravenous Q24H    [START ON 7/13/2020] loratadine (CLARITIN) tablet 10 mg  10 mg Oral Daily    [START ON 7/13/2020] metoprolol succinate (TOPROL-XL) 24 hr tablet 50 mg  50 mg Oral Daily    morphine injection 2 mg  2 mg Intravenous Q4H PRN    [START ON 7/13/2020] multivitamin-minerals (CENTRUM ADULTS) tablet 1 tablet  1 tablet Oral Daily    ondansetron (ZOFRAN) injection 4 mg  4 mg Intravenous Q4H PRN    oxyCODONE (ROXICODONE) IR tablet 2 5 mg  2 5 mg Oral Q4H PRN    oxyCODONE (ROXICODONE) IR tablet 5 mg  5 mg Oral Q4H PRN    [START ON 7/13/2020] pantoprazole (PROTONIX) EC tablet 40 mg  40 mg Oral Early Morning    sodium chloride 0 9 % infusion  125 mL/hr Intravenous Continuous    [START ON 7/13/2020] venlafaxine (EFFEXOR-XR) 24 hr capsule 150 mg  150 mg Oral Daily    [START ON 7/13/2020] venlafaxine (EFFEXOR-XR) 24 hr capsule 37 5 mg  37 5 mg Oral Daily         Allergies   Allergen Reactions    Amoxicillin Swelling, Anaphylaxis and Angioedema    Neurontin [Gabapentin] Other (See Comments)     Other reaction(s): SUICIDE IDEATION  Other reaction(s):  Other (See Comments)  suicidal  suicidal    Penicillin V Angioedema and Swelling     Other reaction(s): Throat closure    Penicillins Swelling and Anaphylaxis    Aripiprazole Other (See Comments)     Other reaction(s): low dose 2 mg ; curving in and locking in of hands/dystonia  Other reaction(s): Other (See Comments)  Other reaction(s): low dose 2 mg ; curving in and locking in of hands/dystonia    Lorazepam Other (See Comments)     Other reaction(s): higher dose > anxiety and depression  Other reaction(s): Other (See Comments)  Other reaction(s): higher dose > anxiety and depression    Carbamazepine Other (See Comments)     Other reaction(s): Unknown Reaction    Doxycycline Hives    Lamotrigine Rash    Other Hives     Adhesive tape       Objective       Physical Exam   Blood pressure 145/99, pulse (!) 125, temperature 98 1 °F (36 7 °C), resp  rate 18, height 5' 7" (1 702 m), weight 130 kg (286 lb 3 2 oz), SpO2 98 %  Constitutional: Oriented to person, place, and time  Well-developed and obese, no apparent distress, non-toxic appearance  Cooperative, able to hear and answer questions without difficulty  Voice: Normal voice quality  Head: Normocephalic, atraumatic  No scars, masses or lesions  Face: Symmetric, no edema, no sinus tenderness  Eyes: Vision grossly intact, extra-ocular movement intact  Right Ear: edema of the presuricular aream also edema nd erythema of lashae auricularis and meatus with complete obliteration of EAC  Left Ear: External ear normal   Auditory canal clear  Tympanic membrane well-appearing, without retraction or scarring  No fluid present  No post-auricular erythema or tenderness  Nose: Septum midline, Mucosa moist, turbinates normal size, no edema  No polyps or masses, no discharge evident  Oral cavity:  Lips normal, no mucosal lesions  Mild trismus  Dentition intact, gingiva normal in appearance  Mucosa moist,  Tongue mobile, floor of mouth normal   Hard palate unremarkable  No masses or lesions  Oropharynx: Uvula is midline, soft palate normal   Unremarkable oropharyngeal inlet      Salivary glands:  Left Parotid gland and submandibular glands symmetric, no enlargement or tenderness  Right parotid with tnederness and edema centered in the preauricular area  Neck: Normal laryngeal elevation with swallow  Trachea midline  No masses or lesions  No palpable adenopathy  Thyroid: normal in size, and consistency, unremarkable without tenderness or palpable nodules  Pulmonary/Chest: Normal effort and rate  No respiratory distress  Musculoskeletal: Normal range of motion  Neurological: Cranial nerves 2-12 intact  Skin: Skin is warm and dry  Psychiatric: Normal mood and affect  Lab Results: CBC:   Lab Results   Component Value Date    WBC 13 89 (H) 07/12/2020    HGB 15 9 (H) 07/12/2020    HCT 46 0 07/12/2020    MCV 87 07/12/2020     07/12/2020    MCH 29 9 07/12/2020    MCHC 34 6 07/12/2020    RDW 13 8 07/12/2020    MPV 10 5 07/12/2020    NRBC 0 07/12/2020   , CMP:   Lab Results   Component Value Date    K 3 6 07/12/2020     07/12/2020    CO2 26 07/12/2020    BUN 6 07/12/2020    CREATININE 0 79 07/12/2020    CALCIUM 10 0 07/12/2020    EGFR 94 07/12/2020     Imaging Studies: I have personally reviewed images on the PACS system and : complete obliteration of lumen right EAC, Middle ear space partially opacified with fluid, mastoid cells most adjacent to EAC partially opacified as well  Right parotid with edema , also fat stranding noted  Code Status: Level 1 - Full Code  Advance Directive and Living Will:      Power of :    POLST:      Counseling/Coordination of Care: Total floor / unit time spent today 30 minutes  Greater than 50% of total time was spent with the patient and / or family counseling and / or coordination of care  A description of the counseling / coordination of care: discussed with SLIM and nurse in unit

## 2020-07-13 NOTE — ASSESSMENT & PLAN NOTE
- presented with leukocytosis/tachycardia coupled with lactic acidosis in the setting of otitis externa/media -> resolving  - continue IV fluid resuscitation/hydration -> lactic acid normalized  - monitor vitals and maintain hemodynamics - monitor for fever curve - procalcitonin normal  - see plan for otic infection below   - blood cultures preliminarily negative

## 2020-07-13 NOTE — ASSESSMENT & PLAN NOTE
- CT imaging revealed "Acute right otitis externa and otitis media without ossicular erosion    Moderate amount of periauricular, periparotid and perimastoid soft tissue inflammation without a discrete fluid collection" - incidental notation of right posterior parotid enhancement w/ lymphadenopathy possibly suggestive of parotitis noted  - continue IV Levaquin as initiated in the ER - significant PCN allergy noted  - PRN pain control  - appreciate ENT input -> Ciprodex otic drops added -> anticipate wick removal tomorrow

## 2020-07-13 NOTE — PROGRESS NOTES
[facility] Internal Medicine - Progress Note  Patient: Tina Church 36 y o  female MRN: [id number]  Unit/Bed#: [facility] 094-56 Encounter: [id number]  Primary Care Provider: Radha Trejo PA-C  Date Of Visit: 07/13/20        Assessment & Plan:    * Severe sepsis on admission  Assessment & Plan  - presented with leukocytosis/tachycardia coupled with lactic acidosis in the setting of otitis externa/media -> resolving  - continue IV fluid resuscitation/hydration -> lactic acid normalized  - monitor vitals and maintain hemodynamics - monitor for fever curve - procalcitonin normal  - see plan for otic infection below   - blood cultures preliminarily negative    Otitis externa/media  Assessment & Plan  - CT imaging revealed "Acute right otitis externa and otitis media without ossicular erosion    Moderate amount of periauricular, periparotid and perimastoid soft tissue inflammation without a discrete fluid collection" - incidental notation of right posterior parotid enhancement w/ lymphadenopathy possibly suggestive of parotitis noted  - continue IV Levaquin as initiated in the ER - significant PCN allergy noted  - PRN pain control  - appreciate ENT input -> Ciprodex otic drops added -> anticipate wick removal tomorrow    Morbid obesity  Assessment & Plan  - BMI of 46 05  - lifestyle/diet modifications    Depression with anxiety  Assessment & Plan  - w/ associated fibromyalgia  - continue Elavil/Effexor-XR    Essential hypertension  Assessment & Plan  - low-sodium diet  - continue Toprol-XL  - PRN IV Hydralazine on board for BP spikes    Hyperlipidemia  Assessment & Plan  - continue Lipitor    Obstructive sleep apnea  Assessment & Plan  - encourage weight loss  - not chronically on a CPAP - maintain oxygenation    Asthma  Assessment & Plan  - stable/asymptomatic  - PRN Albuterol on board      DVT Prophylaxis: Heparin SC       Patient Centered Rounds:  I have performed bedside rounds and discussed plan of care with nursing today  Discussions with Specialists or Other Care Team Provider:  see above assessments if applicable    Education and Discussions with Family / Patient:  Patient at bedside - denied/refused need to update other family/friends at this time  Time Spent for Care:  32 minutes  More than 50% of total time spent on counseling and coordination of care as described above  Current Length of Stay: 1 day(s)    Current Patient Status: Inpatient   Certification Statement:  Patient will continue to require additional hospital stay due to assessments as noted above  Code Status: Level 1 - Full Code        Subjective:     Seen/examined earlier in the day  States she feels better in regards to care pain  Not diaphoretic today  Objective:     Vitals:   Temp (24hrs), Av 9 °F (36 6 °C), Min:97 7 °F (36 5 °C), Max:98 1 °F (36 7 °C)    Temp:  [97 7 °F (36 5 °C)-98 1 °F (36 7 °C)] 97 7 °F (36 5 °C)  HR:  [] 90  Resp:  [18-20] 20  BP: (122-129)/(78-80) 129/79  SpO2:  [96 %-98 %] 97 %  Body mass index is 44 83 kg/m²  Input and Output Summary (last 24 hours):        Intake/Output Summary (Last 24 hours) at 2020 1807  Last data filed at 2020 1639  Gross per 24 hour   Intake 3876 67 ml   Output 0 ml   Net 3876 67 ml       Physical Exam:     GENERAL:  Obese - improved distress from right ear pain  HEAD:  Normocephalic - atraumatic - improved preauricular/tragus tenderness  EYES: PERRL - EOMI   MOUTH:  Mucosa moist  NECK:  Supple - full range of motion  CARDIAC:  Regular rate/rhythm - S1/S2 positive  PULMONARY:  Clear breath sounds bilaterally - nonlabored respirations  ABDOMEN:  Soft - nontender/nondistended - active bowel sounds  MUSCULOSKELETAL:  Motor strength/range of motion intact  NEUROLOGIC:  Alert/oriented at baseline  SKIN:  Chronic wrinkles/blemishes   PSYCHIATRIC:  Mood/affect stable      Additional Data:     Labs & Recent Cultures:    Results from last 7 days   Lab Units 07/13/20  0505 07/12/20  1506   WBC Thousand/uL 10 33* 13 89*   HEMOGLOBIN g/dL 13 0 15 9*   HEMATOCRIT % 38 6 46 0   PLATELETS Thousands/uL 155 176   BANDS PCT %  --  17*   NEUTROS PCT % 88*  --    LYMPHS PCT % 8*  --    LYMPHO PCT %  --  0*   MONOS PCT % 4  --    MONO PCT %  --  2*   EOS PCT % 0 0     Results from last 7 days   Lab Units 07/12/20  1506   POTASSIUM mmol/L 3 6   CHLORIDE mmol/L 105   CO2 mmol/L 26   BUN mg/dL 6   CREATININE mg/dL 0 79   CALCIUM mg/dL 10 0                 Results from last 7 days   Lab Units 07/13/20  0505 07/13/20  0221 07/12/20  2149 07/12/20  1939 07/12/20  1506   LACTIC ACID mmol/L  --  1 6 2 6* 2 5* 2 4*   PROCALCITONIN ng/ml <0 05  --   --   --   --          Results from last 7 days   Lab Units 07/12/20  1517 07/12/20  1506   BLOOD CULTURE  Received in Microbiology Lab  Culture in Progress  Received in Microbiology Lab  Culture in Progress           Last 24 Hours Medication List:     Current Facility-Administered Medications:  acetaminophen 650 mg Oral Q6H PRN Adal Nunn MD    albuterol 2 puff Inhalation Q6H PRN Adal Nunn MD    amitriptyline 50 mg Oral HS Adal Nunn MD    ascorbic acid 500 mg Oral Daily Adal Nunn MD    atorvastatin 40 mg Oral Daily Adal Nunn MD    cholecalciferol 2,000 Units Oral Daily Adal Nunn MD    ciprofloxacin-dexamethasone 4 drop Right Ear BID Sara Mclean MD    heparin (porcine) 5,000 Units Subcutaneous [facility] Adal Nunn MD    hydrALAZINE 5 mg Intravenous Q6H PRN Adal Nunn MD    levofloxacin 750 mg Intravenous Q24H Adal Nunn MD Last Rate: 750 mg (07/13/20 1640)   loratadine 10 mg Oral Daily Adal Nunn MD    metoprolol succinate 50 mg Oral Daily Adal Nunn MD    morphine injection 2 mg Intravenous Q4H PRN Adal Nunn MD    multivitamin-minerals 1 tablet Oral Daily Adal Nunn MD    ondansetron 4 mg Intravenous Q4H PRN Adal Nunn MD    oxyCODONE 2 5 mg Oral Q4H PRN [place], MD    oxyCODONE 5 mg Oral Q4H PRN Sobeida Ryan MD    pantoprazole 40 mg Oral Early Morning Sobeida Ryan MD    sodium chloride 125 mL/hr Intravenous Continuous Sobeida Ryan MD Last Rate: 125 mL/hr (07/13/20 1640)   venlafaxine 150 mg Oral Daily Sobeida Ryan MD    venlafaxine 37 5 mg Oral Daily Sobeida Ryan MD                 ** Please Note: This note is constructed using a voice recognition dictation system  An occasional wrong word/phrase or sound-a-like substitution may have been picked up by dictation device due to the inherent limitations of voice recognition software  Read the chart carefully and recognize, using reasonable context, where substitutions may have occurred  **

## 2020-07-13 NOTE — UTILIZATION REVIEW
Initial Clinical Review    Admission: Date/Time/Statement: Admission Orders (From admission, onward)     Ordered        07/12/20 1635  Inpatient Admission  Once                   Orders Placed This Encounter   Procedures    Inpatient Admission     Standing Status:   Standing     Number of Occurrences:   1     Order Specific Question:   Admitting Physician     Answer:   Shirley Carias     Order Specific Question:   Level of Care     Answer:   Med Surg [16]     Order Specific Question:   Estimated length of stay     Answer:   More than 2 Midnights     Order Specific Question:   Certification     Answer:   I certify that inpatient services are medically necessary for this patient for a duration of greater than two midnights  See H&P and MD Progress Notes for additional information about the patient's course of treatment  ED Arrival Information     Expected Arrival Acuity Means of Arrival Escorted By Service Admission Type    - 7/12/2020 12:58 Less Urgent Walk-In Self Hospitalist Urgent    Arrival Complaint    earache        Chief Complaint   Patient presents with    Earache     Seen at Urgent Care on Fri for R ear infection, has been using ABX and drops, swelling and pain has increased  Pt also reports dizziness, +nausea, no vomiting/diarrhea  Assessment/Plan:   Ms Givens Courser is a 35 yo female who presents to the ED from home with c/o progressively worsening R ear pain, itching and swelling  Was treated PTA with Cipro ear drops x 2 days after Urgent care visit  She then developed R jaw tenderness and Urgent care added an oral antibiotic  She developed rigors, chills with worsening pain  In ED CT showed R otitis media/externa with R parotitis  She had elevated WBC, tachycardia and lactic acidosis - sepsis  She was started on IV antibiotics and fluids  She is admitted to INPATIENT status with Severe Sepsis - trend lactic acid  Otitis Media/extrena - IV antibiotics, ENT consult    PMH: Morbid obesity, depression, anxiety, HTN, HLD, MARCELINO, Asthma       7/12 ENT Consult - 10 day h/o progressively worsening R ear pain, pruritis, pain with opening of mouth, swelling in periauricular area  Severe otitis externa  Placed an otowick hydrated with Ciprodex inserted  Continue with IV Levaquin, Ciprodex drops BID and Decadron 8 mg IV x 1       7/13 pt reports feeling the same but not worse, WBC decreasing, still with periauricular edema and tenderness  Leave wick in 1 more day, continue other meds  ED Triage Vitals [07/12/20 1316]   Temperature Pulse Respirations Blood Pressure SpO2   99 8 °F (37 7 °C) (!) 119 18 162/79 100 %      Temp Source Heart Rate Source Patient Position - Orthostatic VS BP Location FiO2 (%)   Oral Monitor Lying Right arm --      Pain Score       Worst Possible Pain        Wt Readings from Last 1 Encounters:   07/12/20 130 kg (286 lb 3 2 oz)     Additional Vital Signs:     07/13/20 08:06:15  98 °F (36 7 °C)  85  20  127/80  96  98 %  --  --   07/12/20 21:40:49  98 1 °F (36 7 °C)  102  18  122/78  93  96 %  --  --   07/12/20 1951  --  --  --  --  --  --  None (Room air)  --   07/12/20 1837  --  --  --  --  --  --  None (Room air)  --   07/12/20 17:28:30  98 1 °F (36 7 °C)  125Abnormal   18  145/99  114  98 %  --  --   07/12/20 1645  --  111Abnormal   19  139/80  --  97 %  None (Room air)  Lying   07/12/20 1630  --  116Abnormal   20  123/79  --  97 %  None (Room air)  Lying     Pertinent Labs/Diagnostic Test Results:     7/12 CT orbits, temporal bones, skull base - Acute right otitis externa and otitis media without ossicular erosion  Moderate amount of periauricular, periparotid and perimastoid soft tissue inflammation without a discrete fluid collection  Mild enhancement of the right posterior parotid with   intraparotid lymph nodes suggests prostatitis        Results from last 7 days   Lab Units 07/13/20  0505 07/12/20  1506   WBC Thousand/uL 10 33* 13 89*   HEMOGLOBIN g/dL 13 0 15 9* HEMATOCRIT % 38 6 46 0   PLATELETS Thousands/uL 155 176   NEUTROS ABS Thousands/µL 9 06*  --    BANDS PCT %  --  17*         Results from last 7 days   Lab Units 07/12/20  1506   SODIUM mmol/L 138   POTASSIUM mmol/L 3 6   CHLORIDE mmol/L 105   CO2 mmol/L 26   ANION GAP mmol/L 7   BUN mg/dL 6   CREATININE mg/dL 0 79   EGFR ml/min/1 73sq m 94   CALCIUM mg/dL 10 0     Results from last 7 days   Lab Units 07/12/20  1506   GLUCOSE RANDOM mg/dL 179*     Results from last 7 days   Lab Units 07/13/20  0505   PROCALCITONIN ng/ml <0 05     Results from last 7 days   Lab Units 07/13/20  0221 07/12/20  2149 07/12/20  1939 07/12/20  1506   LACTIC ACID mmol/L 1 6 2 6* 2 5* 2 4*     Results from last 7 days   Lab Units 07/12/20  1517 07/12/20  1506   BLOOD CULTURE  Received in Microbiology Lab  Culture in Progress  Received in Microbiology Lab  Culture in Progress       ED Treatment:   Medication Administration from 07/12/2020 1258 to 07/12/2020 1728    Date/Time Order Dose Route Action   07/12/2020 1500 acetaminophen (TYLENOL) tablet 650 mg 650 mg Oral Given   07/12/2020 1518 ciprofloxacin (CIPRO) tablet 500 mg 500 mg Oral Given   07/12/2020 1642 levofloxacin (LEVAQUIN) IVPB (premix) 750 mg 150 mL 750 mg Intravenous New Bag   07/12/2020 1630 sodium chloride 0 9 % bolus 1,000 mL 1,000 mL Intravenous New Bag        Past Medical History:   Diagnosis Date    Allergic rhinitis     Anxiety     Arthritis     Chronic pain disorder     BACK SHOULDERS NECK    Depression     Disease of thyroid gland     nodule    Fibromyalgia, primary     GERD (gastroesophageal reflux disease)     Hearing difficulty of right ear     Hyperlipidemia     Hypertension     Irritable bowel syndrome     Perforation of tympanic membrane     Right    PONV (postoperative nausea and vomiting)     RBBB     Right bundle branch blockage     Sleep apnea     no cpap    Wears glasses      Present on Admission:   Morbid obesity   Depression with anxiety   Obstructive sleep apnea    Admitting Diagnosis: Earache [H92 09]  Otitis externa [H60 90]  Otitis media [H66 90]  Sepsis ([address] ) [A41 9]     Age/Sex: 36 y o  female     Admission Orders:  Scheduled Medications:    Medications:  amitriptyline 50 mg Oral HS   ascorbic acid 500 mg Oral Daily   atorvastatin 40 mg Oral Daily   cholecalciferol 2,000 Units Oral Daily   ciprofloxacin-dexamethasone 4 drop Right Ear BID   heparin (porcine) 5,000 Units Subcutaneous Q8H [facility]   levofloxacin 750 mg Intravenous Q24H   loratadine 10 mg Oral Daily   metoprolol succinate 50 mg Oral Daily   multivitamin-minerals 1 tablet Oral Daily   pantoprazole 40 mg Oral Early Morning   venlafaxine 150 mg Oral Daily   venlafaxine 37 5 mg Oral Daily     Continuous IV Infusions:    sodium chloride 125 mL/hr Intravenous Continuous     PRN Meds:    acetaminophen 650 mg Oral Q6H PRN x1 7/13   albuterol 2 puff Inhalation Q6H PRN    hydrALAZINE 5 mg Intravenous Q6H PRN    morphine injection 2 mg Intravenous Q4H PRN x1 7/12   ondansetron 4 mg Intravenous Q4H PRN    oxyCODONE 2 5 mg Oral Q4H PRN    oxyCODONE 5 mg Oral Q4H PRN x1 7/12     SCDs  Diet surgical soft   IP CONSULT TO ENT    Network Utilization Review Department  [email or web address]  ATTENTION: Please call with any questions or concerns to [phone] and carefully listen to the prompts so that you are directed to the right person  All voicemails are confidential   Cristian Lee all requests for admission clinical reviews, approved or denied determinations and any other requests to dedicated fax number below belonging to the campus where the patient is receiving treatment   List of dedicated fax numbers for the Facilities:  [address] DENIALS (Administrative/Medical Necessity) [phone]   [address] (Maternity/NICU/Pediatrics) [phone]   [facility] [phone]   [address] [place] [address] [phone]     Anahi Guerin [phone]   [facility] [address] [facility] [phone]   [facility]  [phone] [address] [facility]  [phone]   [address] [place] [address] 1000 [facility] [phone]

## 2020-07-13 NOTE — PROGRESS NOTES
S: states she feels same; but not worse    O:  Vitals:    07/12/20 1630 07/12/20 1645 07/12/20 1728 07/12/20 2140   BP: 123/79 139/80 145/99 122/78   BP Location: Right arm Right arm     Pulse: (!) 116 (!) 111 (!) 125 102   Resp: 20 19 18 18   Temp:   98 1 °F (36 7 °C) 98 1 °F (36 7 °C)   TempSrc:       SpO2: 97% 97% 98% 96%   Weight:   130 kg (286 lb 3 2 oz)    Height:   5' 7" (1 702 m)      WBC today 10 2 down from 13 89 on 7/12    NAD  Awake/alert  Right ear - +periauricular edema/tenderness without erythema  No otorrhea; wick in place and moist; pinna no erythematous    A/P: right acute OE  Stable to somewhat improved clinically    -will leave wick in x 1 more day; canal edema likely improving on current therapy  -cont drops BID, abx  -final dispo once wick removed tomorrow

## 2020-07-13 NOTE — UTILIZATION REVIEW
Notification of Inpatient Admission/Inpatient Authorization Request   This is a Notification of Inpatient Admission for [address]  Be advised that this patient was admitted to our facility under Inpatient Status  Contact Ernesto Parkinson at [phone] for additional admission information  [facility]  DEDICATED [phone]  Patient Name:   Adia Olivera   YOB: 1980       [address]   [address]:   Emily Guillory  Tax ID: [id number]  NPI: [id number] Attending Provider/NPI:  Phone:  Address: Radha Salter [[id number]]   [phone]  Same as Facility   Place of Service Code: 24 Place of Service Name:  [address] [place] [address]   Start Date: 7/12/20 1634 Discharge Date & Time: No discharge date for patient encounter  Type of Admission: Inpatient Status Discharge Disposition (if discharged): Home/Self Care   Patient Diagnoses: Earache [H92 09]  Otitis externa [H60 90]  Otitis media [H66 90]  Sepsis ([address] ) [A41 9]     Orders: Admission Orders (From admission, onward)     Ordered        07/12/20 1635  Inpatient Admission  Once                    Assigned Utilization Review Contact: Ernesto Parkinson  Utilization   Network Utilization Review Department  Phone: [phone]; Fax [phone]  Email: [email or web address]   ATTENTION PAYERS: Please call the assigned Utilization  directly with any questions or concerns ALL voicemails in the department are confidential  Send all requests for admission clinical reviews, approved or denied determinations and any other requests to dedicated fax number belonging to the campus where the patient is receiving treatment

## 2020-07-13 NOTE — PLAN OF CARE
Problem: Potential for Falls  Goal: Patient will remain free of falls  Description  INTERVENTIONS:  - Assess patient frequently for physical needs  -  Identify cognitive and physical deficits and behaviors that affect risk of falls    -  [place] fall precautions as indicated by assessment   - Educate patient/family on patient safety including physical limitations  - Instruct patient to call for assistance with activity based on assessment  - Modify environment to reduce risk of injury  - Consider OT/PT consult to assist with strengthening/mobility  Outcome: Progressing

## 2020-07-14 VITALS
BODY MASS INDEX: 44.92 KG/M2 | DIASTOLIC BLOOD PRESSURE: 82 MMHG | RESPIRATION RATE: 16 BRPM | HEIGHT: 67 IN | WEIGHT: 286.2 LBS | TEMPERATURE: 97.6 F | SYSTOLIC BLOOD PRESSURE: 137 MMHG | OXYGEN SATURATION: 100 % | HEART RATE: 82 BPM

## 2020-07-14 LAB
BASOPHILS # BLD AUTO: 0.04 THOUSANDS/ΜL (ref 0–0.1)
BASOPHILS NFR BLD AUTO: 0 % (ref 0–1)
EOSINOPHIL # BLD AUTO: 0.09 THOUSAND/ΜL (ref 0–0.61)
EOSINOPHIL NFR BLD AUTO: 1 % (ref 0–6)
ERYTHROCYTE [DISTWIDTH] IN BLOOD BY AUTOMATED COUNT: 13.9 % (ref 11.6–15.1)
HCT VFR BLD AUTO: 38.1 % (ref 34.8–46.1)
HGB BLD-MCNC: 12.6 G/DL (ref 11.5–15.4)
IMM GRANULOCYTES # BLD AUTO: 0.05 THOUSAND/UL (ref 0–0.2)
IMM GRANULOCYTES NFR BLD AUTO: 1 % (ref 0–2)
LYMPHOCYTES # BLD AUTO: 2.76 THOUSANDS/ΜL (ref 0.6–4.47)
LYMPHOCYTES NFR BLD AUTO: 31 % (ref 14–44)
MCH RBC QN AUTO: 29.7 PG (ref 26.8–34.3)
MCHC RBC AUTO-ENTMCNC: 33.1 G/DL (ref 31.4–37.4)
MCV RBC AUTO: 90 FL (ref 82–98)
MONOCYTES # BLD AUTO: 0.6 THOUSAND/ΜL (ref 0.17–1.22)
MONOCYTES NFR BLD AUTO: 7 % (ref 4–12)
NEUTROPHILS # BLD AUTO: 5.42 THOUSANDS/ΜL (ref 1.85–7.62)
NEUTS SEG NFR BLD AUTO: 60 % (ref 43–75)
NRBC BLD AUTO-RTO: 0 /100 WBCS
PLATELET # BLD AUTO: 144 THOUSANDS/UL (ref 149–390)
PMV BLD AUTO: 10.9 FL (ref 8.9–12.7)
RBC # BLD AUTO: 4.24 MILLION/UL (ref 3.81–5.12)
WBC # BLD AUTO: 8.96 THOUSAND/UL (ref 4.31–10.16)

## 2020-07-14 PROCEDURE — 99239 HOSP IP/OBS DSCHRG MGMT >30: CPT | Performed by: INTERNAL MEDICINE

## 2020-07-14 PROCEDURE — 85025 COMPLETE CBC W/AUTO DIFF WBC: CPT | Performed by: INTERNAL MEDICINE

## 2020-07-14 PROCEDURE — 99232 SBSQ HOSP IP/OBS MODERATE 35: CPT | Performed by: OTOLARYNGOLOGY

## 2020-07-14 RX ORDER — ACETAMINOPHEN 325 MG/1
650 TABLET ORAL EVERY 6 HOURS PRN
Qty: 30 TABLET | Refills: 0 | Status: SHIPPED | OUTPATIENT
Start: 2020-07-14 | End: 2020-10-21

## 2020-07-14 RX ORDER — LEVOFLOXACIN 750 MG/1
750 TABLET ORAL EVERY 24 HOURS
Qty: 8 TABLET | Refills: 0 | Status: SHIPPED | OUTPATIENT
Start: 2020-07-14 | End: 2020-07-22

## 2020-07-14 RX ORDER — OXYCODONE HYDROCHLORIDE 5 MG/1
5 TABLET ORAL EVERY 4 HOURS PRN
Qty: 12 TABLET | Refills: 0 | Status: SHIPPED | OUTPATIENT
Start: 2020-07-14 | End: 2020-10-21

## 2020-07-14 RX ORDER — CIPROFLOXACIN AND DEXAMETHASONE 3; 1 MG/ML; MG/ML
4 SUSPENSION/ DROPS AURICULAR (OTIC) 2 TIMES DAILY
Qty: 7.5 ML | Refills: 0 | Status: SHIPPED | OUTPATIENT
Start: 2020-07-14 | End: 2022-05-18 | Stop reason: SDUPTHER

## 2020-07-14 RX ADMIN — ATORVASTATIN CALCIUM 40 MG: 40 TABLET, FILM COATED ORAL at 07:42

## 2020-07-14 RX ADMIN — SODIUM CHLORIDE 125 ML/HR: 0.9 INJECTION, SOLUTION INTRAVENOUS at 05:18

## 2020-07-14 RX ADMIN — OXYCODONE HYDROCHLORIDE AND ACETAMINOPHEN 500 MG: 500 TABLET ORAL at 07:41

## 2020-07-14 RX ADMIN — VENLAFAXINE HYDROCHLORIDE 37.5 MG: 37.5 CAPSULE, EXTENDED RELEASE ORAL at 07:46

## 2020-07-14 RX ADMIN — MELATONIN 2000 UNITS: at 07:42

## 2020-07-14 RX ADMIN — OXYCODONE HYDROCHLORIDE 5 MG: 5 TABLET ORAL at 07:43

## 2020-07-14 RX ADMIN — METOPROLOL SUCCINATE 50 MG: 50 TABLET, EXTENDED RELEASE ORAL at 07:44

## 2020-07-14 RX ADMIN — PANTOPRAZOLE SODIUM 40 MG: 40 TABLET, DELAYED RELEASE ORAL at 05:18

## 2020-07-14 RX ADMIN — CIPROFLOXACIN AND DEXAMETHASONE 4 DROP: 3; 1 SUSPENSION/ DROPS AURICULAR (OTIC) at 07:45

## 2020-07-14 RX ADMIN — HEPARIN SODIUM 5000 UNITS: 5000 INJECTION INTRAVENOUS; SUBCUTANEOUS at 05:18

## 2020-07-14 RX ADMIN — VENLAFAXINE HYDROCHLORIDE 150 MG: 150 CAPSULE, EXTENDED RELEASE ORAL at 07:47

## 2020-07-14 RX ADMIN — LORATADINE 10 MG: 10 TABLET ORAL at 07:44

## 2020-07-14 RX ADMIN — Medication 1 TABLET: at 07:42

## 2020-07-14 NOTE — PROGRESS NOTES
S: states she feels better; opening mouth more easily and pain/swelling improved    O:  Vitals:    07/13/20 0806 07/13/20 1518 07/13/20 2201 07/14/20 0718   BP: 127/80 129/79 129/84 137/82   Pulse: 85 90 80 82   Resp: 20 20 20 16   Temp: 98 °F (36 7 °C) 97 7 °F (36 5 °C) 98 3 °F (36 8 °C) 97 6 °F (36 4 °C)   TempSrc:       SpO2: 98% 97% 98% 100%   Weight:       Height:         WBC today 8 96<--10 2 <-- 13 89    NAD  Awake/alert  Right ear - mild periauricular edema, mild tenderness; pinna with mild edema/erythema  Miguelina Ramirez was removed  +canl edema but able to pass small ear speculum to the TM    PROCEDURE  Right ear debridement was performed using otoscope/suction  - moist squamous debris; Pt tolerated procedure  A/P: right acute OE   Slowly improving   -continue ototopical drops BID (ciprodex favored over ofloxacin if not cost prohibited) + levaquin x 10 days  -discussed importance of getting drops through the entire ear canal (ear wiggle or tragal pump)  -dry ear precautions  -ok for dc from ENT standpoint  -needs ENT follow up within 1 week (her ENT is Dr Carli Castaneda)

## 2020-07-14 NOTE — DISCHARGE SUMMARY
Discharge- Mayco Alcocer 1980, 36 y o  female MRN: [id number]    Unit/Bed#: [facility] 912-01 Encounter: [id number]    Primary Care Provider: Carolin Hernadez PA-C   Date and time admitted to hospital: 7/12/2020  1:11 PM      Discharge Summary - Janine Singleton [facility] Internal Medicine    Patient Information: Mayco Alcocer 36 y o  female MRN: [id number]  Unit/Bed#: [facility] 824-37 Encounter: [id number]    Discharging Physician / Practitioner: Indra Holman DO  PCP: Carolin Hernadez PA-C  Admission Date: 7/12/2020  Discharge Date: 07/14/20    Disposition:     Home    Reason for Admission:   Acute right otitis externa    Discharge Diagnoses:     Principal Problem:    Severe sepsis on admission  Active Problems:    Essential hypertension    Depression with anxiety    Hyperlipidemia    Morbid obesity    Obstructive sleep apnea    Otitis externa/media    Asthma  Resolved Problems:    * No resolved hospital problems  *      Consultations During Hospital Stay:  · ENT    Procedures Performed:     · CT temporal bones  Acute right otitis externa and otitis media without ossicular erosion  Moderate amount of periauricular, periparotid and perimastoid soft tissue inflammation without a discrete fluid collection  Mild enhancement of the right posterior parotid with   intraparotid lymph nodes suggests prostatitis  Significant Findings / Test Results:     · As above    Incidental Findings:   · As above    Test Results Pending at Discharge (will require follow up):    · Non     Outpatient Tests Requested:  · Non    Complications:  None    Hospital Course:     Mayco Alcocer is a 36 y o  female patient who originally presented to the hospital on 7/12/2020 due to right ear pain/infection  She presents with complaints of progressively worsening your pain and sensation of in her swelling over the last two weeks  CT scan with above finding  Found to have severe sepsis  poa with leukocytosis, and elevated lactic acid  Patient was admitted the hospital, ENT consulted, patient with severe otitis externa, otowick with placed by ENT and she was started on ear drops and IV Levaquin  Patient responded to above treatment, ear wick was removed without complication  She was cleared by ENT to discharge home today on Ciprodex ear drops and 8 more days of oral Levaquin with plan to follow-up with her ENT in 1 week  She was instructed to follow with her  family doctor in 1 week  Condition at Discharge: stable     Discharge Day Visit / Exam:     Subjective:    Patient seen and examined  Comfortable sitting in chair  Anxious to go home  Right ear pain is much better  Vitals: Blood Pressure: 137/82 (07/14/20 0718)  Pulse: 82 (07/14/20 0718)  Temperature: 97 6 °F (36 4 °C) (07/14/20 0718)  Temp Source: Oral (07/12/20 1316)  Respirations: 16 (07/14/20 0718)  Height: 5' 7" (170 2 cm) (07/12/20 1728)  Weight - Scale: 130 kg (286 lb 3 2 oz) (07/12/20 1728)  SpO2: 100 % (07/14/20 0718)  Exam:   Physical Exam  Patient is awake alert oriented no acute distress  Right ear, Mild very regular edema with mild tenderness and erythema  Lung clear to auscultation bilateral  Heart positive S1-S2 no murmur  Abdomen soft nontender  Lower extremities no edema  Discussion with Family: no    Discharge instructions/Information to patient and family:   See after visit summary for information provided to patient and family  Provisions for Follow-Up Care:  See after visit summary for information related to follow-up care and any pertinent home health orders  Planned Readmission: no     Discharge Statement:  I spent 45  minutes discharging the patient  This time was spent on the day of discharge  I had direct contact with the patient on the day of discharge  Greater than 50% of the total time was spent examining patient, answering all patient questions, arranging and discussing plan of care with patient as well as directly providing post-discharge instructions    Additional time then spent on discharge activities  Discharge Medications:  See after visit summary for reconciled discharge medications provided to patient and family        ** Please Note: This note has been constructed using a voice recognition system **

## 2020-07-15 ENCOUNTER — TRANSITIONAL CARE MANAGEMENT (OUTPATIENT)
Dept: FAMILY MEDICINE CLINIC | Facility: CLINIC | Age: 40
End: 2020-07-15

## 2020-07-17 LAB
BACTERIA BLD CULT: NORMAL
BACTERIA BLD CULT: NORMAL

## 2020-07-27 ENCOUNTER — TELEMEDICINE (OUTPATIENT)
Dept: BEHAVIORAL/MENTAL HEALTH CLINIC | Facility: CLINIC | Age: 40
End: 2020-07-27
Payer: COMMERCIAL

## 2020-07-27 DIAGNOSIS — F41.8 DEPRESSION WITH ANXIETY: Primary | ICD-10-CM

## 2020-07-27 PROCEDURE — 90834 PSYTX W PT 45 MINUTES: CPT | Performed by: SOCIAL WORKER

## 2020-07-27 PROCEDURE — 1036F TOBACCO NON-USER: CPT | Performed by: SOCIAL WORKER

## 2020-07-27 NOTE — PSYCH
Virtual Regular Visit      Assessment/Plan:    Problem List Items Addressed This Visit        Other    Depression with anxiety - Primary               Reason for visit is No chief complaint on file  Encounter provider Ynes Haines    Provider located at [address] [place] [id number] [phone]      Recent Visits  No visits were found meeting these conditions  Showing recent visits within past 7 days and meeting all other requirements     Future Appointments  No visits were found meeting these conditions  Showing future appointments within next 150 days and meeting all other requirements        The patient was identified by name and date of birth  Jannette Baugh was informed that this is a telemedicine visit and that the visit is being conducted through [email or web address]  My office door was closed  No one else was in the room  She acknowledged consent and understanding of privacy and security of the video platform  The patient has agreed to participate and understands they can discontinue the visit at any time  Patient is aware this is a billable service  Subjective  Jannette Del Real is a 36 y o  female   Jannette reported she has struggled with a bed ear infection, that mushroomed into a sepsis necessitating an ER visit  She was admitted due to severe dehydration and the infection  She spent three days in the hospital  This Friday she needs surgery on her right elbow, due to nerve issues  She had prior surgery on the left elbow  The dog continues to struggle with seizures  She continues to have multiple stressors  She is now on quarantine till her surgery this week  Due to the increased stress, she is binge eating more at night, and struggling with a stomach upset  In addition her daughter developed a rash, that necessitated a trip to the urgent care   This was diagnosed as stress related due to how the stress of the house is impacting her  Session focused on stress management  Specifically that need for her to develop a clear and consistent routine to manage stress, and decrease the impact of it on her body and mind  She was given more resources to chose from , apps, the use of youtube, coloring, non dominant hand coloring, breathing, etc  She was encouraged this week to explore methods, for both herself and ones that she and her daughter can do together, and then make a plan of twice per day using the tools as a way of being aware of the stress level, and decreasing it, taking more control of her emotional well being  She agreed  She will complete this for homework  Following her surgery she will begin implementation of it  She ended session by discussing the ongoing racial issues in her community, her anger with her neighbors and dismay that this is what is happening in the country  She was given support and validation for this  Montell Gosselin appeared casually dressed, fully oriented, made consistent eye contact with her speech being of normal rate and tone  She was cooperative, her mood was anxious as was her affect  She demonstrated good insight during session  She will continue biweekly to address stress management issues, for homework identifying and using stress management techniques daily           HPI     Past Medical History:   Diagnosis Date    Allergic rhinitis     Anxiety     Arthritis     Chronic pain disorder     BACK SHOULDERS NECK    Depression     Disease of thyroid gland     nodule    Fibromyalgia, primary     GERD (gastroesophageal reflux disease)     Hearing difficulty of right ear     Hyperlipidemia     Hypertension     Irritable bowel syndrome     Perforation of tympanic membrane     Right    PONV (postoperative nausea and vomiting)     RBBB     Right bundle branch blockage     Sleep apnea     no cpap    Wears glasses        Past Surgical History: Procedure Laterality Date    ANAL FISTULOTOMY N/A 2018    Procedure: FISTULOTOMY;  Surgeon: Debby Weiner MD;  [facility];  Service: Colorectal    BACK SURGERY      lumbar herniated disc    BREAST SURGERY Left 2006    cyst removal    CARPAL TUNNEL RELEASE       SECTION      CHOLECYSTECTOMY      COLONOSCOPY      CYST REMOVAL      DENTAL SURGERY      INCISION AND DRAINAGE OF WOUND N/A 2018    Procedure: INCISION AND DRAINAGE (I&D) BUTTOCK;  Surgeon: Debby Weiner MD;  [facility];  Service: Colorectal    MYRINGOTOMY W/ TUBES Right 2015    [facility]    CA COLONOSCOPY FLX DX W/COLLJ SPEC WHEN PFRMD N/A 2017    Procedure: COLONOSCOPY;  Surgeon: Radha Hahn MD;  [facility]; Service: Gastroenterology    CA INCISE FINGER TENDON SHEATH Right 3/13/2018    Procedure: LONG TRIGGER FINGER RELEASE;  Surgeon: Duke Mcgee DO;  [facility];  Service: Orthopedics    CA SURG DIAGNOSTIC EXAM, ANORECTAL N/A 2018    Procedure: EXAM UNDER ANESTHESIA (EUA); Surgeon: Debby Weiner MD;  [facility];  Service: Colorectal    CA TYMPANOPLASTY Right 2017    Procedure: TYMPANOPLASTY WITH PERICHONDRIN GRAFT;  Surgeon:  Rosita Carreon DO;  [facility];  Service: ENT    SHOULDER SURGERY Right     ULNAR NERVE TRANSPOSITION Left     WISDOM TOOTH EXTRACTION         Current Outpatient Medications   Medication Sig Dispense Refill    acetaminophen (TYLENOL) 325 mg tablet Take 2 tablets (650 mg total) by mouth every 6 (six) hours as needed for mild pain, headaches or fever (Patient not taking: Reported on 2020) 30 tablet 0    albuterol (PROVENTIL HFA,VENTOLIN HFA) 90 mcg/act inhaler Inhale 2 puffs every 6 (six) hours as needed for shortness of breath (cough) 1 Inhaler 0    amitriptyline (ELAVIL) 50 mg tablet Take 1 tablet (50 mg total) by mouth daily at bedtime 90 tablet 1    Ascorbic Acid (VITAMIN C) 500 MG CAPS Take 1 capsule by mouth daily      atorvastatin (LIPITOR) 40 mg tablet Take 1 tablet (40 mg total) by mouth daily 90 tablet 1    cetirizine (ZyrTEC) 10 mg tablet Take 10 mg by mouth daily      Cholecalciferol (VITAMIN D3) 5000 units CAPS Take 1 capsule by mouth daily        ciprofloxacin-dexamethasone (CIPRODEX) otic suspension Administer 4 drops to the right ear 2 (two) times a day 7 5 mL 0    cyanocobalamin (VITAMIN B-12) 1,000 mcg tablet Take 3 tablets by mouth 2 (two) times a day      esomeprazole (NexIUM 24HR) 20 mg capsule Take 1 capsule (20 mg total) by mouth every morning 30 capsule 6    levonorgestrel (MIRENA) 20 MCG/24HR IUD 1 each by Intrauterine route once      metoprolol succinate (TOPROL-XL) 50 mg 24 hr tablet Take 1 tablet (50 mg total) by mouth daily 90 tablet 1    Multiple Vitamins-Minerals (CENTRUM ADULTS PO) Take 1 tablet by mouth daily      oxyCODONE (ROXICODONE) 5 mg immediate release tablet Take 1 tablet (5 mg total) by mouth every 4 (four) hours as needed for severe painMax Daily Amount: 30 mg (Patient not taking: Reported on 7/21/2020) 12 tablet 0    venlafaxine (EFFEXOR-XR) 150 mg 24 hr capsule Take 1 capsule (150 mg total) by mouth daily Take with 37 5 mg tablet for total 187 5mg 30 capsule 2    venlafaxine (EFFEXOR-XR) 37 5 mg 24 hr capsule Take 1 capsule (37 5 mg total) by mouth daily Take with 150 mg tablet for total 187 5mg 30 capsule 2     No current facility-administered medications for this visit  Allergies   Allergen Reactions    Amoxicillin Swelling, Anaphylaxis and Angioedema    Neurontin [Gabapentin] Other (See Comments)     Other reaction(s): SUICIDE IDEATION  Other reaction(s):  Other (See Comments)  suicidal  suicidal    Penicillin V Angioedema and Swelling     Other reaction(s): Throat closure    Penicillins Swelling and Anaphylaxis    Aripiprazole Other (See Comments)     Other reaction(s): low dose 2 mg ; curving in and locking in of hands/dystonia  Other reaction(s): Other (See Comments)  Other reaction(s): low dose 2 mg ; curving in and locking in of hands/dystonia    Lorazepam Other (See Comments)     Other reaction(s): higher dose > anxiety and depression  Other reaction(s): Other (See Comments)  Other reaction(s): higher dose > anxiety and depression    Carbamazepine Other (See Comments)     Other reaction(s): Unknown Reaction    Doxycycline Hives    Lamotrigine Rash    Other Hives     Adhesive tape       Review of Systems    Video Exam    There were no vitals filed for this visit  Physical Exam     I spent 45 minutes directly with the patient during this visit      Lavon Fabian acknowledges that she has consented to an online visit or consultation  She understands that the online visit is based solely on information provided by her, and that, in the absence of a face-to-face physical evaluation by the physician, the diagnosis she receives is both limited and provisional in terms of accuracy and completeness  This is not intended to replace a full medical face-to-face evaluation by the physician  Jannette Baugh understands and accepts these terms

## 2020-07-28 ENCOUNTER — TELEPHONE (OUTPATIENT)
Dept: CARDIOLOGY CLINIC | Facility: CLINIC | Age: 40
End: 2020-07-28

## 2020-07-28 NOTE — TELEPHONE ENCOUNTER
Fax received requesting last office visit notes  Patient is scheduled for upcoming procedure on 7/31/2020      Faxed to: [phone]

## 2020-07-30 NOTE — UTILIZATION REVIEW
Notification of Discharge  This is a Notification of Discharge from our facility [address]  Please be advised that this patient has been discharge from our facility  Below you will find the admission and discharge date and time including the patients disposition  PRESENTATION DATE: 7/12/2020  1:11 PM  OBS ADMISSION DATE:   IP ADMISSION DATE: 7/12/20 1634   DISCHARGE DATE: 7/14/2020 12:00 PM  DISPOSITION: Home/Self Care Home/Self Care   Admission Orders listed below:  Admission Orders (From admission, onward)     Ordered        07/12/20 1635  Inpatient Admission  Once                   Please contact the UR Department if additional information is required to close this patient's authorization/case  [address] [employer] Utilization Review Department  Main: [phone] x carefully listen to the prompts  All voicemails are confidential   [email or web address]  Send all requests for admission clinical reviews, approved or denied determinations and any other requests to dedicated fax number below belonging to the campus where the patient is receiving treatment   List of dedicated fax numbers:  [address] DENIALS (Administrative/Medical Necessity) [phone]   [address] (Maternity/NICU/Pediatrics) [phone]   Matt Hensley [phone]   Navdeep Walls [phone]   Eli Heady [phone]   Hettie Lips [facility] [address] [facility] [phone]   [facility]  [phone]   [address] [facility]  [address] [facility] [address] [facility] [phone]

## 2020-08-11 ENCOUNTER — TELEMEDICINE (OUTPATIENT)
Dept: BEHAVIORAL/MENTAL HEALTH CLINIC | Facility: CLINIC | Age: 40
End: 2020-08-11
Payer: COMMERCIAL

## 2020-08-11 DIAGNOSIS — F41.8 DEPRESSION WITH ANXIETY: Primary | ICD-10-CM

## 2020-08-11 PROCEDURE — 90834 PSYTX W PT 45 MINUTES: CPT | Performed by: SOCIAL WORKER
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
## 2020-08-12 ENCOUNTER — TELEPHONE (OUTPATIENT)
Dept: FAMILY MEDICINE CLINIC | Facility: CLINIC | Age: 40
End: 2020-08-12

## 2020-08-12 DIAGNOSIS — I10 HYPERTENSION, UNSPECIFIED TYPE: ICD-10-CM

## 2020-08-12 RX ORDER — METOPROLOL SUCCINATE 50 MG/1
50 TABLET, EXTENDED RELEASE ORAL DAILY
Qty: 14 TABLET | Refills: 0 | Status: SHIPPED | OUTPATIENT
Start: 2020-08-12 | End: 2020-08-21 | Stop reason: SDUPTHER

## 2020-08-12 NOTE — TELEPHONE ENCOUNTER
Patient left message 08/11/2020 8:46 pm that she believes mail order pharmacy sent her Metoprolol but she cannot find it  She stated she will call them in the morning  She only has enough for 08/11/2020  Can a refill be sent to [facility] so she has for 08/12/2020? Thank you

## 2020-08-19 ENCOUNTER — OFFICE VISIT (OUTPATIENT)
Dept: PSYCHIATRY | Facility: CLINIC | Age: 40
End: 2020-08-19
Payer: COMMERCIAL

## 2020-08-19 DIAGNOSIS — M79.7 FIBROMYALGIA: ICD-10-CM

## 2020-08-19 PROCEDURE — 90833 PSYTX W PT W E/M 30 MIN: CPT | Performed by: HOSPITALIST

## 2020-08-19 PROCEDURE — 1036F TOBACCO NON-USER: CPT | Performed by: HOSPITALIST

## 2020-08-19 PROCEDURE — 99214 OFFICE O/P EST MOD 30 MIN: CPT | Performed by: HOSPITALIST

## 2020-08-19 RX ORDER — AMITRIPTYLINE HYDROCHLORIDE 100 MG/1
100 TABLET, FILM COATED ORAL
Qty: 90 TABLET | Refills: 1 | Status: SHIPPED | OUTPATIENT
Start: 2020-08-19 | End: 2021-01-18 | Stop reason: SDUPTHER

## 2020-08-19 RX ORDER — AMITRIPTYLINE HYDROCHLORIDE 100 MG/1
100 TABLET, FILM COATED ORAL
Qty: 90 TABLET | Refills: 1 | Status: SHIPPED | OUTPATIENT
Start: 2020-08-19 | End: 2020-08-19

## 2020-08-19 NOTE — PSYCH
MEDICATION MANAGEMENT NOTE        [facility]      Name and Date of Birth:  Adia Olivera 36 y o  1980    Date of Visit: August 19, 2020    SUBJECTIVE:  CC: Natalya Rider presents today for outpatient psychiatric follow up for  Anxiety and depression, was last seen 6/04 via telemedicine    Jannette  reports she has been very anxious and feels somewhat depressed  She reports stressors of right arm surgery, her dog was improvements he has been having more seizures, and her abdomen his recent been not feeling well home  She also reports having started individual counseling and is working in some trauma issues  It appears the combined stressors have made anxious  She reports also having difficulty with sleep and feels tired throughout the day  She continues to remain active and feels all of her obligations  Though she admits she is struggling somewhat  She denies being hopeless or helpless, she denies any overt panic attacks  No thoughts of self-harm or harm of others  No evidence of psychosis or siva  Jannette denies any side effects from medications unless noted above    Since our last visit, overall symptoms have been Mildly regressed  HPI ROS:  Review Of Systems as noted above      Psychiatric History  Reviewed    History Review:  The following portions of the patient's history were reviewed and updated as appropriate: allergies, current medications, past family history, past medical history, past social history, past surgical history and problem list      Lab Review: Labs were reviewed      OBJECTIVE:     MENTAL STATUS EXAM  Appearance:  age appropriate, dressed casually, overweight   Behavior:  Pleasant & cooperative   Speech:  Normal volume, regular rate and rhythm   Mood:  anxious   Affect:  mood congruent, blunted, full   Language: intact and appropriate for age, education, and intellect   Thought Process:  Linear and goal directed   Associations: intact associations   Thought Content:  normal and appropriate   Perceptual Disturbances: no auditory or visual hallcunations   Risk Potential / Abnormal Thoughts: Suicidal ideation - None  Homicidal ideation - None  Potential for aggression - No       Consciousness:  Alert & Awake   Sensorium:  Grossly oriented   Attention: attention span and concentration are age appropriate       Fund of Knowledge:  Memory: awareness of current events: yes  recent and remote memory grossly intact   Insight:  good   Judgment: good   Muscle Strength Muscle Tone: normal  normal   Gait/Station: normal gait/station with good balance   Motor Activity: no abnormal movements       Risks, Benefits And Possible Side Effects Of Medications:    AGREE: Risks, benefits, and possible side effects of medications explained to Jannette and she (or legal representative) verbalizes understanding and agreement for treatment  Controlled Medication Discussion:     Not applicable    Recent labs:  No visits with results within 1 Month(s) from this visit  Latest known visit with results is:   Admission on 07/12/2020, Discharged on 07/14/2020   Component Date Value    WBC 07/12/2020 13 89*    RBC 07/12/2020 5 32*    Hemoglobin 07/12/2020 15 9*    Hematocrit 07/12/2020 46 0     MCV 07/12/2020 87     MCH 07/12/2020 29 9     MCHC 07/12/2020 34 6     RDW 07/12/2020 13 8     MPV 07/12/2020 10 5     Platelets 91/99/2520 176     nRBC 07/12/2020 0     Sodium 07/12/2020 138     Potassium 07/12/2020 3 6     Chloride 07/12/2020 105     CO2 07/12/2020 26     ANION GAP 07/12/2020 7     BUN 07/12/2020 6     Creatinine 07/12/2020 0 79     Glucose 07/12/2020 179*    Calcium 07/12/2020 10 0     eGFR 07/12/2020 94     Blood Culture 07/12/2020 No Growth After 5 Days   Blood Culture 07/12/2020 No Growth After 5 Days       LACTIC ACID 07/12/2020 2 4*    Segmented % 07/12/2020 79*    Bands % 07/12/2020 17*    Lymphocytes % 07/12/2020 0*    Monocytes % 07/12/2020 2*    Eosinophils, % 07/12/2020 0     Basophils % 07/12/2020 0     Atypical Lymphocytes % 07/12/2020 2*    Absolute Neutrophils 07/12/2020 13 33*    Lymphocytes Absolute 07/12/2020 0 00*    Monocytes Absolute 07/12/2020 0 28     Eosinophils Absolute 07/12/2020 0 00     Basophils Absolute 07/12/2020 0 00     RBC Morphology 07/12/2020 Present     Polychromasia 07/12/2020 Present     Platelet Estimate 09/75/8246 Adequate     LACTIC ACID 07/12/2020 2 5*    LACTIC ACID 07/12/2020 2 6*    LACTIC ACID 07/13/2020 1 6     Procalcitonin 07/13/2020 <0 05     WBC 07/13/2020 10 33*    RBC 07/13/2020 4 37     Hemoglobin 07/13/2020 13 0     Hematocrit 07/13/2020 38 6     MCV 07/13/2020 88     MCH 07/13/2020 29 7     MCHC 07/13/2020 33 7     RDW 07/13/2020 13 7     MPV 07/13/2020 11 3     Platelets 00/03/2484 155     nRBC 07/13/2020 0     Neutrophils Relative 07/13/2020 88*    Immat GRANS % 07/13/2020 0     Lymphocytes Relative 07/13/2020 8*    Monocytes Relative 07/13/2020 4     Eosinophils Relative 07/13/2020 0     Basophils Relative 07/13/2020 0     Neutrophils Absolute 07/13/2020 9 06*    Immature Grans Absolute 07/13/2020 0 03     Lymphocytes Absolute 07/13/2020 0 78     Monocytes Absolute 07/13/2020 0 45     Eosinophils Absolute 07/13/2020 0 00     Basophils Absolute 07/13/2020 0 01     WBC 07/14/2020 8 96     RBC 07/14/2020 4 24     Hemoglobin 07/14/2020 12 6     Hematocrit 07/14/2020 38 1     MCV 07/14/2020 90     MCH 07/14/2020 29 7     MCHC 07/14/2020 33 1     RDW 07/14/2020 13 9     MPV 07/14/2020 10 9     Platelets 48/79/5882 144*    nRBC 07/14/2020 0     Neutrophils Relative 07/14/2020 60     Immat GRANS % 07/14/2020 1     Lymphocytes Relative 07/14/2020 31     Monocytes Relative 07/14/2020 7     Eosinophils Relative 07/14/2020 1     Basophils Relative 07/14/2020 0     Neutrophils Absolute 07/14/2020 5 42     Immature Grans Absolute 07/14/2020 0 05     Lymphocytes Absolute 07/14/2020 2 76     Monocytes Absolute 07/14/2020 0 60     Eosinophils Absolute 07/14/2020 0 09     Basophils Absolute 07/14/2020 0 04          Assessment/Plan:        There are no diagnoses linked to this encounter  Increase Amitriptyline to 100 mg at bedtime  Continue Effexor XR  187 5 mg daily  Follow up 6 weeks  Patient has been educated about their diagnosis and treatment modalities  They voiced understanding and agreement with the following plan:  Discussed medications and if treatment adjustment was needed/desired  Discussed self monitoring of symptoms, and symptom monitoring tools  Patient has been informed of 24 hours and weekend coverage for urgent situations accessed by calling the main clinic phone number  Treatment Plan:   Initial done 5/[id number] by Olivia Blake  Follow up needed 11/11/2020      Psychotherapy in session:  Time spent performing psychotherapy: 30 Minutes  Education and counseling regarding diagnosis, medication and treatment plan  Discussed and support provided regarding patients current stressors

## 2020-08-21 DIAGNOSIS — F41.8 DEPRESSION WITH ANXIETY: ICD-10-CM

## 2020-08-21 RX ORDER — METOPROLOL SUCCINATE 50 MG/1
50 TABLET, EXTENDED RELEASE ORAL DAILY
Qty: 90 TABLET | Refills: 1 | Status: SHIPPED | OUTPATIENT
Start: 2020-08-21 | End: 2020-11-16 | Stop reason: SDUPTHER

## 2020-08-21 RX ORDER — VENLAFAXINE HYDROCHLORIDE 37.5 MG/1
37.5 CAPSULE, EXTENDED RELEASE ORAL DAILY
Qty: 30 CAPSULE | Refills: 2 | Status: SHIPPED | OUTPATIENT
Start: 2020-08-21 | End: 2020-10-07 | Stop reason: SDUPTHER

## 2020-08-21 RX ORDER — VENLAFAXINE HYDROCHLORIDE 150 MG/1
150 CAPSULE, EXTENDED RELEASE ORAL DAILY
Qty: 30 CAPSULE | Refills: 2 | Status: SHIPPED | OUTPATIENT
Start: 2020-08-21 | End: 2020-10-07 | Stop reason: SDUPTHER

## 2020-08-21 NOTE — TELEPHONE ENCOUNTER
Patient called stating that she called her mail order pharmacy because she has not received metoprolol and they told her that they received a letter from Corky Holman PA-C, that the prescription was cancelled  After research, I told her that I feel it would not be filled because of the hold-over script picked up in [facility]  Patient has one week left and is asking that a new script be sent to her mail order  And that she be called with the updated

## 2020-08-24 ENCOUNTER — SOCIAL WORK (OUTPATIENT)
Dept: BEHAVIORAL/MENTAL HEALTH CLINIC | Facility: CLINIC | Age: 40
End: 2020-08-24
Payer: COMMERCIAL

## 2020-08-24 DIAGNOSIS — F41.8 DEPRESSION WITH ANXIETY: Primary | ICD-10-CM

## 2020-08-24 PROCEDURE — 90834 PSYTX W PT 45 MINUTES: CPT | Performed by: SOCIAL WORKER

## 2020-08-24 NOTE — PSYCH
Psychotherapy Provided: Individual Psychotherapy 50 minutes     Length of time in session: 50 minutes, follow up in 2 week    Goals addressed in session: Goal 1     Pain:      mild    2    Current suicide risk : Low   Jannette reported the dog continues to struggle, he had several seizures, this has necessitated trips to the vet, and attempting now to try chinese herbs to assist  In conjunction to this several of her long time friends have suggested, re-homing or putting down the dog, to Jannette's horror, which has left her questioning those she calls friends, her daughter is equally anxious about the dog, and with the family car also having difficulty they are having financial issues  She reported she is back to not leaving the dog for extended periods which leaves her in the house, and restricts what she can do at one time when leaving the house  In addition her sleep is off, she is struggling to fall asleep, and then after falling asleep very early in the am, she needs to wake up to begin her day  Session focused on ways she can use meditations for sleep, sleep stories or audio books to assist with falling asleep at night  She reported several days of high anxiety, waking up with a panic attack, and feeling overwhelmed  We reviewed rhythmic breathing  We also discussed her fears around beginning to address past trauma  The process was explained to her including the considerable ground work laid to improve her ability to feel ready to tolerate the emotion that comes with addressing the past  She was assigned the homework of identifying two to three protective, wise and nurturing figures to complete the support Saxman visual when she returns in three weeks  She continues to work with identifying negative thoughts that belong to her brother and place them in the tub in the garage where she has stored him for now  She continues to use the tools she has learned thus far       Jannette was neatly dressed, fully oriented, made consistent eye contact, her speech was of normal rate and tone, her affect was within normal ranger, her mood was anxious     her though process seemed logical and goal directed, she was attentive and demonstrated insight  Jannette will be seen in three weeks to address impact of past trauma  Behavioral Health Treatment Plan [facility]: Diagnosis and Treatment Plan explained to Federico Francis relates understanding diagnosis and is agreeable to Treatment Plan   Yes

## 2020-08-25 ENCOUNTER — HOSPITAL ENCOUNTER (OUTPATIENT)
Dept: MAMMOGRAPHY | Facility: HOSPITAL | Age: 40
Discharge: HOME | End: 2020-08-25
Payer: COMMERCIAL

## 2020-08-25 ENCOUNTER — HOSPITAL ENCOUNTER (OUTPATIENT)
Dept: ULTRASOUND IMAGING | Facility: HOSPITAL | Age: 40
Discharge: HOME | End: 2020-08-25
Payer: COMMERCIAL

## 2020-08-25 VITALS — WEIGHT: 290 LBS | BODY MASS INDEX: 45.52 KG/M2 | HEIGHT: 67 IN

## 2020-08-25 DIAGNOSIS — E04.1 THYROID NODULE: ICD-10-CM

## 2020-08-25 DIAGNOSIS — Z12.31 BREAST CANCER SCREENING BY MAMMOGRAM: ICD-10-CM

## 2020-08-25 PROCEDURE — 77067 SCR MAMMO BI INCL CAD: CPT

## 2020-08-25 PROCEDURE — 76536 US EXAM OF HEAD AND NECK: CPT

## 2020-08-25 PROCEDURE — 77063 BREAST TOMOSYNTHESIS BI: CPT

## 2020-08-26 ENCOUNTER — APPOINTMENT (OUTPATIENT)
Dept: LAB | Facility: CLINIC | Age: 40
End: 2020-08-26
Payer: COMMERCIAL

## 2020-08-26 DIAGNOSIS — I10 HYPERTENSION, UNSPECIFIED TYPE: ICD-10-CM

## 2020-08-26 DIAGNOSIS — R82.90 ABNORMAL URINE FINDING: ICD-10-CM

## 2020-08-26 DIAGNOSIS — E04.1 THYROID NODULE: ICD-10-CM

## 2020-08-26 DIAGNOSIS — E78.5 HYPERLIPIDEMIA, UNSPECIFIED HYPERLIPIDEMIA TYPE: ICD-10-CM

## 2020-08-26 DIAGNOSIS — R82.90 ABNORMAL URINE FINDINGS: ICD-10-CM

## 2020-08-26 DIAGNOSIS — Z13.29 THYROID DISORDER SCREEN: ICD-10-CM

## 2020-08-26 LAB
ALBUMIN SERPL BCP-MCNC: 4 G/DL (ref 3.5–5)
ALP SERPL-CCNC: 70 U/L (ref 46–116)
ALT SERPL W P-5'-P-CCNC: 73 U/L (ref 12–78)
ANION GAP SERPL CALCULATED.3IONS-SCNC: 6 MMOL/L (ref 4–13)
AST SERPL W P-5'-P-CCNC: 41 U/L (ref 5–45)
BACTERIA UR QL AUTO: ABNORMAL /HPF
BILIRUB SERPL-MCNC: 0.55 MG/DL (ref 0.2–1)
BILIRUB UR QL STRIP: NEGATIVE
BUN SERPL-MCNC: 10 MG/DL (ref 5–25)
CALCIUM SERPL-MCNC: 8.9 MG/DL (ref 8.3–10.1)
CHLORIDE SERPL-SCNC: 106 MMOL/L (ref 100–108)
CHOLEST SERPL-MCNC: 196 MG/DL (ref 50–200)
CLARITY UR: ABNORMAL
CO2 SERPL-SCNC: 29 MMOL/L (ref 21–32)
COLOR UR: YELLOW
CREAT SERPL-MCNC: 0.67 MG/DL (ref 0.6–1.3)
GFR SERPL CREATININE-BSD FRML MDRD: 110 ML/MIN/1.73SQ M
GLUCOSE P FAST SERPL-MCNC: 135 MG/DL (ref 65–99)
GLUCOSE UR STRIP-MCNC: NEGATIVE MG/DL
HDLC SERPL-MCNC: 45 MG/DL
HGB UR QL STRIP.AUTO: ABNORMAL
KETONES UR STRIP-MCNC: NEGATIVE MG/DL
LDLC SERPL CALC-MCNC: 106 MG/DL (ref 0–100)
LEUKOCYTE ESTERASE UR QL STRIP: ABNORMAL
NITRITE UR QL STRIP: NEGATIVE
NON-SQ EPI CELLS URNS QL MICRO: ABNORMAL /HPF
NONHDLC SERPL-MCNC: 151 MG/DL
PH UR STRIP.AUTO: 6.5 [PH]
POTASSIUM SERPL-SCNC: 4.3 MMOL/L (ref 3.5–5.3)
PROT SERPL-MCNC: 7.1 G/DL (ref 6.4–8.2)
PROT UR STRIP-MCNC: NEGATIVE MG/DL
RBC #/AREA URNS AUTO: ABNORMAL /HPF
SODIUM SERPL-SCNC: 141 MMOL/L (ref 136–145)
SP GR UR STRIP.AUTO: 1.01 (ref 1–1.03)
TRIGL SERPL-MCNC: 226 MG/DL
TSH SERPL DL<=0.05 MIU/L-ACNC: 1.41 UIU/ML (ref 0.36–3.74)
UROBILINOGEN UR QL STRIP.AUTO: 0.2 E.U./DL
WBC #/AREA URNS AUTO: ABNORMAL /HPF

## 2020-08-26 PROCEDURE — 81001 URINALYSIS AUTO W/SCOPE: CPT

## 2020-08-26 PROCEDURE — 80061 LIPID PANEL: CPT

## 2020-08-26 PROCEDURE — 87086 URINE CULTURE/COLONY COUNT: CPT

## 2020-08-26 PROCEDURE — 80053 COMPREHEN METABOLIC PANEL: CPT

## 2020-08-26 PROCEDURE — 36415 COLL VENOUS BLD VENIPUNCTURE: CPT

## 2020-08-26 PROCEDURE — 84443 ASSAY THYROID STIM HORMONE: CPT

## 2020-08-28 ENCOUNTER — TELEMEDICINE (OUTPATIENT)
Dept: FAMILY MEDICINE CLINIC | Facility: CLINIC | Age: 40
End: 2020-08-28
Payer: COMMERCIAL

## 2020-08-28 DIAGNOSIS — E78.1 HIGH TRIGLYCERIDES: ICD-10-CM

## 2020-08-28 DIAGNOSIS — Z71.2 ENCOUNTER TO DISCUSS TEST RESULTS: ICD-10-CM

## 2020-08-28 DIAGNOSIS — R82.90 ABNORMAL FINDING ON URINALYSIS: Primary | ICD-10-CM

## 2020-08-28 DIAGNOSIS — R73.01 IMPAIRED FASTING GLUCOSE: ICD-10-CM

## 2020-08-28 DIAGNOSIS — R31.29 MICROSCOPIC HEMATURIA: ICD-10-CM

## 2020-08-28 LAB — BACTERIA UR CULT: NORMAL

## 2020-08-28 PROCEDURE — 1036F TOBACCO NON-USER: CPT | Performed by: PHYSICIAN ASSISTANT

## 2020-08-28 PROCEDURE — 3077F SYST BP >= 140 MM HG: CPT | Performed by: PHYSICIAN ASSISTANT

## 2020-08-28 PROCEDURE — 1111F DSCHRG MED/CURRENT MED MERGE: CPT | Performed by: PHYSICIAN ASSISTANT

## 2020-08-28 PROCEDURE — 3080F DIAST BP >= 90 MM HG: CPT | Performed by: PHYSICIAN ASSISTANT

## 2020-08-28 PROCEDURE — 99213 OFFICE O/P EST LOW 20 MIN: CPT | Performed by: PHYSICIAN ASSISTANT

## 2020-08-28 NOTE — PROGRESS NOTES
Virtual Regular Visit      Assessment/Plan:    Problem List Items Addressed This Visit        Other    High triglycerides  - low fat diet, physical activity  - omega 3/fish oil      Other Visit Diagnoses     Abnormal finding on urinalysis    -  Primary    Relevant Orders    UA w Reflex to Microscopic w Reflex to Culture -Lab Collect    Microscopic hematuria        Relevant Orders    UA w Reflex to Microscopic w Reflex to Culture -Lab Collect  - trace, asymptomatic  - appears to be a contaminated specimen  - will repeat, if persistent than pursue urology consult  - culture not resulted    Impaired fasting glucose      - low carb/sugar diet  - a1c in office at next follow up   - last a1c 5 2 Dec 2019    Encounter to discuss test results      - mammogram and US thyroid not resulted yet, on vacation next week so will call with results when back                Reason for visit is   Chief Complaint   Patient presents with    Virtual Regular Visit        Encounter provider Mikell Leventhal, PA-C    Provider located at [facility],   [address]      Recent Visits  No visits were found meeting these conditions  Showing recent visits within past 7 days and meeting all other requirements     Today's Visits  Date Type Provider Dept   08/28/20 Telemedicine Mikell Leventhal, PA-C [facility] At [address] [place] [address] today's visits and meeting all other requirements     Future Appointments  No visits were found meeting these conditions  Showing future appointments within next 150 days and meeting all other requirements        The patient was identified by name and date of birth  Jannette Baugh was informed that this is a telemedicine visit and that the visit is being conducted through [employer] and patient was informed that this is not a secure, HIPAA-complaint platform  She agrees to proceed     My office door was closed   No one else was in the room   She acknowledged consent and understanding of privacy and security of the video platform  The patient has agreed to participate and understands they can discontinue the visit at any time  Patient is aware this is a billable service  Subjective  Jannette Ruiz is a 36 y o  female who presents to review recent labs  HPI   Her urine sample was abnormal, appears contaminated  The urine culture is still not results et but she is not having any UTI symptoms  She admits it was hard to collect the sample due to just having hand surgery  Her TG are still elevated and fasting glucose was 135  Her last a1c was 5 2  She is have US thyroid and mammogram done this week but is not resulted to review today       Past Medical History:   Diagnosis Date    Allergic rhinitis     Anxiety     Arthritis     Chronic pain disorder     BACK SHOULDERS NECK    Depression     Disease of thyroid gland     nodule    Fibromyalgia, primary     GERD (gastroesophageal reflux disease)     Hearing difficulty of right ear     Hyperlipidemia     Hypertension     Irritable bowel syndrome     Perforation of tympanic membrane     Right    PONV (postoperative nausea and vomiting)     RBBB     Right bundle branch blockage     Sleep apnea     no cpap    Wears glasses        Past Surgical History:   Procedure Laterality Date    ANAL FISTULOTOMY N/A 2018    Procedure: FISTULOTOMY;  Surgeon: Nathaniel Granados MD;  [facility];  Service: Colorectal    BACK SURGERY      lumbar herniated disc    BREAST CYST EXCISION Left 13 yrs ago  benign    BREAST SURGERY Left 2006    cyst removal    CARPAL TUNNEL RELEASE       SECTION      CHOLECYSTECTOMY      COLONOSCOPY      CYST REMOVAL      DENTAL SURGERY      INCISION AND DRAINAGE OF WOUND N/A 2018    Procedure: INCISION AND DRAINAGE (I&D) BUTTOCK;  Surgeon: Nathaniel Granados MD;  [facility];  Service: Colorectal    MYRINGOTOMY W/ TUBES Right 08/17/2015    [place]    TX COLONOSCOPY FLX DX W/COLLJ SPEC WHEN PFRMD N/A 8/2/2017    Procedure: COLONOSCOPY;  Surgeon: Alfred Shaw MD;  [facility]; Service: Gastroenterology    TX INCISE FINGER TENDON SHEATH Right 3/13/2018    Procedure: LONG TRIGGER FINGER RELEASE;  Surgeon: Joycelyn Mcnally DO;  [facility];  Service: Orthopedics    TX SURG DIAGNOSTIC EXAM, ANORECTAL N/A 11/2/2018    Procedure: EXAM UNDER ANESTHESIA (EUA); Surgeon: Cammy Moy MD;  [facility];  Service: Colorectal    TX TYMPANOPLASTY Right 5/24/2017    Procedure: TYMPANOPLASTY WITH PERICHONDRIN GRAFT;  Surgeon:  Yonathan Sheehan DO;  [facility];  Service: ENT    SHOULDER SURGERY Right     ULNAR NERVE TRANSPOSITION Left     WISDOM TOOTH EXTRACTION         Current Outpatient Medications   Medication Sig Dispense Refill    acetaminophen (TYLENOL) 325 mg tablet Take 2 tablets (650 mg total) by mouth every 6 (six) hours as needed for mild pain, headaches or fever (Patient not taking: Reported on 7/21/2020) 30 tablet 0    albuterol (PROVENTIL HFA,VENTOLIN HFA) 90 mcg/act inhaler Inhale 2 puffs every 6 (six) hours as needed for shortness of breath (cough) 1 Inhaler 0    amitriptyline (ELAVIL) 100 mg tablet Take 1 tablet (100 mg total) by mouth daily at bedtime 90 tablet 1    Ascorbic Acid (VITAMIN C) 500 MG CAPS Take 1 capsule by mouth daily      atorvastatin (LIPITOR) 40 mg tablet Take 1 tablet (40 mg total) by mouth daily 90 tablet 1    cetirizine (ZyrTEC) 10 mg tablet Take 10 mg by mouth daily      Cholecalciferol (VITAMIN D3) 5000 units CAPS Take 1 capsule by mouth daily        ciprofloxacin-dexamethasone (CIPRODEX) otic suspension Administer 4 drops to the right ear 2 (two) times a day 7 5 mL 0    cyanocobalamin (VITAMIN B-12) 1,000 mcg tablet Take 3 tablets by mouth 2 (two) times a day      esomeprazole (NexIUM 24HR) 20 mg capsule Take 1 capsule (20 mg total) by mouth every morning 30 capsule 6    levonorgestrel (MIRENA) 20 MCG/24HR IUD 1 each by Intrauterine route once      metoprolol succinate (TOPROL-XL) 50 mg 24 hr tablet Take 1 tablet (50 mg total) by mouth daily 90 tablet 1    Multiple Vitamins-Minerals (CENTRUM ADULTS PO) Take 1 tablet by mouth daily      oxyCODONE (ROXICODONE) 5 mg immediate release tablet Take 1 tablet (5 mg total) by mouth every 4 (four) hours as needed for severe painMax Daily Amount: 30 mg (Patient not taking: Reported on 8/18/2020) 12 tablet 0    venlafaxine (EFFEXOR-XR) 150 mg 24 hr capsule Take 1 capsule (150 mg total) by mouth daily Take with 37 5 mg tablet for total 187 5mg 30 capsule 2    venlafaxine (EFFEXOR-XR) 37 5 mg 24 hr capsule Take 1 capsule (37 5 mg total) by mouth daily Take with 150 mg tablet for total 187 5mg 30 capsule 2     No current facility-administered medications for this visit  Allergies   Allergen Reactions    Amoxicillin Swelling, Anaphylaxis and Angioedema    Neurontin [Gabapentin] Other (See Comments)     Other reaction(s): SUICIDE IDEATION  Other reaction(s): Other (See Comments)  suicidal  suicidal    Penicillin V Angioedema and Swelling     Other reaction(s): Throat closure    Penicillins Swelling and Anaphylaxis    Aripiprazole Other (See Comments)     Other reaction(s): low dose 2 mg ; curving in and locking in of hands/dystonia  Other reaction(s): Other (See Comments)  Other reaction(s): low dose 2 mg ; curving in and locking in of hands/dystonia    Lorazepam Other (See Comments)     Other reaction(s): higher dose > anxiety and depression  Other reaction(s): Other (See Comments)  Other reaction(s): higher dose > anxiety and depression    Carbamazepine Other (See Comments)     Other reaction(s): Unknown Reaction    Doxycycline Hives    Lamotrigine Rash    Other Hives     Adhesive tape       Review of Systems   Constitutional: Negative for appetite change, chills, diaphoresis and fever  Respiratory: Negative  Cardiovascular: Negative  Genitourinary: Positive for hematuria (on UA)  Negative for difficulty urinating and dysuria  Menstrual problem: does not get periods  Skin: Negative  Video Exam    There were no vitals filed for this visit  Physical Exam  Constitutional:       General: She is not in acute distress  Appearance: She is obese  HENT:      Head: Normocephalic  Eyes:      Conjunctiva/sclera: Conjunctivae normal    Pulmonary:      Effort: Pulmonary effort is normal  No respiratory distress  Skin:     General: Skin is dry  Coloration: Skin is not pale  Neurological:      Mental Status: She is alert and oriented to person, place, and time  Psychiatric:         Behavior: Behavior normal           I spent 12 minutes directly with the patient during this visit      Lavon Fabian acknowledges that she has consented to an online visit or consultation  She understands that the online visit is based solely on information provided by her, and that, in the absence of a face-to-face physical evaluation by the physician, the diagnosis she receives is both limited and provisional in terms of accuracy and completeness  This is not intended to replace a full medical face-to-face evaluation by the physician  Jannette Baugh understands and accepts these terms

## 2020-10-01 ENCOUNTER — SOCIAL WORK (OUTPATIENT)
Dept: BEHAVIORAL/MENTAL HEALTH CLINIC | Facility: CLINIC | Age: 40
End: 2020-10-01
Payer: COMMERCIAL

## 2020-10-01 DIAGNOSIS — F41.8 DEPRESSION WITH ANXIETY: Primary | ICD-10-CM

## 2020-10-01 PROCEDURE — 90834 PSYTX W PT 45 MINUTES: CPT | Performed by: SOCIAL WORKER

## 2020-10-07 ENCOUNTER — TELEMEDICINE (OUTPATIENT)
Dept: PSYCHIATRY | Facility: CLINIC | Age: 40
End: 2020-10-07
Payer: COMMERCIAL

## 2020-10-07 DIAGNOSIS — F41.8 DEPRESSION WITH ANXIETY: Primary | ICD-10-CM

## 2020-10-07 PROCEDURE — 99214 OFFICE O/P EST MOD 30 MIN: CPT | Performed by: HOSPITALIST

## 2020-10-07 PROCEDURE — 1036F TOBACCO NON-USER: CPT | Performed by: HOSPITALIST

## 2020-10-07 RX ORDER — VENLAFAXINE HYDROCHLORIDE 150 MG/1
150 CAPSULE, EXTENDED RELEASE ORAL DAILY
Qty: 90 CAPSULE | Refills: 0 | Status: SHIPPED | OUTPATIENT
Start: 2020-10-07 | End: 2021-01-04 | Stop reason: SDUPTHER

## 2020-10-07 RX ORDER — VENLAFAXINE HYDROCHLORIDE 37.5 MG/1
37.5 CAPSULE, EXTENDED RELEASE ORAL DAILY
Qty: 90 CAPSULE | Refills: 0 | Status: SHIPPED | OUTPATIENT
Start: 2020-10-07 | End: 2021-01-04 | Stop reason: SDUPTHER

## 2020-10-10 ENCOUNTER — TELEPHONE (OUTPATIENT)
Dept: GASTROENTEROLOGY | Facility: AMBULARY SURGERY CENTER | Age: 40
End: 2020-10-10

## 2020-10-14 ENCOUNTER — SOCIAL WORK (OUTPATIENT)
Dept: BEHAVIORAL/MENTAL HEALTH CLINIC | Facility: CLINIC | Age: 40
End: 2020-10-14
Payer: COMMERCIAL

## 2020-10-14 DIAGNOSIS — F41.8 DEPRESSION WITH ANXIETY: Primary | ICD-10-CM

## 2020-10-14 PROCEDURE — 90834 PSYTX W PT 45 MINUTES: CPT | Performed by: SOCIAL WORKER

## 2020-10-21 ENCOUNTER — TELEMEDICINE (OUTPATIENT)
Dept: FAMILY MEDICINE CLINIC | Facility: CLINIC | Age: 40
End: 2020-10-21
Payer: COMMERCIAL

## 2020-10-21 DIAGNOSIS — G43.109 MIGRAINE WITH AURA AND WITHOUT STATUS MIGRAINOSUS, NOT INTRACTABLE: ICD-10-CM

## 2020-10-21 DIAGNOSIS — M25.561 ACUTE PAIN OF RIGHT KNEE: ICD-10-CM

## 2020-10-21 DIAGNOSIS — R51.9 CHRONIC HEADACHE WITH NEW FEATURES: Primary | ICD-10-CM

## 2020-10-21 DIAGNOSIS — G89.29 CHRONIC HEADACHE WITH NEW FEATURES: Primary | ICD-10-CM

## 2020-10-21 PROCEDURE — 99214 OFFICE O/P EST MOD 30 MIN: CPT | Performed by: FAMILY MEDICINE

## 2020-10-21 RX ORDER — RIZATRIPTAN BENZOATE 10 MG/1
10 TABLET ORAL ONCE AS NEEDED
Qty: 9 TABLET | Refills: 0 | Status: SHIPPED | OUTPATIENT
Start: 2020-10-21 | End: 2021-01-18

## 2020-10-21 RX ORDER — ALPRAZOLAM 0.25 MG/1
0.25 TABLET ORAL ONCE AS NEEDED
Qty: 2 TABLET | Refills: 0 | Status: SHIPPED | OUTPATIENT
Start: 2020-10-21 | End: 2021-01-18

## 2020-10-22 ENCOUNTER — TELEPHONE (OUTPATIENT)
Dept: FAMILY MEDICINE CLINIC | Facility: CLINIC | Age: 40
End: 2020-10-22

## 2020-10-26 ENCOUNTER — SOCIAL WORK (OUTPATIENT)
Dept: BEHAVIORAL/MENTAL HEALTH CLINIC | Facility: CLINIC | Age: 40
End: 2020-10-26
Payer: COMMERCIAL

## 2020-10-26 DIAGNOSIS — F41.8 DEPRESSION WITH ANXIETY: Primary | ICD-10-CM

## 2020-10-26 PROCEDURE — 90834 PSYTX W PT 45 MINUTES: CPT | Performed by: SOCIAL WORKER

## 2020-11-06 ENCOUNTER — OFFICE VISIT (OUTPATIENT)
Dept: FAMILY MEDICINE CLINIC | Facility: CLINIC | Age: 40
End: 2020-11-06
Payer: COMMERCIAL

## 2020-11-06 VITALS
BODY MASS INDEX: 45.99 KG/M2 | SYSTOLIC BLOOD PRESSURE: 128 MMHG | TEMPERATURE: 99 F | WEIGHT: 293 LBS | HEART RATE: 96 BPM | HEIGHT: 67 IN | DIASTOLIC BLOOD PRESSURE: 84 MMHG | OXYGEN SATURATION: 99 %

## 2020-11-06 DIAGNOSIS — J45.20 MILD INTERMITTENT ASTHMA, UNSPECIFIED WHETHER COMPLICATED: ICD-10-CM

## 2020-11-06 DIAGNOSIS — E66.01 MORBID OBESITY (HCC): ICD-10-CM

## 2020-11-06 DIAGNOSIS — R21 RASH: Primary | ICD-10-CM

## 2020-11-06 DIAGNOSIS — Z01.818 PRE-PROCEDURAL GENERAL PHYSICAL EXAMINATION: Primary | ICD-10-CM

## 2020-11-06 DIAGNOSIS — I10 ESSENTIAL HYPERTENSION: ICD-10-CM

## 2020-11-06 DIAGNOSIS — Z86.2 HISTORY OF THROMBOCYTOPENIA: ICD-10-CM

## 2020-11-06 DIAGNOSIS — Z23 NEED FOR INFLUENZA VACCINATION: ICD-10-CM

## 2020-11-06 DIAGNOSIS — G47.33 OBSTRUCTIVE SLEEP APNEA: ICD-10-CM

## 2020-11-06 DIAGNOSIS — I45.10 RBBB (RIGHT BUNDLE BRANCH BLOCK): ICD-10-CM

## 2020-11-06 DIAGNOSIS — R73.01 IMPAIRED FASTING GLUCOSE: ICD-10-CM

## 2020-11-06 PROBLEM — K60.3 ANAL FISTULA: Status: RESOLVED | Noted: 2018-11-30 | Resolved: 2020-11-06

## 2020-11-06 PROBLEM — Z86.19 HISTORY OF SEPSIS: Status: ACTIVE | Noted: 2020-07-12

## 2020-11-06 PROBLEM — K62.5 BRIGHT RED BLOOD PER RECTUM: Status: RESOLVED | Noted: 2017-07-13 | Resolved: 2020-11-06

## 2020-11-06 PROBLEM — K62.89 ANAL PAIN: Status: RESOLVED | Noted: 2018-10-23 | Resolved: 2020-11-06

## 2020-11-06 PROBLEM — K60.30 ANAL FISTULA: Status: RESOLVED | Noted: 2018-11-30 | Resolved: 2020-11-06

## 2020-11-06 PROBLEM — K60.2 ANAL FISSURE, UNSPECIFIED: Status: RESOLVED | Noted: 2018-10-23 | Resolved: 2020-11-06

## 2020-11-06 PROCEDURE — 90682 RIV4 VACC RECOMBINANT DNA IM: CPT

## 2020-11-06 PROCEDURE — 3079F DIAST BP 80-89 MM HG: CPT | Performed by: FAMILY MEDICINE

## 2020-11-06 PROCEDURE — 3074F SYST BP LT 130 MM HG: CPT | Performed by: FAMILY MEDICINE

## 2020-11-06 PROCEDURE — 1036F TOBACCO NON-USER: CPT | Performed by: FAMILY MEDICINE

## 2020-11-06 PROCEDURE — 99243 OFF/OP CNSLTJ NEW/EST LOW 30: CPT | Performed by: FAMILY MEDICINE

## 2020-11-06 PROCEDURE — 3008F BODY MASS INDEX DOCD: CPT | Performed by: FAMILY MEDICINE

## 2020-11-06 PROCEDURE — 90471 IMMUNIZATION ADMIN: CPT

## 2020-11-06 RX ORDER — NYSTATIN 100000 U/G
CREAM TOPICAL 2 TIMES DAILY PRN
Qty: 30 G | Refills: 0 | Status: SHIPPED | OUTPATIENT
Start: 2020-11-06 | End: 2022-07-06 | Stop reason: SDUPTHER

## 2020-11-07 ENCOUNTER — APPOINTMENT (OUTPATIENT)
Dept: LAB | Facility: CLINIC | Age: 40
End: 2020-11-07
Payer: COMMERCIAL

## 2020-11-07 ENCOUNTER — CLINICAL SUPPORT (OUTPATIENT)
Dept: URGENT CARE | Facility: CLINIC | Age: 40
End: 2020-11-07
Payer: COMMERCIAL

## 2020-11-07 DIAGNOSIS — Z86.2 HISTORY OF THROMBOCYTOPENIA: ICD-10-CM

## 2020-11-07 DIAGNOSIS — R94.31 ABNORMAL EKG: Primary | ICD-10-CM

## 2020-11-07 DIAGNOSIS — I10 ESSENTIAL HYPERTENSION: ICD-10-CM

## 2020-11-07 DIAGNOSIS — R73.01 IMPAIRED FASTING GLUCOSE: ICD-10-CM

## 2020-11-07 DIAGNOSIS — Z01.818 PRE-PROCEDURAL GENERAL PHYSICAL EXAMINATION: ICD-10-CM

## 2020-11-07 DIAGNOSIS — I45.10 RBBB (RIGHT BUNDLE BRANCH BLOCK): ICD-10-CM

## 2020-11-07 LAB
ALBUMIN SERPL BCP-MCNC: 3.9 G/DL (ref 3.5–5)
ALP SERPL-CCNC: 81 U/L (ref 46–116)
ALT SERPL W P-5'-P-CCNC: 77 U/L (ref 12–78)
ANION GAP SERPL CALCULATED.3IONS-SCNC: 4 MMOL/L (ref 4–13)
AST SERPL W P-5'-P-CCNC: 34 U/L (ref 5–45)
BASOPHILS # BLD AUTO: 0.06 THOUSANDS/ΜL (ref 0–0.1)
BASOPHILS NFR BLD AUTO: 1 % (ref 0–1)
BILIRUB SERPL-MCNC: 0.35 MG/DL (ref 0.2–1)
BUN SERPL-MCNC: 14 MG/DL (ref 5–25)
CALCIUM SERPL-MCNC: 9.3 MG/DL (ref 8.3–10.1)
CHLORIDE SERPL-SCNC: 109 MMOL/L (ref 100–108)
CO2 SERPL-SCNC: 29 MMOL/L (ref 21–32)
CREAT SERPL-MCNC: 0.61 MG/DL (ref 0.6–1.3)
EOSINOPHIL # BLD AUTO: 0.2 THOUSAND/ΜL (ref 0–0.61)
EOSINOPHIL NFR BLD AUTO: 2 % (ref 0–6)
ERYTHROCYTE [DISTWIDTH] IN BLOOD BY AUTOMATED COUNT: 13.5 % (ref 11.6–15.1)
EST. AVERAGE GLUCOSE BLD GHB EST-MCNC: 114 MG/DL
GFR SERPL CREATININE-BSD FRML MDRD: 114 ML/MIN/1.73SQ M
GLUCOSE P FAST SERPL-MCNC: 128 MG/DL (ref 65–99)
HBA1C MFR BLD: 5.6 %
HCT VFR BLD AUTO: 44.9 % (ref 34.8–46.1)
HGB BLD-MCNC: 15.3 G/DL (ref 11.5–15.4)
IMM GRANULOCYTES # BLD AUTO: 0.03 THOUSAND/UL (ref 0–0.2)
IMM GRANULOCYTES NFR BLD AUTO: 0 % (ref 0–2)
LYMPHOCYTES # BLD AUTO: 1.95 THOUSANDS/ΜL (ref 0.6–4.47)
LYMPHOCYTES NFR BLD AUTO: 24 % (ref 14–44)
MCH RBC QN AUTO: 29.9 PG (ref 26.8–34.3)
MCHC RBC AUTO-ENTMCNC: 34.1 G/DL (ref 31.4–37.4)
MCV RBC AUTO: 88 FL (ref 82–98)
MONOCYTES # BLD AUTO: 0.49 THOUSAND/ΜL (ref 0.17–1.22)
MONOCYTES NFR BLD AUTO: 6 % (ref 4–12)
NEUTROPHILS # BLD AUTO: 5.45 THOUSANDS/ΜL (ref 1.85–7.62)
NEUTS SEG NFR BLD AUTO: 67 % (ref 43–75)
NRBC BLD AUTO-RTO: 0 /100 WBCS
PLATELET # BLD AUTO: 189 THOUSANDS/UL (ref 149–390)
PMV BLD AUTO: 10.9 FL (ref 8.9–12.7)
POTASSIUM SERPL-SCNC: 4 MMOL/L (ref 3.5–5.3)
PROT SERPL-MCNC: 7.1 G/DL (ref 6.4–8.2)
RBC # BLD AUTO: 5.11 MILLION/UL (ref 3.81–5.12)
SODIUM SERPL-SCNC: 142 MMOL/L (ref 136–145)
WBC # BLD AUTO: 8.18 THOUSAND/UL (ref 4.31–10.16)

## 2020-11-07 PROCEDURE — 83036 HEMOGLOBIN GLYCOSYLATED A1C: CPT

## 2020-11-07 PROCEDURE — 80053 COMPREHEN METABOLIC PANEL: CPT

## 2020-11-07 PROCEDURE — 85025 COMPLETE CBC W/AUTO DIFF WBC: CPT

## 2020-11-07 PROCEDURE — 93005 ELECTROCARDIOGRAM TRACING: CPT

## 2020-11-07 PROCEDURE — 36415 COLL VENOUS BLD VENIPUNCTURE: CPT

## 2020-11-09 ENCOUNTER — SOCIAL WORK (OUTPATIENT)
Dept: BEHAVIORAL/MENTAL HEALTH CLINIC | Facility: CLINIC | Age: 40
End: 2020-11-09
Payer: COMMERCIAL

## 2020-11-09 DIAGNOSIS — F41.8 DEPRESSION WITH ANXIETY: Primary | ICD-10-CM

## 2020-11-09 LAB
ATRIAL RATE: 88 BPM
P AXIS: 37 DEGREES
PR INTERVAL: 140 MS
QRS AXIS: -34 DEGREES
QRSD INTERVAL: 122 MS
QT INTERVAL: 398 MS
QTC INTERVAL: 481 MS
T WAVE AXIS: 26 DEGREES
VENTRICULAR RATE: 88 BPM

## 2020-11-09 PROCEDURE — 93010 ELECTROCARDIOGRAM REPORT: CPT | Performed by: INTERNAL MEDICINE

## 2020-11-09 PROCEDURE — 90834 PSYTX W PT 45 MINUTES: CPT | Performed by: SOCIAL WORKER

## 2020-11-12 ENCOUNTER — TELEPHONE (OUTPATIENT)
Dept: FAMILY MEDICINE CLINIC | Facility: CLINIC | Age: 40
End: 2020-11-12

## 2020-11-16 ENCOUNTER — TELEPHONE (OUTPATIENT)
Dept: FAMILY MEDICINE CLINIC | Facility: CLINIC | Age: 40
End: 2020-11-16

## 2020-11-16 DIAGNOSIS — G89.29 CHRONIC HEADACHE WITH NEW FEATURES: Primary | ICD-10-CM

## 2020-11-16 DIAGNOSIS — R51.9 CHRONIC HEADACHE WITH NEW FEATURES: Primary | ICD-10-CM

## 2020-11-16 DIAGNOSIS — I10 HYPERTENSION, UNSPECIFIED TYPE: ICD-10-CM

## 2020-11-16 RX ORDER — METOPROLOL SUCCINATE 50 MG/1
50 TABLET, EXTENDED RELEASE ORAL DAILY
Qty: 90 TABLET | Refills: 1 | Status: SHIPPED | OUTPATIENT
Start: 2020-11-16 | End: 2021-05-24 | Stop reason: SDUPTHER

## 2020-11-23 ENCOUNTER — SOCIAL WORK (OUTPATIENT)
Dept: BEHAVIORAL/MENTAL HEALTH CLINIC | Facility: CLINIC | Age: 40
End: 2020-11-23
Payer: COMMERCIAL

## 2020-11-23 DIAGNOSIS — F41.8 DEPRESSION WITH ANXIETY: Primary | ICD-10-CM

## 2020-11-23 PROCEDURE — 90834 PSYTX W PT 45 MINUTES: CPT | Performed by: SOCIAL WORKER

## 2020-12-03 ENCOUNTER — SOCIAL WORK (OUTPATIENT)
Dept: BEHAVIORAL/MENTAL HEALTH CLINIC | Facility: CLINIC | Age: 40
End: 2020-12-03
Payer: COMMERCIAL

## 2020-12-03 DIAGNOSIS — F41.8 DEPRESSION WITH ANXIETY: Primary | ICD-10-CM

## 2020-12-03 PROCEDURE — 90834 PSYTX W PT 45 MINUTES: CPT | Performed by: SOCIAL WORKER

## 2020-12-08 DIAGNOSIS — K21.9 GASTROESOPHAGEAL REFLUX DISEASE WITHOUT ESOPHAGITIS: ICD-10-CM

## 2020-12-10 ENCOUNTER — TELEMEDICINE (OUTPATIENT)
Dept: BEHAVIORAL/MENTAL HEALTH CLINIC | Facility: CLINIC | Age: 40
End: 2020-12-10
Payer: COMMERCIAL

## 2020-12-10 DIAGNOSIS — F41.8 DEPRESSION WITH ANXIETY: Primary | ICD-10-CM

## 2020-12-10 PROCEDURE — 90834 PSYTX W PT 45 MINUTES: CPT | Performed by: SOCIAL WORKER

## 2020-12-14 ENCOUNTER — PATIENT MESSAGE (OUTPATIENT)
Dept: FAMILY MEDICINE CLINIC | Facility: CLINIC | Age: 40
End: 2020-12-14

## 2020-12-15 ENCOUNTER — SOCIAL WORK (OUTPATIENT)
Dept: BEHAVIORAL/MENTAL HEALTH CLINIC | Facility: CLINIC | Age: 40
End: 2020-12-15
Payer: COMMERCIAL

## 2020-12-15 DIAGNOSIS — F41.8 DEPRESSION WITH ANXIETY: Primary | ICD-10-CM

## 2020-12-15 PROCEDURE — 90834 PSYTX W PT 45 MINUTES: CPT | Performed by: SOCIAL WORKER

## 2021-01-04 DIAGNOSIS — F41.8 DEPRESSION WITH ANXIETY: ICD-10-CM

## 2021-01-04 RX ORDER — VENLAFAXINE HYDROCHLORIDE 150 MG/1
150 CAPSULE, EXTENDED RELEASE ORAL DAILY
Qty: 90 CAPSULE | Refills: 0 | Status: SHIPPED | OUTPATIENT
Start: 2021-01-04 | End: 2021-02-18 | Stop reason: SDUPTHER

## 2021-01-04 RX ORDER — VENLAFAXINE HYDROCHLORIDE 37.5 MG/1
37.5 CAPSULE, EXTENDED RELEASE ORAL DAILY
Qty: 90 CAPSULE | Refills: 0 | Status: SHIPPED | OUTPATIENT
Start: 2021-01-04 | End: 2021-02-18 | Stop reason: SDUPTHER

## 2021-01-08 ENCOUNTER — OFFICE VISIT (OUTPATIENT)
Dept: FAMILY MEDICINE CLINIC | Facility: CLINIC | Age: 41
End: 2021-01-08
Payer: COMMERCIAL

## 2021-01-08 VITALS
HEART RATE: 98 BPM | HEIGHT: 67 IN | BODY MASS INDEX: 45.99 KG/M2 | TEMPERATURE: 97.9 F | SYSTOLIC BLOOD PRESSURE: 122 MMHG | DIASTOLIC BLOOD PRESSURE: 88 MMHG | WEIGHT: 293 LBS | OXYGEN SATURATION: 100 %

## 2021-01-08 DIAGNOSIS — Z01.818 PRE-PROCEDURAL EXAMINATION: Primary | ICD-10-CM

## 2021-01-08 PROCEDURE — 99243 OFF/OP CNSLTJ NEW/EST LOW 30: CPT | Performed by: PHYSICIAN ASSISTANT

## 2021-01-08 NOTE — PROGRESS NOTES
Jannette Baugh 36 y o  female   Date:  1/8/2021      Assessment and Plan:    Jannette was seen today for follow-up  Diagnoses and all orders for this visit:    Encounter for respiratory clearance examination                 HPI:  Chief Complaint   Patient presents with    Follow-up     clearance for anesthesia to have head MRI on 1/21/2021     HPI     She requires clearance for MRI       ROS: Review of Systems    Past Medical History:   Diagnosis Date    Allergic rhinitis     Anxiety     Arthritis     Chronic pain disorder     BACK SHOULDERS NECK    Depression     Disease of thyroid gland     nodule    Fibromyalgia, primary     GERD (gastroesophageal reflux disease)     Hearing difficulty of right ear     Hyperlipidemia     Hypertension     Irritable bowel syndrome     Perforation of tympanic membrane     Right    PONV (postoperative nausea and vomiting)     RBBB     Right bundle branch blockage     Sleep apnea     no cpap    Wears glasses      Patient Active Problem List   Diagnosis    Thyroid nodule    Trigger finger, right middle finger    Carpal tunnel syndrome, bilateral    Asthma, mild intermittent    Essential hypertension    Breast hypertrophy    Bulging lumbar disc    Calcific tendinitis of shoulder    Chronic low back pain    Chronic right-sided low back pain without sciatica    Chronic sinusitis    Depression with anxiety    Dermatitis, eczematoid    Disorder of bursae of shoulder region    Esophageal reflux    Fibromyalgia    H/O laminectomy    Hemorrhoids    Hyperlipidemia    Mammographic microcalcification    Morbid obesity    Obstructive sleep apnea    Pars defect of lumbar spine    RBBB (right bundle branch block)    Spondylolysis of lumbar region    Vitamin D insufficiency    Atypical chest pain    Sebaceous cyst of breast, left    Left ear pain    Left cervical lymphadenopathy    Eliz-Danlos syndrome    Preop cardiovascular exam    History of sepsis    Otitis externa/media    Asthma    High triglycerides    Chronic headache with new features    Impaired fasting glucose       Past Surgical History:   Procedure Laterality Date    ANAL FISTULOTOMY N/A 2018    Procedure: FISTULOTOMY;  Surgeon: Roberta Urbina MD;  [facility];  Service: Colorectal    BACK SURGERY      lumbar herniated disc    BREAST CYST EXCISION Left 13 yrs ago  benign    BREAST SURGERY Left 2006    cyst removal    CARPAL TUNNEL RELEASE       SECTION      CHOLECYSTECTOMY      COLONOSCOPY      CYST REMOVAL      DENTAL SURGERY      INCISION AND DRAINAGE OF WOUND N/A 2018    Procedure: INCISION AND DRAINAGE (I&D) BUTTOCK;  Surgeon: Roberta Urbina MD;  [facility];  Service: Colorectal    MYRINGOTOMY W/ TUBES Right 2015    [facility]    [place] COLONOSCOPY FLX DX W/COLLJ SPEC WHEN PFRMD N/A 2017    Procedure: COLONOSCOPY;  Surgeon: Juan Ramon Grey MD;  [facility]; Service: Gastroenterology    [place] INCISE FINGER TENDON SHEATH Right 3/13/2018    Procedure: LONG TRIGGER FINGER RELEASE;  Surgeon: Aaron Hull DO;  [facility];  Service: Orthopedics    [place] SURG DIAGNOSTIC EXAM, ANORECTAL N/A 2018    Procedure: EXAM UNDER ANESTHESIA (EUA); Surgeon: Roberta Urbina MD;  [facility];  Service: Colorectal    [place] TYMPANOPLASTY Right 2017    Procedure: TYMPANOPLASTY WITH PERICHONDRIN GRAFT;  Surgeon:  Abelardo Ann DO;  [facility];  Service: ENT    SHOULDER SURGERY Right     ULNAR NERVE TRANSPOSITION Left     WISDOM TOOTH EXTRACTION         Social History     Socioeconomic History    Marital status: /Civil Union     Spouse name: None    Number of children: None    Years of education: None    Highest education level: None   Occupational History    None   Social Needs    Financial resource strain: None    Food insecurity     Worry: None     Inability: None    Transportation needs     Medical: No     Non-medical: No   Tobacco Use    Smoking status: Never Smoker    Smokeless tobacco: Never Used   Substance and Sexual Activity    Alcohol use: Not Currently     Comment: social    Drug use: No    Sexual activity: Yes     Partners: Male     Birth control/protection: Injection     Comment: per ricky   Lifestyle    Physical activity     Days per week: 2 days     Minutes per session: None    Stress: None   Relationships    Social connections     Talks on phone: None     Gets together: None     Attends Hindu service: None     Active member of club or organization: None     Attends meetings of clubs or organizations: None     Relationship status: None    Intimate partner violence     Fear of current or ex partner: None     Emotionally abused: None     Physically abused: None     Forced sexual activity: None   Other Topics Concern    None   Social History Narrative    Daily caffeine use- 1 cup of coffee       Family History   Problem Relation Age of Onset    Hypertension Mother     Hyperlipidemia Mother     Diabetes Mother     Hypertension Father     Hyperlipidemia Father     Heart disease Father         cardiac disorder-myocardial infarction arrhythmias    Diabetes unspecified Maternal Grandmother     Crohn's disease Brother     Arthritis Brother     Thyroid disease Paternal Grandmother     No Known Problems Daughter     No Known Problems Maternal Aunt     No Known Problems Maternal Aunt     No Known Problems Maternal Aunt     No Known Problems Maternal Aunt     No Known Problems Paternal Aunt     No Known Problems Paternal Aunt        Allergies   Allergen Reactions    Amoxicillin Swelling, Anaphylaxis and Angioedema    Neurontin [Gabapentin] Other (See Comments)     Other reaction(s): SUICIDE IDEATION  Other reaction(s):  Other (See Comments)  suicidal  suicidal    Penicillin V Angioedema and Swelling     Other reaction(s): Throat closure    Penicillins Swelling and Anaphylaxis    Aripiprazole Other (See Comments)     Other reaction(s): low dose 2 mg ; curving in and locking in of hands/dystonia  Other reaction(s): Other (See Comments)  Other reaction(s): low dose 2 mg ; curving in and locking in of hands/dystonia    Lorazepam Other (See Comments)     Other reaction(s): higher dose > anxiety and depression  Other reaction(s):  Other (See Comments)  Other reaction(s): higher dose > anxiety and depression    Carbamazepine Other (See Comments)     Other reaction(s): Unknown Reaction    Doxycycline Hives    Lamotrigine Rash    Other Hives     Adhesive tape         Current Outpatient Medications:     albuterol (PROVENTIL HFA,VENTOLIN HFA) 90 mcg/act inhaler, Inhale 2 puffs every 6 (six) hours as needed for shortness of breath (cough), Disp: 1 Inhaler, Rfl: 0    ALPRAZolam (XANAX) 0 25 mg tablet, Take 1 tablet (0 25 mg total) by mouth once as needed for anxiety (prior to procedure) for up to 2 doses (may take the additional 0 25mg tablet if necessary), Disp: 2 tablet, Rfl: 0    amitriptyline (ELAVIL) 100 mg tablet, Take 1 tablet (100 mg total) by mouth daily at bedtime, Disp: 90 tablet, Rfl: 1    Ascorbic Acid (VITAMIN C) 500 MG CAPS, Take 1 capsule by mouth daily, Disp: , Rfl:     atorvastatin (LIPITOR) 40 mg tablet, Take 1 tablet (40 mg total) by mouth daily, Disp: 90 tablet, Rfl: 1    cetirizine (ZyrTEC) 10 mg tablet, Take 10 mg by mouth daily, Disp: , Rfl:     Cholecalciferol (VITAMIN D3) 5000 units CAPS, Take 1 capsule by mouth daily  , Disp: , Rfl:     ciprofloxacin-dexamethasone (CIPRODEX) otic suspension, Administer 4 drops to the right ear 2 (two) times a day, Disp: 7 5 mL, Rfl: 0    cyanocobalamin (VITAMIN B-12) 1,000 mcg tablet, Take 3 tablets by mouth 2 (two) times a day, Disp: , Rfl:     esomeprazole (NexIUM 24HR) 20 mg capsule, Take 1 capsule (20 mg total) by mouth every morning, Disp: 30 capsule, Rfl: 3    levonorgestrel (MIRENA) 20 MCG/24HR IUD, 1 each by Intrauterine route once, Disp: , Rfl:     metoprolol succinate (TOPROL-XL) 50 mg 24 hr tablet, Take 1 tablet (50 mg total) by mouth daily, Disp: 90 tablet, Rfl: 1    Multiple Vitamins-Minerals (CENTRUM ADULTS PO), Take 1 tablet by mouth daily, Disp: , Rfl:     nystatin (MYCOSTATIN) cream, Apply topically 2 (two) times a day as needed (rash), Disp: 30 g, Rfl: 0    venlafaxine (EFFEXOR-XR) 150 mg 24 hr capsule, Take 1 capsule (150 mg total) by mouth daily Take with 37 5 mg tablet for total 187 5mg, Disp: 90 capsule, Rfl: 0    venlafaxine (EFFEXOR-XR) 37 5 mg 24 hr capsule, Take 1 capsule (37 5 mg total) by mouth daily Take with 150 mg tablet for total 187 5mg, Disp: 90 capsule, Rfl: 0    rizatriptan (MAXALT) 10 MG tablet, Take 1 tablet (10 mg total) by mouth once as needed for migraine for up to 1 dose May repeat in 2 hours if needed (Patient not taking: Reported on 1/8/2021), Disp: 9 tablet, Rfl: 0      Physical Exam:  /88 (BP Location: Left arm, Patient Position: Sitting, Cuff Size: Large)   Pulse 98   Temp 97 9 °F (36 6 °C) (Tympanic)   Ht 5' 7" (1 702 m)   Wt 135 kg (298 lb)   SpO2 100%   BMI 46 67 kg/m²     Physical Exam      Labs:  Lab Results   Component Value Date    WBC 8 18 11/07/2020    HGB 15 3 11/07/2020    HCT 44 9 11/07/2020    MCV 88 11/07/2020     11/07/2020     Lab Results   Component Value Date     04/05/2017    K 4 0 11/07/2020     (H) 11/07/2020    CO2 29 11/07/2020    ANIONGAP 7 09/19/2014    BUN 14 11/07/2020    CREATININE 0 61 11/07/2020    GLUCOSE 96 04/05/2017    GLUF 128 (H) 11/07/2020    CALCIUM 9 3 11/07/2020    AST 34 11/07/2020    ALT 77 11/07/2020    ALKPHOS 81 11/07/2020    PROT 6 7 04/05/2017    BILITOT 0 6 04/05/2017    EGFR 114 11/07/2020

## 2021-01-08 NOTE — PROGRESS NOTES
PRE-OPERATIVE EVALUATION  FAMILY PRACTICE AT [id number]    NAME: Jannette Baugh  AGE: 36 y o  SEX: female  : 1980     DATE: 2021    Internal Medicine Pre-Operative Evaluation      Chief Complaint: Pre-operative Evaluation     Surgery: needs MRI to be done under anesthesia  Anticipated Date of Surgery: 21  Referring Provider: Dr Herberth Del Rosario     History of Present Illness:     Wendy Wolf is a 36 y o  female who presents to the office today for a preoperative consultation due to need for general anesthesia to obtain MRI brain  Planned anesthesia is IV sedation  Patient has a bleeding risk of: no recent abnormal bleeding, no remote history of abnormal bleeding and no use of Ca-channel blockers    Current anti-platelet/anti-coagulation medications that the patient is prescribed includes: none     had a full cardiac clearance visit earlier this year prior to orthopedic surgery, and was cleared, has had no interval cardioresp problems or complaints    Assessment of Chronic Conditions:   - Asthma: stable/controlled  - Hypertension: controlled  - MARCELINO, morbid obesity, and elevated fasting glucose     Assessment of Cardiac Risk:  · Denies unstable or severe angina or MI in the last 6 weeks or history of stent placement in the last year   · Denies decompensated heart failure (e g  New onset heart failure, NYHA functional class IV heart failure, or worsening existing heart failure)  · Denies significant arrhythmias such as high grade AV block, symptomatic ventricular arrhythmia, newly recognized ventricular tachycardia, supraventricular tachycardia with resting heart rate >100, or symptomatic bradycardia  · Denies severe heart valve disease including aortic stenosis or symptomatic mitral stenosis     Exercise Capacity:  · Able to walk 4 blocks without symptoms?: yes  · Able to walk 2 flights without symptoms?: Yes    Prior Anesthesia Reactions: she denies prior issue with sedation     Personal history of venous thromboembolic disease? No    History of steroid use for >2 weeks within last year? No    STOP-BANG Sleep Apnea Screening Questionnaire:  Has MARCELINO       Review of Systems:     Review of Systems   Constitutional: Negative for appetite change, diaphoresis and fever  Respiratory: Negative for cough, shortness of breath and wheezing  Cardiovascular: Negative for chest pain, palpitations and leg swelling  Gastrointestinal: Negative  Genitourinary: Negative  Skin: Negative  Neurological: Positive for headaches  Negative for syncope and weakness  Current Problem List:     Patient Active Problem List   Diagnosis    Thyroid nodule    Trigger finger, right middle finger    Carpal tunnel syndrome, bilateral    Asthma, mild intermittent    Essential hypertension    Breast hypertrophy    Bulging lumbar disc    Calcific tendinitis of shoulder    Chronic low back pain    Chronic right-sided low back pain without sciatica    Chronic sinusitis    Depression with anxiety    Dermatitis, eczematoid    Disorder of bursae of shoulder region    Esophageal reflux    Fibromyalgia    H/O laminectomy    Hemorrhoids    Hyperlipidemia    Mammographic microcalcification    Morbid obesity    Obstructive sleep apnea    Pars defect of lumbar spine    RBBB (right bundle branch block)    Spondylolysis of lumbar region    Vitamin D insufficiency    Atypical chest pain    Sebaceous cyst of breast, left    Left ear pain    Left cervical lymphadenopathy    Eliz-Danlos syndrome    Preop cardiovascular exam    History of sepsis    Otitis externa/media    Asthma    High triglycerides    Chronic headache with new features    Impaired fasting glucose       Allergies: Allergies   Allergen Reactions    Amoxicillin Swelling, Anaphylaxis and Angioedema    Neurontin [Gabapentin] Other (See Comments)     Other reaction(s): SUICIDE IDEATION  Other reaction(s):  Other (See Comments)  suicidal  suicidal    Penicillin V Angioedema and Swelling     Other reaction(s): Throat closure    Penicillins Swelling and Anaphylaxis    Aripiprazole Other (See Comments)     Other reaction(s): low dose 2 mg ; curving in and locking in of hands/dystonia  Other reaction(s): Other (See Comments)  Other reaction(s): low dose 2 mg ; curving in and locking in of hands/dystonia    Lorazepam Other (See Comments)     Other reaction(s): higher dose > anxiety and depression  Other reaction(s):  Other (See Comments)  Other reaction(s): higher dose > anxiety and depression    Carbamazepine Other (See Comments)     Other reaction(s): Unknown Reaction    Doxycycline Hives    Lamotrigine Rash    Other Hives     Adhesive tape       Physical Exam:     Vitals:    01/08/21 1505   BP: 122/88   BP Location: Left arm   Patient Position: Sitting   Cuff Size: Large   Pulse: 98   Temp: 97 9 °F (36 6 °C)   TempSrc: Tympanic   SpO2: 100%   Weight: 135 kg (298 lb)   Height: 5' 7" (1 702 m)           Current Outpatient Medications:     albuterol (PROVENTIL HFA,VENTOLIN HFA) 90 mcg/act inhaler, Inhale 2 puffs every 6 (six) hours as needed for shortness of breath (cough), Disp: 1 Inhaler, Rfl: 0    ALPRAZolam (XANAX) 0 25 mg tablet, Take 1 tablet (0 25 mg total) by mouth once as needed for anxiety (prior to procedure) for up to 2 doses (may take the additional 0 25mg tablet if necessary), Disp: 2 tablet, Rfl: 0    amitriptyline (ELAVIL) 100 mg tablet, Take 1 tablet (100 mg total) by mouth daily at bedtime, Disp: 90 tablet, Rfl: 1    Ascorbic Acid (VITAMIN C) 500 MG CAPS, Take 1 capsule by mouth daily, Disp: , Rfl:     atorvastatin (LIPITOR) 40 mg tablet, Take 1 tablet (40 mg total) by mouth daily, Disp: 90 tablet, Rfl: 1    cetirizine (ZyrTEC) 10 mg tablet, Take 10 mg by mouth daily, Disp: , Rfl:     Cholecalciferol (VITAMIN D3) 5000 units CAPS, Take 1 capsule by mouth daily  , Disp: , Rfl:    ciprofloxacin-dexamethasone (CIPRODEX) otic suspension, Administer 4 drops to the right ear 2 (two) times a day, Disp: 7 5 mL, Rfl: 0    cyanocobalamin (VITAMIN B-12) 1,000 mcg tablet, Take 3 tablets by mouth 2 (two) times a day, Disp: , Rfl:     esomeprazole (NexIUM 24HR) 20 mg capsule, Take 1 capsule (20 mg total) by mouth every morning, Disp: 30 capsule, Rfl: 3    levonorgestrel (MIRENA) 20 MCG/24HR IUD, 1 each by Intrauterine route once, Disp: , Rfl:     metoprolol succinate (TOPROL-XL) 50 mg 24 hr tablet, Take 1 tablet (50 mg total) by mouth daily, Disp: 90 tablet, Rfl: 1    Multiple Vitamins-Minerals (CENTRUM ADULTS PO), Take 1 tablet by mouth daily, Disp: , Rfl:     nystatin (MYCOSTATIN) cream, Apply topically 2 (two) times a day as needed (rash), Disp: 30 g, Rfl: 0    venlafaxine (EFFEXOR-XR) 150 mg 24 hr capsule, Take 1 capsule (150 mg total) by mouth daily Take with 37 5 mg tablet for total 187 5mg, Disp: 90 capsule, Rfl: 0    venlafaxine (EFFEXOR-XR) 37 5 mg 24 hr capsule, Take 1 capsule (37 5 mg total) by mouth daily Take with 150 mg tablet for total 187 5mg, Disp: 90 capsule, Rfl: 0    rizatriptan (MAXALT) 10 MG tablet, Take 1 tablet (10 mg total) by mouth once as needed for migraine for up to 1 dose May repeat in 2 hours if needed (Patient not taking: Reported on 1/8/2021), Disp: 9 tablet, Rfl: 0    Past Medical History:       Past Medical History:   Diagnosis Date    Allergic rhinitis     Anxiety     Arthritis     Chronic pain disorder     BACK SHOULDERS NECK    Depression     Disease of thyroid gland     nodule    Fibromyalgia, primary     GERD (gastroesophageal reflux disease)     Hearing difficulty of right ear     Hyperlipidemia     Hypertension     Irritable bowel syndrome     Perforation of tympanic membrane     Right    PONV (postoperative nausea and vomiting)     RBBB     Right bundle branch blockage     Sleep apnea     no cpap    Wears glasses         Past Surgical History:   Procedure Laterality Date    ANAL FISTULOTOMY N/A 2018    Procedure: FISTULOTOMY;  Surgeon: Dirk Caraballo MD;  [facility];  Service: Colorectal    BACK SURGERY      lumbar herniated disc    BREAST CYST EXCISION Left 13 yrs ago  benign    BREAST SURGERY Left 2006    cyst removal    CARPAL TUNNEL RELEASE       SECTION      CHOLECYSTECTOMY      COLONOSCOPY      CYST REMOVAL      DENTAL SURGERY      INCISION AND DRAINAGE OF WOUND N/A 2018    Procedure: INCISION AND DRAINAGE (I&D) BUTTOCK;  Surgeon: Dirk Caraballo MD;  [facility];  Service: Colorectal    MYRINGOTOMY W/ TUBES Right 2015    [place]    IN COLONOSCOPY FLX DX W/COLLJ SPEC WHEN PFRMD N/A 2017    Procedure: COLONOSCOPY;  Surgeon: London Kraus MD;  [facility]; Service: Gastroenterology    IN INCISE FINGER TENDON SHEATH Right 3/13/2018    Procedure: LONG TRIGGER FINGER RELEASE;  Surgeon: Rc Adair DO;  [facility];  Service: Orthopedics    IN SURG DIAGNOSTIC EXAM, ANORECTAL N/A 2018    Procedure: EXAM UNDER ANESTHESIA (EUA); Surgeon: Dirk Caraballo MD;  [facility];  Service: Colorectal    IN TYMPANOPLASTY Right 2017    Procedure: TYMPANOPLASTY WITH PERICHONDRIN GRAFT;  Surgeon:  Patty Russ DO;  [facility];  Service: ENT    SHOULDER SURGERY Right     ULNAR NERVE TRANSPOSITION Left     WISDOM TOOTH EXTRACTION          Family History   Problem Relation Age of Onset    Hypertension Mother     Hyperlipidemia Mother     Diabetes Mother     Hypertension Father     Hyperlipidemia Father     Heart disease Father         cardiac disorder-myocardial infarction arrhythmias    Diabetes unspecified Maternal Grandmother     Crohn's disease Brother     Arthritis Brother     Thyroid disease Paternal Grandmother     No Known Problems Daughter     No Known Problems Maternal Aunt     No Known Problems Maternal Aunt     No Known Problems Maternal Aunt     No Known Problems Maternal Aunt     No Known Problems Paternal Aunt     No Known Problems Paternal Aunt         Social History     Socioeconomic History    Marital status: /Civil Union     Spouse name: Not on file    Number of children: Not on file    Years of education: Not on file    Highest education level: Not on file   Occupational History    Not on file   Social Needs    Financial resource strain: Not on file    Food insecurity     Worry: Not on file     Inability: Not on file    Transportation needs     Medical: No     Non-medical: No   Tobacco Use    Smoking status: Never Smoker    Smokeless tobacco: Never Used   Substance and Sexual Activity    Alcohol use: Not Currently     Comment: social    Drug use: No    Sexual activity: Yes     Partners: Male     Birth control/protection: Injection     Comment: per allscripts   Lifestyle    Physical activity     Days per week: 2 days     Minutes per session: Not on file    Stress: Not on file   Relationships    Social connections     Talks on phone: Not on file     Gets together: Not on file     Attends Shinto service: Not on file     Active member of club or organization: Not on file     Attends meetings of clubs or organizations: Not on file     Relationship status: Not on file    Intimate partner violence     Fear of current or ex partner: Not on file     Emotionally abused: Not on file     Physically abused: Not on file     Forced sexual activity: Not on file   Other Topics Concern    Not on file   Social History Narrative    Daily caffeine use- 1 cup of coffee        Physical Exam:     Physical Exam  Constitutional:       General: She is not in acute distress  Appearance: Normal appearance  She is obese  HENT:      Head: Atraumatic        Right Ear: Tympanic membrane, ear canal and external ear normal       Left Ear: Tympanic membrane, ear canal and external ear normal       Nose: Nose normal       Mouth/Throat:      Mouth: Mucous membranes are moist       Pharynx: Oropharynx is clear  Eyes:      Conjunctiva/sclera: Conjunctivae normal       Pupils: Pupils are equal, round, and reactive to light  Cardiovascular:      Rate and Rhythm: Normal rate and regular rhythm  Heart sounds: No murmur  Pulmonary:      Effort: Pulmonary effort is normal  No respiratory distress  Breath sounds: Normal breath sounds  Musculoskeletal:         General: No deformity  Right lower leg: No edema  Left lower leg: No edema  Skin:     General: Skin is warm and dry  Coloration: Skin is not pale  Neurological:      General: No focal deficit present  Mental Status: She is alert and oriented to person, place, and time  Cranial Nerves: No cranial nerve deficit  Psychiatric:         Mood and Affect: Mood normal          Behavior: Behavior normal           Data:     Pre-operative work-up    Laboratory Results: stable from routine labs Nov 2020    EKG: Nov 2020, unchanged from prior EKG    Chest x-ray: last CXR was normal 04/2019    Previous cardiopulmonary studies within the past year:  · Echocardiogram: none  · Cardiac Catheterization: none  · Stress Test: none  · Pulmonary Function Testing: none      Assessment & Recommendations:     1  Encounter for respiratory clearance examination         Pre-Op Evaluation Assessment  36 y o  female with planned : MRI under anesthesia  Known risk factors for perioperative complications: Chronic pulmonary disease  Cardiac Risk Estimation: per the Revised Cardiac Risk Index (Circ  [id number], 1999), the patient's risk factors for cardiac complications include does have HTN, elevated fasting glucose, and morbid obesity, putting her in: RCI RISK CLASS I (0 risk factors, risk of major cardiac compl  appr  0 5%)  Current medications which may produce withdrawal symptoms if withheld perioperatively: none  Pre-Op Evaluation Plan  1  Further preoperative workup as follows:   - none, does not need COVID testing completed    2  Medication Management/Recommendations:   - None, continue medication regimen including morning of surgery, with sip of water    3  Prophylaxis for cardiac events with perioperative beta-blockers: not indicated      4  Patient requires further consultation with: None    Clearance  Patient is CLEARED for MRI under anesthesia    [address] [facility]  Phone#  [phone]  Fax#  [phone]

## 2021-01-15 ENCOUNTER — ANESTHESIA EVENT (OUTPATIENT)
Dept: RADIOLOGY | Facility: HOSPITAL | Age: 41
End: 2021-01-15

## 2021-01-18 ENCOUNTER — OFFICE VISIT (OUTPATIENT)
Dept: FAMILY MEDICINE CLINIC | Facility: CLINIC | Age: 41
End: 2021-01-18
Payer: COMMERCIAL

## 2021-01-18 VITALS
HEART RATE: 102 BPM | TEMPERATURE: 99.7 F | WEIGHT: 293 LBS | HEIGHT: 67 IN | OXYGEN SATURATION: 99 % | BODY MASS INDEX: 45.99 KG/M2 | DIASTOLIC BLOOD PRESSURE: 94 MMHG | SYSTOLIC BLOOD PRESSURE: 138 MMHG

## 2021-01-18 DIAGNOSIS — Z00.00 ANNUAL PHYSICAL EXAM: Primary | ICD-10-CM

## 2021-01-18 DIAGNOSIS — L98.9 BENIGN SKIN LESION OF FACE: ICD-10-CM

## 2021-01-18 DIAGNOSIS — E66.01 MORBID OBESITY WITH BMI OF 45.0-49.9, ADULT (HCC): ICD-10-CM

## 2021-01-18 DIAGNOSIS — E78.5 HYPERLIPIDEMIA, UNSPECIFIED HYPERLIPIDEMIA TYPE: ICD-10-CM

## 2021-01-18 DIAGNOSIS — R73.01 IMPAIRED FASTING GLUCOSE: ICD-10-CM

## 2021-01-18 DIAGNOSIS — M79.7 FIBROMYALGIA: ICD-10-CM

## 2021-01-18 PROCEDURE — 1036F TOBACCO NON-USER: CPT | Performed by: PHYSICIAN ASSISTANT

## 2021-01-18 PROCEDURE — 3075F SYST BP GE 130 - 139MM HG: CPT | Performed by: PHYSICIAN ASSISTANT

## 2021-01-18 PROCEDURE — 3008F BODY MASS INDEX DOCD: CPT | Performed by: PHYSICIAN ASSISTANT

## 2021-01-18 PROCEDURE — 99396 PREV VISIT EST AGE 40-64: CPT | Performed by: PHYSICIAN ASSISTANT

## 2021-01-18 PROCEDURE — 3080F DIAST BP >= 90 MM HG: CPT | Performed by: PHYSICIAN ASSISTANT

## 2021-01-18 RX ORDER — AMITRIPTYLINE HYDROCHLORIDE 100 MG/1
100 TABLET, FILM COATED ORAL
Qty: 90 TABLET | Refills: 1 | Status: SHIPPED | OUTPATIENT
Start: 2021-01-18 | End: 2021-06-24 | Stop reason: SDUPTHER

## 2021-01-18 RX ORDER — ATORVASTATIN CALCIUM 40 MG/1
40 TABLET, FILM COATED ORAL DAILY
Qty: 90 TABLET | Refills: 1 | Status: SHIPPED | OUTPATIENT
Start: 2021-01-18 | End: 2021-08-24 | Stop reason: SDUPTHER

## 2021-01-18 NOTE — PROGRESS NOTES
[address]     NAME: Jannette Baugh  AGE: 36 y o  SEX: female  : 1980     DATE: 2021     Assessment and Plan:     Problem List Items Addressed This Visit        Endocrine    Impaired fasting glucose    Relevant Orders    Comprehensive metabolic panel    Hemoglobin A1C       Other    Fibromyalgia    Relevant Medications    amitriptyline (ELAVIL) 100 mg tablet    Hyperlipidemia    Relevant Medications    atorvastatin (LIPITOR) 40 mg tablet    Other Relevant Orders    Lipid panel      Other Visit Diagnoses     Annual physical exam    -  Primary    Morbid obesity with BMI of 45 0-49 9, adult ([facility] [address] )        Benign skin lesion of face        Relevant Orders    Ambulatory referral to Dermatology          Immunizations and preventive care screenings were discussed with patient today  Appropriate education was printed on patient's after visit summary  Counseling:  · Exercise: the importance of regular exercise/physical activity was discussed  Recommend exercise 3-5 times per week for at least 30 minutes  BMI Counseling: Body mass index is 46 36 kg/m²  The BMI is above normal  Nutrition recommendations include decreasing portion sizes and encouraging healthy choices of fruits and vegetables  Exercise recommendations include exercising 3-5 times per week  Keep up the good work with recent wt loss goals  Return in 6 months (on 2021)  Chief Complaint:     Chief Complaint   Patient presents with    Follow-up     6 month check  No concerns today  History of Present Illness:     Adult Annual Physical   Patient here for a comprehensive physical exam  The patient reports recurrent lesion on nose, bothersome with mask  ?wart  She did not continue taking maxalt, was not helping for headaches  She has upcoming MRI  She thought she needed a follow up EGD this year but per GI not due til Dec 2021   She is still on nexium  Diet and Physical Activity  · Diet/Nutrition: she is counting calories, eating out less; modifying diet due to daughters Crohns  She recently got up to 310 and has been coming back down  · Exercise: walking and walks dog everyday, over the last week increased physical activity with [employer]  Depression Screening  PHQ-9 Depression Screening    PHQ-9:   Frequency of the following problems over the past two weeks:           General Health  · Sleep: gets 7-8 hours of sleep on average  Uses melatonin, helps her to fall asleep  · Hearing: normal - bilateral   · Vision: goes for regular eye exams and wears glasses  · Dental: regular dental visits  /GYN Health  · She has upcoming GYN appt, she has been having a lot of spotting and with cramping since getting IUD x 6 months  She has not been able to get that repeat urine since always spotting  · Contraceptive method: IUD placement  Review of Systems:     Review of Systems   Constitutional: Negative  HENT: Negative  Eyes: Negative  Respiratory: Negative  Cardiovascular: Negative  Gastrointestinal: Positive for abdominal pain (GERD and stomach upset (thinks stress related))  Negative for diarrhea and vomiting  Genitourinary: Positive for menstrual problem (spotting since IUD)  Musculoskeletal: Positive for back pain  Negative for gait problem  Skin: Negative for color change, pallor, rash and wound  Small lesion on nose, botehrsome   Neurological: Positive for headaches (frontal, tension headaches)  Negative for dizziness, syncope and weakness  Psychiatric/Behavioral: Negative for suicidal ideas  Nervous/anxious: increased stressors         Past Medical History:     Past Medical History:   Diagnosis Date    Allergic rhinitis     Anxiety     Arthritis     Chronic pain disorder     BACK SHOULDERS NECK    Depression     Disease of thyroid gland     nodule    Fibromyalgia, primary     GERD (gastroesophageal reflux disease)     Hearing difficulty of right ear     Hyperlipidemia     Hypertension     Irritable bowel syndrome     Perforation of tympanic membrane     Right    PONV (postoperative nausea and vomiting)     RBBB     Right bundle branch blockage     Sleep apnea     no cpap    Wears glasses       Past Surgical History:     Past Surgical History:   Procedure Laterality Date    ANAL FISTULOTOMY N/A 2018    Procedure: FISTULOTOMY;  Surgeon: Einar Buerger, MD;  [facility];  Service: Colorectal    BACK SURGERY      lumbar herniated disc    BREAST CYST EXCISION Left 13 yrs ago  benign    BREAST SURGERY Left 2006    cyst removal    CARPAL TUNNEL RELEASE       SECTION      CHOLECYSTECTOMY      COLONOSCOPY      CYST REMOVAL      DENTAL SURGERY      INCISION AND DRAINAGE OF WOUND N/A 2018    Procedure: INCISION AND DRAINAGE (I&D) BUTTOCK;  Surgeon: Einar Buerger, MD;  [facility];  Service: Colorectal    MYRINGOTOMY W/ TUBES Right 2015    [place]    ME COLONOSCOPY FLX DX W/COLLJ SPEC WHEN PFRMD N/A 2017    Procedure: COLONOSCOPY;  Surgeon: Elizabeth Bucio MD;  [facility]; Service: Gastroenterology    ME INCISE FINGER TENDON SHEATH Right 3/13/2018    Procedure: LONG TRIGGER FINGER RELEASE;  Surgeon: Mike Sanderson DO;  [facility];  Service: Orthopedics    ME SURG DIAGNOSTIC EXAM, ANORECTAL N/A 2018    Procedure: EXAM UNDER ANESTHESIA (EUA); Surgeon: Einar Buerger, MD;  [facility];  Service: Colorectal    ME TYMPANOPLASTY Right 2017    Procedure: TYMPANOPLASTY WITH PERICHONDRIN GRAFT;  Surgeon:  Bernadine Mann DO;  [facility];  Service: ENT    SHOULDER SURGERY Right     ULNAR NERVE TRANSPOSITION Left     WISDOM TOOTH EXTRACTION        Social History:     E-Cigarette/Vaping    E-Cigarette Use Never User      E-Cigarette/Vaping Substances    Nicotine No     THC No     CBD No     Flavoring No     Other No      Social History     Socioeconomic History    Marital status: /Civil Union     Spouse name: None    Number of children: None    Years of education: None    Highest education level: None   Occupational History    None   Social Needs    Financial resource strain: None    Food insecurity     Worry: None     Inability: None    Transportation needs     Medical: No     Non-medical: No   Tobacco Use    Smoking status: Never Smoker    Smokeless tobacco: Never Used   Substance and Sexual Activity    Alcohol use: Not Currently     Comment: social    Drug use: No    Sexual activity: Yes     Partners: Male     Birth control/protection: Injection     Comment: per allscripts   Lifestyle    Physical activity     Days per week: 2 days     Minutes per session: None    Stress: None   Relationships    Social connections     Talks on phone: None     Gets together: None     Attends [employer] service: None     Active member of club or organization: None     Attends meetings of clubs or organizations: None     Relationship status: None    Intimate partner violence     Fear of current or ex partner: None     Emotionally abused: None     Physically abused: None     Forced sexual activity: None   Other Topics Concern    None   Social History Narrative    Daily caffeine use- 1 cup of coffee      Family History:     Family History   Problem Relation Age of Onset    Hypertension Mother     Hyperlipidemia Mother     Diabetes Mother     Hypertension Father     Hyperlipidemia Father     Heart disease Father         cardiac disorder-myocardial infarction arrhythmias    Diabetes unspecified Maternal Grandmother     Crohn's disease Brother     Arthritis Brother     Thyroid disease Paternal Grandmother     No Known Problems Daughter     No Known Problems Maternal Aunt     No Known Problems Maternal Aunt     No Known Problems Maternal Aunt     No Known Problems Maternal Aunt     No Known Problems Paternal Aunt     No Known Problems Paternal Aunt       Current Medications:     Current Outpatient Medications   Medication Sig Dispense Refill    albuterol (PROVENTIL HFA,VENTOLIN HFA) 90 mcg/act inhaler Inhale 2 puffs every 6 (six) hours as needed for shortness of breath (cough) 1 Inhaler 0    amitriptyline (ELAVIL) 100 mg tablet Take 1 tablet (100 mg total) by mouth daily at bedtime 90 tablet 1    Ascorbic Acid (VITAMIN C) 500 MG CAPS Take 1 capsule by mouth daily      atorvastatin (LIPITOR) 40 mg tablet Take 1 tablet (40 mg total) by mouth daily 90 tablet 1    cetirizine (ZyrTEC) 10 mg tablet Take 10 mg by mouth daily      Cholecalciferol (VITAMIN D3) 5000 units CAPS Take 1 capsule by mouth daily        ciprofloxacin-dexamethasone (CIPRODEX) otic suspension Administer 4 drops to the right ear 2 (two) times a day 7 5 mL 0    cyanocobalamin (VITAMIN B-12) 1,000 mcg tablet Take 3 tablets by mouth 2 (two) times a day      esomeprazole (NexIUM 24HR) 20 mg capsule Take 1 capsule (20 mg total) by mouth every morning 30 capsule 3    levonorgestrel (MIRENA) 20 MCG/24HR IUD 1 each by Intrauterine route once      metoprolol succinate (TOPROL-XL) 50 mg 24 hr tablet Take 1 tablet (50 mg total) by mouth daily 90 tablet 1    Multiple Vitamins-Minerals (CENTRUM ADULTS PO) Take 1 tablet by mouth daily      nystatin (MYCOSTATIN) cream Apply topically 2 (two) times a day as needed (rash) 30 g 0    venlafaxine (EFFEXOR-XR) 150 mg 24 hr capsule Take 1 capsule (150 mg total) by mouth daily Take with 37 5 mg tablet for total 187 5mg 90 capsule 0    venlafaxine (EFFEXOR-XR) 37 5 mg 24 hr capsule Take 1 capsule (37 5 mg total) by mouth daily Take with 150 mg tablet for total 187 5mg 90 capsule 0     No current facility-administered medications for this visit  Allergies:      Allergies   Allergen Reactions    Amoxicillin Swelling, Anaphylaxis and Angioedema    Neurontin [Gabapentin] Other (See Comments)     Other reaction(s): SUICIDE IDEATION  Other reaction(s): Other (See Comments)  suicidal  suicidal    Penicillin V Angioedema and Swelling     Other reaction(s): Throat closure    Penicillins Swelling and Anaphylaxis    Aripiprazole Other (See Comments)     Other reaction(s): low dose 2 mg ; curving in and locking in of hands/dystonia  Other reaction(s): Other (See Comments)  Other reaction(s): low dose 2 mg ; curving in and locking in of hands/dystonia    Lorazepam Other (See Comments)     Other reaction(s): higher dose > anxiety and depression  Other reaction(s): Other (See Comments)  Other reaction(s): higher dose > anxiety and depression    Carbamazepine Other (See Comments)     Other reaction(s): Unknown Reaction    Doxycycline Hives    Lamotrigine Rash    Other Hives     Adhesive tape      Physical Exam:     /94 (BP Location: Left arm, Patient Position: Sitting, Cuff Size: Large)   Pulse 102   Temp 99 7 °F (37 6 °C) (Tympanic)   Ht 5' 7" (1 702 m)   Wt 134 kg (296 lb)   SpO2 99%   BMI 46 36 kg/m²     Physical Exam  Constitutional:       General: She is not in acute distress  Appearance: Normal appearance  She is obese  She is not diaphoretic  HENT:      Head: Normocephalic and atraumatic  Right Ear: Tympanic membrane, ear canal and external ear normal       Left Ear: Tympanic membrane, ear canal and external ear normal       Nose: Nose normal       Comments: 3mm small pink lump on nose, ?blocked pore or wart     Mouth/Throat:      Mouth: Mucous membranes are moist       Pharynx: Oropharynx is clear  Eyes:      Conjunctiva/sclera: Conjunctivae normal       Pupils: Pupils are equal, round, and reactive to light  Neck:      Musculoskeletal: Normal range of motion and neck supple  Cardiovascular:      Rate and Rhythm: Normal rate and regular rhythm  Pulses: Normal pulses     Pulmonary:      Effort: Pulmonary effort is normal       Breath sounds: Normal breath sounds  No wheezing  Abdominal:      Comments: Central adiposity   Musculoskeletal:         General: No deformity or signs of injury  Right lower leg: No edema  Left lower leg: No edema  Skin:     General: Skin is warm and dry  Findings: No erythema or rash  Neurological:      General: No focal deficit present  Mental Status: She is alert and oriented to person, place, and time  Cranial Nerves: No cranial nerve deficit  Psychiatric:         Mood and Affect: Mood normal          Behavior: Behavior normal          Thought Content:  Thought content normal           Rachel Dickerson PA-C  [facility]

## 2021-01-18 NOTE — PATIENT INSTRUCTIONS
Wellness Visit for Adults   AMBULATORY CARE:   A wellness visit  is when you see your healthcare provider to get screened for health problems  Your healthcare provider will also give you advice on how to stay healthy  Write down your questions so you remember to ask them  Ask your healthcare provider how often you should have a wellness visit  What happens at a wellness visit:  Your healthcare provider will ask about your health, and your family history of health problems  This includes high blood pressure, heart disease, and cancer  He or she will ask if you have symptoms that concern you, if you smoke, and about your mood  You may also be asked about your intake of medicines, supplements, food, and alcohol  Any of the following may be done:  · Your weight  will be checked  Your height may also be checked so your body mass index (BMI) can be calculated  Your BMI shows if you are at a healthy weight  · Your blood pressure  and heart rate will be checked  Your temperature may also be checked  · Blood and urine tests  may be done  Blood tests may be done to check your cholesterol levels  Abnormal cholesterol levels increase your risk for heart disease and stroke  You may also need a blood or urine test to check for diabetes if you are at increased risk  Urine tests may be done to look for signs of an infection or kidney disease  · A physical exam  includes checking your heartbeat and lungs with a stethoscope  Your healthcare provider may also check your skin to look for sun damage  · Screening tests  may be recommended  A screening test is done to check for diseases that may not cause symptoms  The screening tests you may need depend on your age, gender, family history, and lifestyle habits  For example, colorectal screening may be recommended if you are 48years old or older  Screening tests you need if you are a woman:   · A Pap smear  is used to screen for cervical cancer   Pap smears are usually done every 3 to 5 years depending on your age  You may need them more often if you have had abnormal Pap smear test results in the past  Ask your healthcare provider how often you should have a Pap smear  · A mammogram  is an x-ray of your breasts to screen for breast cancer  Experts recommend mammograms every 2 years starting at age 48 years  You may need a mammogram at age 52 years or younger if you have an increased risk for breast cancer  Talk to your healthcare provider about when you should start having mammograms and how often you need them  Vaccines you may need:   · Get an influenza vaccine  every year  The influenza vaccine protects you from the flu  Several types of viruses cause the flu  The viruses change over time, so new vaccines are made each year  · Get a tetanus-diphtheria (Td) booster vaccine  every 10 years  This vaccine protects you against tetanus and diphtheria  Tetanus is a severe infection that may cause painful muscle spasms and lockjaw  Diphtheria is a severe bacterial infection that causes a thick covering in the back of your mouth and throat  · Get a human papillomavirus (HPV) vaccine  if you are female and aged 23 to 32 or male 23 to 24 and never received it  This vaccine protects you from HPV infection  HPV is the most common infection spread by sexual contact  HPV may also cause vaginal, penile, and anal cancers  · Get a pneumococcal vaccine  if you are aged 72 years or older  The pneumococcal vaccine is an injection given to protect you from pneumococcal disease  Pneumococcal disease is an infection caused by pneumococcal bacteria  The infection may cause pneumonia, meningitis, or an ear infection  · Get a shingles vaccine  if you are 60 or older, even if you have had shingles before  The shingles vaccine is an injection to protect you from the varicella-zoster virus  This is the same virus that causes chickenpox   Shingles is a painful rash that develops in people who had chickenpox or have been exposed to the virus  How to eat healthy:  My Plate is a model for planning healthy meals  It shows the types and amounts of foods that should go on your plate  Fruits and vegetables make up about half of your plate, and grains and protein make up the other half  A serving of dairy is included on the side of your plate  The amount of calories and serving sizes you need depends on your age, gender, weight, and height  Examples of healthy foods are listed below:  · Eat a variety of vegetables  such as dark green, red, and orange vegetables  You can also include canned vegetables low in sodium (salt) and frozen vegetables without added butter or sauces  · Eat a variety of fresh fruits , canned fruit in 100% juice, frozen fruit, and dried fruit  · Include whole grains  At least half of the grains you eat should be whole grains  Examples include whole-wheat bread, wheat pasta, brown rice, and whole-grain cereals such as oatmeal     · Eat a variety of protein foods such as seafood (fish and shellfish), lean meat, and poultry without skin (turkey and chicken)  Examples of lean meats include pork leg, shoulder, or tenderloin, and beef round, sirloin, tenderloin, and extra lean ground beef  Other protein foods include eggs and egg substitutes, beans, peas, soy products, nuts, and seeds  · Choose low-fat dairy products such as skim or 1% milk or low-fat yogurt, cheese, and cottage cheese  · Limit unhealthy fats  such as butter, hard margarine, and shortening  Exercise:  Exercise at least 30 minutes per day on most days of the week  Some examples of exercise include walking, biking, dancing, and swimming  You can also fit in more physical activity by taking the stairs instead of the elevator or parking farther away from stores  Include muscle strengthening activities 2 days each week  Regular exercise provides many health benefits   It helps you manage your weight, and decreases your risk for type 2 diabetes, heart disease, stroke, and high blood pressure  Exercise can also help improve your mood  Ask your healthcare provider about the best exercise plan for you  General health and safety guidelines:   · Do not smoke  Nicotine and other chemicals in cigarettes and cigars can cause lung damage  Ask your healthcare provider for information if you currently smoke and need help to quit  E-cigarettes or smokeless tobacco still contain nicotine  Talk to your healthcare provider before you use these products  · Limit alcohol  A drink of alcohol is 12 ounces of beer, 5 ounces of wine, or 1½ ounces of liquor  · Lose weight, if needed  Being overweight increases your risk of certain health conditions  These include heart disease, high blood pressure, type 2 diabetes, and certain types of cancer  · Protect your skin  Do not sunbathe or use tanning beds  Use sunscreen with a SPF 15 or higher  Apply sunscreen at least 15 minutes before you go outside  Reapply sunscreen every 2 hours  Wear protective clothing, hats, and sunglasses when you are outside  · Drive safely  Always wear your seatbelt  Make sure everyone in your car wears a seatbelt  A seatbelt can save your life if you are in an accident  Do not use your cell phone when you are driving  This could distract you and cause an accident  Pull over if you need to make a call or send a text message  · Practice safe sex  Use latex condoms if are sexually active and have more than one partner  Your healthcare provider may recommend screening tests for sexually transmitted infections (STIs)  · Wear helmets, lifejackets, and protective gear  Always wear a helmet when you ride a bike or motorcycle, go skiing, or play sports that could cause a head injury  Wear protective equipment when you play sports  Wear a lifejacket when you are on a boat or doing water sports      © Copyright [employer] 2020 Information is for End User's use only and may not be sold, redistributed or otherwise used for commercial purposes  All illustrations and images included in CareNotes® are the copyrighted property of [employer]  or Aniyah Mike  The above information is an  only  It is not intended as medical advice for individual conditions or treatments  Talk to your doctor, nurse or pharmacist before following any medical regimen to see if it is safe and effective for you  Weight Management   AMBULATORY CARE:   Why it is important to manage your weight:  Being overweight increases your risk of health conditions such as heart disease, high blood pressure, type 2 diabetes, and certain types of cancer  It can also increase your risk for osteoarthritis, sleep apnea, and other respiratory problems  Aim for a slow, steady weight loss  Even a small amount of weight loss can lower your risk of health problems  How to lose weight safely:  A safe and healthy way to lose weight is to eat fewer calories and get regular exercise  · You can lose up about 1 pound a week by decreasing the number of calories you eat by 500 calories each day  You can decrease calories by eating smaller portion sizes or by cutting out high-calorie foods  Read labels to find out how many calories are in the foods you eat  · You can also burn calories with exercise such as walking, swimming, or biking  You will be more likely to keep weight off if you make these changes part of your lifestyle  Exercise at least 30 minutes per day on most days of the week  You can also fit in more physical activity by taking the stairs instead of the elevator or parking farther away from stores  Ask your healthcare provider about the best exercise plan for you  Healthy meal plan for weight management:  A healthy meal plan includes a variety of foods, contains fewer calories, and helps you stay healthy   A healthy meal plan includes the following:     · Eat whole-grain foods more often  A healthy meal plan should contain fiber  Fiber is the part of grains, fruits, and vegetables that is not broken down by your body  Whole-grain foods are healthy and provide extra fiber in your diet  Some examples of whole-grain foods are whole-wheat breads and pastas, oatmeal, brown rice, and bulgur  · Eat a variety of vegetables every day  Include dark, leafy greens such as spinach, kale, griselda greens, and mustard greens  Eat yellow and orange vegetables such as carrots, sweet potatoes, and winter squash  · Eat a variety of fruits every day  Choose fresh or canned fruit (canned in its own juice or light syrup) instead of juice  Fruit juice has very little or no fiber  · Eat low-fat dairy foods  Drink fat-free (skim) milk or 1% milk  Eat fat-free yogurt and low-fat cottage cheese  Try low-fat cheeses such as mozzarella and other reduced-fat cheeses  · Choose meat and other protein foods that are low in fat  Choose beans or other legumes such as split peas or lentils  Choose fish, skinless poultry (chicken or turkey), or lean cuts of red meat (beef or pork)  Before you cook meat or poultry, cut off any visible fat  · Use less fat and oil  Try baking foods instead of frying them  Add less fat, such as margarine, sour cream, regular salad dressing and mayonnaise to foods  Eat fewer high-fat foods  Some examples of high-fat foods include french fries, doughnuts, ice cream, and cakes  · Eat fewer sweets  Limit foods and drinks that are high in sugar  This includes candy, cookies, regular soda, and sweetened drinks  Ways to decrease calories:   · Eat smaller portions  ? Use a small plate with smaller servings  ? Do not eat second helpings  ? When you eat at a restaurant, ask for a box and place half of your meal in the box before you eat  ? Share an entrée with someone else  · Replace high-calorie snacks with healthy, low-calorie snacks  ? Choose fresh fruit, vegetables, fat-free rice cakes, or air-popped popcorn instead of potato chips, nuts, or chocolate  ? Choose water or calorie-free drinks instead of soda or sweetened drinks  · Do not shop for groceries when you are hungry  You may be more likely to make unhealthy food choices  Take a grocery list of healthy foods and shop after you have eaten  · Eat regular meals  Do not skip meals  Skipping meals can lead to overeating later in the day  This can make it harder for you to lose weight  Eat a healthy snack in place of a meal if you do not have time to eat a regular meal  Talk with a dietitian to help you create a meal plan and schedule that is right for you  Other things to consider as you try to lose weight:   · Be aware of situations that may give you the urge to overeat, such as eating while watching television  Find ways to avoid these situations  For example, read a book, go for a walk, or do crafts  · Meet with a weight loss support group or friends who are also trying to lose weight  This may help you stay motivated to continue working on your weight loss goals  © Copyright [address] Information is for End User's use only and may not be sold, redistributed or otherwise used for commercial purposes  All illustrations and images included in CareNotes® are the copyrighted property of [employer]  or Aniyah Mike  The above information is an  only  It is not intended as medical advice for individual conditions or treatments  Talk to your doctor, nurse or pharmacist before following any medical regimen to see if it is safe and effective for you

## 2021-01-19 NOTE — ANESTHESIA PREPROCEDURE EVALUATION
Procedure:  MRI BRAIN W WO CONTRAST    Relevant Problems   CARDIO   (+) Atypical chest pain   (+) Essential hypertension   (+) Hemorrhoids   (+) High triglycerides   (+) Hyperlipidemia   (+) RBBB (right bundle branch block)      GI/HEPATIC   (+) Esophageal reflux      MUSCULOSKELETAL   (+) Chronic low back pain   (+) Chronic right-sided low back pain without sciatica   (+) Fibromyalgia      NEURO/PSYCH   (+) Chronic headache with new features   (+) Depression with anxiety   (+) Fibromyalgia   (+) History of sepsis      PULMONARY   (+) Asthma   (+) Asthma, mild intermittent   (+) Obstructive sleep apnea      Other   (+) Breast hypertrophy   (+) Bulging lumbar disc   (+) Calcific tendinitis of shoulder   (+) Carpal tunnel syndrome, bilateral   (+) Elzi-Danlos syndrome   (+) Impaired fasting glucose   (+) Left cervical lymphadenopathy   (+) Mammographic microcalcification   (+) Morbid obesity   (+) Otitis externa/media   (+) Pars defect of lumbar spine   (+) Spondylolysis of lumbar region   (+) Thyroid nodule   (+) Trigger finger, right middle finger   (+) Vitamin D insufficiency      Recent labs personally reviewed:  Lab Results   Component Value Date    WBC 8 18 11/07/2020    HGB 15 3 11/07/2020     11/07/2020     Lab Results   Component Value Date     04/05/2017    K 4 0 11/07/2020    BUN 14 11/07/2020    CREATININE 0 61 11/07/2020    GLUCOSE 96 04/05/2017     Lab Results   Component Value Date    PTT 30 09/19/2014      Lab Results   Component Value Date    INR 0 99 09/19/2014     Lab Results   Component Value Date    HGBA1C 5 6 11/07/2020     Physical Exam    Airway    Mallampati score: III  TM Distance: >3 FB  Neck ROM: full     Dental       Cardiovascular  Rhythm: regular, Rate: normal,     Pulmonary  Breath sounds clear to auscultation,     Other Findings        Anesthesia Plan  ASA Score- 4     Anesthesia Type- IV sedation with anesthesia with ASA Monitors           Additional Monitors:   Airway Plan: LMA  Plan Factors-Exercise tolerance (METS): <4 METS  Chart reviewed  EKG reviewed  Existing labs reviewed  Patient summary reviewed  Induction- intravenous  Postoperative Plan-     Informed Consent- Anesthetic plan and risks discussed with patient

## 2021-01-19 NOTE — PRE-PROCEDURE INSTRUCTIONS
Pre-Surgery Instructions:   Medication Instructions    albuterol (PROVENTIL HFA,VENTOLIN HFA) 90 mcg/act inhaler Instructed patient per Anesthesia Guidelines   amitriptyline (ELAVIL) 100 mg tablet Instructed patient per Anesthesia Guidelines   Ascorbic Acid (VITAMIN C) 500 MG CAPS Instructed patient per Anesthesia Guidelines   atorvastatin (LIPITOR) 40 mg tablet Instructed patient per Anesthesia Guidelines   cetirizine (ZyrTEC) 10 mg tablet Instructed patient per Anesthesia Guidelines   Cholecalciferol (VITAMIN D3) 5000 units CAPS Instructed patient per Anesthesia Guidelines   ciprofloxacin-dexamethasone (CIPRODEX) otic suspension Instructed patient per Anesthesia Guidelines   cyanocobalamin (VITAMIN B-12) 1,000 mcg tablet Instructed patient per Anesthesia Guidelines   esomeprazole (NexIUM 24HR) 20 mg capsule Instructed patient per Anesthesia Guidelines   levonorgestrel (MIRENA) 20 MCG/24HR IUD Instructed patient per Anesthesia Guidelines   metoprolol succinate (TOPROL-XL) 50 mg 24 hr tablet Instructed patient per Anesthesia Guidelines   Multiple Vitamins-Minerals (CENTRUM ADULTS PO) Instructed patient per Anesthesia Guidelines   nystatin (MYCOSTATIN) cream Instructed patient per Anesthesia Guidelines   venlafaxine (EFFEXOR-XR) 150 mg 24 hr capsule Instructed patient per Anesthesia Guidelines   venlafaxine (EFFEXOR-XR) 37 5 mg 24 hr capsule Instructed patient per Anesthesia Guidelines  Reviewed with patient via phone all medications, ok to take as ordered with sips of water in morning of MRI  Informed about call from [facility] with time to arrive for MRI  Verbalized understanding

## 2021-01-20 ENCOUNTER — ANESTHESIA (OUTPATIENT)
Dept: RADIOLOGY | Facility: HOSPITAL | Age: 41
End: 2021-01-20

## 2021-01-20 ENCOUNTER — HOSPITAL ENCOUNTER (OUTPATIENT)
Dept: RADIOLOGY | Facility: HOSPITAL | Age: 41
Discharge: HOME | End: 2021-01-20
Payer: COMMERCIAL

## 2021-01-20 VITALS
OXYGEN SATURATION: 98 % | WEIGHT: 293 LBS | RESPIRATION RATE: 18 BRPM | SYSTOLIC BLOOD PRESSURE: 130 MMHG | TEMPERATURE: 97.8 F | HEART RATE: 97 BPM | HEIGHT: 67 IN | DIASTOLIC BLOOD PRESSURE: 79 MMHG | BODY MASS INDEX: 45.99 KG/M2

## 2021-01-20 DIAGNOSIS — G89.29 CHRONIC HEADACHE WITH NEW FEATURES: ICD-10-CM

## 2021-01-20 DIAGNOSIS — R51.9 CHRONIC HEADACHE WITH NEW FEATURES: ICD-10-CM

## 2021-01-20 LAB
EXT PREGNANCY TEST URINE: NEGATIVE
EXT. CONTROL: NORMAL

## 2021-01-20 PROCEDURE — 81025 URINE PREGNANCY TEST: CPT | Performed by: STUDENT IN AN ORGANIZED HEALTH CARE EDUCATION/TRAINING PROGRAM

## 2021-01-20 PROCEDURE — A9585 GADOBUTROL INJECTION: HCPCS | Performed by: FAMILY MEDICINE

## 2021-01-20 PROCEDURE — 70553 MRI BRAIN STEM W/O & W/DYE: CPT

## 2021-01-20 PROCEDURE — G1004 CDSM NDSC: HCPCS

## 2021-01-20 RX ORDER — PROPOFOL 10 MG/ML
INJECTION, EMULSION INTRAVENOUS AS NEEDED
Status: DISCONTINUED | OUTPATIENT
Start: 2021-01-20 | End: 2021-01-20

## 2021-01-20 RX ORDER — LIDOCAINE HYDROCHLORIDE 10 MG/ML
0.5 INJECTION, SOLUTION EPIDURAL; INFILTRATION; INTRACAUDAL; PERINEURAL ONCE AS NEEDED
Status: COMPLETED | OUTPATIENT
Start: 2021-01-20 | End: 2021-01-20

## 2021-01-20 RX ORDER — SODIUM CHLORIDE, SODIUM LACTATE, POTASSIUM CHLORIDE, CALCIUM CHLORIDE 600; 310; 30; 20 MG/100ML; MG/100ML; MG/100ML; MG/100ML
50 INJECTION, SOLUTION INTRAVENOUS CONTINUOUS
Status: DISCONTINUED | OUTPATIENT
Start: 2021-01-20 | End: 2021-01-20 | Stop reason: SDUPTHER

## 2021-01-20 RX ORDER — SODIUM CHLORIDE, SODIUM LACTATE, POTASSIUM CHLORIDE, CALCIUM CHLORIDE 600; 310; 30; 20 MG/100ML; MG/100ML; MG/100ML; MG/100ML
50 INJECTION, SOLUTION INTRAVENOUS CONTINUOUS
Status: DISCONTINUED | OUTPATIENT
Start: 2021-01-20 | End: 2021-01-21 | Stop reason: HOSPADM

## 2021-01-20 RX ORDER — LIDOCAINE HYDROCHLORIDE 10 MG/ML
0.5 INJECTION, SOLUTION EPIDURAL; INFILTRATION; INTRACAUDAL; PERINEURAL ONCE AS NEEDED
Status: DISCONTINUED | OUTPATIENT
Start: 2021-01-20 | End: 2021-01-21 | Stop reason: HOSPADM

## 2021-01-20 RX ORDER — PROPOFOL 10 MG/ML
INJECTION, EMULSION INTRAVENOUS CONTINUOUS PRN
Status: DISCONTINUED | OUTPATIENT
Start: 2021-01-20 | End: 2021-01-20

## 2021-01-20 RX ADMIN — SODIUM CHLORIDE, SODIUM LACTATE, POTASSIUM CHLORIDE, AND CALCIUM CHLORIDE 50 ML/HR: .6; .31; .03; .02 INJECTION, SOLUTION INTRAVENOUS at 09:53

## 2021-01-20 RX ADMIN — GADOBUTROL 14 ML: 604.72 INJECTION INTRAVENOUS at 12:11

## 2021-01-20 RX ADMIN — PROPOFOL 200 MG: 10 INJECTION, EMULSION INTRAVENOUS at 11:24

## 2021-01-20 RX ADMIN — LIDOCAINE HYDROCHLORIDE 0.5 ML: 10 INJECTION, SOLUTION EPIDURAL; INFILTRATION; INTRACAUDAL; PERINEURAL at 09:53

## 2021-01-20 RX ADMIN — PROPOFOL 140 MCG/KG/MIN: 10 INJECTION, EMULSION INTRAVENOUS at 11:24

## 2021-01-20 NOTE — NURSING NOTE
MRI completed, patient tolerated procedure  Post vital signs taken and recorded  Report given to  [facility] by Selina Keene  Patient placed in transport to be taken to [facility]  Patient offers no complaints or verbalizing any issues

## 2021-01-20 NOTE — ANESTHESIA POSTPROCEDURE EVALUATION
Post-Op Assessment Note    CV Status:  Stable  Pain Score: 0    Pain management: adequate     Mental Status:  Alert   Hydration Status:  Stable   PONV Controlled:  None   Airway Patency:  Patent      Post Op Vitals Reviewed: Yes      Staff: Anesthesiologist         No complications documented      BP   104/70   Temp   98   Pulse  80   Resp   18   SpO2   100

## 2021-01-21 DIAGNOSIS — G89.29 CHRONIC HEADACHE WITH NEW FEATURES: Primary | ICD-10-CM

## 2021-01-21 DIAGNOSIS — R51.9 CHRONIC HEADACHE WITH NEW FEATURES: Primary | ICD-10-CM

## 2021-01-22 ENCOUNTER — SOCIAL WORK (OUTPATIENT)
Dept: BEHAVIORAL/MENTAL HEALTH CLINIC | Facility: CLINIC | Age: 41
End: 2021-01-22
Payer: COMMERCIAL

## 2021-01-22 DIAGNOSIS — F41.8 DEPRESSION WITH ANXIETY: Primary | ICD-10-CM

## 2021-01-22 PROCEDURE — 90834 PSYTX W PT 45 MINUTES: CPT | Performed by: SOCIAL WORKER

## 2021-01-22 NOTE — PSYCH
This note was not shared with the patient due to this is a psychotherapy note     Psychotherapy Provided: Individual Psychotherapy 50 minutes     Length of time in session: 50 minutes, follow up in 2 week    Goals addressed in session: Goal 1     Pain:      none    0    Current suicide risk : Low     Met with Jannette  She reports doing overall well  She continues to report improvement in her relationships and wellness by setting boundaries with her family and friends  She continues to struggle with low self worth and feels she cannot schedule gastric bypass surgery until it is resolved  She's lost a significant amount of weight on her own 2x in the past and voices no change in her self worth when this occurred  She fears without improving her self worth prior to surgery she will again gain back the weight  Session focused on exploring factors which contribute to low self worth through a CBT cog wheel activity  She identified her weight, comparing herself to others, unhealthy/negative thoughts about herself, her relationship with her mother, and her relationship with her brother as factors  She was then encouraged to target each smaller factor and ways she can begin to make positive changes  The smaller factor of her brother was explored further in session  She's been setting a healthy boundary but continues to have anger towards him  Healthy outlets to release this emotion was discussed  She expressed interest in writing him a letter but not giving it to him  Jannette will continue to work on exercise outside of session and begin writing a letter to her brother to allow herself to express the anger she's been holding  Continue biweekly support       Mental status:  Appearance calm and cooperative , adequate hygiene and grooming and good eye contact    Mood euthymic   Affect affect appropriate    Speech a normal rate   Thought Processes normal thought processes   Hallucinations no hallucinations present    Thought Content no delusions   Abnormal Thoughts no suicidal thoughts  and no homicidal thoughts    Orientation  oriented to person and place and time   Remote Memory short term memory intact and long term memory intact   Attention Span concentration intact   Intellect Appears to be of Average Intelligence   Fund of Knowledge displays adequate knowledge of current events   Insight Insight intact   Judgement judgment was intact   Muscle Strength Muscle strength and tone were normal and Normal gait    Language no difficulty naming common objects   Pain none   Pain Scale 0         [address]: Diagnosis and Treatment Plan explained to Lenny Calvert relates understanding diagnosis and is agreeable to Treatment Plan   Yes

## 2021-01-26 ENCOUNTER — TELEPHONE (OUTPATIENT)
Dept: GASTROENTEROLOGY | Facility: AMBULARY SURGERY CENTER | Age: 41
End: 2021-01-26

## 2021-01-26 NOTE — TELEPHONE ENCOUNTER
Left message informing patient that we need to reschedule her appt on 01/29/2021 with Dr Angelique Min since she will not be in office in the afternoon      Rescheduled patient to 02/10/2020 at 3pm

## 2021-01-27 ENCOUNTER — TRANSCRIBE ORDERS (OUTPATIENT)
Dept: ADMINISTRATIVE | Facility: HOSPITAL | Age: 41
End: 2021-01-27

## 2021-01-27 DIAGNOSIS — R10.2 PELVIC PAIN: Primary | ICD-10-CM

## 2021-02-01 ENCOUNTER — DOCUMENTATION (OUTPATIENT)
Dept: BEHAVIORAL/MENTAL HEALTH CLINIC | Facility: CLINIC | Age: 41
End: 2021-02-01

## 2021-02-01 NOTE — PROGRESS NOTES
Jannette Baugh cancelled her scheduled psychotherapy appt scheduled for today, 2/1/21 at 1600 due to the office being physically closed due to inclement weather  Jannette was offered a virtual appt in which she declined due to a lack of privacy in her home  She confirmed her next scheduled psychotherapy appt

## 2021-02-04 ENCOUNTER — HOSPITAL ENCOUNTER (OUTPATIENT)
Dept: ULTRASOUND IMAGING | Facility: HOSPITAL | Age: 41
Discharge: HOME | End: 2021-02-04
Attending: SPECIALIST
Payer: COMMERCIAL

## 2021-02-04 DIAGNOSIS — R10.2 PELVIC PAIN: ICD-10-CM

## 2021-02-04 PROCEDURE — 76856 US EXAM PELVIC COMPLETE: CPT

## 2021-02-04 PROCEDURE — 76830 TRANSVAGINAL US NON-OB: CPT

## 2021-02-10 ENCOUNTER — OFFICE VISIT (OUTPATIENT)
Dept: GASTROENTEROLOGY | Facility: AMBULARY SURGERY CENTER | Age: 41
End: 2021-02-10
Payer: COMMERCIAL

## 2021-02-10 ENCOUNTER — LAB (OUTPATIENT)
Dept: LAB | Facility: AMBULARY SURGERY CENTER | Age: 41
End: 2021-02-10
Payer: COMMERCIAL

## 2021-02-10 VITALS
HEIGHT: 67 IN | DIASTOLIC BLOOD PRESSURE: 88 MMHG | BODY MASS INDEX: 45.99 KG/M2 | SYSTOLIC BLOOD PRESSURE: 132 MMHG | WEIGHT: 293 LBS

## 2021-02-10 DIAGNOSIS — R19.7 DIARRHEA, UNSPECIFIED TYPE: ICD-10-CM

## 2021-02-10 DIAGNOSIS — K21.9 GASTROESOPHAGEAL REFLUX DISEASE WITHOUT ESOPHAGITIS: ICD-10-CM

## 2021-02-10 DIAGNOSIS — R19.7 DIARRHEA, UNSPECIFIED TYPE: Primary | ICD-10-CM

## 2021-02-10 LAB
CRP SERPL QL: 11.2 MG/L
ERYTHROCYTE [SEDIMENTATION RATE] IN BLOOD: 15 MM/HOUR (ref 0–19)

## 2021-02-10 PROCEDURE — 86140 C-REACTIVE PROTEIN: CPT

## 2021-02-10 PROCEDURE — 3079F DIAST BP 80-89 MM HG: CPT | Performed by: INTERNAL MEDICINE

## 2021-02-10 PROCEDURE — 99214 OFFICE O/P EST MOD 30 MIN: CPT | Performed by: INTERNAL MEDICINE

## 2021-02-10 PROCEDURE — 3008F BODY MASS INDEX DOCD: CPT | Performed by: REGISTERED NURSE

## 2021-02-10 PROCEDURE — 3075F SYST BP GE 130 - 139MM HG: CPT | Performed by: INTERNAL MEDICINE

## 2021-02-10 PROCEDURE — 36415 COLL VENOUS BLD VENIPUNCTURE: CPT

## 2021-02-10 PROCEDURE — 85652 RBC SED RATE AUTOMATED: CPT

## 2021-02-10 RX ORDER — DICYCLOMINE HYDROCHLORIDE 10 MG/1
10 CAPSULE ORAL 3 TIMES DAILY PRN
Qty: 90 CAPSULE | Refills: 2 | Status: SHIPPED | OUTPATIENT
Start: 2021-02-10 | End: 2021-05-10

## 2021-02-10 NOTE — LETTER
February 10, 2021     Tara Fraser PA-C  [address] [facility] [address]    Patient: Bethel Dunn   YOB: 1980   Date of Visit: 2/10/2021       Dear Dr Ernesto Pimentel: Thank you for referring Gaston Perez to me for evaluation  Below are my notes for this consultation  If you have questions, please do not hesitate to call me  I look forward to following your patient along with you  Sincerely,        Dee Hollingsworth MD        CC: No Recipients  Dee Hollingsworth MD  2/10/2021  4:24 PM  Sign when Signing Visit  [facility]  Progress Note - Kyung Baugh 39 y o  female MRN: [id number]    Unit/Bed#:  Encounter: [id number]    Assessment/Plan:    1  Diarrhea- possibly infectious versus inflammatory bowel disease versus a IBS D  No new medications  No dietary changes  -  Would recommend stool studies at this time to rule out infectious causes of diarrhea  Check ova and parasite, C diff, stool PCR  Check inflammatory markers including sed rate and CRP  Would recommend starting a daily probiotic  Recommend low FODMAP diet  Start on Bentyl 10 mg b i d ,  Discussed with patient regarding the possible side effect of constipation  She understands and agrees with the plan  If her inflammatory markers are elevated, would recommend colonoscopy for further evaluation  Additionally if her symptoms do not improve and above workup is negative, can consider colonoscopy for further evaluation  2  Gastritis/ hyperplastic polyp- asymptomatic on Nexium 20 mg daily  Will continue this  Patient will be due for EGD for surveillance of hyperplastic polyp this year  Patient will schedule the EGD  - avoid NSAIDs  Continue to follow anti-reflux measures  Continue to wo      Subjective:    45-year-old female with history of depression, anxiety, presents for follow-up    She underwent EGD last year which was notable for moderate gastritis, bile reflux and innumerable gastric polyps which were hyperplastic  She was recommended a repeat EGD in 1 year interval   She is currently on Nexium 20 mg daily,Reports that her symptoms of acid reflux are well controlled with this  Patient does report symptoms of diarrhea for the past 2 months  Patient reports that she has days of normal bowel movement followed by days of at least 8-10 bowel movements  She reports that these are typically watery bowel movements, denies any nocturnal symptoms, no blood in the stool  She last underwent complete colonoscopy in 2017 at which time she was noted to have a hyperplastic polyp  She does report significant family history of inflammatory bowel disease, reports that her daughter was recently diagnosed with Crohn's disease  She also has Crohn's disease in her brother and other siblings  She denies any recent travel, no recent antibiotic use or sick contacts  No recent hospitalizations  She does report increased stress and is wondering whether her symptoms may be secondary to IBS  No unintentional weight loss, rectal bleeding or mucus in the stool  Objective:     Vitals: Blood pressure 132/88, height 5' 7" (1 702 m), weight 134 kg (295 lb 6 4 oz)  ,Body mass index is 46 27 kg/m²  [unfilled]    Physical Exam:    GEN: wn/wd, NAD  HEENT: MMM, no cervical or supraclavicular LAD, anciteric  CV: RRR, no m/r/g  CHEST: CTA b/l, no w/r/r  ABD: +BS, soft, NT/ND, no hepatosplenomegaly  EXT: no c/c/e  SKIN: no rashes  NEURO: aaox3      Invasive Devices     None                         Lab, Imaging and other studies:     No visits with results within 1 Day(s) from this visit  Latest known visit with results is:   Hospital Outpatient Visit on 01/20/2021   Component Date Value    EXT Preg Test, Ur 01/20/2021 Negative     Control 01/20/2021 Valid          I have personally reviewed pertinent films in PACS    No current facility-administered medications for this visit  Answers for HPI/ROS submitted by the patient on 2/4/2021   Abdominal pain  Chronicity: chronic  Onset: more than 1 month ago  Onset quality: gradual  Frequency: daily  Episode duration: 1 days  Progression since onset: rapidly worsening  Pain location: periumbilical region  Pain - numeric: 6/10  Pain quality: cramping  Radiates to: LLQ, RLQ  anorexia: No  arthralgias: Yes  belching: No  constipation: Yes  diarrhea: Yes  dysuria: No  fever: No  flatus: No  frequency: No  headaches: Yes  hematochezia: No  hematuria: No  melena: No  myalgias: Yes  nausea: Yes  weight loss: No  vomiting: No  Aggravated by: eating  Relieved by: bowel movements, certain positions

## 2021-02-10 NOTE — PROGRESS NOTES
[facility]  Progress Note - Rosanna Baugh 39 y o  female MRN: [id number]    Unit/Bed#:  Encounter: [id number]    Assessment/Plan:    1  Diarrhea- possibly infectious versus inflammatory bowel disease versus a IBS D  No new medications  No dietary changes  -  Would recommend stool studies at this time to rule out infectious causes of diarrhea  Check ova and parasite, C diff, stool PCR  Check inflammatory markers including sed rate and CRP  Would recommend starting a daily probiotic  Recommend low FODMAP diet  Start on Bentyl 10 mg b i d ,  Discussed with patient regarding the possible side effect of constipation  She understands and agrees with the plan  If her inflammatory markers are elevated, would recommend colonoscopy for further evaluation  Additionally if her symptoms do not improve and above workup is negative, can consider colonoscopy for further evaluation  2  Gastritis/ hyperplastic polyp- asymptomatic on Nexium 20 mg daily  Will continue this  Patient will be due for EGD for surveillance of hyperplastic polyp this year  Patient will schedule the EGD  - avoid NSAIDs  Continue to follow anti-reflux measures  Continue to wo      Subjective:    20-year-old female with history of depression, anxiety, presents for follow-up  She underwent EGD last year which was notable for moderate gastritis, bile reflux and innumerable gastric polyps which were hyperplastic  She was recommended a repeat EGD in 1 year interval   She is currently on Nexium 20 mg daily,Reports that her symptoms of acid reflux are well controlled with this  Patient does report symptoms of diarrhea for the past 2 months  Patient reports that she has days of normal bowel movement followed by days of at least 8-10 bowel movements  She reports that these are typically watery bowel movements, denies any nocturnal symptoms, no blood in the stool    She last underwent complete colonoscopy in 2017 at which time she was noted to have a hyperplastic polyp  She does report significant family history of inflammatory bowel disease, reports that her daughter was recently diagnosed with Crohn's disease  She also has Crohn's disease in her brother and other siblings  She denies any recent travel, no recent antibiotic use or sick contacts  No recent hospitalizations  She does report increased stress and is wondering whether her symptoms may be secondary to IBS  No unintentional weight loss, rectal bleeding or mucus in the stool  Objective:     Vitals: Blood pressure 132/88, height 5' 7" (1 702 m), weight 134 kg (295 lb 6 4 oz)  ,Body mass index is 46 27 kg/m²  [unfilled]    Physical Exam:    GEN: wn/wd, NAD  HEENT: MMM, no cervical or supraclavicular LAD, anciteric  CV: RRR, no m/r/g  CHEST: CTA b/l, no w/r/r  ABD: +BS, soft, NT/ND, no hepatosplenomegaly  EXT: no c/c/e  SKIN: no rashes  NEURO: aaox3      Invasive Devices     None                         Lab, Imaging and other studies:     No visits with results within 1 Day(s) from this visit  Latest known visit with results is:   Hospital Outpatient Visit on 01/20/2021   Component Date Value    EXT Preg Test, Ur 01/20/2021 Negative     Control 01/20/2021 Valid          I have personally reviewed pertinent films in PACS    No current facility-administered medications for this visit                Answers for HPI/ROS submitted by the patient on 2/4/2021   Abdominal pain  Chronicity: chronic  Onset: more than 1 month ago  Onset quality: gradual  Frequency: daily  Episode duration: 1 days  Progression since onset: rapidly worsening  Pain location: periumbilical region  Pain - numeric: 6/10  Pain quality: cramping  Radiates to: LLQ, RLQ  anorexia: No  arthralgias: Yes  belching: No  constipation: Yes  diarrhea: Yes  dysuria: No  fever: No  flatus: No  frequency: No  headaches: Yes  hematochezia: No  hematuria: No  melena: No  myalgias: Yes  nausea: Yes  weight loss: No  vomiting: No  Aggravated by: eating  Relieved by: bowel movements, certain positions

## 2021-02-11 ENCOUNTER — TELEMEDICINE (OUTPATIENT)
Dept: FAMILY MEDICINE CLINIC | Facility: CLINIC | Age: 41
End: 2021-02-11
Payer: COMMERCIAL

## 2021-02-11 ENCOUNTER — TELEPHONE (OUTPATIENT)
Dept: GASTROENTEROLOGY | Facility: CLINIC | Age: 41
End: 2021-02-11

## 2021-02-11 ENCOUNTER — LAB (OUTPATIENT)
Dept: LAB | Facility: CLINIC | Age: 41
End: 2021-02-11
Payer: COMMERCIAL

## 2021-02-11 DIAGNOSIS — R19.7 DIARRHEA, UNSPECIFIED TYPE: ICD-10-CM

## 2021-02-11 DIAGNOSIS — M62.830 SPASM OF MUSCLE OF LOWER BACK: ICD-10-CM

## 2021-02-11 DIAGNOSIS — A04.72 C. DIFFICILE COLITIS: Primary | ICD-10-CM

## 2021-02-11 DIAGNOSIS — M54.50 LUMBAR BACK PAIN: ICD-10-CM

## 2021-02-11 DIAGNOSIS — M54.16 LUMBAR RADICULOPATHY: Primary | ICD-10-CM

## 2021-02-11 LAB
C DIFF TOX A+B STL QL IA: NEGATIVE
C DIFF TOX B TCDB STL QL NAA+PROBE: POSITIVE
CAMPYLOBACTER DNA SPEC NAA+PROBE: NORMAL
SALMONELLA DNA SPEC QL NAA+PROBE: NORMAL
SHIGA TOXIN STX GENE SPEC NAA+PROBE: NORMAL
SHIGELLA DNA SPEC QL NAA+PROBE: NORMAL

## 2021-02-11 PROCEDURE — 99213 OFFICE O/P EST LOW 20 MIN: CPT | Performed by: PHYSICIAN ASSISTANT

## 2021-02-11 PROCEDURE — 87209 SMEAR COMPLEX STAIN: CPT

## 2021-02-11 PROCEDURE — 87177 OVA AND PARASITES SMEARS: CPT

## 2021-02-11 PROCEDURE — 87505 NFCT AGENT DETECTION GI: CPT

## 2021-02-11 RX ORDER — VANCOMYCIN HYDROCHLORIDE 125 MG/1
125 CAPSULE ORAL 4 TIMES DAILY
Qty: 40 CAPSULE | Refills: 0 | Status: SHIPPED | OUTPATIENT
Start: 2021-02-11 | End: 2021-02-21

## 2021-02-11 RX ORDER — SACCHAROMYCES BOULARDII 250 MG
250 CAPSULE ORAL 2 TIMES DAILY
Qty: 28 CAPSULE | Refills: 0 | Status: SHIPPED | OUTPATIENT
Start: 2021-02-11 | End: 2021-02-25

## 2021-02-11 RX ORDER — METHOCARBAMOL 750 MG/1
750 TABLET, FILM COATED ORAL EVERY 8 HOURS PRN
Qty: 30 TABLET | Refills: 0 | Status: SHIPPED | OUTPATIENT
Start: 2021-02-11 | End: 2021-08-09

## 2021-02-11 RX ORDER — METHYLPREDNISOLONE 4 MG/1
TABLET ORAL
Qty: 21 EACH | Refills: 0 | Status: SHIPPED | OUTPATIENT
Start: 2021-02-11 | End: 2021-03-12

## 2021-02-11 NOTE — TELEPHONE ENCOUNTER
I spoke with patient  She was on clindamycin in November and December for a dental infection  Even though her toxins are not positive, the C diff PCR is positive  She is having diarrhea every time she eats, and she will have multiple episodes in 1 sitting  I did start her on vancomycin, and tried sending script for probiotic  I told her in the future she should take probiotic with antibiotic  She will avoid dairy products for now  I encouraged hand washing in order to prevent spread  She has a bathroom separate from the other people living in her home  Should we at colonoscopy to her endoscopy in May?

## 2021-02-11 NOTE — PROGRESS NOTES
Virtual Regular Visit      Assessment/Plan:    Problem List Items Addressed This Visit     None      Visit Diagnoses     Lumbar radiculopathy    -  Primary    Relevant Medications    methylPREDNISolone 4 MG tablet therapy pack    methocarbamol (ROBAXIN) 750 mg tablet  - take medrol dose pack with food and full glass of water, especially due to hx of GERD, continue nexium  - rest from lifting, continue home stretching (stop if anything is more aggravating), defers PT at this time     Spasm of muscle of lower back        Relevant Medications    methylPREDNISolone 4 MG tablet therapy pack    methocarbamol (ROBAXIN) 750 mg tablet    Lumbar back pain        Relevant Medications    methylPREDNISolone 4 MG tablet therapy pack    methocarbamol (ROBAXIN) 750 mg tablet               Reason for visit is No chief complaint on file  Encounter provider Shreyas Antoine PA-C    Provider located at [address] [facility],   [address] [facility] [address]      Recent Visits  No visits were found meeting these conditions  Showing recent visits within past 7 days and meeting all other requirements     Future Appointments  No visits were found meeting these conditions  Showing future appointments within next 150 days and meeting all other requirements        The patient was identified by name and date of birth  Jannette Baugh was informed that this is a telemedicine visit and that the visit is being conducted through [facility] and patient was informed that this is a secure, HIPAA-compliant platform  She agrees to proceed     My office door was closed  No one else was in the room  She acknowledged consent and understanding of privacy and security of the video platform  The patient has agreed to participate and understands they can discontinue the visit at any time  Patient is aware this is a billable service       Grisel  Kimberly Stephenson is a 39 y o  female who presents with lower back spasm and pain  HPI   She recently has been having bad spasms across her lower back and into L hip  She got a new bed and lugging girl  cookies all over the place  Her dog has been having seizures and she had a to pick the 60 lbs dog up  She was sitting in the back of the [facility] with the dog while onto the way to the vet  This has all been aggravating her chronic back pain  She has sharp shooting pain down outside of leg into thigh, stops at knee  Her legs feel tired  No change in BM or urination  She has not seen back doctor Dr Dorian Oleary for about 2 years  She declines therapy at this time, feels she just needs to calm down and let it rest and feels in the past therapy aggravated it more  She has been trying to do home stretching       Past Medical History:   Diagnosis Date    Allergic rhinitis     Anxiety     Arthritis     Chronic pain disorder     BACK SHOULDERS NECK    Colon polyp     Depression     Disease of thyroid gland     nodule    Fibromyalgia, primary     GERD (gastroesophageal reflux disease)     Hearing difficulty of right ear     Hyperlipidemia     Hypertension     Irritable bowel syndrome     Perforation of tympanic membrane     Right    PONV (postoperative nausea and vomiting)     RBBB     Right bundle branch blockage     Sleep apnea     no cpap    Wears glasses        Past Surgical History:   Procedure Laterality Date    ANAL FISTULOTOMY N/A 2018    Procedure: FISTULOTOMY;  Surgeon: Mane Morel MD;  [facility];  Service: Colorectal    BACK SURGERY      lumbar herniated disc    BREAST CYST EXCISION Left 13 yrs ago  benign    BREAST SURGERY Left 2006    cyst removal    CARPAL TUNNEL RELEASE       SECTION      CHOLECYSTECTOMY      COLONOSCOPY      CYST REMOVAL      DENTAL SURGERY      INCISION AND DRAINAGE OF WOUND N/A 2018    Procedure: INCISION AND DRAINAGE (I&D) BUTTOCK;  Surgeon: Mane Morel MD; [facility];  Service: Colorectal    MYRINGOTOMY W/ TUBES Right 08/17/2015    [place]    WI COLONOSCOPY FLX DX W/COLLJ SPEC WHEN PFRMD N/A 8/2/2017    Procedure: COLONOSCOPY;  Surgeon: Renny Ash MD;  [facility]; Service: Gastroenterology    WI INCISE FINGER TENDON SHEATH Right 3/13/2018    Procedure: LONG TRIGGER FINGER RELEASE;  Surgeon: Miko Hedrick DO;  [facility];  Service: Orthopedics    WI SURG DIAGNOSTIC EXAM, ANORECTAL N/A 11/2/2018    Procedure: EXAM UNDER ANESTHESIA (EUA); Surgeon: Suzanne Ruiz MD;  [facility];  Service: Colorectal    WI TYMPANOPLASTY Right 5/24/2017    Procedure: TYMPANOPLASTY WITH PERICHONDRIN GRAFT;  Surgeon:  Mary Damon DO;  [facility];  Service: ENT    SHOULDER SURGERY Right     ULNAR NERVE TRANSPOSITION Left     WISDOM TOOTH EXTRACTION         Current Outpatient Medications   Medication Sig Dispense Refill    albuterol (PROVENTIL HFA,VENTOLIN HFA) 90 mcg/act inhaler Inhale 2 puffs every 6 (six) hours as needed for shortness of breath (cough) 1 Inhaler 0    amitriptyline (ELAVIL) 100 mg tablet Take 1 tablet (100 mg total) by mouth daily at bedtime 90 tablet 1    Ascorbic Acid (VITAMIN C) 500 MG CAPS Take 1 capsule by mouth daily      atorvastatin (LIPITOR) 40 mg tablet Take 1 tablet (40 mg total) by mouth daily 90 tablet 1    cetirizine (ZyrTEC) 10 mg tablet Take 10 mg by mouth daily      Cholecalciferol (VITAMIN D3) 5000 units CAPS Take 1 capsule by mouth daily        ciprofloxacin-dexamethasone (CIPRODEX) otic suspension Administer 4 drops to the right ear 2 (two) times a day (Patient taking differently: Administer 4 drops to the right ear 2 (two) times a day As needed) 7 5 mL 0    cyanocobalamin (VITAMIN B-12) 1,000 mcg tablet Take 1,000 mcg by mouth daily       dicyclomine (BENTYL) 10 mg capsule Take 1 capsule (10 mg total) by mouth 3 (three) times a day as needed (diarrhea) 90 capsule 2    esomeprazole (NexIUM 24HR) 20 mg capsule Take 1 capsule (20 mg total) by mouth every morning 90 capsule 3    levonorgestrel (MIRENA) 20 MCG/24HR IUD 1 each by Intrauterine route once      Melatonin 5 MG CAPS Take 5 mg by mouth      methocarbamol (ROBAXIN) 750 mg tablet Take 1 tablet (750 mg total) by mouth every 8 (eight) hours as needed for muscle spasms 30 tablet 0    methylPREDNISolone 4 MG tablet therapy pack Use as directed on package 21 each 0    metoprolol succinate (TOPROL-XL) 50 mg 24 hr tablet Take 1 tablet (50 mg total) by mouth daily 90 tablet 1    Multiple Vitamins-Minerals (CENTRUM ADULTS PO) Take 1 tablet by mouth daily      nystatin (MYCOSTATIN) cream Apply topically 2 (two) times a day as needed (rash) 30 g 0    venlafaxine (EFFEXOR-XR) 150 mg 24 hr capsule Take 1 capsule (150 mg total) by mouth daily Take with 37 5 mg tablet for total 187 5mg 90 capsule 0    venlafaxine (EFFEXOR-XR) 37 5 mg 24 hr capsule Take 1 capsule (37 5 mg total) by mouth daily Take with 150 mg tablet for total 187 5mg 90 capsule 0     No current facility-administered medications for this visit  Allergies   Allergen Reactions    Amoxicillin Swelling, Anaphylaxis and Angioedema    Neurontin [Gabapentin] Other (See Comments)     Other reaction(s): SUICIDE IDEATION  Other reaction(s): Other (See Comments)  suicidal  suicidal    Penicillin V Angioedema and Swelling     Other reaction(s): Throat closure    Penicillins Swelling and Anaphylaxis    Aripiprazole Other (See Comments)     Other reaction(s): low dose 2 mg ; curving in and locking in of hands/dystonia  Other reaction(s): Other (See Comments)  Other reaction(s): low dose 2 mg ; curving in and locking in of hands/dystonia    Lorazepam Other (See Comments)     Other reaction(s): higher dose > anxiety and depression  Other reaction(s):  Other (See Comments)  Other reaction(s): higher dose > anxiety and depression    Carbamazepine Other (See Comments) Other reaction(s): Unknown Reaction    Doxycycline Hives    Lamotrigine Rash    Other Hives     Adhesive tape       Review of Systems   Constitutional: Positive for activity change (increased physical activity )  Negative for appetite change, diaphoresis and fever  Respiratory: Negative  Cardiovascular: Negative  Musculoskeletal: Positive for back pain and myalgias  Neurological: Negative for weakness (no overt weakness, legs feel tired) and numbness  Video Exam    There were no vitals filed for this visit  Physical Exam  Constitutional:       General: She is not in acute distress  Appearance: Normal appearance  She is obese  Comments: Sitting comfortably in recliner   HENT:      Head: Normocephalic and atraumatic  Eyes:      Conjunctiva/sclera: Conjunctivae normal       Pupils: Pupils are equal, round, and reactive to light  Pulmonary:      Effort: Pulmonary effort is normal  No respiratory distress  Musculoskeletal:         General: No deformity  Neurological:      Mental Status: She is alert and oriented to person, place, and time  Psychiatric:         Mood and Affect: Mood normal          Behavior: Behavior normal           I spent 10 minutes directly with the patient during this visit      64 Brigitte Fabian acknowledges that she has consented to an online visit or consultation  She understands that the online visit is based solely on information provided by her, and that, in the absence of a face-to-face physical evaluation by the physician, the diagnosis she receives is both limited and provisional in terms of accuracy and completeness  This is not intended to replace a full medical face-to-face evaluation by the physician  Jannette Baugh understands and accepts these terms

## 2021-02-11 NOTE — TELEPHONE ENCOUNTER
Patients GI provider:  Dr Lexi Don    Number to return call: [phone]     Reason for call: Pt calling stating she received notification in her mychart that her C  Diff results came back abnormal and would like to speak to someone about it    Scheduled procedure/appointment date if applicable: NA

## 2021-02-11 NOTE — TELEPHONE ENCOUNTER
Would recommend adding colonoscopy especially in setting of elevated inflammatory markers  I would also treat her C diff with Vanco as well, especially since she is symptomatic

## 2021-02-12 LAB — O+P STL CONC: NORMAL

## 2021-02-16 ENCOUNTER — TELEMEDICINE (OUTPATIENT)
Dept: BEHAVIORAL/MENTAL HEALTH CLINIC | Facility: CLINIC | Age: 41
End: 2021-02-16
Payer: COMMERCIAL

## 2021-02-16 DIAGNOSIS — F41.8 DEPRESSION WITH ANXIETY: Primary | ICD-10-CM

## 2021-02-16 PROCEDURE — 90834 PSYTX W PT 45 MINUTES: CPT | Performed by: SOCIAL WORKER

## 2021-02-16 NOTE — PSYCH
This note was not shared with the patient due to this is a psychotherapy note     Virtual Regular Visit      Assessment/Plan:    Problem List Items Addressed This Visit        Other    Depression with anxiety - Primary               Reason for visit is No chief complaint on file  Encounter provider Vianey Ordaz    Provider located at [address] [place] [id number] [phone]    The patient was identified by name and date of birth  Jannette Baugh was informed that this is a telemedicine visit and that the visit is being conducted through [facility] and patient was informed that this is a secure, HIPAA-compliant platform  She agrees to proceed     My office door was closed  No one else was in the room  She acknowledged consent and understanding of privacy and security of the video platform  The patient has agreed to participate and understands they can discontinue the visit at any time  Patient is aware this is a billable service  Subjective  Damion Virk is a 39 y o  female  Psychotherapy Provided: Individual Psychotherapy 45 minutes     Length of time in session: 45 minutes, follow up in 2 week    Goals addressed in session: Goal 1     Pain:      none    0    Current suicide risk : Low     Met with Jannette  She did not fully complete her homework from her previous session stating she's been overwhelmed with physical ailments and her dog being ill  She did not write a letter to her brother as she wished to do her previous session  She spoke to her brother last week, and she realizes "I do want a relationship with him "  She notes that therapists in the past felt she should no longer speak to him  We discussed the reasoning for this in session and Jannette plans to implement assertive communication and boundary setting to maintain her wellness  ,  She did join an exercise program with meditation and expresses excitement about wanting to improve her physical health and have her daughter join her  Session focused on providing psychoeducation on how her physical health and mental health are effecting one another and allowing Jannette to process her emotions about recent diagnois' and planned procedures  Jannette reports some relief as she recently received answers after having frequent stomach issues  She was diagnosed with CDIFF and is scheduled for a colonoscopy and endoscopy  She recognizes her anxiety is associates with her stomach issues and feels it is a cycle constantly effecting one another  She expressed her frustration with this and feeling she has a lack of control  She is also reporting cramping and vaginal bleeding and plans to have her Mirena IUD removed  She expresses some concern of how this will effect her mental health due to the hormones  We discussed speaking to her OP psychiatric provider and OBGYN with her concerns  Jannette will remain compliant with medical appts and continue to implement wellness tools outside of program   Continue biweekly support        Mental status:  Appearance calm and cooperative , adequate hygiene and grooming and good eye contact    Mood euthymic   Affect affect appropriate    Speech a normal rate and normal volume   Thought Processes coherent/organized and normal thought processes   Hallucinations no hallucinations present    Thought Content no delusions   Abnormal Thoughts no suicidal thoughts  and no homicidal thoughts    Orientation  oriented to person and place and time   Remote Memory short term memory intact and long term memory intact   Attention Span concentration intact   Intellect Appears to be of Average Intelligence   Fund of Knowledge displays adequate knowledge of current events   Insight fair   Judgement fair   Muscle Strength Muscle strength and tone were normal and Normal gait    Language no difficulty naming common objects Pain none   Pain Scale 0         Behavioral Health Treatment Plan  Luke: Diagnosis and Treatment Plan explained to Joe Sharma relates understanding diagnosis and is agreeable to Treatment Plan  Yes     I spent 50 minutes directly with the patient during this visit      64 Brigitte Fabian acknowledges that she has consented to an online visit or consultation  She understands that the online visit is based solely on information provided by her, and that, in the absence of a face-to-face physical evaluation by the physician, the diagnosis she receives is both limited and provisional in terms of accuracy and completeness  This is not intended to replace a full medical face-to-face evaluation by the physician  Jannette Baugh understands and accepts these terms

## 2021-02-18 ENCOUNTER — TELEMEDICINE (OUTPATIENT)
Dept: PSYCHIATRY | Facility: CLINIC | Age: 41
End: 2021-02-18
Payer: COMMERCIAL

## 2021-02-18 DIAGNOSIS — M79.7 FIBROMYALGIA: ICD-10-CM

## 2021-02-18 DIAGNOSIS — F41.8 DEPRESSION WITH ANXIETY: Primary | ICD-10-CM

## 2021-02-18 PROCEDURE — 99214 OFFICE O/P EST MOD 30 MIN: CPT | Performed by: REGISTERED NURSE

## 2021-02-18 RX ORDER — VENLAFAXINE HYDROCHLORIDE 37.5 MG/1
37.5 CAPSULE, EXTENDED RELEASE ORAL DAILY
Qty: 90 CAPSULE | Refills: 0 | Status: SHIPPED | OUTPATIENT
Start: 2021-02-18 | End: 2021-04-29 | Stop reason: DRUGHIGH

## 2021-02-18 RX ORDER — VENLAFAXINE HYDROCHLORIDE 150 MG/1
150 CAPSULE, EXTENDED RELEASE ORAL DAILY
Qty: 90 CAPSULE | Refills: 0 | Status: SHIPPED | OUTPATIENT
Start: 2021-02-18 | End: 2021-04-29 | Stop reason: SDUPTHER

## 2021-02-18 NOTE — PSYCH
MEDICATION MANAGEMENT NOTE        [facility]      Name and Date of Birth:  Lashon Smiley 39 y o  1980    Date of Visit: February 18, 2021    The patient was identified by name and date of birth  Jannette Baugh was informed that this is a telemedicine visit and that the visit is being conducted through [employer] and patient was informed that this is a secure, HIPAA-compliant platform  She agrees to proceed  she acknowledged consent and understanding of privacy and security of the video platform  The patient has agreed to participate and understands they can discontinue the visit at any time  SUBJECTIVE:  CC: Jannette presents today for outpatient psychiatric follow up for depression with anxiety  She was last seen by Dr Dee Lee   on October 7, 2020  Jannette reports that she had her Mirena IUD removed yesterday due to constant bleeding issues and severe cramping  She reports that last month she had an increase in her anxiety and was having depression  Today she says she is feeling better and rates her depression a 5/10 and anxiety a 5/10 on rating scale 1-10 (10 being worst symptoms)  Jannette denies any panic attacks  She verbalized that her mood today is "pretty good"  She denies SI/ HI/ AVH  Jannette denies any medication issues or side effects  Jannette reports that she has had some additional medical stressors and was recently diagnosed with C-diff after a course of antibiotics  She denies any issues with memory focus and concentration  She reports her sleep is good and takes melatonin 5 mg which is effective  Jannette reports her appetite is increased at times and she tends to binge eat approximately 4 times a week  She did report she utilizes her fitness [employer] and has a plan to write down a daily schedule to help her not feel bored (boredom appears to be a trigger to binge eating)    Lynda Lam reports that she is socially connected and talks to several of her friends on a daily basis  Jannette is in agreement to continue her current medication regimen  Jannette denies any side effects from medications unless noted above    Since our last visit, overall symptoms have been varying  At this time she is feeling better  Do Console HPI ROS:               Medication Side Effects:  denies     Depression (10 worst): 5/10    Anxiety (10 worst): 5/10    Safety concerns (SI, HI, etc): Denies     Sleep: good    Energy: fair    Appetite: Reports binge eating 4 times a week    Weight Change: Did report some weight gain, unknown amount          Review Of Systems as noted above      Psychiatric History  Reviewed    History Review:  The following portions of the patient's history were reviewed and updated as appropriate: allergies, current medications, past family history, past medical history, past social history, past surgical history and problem list      Lab Review: Labs were reviewed      OBJECTIVE:     MENTAL STATUS EXAM  Appearance:  age appropriate, dressed casually   Behavior:  pleasant, cooperative, with good eye contact   Speech:  Normal volume, regular rate and rhythm   Mood:  depressed   Affect:  mood congruent   Language: intact and appropriate for age, education, and intellect   Thought Process:  Linear and goal directed   Associations: intact associations   Thought Content:  normal and appropriate   Perceptual Disturbances: no auditory or visual hallcunations   Risk Potential / Abnormal Thoughts: Suicidal ideation - None  Homicidal ideation - None  Potential for aggression - No       Consciousness:  Alert & Awake   Sensorium:  Grossly oriented   Attention: attention span and concentration are age appropriate       Fund of Knowledge:  Memory: awareness of current events: yes  recent and remote memory grossly intact   Insight:  fair   Judgment: fair   Muscle Strength Muscle Tone: not assessed, virtual visit  not assessed, virtual visit   Gait/Station: not observed   Motor Activity: no abnormal movements       Risks, Benefits And Possible Side Effects Of Medications:    AGREE: Risks, benefits, and possible side effects of medications explained to Jannette and she (or legal representative) verbalizes understanding and agreement for treatment  Controlled Medication Discussion:     n/a    Recent labs:  Lab on 02/11/2021   Component Date Value     C difficile toxin by PC* 02/11/2021 Positive*    C difficile Toxins A+B, * 02/11/2021 Negative     Ova + Parasite Exam 02/11/2021 No ova, cysts, or parasites seen     One negative specimen does not rule out the possibility of a  parasitic infection   Salmonella sp PCR 02/11/2021 None Detected     Shigella sp/Enteroinvasi* 02/11/2021 None Detected     Campylobacter sp (jejuni* 02/11/2021 None Detected     Shiga toxin 1/Shiga toxi* 02/11/2021 None Detected    Lab on 02/10/2021   Component Date Value    Sed Rate 02/10/2021 15     CRP 02/10/2021 11 [address] [place] [address] Outpatient Visit on 01/20/2021   Component Date Value    EXT Preg Test, Ur 01/20/2021 Negative     Control 01/20/2021 Valid          Assessment/Plan:    Follow up with provider in one month  Continue Effexor  5 mg po daily  Jannette continues to be engaged in therapy  Jannette was educated to call the office if she had any changes in her depression/anxiety post removal of her IUD    Diagnoses and all orders for this visit:    Depression with anxiety  -     venlafaxine (EFFEXOR-XR) 150 mg 24 hr capsule; Take 1 capsule (150 mg total) by mouth daily Take with 37 5 mg tablet for total 187 5mg  -     venlafaxine (EFFEXOR-XR) 37 5 mg 24 hr capsule; Take 1 capsule (37 5 mg total) by mouth daily Take with 150 mg tablet for total 187 5mg    Fibromyalgia                Patient has been educated about their diagnosis and treatment modalities   They voiced understanding and agreement with the following plan:  Discussed medications and if treatment adjustment was needed/desired  Discussed self monitoring of symptoms, and symptom monitoring tools  Patient has been informed of 24 hours and weekend coverage for urgent situations accessed by calling the main clinic phone number  Psychotherapy in session:    Education and counseling regarding diagnosis, medication and treatment plan  Discussed and support provided regarding patients current stressors  VIRTUAL VISIT DISCLAIMER  Jannette Baugh acknowledges that she has consented to an online visit or consultation  she understands that the online visit is based solely on information provided by her , and that, in the absence of a face-to-face physical evaluation by the physician, the diagnosis she receives is both limited and provisional in terms of accuracy and completeness  This is not intended to replace a full medical face-to-face evaluation by the provider  Jannette Baugh understands and accepts these terms

## 2021-03-03 ENCOUNTER — DOCUMENTATION (OUTPATIENT)
Dept: BEHAVIORAL/MENTAL HEALTH CLINIC | Facility: CLINIC | Age: 41
End: 2021-03-03

## 2021-03-03 NOTE — PROGRESS NOTES
Jannette Baugh cancelled her psychotherapy appt scheduled for 3/2/21 at 1400 due to transportation issues  A virtual session was offered in which she declined  Treatment Plan not completed within required time limits due to:   Jannette Baugh  canceled appointment on 3/2/21

## 2021-03-10 DIAGNOSIS — Z23 ENCOUNTER FOR IMMUNIZATION: ICD-10-CM

## 2021-03-11 ENCOUNTER — TELEPHONE (OUTPATIENT)
Dept: FAMILY MEDICINE CLINIC | Facility: CLINIC | Age: 41
End: 2021-03-11

## 2021-03-11 NOTE — TELEPHONE ENCOUNTER
Pt called, stating that she has been trying to lose weight for  For the past 3 weeks, she states that she is exercising for 1 hour per day and limiting her food to 1800 calories per day and has not lost anything, and is frustrated  She is asking if there is anything that you recommend, if there is any testing that needs to be done, etc?    Please advise  Thank you

## 2021-03-12 ENCOUNTER — TELEMEDICINE (OUTPATIENT)
Dept: FAMILY MEDICINE CLINIC | Facility: CLINIC | Age: 41
End: 2021-03-12
Payer: COMMERCIAL

## 2021-03-12 DIAGNOSIS — R73.01 IMPAIRED FASTING BLOOD SUGAR: ICD-10-CM

## 2021-03-12 DIAGNOSIS — Z13.29 THYROID DISORDER SCREEN: ICD-10-CM

## 2021-03-12 DIAGNOSIS — R63.8 UNABLE TO LOSE WEIGHT: ICD-10-CM

## 2021-03-12 DIAGNOSIS — E66.01 MORBID OBESITY WITH BMI OF 45.0-49.9, ADULT (HCC): Primary | ICD-10-CM

## 2021-03-12 PROCEDURE — 99213 OFFICE O/P EST LOW 20 MIN: CPT | Performed by: PHYSICIAN ASSISTANT

## 2021-03-12 NOTE — PROGRESS NOTES
Virtual Regular Visit      Assessment/Plan:    Problem List Items Addressed This Visit     None      Visit Diagnoses     Morbid obesity with BMI of 45 0-49 9, adult ([address] )    -  Primary    Relevant Orders    Ambulatory referral to Weight Management    Unable to lose weight        Relevant Orders    Hemoglobin A1C (Completed)    Comprehensive metabolic panel (Completed)    TSH, 3rd generation with Free T4 reflex (Completed)    Ambulatory referral to Weight Management    Impaired fasting blood sugar        Relevant Orders    Hemoglobin A1C (Completed)    Comprehensive metabolic panel (Completed)    Thyroid disorder screen        Relevant Orders    TSH, 3rd generation with Free T4 reflex (Completed)        She was praised for her efforts, encouraged to continue healthier lifestyle choices  She is trying to over focus on scale, just ordered measuring tape  BMI Counseling: There is no height or weight on file to calculate BMI  The BMI is above normal  Nutrition recommendations include decreasing portion sizes, encouraging healthy choices of fruits and vegetables, consuming healthier snacks, limiting drinks that contain sugar, moderation in carbohydrate intake, increasing intake of lean protein and reducing intake of cholesterol  Exercise recommendations include exercising 3-5 times per week  Patient referred to weight management due to patient being overweight             Reason for visit is   Chief Complaint   Patient presents with    Virtual Regular Visit        Encounter provider Vernita Schlatter, PA-C    Provider located at [address] [place] [address], [address] [address]      Recent Visits  Date Type Provider Dept   03/12/21 Telemedicine Vernita Schlatter, PA-C [facility]   03/11/21 Telephone Curry Treadwell MA [facility] recent visits within past 7 days and meeting all other requirements     Future Appointments  No visits were found meeting these conditions  Showing future appointments within next 150 days and meeting all other requirements        The patient was identified by name and date of birth  Jannette CYNTHIA Baugh was informed that this is a telemedicine visit and that the visit is being conducted through [facility] and patient was informed that this is a secure, HIPAA-compliant platform  She agrees to proceed     My office door was closed  No one else was in the room  She acknowledged consent and understanding of privacy and security of the video platform  The patient has agreed to participate and understands they can discontinue the visit at any time  Patient is aware this is a billable service  Subjective  Reather Andrez is a 39 y o  female who is having difficulty losing weight  HPI   Patient is a 40 yo female with morbid obesity  About 3 weeks ago, she decided to get more strict and disciplined  She has been using [employer] to track her food  She restricted her calories to around 1800 calories and has been sticking to this  She is keeping her busy to not binge and snack at night  In the morning, she has coffee and yogurt, maybe a banana  She puts sugary creamer in her coffee  She has been eating eggs and toast for lunch or a sandwich or tuna salad  For dinner, she will do a small portion of carb like spaghetti, sausage and veggie  For snacks, she did celery  After dinner, sometimes would treat with a mini icecream and popcorn  She has cut back on portion and is measuring her food  She does a 45 min cardio video in the morning and walks her dog at night  She has been able to increase the amount of time she is able to get through since starting  She has been getting over 10-12 thousand steps per day  She has chronic back pain but is trying to remain active  She just had Mirena taken out about 1 month ago  She had period for 1 5 week long, ended 3 weeks ago       She is frustrated as the scale has not budged yet despite her efforts above  She recalls being bale to lose 60 lbs in the past with changes above and wants to make sure nothing is wrong  She is open to weight management, declines any surgical option  Past Medical History:   Diagnosis Date    Allergic rhinitis     Anxiety     Arthritis     Chronic pain disorder     BACK SHOULDERS NECK    Colon polyp     Depression     Disease of thyroid gland     nodule    Fibromyalgia, primary     GERD (gastroesophageal reflux disease)     Hearing difficulty of right ear     Hyperlipidemia     Hypertension     Irritable bowel syndrome     Perforation of tympanic membrane     Right    PONV (postoperative nausea and vomiting)     RBBB     Right bundle branch blockage     Sleep apnea     no cpap    Wears glasses        Past Surgical History:   Procedure Laterality Date    ANAL FISTULOTOMY N/A 2018    Procedure: FISTULOTOMY;  Surgeon: Shirlene Mcduffie MD;  [facility];  Service: Colorectal    BACK SURGERY      lumbar herniated disc    BREAST CYST EXCISION Left 13 yrs ago  benign    BREAST SURGERY Left 2006    cyst removal    CARPAL TUNNEL RELEASE       SECTION      CHOLECYSTECTOMY      COLONOSCOPY      CYST REMOVAL      DENTAL SURGERY      INCISION AND DRAINAGE OF WOUND N/A 2018    Procedure: INCISION AND DRAINAGE (I&D) BUTTOCK;  Surgeon: Shirlene Mcduffie MD;  [facility];  Service: Colorectal    MYRINGOTOMY W/ TUBES Right 2015    [place]    NE COLONOSCOPY FLX DX W/COLLJ SPEC WHEN PFRMD N/A 2017    Procedure: COLONOSCOPY;  Surgeon: Roberto Kelly MD;  [facility]; Service: Gastroenterology    NE INCISE FINGER TENDON SHEATH Right 3/13/2018    Procedure: LONG TRIGGER FINGER RELEASE;  Surgeon: Karma Granados DO;  [facility];  Service: Orthopedics    NE SURG DIAGNOSTIC EXAM, ANORECTAL N/A 2018    Procedure: EXAM UNDER ANESTHESIA (EUA);   Surgeon: Hiram Najera Lori Doyle MD;  [facility];  Service: Colorectal    LA TYMPANOPLASTY Right 5/24/2017    Procedure: TYMPANOPLASTY WITH PERICHONDRIN GRAFT;  Surgeon:  Raeann Mcmahon DO;  [facility];  Service: ENT    SHOULDER SURGERY Right     ULNAR NERVE TRANSPOSITION Left     WISDOM TOOTH EXTRACTION         Current Outpatient Medications   Medication Sig Dispense Refill    albuterol (PROVENTIL HFA,VENTOLIN HFA) 90 mcg/act inhaler Inhale 2 puffs every 6 (six) hours as needed for shortness of breath (cough) 1 Inhaler 0    amitriptyline (ELAVIL) 100 mg tablet Take 1 tablet (100 mg total) by mouth daily at bedtime 90 tablet 1    Ascorbic Acid (VITAMIN C) 500 MG CAPS Take 1 capsule by mouth daily      atorvastatin (LIPITOR) 40 mg tablet Take 1 tablet (40 mg total) by mouth daily 90 tablet 1    cetirizine (ZyrTEC) 10 mg tablet Take 10 mg by mouth daily      Cholecalciferol (VITAMIN D3) 5000 units CAPS Take 1 capsule by mouth daily        ciprofloxacin-dexamethasone (CIPRODEX) otic suspension Administer 4 drops to the right ear 2 (two) times a day (Patient taking differently: Administer 4 drops to the right ear 2 (two) times a day As needed) 7 5 mL 0    cyanocobalamin (VITAMIN B-12) 1,000 mcg tablet Take 1,000 mcg by mouth daily       dicyclomine (BENTYL) 10 mg capsule Take 1 capsule (10 mg total) by mouth 3 (three) times a day as needed (diarrhea) 90 capsule 2    esomeprazole (NexIUM 24HR) 20 mg capsule Take 1 capsule (20 mg total) by mouth every morning 90 capsule 3    levonorgestrel (MIRENA) 20 MCG/24HR IUD 1 each by Intrauterine route once      Melatonin 5 MG CAPS Take 5 mg by mouth      methocarbamol (ROBAXIN) 750 mg tablet Take 1 tablet (750 mg total) by mouth every 8 (eight) hours as needed for muscle spasms 30 tablet 0    metoprolol succinate (TOPROL-XL) 50 mg 24 hr tablet Take 1 tablet (50 mg total) by mouth daily 90 tablet 1    Multiple Vitamins-Minerals (CENTRUM ADULTS PO) Take 1 tablet by mouth daily      nystatin (MYCOSTATIN) cream Apply topically 2 (two) times a day as needed (rash) 30 g 0    venlafaxine (EFFEXOR-XR) 150 mg 24 hr capsule Take 1 capsule (150 mg total) by mouth daily Take with 37 5 mg tablet for total 187 5mg 90 capsule 0    venlafaxine (EFFEXOR-XR) 37 5 mg 24 hr capsule Take 1 capsule (37 5 mg total) by mouth daily Take with 150 mg tablet for total 187 5mg 90 capsule 0     No current facility-administered medications for this visit  Allergies   Allergen Reactions    Amoxicillin Swelling, Anaphylaxis and Angioedema    Neurontin [Gabapentin] Other (See Comments)     Other reaction(s): SUICIDE IDEATION  Other reaction(s): Other (See Comments)  suicidal  suicidal    Penicillin V Angioedema and Swelling     Other reaction(s): Throat closure    Penicillins Swelling and Anaphylaxis    Aripiprazole Other (See Comments)     Other reaction(s): low dose 2 mg ; curving in and locking in of hands/dystonia  Other reaction(s): Other (See Comments)  Other reaction(s): low dose 2 mg ; curving in and locking in of hands/dystonia    Lorazepam Other (See Comments)     Other reaction(s): higher dose > anxiety and depression  Other reaction(s): Other (See Comments)  Other reaction(s): higher dose > anxiety and depression    Carbamazepine Other (See Comments)     Other reaction(s): Unknown Reaction    Doxycycline Hives    Lamotrigine Rash    Other Hives     Adhesive tape       Review of Systems    Video Exam    There were no vitals filed for this visit  Physical Exam  Constitutional:       General: She is not in acute distress  Appearance: She is obese  Eyes:      General: No scleral icterus  Conjunctiva/sclera: Conjunctivae normal    Pulmonary:      Effort: Pulmonary effort is normal  No respiratory distress  Skin:     General: Skin is dry  Coloration: Skin is not pale  Neurological:      General: No focal deficit present        Mental Status: She is alert and oriented to person, place, and time  Mental status is at baseline  Psychiatric:         Mood and Affect: Mood normal          Behavior: Behavior normal           I spent 20 minutes directly with the patient during this visit      64 Brigitte Fabian acknowledges that she has consented to an online visit or consultation  She understands that the online visit is based solely on information provided by her, and that, in the absence of a face-to-face physical evaluation by the physician, the diagnosis she receives is both limited and provisional in terms of accuracy and completeness  This is not intended to replace a full medical face-to-face evaluation by the physician  Jannette Baugh understands and accepts these terms

## 2021-03-13 ENCOUNTER — APPOINTMENT (OUTPATIENT)
Dept: LAB | Facility: CLINIC | Age: 41
End: 2021-03-13
Payer: COMMERCIAL

## 2021-03-13 DIAGNOSIS — R73.01 IMPAIRED FASTING BLOOD SUGAR: ICD-10-CM

## 2021-03-13 DIAGNOSIS — Z13.29 THYROID DISORDER SCREEN: ICD-10-CM

## 2021-03-13 DIAGNOSIS — R63.8 UNABLE TO LOSE WEIGHT: ICD-10-CM

## 2021-03-13 LAB
ALBUMIN SERPL BCP-MCNC: 4.1 G/DL (ref 3.5–5)
ALP SERPL-CCNC: 70 U/L (ref 46–116)
ALT SERPL W P-5'-P-CCNC: 78 U/L (ref 12–78)
ANION GAP SERPL CALCULATED.3IONS-SCNC: 7 MMOL/L (ref 4–13)
AST SERPL W P-5'-P-CCNC: 36 U/L (ref 5–45)
BILIRUB SERPL-MCNC: 0.36 MG/DL (ref 0.2–1)
BILIRUB UR QL STRIP: NEGATIVE
BUN SERPL-MCNC: 10 MG/DL (ref 5–25)
CALCIUM SERPL-MCNC: 9.2 MG/DL (ref 8.3–10.1)
CHLORIDE SERPL-SCNC: 110 MMOL/L (ref 100–108)
CLARITY UR: CLEAR
CO2 SERPL-SCNC: 27 MMOL/L (ref 21–32)
COLOR UR: YELLOW
CREAT SERPL-MCNC: 0.59 MG/DL (ref 0.6–1.3)
EST. AVERAGE GLUCOSE BLD GHB EST-MCNC: 100 MG/DL
GFR SERPL CREATININE-BSD FRML MDRD: 114 ML/MIN/1.73SQ M
GLUCOSE P FAST SERPL-MCNC: 113 MG/DL (ref 65–99)
GLUCOSE UR STRIP-MCNC: NEGATIVE MG/DL
HBA1C MFR BLD: 5.1 %
HGB UR QL STRIP.AUTO: NEGATIVE
KETONES UR STRIP-MCNC: NEGATIVE MG/DL
LEUKOCYTE ESTERASE UR QL STRIP: NEGATIVE
NITRITE UR QL STRIP: NEGATIVE
PH UR STRIP.AUTO: 6.5 [PH]
POTASSIUM SERPL-SCNC: 3.8 MMOL/L (ref 3.5–5.3)
PROT SERPL-MCNC: 7 G/DL (ref 6.4–8.2)
PROT UR STRIP-MCNC: NEGATIVE MG/DL
SODIUM SERPL-SCNC: 144 MMOL/L (ref 136–145)
SP GR UR STRIP.AUTO: 1.01 (ref 1–1.03)
TSH SERPL DL<=0.05 MIU/L-ACNC: 0.87 UIU/ML (ref 0.36–3.74)
UROBILINOGEN UR QL STRIP.AUTO: 0.2 E.U./DL

## 2021-03-13 PROCEDURE — 83036 HEMOGLOBIN GLYCOSYLATED A1C: CPT

## 2021-03-13 PROCEDURE — 84443 ASSAY THYROID STIM HORMONE: CPT

## 2021-03-13 PROCEDURE — 36415 COLL VENOUS BLD VENIPUNCTURE: CPT

## 2021-03-13 PROCEDURE — 80053 COMPREHEN METABOLIC PANEL: CPT

## 2021-03-13 PROCEDURE — 81003 URINALYSIS AUTO W/O SCOPE: CPT | Performed by: PHYSICIAN ASSISTANT

## 2021-03-16 ENCOUNTER — TELEMEDICINE (OUTPATIENT)
Dept: BEHAVIORAL/MENTAL HEALTH CLINIC | Facility: CLINIC | Age: 41
End: 2021-03-16
Payer: COMMERCIAL

## 2021-03-16 DIAGNOSIS — F41.1 GENERALIZED ANXIETY DISORDER: ICD-10-CM

## 2021-03-16 DIAGNOSIS — F33.1 MODERATE RECURRENT MAJOR DEPRESSION (HCC): Primary | ICD-10-CM

## 2021-03-16 PROCEDURE — 90834 PSYTX W PT 45 MINUTES: CPT | Performed by: SOCIAL WORKER

## 2021-03-16 NOTE — PSYCH
This note was not shared with the patient due to this is a psychotherapy note       Virtual Regular Visit      Assessment/Plan:    Problem List Items Addressed This Visit        Other    Moderate recurrent major depression ([address] ) - Primary    Generalized anxiety disorder               Reason for visit is VIRTUAL PSYCHOTHERAPY SESSION DUE TO COVID-19     Encounter provider [facility]    Provider located at [address] [place] [id number] [phone]    The patient was identified by name and date of birth  Jannette Baugh was informed that this is a telemedicine visit and that the visit is being conducted through [employer] and patient was informed that this is a secure, HIPAA-compliant platform  She agrees to proceed     My office door was closed  No one else was in the room  She acknowledged consent and understanding of privacy and security of the video platform  The patient has agreed to participate and understands they can discontinue the visit at any time  Patient is aware this is a billable service  Subjective    Chris Coyne is a 39 y o  female   Psychotherapy Provided: Individual Psychotherapy 45 minutes     Length of time in session: 45 minutes, follow up in 2 week    Goals addressed in session: Goal 1     Pain:      none    0    Current suicide risk : Low     Met with Jannette  She reports doing overall well  She completed her homework from the previous session by remaining compliant with her medical appts  She is reporting positive results physically and emotionally today due to this  Session focused on reviewing Jannette's treatment plan progress and focusing on behavioral activation tools to promote her overall wellness using DBT  We reviewed Jannette's treatment plan goals and made revisions as appropriate    She feels progress has been made in addressing past trauma and wishes to focus on creating a healthier identify for herself  Psychoeducation provided on how her physical and emotional wellness are connected  Jannette processed her emotions about recent medical issues  She has made some progress in this area and recently had her IUD removed  She reports feeling better after this  She wishes to become actively involved in weight management again and is exercising several days a week  She has increased her workouts from 15 minutes to 45 minutes  She is voicing some improvement in her self worth by using these behavioral activation tools  She was praised by this provider on her effort she has put forth in her wellness  Jannette will continue to implement wellness tools learned in sessions with focus on behavioral activation tools  Continue biweekly support  PHQ-9 Depression Screening    PHQ-9:   Frequency of the following problems over the past two weeks:      Little interest or pleasure in doing things: 2 - more than half the days  Feeling down, depressed, or hopeless: 2 - more than half the days  Trouble falling or staying asleep, or sleeping too much: 3 - nearly every day  Feeling tired or having little energy: 3 - nearly every day  Poor appetite or overeatin - not at all  Feeling bad about yourself - or that you are a failure or have let yourself or your family down: 3 - nearly every day  Trouble concentrating on things, such as reading the newspaper or watching television: 3 - nearly every day  Moving or speaking so slowly that other people could have noticed  Or the opposite - being so fidgety or restless that you have been moving around a lot more than usual: 0 - not at all  Thoughts that you would be better off dead, or of hurting yourself in some way: 1 - several days  PHQ-2 Score: 4  PHQ-9 Score: 17         MILADYS-7 Flowsheet Screening      Most Recent Value   Over the last two weeks, how often have you been bothered by the following problems?     Feeling nervous, anxious, or on edge  1   Not being able to stop or control worrying  3   Worrying too much about different things  3   Trouble relaxing   2   Being so restless that it's hard to sit still  0   Becoming easily annoyed or irritable   1   Feeling afraid as if something awful might happen  1   How difficult have these problems made it for you to do your work, take care of things at home, or get along with other people? Somewhat difficult   MILADYS Score   11            Behavioral Health Treatment Plan [facility]: Diagnosis and Treatment Plan explained to [facility] relates understanding diagnosis and is agreeable to Treatment Plan  Yes       I spent 45 minutes directly with the patient during this visit      Lavon Fabian acknowledges that she has consented to an online visit or consultation  She understands that the online visit is based solely on information provided by her, and that, in the absence of a face-to-face physical evaluation by the physician, the diagnosis she receives is both limited and provisional in terms of accuracy and completeness  This is not intended to replace a full medical face-to-face evaluation by the physician  Jannette Baugh understands and accepts these terms
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
## 2021-03-18 ENCOUNTER — TELEMEDICINE (OUTPATIENT)
Dept: PSYCHIATRY | Facility: CLINIC | Age: 41
End: 2021-03-18
Payer: COMMERCIAL

## 2021-03-18 DIAGNOSIS — F33.1 MODERATE RECURRENT MAJOR DEPRESSION (HCC): Primary | ICD-10-CM

## 2021-03-18 DIAGNOSIS — F41.1 GENERALIZED ANXIETY DISORDER: ICD-10-CM

## 2021-03-18 PROCEDURE — 1036F TOBACCO NON-USER: CPT | Performed by: REGISTERED NURSE

## 2021-03-18 PROCEDURE — 99214 OFFICE O/P EST MOD 30 MIN: CPT | Performed by: REGISTERED NURSE

## 2021-03-18 NOTE — PSYCH
Virtual Regular Visit      Assessment/Plan:    Problem List Items Addressed This Visit        Other    Moderate recurrent major depression ([address] ) - Primary    Generalized anxiety disorder               Reason for visit is   Chief Complaint   Patient presents with    Virtual Regular Visit        Encounter provider Steve Amor    Provider located at [address] [address] [phone]      Recent Visits  Date Type Provider Dept   03/12/21 Telemedicine Kalyani Acuña PA-C [facility] recent visits within past 7 days and meeting all other requirements     Today's Visits  Date Type Provider Dept   03/18/21 [facility] ELAN [facility] [place]   Showing today's visits and meeting all other requirements     Future Appointments  No visits were found meeting these conditions  Showing future appointments within next 150 days and meeting all other requirements        The patient was identified by name and date of birth  Jannette Baugh was informed that this is a telemedicine visit and that the visit is being conducted through [employer] and patient was informed that this is a secure, HIPAA-compliant platform  She agrees to proceed     My office door was closed  No one else was in the room  She acknowledged consent and understanding of privacy and security of the video platform  The patient has agreed to participate and understands they can discontinue the visit at any time  Patient is aware this is a billable service  Subjective  Chris Coyne is a 39 y o  female who presented today for medication management visit for depression and anxiety  Jannette was last seen 2/18/2021 in the office  She continues to be engaged in therapy with [employer]  She reports therapy is going well   She does report some fluctuations with her mood and stated that she has been dieting the last few weeks without weight loss and this frustrates her  She endorses trying a lower carb diet and has been exercising 45 minutes at a time with a walking DVD  She stated that in the beginning she could only complete 20 minutes and is now increased to 45 minutes  Jannette  was proud of herself and CRNP congratulated her on her accomplishment  Jannette also stated that she will walk he dog daily  Energy levels are reported as fair  Sleep is reported as fair with some difficulty falling asleep  Discussed increasing Melatonin from 5 mg to 6 mg (takes OTC)  Appetite is fair and as mentioned she is dieting and attempting to lose weight  Jannette denies suicidal or homicidal ideation  She denies psychosis  Depression is rated a 3-4/10 on scale with 10 being worse symptoms  She reports anxiety is manageable and rates 3/10 on same scale  Jannette does report that she had 3 days of being tearful over the last 2 weeks and "overthinking"  She did report that she has a close friend who she will call when she feels down  Reports she is supportive  She states that all is well at home and feels supported  Jannette reports that her PCP ordered labs and was happy to report her hemaglobin A1C was 5 1 and thyroid testing was normal  Jannette is in agreement to stay on current medication regimen and follow up with provider in 2 months         Plan:  Follow up in 2 months or sooner if needed  Continue Effexor XR at 187 5 mg po daily  Increase Melatonin to 6 mg po HS  Continue therapy   Encouraged to continue exercise program    HPI     Past Medical History:   Diagnosis Date    Allergic rhinitis     Anxiety     Arthritis     Chronic pain disorder     BACK SHOULDERS NECK    Colon polyp     Depression     Disease of thyroid gland     nodule    Fibromyalgia, primary     GERD (gastroesophageal reflux disease)     Hearing difficulty of right ear     Hyperlipidemia     Hypertension     Irritable bowel syndrome     Perforation of tympanic membrane     Right    PONV (postoperative nausea and vomiting)     RBBB     Right bundle branch blockage     Sleep apnea     no cpap    Wears glasses        Past Surgical History:   Procedure Laterality Date    ANAL FISTULOTOMY N/A 2018    Procedure: FISTULOTOMY;  Surgeon: Bola Flores MD;  [facility];  Service: Colorectal    BACK SURGERY      lumbar herniated disc    BREAST CYST EXCISION Left 13 yrs ago  benign    BREAST SURGERY Left 2006    cyst removal    CARPAL TUNNEL RELEASE       SECTION      CHOLECYSTECTOMY      COLONOSCOPY      CYST REMOVAL      DENTAL SURGERY      INCISION AND DRAINAGE OF WOUND N/A 2018    Procedure: INCISION AND DRAINAGE (I&D) BUTTOCK;  Surgeon: Bola Flores MD;  [facility];  Service: Colorectal    MYRINGOTOMY W/ TUBES Right 2015    [facility]    [place] COLONOSCOPY FLX DX W/COLLJ SPEC WHEN PFRMD N/A 2017    Procedure: COLONOSCOPY;  Surgeon: Kee Price MD;  [facility]; Service: Gastroenterology    [place] INCISE FINGER TENDON SHEATH Right 3/13/2018    Procedure: LONG TRIGGER FINGER RELEASE;  Surgeon: Derrick Rosario DO;  [facility];  Service: Orthopedics    [place] SURG DIAGNOSTIC EXAM, ANORECTAL N/A 2018    Procedure: EXAM UNDER ANESTHESIA (EUA); Surgeon: Bola Flores MD;  [facility];  Service: Colorectal    [place] TYMPANOPLASTY Right 2017    Procedure: TYMPANOPLASTY WITH PERICHONDRIN GRAFT;  Surgeon:  Afshan Frye DO;  [facility];  Service: ENT    SHOULDER SURGERY Right     ULNAR NERVE TRANSPOSITION Left     WISDOM TOOTH EXTRACTION         Current Outpatient Medications   Medication Sig Dispense Refill    albuterol (PROVENTIL HFA,VENTOLIN HFA) 90 mcg/act inhaler Inhale 2 puffs every 6 (six) hours as needed for shortness of breath (cough) 1 Inhaler 0    amitriptyline (ELAVIL) 100 mg tablet Take 1 tablet (100 mg total) by mouth daily at bedtime 90 tablet 1    Ascorbic Acid (VITAMIN C) 500 MG CAPS Take 1 capsule by mouth daily      atorvastatin (LIPITOR) 40 mg tablet Take 1 tablet (40 mg total) by mouth daily 90 tablet 1    cetirizine (ZyrTEC) 10 mg tablet Take 10 mg by mouth daily      Cholecalciferol (VITAMIN D3) 5000 units CAPS Take 1 capsule by mouth daily        ciprofloxacin-dexamethasone (CIPRODEX) otic suspension Administer 4 drops to the right ear 2 (two) times a day (Patient taking differently: Administer 4 drops to the right ear 2 (two) times a day As needed) 7 5 mL 0    cyanocobalamin (VITAMIN B-12) 1,000 mcg tablet Take 1,000 mcg by mouth daily       dicyclomine (BENTYL) 10 mg capsule Take 1 capsule (10 mg total) by mouth 3 (three) times a day as needed (diarrhea) 90 capsule 2    esomeprazole (NexIUM 24HR) 20 mg capsule Take 1 capsule (20 mg total) by mouth every morning 90 capsule 3    levonorgestrel (MIRENA) 20 MCG/24HR IUD 1 each by Intrauterine route once      Melatonin 5 MG CAPS Take 5 mg by mouth      methocarbamol (ROBAXIN) 750 mg tablet Take 1 tablet (750 mg total) by mouth every 8 (eight) hours as needed for muscle spasms 30 tablet 0    metoprolol succinate (TOPROL-XL) 50 mg 24 hr tablet Take 1 tablet (50 mg total) by mouth daily 90 tablet 1    Multiple Vitamins-Minerals (CENTRUM ADULTS PO) Take 1 tablet by mouth daily      nystatin (MYCOSTATIN) cream Apply topically 2 (two) times a day as needed (rash) 30 g 0    venlafaxine (EFFEXOR-XR) 150 mg 24 hr capsule Take 1 capsule (150 mg total) by mouth daily Take with 37 5 mg tablet for total 187 5mg 90 capsule 0    venlafaxine (EFFEXOR-XR) 37 5 mg 24 hr capsule Take 1 capsule (37 5 mg total) by mouth daily Take with 150 mg tablet for total 187 5mg 90 capsule 0     No current facility-administered medications for this visit           Allergies   Allergen Reactions    Amoxicillin Swelling, Anaphylaxis and Angioedema    Neurontin [Gabapentin] Other (See Comments)     Other reaction(s): SUICIDE IDEATION  Other reaction(s): Other (See Comments)  suicidal  suicidal    Penicillin V Angioedema and Swelling     Other reaction(s): Throat closure    Penicillins Swelling and Anaphylaxis    Aripiprazole Other (See Comments)     Other reaction(s): low dose 2 mg ; curving in and locking in of hands/dystonia  Other reaction(s): Other (See Comments)  Other reaction(s): low dose 2 mg ; curving in and locking in of hands/dystonia    Lorazepam Other (See Comments)     Other reaction(s): higher dose > anxiety and depression  Other reaction(s): Other (See Comments)  Other reaction(s): higher dose > anxiety and depression    Carbamazepine Other (See Comments)     Other reaction(s): Unknown Reaction    Doxycycline Hives    Lamotrigine Rash    Other Hives     Adhesive tape       Review of Systems no changes to medical profile reported, denies pain  Jannette was pleasant and cooperative during visit, good eye contact  She was dressed neatly  She was oriented to person, place and time  Speech was of a normal rate, rhythm and volume  Her mood was euthymic and affect congruent  Her thought process was logical  Thought content she denies any SI/HI/AVH  Jannette's attention and focused remained good throughout the visit  Memory both recent and remote intact  Insight and judgement intact  Muscle tone, strength not observed as this was a virtual visit  Gait not observed  No abnormal movements noted or reported during the visit  Video Exam    There were no vitals filed for this visit  Physical Exam     I spent 30 minutes with patient during the virtual visit  VIRTUAL VISIT DISCLAIMER    Jannette Baugh acknowledges that she has consented to an online visit or consultation   She understands that the online visit is based solely on information provided by her, and that, in the absence of a face-to-face physical evaluation by the physician, the diagnosis she receives is both limited and provisional in terms of accuracy and completeness  This is not intended to replace a full medical face-to-face evaluation by the physician  Jannette Baugh understands and accepts these terms

## 2021-03-31 ENCOUNTER — SOCIAL WORK (OUTPATIENT)
Dept: BEHAVIORAL/MENTAL HEALTH CLINIC | Facility: CLINIC | Age: 41
End: 2021-03-31
Payer: COMMERCIAL

## 2021-03-31 DIAGNOSIS — F33.1 MODERATE RECURRENT MAJOR DEPRESSION (HCC): Primary | ICD-10-CM

## 2021-03-31 DIAGNOSIS — F41.1 GENERALIZED ANXIETY DISORDER: ICD-10-CM

## 2021-03-31 PROCEDURE — 90834 PSYTX W PT 45 MINUTES: CPT | Performed by: SOCIAL WORKER

## 2021-03-31 NOTE — PSYCH
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  Jannette's friend is ill and is unable to make bake goods for the small business  Her friend asked Jannette to help one day which then turned into Jannette helping multiple days, several store trips, and multiple batches of baked goods at home  With these stressors, Jannette forgot to get groceries to bake and host her own family Easter dinner and 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  Session focused on Jannette processing her emotions about her recent stressors and discussed self-respect and boundary setting using DBT  Validation was provided that she is coping with many stressors in her environment  She was encouraged to identify areas of her stressors in which she has control of, has the ability to change to better manage her own symptoms of anxiety  Through discussion, Rossliz Gaitan shows insight that she has the ability to place more boundaries with her friend's small business  She processed her emotions about finding happiness in helping others  We discussed the implication of this own her own mental health  We discussed self respect through FAST skill  Jannette will continue to implement wellness tools learned in session with focus on behavioral activation tools and self respect through boundary setting  Share successes and challenges at next session  Continue biweekly support          Behavioral Health Treatment Plan [facility]: Diagnosis and Treatment Plan explained to Trever Maurer relates understanding diagnosis and is agreeable to Treatment Plan   Yes

## 2021-04-13 ENCOUNTER — TELEPHONE (OUTPATIENT)
Dept: FAMILY MEDICINE CLINIC | Facility: CLINIC | Age: 41
End: 2021-04-13

## 2021-04-13 DIAGNOSIS — F41.8 SITUATIONAL ANXIETY: Primary | ICD-10-CM

## 2021-04-13 RX ORDER — ALPRAZOLAM 0.25 MG/1
0.25 TABLET ORAL 2 TIMES DAILY PRN
Qty: 4 TABLET | Refills: 0 | Status: SHIPPED | OUTPATIENT
Start: 2021-04-13 | End: 2021-08-12 | Stop reason: ALTCHOICE

## 2021-04-13 NOTE — TELEPHONE ENCOUNTER
Upon review of chart, patient has been given xanax situationally in the past  Please expressed my condolences  She can take 1-2 xanax as needed for this situation  Do not drive if tired or impaired

## 2021-04-13 NOTE — TELEPHONE ENCOUNTER
Pt called that her Uncle passed away and he was like a Father to her  She is asking for 4 Xanax to help her until his services on Friday  She is having trouble sleeping         PIA Pena

## 2021-04-27 ENCOUNTER — TELEMEDICINE (OUTPATIENT)
Dept: BEHAVIORAL/MENTAL HEALTH CLINIC | Facility: CLINIC | Age: 41
End: 2021-04-27
Payer: COMMERCIAL

## 2021-04-27 DIAGNOSIS — F33.1 MODERATE RECURRENT MAJOR DEPRESSION (HCC): Primary | ICD-10-CM

## 2021-04-27 DIAGNOSIS — F41.1 GENERALIZED ANXIETY DISORDER: ICD-10-CM

## 2021-04-27 PROCEDURE — 90834 PSYTX W PT 45 MINUTES: CPT | Performed by: SOCIAL WORKER

## 2021-04-27 NOTE — PSYCH
This note was not shared with the patient due to this is a psychotherapy note     Virtual Regular Visit      Assessment/Plan:    Problem List Items Addressed This Visit        Other    Moderate recurrent major depression ([address] ) - Primary    Generalized anxiety disorder          Goals addressed in session: Goal 1          Reason for visit is VIRTUAL PSYCHOTHERAPY SESSION DUE TO COVID-19     Encounter provider [facility]    Provider located at [address] [place] [id number] [phone]    The patient was identified by name and date of birth  Jannette Baugh was informed that this is a telemedicine visit and that the visit is being conducted through [employer] and patient was informed that this is a secure, HIPAA-compliant platform  She agrees to proceed     My office door was closed  No one else was in the room  She acknowledged consent and understanding of privacy and security of the video platform  The patient has agreed to participate and understands they can discontinue the visit at any time  Patient is aware this is a billable service  Subjective  Romelia Mondragon is a 39 y o  female   Psychotherapy Provided: Individual Psychotherapy 45 minutes     Length of time in session: 45 minutes, follow up in 2 week    Goals addressed in session: Goal 1     Pain:      none    0    Current suicide risk : Low     Met with Jannette  She completed her homework from the previous session and has begin doing things for herself  She states, "things actually got worse "  She is is reporting an increase in depression and anxiety with the sudden passing of her Uncle- "he was a father figure to me "   She is struggling with ruminations and crying spells that worsen at night  She has also been comfort eating and gained 9lbs in 1 week      Session focused on Jannette verbalizing the story of the death of her Uncle which triggered symptoms  Jannette shared how her Starlet Cristina took on the role as her father figure when her biological father passed  She processed her emotions about how her father and uncle both  from massive heart attacks  She processed her thoughts and feelings about their lifestyle choices and changes she wishes to make for her own health  Since her uncle's passing, she scheduled her yearly follow up with her cardiologist; this was an appt she has been putting off due to weight gain  We discussed her use of assertiveness with family which allowed her the ability to have alone time at the  home with her uncle  She states, "I got to say everything I needed to say "   We discussed the process of grief and ways to begin to refocusing to activities (i e  exercising, nutrition, online support group, spending time with friends) to meet emotional needs in a healthy way  Godfrey Amadonicole will continue to implement wellness tools learned in session with focus on self care  Continue biweekly support  She requested additional support next week if an opening becomes available  Behavioral Health Treatment Plan [facility]: Diagnosis and Treatment Plan explained to Sheridan Burnett relates understanding diagnosis and is agreeable to Treatment Plan  Yes     I spent 45 minutes directly with the patient during this visit      Lavon Fabian acknowledges that she has consented to an online visit or consultation  She understands that the online visit is based solely on information provided by her, and that, in the absence of a face-to-face physical evaluation by the physician, the diagnosis she receives is both limited and provisional in terms of accuracy and completeness  This is not intended to replace a full medical face-to-face evaluation by the physician  Jannette Baugh understands and accepts these terms

## 2021-04-29 ENCOUNTER — TELEMEDICINE (OUTPATIENT)
Dept: PSYCHIATRY | Facility: CLINIC | Age: 41
End: 2021-04-29
Payer: COMMERCIAL

## 2021-04-29 ENCOUNTER — TELEPHONE (OUTPATIENT)
Dept: PSYCHIATRY | Facility: CLINIC | Age: 41
End: 2021-04-29

## 2021-04-29 DIAGNOSIS — F33.1 MODERATE RECURRENT MAJOR DEPRESSION (HCC): ICD-10-CM

## 2021-04-29 DIAGNOSIS — F41.8 DEPRESSION WITH ANXIETY: Primary | ICD-10-CM

## 2021-04-29 DIAGNOSIS — F41.1 GENERALIZED ANXIETY DISORDER: ICD-10-CM

## 2021-04-29 PROCEDURE — 99213 OFFICE O/P EST LOW 20 MIN: CPT | Performed by: REGISTERED NURSE

## 2021-04-29 RX ORDER — VENLAFAXINE HYDROCHLORIDE 150 MG/1
150 CAPSULE, EXTENDED RELEASE ORAL DAILY
Qty: 90 CAPSULE | Refills: 0 | Status: SHIPPED | OUTPATIENT
Start: 2021-04-29 | End: 2021-05-10 | Stop reason: ALTCHOICE

## 2021-04-29 RX ORDER — BUSPIRONE HYDROCHLORIDE 5 MG/1
5 TABLET ORAL 2 TIMES DAILY
Qty: 60 TABLET | Refills: 0 | Status: SHIPPED | OUTPATIENT
Start: 2021-04-29 | End: 2021-05-27 | Stop reason: SDUPTHER

## 2021-04-29 RX ORDER — VENLAFAXINE HYDROCHLORIDE 75 MG/1
75 CAPSULE, EXTENDED RELEASE ORAL DAILY
Qty: 90 CAPSULE | Refills: 0 | Status: SHIPPED | OUTPATIENT
Start: 2021-04-29 | End: 2021-05-27 | Stop reason: CLARIF

## 2021-04-29 NOTE — PSYCH
Virtual Regular Visit    Problem List Items Addressed This Visit        Other    Depression with anxiety - Primary    Relevant Medications    venlafaxine (EFFEXOR-XR) 75 mg 24 hr capsule    venlafaxine (EFFEXOR-XR) 150 mg 24 hr capsule    busPIRone (BUSPAR) 5 mg tablet    Moderate recurrent major depression (HCC)    Relevant Medications    venlafaxine (EFFEXOR-XR) 75 mg 24 hr capsule    venlafaxine (EFFEXOR-XR) 150 mg 24 hr capsule    busPIRone (BUSPAR) 5 mg tablet    Generalized anxiety disorder    Relevant Medications    venlafaxine (EFFEXOR-XR) 75 mg 24 hr capsule    venlafaxine (EFFEXOR-XR) 150 mg 24 hr capsule    busPIRone (BUSPAR) 5 mg tablet             Encounter provider ELAN Medina    Provider located at   [address] [id number] [phone]    Recent Visits  No visits were found meeting these conditions  Showing recent visits within past 7 days and meeting all other requirements     Today's Visits  Date Type Provider Dept   04/29/21 ELAN Boyd  [facility]   Showing today's visits and meeting all other requirements     Future Appointments  No visits were found meeting these conditions  Showing future appointments within next 150 days and meeting all other requirements      The patient was identified by name and date of birth  Jannette Baugh was informed that this is a telemedicine visit and that the visit is being conducted through [employer]  My office door was closed  No one else was in the room  She acknowledged consent and understanding of privacy and security of the video platform  The patient has agreed to participate and understands they can discontinue the visit at any time  Patient is aware this is a billable service       HPI     Current Outpatient Medications   Medication Sig Dispense Refill    albuterol (PROVENTIL HFA,VENTOLIN HFA) 90 mcg/act inhaler Inhale 2 puffs every 6 (six) hours as needed for shortness of breath (cough) 1 Inhaler 0    ALPRAZolam (XANAX) 0 25 mg tablet Take 1 tablet (0 25 mg total) by mouth 2 (two) times a day as needed for anxiety 4 tablet 0    amitriptyline (ELAVIL) 100 mg tablet Take 1 tablet (100 mg total) by mouth daily at bedtime 90 tablet 1    Ascorbic Acid (VITAMIN C) 500 MG CAPS Take 1 capsule by mouth daily      atorvastatin (LIPITOR) 40 mg tablet Take 1 tablet (40 mg total) by mouth daily 90 tablet 1    busPIRone (BUSPAR) 5 mg tablet Take 1 tablet (5 mg total) by mouth 2 (two) times a day 60 tablet 0    cetirizine (ZyrTEC) 10 mg tablet Take 10 mg by mouth daily      Cholecalciferol (VITAMIN D3) 5000 units CAPS Take 1 capsule by mouth daily        ciprofloxacin-dexamethasone (CIPRODEX) otic suspension Administer 4 drops to the right ear 2 (two) times a day (Patient taking differently: Administer 4 drops to the right ear 2 (two) times a day As needed) 7 5 mL 0    ciprofloxacin-dexamethasone (CIPRODEX) otic suspension Administer 4 drops to the right ear 2 (two) times a day 7 5 mL 5    cyanocobalamin (VITAMIN B-12) 1,000 mcg tablet Take 1,000 mcg by mouth daily       dicyclomine (BENTYL) 10 mg capsule Take 1 capsule (10 mg total) by mouth 3 (three) times a day as needed (diarrhea) 90 capsule 2    esomeprazole (NexIUM 24HR) 20 mg capsule Take 1 capsule (20 mg total) by mouth every morning 90 capsule 3    levonorgestrel (MIRENA) 20 MCG/24HR IUD 1 each by Intrauterine route once      methocarbamol (ROBAXIN) 750 mg tablet Take 1 tablet (750 mg total) by mouth every 8 (eight) hours as needed for muscle spasms 30 tablet 0    metoprolol succinate (TOPROL-XL) 50 mg 24 hr tablet Take 1 tablet (50 mg total) by mouth daily 90 tablet 1    Multiple Vitamins-Minerals (CENTRUM ADULTS PO) Take 1 tablet by mouth daily      nystatin (MYCOSTATIN) cream Apply topically 2 (two) times a day as needed (rash) 30 g 0    venlafaxine (EFFEXOR-XR) 150 mg 24 hr capsule Take 1 capsule (150 mg total) by mouth daily Take with 75 mg tablet for total 225 mg 90 capsule 0    venlafaxine (EFFEXOR-XR) 75 mg 24 hr capsule Take 1 capsule (75 mg total) by mouth daily Takes with a 150 mg capsule for total of 225 mg daily 90 capsule 0     No current facility-administered medications for this visit  Review of Systems  Video Exam    There were no vitals filed for this visit  Physical Exam   As a result of this visit, I have referred the patient for further respiratory evaluation  No    I spent 20 minutes directly with the patient during this visit  Lavon Fabian acknowledges that she has consented to an online visit or consultation  She understands that the online visit is based solely on information provided by her, and that, in the absence of a face-to-face physical evaluation by the physician, the diagnosis she receives is both limited and provisional in terms of accuracy and completeness  This is not intended to replace a full medical face-to-face evaluation by the physician  Jannette Baugh understands and accepts these terms  MEDICATION MANAGEMENT NOTE        [facility]    Name and Date of Birth:  Baldo Wheeler 39 y o  1980 MRN: [id number]    Date of Visit: April 29, 2021    Allergies   Allergen Reactions    Amoxicillin Swelling, Anaphylaxis and Angioedema    Neurontin [Gabapentin] Other (See Comments)     Other reaction(s): SUICIDE IDEATION  Other reaction(s): Other (See Comments)  suicidal  suicidal    Penicillin V Angioedema and Swelling     Other reaction(s): Throat closure    Penicillins Swelling and Anaphylaxis    Aripiprazole Other (See Comments)     Other reaction(s): low dose 2 mg ; curving in and locking in of hands/dystonia  Other reaction(s):  Other (See Comments)  Other reaction(s): low dose 2 mg ; curving in and locking in of hands/dystonia    Lorazepam Other (See Comments)     Other reaction(s): higher dose > anxiety and depression  Other reaction(s): Other (See Comments)  Other reaction(s): higher dose > anxiety and depression    Carbamazepine Other (See Comments)     Other reaction(s): Unknown Reaction    Doxycycline Hives    Lamotrigine Rash    Other Hives     Adhesive tape     SUBJECTIVE:    Jannette is seen today for a follow up for depression and anxiety  She reports that she continues to decompensate since the last visit 3/18/2021 with the recent passing of her uncle  She reports constant knots in her stomach and heart palpitations at night  She reports that her PCP had given her xanax and it was not very effective  She reports anhedonia, not wanting to do anything and "horrible anxious feeling"  She shared that she is concerned with the heart problems and sudden cardiac deaths in the family  She has an upcoming cardiology appointment and stated that he told her to lose 20 pounds before her next visit  She shared that she has gained instead of losing  She did stated that she had recently lost 15 pounds recently, but then in one week gained 9 pounds back  Jannette stated that her blood pressure has been good overall in the 120/80 range  She reports an increase in difficulty falling asleep, but stated that she took Melatonin 10 mg last night and was able to fall asleep by midnight and had a few awakenings to tend to the dog  We discussed increasing the Effexor to 225 mg po daily and Jannette was in agreement and stated that this medication has been helpful  We also discussed adding low dose Buspar BID  It was dicussed that these medications will take some time to see if the additions will be helpful  Jannette verbalized an understanding of the education  We also discussed that she is on Elavil that is prescribed by her rheumatologist along with other serotonergic medications   Educated on sign and symptoms of serotonin syndrome, verbalized an understanding of education  We also dicussed that if she has any issues or increased symptoms to call the office  Encouraged Jannette to continue to work with Mady Jauregui in therapy  She denies suicidal or homicidal ideation or psychosis  She denies any side effects from medications  PLAN:  Increase Effexor XR to 225 mg po daily, 90 day supply sent to mail order  Add Buspar 5 mg po BID  Follow up with medical providers for medical issues and concerns, asked Jannette to review her psychotropic medication regimen at her upcoming cardiology visit with cardiologist  Has appointment 5/6/20201 with this provider  Utilize crisis as needed  Aware of 24 hour and weekend coverage for urgent situations accessed by calling [facility] main practice number  Continue psychotherapy with therapist    Diagnoses and all orders for this visit:    Depression with anxiety  -     venlafaxine (EFFEXOR-XR) 75 mg 24 hr capsule; Take 1 capsule (75 mg total) by mouth daily Takes with a 150 mg capsule for total of 225 mg daily  -     venlafaxine (EFFEXOR-XR) 150 mg 24 hr capsule; Take 1 capsule (150 mg total) by mouth daily Take with 75 mg tablet for total 225 mg  -     busPIRone (BUSPAR) 5 mg tablet; Take 1 tablet (5 mg total) by mouth 2 (two) times a day    Generalized anxiety disorder  -     venlafaxine (EFFEXOR-XR) 75 mg 24 hr capsule;  Take 1 capsule (75 mg total) by mouth daily Takes with a 150 mg capsule for total of 225 mg daily    Moderate recurrent major depression (HCC)        Current Outpatient Medications on File Prior to Visit   Medication Sig Dispense Refill    albuterol (PROVENTIL HFA,VENTOLIN HFA) 90 mcg/act inhaler Inhale 2 puffs every 6 (six) hours as needed for shortness of breath (cough) 1 Inhaler 0    ALPRAZolam (XANAX) 0 25 mg tablet Take 1 tablet (0 25 mg total) by mouth 2 (two) times a day as needed for anxiety 4 tablet 0    amitriptyline (ELAVIL) 100 mg tablet Take 1 tablet (100 mg total) by mouth daily at bedtime 90 tablet 1    Ascorbic Acid (VITAMIN C) 500 MG CAPS Take 1 capsule by mouth daily      atorvastatin (LIPITOR) 40 mg tablet Take 1 tablet (40 mg total) by mouth daily 90 tablet 1    cetirizine (ZyrTEC) 10 mg tablet Take 10 mg by mouth daily      Cholecalciferol (VITAMIN D3) 5000 units CAPS Take 1 capsule by mouth daily        ciprofloxacin-dexamethasone (CIPRODEX) otic suspension Administer 4 drops to the right ear 2 (two) times a day (Patient taking differently: Administer 4 drops to the right ear 2 (two) times a day As needed) 7 5 mL 0    ciprofloxacin-dexamethasone (CIPRODEX) otic suspension Administer 4 drops to the right ear 2 (two) times a day 7 5 mL 5    cyanocobalamin (VITAMIN B-12) 1,000 mcg tablet Take 1,000 mcg by mouth daily       dicyclomine (BENTYL) 10 mg capsule Take 1 capsule (10 mg total) by mouth 3 (three) times a day as needed (diarrhea) 90 capsule 2    esomeprazole (NexIUM 24HR) 20 mg capsule Take 1 capsule (20 mg total) by mouth every morning 90 capsule 3    levonorgestrel (MIRENA) 20 MCG/24HR IUD 1 each by Intrauterine route once      methocarbamol (ROBAXIN) 750 mg tablet Take 1 tablet (750 mg total) by mouth every 8 (eight) hours as needed for muscle spasms 30 tablet 0    metoprolol succinate (TOPROL-XL) 50 mg 24 hr tablet Take 1 tablet (50 mg total) by mouth daily 90 tablet 1    Multiple Vitamins-Minerals (CENTRUM ADULTS PO) Take 1 tablet by mouth daily      nystatin (MYCOSTATIN) cream Apply topically 2 (two) times a day as needed (rash) 30 g 0    [DISCONTINUED] venlafaxine (EFFEXOR-XR) 150 mg 24 hr capsule Take 1 capsule (150 mg total) by mouth daily Take with 37 5 mg tablet for total 187 5mg 90 capsule 0    [DISCONTINUED] venlafaxine (EFFEXOR-XR) 37 5 mg 24 hr capsule Take 1 capsule (37 5 mg total) by mouth daily Take with 150 mg tablet for total 187 5mg 90 capsule 0     No current facility-administered medications on file prior to visit  Psychotherapy Provided:     Individual psychotherapy provided: No     HPI ROS Appetite Changes and Sleep:     She reports difficulty falling asleep, fluctuating sleep pattern, increased appetite, decreased energy   Patient denies suicidal or homicidal ideation    Review Of Systems:      HPI ROS:               Medication Side Effects:  denies     Depression (10 worst): Did not ask to rate but reports increased    Anxiety (10 worst): elevated    Safety concerns (SI, HI, etc): denies    Sleep: poor    Energy: poor    Appetite: increased    Weight Change: Gained 9 pounds in one week       General emotional problems and sleep disturbances   Personality no change in personality   Constitutional negative   ENT negative   Cardiovascular as noted in HPI   Respiratory As noted in HPI   Gastrointestinal as noted in HPI   Genitourinary negative   Musculoskeletal as noted in HPI   Integumentary negative   Neurological as noted in HPI   Endocrine As noted in HPI   Other Symptoms none, all other systems are negative     Mental Status Evaluation:    Appearance Adequate hygiene and grooming   Behavior cooperative   Mood anxious and depressed  Depression Scale - not asked today of 10 (0 = No depression)  Anxiety Scale - numeric rating not asked today of 10 (0 = No anxiety)   Speech Normal rate and volume   Affect mood-congruent   Thought Processes Goal directed and coherent   Thought Content Does not verbalize delusional material   Associations Tightly connected   Perceptual Disturbances Denies hallucinations and does not appear to be responding to internal stimuli   Risk Potential Suicidal/Homicidal Ideation - No evidence of suicidal or homicidal ideation and Patient does not verbalize suicidal or homicidal ideation  Risk of Violence - No evidence of risk for violence found on assessment  Risk of Self Mutilation - No evidence of risk for self mutilation found on assessment   Orientation oriented to person, place, time/date and situation   Memory recent and remote memory grossly intact   Consciousness alert and awake   Attention/Concentration attention span and concentration are age appropriate   Insight fair   Judgement fair   Muscle Strength and Gait Virtual visit not assessed   Motor Activity no abnormal movements   Language no difficulty naming common objects, no difficulty repeating a phrase, no difficulty writing a sentence   Fund of Knowledge adequate knowledge of current events  adequate fund of knowledge regarding past history  adequate fund of knowledge regarding vocabulary      Past Psychiatric History Update:     Inpatient Psychiatric Admission Since Last Encounter:   no  Changes to Outpatient Psychiatric Treatment Team:    no  Suicide Attempt Or Self Mutilation Since Last Encounter:   no  Incidence of Violent Behavior Since Last Encounter:   no    Traumatic History Update:     New Onset of Abuse Since Last Encounter:   no  Traumatic Events Since Last Encounter:   yes, uncle age 46 passed suddenly of MI    Past Medical History:    Past Medical History:   Diagnosis Date    Allergic rhinitis     Anxiety     Arthritis     Chronic pain disorder     BACK SHOULDERS NECK    Colon polyp     Depression     Disease of thyroid gland     nodule    Fibromyalgia, primary     GERD (gastroesophageal reflux disease)     Hearing difficulty of right ear     Hyperlipidemia     Hypertension     Irritable bowel syndrome     Perforation of tympanic membrane     Right    PONV (postoperative nausea and vomiting)     RBBB     Right bundle branch blockage     Sleep apnea     no cpap    Wears glasses      No past medical history pertinent negatives    Past Surgical History:   Procedure Laterality Date    ANAL FISTULOTOMY N/A 11/2/2018    Procedure: FISTULOTOMY;  Surgeon: Berna Khan MD;  [facility];  Service: Colorectal    BACK SURGERY      lumbar herniated disc    BREAST CYST EXCISION Left 13 yrs ago  benign    BREAST SURGERY Left 2006    cyst removal    CARPAL TUNNEL RELEASE       SECTION      CHOLECYSTECTOMY      COLONOSCOPY      CYST REMOVAL      DENTAL SURGERY      INCISION AND DRAINAGE OF WOUND N/A 2018    Procedure: INCISION AND DRAINAGE (I&D) BUTTOCK;  Surgeon: Marisol Tomas MD;  [facility];  Service: Colorectal    MYRINGOTOMY W/ TUBES Right 2015    [place]    RI COLONOSCOPY FLX DX W/COLLJ SPEC WHEN PFRMD N/A 2017    Procedure: COLONOSCOPY;  Surgeon: Dylan Santacruz MD;  [facility]; Service: Gastroenterology    RI INCISE FINGER TENDON SHEATH Right 3/13/2018    Procedure: LONG TRIGGER FINGER RELEASE;  Surgeon: Huyen Marquez DO;  [facility];  Service: Orthopedics    RI SURG DIAGNOSTIC EXAM, ANORECTAL N/A 2018    Procedure: EXAM UNDER ANESTHESIA (EUA); Surgeon: Marisol Tomas MD;  [facility];  Service: Colorectal    RI TYMPANOPLASTY Right 2017    Procedure: TYMPANOPLASTY WITH PERICHONDRIN GRAFT;  Surgeon: Naomie Banegas DO;  [facility];  Service: ENT    SHOULDER SURGERY Right     ULNAR NERVE TRANSPOSITION Left     WISDOM TOOTH EXTRACTION       Allergies   Allergen Reactions    Amoxicillin Swelling, Anaphylaxis and Angioedema    Neurontin [Gabapentin] Other (See Comments)     Other reaction(s): SUICIDE IDEATION  Other reaction(s): Other (See Comments)  suicidal  suicidal    Penicillin V Angioedema and Swelling     Other reaction(s): Throat closure    Penicillins Swelling and Anaphylaxis    Aripiprazole Other (See Comments)     Other reaction(s): low dose 2 mg ; curving in and locking in of hands/dystonia  Other reaction(s): Other (See Comments)  Other reaction(s): low dose 2 mg ; curving in and locking in of hands/dystonia    Lorazepam Other (See Comments)     Other reaction(s): higher dose > anxiety and depression  Other reaction(s):  Other (See Comments)  Other reaction(s): higher dose > anxiety and depression    Carbamazepine Other (See Comments)     Other reaction(s): Unknown Reaction    Doxycycline Hives    Lamotrigine Rash    Other Hives     Adhesive tape     Substance Abuse History:    Social History     Substance and Sexual Activity   Alcohol Use Not Currently    Comment: social     Social History     Substance and Sexual Activity   Drug Use No     Social History:    Social History     Socioeconomic History    Marital status: /Civil Union     Spouse name: Not on file    Number of children: Not on file    Years of education: Not on file    Highest education level: Not on file   Occupational History    Not on file   Social Needs    Financial resource strain: Not on file    Food insecurity     Worry: Not on file     Inability: Not on file    Transportation needs     Medical: No     Non-medical: No   Tobacco Use    Smoking status: Never Smoker    Smokeless tobacco: Never Used   Substance and Sexual Activity    Alcohol use: Not Currently     Comment: social    Drug use: No    Sexual activity: Yes     Partners: Male     Birth control/protection: Injection     Comment: per allscripts   Lifestyle    Physical activity     Days per week: 2 days     Minutes per session: Not on file    Stress: Not on file   Relationships    Social connections     Talks on phone: Not on file     Gets together: Not on file     Attends Muslim service: Not on file     Active member of club or organization: Not on file     Attends meetings of clubs or organizations: Not on file     Relationship status: Not on file    Intimate partner violence     Fear of current or ex partner: Not on file     Emotionally abused: Not on file     Physically abused: Not on file     Forced sexual activity: Not on file   Other Topics Concern    Not on file   Social History Narrative    Daily caffeine use- 1 cup of coffee     Family Psychiatric History:     Family History   Problem Relation Age of Onset    Hypertension Mother     Hyperlipidemia Mother     Diabetes Mother     Hypertension Father     Hyperlipidemia Father     Heart disease Father         cardiac disorder-myocardial infarction arrhythmias    Diabetes unspecified Maternal Grandmother     Crohn's disease Brother     Arthritis Brother     Thyroid disease Paternal Grandmother     No Known Problems Daughter     No Known Problems Maternal Aunt     No Known Problems Maternal Aunt     No Known Problems Maternal Aunt     No Known Problems Maternal Aunt     No Known Problems Paternal Aunt     No Known Problems Paternal Aunt      History Review: The following portions of the patient's history were reviewed and updated as appropriate: allergies, current medications, past family history, past medical history, past social history, past surgical history and problem list     OBJECTIVE:     Vital signs in last 24 hours: There were no vitals filed for this visit  Laboratory Results: I have personally reviewed all pertinent laboratory/tests results  Suicide/Homicide Risk Assessment:    Risk of Harm to Self:  Based on today's assessment, Jannette presents the following risk of harm to self: low    Risk of Harm to Others:  Based on today's assessment, Jannette presents the following risk of harm to others: low    The following interventions are recommended: no intervention changes needed    Medications Risks/Benefits:      Risks, Benefits And Possible Side Effects Of Medications:    Discussed risks and benefits of treatment with patient including risk of suicidality, serotonin syndrome and SIADH related to treatment with antidepressants;  Risk of induction of manic symptoms in certain patient populations     Controlled Medication Discussion:     Not applicable - controlled prescriptions are not prescribed by this practice    Treatment Plan:    Due for update/Updated:   no  Last treatment plan done Elisa Dalton, client in therapy    Alkamyesha MoralesChristiano 04/29/21    This note was shared with patient

## 2021-04-29 NOTE — TELEPHONE ENCOUNTER
Called to offer 5/5 at 4pm with Zita Voss voice message asking Patient to call me back to let me know if she can take appointment or not

## 2021-05-05 ENCOUNTER — SOCIAL WORK (OUTPATIENT)
Dept: BEHAVIORAL/MENTAL HEALTH CLINIC | Facility: CLINIC | Age: 41
End: 2021-05-05
Payer: COMMERCIAL

## 2021-05-05 DIAGNOSIS — F41.1 GENERALIZED ANXIETY DISORDER: ICD-10-CM

## 2021-05-05 DIAGNOSIS — F33.1 MODERATE RECURRENT MAJOR DEPRESSION (HCC): Primary | ICD-10-CM

## 2021-05-05 PROCEDURE — 90834 PSYTX W PT 45 MINUTES: CPT | Performed by: SOCIAL WORKER

## 2021-05-05 NOTE — PSYCH
This note was not shared with the patient due to this is a psychotherapy note     Psychotherapy Provided: Individual Psychotherapy 50 minutes     Length of time in session: 50 minutes, follow up in 1 week    Goals addressed in session: Goal 1     Pain:      none    0    Current suicide risk : Low     Met with Jannette  She continues to report an increase in depression and anxiety symptoms since her Uncle passed away  She has been processing her Uncle's death with her family and sharing memories which has been helpful  She states, "I just hate that the world keeps going, I feel like everyone should just stop "      Session focused on Jannette continuing to process the death of her Uncle using CBT  Jannette processed her thoughts and feelings about her Uncle's lifestyle and expressed feelings of anger and guilt about her Uncle's inability to be happy/using alcohol to cope  We discussed how Jannette takes on the caregiver role to other's in her life and the guilt she hold's that she was unable to help him  She processed her emotions about how she feels other's are farther along in their grieving process than herself  She identifies social media as a trigger to this  Education was provided on the grieving process, validation and support was provided for her grief journey, and she was encouraged to set limits with social media at this time  Kamilah Jean will continue to implement wellness tools learned in session with focus on self care  Next appt in 1 week for continued support       Mental status:  Appearance calm and cooperative , adequate hygiene and grooming and good eye contact    Mood depressed and anxious   Affect Mood congruent, tearful   Speech a normal rate and volume   Thought Processes coherent/organized and normal thought processes   Hallucinations no hallucinations present    Thought Content no delusions   Abnormal Thoughts no suicidal thoughts  and no homicidal thoughts    Orientation  oriented to person and place and time   Remote Memory short term memory intact and long term memory intact   Attention Span concentration intact   Intellect Appears to be of Average Intelligence   Fund of Knowledge displays adequate knowledge of current events   Insight fair   Judgement fair   Muscle Strength Muscle strength and tone were normal and Normal gait    Language no difficulty naming common objects   Pain none   Pain Scale 0         Behavioral Health Treatment Plan [facility]: Diagnosis and Treatment Plan explained to Geovanna Rice relates understanding diagnosis and is agreeable to Treatment Plan   Yes

## 2021-05-07 ENCOUNTER — OFFICE VISIT (OUTPATIENT)
Dept: CARDIOLOGY CLINIC | Facility: CLINIC | Age: 41
End: 2021-05-07
Payer: COMMERCIAL

## 2021-05-07 VITALS
SYSTOLIC BLOOD PRESSURE: 132 MMHG | WEIGHT: 288.7 LBS | BODY MASS INDEX: 45.31 KG/M2 | OXYGEN SATURATION: 98 % | HEIGHT: 67 IN | HEART RATE: 99 BPM | DIASTOLIC BLOOD PRESSURE: 82 MMHG

## 2021-05-07 DIAGNOSIS — I45.10 RBBB (RIGHT BUNDLE BRANCH BLOCK): Primary | ICD-10-CM

## 2021-05-07 DIAGNOSIS — Z01.810 PREOPERATIVE CARDIOVASCULAR EXAMINATION: ICD-10-CM

## 2021-05-07 DIAGNOSIS — I10 ESSENTIAL HYPERTENSION: ICD-10-CM

## 2021-05-07 PROCEDURE — 93000 ELECTROCARDIOGRAM COMPLETE: CPT | Performed by: INTERNAL MEDICINE

## 2021-05-07 PROCEDURE — 99213 OFFICE O/P EST LOW 20 MIN: CPT | Performed by: INTERNAL MEDICINE

## 2021-05-07 NOTE — PROGRESS NOTES
Cardiology Follow Up    Jannette Baugh  1980  [id number]  [facility]  [address]  [address]   [phone]    1  RBBB (right bundle branch block)  POCT ECG    Stress test only, exercise   2  Essential hypertension  POCT ECG    Stress test only, exercise   3  Preoperative cardiovascular examination  POCT ECG         Discussion/Summary: She has very good exercise capacity without cardiac symptoms  Cardiac cath in 2009 showed patent coronary arteries  I feel that her cardiac risk for upcoming shoulder surgery is low and she is cleared without further cardiac testing needed  She will be scheduled for a regular EST after she recovers from her shoulder surgery  Interval History: She has not had any cardiac problems since her last OV  She has recently been much more active and doing a lot of hiking  She had gotten her weight down close to 270 lbs  She denied exertional CP, SOB, palpitations  She had cardiac cath in 2009 which was normal     /82  ECG - NSR, low QRS voltage   RV conduction delay ( no change from previous ECGs )    Patient Active Problem List   Diagnosis    Thyroid nodule    Trigger finger, right middle finger    Carpal tunnel syndrome, bilateral    Asthma, mild intermittent    Essential hypertension    Breast hypertrophy    Bulging lumbar disc    Calcific tendinitis of shoulder    Chronic low back pain    Chronic right-sided low back pain without sciatica    Chronic sinusitis    Depression with anxiety    Dermatitis, eczematoid    Disorder of bursae of shoulder region    Esophageal reflux    Fibromyalgia    H/O laminectomy    Hemorrhoids    Hyperlipidemia    Mammographic microcalcification    Morbid obesity    Obstructive sleep apnea    Pars defect of lumbar spine    RBBB (right bundle branch block)    Spondylolysis of lumbar region    Vitamin D insufficiency    Atypical chest pain    Sebaceous cyst of breast, left    Left ear pain    Left cervical lymphadenopathy    Eliz-Danlos syndrome    Preop cardiovascular exam    History of sepsis    Otitis externa/media    Asthma    High triglycerides    Chronic headache with new features    Impaired fasting glucose    Moderate recurrent major depression (HCC)    Generalized anxiety disorder     Past Medical History:   Diagnosis Date    Allergic rhinitis     Anxiety     Arthritis     Chronic pain disorder     BACK SHOULDERS NECK    Colon polyp     Depression     Disease of thyroid gland     nodule    Fibromyalgia, primary     GERD (gastroesophageal reflux disease)     Hearing difficulty of right ear     Hyperlipidemia     Hypertension     Irritable bowel syndrome     Perforation of tympanic membrane     Right    PONV (postoperative nausea and vomiting)     RBBB     Right bundle branch blockage     Sleep apnea     no cpap    Wears glasses      Social History     Socioeconomic History    Marital status: /Civil Union     Spouse name: Not on file    Number of children: Not on file    Years of education: Not on file    Highest education level: Not on file   Occupational History    Not on file   Social Needs    Financial resource strain: Not on file    Food insecurity     Worry: Not on file     Inability: Not on file    Transportation needs     Medical: No     Non-medical: No   Tobacco Use    Smoking status: Never Smoker    Smokeless tobacco: Never Used   Substance and Sexual Activity    Alcohol use: Not Currently     Comment: social    Drug use: No    Sexual activity: Yes     Partners: Male     Birth control/protection: Injection     Comment: per allscripts   Lifestyle    Physical activity     Days per week: 2 days     Minutes per session: Not on file    Stress: Not on file   Relationships    Social connections     Talks on phone: Not on file     Gets together: Not on file     Attends Yarsanism service: Not on file     Active member of club or organization: Not on file     Attends meetings of clubs or organizations: Not on file     Relationship status: Not on file    Intimate partner violence     Fear of current or ex partner: Not on file     Emotionally abused: Not on file     Physically abused: Not on file     Forced sexual activity: Not on file   Other Topics Concern    Not on file   Social History Narrative    Daily caffeine use- 1 cup of coffee      Family History   Problem Relation Age of Onset    Hypertension Mother    Haleigh Saunemin Hyperlipidemia Mother     Diabetes Mother     Hypertension Father     Hyperlipidemia Father     Heart disease Father         cardiac disorder-myocardial infarction arrhythmias    Diabetes unspecified Maternal Grandmother     Crohn's disease Brother     Arthritis Brother     Thyroid disease Paternal Grandmother     No Known Problems Daughter     No Known Problems Maternal Aunt     No Known Problems Maternal Aunt     No Known Problems Maternal Aunt     No Known Problems Maternal Aunt     No Known Problems Paternal Aunt     No Known Problems Paternal Aunt     Heart attack Paternal Uncle      Past Surgical History:   Procedure Laterality Date    ANAL FISTULOTOMY N/A 2018    Procedure: FISTULOTOMY;  Surgeon: Rosita Reynolds MD;  [facility];  Service: Colorectal    BACK SURGERY      lumbar herniated disc    BREAST CYST EXCISION Left 13 yrs ago  benign    BREAST SURGERY Left 2006    cyst removal    CARPAL TUNNEL RELEASE       SECTION      CHOLECYSTECTOMY      COLONOSCOPY      CYST REMOVAL      DENTAL SURGERY      INCISION AND DRAINAGE OF WOUND N/A 2018    Procedure: INCISION AND DRAINAGE (I&D) BUTTOCK;  Surgeon: Rosita Reynolds MD;  [facility];  Service: Colorectal    MYRINGOTOMY W/ TUBES Right 2015    [facility]    CA COLONOSCOPY FLX DX W/COLLJ SPEC WHEN PFRMD N/A 2017 Procedure: COLONOSCOPY;  Surgeon: Julien Hernandez MD;  [facility]; Service: Gastroenterology    VA INCISE FINGER TENDON SHEATH Right 3/13/2018    Procedure: LONG TRIGGER FINGER RELEASE;  Surgeon: Jennifer Castaneda DO;  [facility];  Service: Orthopedics    VA SURG DIAGNOSTIC EXAM, ANORECTAL N/A 11/2/2018    Procedure: EXAM UNDER ANESTHESIA (EUA); Surgeon: Stefanie Allred MD;  [facility];  Service: Colorectal    VA TYMPANOPLASTY Right 5/24/2017    Procedure: TYMPANOPLASTY WITH PERICHONDRIN GRAFT;  Surgeon:  Yoshi Moore DO;  [facility];  Service: ENT    SHOULDER SURGERY Right     ULNAR NERVE TRANSPOSITION Left     WISDOM TOOTH EXTRACTION         Current Outpatient Medications:     albuterol (PROVENTIL HFA,VENTOLIN HFA) 90 mcg/act inhaler, Inhale 2 puffs every 6 (six) hours as needed for shortness of breath (cough), Disp: 1 Inhaler, Rfl: 0    ALPRAZolam (XANAX) 0 25 mg tablet, Take 1 tablet (0 25 mg total) by mouth 2 (two) times a day as needed for anxiety, Disp: 4 tablet, Rfl: 0    amitriptyline (ELAVIL) 100 mg tablet, Take 1 tablet (100 mg total) by mouth daily at bedtime, Disp: 90 tablet, Rfl: 1    Ascorbic Acid (VITAMIN C) 500 MG CAPS, Take 1 capsule by mouth daily, Disp: , Rfl:     atorvastatin (LIPITOR) 40 mg tablet, Take 1 tablet (40 mg total) by mouth daily, Disp: 90 tablet, Rfl: 1    busPIRone (BUSPAR) 5 mg tablet, Take 1 tablet (5 mg total) by mouth 2 (two) times a day, Disp: 60 tablet, Rfl: 0    cetirizine (ZyrTEC) 10 mg tablet, Take 10 mg by mouth daily, Disp: , Rfl:     Cholecalciferol (VITAMIN D3) 5000 units CAPS, Take 1 capsule by mouth daily  , Disp: , Rfl:     cyanocobalamin (VITAMIN B-12) 1,000 mcg tablet, Take 1,000 mcg by mouth daily , Disp: , Rfl:     esomeprazole (NexIUM 24HR) 20 mg capsule, Take 1 capsule (20 mg total) by mouth every morning, Disp: 90 capsule, Rfl: 3    methocarbamol (ROBAXIN) 750 mg tablet, Take 1 tablet (750 mg total) by mouth every 8 (eight) hours as needed for muscle spasms, Disp: 30 tablet, Rfl: 0    metoprolol succinate (TOPROL-XL) 50 mg 24 hr tablet, Take 1 tablet (50 mg total) by mouth daily, Disp: 90 tablet, Rfl: 1    Multiple Vitamins-Minerals (CENTRUM ADULTS PO), Take 1 tablet by mouth daily, Disp: , Rfl:     venlafaxine (EFFEXOR-XR) 75 mg 24 hr capsule, Take 1 capsule (75 mg total) by mouth daily Takes with a 150 mg capsule for total of 225 mg daily, Disp: 90 capsule, Rfl: 0    ciprofloxacin-dexamethasone (CIPRODEX) otic suspension, Administer 4 drops to the right ear 2 (two) times a day (Patient not taking: Reported on 5/7/2021), Disp: 7 5 mL, Rfl: 0    ciprofloxacin-dexamethasone (CIPRODEX) otic suspension, Administer 4 drops to the right ear 2 (two) times a day (Patient not taking: Reported on 5/7/2021), Disp: 7 5 mL, Rfl: 5    dicyclomine (BENTYL) 10 mg capsule, Take 1 capsule (10 mg total) by mouth 3 (three) times a day as needed (diarrhea) (Patient not taking: Reported on 5/7/2021), Disp: 90 capsule, Rfl: 2    levonorgestrel (MIRENA) 20 MCG/24HR IUD, 1 each by Intrauterine route once, Disp: , Rfl:     nystatin (MYCOSTATIN) cream, Apply topically 2 (two) times a day as needed (rash), Disp: 30 g, Rfl: 0    venlafaxine (EFFEXOR-XR) 150 mg 24 hr capsule, Take 1 capsule (150 mg total) by mouth daily Take with 75 mg tablet for total 225 mg, Disp: 90 capsule, Rfl: 0  Allergies   Allergen Reactions    Amoxicillin Swelling, Anaphylaxis and Angioedema    Neurontin [Gabapentin] Other (See Comments)     Other reaction(s): SUICIDE IDEATION  Other reaction(s): Other (See Comments)  suicidal  suicidal    Penicillin V Angioedema and Swelling     Other reaction(s): Throat closure    Penicillins Swelling and Anaphylaxis    Aripiprazole Other (See Comments)     Other reaction(s): low dose 2 mg ; curving in and locking in of hands/dystonia  Other reaction(s):  Other (See Comments)  Other reaction(s): low dose 2 mg ; curving in and locking in of hands/dystonia    Lorazepam Other (See Comments)     Other reaction(s): higher dose > anxiety and depression  Other reaction(s): Other (See Comments)  Other reaction(s): higher dose > anxiety and depression    Carbamazepine Other (See Comments)     Other reaction(s): Unknown Reaction    Doxycycline Hives    Lamotrigine Rash    Other Hives     Adhesive tape     Vitals:    05/07/21 1532   BP: 132/82   BP Location: Right arm   Patient Position: Sitting   Cuff Size: Large   Pulse: 99   SpO2: 98%   Weight: 131 kg (288 lb 11 2 oz)   Height: 5' 7" (1 702 m)     Weight (last 2 days)     Date/Time   Weight    05/07/21 1532   131 (288 7)             Blood pressure 132/82, pulse 99, height 5' 7" (1 702 m), weight 131 kg (288 lb 11 2 oz), SpO2 98 %  , Body mass index is 45 22 kg/m²  Labs:  Appointment on 03/13/2021   Component Date Value    Hemoglobin A1C 03/13/2021 5 1     EAG 03/13/2021 100     Sodium 03/13/2021 144     Potassium 03/13/2021 3 8     Chloride 03/13/2021 110*    CO2 03/13/2021 27     ANION GAP 03/13/2021 7     BUN 03/13/2021 10     Creatinine 03/13/2021 0 59*    Glucose, Fasting 03/13/2021 113*    Calcium 03/13/2021 9 2     AST 03/13/2021 36     ALT 03/13/2021 78     Alkaline Phosphatase 03/13/2021 70     Total Protein 03/13/2021 7 0     Albumin 03/13/2021 4 1     Total Bilirubin 03/13/2021 0 36     eGFR 03/13/2021 114     TSH 3RD GENERATON 03/13/2021 0 869    Lab on 02/11/2021   Component Date Value     C difficile toxin by PC* 02/11/2021 Positive*    C difficile Toxins A+B, * 02/11/2021 Negative     Ova + Parasite Exam 02/11/2021 No ova, cysts, or parasites seen     One negative specimen does not rule out the possibility of a  parasitic infection       Salmonella sp PCR 02/11/2021 None Detected     Shigella sp/Enteroinvasi* 02/11/2021 None Detected     Campylobacter sp (jejuni* 02/11/2021 None Detected     Shiga toxin 1/Shiga toxi* 02/11/2021 None Detected    Lab on 02/10/2021   Component Date Value    Sed Rate 02/10/2021 15     CRP 02/10/2021 11 [address] [place] [address] Outpatient Visit on 01/20/2021   Component Date Value    EXT Preg Test, Ur 01/20/2021 Negative     Control 01/20/2021 Valid      Imaging: No results found  Review of Systems:  Review of Systems   Constitutional: Negative for diaphoresis, fatigue, fever and unexpected weight change  HENT: Negative  Respiratory: Negative for cough, shortness of breath and wheezing  Cardiovascular: Negative for chest pain, palpitations and leg swelling  Gastrointestinal: Negative for abdominal pain, diarrhea and nausea  Musculoskeletal: Negative for gait problem and myalgias  Skin: Negative for rash  Neurological: Negative for dizziness and numbness  Psychiatric/Behavioral: Negative  Physical Exam:  Physical Exam  Constitutional:       Appearance: She is well-developed  HENT:      Head: Normocephalic and atraumatic  Eyes:      Pupils: Pupils are equal, round, and reactive to light  Neck:      Musculoskeletal: Normal range of motion and neck supple  Vascular: No JVD  Cardiovascular:      Rate and Rhythm: Regular rhythm  Pulses: Normal pulses  Carotid pulses are 2+ on the right side and 2+ on the left side  Heart sounds: S1 normal and S2 normal    Pulmonary:      Effort: Pulmonary effort is normal       Breath sounds: Normal breath sounds  No wheezing or rales  Abdominal:      General: Bowel sounds are normal       Palpations: Abdomen is soft  Tenderness: There is no abdominal tenderness  Musculoskeletal: Normal range of motion  General: No tenderness  Skin:     General: Skin is warm  Neurological:      Mental Status: She is alert and oriented to person, place, and time  Cranial Nerves: No cranial nerve deficit        Deep Tendon Reflexes: Reflexes are normal and symmetric

## 2021-05-10 ENCOUNTER — OFFICE VISIT (OUTPATIENT)
Dept: FAMILY MEDICINE CLINIC | Facility: CLINIC | Age: 41
End: 2021-05-10
Payer: COMMERCIAL

## 2021-05-10 ENCOUNTER — TELEPHONE (OUTPATIENT)
Dept: CARDIOLOGY CLINIC | Facility: CLINIC | Age: 41
End: 2021-05-10

## 2021-05-10 VITALS
RESPIRATION RATE: 18 BRPM | SYSTOLIC BLOOD PRESSURE: 130 MMHG | TEMPERATURE: 97.2 F | BODY MASS INDEX: 45.52 KG/M2 | HEART RATE: 99 BPM | HEIGHT: 67 IN | OXYGEN SATURATION: 100 % | DIASTOLIC BLOOD PRESSURE: 78 MMHG | WEIGHT: 290 LBS

## 2021-05-10 DIAGNOSIS — M25.511 RIGHT SHOULDER PAIN, UNSPECIFIED CHRONICITY: ICD-10-CM

## 2021-05-10 DIAGNOSIS — G47.33 OBSTRUCTIVE SLEEP APNEA: ICD-10-CM

## 2021-05-10 DIAGNOSIS — J45.20 MILD INTERMITTENT ASTHMA WITHOUT COMPLICATION: ICD-10-CM

## 2021-05-10 DIAGNOSIS — I10 ESSENTIAL HYPERTENSION: ICD-10-CM

## 2021-05-10 DIAGNOSIS — Z01.818 PREOP GENERAL PHYSICAL EXAM: Primary | ICD-10-CM

## 2021-05-10 PROCEDURE — 3075F SYST BP GE 130 - 139MM HG: CPT | Performed by: FAMILY MEDICINE

## 2021-05-10 PROCEDURE — 99243 OFF/OP CNSLTJ NEW/EST LOW 30: CPT | Performed by: FAMILY MEDICINE

## 2021-05-10 PROCEDURE — 3008F BODY MASS INDEX DOCD: CPT | Performed by: REGISTERED NURSE

## 2021-05-10 PROCEDURE — 3078F DIAST BP <80 MM HG: CPT | Performed by: FAMILY MEDICINE

## 2021-05-10 NOTE — PROGRESS NOTES
PRE-OPERATIVE EVALUATION  FAMILY PRACTICE AT [facility]    NAME: Jannette Baugh  AGE: 39 y o  SEX: female  : 1980     DATE: 5/10/2021    Internal Medicine Pre-Operative Evaluation      Chief Complaint: Pre-operative Evaluation     Surgery: right shoulder arthroscopy, possible rotator cuff repair  Anticipated Date of Surgery: 2021  Referring Provider: Dr Oren Parker      History of Present Illness:     Luz Jara is a 39 y o  female who presents to the office today for a preoperative consultation  Planned anesthesia is general  Patient has a bleeding risk of: no recent abnormal bleeding, no remote history of abnormal bleeding and no use of Ca-channel blockers  Patient does not have objections to receiving blood products if needed  Current anti-platelet/anti-coagulation medications that the patient is prescribed includes: none  Assessment of Chronic Conditions:   - Asthma: stable  - Hypertension: controlled, stable  - obstructive sleep apnea     Assessment of Cardiac Risk:  · Denies unstable or severe angina or MI in the last 6 weeks or history of stent placement in the last year   · Denies decompensated heart failure (e g  New onset heart failure, NYHA functional class IV heart failure, or worsening existing heart failure)  · Denies significant arrhythmias such as high grade AV block, symptomatic ventricular arrhythmia, newly recognized ventricular tachycardia, supraventricular tachycardia with resting heart rate >100, or symptomatic bradycardia  · Denies severe heart valve disease including aortic stenosis or symptomatic mitral stenosis     Exercise Capacity:  · Able to walk 4 blocks without symptoms?: Yes  · Able to walk 2 flights without symptoms?: Yes    Prior Anesthesia Reactions: PANV and headache     Personal history of venous thromboembolic disease? No    History of steroid use for >2 weeks within last year?  No    STOP-BANG Sleep Apnea Screening Questionnaire:  has known MARCELINO- managed and controlled       Review of Systems:     Review of Systems   All other systems reviewed and are negative  Current Problem List:     Patient Active Problem List   Diagnosis    Thyroid nodule    Trigger finger, right middle finger    Carpal tunnel syndrome, bilateral    Asthma, mild intermittent    Essential hypertension    Breast hypertrophy    Bulging lumbar disc    Calcific tendinitis of shoulder    Chronic low back pain    Chronic right-sided low back pain without sciatica    Chronic sinusitis    Depression with anxiety    Dermatitis, eczematoid    Disorder of bursae of shoulder region    Esophageal reflux    Fibromyalgia    H/O laminectomy    Hemorrhoids    Hyperlipidemia    Mammographic microcalcification    Morbid obesity    Obstructive sleep apnea    Pars defect of lumbar spine    RBBB (right bundle branch block)    Spondylolysis of lumbar region    Vitamin D insufficiency    Atypical chest pain    Sebaceous cyst of breast, left    Left ear pain    Left cervical lymphadenopathy    Eliz-Danlos syndrome    Preop cardiovascular exam    History of sepsis    Otitis externa/media    Asthma    High triglycerides    Chronic headache with new features    Impaired fasting glucose    Moderate recurrent major depression (HCC)    Generalized anxiety disorder       Allergies: Allergies   Allergen Reactions    Amoxicillin Swelling, Anaphylaxis and Angioedema    Neurontin [Gabapentin] Other (See Comments)     Other reaction(s): SUICIDE IDEATION  Other reaction(s): Other (See Comments)  suicidal  suicidal    Penicillin V Angioedema and Swelling     Other reaction(s): Throat closure    Penicillins Swelling and Anaphylaxis    Aripiprazole Other (See Comments)     Other reaction(s): low dose 2 mg ; curving in and locking in of hands/dystonia  Other reaction(s):  Other (See Comments)  Other reaction(s): low dose 2 mg ; curving in and locking in of hands/dystonia    Lorazepam Other (See Comments)     Other reaction(s): higher dose > anxiety and depression  Other reaction(s):  Other (See Comments)  Other reaction(s): higher dose > anxiety and depression    Carbamazepine Other (See Comments)     Other reaction(s): Unknown Reaction    Doxycycline Hives    Lamotrigine Rash    Other Hives     Adhesive tape       Physical Exam:     Vitals:    05/10/21 1312   BP: 130/78   BP Location: Left arm   Patient Position: Sitting   Pulse: 99   Resp: 18   Temp: (!) 97 2 °F (36 2 °C)   SpO2: 100%   Weight: 132 kg (290 lb)   Height: 5' 6 75" (1 695 m)           Current Outpatient Medications:     albuterol (PROVENTIL HFA,VENTOLIN HFA) 90 mcg/act inhaler, Inhale 2 puffs every 6 (six) hours as needed for shortness of breath (cough), Disp: 1 Inhaler, Rfl: 0    ALPRAZolam (XANAX) 0 25 mg tablet, Take 1 tablet (0 25 mg total) by mouth 2 (two) times a day as needed for anxiety, Disp: 4 tablet, Rfl: 0    amitriptyline (ELAVIL) 100 mg tablet, Take 1 tablet (100 mg total) by mouth daily at bedtime, Disp: 90 tablet, Rfl: 1    Ascorbic Acid (VITAMIN C) 500 MG CAPS, Take 1 capsule by mouth daily, Disp: , Rfl:     atorvastatin (LIPITOR) 40 mg tablet, Take 1 tablet (40 mg total) by mouth daily, Disp: 90 tablet, Rfl: 1    busPIRone (BUSPAR) 5 mg tablet, Take 1 tablet (5 mg total) by mouth 2 (two) times a day, Disp: 60 tablet, Rfl: 0    cetirizine (ZyrTEC) 10 mg tablet, Take 10 mg by mouth daily, Disp: , Rfl:     Cholecalciferol (VITAMIN D3) 5000 units CAPS, Take 1 capsule by mouth daily  , Disp: , Rfl:     ciprofloxacin-dexamethasone (CIPRODEX) otic suspension, Administer 4 drops to the right ear 2 (two) times a day, Disp: 7 5 mL, Rfl: 5    cyanocobalamin (VITAMIN B-12) 1,000 mcg tablet, Take 1,000 mcg by mouth daily , Disp: , Rfl:     esomeprazole (NexIUM 24HR) 20 mg capsule, Take 1 capsule (20 mg total) by mouth every morning, Disp: 90 capsule, Rfl: 3    methocarbamol (ROBAXIN) 750 mg tablet, Take 1 tablet (750 mg total) by mouth every 8 (eight) hours as needed for muscle spasms, Disp: 30 tablet, Rfl: 0    metoprolol succinate (TOPROL-XL) 50 mg 24 hr tablet, Take 1 tablet (50 mg total) by mouth daily, Disp: 90 tablet, Rfl: 1    Multiple Vitamins-Minerals (CENTRUM ADULTS PO), Take 1 tablet by mouth daily, Disp: , Rfl:     nystatin (MYCOSTATIN) cream, Apply topically 2 (two) times a day as needed (rash), Disp: 30 g, Rfl: 0    venlafaxine (EFFEXOR-XR) 75 mg 24 hr capsule, Take 1 capsule (75 mg total) by mouth daily Takes with a 150 mg capsule for total of 225 mg daily (Patient taking differently: Take 75 mg by mouth daily Take three tablets daily), Disp: 90 capsule, Rfl: 0    ciprofloxacin-dexamethasone (CIPRODEX) otic suspension, Administer 4 drops to the right ear 2 (two) times a day, Disp: 7 5 mL, Rfl: 0    Past Medical History:       Past Medical History:   Diagnosis Date    Allergic rhinitis     Anxiety     Arthritis     Chronic pain disorder     BACK SHOULDERS NECK    Colon polyp     Depression     Disease of thyroid gland     nodule    Fibromyalgia, primary     GERD (gastroesophageal reflux disease)     Hearing difficulty of right ear     Hyperlipidemia     Hypertension     Irritable bowel syndrome     Perforation of tympanic membrane     Right    PONV (postoperative nausea and vomiting)     RBBB     Right bundle branch blockage     Sleep apnea     no cpap    Wears glasses         Past Surgical History:   Procedure Laterality Date    ANAL FISTULOTOMY N/A 2018    Procedure: FISTULOTOMY;  Surgeon: Cathy Dykes MD;  [facility];  Service: Colorectal    BACK SURGERY      lumbar herniated disc    BREAST CYST EXCISION Left 13 yrs ago  benign    BREAST SURGERY Left 2006    cyst removal    CARPAL TUNNEL RELEASE       SECTION      CHOLECYSTECTOMY      COLONOSCOPY      CYST REMOVAL      DENTAL SURGERY      INCISION AND DRAINAGE OF WOUND N/A 11/2/2018    Procedure: INCISION AND DRAINAGE (I&D) BUTTOCK;  Surgeon: Berna Khan MD;  [facility];  Service: Colorectal    MYRINGOTOMY W/ TUBES Right 08/17/2015    [place]    SD COLONOSCOPY FLX DX W/COLLJ SPEC WHEN PFRMD N/A 8/2/2017    Procedure: COLONOSCOPY;  Surgeon: Claribel Leal MD;  [facility]; Service: Gastroenterology    SD INCISE FINGER TENDON SHEATH Right 3/13/2018    Procedure: LONG TRIGGER FINGER RELEASE;  Surgeon: Wendy Gutierrez DO;  [facility];  Service: Orthopedics    SD SURG DIAGNOSTIC EXAM, ANORECTAL N/A 11/2/2018    Procedure: EXAM UNDER ANESTHESIA (EUA); Surgeon: Berna Khan MD;  [facility];  Service: Colorectal    SD TYMPANOPLASTY Right 5/24/2017    Procedure: TYMPANOPLASTY WITH PERICHONDRIN GRAFT;  Surgeon:  Gayla Carlos DO;  [facility];  Service: ENT    SHOULDER SURGERY Right     ULNAR NERVE TRANSPOSITION Left     WISDOM TOOTH EXTRACTION          Family History   Problem Relation Age of Onset    Hypertension Mother     Hyperlipidemia Mother     Diabetes Mother     Hypertension Father     Hyperlipidemia Father     Heart disease Father         cardiac disorder-myocardial infarction arrhythmias    Heart attack Father     Diabetes unspecified Maternal Grandmother     Crohn's disease Brother     Arthritis Brother     Thyroid disease Paternal Grandmother     No Known Problems Daughter     No Known Problems Maternal Aunt     No Known Problems Maternal Aunt     No Known Problems Maternal Aunt     No Known Problems Maternal Aunt     No Known Problems Paternal Aunt     No Known Problems Paternal Aunt     Heart attack Paternal Uncle         Social History     Socioeconomic History    Marital status: /Civil Union     Spouse name: Not on file    Number of children: Not on file    Years of education: Not on file    Highest education level: Not on file   Occupational History    Not on file Social Needs    Financial resource strain: Not on file    Food insecurity     Worry: Not on file     Inability: Not on file    Transportation needs     Medical: No     Non-medical: No   Tobacco Use    Smoking status: Never Smoker    Smokeless tobacco: Never Used   Substance and Sexual Activity    Alcohol use: Not Currently     Comment: social    Drug use: No    Sexual activity: Yes     Partners: Male     Birth control/protection: Injection     Comment: per allscripts   Lifestyle    Physical activity     Days per week: 2 days     Minutes per session: Not on file    Stress: Not on file   Relationships    Social connections     Talks on phone: Not on file     Gets together: Not on file     Attends Church service: Not on file     Active member of club or organization: Not on file     Attends meetings of clubs or organizations: Not on file     Relationship status: Not on file    Intimate partner violence     Fear of current or ex partner: Not on file     Emotionally abused: Not on file     Physically abused: Not on file     Forced sexual activity: Not on file   Other Topics Concern    Not on file   Social History Narrative    Daily caffeine use- 1 cup of coffee        Physical Exam:     Physical Exam  Vitals signs and nursing note reviewed  Constitutional:       General: She is not in acute distress  Appearance: She is well-developed  She is not ill-appearing, toxic-appearing or diaphoretic  HENT:      Head: Normocephalic and atraumatic  Nose: Nose normal       Mouth/Throat:      Mouth: Mucous membranes are moist       Pharynx: Oropharynx is clear  Eyes:      General: Lids are normal       Extraocular Movements: Extraocular movements intact  Conjunctiva/sclera: Conjunctivae normal       Pupils: Pupils are equal, round, and reactive to light  Neck:      Musculoskeletal: Neck supple  Thyroid: No thyroid mass or thyromegaly  Vascular: No JVD        Trachea: Trachea normal  Cardiovascular:      Rate and Rhythm: Normal rate and regular rhythm  Heart sounds: Normal heart sounds  Comments: No edema  Pulmonary:      Effort: Pulmonary effort is normal       Breath sounds: Normal breath sounds  Abdominal:      General: Bowel sounds are normal  There is no distension  Palpations: Abdomen is soft  There is no hepatomegaly, splenomegaly or mass  Tenderness: There is no abdominal tenderness  Hernia: There is no hernia in the ventral area  Lymphadenopathy:      Cervical: No cervical adenopathy  Upper Body:      Right upper body: No supraclavicular adenopathy  Left upper body: No supraclavicular adenopathy  Skin:     General: Skin is warm and dry  Coloration: Skin is not pale  Neurological:      Mental Status: She is alert and oriented to person, place, and time  Gait: Gait normal    Psychiatric:         Mood and Affect: Mood normal          Behavior: Behavior normal  Behavior is cooperative  Data:     Pre-operative work-up    Laboratory Results: I have personally reviewed the pertinent laboratory results/reports     EKG: pt saw her cardiologist for preop clearance last week    Chest x-ray: I have personally reviewed pertinent reports  2019    Previous cardiopulmonary studies within the past year:  · Echocardiogram: none  · Cardiac Catheterization: none  · Stress Test: none  · Pulmonary Function Testing: none      Assessment & Recommendations:     1  Preop general physical exam     2  Right shoulder pain, unspecified chronicity     3  Obstructive sleep apnea      stable, controlled, cpm   4  Essential hypertension      controlled, cpm   5  Mild intermittent asthma without complication      controlled, stable       Pre-Op Evaluation Assessment  39 y o  female with planned surgery: right shoulder arthroscopy  Known risk factors for perioperative complications: Chronic pulmonary disease        Cardiac Risk Estimation: per the Revised Cardiac Risk Index (Circ  [id number], 1999), the patient's risk factors for cardiac complications include none, putting her in: RCI RISK CLASS I (0 risk factors, risk of major cardiac compl  appr  0 5%)  Current medications which may produce withdrawal symptoms if withheld perioperatively: xanax  Pre-Op Evaluation Plan  1  Further preoperative workup as follows:   - None; no further preoperative work-up is required    2  Medication Management/Recommendations:   - Patient has been instructed to avoid herbs or non-directed vitamins the week prior to surgery to ensure no drug interactions with perioperative surgical and anesthetic medications  Otherwise continue medications up to and including day of surgery    3  Prophylaxis for cardiac events with perioperative beta-blockers: should be considered, specific regimen per anesthesia  4  Patient requires further consultation with: None    Clearance  Patient is CLEARED for surgery without any additional cardiac testing       Sara Lopez DO  FAMILY PRACTICE AT [place] [address] [facility] [address]  Phone#  [phone]  Fax#  [phone]

## 2021-05-11 ENCOUNTER — SOCIAL WORK (OUTPATIENT)
Dept: BEHAVIORAL/MENTAL HEALTH CLINIC | Facility: CLINIC | Age: 41
End: 2021-05-11
Payer: COMMERCIAL

## 2021-05-11 DIAGNOSIS — F33.1 MODERATE RECURRENT MAJOR DEPRESSION (HCC): Primary | ICD-10-CM

## 2021-05-11 DIAGNOSIS — F41.1 GENERALIZED ANXIETY DISORDER: ICD-10-CM

## 2021-05-11 PROCEDURE — 90834 PSYTX W PT 45 MINUTES: CPT | Performed by: SOCIAL WORKER

## 2021-05-11 NOTE — PSYCH
This note was not shared with the patient due to this is a psychotherapy note     Psychotherapy Provided: Individual Psychotherapy 45 minutes     Length of time in session: 45 minutes, follow up in 2 week    Goals addressed in session: Goal 1     Pain:      none    0    Current suicide risk : Low     Met with Jannette  She reports feeling overwhelmed with life events while continuing to process the death of her Uncle  Session focused on discussing how Jannette is coping, validating the many stressors she is coping with, and ensuring that she is taking time for self care  Jannette processed the challenges of 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 by planning a trip to [facility], managing her dogs seizure disorder, and right shoulder surgery next Friday 5/21/21 while processing her Uncle's death  She states, "it just still doesn't seem real "  Education on the process of grief was provided  Validation and support was provided  We discussed outlets for expression of her emotions  Jannette has been confiding in friends and family to process her emotions  Jannette has insight that she often uses distraction tools to cope; such as focusing on helping others and keeping herself busy  Cons of distraction as a coping mechanism was reviewed and we discussed other more effective coping tools  Jannette will continue to implement wellness tools learned in session with focus on self care and healthy emotional outlets  Continue biweekly support       PHQ-9 Depression Screening    PHQ-9:   Frequency of the following problems over the past two weeks:      Little interest or pleasure in doing things: 3 - nearly every day  Feeling down, depressed, or hopeless: 3 - nearly every day  Trouble falling or staying asleep, or sleeping too much: 2 - more than half the days  Feeling tired or having little energy: 3 - nearly every day  Poor appetite or overeating: 3 - nearly every day  Feeling bad about yourself - or that you are a failure or have let yourself or your family down: 3 - nearly every day  Trouble concentrating on things, such as reading the newspaper or watching television: 2 - more than half the days  Moving or speaking so slowly that other people could have noticed  Or the opposite - being so fidgety or restless that you have been moving around a lot more than usual: 1 - several days  Thoughts that you would be better off dead, or of hurting yourself in some way: 0 - not at all  PHQ-2 Score: 6  PHQ-9 Score: 20       MILADYS-7 Flowsheet Screening      Most Recent Value   Over the last two weeks, how often have you been bothered by the following problems? Feeling nervous, anxious, or on edge  3   Not being able to stop or control worrying  3   Worrying too much about different things  3   Trouble relaxing   3   Being so restless that it's hard to sit still  1   Becoming easily annoyed or irritable   2   Feeling afraid as if something awful might happen  2   How difficult have these problems made it for you to do your work, take care of things at home, or get along with other people?    Somewhat difficult   MILADYS Score   17          Mental status:  Appearance calm and cooperative , adequate hygiene and grooming and good eye contact    Mood depressed and anxious   Affect Mood congruent   Speech a normal rate and volume   Thought Processes coherent/organized and normal thought processes   Hallucinations no hallucinations present    Thought Content no delusions   Abnormal Thoughts no suicidal thoughts  and no homicidal thoughts    Orientation  oriented to person and place and time   Remote Memory short term memory intact and long term memory intact   Attention Span concentration intact   Intellect Appears to be of Average Intelligence   Fund of Knowledge displays adequate knowledge of current events   Insight fair   Judgement fair   Muscle Strength Muscle strength and tone were normal and Normal gait    Language no difficulty naming common objects   Pain none   Pain Scale 0         Behavioral Health Treatment Plan [facility]: Diagnosis and Treatment Plan explained to Carmen Newton relates understanding diagnosis and is agreeable to Treatment Plan   Yes

## 2021-05-13 ENCOUNTER — HOSPITAL ENCOUNTER (OUTPATIENT)
Dept: GASTROENTEROLOGY | Facility: HOSPITAL | Age: 41
Setting detail: OUTPATIENT SURGERY
Discharge: HOME | End: 2021-05-13
Attending: INTERNAL MEDICINE
Payer: COMMERCIAL

## 2021-05-13 ENCOUNTER — ANESTHESIA EVENT (OUTPATIENT)
Dept: GASTROENTEROLOGY | Facility: HOSPITAL | Age: 41
End: 2021-05-13

## 2021-05-13 ENCOUNTER — ANESTHESIA (OUTPATIENT)
Dept: GASTROENTEROLOGY | Facility: HOSPITAL | Age: 41
End: 2021-05-13

## 2021-05-13 VITALS
BODY MASS INDEX: 46.61 KG/M2 | WEIGHT: 290 LBS | OXYGEN SATURATION: 99 % | SYSTOLIC BLOOD PRESSURE: 137 MMHG | TEMPERATURE: 97 F | DIASTOLIC BLOOD PRESSURE: 65 MMHG | HEART RATE: 81 BPM | RESPIRATION RATE: 20 BRPM | HEIGHT: 66 IN

## 2021-05-13 DIAGNOSIS — R19.7 DIARRHEA, UNSPECIFIED TYPE: ICD-10-CM

## 2021-05-13 DIAGNOSIS — K21.9 GASTROESOPHAGEAL REFLUX DISEASE WITHOUT ESOPHAGITIS: ICD-10-CM

## 2021-05-13 LAB
EXT PREGNANCY TEST URINE: NEGATIVE
EXT. CONTROL: NORMAL

## 2021-05-13 PROCEDURE — 88305 TISSUE EXAM BY PATHOLOGIST: CPT | Performed by: PATHOLOGY

## 2021-05-13 PROCEDURE — 43239 EGD BIOPSY SINGLE/MULTIPLE: CPT | Performed by: INTERNAL MEDICINE

## 2021-05-13 PROCEDURE — 81025 URINE PREGNANCY TEST: CPT | Performed by: INTERNAL MEDICINE

## 2021-05-13 PROCEDURE — 45380 COLONOSCOPY AND BIOPSY: CPT | Performed by: INTERNAL MEDICINE

## 2021-05-13 RX ORDER — PROPOFOL 10 MG/ML
INJECTION, EMULSION INTRAVENOUS CONTINUOUS PRN
Status: DISCONTINUED | OUTPATIENT
Start: 2021-05-13 | End: 2021-05-13

## 2021-05-13 RX ORDER — PROPOFOL 10 MG/ML
INJECTION, EMULSION INTRAVENOUS AS NEEDED
Status: DISCONTINUED | OUTPATIENT
Start: 2021-05-13 | End: 2021-05-13

## 2021-05-13 RX ORDER — SODIUM CHLORIDE, SODIUM LACTATE, POTASSIUM CHLORIDE, CALCIUM CHLORIDE 600; 310; 30; 20 MG/100ML; MG/100ML; MG/100ML; MG/100ML
INJECTION, SOLUTION INTRAVENOUS CONTINUOUS PRN
Status: DISCONTINUED | OUTPATIENT
Start: 2021-05-13 | End: 2021-05-13

## 2021-05-13 RX ADMIN — SODIUM CHLORIDE, SODIUM LACTATE, POTASSIUM CHLORIDE, AND CALCIUM CHLORIDE: .6; .31; .03; .02 INJECTION, SOLUTION INTRAVENOUS at 10:38

## 2021-05-13 RX ADMIN — PROPOFOL 50 MG: 10 INJECTION, EMULSION INTRAVENOUS at 10:44

## 2021-05-13 RX ADMIN — PROPOFOL 50 MG: 10 INJECTION, EMULSION INTRAVENOUS at 10:50

## 2021-05-13 RX ADMIN — PROPOFOL 50 MG: 10 INJECTION, EMULSION INTRAVENOUS at 10:53

## 2021-05-13 RX ADMIN — PROPOFOL 50 MG: 10 INJECTION, EMULSION INTRAVENOUS at 10:58

## 2021-05-13 RX ADMIN — PROPOFOL 50 MG: 10 INJECTION, EMULSION INTRAVENOUS at 10:48

## 2021-05-13 RX ADMIN — PROPOFOL 50 MG: 10 INJECTION, EMULSION INTRAVENOUS at 11:03

## 2021-05-13 RX ADMIN — PROPOFOL 50 MG: 10 INJECTION, EMULSION INTRAVENOUS at 10:42

## 2021-05-13 RX ADMIN — PROPOFOL 100 MG: 10 INJECTION, EMULSION INTRAVENOUS at 10:41

## 2021-05-13 RX ADMIN — PROPOFOL 120 MCG/KG/MIN: 10 INJECTION, EMULSION INTRAVENOUS at 10:57

## 2021-05-13 RX ADMIN — PROPOFOL 50 MG: 10 INJECTION, EMULSION INTRAVENOUS at 11:06

## 2021-05-13 RX ADMIN — PROPOFOL 50 MG: 10 INJECTION, EMULSION INTRAVENOUS at 10:45

## 2021-05-13 NOTE — ANESTHESIA POSTPROCEDURE EVALUATION
Post-Op Assessment Note    CV Status:  Stable    Pain management: adequate     Mental Status:  Alert and awake   Hydration Status:  Stable   PONV Controlled:  None   Airway Patency:  Patent      Post Op Vitals Reviewed: Yes      Staff: CRNA, Anesthesiologist         No complications documented      /88 (05/13/21 1121)    Temp 98 4 °F (36 9 °C) (05/13/21 1121)    Pulse 95 (05/13/21 1121)   Resp 16 (05/13/21 1121)    SpO2 95 % (05/13/21 1121)

## 2021-05-13 NOTE — ANESTHESIA PREPROCEDURE EVALUATION
Procedure:  EGD  COLONOSCOPY    Relevant Problems   CARDIO   (+) Atypical chest pain   (+) Essential hypertension   (+) High triglycerides   (+) Hyperlipidemia   (+) RBBB (right bundle branch block)      GI/HEPATIC   (+) Esophageal reflux      MUSCULOSKELETAL   (+) Chronic low back pain   (+) Chronic right-sided low back pain without sciatica   (+) Fibromyalgia      NEURO/PSYCH   (+) Chronic headache with new features   (+) Depression with anxiety   (+) Fibromyalgia   (+) Generalized anxiety disorder   (+) History of sepsis   (+) Moderate recurrent major depression (HCC)      PULMONARY   (+) Asthma   (+) Asthma, mild intermittent   (+) Obstructive sleep apnea      Other   (+) Eliz-Danlos syndrome   (+) Left cervical lymphadenopathy        Physical Exam    Airway    Mallampati score: II  TM Distance: >3 FB  Neck ROM: full     Dental       Cardiovascular      Pulmonary      Other Findings        Anesthesia Plan  ASA Score- 3     Anesthesia Type- IV sedation with anesthesia with ASA Monitors  Additional Monitors:   Airway Plan:           Plan Factors-    Chart reviewed  Induction- intravenous  Postoperative Plan-     Informed Consent- Anesthetic plan and risks discussed with patient  I personally reviewed this patient with the CRNA  Discussed and agreed on the Anesthesia Plan with the CRNA  Nel Dempsey

## 2021-05-13 NOTE — DISCHARGE INSTRUCTIONS
Acute Diarrhea   WHAT YOU NEED TO KNOW:   Acute diarrhea starts quickly and lasts a short time, usually 1 to 3 days  It can last up to 2 weeks  You may not be able to control your diarrhea  Acute diarrhea usually stops on its own  DISCHARGE INSTRUCTIONS:   Return to the emergency department if:   · You feel confused  · Your heartbeat is faster than usual      · Your eyes look deeply sunken, or you have no tears when you cry  · You urinate less than usual, or your urine is dark yellow  · You have blood or mucus in your bowel movements  · You have severe abdominal pain  · You are unable to drink any liquids  Contact your healthcare provider if:   · Your symptoms do not get better with treatment  · You have a fever higher than 101 3°F (38 5°C)  · You have trouble eating and drinking because you are vomiting  · Your diarrhea does not get better in 7 days  · You have questions or concerns about your condition or care  Follow up with your healthcare provider as directed:  Write down your questions so you remember to ask them during your visits  Medicines:  · Diarrhea medicine  is an over-the-counter medicine that helps slow or stop your diarrhea  Do not  take this medicine unless your healthcare provider says it is okay  · Antibiotics  may be given to help treat an infection caused by bacteria  · Antiparasitics  may be given to treat an infection caused by parasites  · Take your medicine as directed  Contact your healthcare provider if you think your medicine is not helping or if you have side effects  Tell him of her if you are allergic to any medicine  Keep a list of the medicines, vitamins, and herbs you take  Include the amounts, and when and why you take them  Bring the list or the pill bottles to follow-up visits  Carry your medicine list with you in case of an emergency  Self-care:   · Drink liquids as directed    Liquids will help prevent dehydration caused by diarrhea  Ask your healthcare provider how much liquid to drink each day and which liquids are best for you  You may need to drink an oral rehydration solution (ORS)  An ORS has the right amounts of water, salts, and sugar you need to replace body fluids  You can buy an ORS at most grocery stores and pharmacies  · Eat foods that are easy to digest   Examples include rice, lentils, cereal, bananas, potatoes, and bread  It also includes some fruits (bananas, melon), well-cooked vegetables, and lean meats  Do not eat foods high in fiber, fat, and sugar  Do not drink alcohol until your diarrhea is gone  Prevent acute diarrhea:   · Wash your hands often  Use soap and water  Wash your hands before you eat or prepare food  Also wash your hands after you use the bathroom  Use an alcohol-based hand gel when soap and water are not available  · Keep bathroom surfaces clean  This helps prevent the spread of germs that cause acute diarrhea  · Wash fruits and vegetables well before you eat them  This can help remove germs that cause diarrhea  If possible, remove the skin from fruits and vegetables, or cook them well before you eat them  · [employer] and poultry as directed  Meat includes beef and pork  Poultry includes chicken, turkey, and duck  ? Cook ground meat  to 160°F      ? Cook ground poultry, whole poultry, or cuts of poultry  to at least 165°F  Remove the poultry from heat  Let it stand for 3 minutes before you eat it  ? Cook whole cuts of meat other than poultry  to at least 145°F  Remove the meat from heat  Let it stand for 3 minutes before you eat it  · Wash dishes that have touched raw meat or poultry with hot water and soap  This includes cutting boards, utensils, dishes, and serving containers  · Place raw or cooked meat or poultry in the refrigerator as soon as possible  Bacteria can grow in meat or poultry that is left at room temperature too long       · Do not eat raw or undercooked oysters, clams, or mussels  These foods may be contaminated and cause infection  · Drink only filtered or treated water when you travel  Do not put ice in your drinks  Drink bottled water whenever possible  © Copyright [address] Information is for End User's use only and may not be sold, redistributed or otherwise used for commercial purposes  All illustrations and images included in CareNotes® are the copyrighted property of A D A M [employer]  or [facility] Matilde Mc   The above information is an  only  It is not intended as medical advice for individual conditions or treatments  Talk to your doctor, nurse or pharmacist before following any medical regimen to see if it is safe and effective for you

## 2021-05-13 NOTE — H&P
History and Physical - [facility]  Jannette Baugh 39 y o  female MRN: [id number]    HPI: Mayco Alcocer is a 39y o  year old female who presents for evaluation of GERD and for evaluation of diarrhea  Review of Systems    Historical Information   Past Medical History:   Diagnosis Date    Allergic rhinitis     Anxiety     Arthritis     Chronic pain disorder     BACK SHOULDERS NECK    Colon polyp     Depression     Disease of thyroid gland     nodule    Fibromyalgia, primary     GERD (gastroesophageal reflux disease)     Hearing difficulty of right ear     Hyperlipidemia     Hypertension     Irritable bowel syndrome     Perforation of tympanic membrane     Right    PONV (postoperative nausea and vomiting)     RBBB     Right bundle branch blockage     Sleep apnea     no cpap    Wears glasses      Past Surgical History:   Procedure Laterality Date    ANAL FISTULOTOMY N/A 2018    Procedure: FISTULOTOMY;  Surgeon: Kailee De Los Santos MD;  [facility];  Service: Colorectal    BACK SURGERY      lumbar herniated disc    BREAST CYST EXCISION Left 13 yrs ago  benign    BREAST SURGERY Left 2006    cyst removal    CARPAL TUNNEL RELEASE       SECTION      CHOLECYSTECTOMY      COLONOSCOPY      CYST REMOVAL      DENTAL SURGERY      INCISION AND DRAINAGE OF WOUND N/A 2018    Procedure: INCISION AND DRAINAGE (I&D) BUTTOCK;  Surgeon: Kailee De Los Santos MD;  [facility];  Service: Colorectal    MYRINGOTOMY W/ TUBES Right 2015    [place]    [place] COLONOSCOPY FLX DX W/COLLJ SPEC WHEN PFRMD N/A 2017    Procedure: COLONOSCOPY;  Surgeon: Hong Rogers MD;  [facility];   Service: Gastroenterology    KS INCISE FINGER TENDON SHEATH Right 3/13/2018    Procedure: LONG TRIGGER FINGER RELEASE;  Surgeon: Charis Rai DO;  [facility];  Service: Orthopedics    KS SURG DIAGNOSTIC EXAM, ANORECTAL N/A 2018    Procedure: EXAM UNDER ANESTHESIA (EUA); Surgeon: Aki Marina MD;  [facility];  Service: Colorectal    NC TYMPANOPLASTY Right 5/24/2017    Procedure: TYMPANOPLASTY WITH PERICHONDRIN GRAFT;  Surgeon: Jose William DO;  [facility];  Service: ENT    SHOULDER SURGERY Right     ULNAR NERVE TRANSPOSITION Left     WISDOM TOOTH EXTRACTION       Social History   Social History     Substance and Sexual Activity   Alcohol Use Not Currently    Comment: social     Social History     Substance and Sexual Activity   Drug Use No     Social History     Tobacco Use   Smoking Status Never Smoker   Smokeless Tobacco Never Used     Family History   Problem Relation Age of Onset    Hypertension Mother     Hyperlipidemia Mother     Diabetes Mother     Hypertension Father     Hyperlipidemia Father     Heart disease Father         cardiac disorder-myocardial infarction arrhythmias    Heart attack Father     Diabetes unspecified Maternal Grandmother     Crohn's disease Brother     Arthritis Brother     Thyroid disease Paternal Grandmother     No Known Problems Daughter     No Known Problems Maternal Aunt     No Known Problems Maternal Aunt     No Known Problems Maternal Aunt     No Known Problems Maternal Aunt     No Known Problems Paternal Aunt     No Known Problems Paternal Aunt     Heart attack Paternal Uncle        Meds/Allergies     (Not in a hospital admission)      Allergies   Allergen Reactions    Amoxicillin Swelling, Anaphylaxis and Angioedema    Neurontin [Gabapentin] Other (See Comments)     Other reaction(s): SUICIDE IDEATION  Other reaction(s): Other (See Comments)  suicidal  suicidal    Penicillin V Angioedema and Swelling     Other reaction(s): Throat closure    Penicillins Swelling and Anaphylaxis    Aripiprazole Other (See Comments)     Other reaction(s): low dose 2 mg ; curving in and locking in of hands/dystonia  Other reaction(s):  Other (See Comments)  Other reaction(s): low dose 2 mg ; curving in and locking in of hands/dystonia    Lorazepam Other (See Comments)     Other reaction(s): higher dose > anxiety and depression  Other reaction(s): Other (See Comments)  Other reaction(s): higher dose > anxiety and depression    Carbamazepine Other (See Comments)     Other reaction(s): Unknown Reaction    Doxycycline Hives    Lamotrigine Rash    Other Hives     Adhesive tape       Objective     /75   Pulse 91   Temp 97 6 °F (36 4 °C) (Temporal)   Resp 16   Ht 5' 6" (1 676 m)   Wt 132 kg (290 lb)   LMP 05/06/2021 (Approximate)   SpO2 100%   BMI 46 81 kg/m²       PHYSICAL EXAM    Gen: NAD  CV: RRR  CHEST: Clear  ABD: soft, NT/ND  EXT: no edema  Neuro: AAO      ASSESSMENT/PLAN:  This is a 39y o  year old female here for evaluation of diarrhea and GERD  PLAN:   Procedure:  EGD and colonoscopy

## 2021-05-14 ENCOUNTER — TELEPHONE (OUTPATIENT)
Dept: GASTROENTEROLOGY | Facility: CLINIC | Age: 41
End: 2021-05-14

## 2021-05-14 NOTE — TELEPHONE ENCOUNTER
Call patient and let her know that her endoscopy yesterday showed gastritis which Dr Alden Cruz recommended continuing Nexium 20 mg once a day  I let her know that we will call her when the biopsy results come back and update her with any further recommendations  She understands and will call us if she has any additional questions     ----- Message from Nigel Medeiros sent at 5/14/2021  8:27 AM EDT -----  Regarding: FW: Non-Urgent Medical Question  Contact: [phone]    ----- Message -----  From: Lsahae Gonzalez MA  Sent: 5/14/2021   8:13 AM EDT  To: [facility]  Subject: FW: Non-Urgent Medical Question                    ----- Message -----  From: Dudley Holt  Sent: 5/13/2021   7:45 PM EDT  To: , #  Subject: Non-Urgent Rogerio Cruz, I was just wondering if there was a treatment for the gastritis or if it's nothing to worry about

## 2021-05-21 ENCOUNTER — TELEPHONE (OUTPATIENT)
Dept: GASTROENTEROLOGY | Facility: AMBULARY SURGERY CENTER | Age: 41
End: 2021-05-21

## 2021-05-21 NOTE — TELEPHONE ENCOUNTER
----- Message from Leann Easley MD sent at 5/19/2021  1:05 PM EDT -----  Please inform the patient that the duodenal biopsies are negative for celiac disease, gastric biopsies show inflammation but no evidence of H pylori or dysplasia  Polyps in the stomach were benign  Can continue Nexium for now, no need for repeat EGD  Avoid NSAIDs  Follow anti-reflux measures  Random biopsies from the colon were negative for microscopic colitis, polyp in the colon was benign, repeat colonoscopy in 5 years

## 2021-05-24 DIAGNOSIS — I10 HYPERTENSION, UNSPECIFIED TYPE: ICD-10-CM

## 2021-05-24 RX ORDER — METOPROLOL SUCCINATE 50 MG/1
50 TABLET, EXTENDED RELEASE ORAL DAILY
Qty: 90 TABLET | Refills: 1 | Status: SHIPPED | OUTPATIENT
Start: 2021-05-24 | End: 2021-07-29 | Stop reason: SDUPTHER

## 2021-05-27 ENCOUNTER — TELEMEDICINE (OUTPATIENT)
Dept: PSYCHIATRY | Facility: CLINIC | Age: 41
End: 2021-05-27
Payer: COMMERCIAL

## 2021-05-27 DIAGNOSIS — F41.8 DEPRESSION WITH ANXIETY: ICD-10-CM

## 2021-05-27 DIAGNOSIS — F41.1 GENERALIZED ANXIETY DISORDER: ICD-10-CM

## 2021-05-27 PROCEDURE — 1036F TOBACCO NON-USER: CPT | Performed by: REGISTERED NURSE

## 2021-05-27 PROCEDURE — 99213 OFFICE O/P EST LOW 20 MIN: CPT | Performed by: REGISTERED NURSE

## 2021-05-27 RX ORDER — VENLAFAXINE HYDROCHLORIDE 75 MG/1
75 CAPSULE, EXTENDED RELEASE ORAL DAILY
Qty: 30 CAPSULE | Refills: 0
Start: 2021-05-27 | End: 2021-06-24 | Stop reason: SDUPTHER

## 2021-05-27 RX ORDER — BUSPIRONE HYDROCHLORIDE 10 MG/1
10 TABLET ORAL 2 TIMES DAILY
Qty: 60 TABLET | Refills: 1 | Status: SHIPPED | OUTPATIENT
Start: 2021-05-27 | End: 2021-06-24 | Stop reason: SDUPTHER

## 2021-05-27 RX ORDER — VENLAFAXINE HYDROCHLORIDE 150 MG/1
150 CAPSULE, EXTENDED RELEASE ORAL DAILY
Qty: 90 CAPSULE | Refills: 0 | Status: SHIPPED | OUTPATIENT
Start: 2021-05-27 | End: 2021-06-24 | Stop reason: SDUPTHER

## 2021-05-27 NOTE — PSYCH
Virtual Regular Visit    Problem List Items Addressed This Visit        Other    Depression with anxiety    Relevant Medications    venlafaxine (EFFEXOR-XR) 150 mg 24 hr capsule    busPIRone (BUSPAR) 10 mg tablet    venlafaxine (EFFEXOR-XR) 75 mg 24 hr capsule    Generalized anxiety disorder    Relevant Medications    venlafaxine (EFFEXOR-XR) 150 mg 24 hr capsule    busPIRone (BUSPAR) 10 mg tablet    venlafaxine (EFFEXOR-XR) 75 mg 24 hr capsule             Encounter provider ELAN Gomez    Provider located at   [address] [address] [phone]    Recent Visits  No visits were found meeting these conditions  Showing recent visits within past 7 days and meeting all other requirements     Today's Visits  Date Type Provider Dept   05/27/21 ELAN [facility]   Showing today's visits and meeting all other requirements     Future Appointments  No visits were found meeting these conditions  Showing future appointments within next 150 days and meeting all other requirements      The patient was identified by name and date of birth  Jannette Baugh was informed that this is a telemedicine visit and that the visit is being conducted through [employer] [place]  My office door was closed  No one else was in the room  She acknowledged consent and understanding of privacy and security of the video platform  The patient has agreed to participate and understands they can discontinue the visit at any time  Patient is aware this is a billable service       HPI     Current Outpatient Medications   Medication Sig Dispense Refill    albuterol (PROVENTIL HFA,VENTOLIN HFA) 90 mcg/act inhaler Inhale 2 puffs every 6 (six) hours as needed for shortness of breath (cough) 1 Inhaler 0    ALPRAZolam (XANAX) 0 25 mg tablet Take 1 tablet (0 25 mg total) by mouth 2 (two) times a day as needed for anxiety 4 tablet 0    amitriptyline (ELAVIL) 100 mg tablet Take 1 tablet (100 mg total) by mouth daily at bedtime 90 tablet 1    Ascorbic Acid (VITAMIN C) 500 MG CAPS Take 1 capsule by mouth daily      atorvastatin (LIPITOR) 40 mg tablet Take 1 tablet (40 mg total) by mouth daily 90 tablet 1    busPIRone (BUSPAR) 10 mg tablet Take 1 tablet (10 mg total) by mouth 2 (two) times a day 60 tablet 1    cetirizine (ZyrTEC) 10 mg tablet Take 10 mg by mouth daily      Cholecalciferol (VITAMIN D3) 5000 units CAPS Take 1 capsule by mouth daily        ciprofloxacin-dexamethasone (CIPRODEX) otic suspension Administer 4 drops to the right ear 2 (two) times a day 7 5 mL 0    cyanocobalamin (VITAMIN B-12) 1,000 mcg tablet Take 1,000 mcg by mouth daily       esomeprazole (NexIUM 24HR) 20 mg capsule Take 1 capsule (20 mg total) by mouth every morning 90 capsule 3    methocarbamol (ROBAXIN) 750 mg tablet Take 1 tablet (750 mg total) by mouth every 8 (eight) hours as needed for muscle spasms 30 tablet 0    metoprolol succinate (TOPROL-XL) 50 mg 24 hr tablet Take 1 tablet (50 mg total) by mouth daily 90 tablet 1    Multiple Vitamins-Minerals (CENTRUM ADULTS PO) Take 1 tablet by mouth daily      nystatin (MYCOSTATIN) cream Apply topically 2 (two) times a day as needed (rash) 30 g 0    venlafaxine (EFFEXOR-XR) 150 mg 24 hr capsule Take 1 capsule (150 mg total) by mouth daily 90 capsule 0    venlafaxine (EFFEXOR-XR) 75 mg 24 hr capsule Take 1 capsule (75 mg total) by mouth daily 30 capsule 0     No current facility-administered medications for this visit  Review of Systems  Video Exam    There were no vitals filed for this visit  Physical Exam   As a result of this visit, I have referred the patient for further respiratory evaluation   No    I spent 20 minutes directly with the patient during this visit  Lavon Fabian acknowledges that she has consented to an online visit or consultation  She understands that the online visit is based solely on information provided by her, and that, in the absence of a face-to-face physical evaluation by the physician, the diagnosis she receives is both limited and provisional in terms of accuracy and completeness  This is not intended to replace a full medical face-to-face evaluation by the physician  Jannette CYNTHIA Laminraghu understands and accepts these terms  MEDICATION MANAGEMENT NOTE        [address] [place] [address]    Name and Date of Birth:  James Simpson 39 y o  1980 MRN: [id number]    Date of Visit: May 27, 2021    Allergies   Allergen Reactions    Amoxicillin Swelling, Anaphylaxis and Angioedema    Neurontin [Gabapentin] Other (See Comments)     Other reaction(s): SUICIDE IDEATION  Other reaction(s): Other (See Comments)  suicidal  suicidal    Penicillin V Angioedema and Swelling     Other reaction(s): Throat closure    Penicillins Swelling and Anaphylaxis    Aripiprazole Other (See Comments)     Other reaction(s): low dose 2 mg ; curving in and locking in of hands/dystonia  Other reaction(s): Other (See Comments)  Other reaction(s): low dose 2 mg ; curving in and locking in of hands/dystonia    Lorazepam Other (See Comments)     Other reaction(s): higher dose > anxiety and depression  Other reaction(s): Other (See Comments)  Other reaction(s): higher dose > anxiety and depression    Carbamazepine Other (See Comments)     Other reaction(s): Unknown Reaction    Doxycycline Hives    Lamotrigine Rash    Other Hives     Adhesive tape     SUBJECTIVE:    Jannette is seen today for a follow up for depression and anxiety  She continues to do relatively well since the last visit 4/29/2021  She reports that she is "feeling slightly  better"  She had recent shoulder surgery and reports she is in  a sling and participating in physical therapy  She denies suicidal or homicidal ideation   She reports appetite as "normal", some sleep difficulties related to "getting comfortable" at nigh t(utilizing multiple pillows)  She denies any medication side effects  Reports depression as 5-6/10 and anxiety 5-6/10  Denies any panic attacks  She continues to remain engaged in therapy and reports it being helpful  She is in agreement to stay on current medication regimen and increase Buspar to 10 mg po BID to target anxiety and depression  Denies any psychosis  She denies any side effects from medications  PLAN:  Continue Effexor  mg po daily and increase Buspar to 10 mg po BID  Continue therapy  Utilize crisis as needed    Aware of 24 hour and weekend coverage for urgent situations accessed by calling [facility] main practice number  Continue psychotherapy with therapist    Diagnoses and all orders for this visit:    Depression with anxiety  -     venlafaxine (EFFEXOR-XR) 150 mg 24 hr capsule; Take 1 capsule (150 mg total) by mouth daily  -     busPIRone (BUSPAR) 10 mg tablet; Take 1 tablet (10 mg total) by mouth 2 (two) times a day  -     venlafaxine (EFFEXOR-XR) 75 mg 24 hr capsule; Take 1 capsule (75 mg total) by mouth daily    Generalized anxiety disorder  -     venlafaxine (EFFEXOR-XR) 150 mg 24 hr capsule; Take 1 capsule (150 mg total) by mouth daily  -     venlafaxine (EFFEXOR-XR) 75 mg 24 hr capsule;  Take 1 capsule (75 mg total) by mouth daily        Current Outpatient Medications on File Prior to Visit   Medication Sig Dispense Refill    albuterol (PROVENTIL HFA,VENTOLIN HFA) 90 mcg/act inhaler Inhale 2 puffs every 6 (six) hours as needed for shortness of breath (cough) 1 Inhaler 0    ALPRAZolam (XANAX) 0 25 mg tablet Take 1 tablet (0 25 mg total) by mouth 2 (two) times a day as needed for anxiety 4 tablet 0    amitriptyline (ELAVIL) 100 mg tablet Take 1 tablet (100 mg total) by mouth daily at bedtime 90 tablet 1    Ascorbic Acid (VITAMIN C) 500 MG CAPS Take 1 capsule by mouth daily  atorvastatin (LIPITOR) 40 mg tablet Take 1 tablet (40 mg total) by mouth daily 90 tablet 1    cetirizine (ZyrTEC) 10 mg tablet Take 10 mg by mouth daily      Cholecalciferol (VITAMIN D3) 5000 units CAPS Take 1 capsule by mouth daily        ciprofloxacin-dexamethasone (CIPRODEX) otic suspension Administer 4 drops to the right ear 2 (two) times a day 7 5 mL 0    cyanocobalamin (VITAMIN B-12) 1,000 mcg tablet Take 1,000 mcg by mouth daily       esomeprazole (NexIUM 24HR) 20 mg capsule Take 1 capsule (20 mg total) by mouth every morning 90 capsule 3    methocarbamol (ROBAXIN) 750 mg tablet Take 1 tablet (750 mg total) by mouth every 8 (eight) hours as needed for muscle spasms 30 tablet 0    metoprolol succinate (TOPROL-XL) 50 mg 24 hr tablet Take 1 tablet (50 mg total) by mouth daily 90 tablet 1    Multiple Vitamins-Minerals (CENTRUM ADULTS PO) Take 1 tablet by mouth daily      nystatin (MYCOSTATIN) cream Apply topically 2 (two) times a day as needed (rash) 30 g 0    [DISCONTINUED] busPIRone (BUSPAR) 5 mg tablet Take 1 tablet (5 mg total) by mouth 2 (two) times a day 60 tablet 0    [DISCONTINUED] venlafaxine (EFFEXOR-XR) 75 mg 24 hr capsule Take 1 capsule (75 mg total) by mouth daily Takes with a 150 mg capsule for total of 225 mg daily (Patient taking differently: Take 75 mg by mouth daily Take three tablets daily) 90 capsule 0     No current facility-administered medications on file prior to visit  Psychotherapy Provided:     Individual psychotherapy provided: Supportive counseling provided  HPI ROS Appetite Changes and Sleep:     She reports fluctuating sleep pattern, normal appetite, normal energy level   Patient denies suicidal or homicidal ideation    Review Of Systems:      HPI ROS:               Medication Side Effects:  denies     Depression (10 worst): 5-6/10    Anxiety (10 worst): 5-6/10    Safety concerns (SI, HI, etc): denies    Sleep: flutuates    Energy: good    Appetite: good Weight Change: No change reported      General sleep disturbances   Personality no change in personality   Constitutional recent shoulder surgery   ENT as noted in HPI   Cardiovascular as noted in HPI   Respiratory as noted in HPI   Gastrointestinal negative   Genitourinary negative   Musculoskeletal as noted in HPI   Integumentary negative   Neurological negative   Endocrine as noted in HPI   Other Symptoms none, all other systems are negative     Mental Status Evaluation:    Appearance Adequate hygiene and grooming and Good eye contact   Behavior calm and cooperative   Mood depressed  Depression Scale - 5-6 of 10 (0 = No depression)  Anxiety Scale - 5-6 of 10 (0 = No anxiety)   Speech Normal rate and volume   Affect mood-congruent   Thought Processes Goal directed and coherent   Thought Content Does not verbalize delusional material   Associations Tightly connected   Perceptual Disturbances Denies hallucinations and does not appear to be responding to internal stimuli   Risk Potential Suicidal/Homicidal Ideation - No evidence of suicidal or homicidal ideation and Patient does not verbalize suicidal or homicidal ideation  Risk of Violence - No evidence of risk for violence found on assessment  Risk of Self Mutilation - No evidence of risk for self mutilation found on assessment   Orientation oriented to person, place, time/date and situation   Memory recent and remote memory grossly intact   Consciousness alert and awake   Attention/Concentration attention span and concentration are age appropriate   Insight fair   Judgement fair   Muscle Strength and Gait recent shoulder surgery (sling)   Motor Activity no abnormal movements   Language no difficulty naming common objects, no difficulty repeating a phrase, no difficulty writing a sentence   Fund of Knowledge adequate knowledge of current events  adequate fund of knowledge regarding past history  adequate fund of knowledge regarding vocabulary      Past Psychiatric History Update:     Inpatient Psychiatric Admission Since Last Encounter:   no  Changes to Outpatient Psychiatric Treatment Team:    no  Suicide Attempt Or Self Mutilation Since Last Encounter:   no  Incidence of Violent Behavior Since Last Encounter:   no    Traumatic History Update:     New Onset of Abuse Since Last Encounter:   no  Traumatic Events Since Last Encounter:   no    Past Medical History:    Past Medical History:   Diagnosis Date    Allergic rhinitis     Anxiety     Arthritis     Chronic pain disorder     BACK SHOULDERS NECK    Colon polyp     Depression     Disease of thyroid gland     nodule    Fibromyalgia, primary     GERD (gastroesophageal reflux disease)     Hearing difficulty of right ear     Hyperlipidemia     Hypertension     Irritable bowel syndrome     Perforation of tympanic membrane     Right    PONV (postoperative nausea and vomiting)     RBBB     Right bundle branch blockage     Sleep apnea     no cpap    Wears glasses      No past medical history pertinent negatives  Past Surgical History:   Procedure Laterality Date    ANAL FISTULOTOMY N/A 2018    Procedure: FISTULOTOMY;  Surgeon: Brittnee Melvin MD;  [facility];  Service: Colorectal    BACK SURGERY      lumbar herniated disc    BREAST CYST EXCISION Left 13 yrs ago  benign    BREAST SURGERY Left 2006    cyst removal    CARPAL TUNNEL RELEASE       SECTION      CHOLECYSTECTOMY      COLONOSCOPY      CYST REMOVAL      DENTAL SURGERY      INCISION AND DRAINAGE OF WOUND N/A 2018    Procedure: INCISION AND DRAINAGE (I&D) BUTTOCK;  Surgeon: Brittnee Melvin MD;  [facility];  Service: Colorectal    MYRINGOTOMY W/ TUBES Right 2015    [facility]    MT COLONOSCOPY FLX DX W/COLLJ SPEC WHEN PFRMD N/A 2017    Procedure: COLONOSCOPY;  Surgeon: Dionna Urbina MD;  [facility];   Service: Gastroenterology    MT INCISE FINGER TENDON SHEATH Right 3/13/2018    Procedure: LONG TRIGGER FINGER RELEASE;  Surgeon: Wendy Gutierrez DO;  [facility];  Service: Orthopedics    ME SURG DIAGNOSTIC EXAM, ANORECTAL N/A 11/2/2018    Procedure: EXAM UNDER ANESTHESIA (EUA); Surgeon: Berna Khan MD;  [facility];  Service: Colorectal    ME TYMPANOPLASTY Right 5/24/2017    Procedure: TYMPANOPLASTY WITH PERICHONDRIN GRAFT;  Surgeon: Gayla Carlos DO;  [facility];  Service: ENT    SHOULDER SURGERY Right     ULNAR NERVE TRANSPOSITION Left     WISDOM TOOTH EXTRACTION       Allergies   Allergen Reactions    Amoxicillin Swelling, Anaphylaxis and Angioedema    Neurontin [Gabapentin] Other (See Comments)     Other reaction(s): SUICIDE IDEATION  Other reaction(s): Other (See Comments)  suicidal  suicidal    Penicillin V Angioedema and Swelling     Other reaction(s): Throat closure    Penicillins Swelling and Anaphylaxis    Aripiprazole Other (See Comments)     Other reaction(s): low dose 2 mg ; curving in and locking in of hands/dystonia  Other reaction(s): Other (See Comments)  Other reaction(s): low dose 2 mg ; curving in and locking in of hands/dystonia    Lorazepam Other (See Comments)     Other reaction(s): higher dose > anxiety and depression  Other reaction(s):  Other (See Comments)  Other reaction(s): higher dose > anxiety and depression    Carbamazepine Other (See Comments)     Other reaction(s): Unknown Reaction    Doxycycline Hives    Lamotrigine Rash    Other Hives     Adhesive tape     Substance Abuse History:    Social History     Substance and Sexual Activity   Alcohol Use Not Currently    Comment: social     Social History     Substance and Sexual Activity   Drug Use No     Social History:    Social History     Socioeconomic History    Marital status: /Civil Union     Spouse name: Not on file    Number of children: Not on file    Years of education: Not on file    Highest education level: Not on file Occupational History    Not on file   Social Needs    Financial resource strain: Not on file    Food insecurity     Worry: Not on file     Inability: Not on file    Transportation needs     Medical: No     Non-medical: No   Tobacco Use    Smoking status: Never Smoker    Smokeless tobacco: Never Used   Substance and Sexual Activity    Alcohol use: Not Currently     Comment: social    Drug use: No    Sexual activity: Yes     Partners: Male     Birth control/protection: Injection     Comment: per allscripts   Lifestyle    Physical activity     Days per week: 2 days     Minutes per session: Not on file    Stress: Not on file   Relationships    Social connections     Talks on phone: Not on file     Gets together: Not on file     Attends Buddhist service: Not on file     Active member of club or organization: Not on file     Attends meetings of clubs or organizations: Not on file     Relationship status: Not on file    Intimate partner violence     Fear of current or ex partner: Not on file     Emotionally abused: Not on file     Physically abused: Not on file     Forced sexual activity: Not on file   Other Topics Concern    Not on file   Social History Narrative    Daily caffeine use- 1 cup of coffee     Family Psychiatric History:     Family History   Problem Relation Age of Onset    Hypertension Mother     Hyperlipidemia Mother     Diabetes Mother     Hypertension Father     Hyperlipidemia Father     Heart disease Father         cardiac disorder-myocardial infarction arrhythmias    Heart attack Father     Diabetes unspecified Maternal Grandmother     Crohn's disease Brother     Arthritis Brother     Thyroid disease Paternal Grandmother     No Known Problems Daughter     No Known Problems Maternal Aunt     No Known Problems Maternal Aunt     No Known Problems Maternal Aunt     No Known Problems Maternal Aunt     No Known Problems Paternal Aunt     No Known Problems Paternal Aunt     Heart attack Paternal Uncle      History Review: The following portions of the patient's history were reviewed and updated as appropriate: allergies, current medications, past family history, past medical history, past social history, past surgical history and problem list     OBJECTIVE:     Vital signs in last 24 hours: There were no vitals filed for this visit  Laboratory Results: I have personally reviewed all pertinent laboratory/tests results  Suicide/Homicide Risk Assessment:    Risk of Harm to Self:  Based on today's assessment, Jannette presents the following risk of harm to self: low    Risk of Harm to Others:  Based on today's assessment, Jannette presents the following risk of harm to others: low    The following interventions are recommended: no intervention changes needed    Medications Risks/Benefits:      Risks, Benefits And Possible Side Effects Of Medications:    Discussed risks and benefits of treatment with patient including risk of suicidality, serotonin syndrome and SIADH related to treatment with antidepressants; Risk of induction of manic symptoms in certain patient populations     Controlled Medication Discussion:     Not applicable - controlled prescriptions are not prescribed by this practice    Treatment Plan:    Due for update/Updated:   no  Last treatment plan done in therapy with Ilya Chavez [place] 05/27/21    This note was shared with patient

## 2021-05-28 ENCOUNTER — TELEMEDICINE (OUTPATIENT)
Dept: BEHAVIORAL/MENTAL HEALTH CLINIC | Facility: CLINIC | Age: 41
End: 2021-05-28
Payer: COMMERCIAL

## 2021-05-28 DIAGNOSIS — F33.1 MODERATE RECURRENT MAJOR DEPRESSION (HCC): Primary | ICD-10-CM

## 2021-05-28 DIAGNOSIS — F41.1 GENERALIZED ANXIETY DISORDER: ICD-10-CM

## 2021-05-28 PROCEDURE — 90834 PSYTX W PT 45 MINUTES: CPT | Performed by: SOCIAL WORKER

## 2021-05-28 NOTE — PSYCH
This note was not shared with the patient due to this is a psychotherapy note     Virtual Regular Visit      Assessment/Plan:    Problem List Items Addressed This Visit        Other    Moderate recurrent major depression ([address] ) - Primary    Generalized anxiety disorder          Goals addressed in session: Goal 1          Reason for visit is VIRTUAL PSYCHOTHERAPY SESSION     Encounter provider Alexx Faith    Provider located at [address] [place] [id number] [phone]    The patient was identified by name and date of birth  Jannette Baugh was informed that this is a telemedicine visit and that the visit is being conducted through [employer] and patient was informed that this is a secure, HIPAA-compliant platform  She agrees to proceed     My office door was closed  No one else was in the room  She acknowledged consent and understanding of privacy and security of the video platform  The patient has agreed to participate and understands they can discontinue the visit at any time  Patient is aware this is a billable service  Subjective  Jason Carrington is a 39 y o  female   Psychotherapy Provided: Individual Psychotherapy 40 minutes     Length of time in session: 40 minutes, follow up in 2 week    Goals addressed in session: Goal 1     Pain:      none    0    Current suicide risk : Low     Met with Jannette Bhatia states, "I've been okay "   She had planned surgery on 5/21/21 and describes it as "worse case scenario "   She is at home recovering and her family is helping with household chores  Session focused on discussing how Jannette has been coping, meeting her emotional needs, and continuing to reinforce wellness tools other than distraction  We discussed her anxiety about receiving general anesthesia for her recent surgery and how she feared she would not wake up    Jannette processed her emotions about this  We discussed the origin of these thoughts being her Uncle's recent death which Jannette continues to express feelings of guilt for due to his poor self care  Jannette also processed a trigger after surgery, when her aunt texted her like her Uncle used to  Validation and support was provided and further education was provided on the stages of grief  Jannette also shared her thoughts and feelings in session about how she feels people only see her weight when they look at her  She processed her emotions about this in session  Through processing, Charly Naidu is able to dispute this thought using CBT tools  She has awareness her Uncle's death and his poor self care triggered her being more uncomfortable with her weight  We discussed what she sees internally and externally when she looks in the mirror  We discussed tools to improve self esteem  Jannette shared she already has affirmations around her home; however, no longer pays attention to them  We discussed making new ones to replace, beginning to journal as she found this helpful in the past, and talking to her  about her wellness needs  Jannette will continue to implement wellness tools learned in session to work through grief and improve self esteem  Continue biweekly support       Mental status:  Appearance calm and cooperative , adequate hygiene and grooming and good eye contact    Mood depressed and anxious   Affect congruent   Speech a normal rate and volume   Thought Processes coherent/organized and normal thought processes   Hallucinations no hallucinations present    Thought Content no delusions   Abnormal Thoughts no suicidal thoughts  and no homicidal thoughts , negative self talk   Orientation  oriented to person and place and time   Remote Memory short term memory intact and long term memory intact   Attention Span concentration intact   Intellect Appears to be of Average Intelligence   Fund of Knowledge displays adequate knowledge of current events   Insight fair   Judgement fair   Muscle Strength Unable to assess due to virtual session   Language no difficulty naming common objects   Pain none   Pain Scale 0       Behavioral Health Treatment Plan [facility] [place]: Diagnosis and Treatment Plan explained to Veronica Du relates understanding diagnosis and is agreeable to Treatment Plan  Yes       I spent 40 minutes directly with the patient during this visit      Lavon Fabian acknowledges that she has consented to an online visit or consultation  She understands that the online visit is based solely on information provided by her, and that, in the absence of a face-to-face physical evaluation by the physician, the diagnosis she receives is both limited and provisional in terms of accuracy and completeness  This is not intended to replace a full medical face-to-face evaluation by the physician  Jannette Baugh understands and accepts these terms

## 2021-06-08 ENCOUNTER — TELEMEDICINE (OUTPATIENT)
Dept: BEHAVIORAL/MENTAL HEALTH CLINIC | Facility: CLINIC | Age: 41
End: 2021-06-08
Payer: COMMERCIAL

## 2021-06-08 DIAGNOSIS — F41.1 GENERALIZED ANXIETY DISORDER: ICD-10-CM

## 2021-06-08 DIAGNOSIS — F33.1 MODERATE RECURRENT MAJOR DEPRESSION (HCC): Primary | ICD-10-CM

## 2021-06-08 PROCEDURE — 90834 PSYTX W PT 45 MINUTES: CPT | Performed by: SOCIAL WORKER

## 2021-06-08 NOTE — PSYCH
This note was not shared with the patient due to this is a psychotherapy note     Virtual Regular Visit      Assessment/Plan:    Problem List Items Addressed This Visit        Other    Moderate recurrent major depression ([address] ) - Primary    Generalized anxiety disorder          Goals addressed in session: Goal 1          Reason for visit is VIRTUAL PSYCHOTHERAPY SESSION     Encounter provider Megan Arredondo    Provider located at [address] [place] [id number] [phone]     The patient was identified by name and date of birth  Jannette Baugh was informed that this is a telemedicine visit and that the visit is being conducted through [employer] and patient was informed that this is a secure, HIPAA-compliant platform  She agrees to proceed     My office door was closed  No one else was in the room  She acknowledged consent and understanding of privacy and security of the video platform  The patient has agreed to participate and understands they can discontinue the visit at any time  Patient is aware this is a billable service  Grisel Jackmark Wiggins is a 39 y o  female   Psychotherapy Provided: Individual Psychotherapy 45 minutes     Length of time in session: 45 minutes, follow up in 2 week    Goals addressed in session: Goal 1     Pain:      mild    3    Current suicide risk : Low     Met with Jannette  She states, "I am doing alright "   Jannette did not complete her homework from the previous session to develop new affirmations cards  Torsten Carson continues to recover from a shoulder surgery and attends PT multiple times a week  She is voicing difficulty with delegating tasks while following doctors orders  Session focused on Jannette beginning to develop an inward sense of self worth and confidence     We discussed how her low self esteem and fears of assertiveness are impacting her relationships; 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  She states, "I don't want them to be mad at me "  Through processing, Jannette gained insight into how she will ask someone to complete a task, begin to develop mind reading cognitive distortions, then ultimately, take the task back from the individual which causes the other person to become frustrated and/or upset  Psychoeducation was provided on how mind reading cognitive distortions are often correlated with low self worth  We completed CBT exercises in session to dispute mind reading distortions  We discussed other tools to improve self esteem as well  Ren Renzo will continue to implement wellness tools learned in session with focus on disputing mind reading distortions effecting self esteem and develop new affirmatin cards  Mental status:  Appearance calm and cooperative , adequate hygiene and grooming and good eye contact    Mood Slightly depressed, slightly anxious   Affect Mood congruent   Speech a normal rate and volume   Thought Processes coherent/organized and normal thought processes   Hallucinations no hallucinations present    Thought Content no delusions   Abnormal Thoughts no suicidal thoughts , no homicidal thoughts  and negative self talk, cognitive distortions   Orientation  oriented to person and place and time   Remote Memory short term memory intact and long term memory intact   Attention Span concentration intact   Intellect Appears to be of Average Intelligence   Fund of Knowledge displays adequate knowledge of current events   Insight fair   Judgement fair   Muscle Strength Unable to assess due to virtual session   Language no difficulty naming common objects   Pain mild   Pain Scale 3     [facility]: Diagnosis and Treatment Plan explained to Susie Jones relates understanding diagnosis and is agreeable to Treatment Plan   Yes     I spent 45 minutes directly with the patient during this visit      VIRTUAL VISIT DISCLAIMER    Jannette Baugh acknowledges that she has consented to an online visit or consultation  She understands that the online visit is based solely on information provided by her, and that, in the absence of a face-to-face physical evaluation by the physician, the diagnosis she receives is both limited and provisional in terms of accuracy and completeness  This is not intended to replace a full medical face-to-face evaluation by the physician  Jannette Baugh understands and accepts these terms

## 2021-06-11 ENCOUNTER — TELEPHONE (OUTPATIENT)
Dept: FAMILY MEDICINE CLINIC | Facility: CLINIC | Age: 41
End: 2021-06-11

## 2021-06-11 NOTE — TELEPHONE ENCOUNTER
Pt wants to know about something the FDA approved called Ozempic its for diabetes and now used for weight loss  Wants to know if this is something she can try if its safe or not  Please advise, thank you

## 2021-06-11 NOTE — TELEPHONE ENCOUNTER
Patient called back stating she called [facility] and does not know if she should schedule with a doctor or dietician  Patient stated that her insurance will cover a doctor's visit but dietician would be out of pocket for her  I stated that that should answer her question; patient agreed  Patient stated that she went to Weight Management a few years ago and they wanted her to follow their plan with their shakes and stuff but it was all out of pocket and not affordable for her  Now she wants to ask about Ozempic so thinks she will set up appointment with doctor since insurance will cover that  It was suggested that things change very quickly in weight management and maybe there could be other options she could try based on the advise of the doctor  Patient agreed, stated "I have a plan now  I am going to call back and set up appointment with the doctor "  She was happy and thankful

## 2021-06-11 NOTE — TELEPHONE ENCOUNTER
This is a class of injectable medication that can be helpful in weight loss  Ozempic just got approved by FDA to use for weight loss in non diabetics  However, this is an option within a weight management program and therefore would still need to establish with weight management, this referral was given recently

## 2021-06-11 NOTE — TELEPHONE ENCOUNTER
Relayed message to patient  She understood and had no further questions  Patient took [facility] phone number

## 2021-06-22 ENCOUNTER — SOCIAL WORK (OUTPATIENT)
Dept: BEHAVIORAL/MENTAL HEALTH CLINIC | Facility: CLINIC | Age: 41
End: 2021-06-22
Payer: COMMERCIAL

## 2021-06-22 DIAGNOSIS — F41.1 GENERALIZED ANXIETY DISORDER: ICD-10-CM

## 2021-06-22 DIAGNOSIS — F33.1 MODERATE RECURRENT MAJOR DEPRESSION (HCC): Primary | ICD-10-CM

## 2021-06-22 PROCEDURE — 90834 PSYTX W PT 45 MINUTES: CPT | Performed by: SOCIAL WORKER
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
## 2021-06-24 ENCOUNTER — OFFICE VISIT (OUTPATIENT)
Dept: PSYCHIATRY | Facility: CLINIC | Age: 41
End: 2021-06-24
Payer: COMMERCIAL

## 2021-06-24 DIAGNOSIS — F41.1 GENERALIZED ANXIETY DISORDER: ICD-10-CM

## 2021-06-24 DIAGNOSIS — F41.8 DEPRESSION WITH ANXIETY: ICD-10-CM

## 2021-06-24 DIAGNOSIS — M79.7 FIBROMYALGIA: ICD-10-CM

## 2021-06-24 DIAGNOSIS — F33.1 MODERATE RECURRENT MAJOR DEPRESSION (HCC): Primary | ICD-10-CM

## 2021-06-24 PROCEDURE — 3725F SCREEN DEPRESSION PERFORMED: CPT | Performed by: REGISTERED NURSE

## 2021-06-24 PROCEDURE — 99214 OFFICE O/P EST MOD 30 MIN: CPT | Performed by: REGISTERED NURSE

## 2021-06-24 RX ORDER — VENLAFAXINE HYDROCHLORIDE 75 MG/1
75 CAPSULE, EXTENDED RELEASE ORAL DAILY
Qty: 90 CAPSULE | Refills: 0
Start: 2021-06-24 | End: 2021-08-24 | Stop reason: SDUPTHER

## 2021-06-24 RX ORDER — BUSPIRONE HYDROCHLORIDE 10 MG/1
10 TABLET ORAL 3 TIMES DAILY
Qty: 90 TABLET | Refills: 1 | Status: SHIPPED | OUTPATIENT
Start: 2021-06-24 | End: 2021-08-09

## 2021-06-24 RX ORDER — AMITRIPTYLINE HYDROCHLORIDE 100 MG/1
100 TABLET, FILM COATED ORAL
Qty: 30 TABLET | Refills: 1 | Status: SHIPPED | OUTPATIENT
Start: 2021-06-24 | End: 2021-09-09 | Stop reason: SDUPTHER

## 2021-06-24 RX ORDER — VENLAFAXINE HYDROCHLORIDE 150 MG/1
150 CAPSULE, EXTENDED RELEASE ORAL DAILY
Qty: 90 CAPSULE | Refills: 0 | Status: SHIPPED | OUTPATIENT
Start: 2021-06-24 | End: 2021-09-09 | Stop reason: SDUPTHER

## 2021-06-24 NOTE — PSYCH
MEDICATION MANAGEMENT NOTE        [address] [place] [address]    Name and Date of Birth:  Romelia Mondragon 39 y o  1980 MRN: [id number]    Date of Visit: June 24, 2021    Allergies   Allergen Reactions    Amoxicillin Swelling, Anaphylaxis and Angioedema    Neurontin [Gabapentin] Other (See Comments)     Other reaction(s): SUICIDE IDEATION  Other reaction(s): Other (See Comments)  suicidal  suicidal    Penicillin V Angioedema and Swelling     Other reaction(s): Throat closure    Penicillins Swelling and Anaphylaxis    Aripiprazole Other (See Comments)     Other reaction(s): low dose 2 mg ; curving in and locking in of hands/dystonia  Other reaction(s): Other (See Comments)  Other reaction(s): low dose 2 mg ; curving in and locking in of hands/dystonia    Lorazepam Other (See Comments)     Other reaction(s): higher dose > anxiety and depression  Other reaction(s): Other (See Comments)  Other reaction(s): higher dose > anxiety and depression    Carbamazepine Other (See Comments)     Other reaction(s): Unknown Reaction    Doxycycline Hives    Lamotrigine Rash    Other Hives     Adhesive tape     SUBJECTIVE:    Jannette is seen today for a follow up for depression  She continues to experience ongoing symptoms since the last visit 5/27/2021  Jannette reports another uncle passed away 3 weeks ago  She reports that she continues to have a difficult time with her lack of mobility related to her shoulder surgery  She reports that prior to the surgery she was always busy and had multiple distractions, she stated she enjoyed cooking and cleaning and now is not able to do those things  She is hopeful  her mood will improve once she is more mobile  She denies any suicidal or homicidal ideation  She denies any psychosis  She denies any symptoms of hypomania or siva  She reports lower energy levels    Jannette shared that her insurance does not pay for nutritionist, but she has an upcoming doctor's appointment with the weight loss physician and is enquiring about the medication, Ozempic to assist her in weight loss  She reports that she has gained  weight and her current weight is 295 pounds  ELAN collaborated with colleague Catie Nixon in [facility] and discussed the case  Recommendations for amitriptyline level was given and relayed to patient  At this time we will make no changes with her Effexor XR but will increase BuSpar to 10 milligrams p o  t i d  to help with depression and anxiety symptoms (she was reminded not to take last dose close to bedtime)  Jannette oviedo blood pressure today was 120/84 and pulse was 80  She reports that she has difficulty falling asleep, is taking Melatonin 10 mg po HS  She denies any side effects from medications  PLAN:  Follow up in 5-6 weeks or sooner if needed  Continue Amitriptyline 100 mg po HS  Continue Effexor  mg po daily  Continue OTC Melatonin 10 mg po HS   Increase Buspar to 10 mg po TID  Obtain Amitriptyline level (will consider changing this medication in the future)  Utilize crisis as needed  Aware of 24 hour and weekend coverage for urgent situations accessed by calling [facility] main practice number  Continue psychotherapy with therapist    Diagnoses and all orders for this visit:    Moderate recurrent major depression ([address] )  -     Amitriptyline level; Future    Generalized anxiety disorder  -     venlafaxine (EFFEXOR-XR) 75 mg 24 hr capsule; Take 1 capsule (75 mg total) by mouth daily  -     venlafaxine (EFFEXOR-XR) 150 mg 24 hr capsule; Take 1 capsule (150 mg total) by mouth daily  -     Amitriptyline level; Future    Depression with anxiety  -     venlafaxine (EFFEXOR-XR) 75 mg 24 hr capsule; Take 1 capsule (75 mg total) by mouth daily  -     venlafaxine (EFFEXOR-XR) 150 mg 24 hr capsule; Take 1 capsule (150 mg total) by mouth daily  -     busPIRone (BUSPAR) 10 mg tablet;  Take 1 tablet (10 mg total) by mouth 3 (three) times a day        Current Outpatient Medications on File Prior to Visit   Medication Sig Dispense Refill    albuterol (PROVENTIL HFA,VENTOLIN HFA) 90 mcg/act inhaler Inhale 2 puffs every 6 (six) hours as needed for shortness of breath (cough) 1 Inhaler 0    ALPRAZolam (XANAX) 0 25 mg tablet Take 1 tablet (0 25 mg total) by mouth 2 (two) times a day as needed for anxiety 4 tablet 0    amitriptyline (ELAVIL) 100 mg tablet Take 1 tablet (100 mg total) by mouth daily at bedtime 90 tablet 1    Ascorbic Acid (VITAMIN C) 500 MG CAPS Take 1 capsule by mouth daily      atorvastatin (LIPITOR) 40 mg tablet Take 1 tablet (40 mg total) by mouth daily 90 tablet 1    cetirizine (ZyrTEC) 10 mg tablet Take 10 mg by mouth daily      Cholecalciferol (VITAMIN D3) 5000 units CAPS Take 1 capsule by mouth daily        ciprofloxacin-dexamethasone (CIPRODEX) otic suspension Administer 4 drops to the right ear 2 (two) times a day 7 5 mL 0    cyanocobalamin (VITAMIN B-12) 1,000 mcg tablet Take 1,000 mcg by mouth daily       esomeprazole (NexIUM 24HR) 20 mg capsule Take 1 capsule (20 mg total) by mouth every morning 90 capsule 3    methocarbamol (ROBAXIN) 750 mg tablet Take 1 tablet (750 mg total) by mouth every 8 (eight) hours as needed for muscle spasms 30 tablet 0    metoprolol succinate (TOPROL-XL) 50 mg 24 hr tablet Take 1 tablet (50 mg total) by mouth daily 90 tablet 1    Multiple Vitamins-Minerals (CENTRUM ADULTS PO) Take 1 tablet by mouth daily      nystatin (MYCOSTATIN) cream Apply topically 2 (two) times a day as needed (rash) 30 g 0    [DISCONTINUED] busPIRone (BUSPAR) 10 mg tablet Take 1 tablet (10 mg total) by mouth 2 (two) times a day 60 tablet 1    [DISCONTINUED] venlafaxine (EFFEXOR-XR) 150 mg 24 hr capsule Take 1 capsule (150 mg total) by mouth daily 90 capsule 0    [DISCONTINUED] venlafaxine (EFFEXOR-XR) 75 mg 24 hr capsule Take 1 capsule (75 mg total) by mouth daily 30 capsule 0     No current facility-administered medications on file prior to visit  Psychotherapy Provided:     Individual psychotherapy provided: Supportive counseling provided  HPI ROS Appetite Changes and Sleep:     She reports difficulty falling asleep, normal appetite, low energy   Patient denies suicidal or homicidal ideation    Review Of Systems:      HPI ROS:               Medication Side Effects:  denies     Depression (10 worst): 7/10    Anxiety (10 worst): 8-9/10    Safety concerns (SI, HI, etc): denies    Sleep: Difficulty falling asleep    Energy: low    Appetite: normal    Weight Change: Weight gain 10 pounds since last visit      General emotional problems and sleep disturbances   Personality no change in personality   Constitutional negative   ENT negative   Cardiovascular as noted in HPI   Respiratory negative   Gastrointestinal negative   Genitourinary negative   Musculoskeletal as noted in HPI and post shoulder surgery   Integumentary negative   Neurological negative   Endocrine negative   Other Symptoms none, all other systems are negative     Mental Status Evaluation:    Appearance Adequate hygiene and grooming and Good eye contact   Behavior cooperative   Mood anxious and depressed  Depression Scale - 7 of 10 (0 = No depression)  Anxiety Scale - 8-9 of 10 (0 = No anxiety)   Speech Normal rate and volume   Affect mood-congruent   Thought Processes Goal directed and coherent   Thought Content Does not verbalize delusional material   Associations Tightly connected   Perceptual Disturbances Denies hallucinations and does not appear to be responding to internal stimuli   Risk Potential Suicidal/Homicidal Ideation - No evidence of suicidal or homicidal ideation and patient does not verbalize suicidal or homicidal ideation  Risk of Violence - No evidence of risk for violence found on assessment  Risk of Self Mutilation - No evidence of risk for self mutilation found on assessment   Orientation oriented to person, place, time/date and situation   Memory recent and remote memory grossly intact   Consciousness alert and awake   Attention/Concentration attention span and concentration are age appropriate   Insight intact   Judgement intact   Muscle Strength and Gait left arm in immobilizer   Motor Activity no abnormal movements   Language no difficulty naming common objects, no difficulty repeating a phrase, no difficulty writing a sentence   Fund of Knowledge adequate knowledge of current events  adequate fund of knowledge regarding past history  adequate fund of knowledge regarding vocabulary      Past Psychiatric History Update:     Inpatient Psychiatric Admission Since Last Encounter:   no  Changes to Outpatient Psychiatric Treatment Team:    no  Suicide Attempt Or Self Mutilation Since Last Encounter:   no  Incidence of Violent Behavior Since Last Encounter:   no    Traumatic History Update:     New Onset of Abuse Since Last Encounter:   no  Traumatic Events Since Last Encounter:   no    Past Medical History:    Past Medical History:   Diagnosis Date    Allergic rhinitis     Anxiety     Arthritis     Chronic pain disorder     BACK SHOULDERS NECK    Colon polyp     Depression     Disease of thyroid gland     nodule    Fibromyalgia, primary     GERD (gastroesophageal reflux disease)     Hearing difficulty of right ear     Hyperlipidemia     Hypertension     Irritable bowel syndrome     Perforation of tympanic membrane     Right    PONV (postoperative nausea and vomiting)     RBBB     Right bundle branch blockage     Sleep apnea     no cpap    Wears glasses      No past medical history pertinent negatives    Past Surgical History:   Procedure Laterality Date    ANAL FISTULOTOMY N/A 11/2/2018    Procedure: FISTULOTOMY;  Surgeon: Julio C Joyner MD;  [facility];  Service: Colorectal    BACK SURGERY      lumbar herniated disc    BREAST CYST EXCISION Left 13 yrs ago  benign    BREAST SURGERY Left 2006    cyst removal    CARPAL TUNNEL RELEASE       SECTION      CHOLECYSTECTOMY      COLONOSCOPY      CYST REMOVAL      DENTAL SURGERY      INCISION AND DRAINAGE OF WOUND N/A 2018    Procedure: INCISION AND DRAINAGE (I&D) BUTTOCK;  Surgeon: Cammy Moy MD;  [facility];  Service: Colorectal    MYRINGOTOMY W/ TUBES Right 2015    [place]    MT COLONOSCOPY FLX DX W/COLLJ SPEC WHEN PFRMD N/A 2017    Procedure: COLONOSCOPY;  Surgeon: Alfred Shaw MD;  [facility]; Service: Gastroenterology    MT INCISE FINGER TENDON SHEATH Right 3/13/2018    Procedure: LONG TRIGGER FINGER RELEASE;  Surgeon: Joycelyn Mcnally DO;  [facility];  Service: Orthopedics    MT SURG DIAGNOSTIC EXAM, ANORECTAL N/A 2018    Procedure: EXAM UNDER ANESTHESIA (EUA); Surgeon: Cammy Moy MD;  [facility];  Service: Colorectal    MT TYMPANOPLASTY Right 2017    Procedure: TYMPANOPLASTY WITH PERICHONDRIN GRAFT;  Surgeon: Yonathan Sheehan DO;  [facility];  Service: ENT    SHOULDER SURGERY Right     ULNAR NERVE TRANSPOSITION Left     WISDOM TOOTH EXTRACTION       Allergies   Allergen Reactions    Amoxicillin Swelling, Anaphylaxis and Angioedema    Neurontin [Gabapentin] Other (See Comments)     Other reaction(s): SUICIDE IDEATION  Other reaction(s): Other (See Comments)  suicidal  suicidal    Penicillin V Angioedema and Swelling     Other reaction(s): Throat closure    Penicillins Swelling and Anaphylaxis    Aripiprazole Other (See Comments)     Other reaction(s): low dose 2 mg ; curving in and locking in of hands/dystonia  Other reaction(s): Other (See Comments)  Other reaction(s): low dose 2 mg ; curving in and locking in of hands/dystonia    Lorazepam Other (See Comments)     Other reaction(s): higher dose > anxiety and depression  Other reaction(s):  Other (See Comments)  Other reaction(s): higher dose > anxiety and depression  Carbamazepine Other (See Comments)     Other reaction(s): Unknown Reaction    Doxycycline Hives    Lamotrigine Rash    Other Hives     Adhesive tape     Substance Abuse History:    Social History     Substance and Sexual Activity   Alcohol Use Not Currently    Comment: social     Social History     Substance and Sexual Activity   Drug Use No     Social History:    Social History     Socioeconomic History    Marital status: /Civil Union     Spouse name: Not on file    Number of children: Not on file    Years of education: Not on file    Highest education level: Not on file   Occupational History    Not on file   Tobacco Use    Smoking status: Never Smoker    Smokeless tobacco: Never Used   Vaping Use    Vaping Use: Never used   Substance and Sexual Activity    Alcohol use: Not Currently     Comment: social    Drug use: No    Sexual activity: Yes     Partners: Male     Birth control/protection: Injection     Comment: per allscripts   Other Topics Concern    Not on file   Social History Narrative    Daily caffeine use- 1 cup of coffee     Social Determinants of Health     Financial Resource Strain:     Difficulty of Paying Living Expenses:    Food Insecurity:     Worried About Running Out of Food in the Last Year:     Ran Out of Food in the Last Year:    Transportation Needs: No Transportation Needs    Lack of Transportation (Medical): No    Lack of Transportation (Non-Medical):  No   Physical Activity: Unknown    Days of Exercise per Week: 2 days    Minutes of Exercise per Session: Not asked   Stress:     Feeling of Stress :    Social Connections:     Frequency of Communication with Friends and Family:     Frequency of Social Gatherings with Friends and Family:     Attends Spiritism Services:     Active Member of Clubs or Organizations:     Attends Club or Organization Meetings:     Marital Status:    Intimate Partner Violence:     Fear of Current or Ex-Partner:     Emotionally Abused:     Physically Abused:     Sexually Abused:      Family Psychiatric History:     Family History   Problem Relation Age of Onset    Hypertension Mother     Hyperlipidemia Mother     Diabetes Mother     Hypertension Father     Hyperlipidemia Father     Heart disease Father         cardiac disorder-myocardial infarction arrhythmias    Heart attack Father     Diabetes unspecified Maternal Grandmother     Crohn's disease Brother     Arthritis Brother     Thyroid disease Paternal Grandmother     No Known Problems Daughter     No Known Problems Maternal Aunt     No Known Problems Maternal Aunt     No Known Problems Maternal Aunt     No Known Problems Maternal Aunt     No Known Problems Paternal Aunt     No Known Problems Paternal Aunt     Heart attack Paternal Uncle      History Review: The following portions of the patient's history were reviewed and updated as appropriate: allergies, current medications, past family history, past medical history, past social history, past surgical history and problem list     OBJECTIVE:     Vital signs in last 24 hours: There were no vitals filed for this visit  Laboratory Results: I have personally reviewed all pertinent laboratory/tests results  Suicide/Homicide Risk Assessment:    Risk of Harm to Self:  Based on today's assessment, Jannette presents the following risk of harm to self: minimal    Risk of Harm to Others:  Based on today's assessment, Jannette presents the following risk of harm to others: minimal    The following interventions are recommended: no intervention changes needed    Medications Risks/Benefits:      Risks, Benefits And Possible Side Effects Of Medications:    Discussed risks and benefits of treatment with patient including risk of suicidality, serotonin syndrome and SIADH related to treatment with antidepressants;  Risk of induction of manic symptoms in certain patient populations     Controlled Medication Discussion:     Not applicable - controlled prescriptions are not prescribed by this practice    Treatment Plan:    Due for update/Updated:   no      Rekha Barnes, [address] 06/24/21    This note was shared with patient

## 2021-07-06 ENCOUNTER — SOCIAL WORK (OUTPATIENT)
Dept: BEHAVIORAL/MENTAL HEALTH CLINIC | Facility: CLINIC | Age: 41
End: 2021-07-06
Payer: COMMERCIAL

## 2021-07-06 DIAGNOSIS — F33.1 MODERATE RECURRENT MAJOR DEPRESSION (HCC): Primary | ICD-10-CM

## 2021-07-06 DIAGNOSIS — F41.1 GENERALIZED ANXIETY DISORDER: ICD-10-CM

## 2021-07-06 PROCEDURE — 90834 PSYTX W PT 45 MINUTES: CPT | Performed by: SOCIAL WORKER

## 2021-07-06 NOTE — PSYCH
This note was not shared with the patient due to this is a psychotherapy note     Psychotherapy Provided: Individual Psychotherapy 45 minutes     Length of time in session: 45 minutes, follow up in 2 week    Goals addressed in session: Goal 1     Pain:      moderate to severe    6    Current suicide risk : Low     Met with Jannette Bhatia states, "I've been doing okay "  PHQ9 and GAD7 provided for updated scores  PHQ9 score decreased by 3 and GAD7 score decreased by 5 since 5/11/21  She completed her homework from last session by making affirmation cards and beginning to write a letter to her brother as a form of self expression for her own benefit  She also shared how she is practicing asserting her wants and needs and allowing other's to help as appropriate  She is reporting a decrease in depression with a recent medication adjustment and no longer having the sling on her arm effecting her ability to do things on her own  Session focused on Jannette continuing to process childhood issues that have affected and continue to affect her functioning  She processed her emotions about the letter she's begun to write to her brother  She shared how she went back to the letter several times to add more and feels the exercise is allowing her to express her thoughts and feelings that she's been holding in for years  She states, "he really made me feel worthless "  Jannette has been practicing sharing her thoughts and feelings and effective communication skills; specifically asserting her wants and needs with her   She shared successes and challenges    Jannette shared how she opened up to her  about how her past childhood experiences are the reasons for some of her behaviors in the home (ex: taking on all tasks and not allowing others to help, not accepting compliments) and her  has been more proactive in working to meet her needs; such as giving her a time frame on when he will complete something and being more patient  Jannette has been practicing being more accepting of compliments from her   We discussed the depression and anxiety psychotherapy groups starting on site  Jannette expressed interest in the anxiety psychotherapy group and a referral was made  Mar Olivarez will continue to implement wellness tools learned in session, share successes and challenges  Continue biweekly support  PHQ-9 Depression Screening    PHQ-9:   Frequency of the following problems over the past two weeks:      Little interest or pleasure in doing things: 2 - more than half the days  Feeling down, depressed, or hopeless: 2 - more than half the days  Trouble falling or staying asleep, or sleeping too much: 3 - nearly every day  Feeling tired or having little energy: 3 - nearly every day  Poor appetite or overeating: 3 - nearly every day  Feeling bad about yourself - or that you are a failure or have let yourself or your family down: 2 - more than half the days  Trouble concentrating on things, such as reading the newspaper or watching television: 1 - several days  Moving or speaking so slowly that other people could have noticed  Or the opposite - being so fidgety or restless that you have been moving around a lot more than usual: 1 - several days  Thoughts that you would be better off dead, or of hurting yourself in some way: 0 - not at all  PHQ-2 Score: 4  PHQ-9 Score: 17       MILADYS-7 Flowsheet Screening      Most Recent Value   Over the last 2 weeks, how often have you been bothered by any of the following problems?    Feeling nervous, anxious, or on edge  2   Not being able to stop or control worrying  2   Worrying too much about different things  2   Trouble relaxing  2   Being so restless that it is hard to sit still  1   Becoming easily annoyed or irritable  2   Feeling afraid as if something awful might happen  1   MILADYS-7 Total Score  12                [address]: Diagnosis and Treatment Plan explained to Priyanka Yeboah relates understanding diagnosis and is agreeable to Treatment Plan   Yes

## 2021-07-14 ENCOUNTER — OFFICE VISIT (OUTPATIENT)
Dept: BEHAVIORAL/MENTAL HEALTH CLINIC | Facility: CLINIC | Age: 41
End: 2021-07-14
Payer: COMMERCIAL

## 2021-07-14 DIAGNOSIS — F41.1 GENERALIZED ANXIETY DISORDER: Primary | ICD-10-CM

## 2021-07-14 PROCEDURE — 90853 GROUP PSYCHOTHERAPY: CPT | Performed by: SOCIAL WORKER

## 2021-07-14 NOTE — PSYCH
This note was not shared with the patient due to this is a psychotherapy note     Psychotherapy group focused on coping with anxiety  Jannette Baugh explored what anxiety is, examples of healthy vs unhealthy anxiety, and how too much anxiety can lead to unpleasant physical, emotional, and cognitive symptoms  Discussion led on triggers, symptoms of anxiety, and thoughts when anxious  Jannette Baugh actively participated in the group discussion by sharing her own cognitive, physical, and behavioral symptoms of anxiety and how it impacts her functioning  She also offered feedback to peers  Jannette Baugh  is encouraged to continue to make progress towards goals and objectives made in individual therapy through group participation      Tx plan objectives: 1 0

## 2021-07-16 ENCOUNTER — CONSULT (OUTPATIENT)
Dept: BARIATRICS | Facility: CLINIC | Age: 41
End: 2021-07-16
Payer: COMMERCIAL

## 2021-07-16 VITALS
HEART RATE: 104 BPM | WEIGHT: 293 LBS | SYSTOLIC BLOOD PRESSURE: 128 MMHG | DIASTOLIC BLOOD PRESSURE: 76 MMHG | HEIGHT: 67 IN | BODY MASS INDEX: 45.99 KG/M2 | TEMPERATURE: 98.1 F

## 2021-07-16 DIAGNOSIS — R63.8 UNABLE TO LOSE WEIGHT: ICD-10-CM

## 2021-07-16 DIAGNOSIS — I10 ESSENTIAL HYPERTENSION: ICD-10-CM

## 2021-07-16 DIAGNOSIS — K21.9 ESOPHAGEAL REFLUX: ICD-10-CM

## 2021-07-16 DIAGNOSIS — R73.9 HYPERGLYCEMIA: ICD-10-CM

## 2021-07-16 DIAGNOSIS — I45.10 RBBB (RIGHT BUNDLE BRANCH BLOCK): ICD-10-CM

## 2021-07-16 DIAGNOSIS — E78.5 HYPERLIPIDEMIA: ICD-10-CM

## 2021-07-16 DIAGNOSIS — E66.01 MORBID OBESITY WITH BMI OF 45.0-49.9, ADULT (HCC): Primary | ICD-10-CM

## 2021-07-16 DIAGNOSIS — F41.8 DEPRESSION WITH ANXIETY: ICD-10-CM

## 2021-07-16 PROCEDURE — 1036F TOBACCO NON-USER: CPT | Performed by: PHYSICIAN ASSISTANT

## 2021-07-16 PROCEDURE — 3008F BODY MASS INDEX DOCD: CPT | Performed by: PHYSICIAN ASSISTANT

## 2021-07-16 PROCEDURE — 99243 OFF/OP CNSLTJ NEW/EST LOW 30: CPT | Performed by: PHYSICIAN ASSISTANT

## 2021-07-16 NOTE — PROGRESS NOTES
-Discussed options of [facility]-Intensive Lifestyle Intervention Program, Very Low Calorie Diet-VLCD, Conservative Program, Julio-En-Y Gastric Bypass and Vertical Sleeve Gastrectomy and the role of weight loss medications   -Initial weight loss goal of 5-10% weight loss for improved health  - not interested in surgery at this time - reports ozempic was approved through her insurance but her PCP wanted her to be seen here to start a medication  - of note, states that unless takes Nexium daily has heartburn   EGD and colonoscopy done 2/2021 (results in epic)  -Screening labs utd 3/2021  - can see RD through her 's insurance so is planning on scheduling   -Patient is interested in pursuing Conservative Program with [facility]  - sample menus given, will start increasing steps     Assessment/Plan:    Follow up in approximately 6 weeks   Goals:  Food log (ie ) www myfitnesspal com,sparkpeople  com,loseit com,calorieking  com,etc  baritastic  No sugary beverages  At least 64oz of water daily  Increase physical activity by 10 minutes daily  Gradually increase physical activity to a goal of 5 days per week for 30 minutes of MODERATE intensity PLUS 2 days per week of FULL BODY resistance training  5-10 servings of fruits and vegetables per day and 25-35 grams of dietary fiber per day, gradually increasing  6051-8926 calories a day     Diagnoses and all orders for this visit:    Morbid obesity with BMI of 45 0-49 9, adult ([facility] [address] )  -     Ambulatory referral to Weight Management    Unable to lose weight  -     Ambulatory referral to Weight Management    Hyperglycemia    Depression with anxiety    Esophageal reflux    Essential hypertension    RBBB (right bundle branch block)    Hyperlipidemia        Subjective:   Chief Complaint   Patient presents with    Consult     mwm consult     Patient ID: Denise Castellanos  is a 39 y o  female with excess weight/obesity here to pursue weight management    Past Medical History:   Diagnosis Date    Allergic rhinitis     Anxiety     Arthritis     Chronic pain disorder     BACK SHOULDERS NECK    Colon polyp     Depression     Disease of thyroid gland     nodule    Fibromyalgia, primary     GERD (gastroesophageal reflux disease)     Hearing difficulty of right ear     Hyperlipidemia     Hypertension     Irritable bowel syndrome     Perforation of tympanic membrane     Right    PONV (postoperative nausea and vomiting)     RBBB     Right bundle branch blockage     Sleep apnea     no cpap    Wears glasses      HPI:  Obesity/Excess Weight:  Severity: class III  Onset: For most of life     Modifiers: Diet and Exercise and Over the Counter Weight Loss Supplements  Contributing factors: Stress/Emotional Eating  Associated symptoms: comorbid conditions, increased joint pain and inability to do certain activities  Colonoscopy- completed     Goals: 225 lbs   STOPBANG: has MARCELINO but no cpap machine  Cannot tolerate machine  Has sleep doctor and was given recommendations other than a cpap machine to help with her night time breathing     The following portions of the patient's history were reviewed and updated as appropriate: allergies, current medications, past family history, past medical history, past social history, past surgical history and problem list     Review of Systems   Constitutional: Negative  Respiratory: Negative  Cardiovascular: Negative  Gastrointestinal: Negative  Musculoskeletal: Positive for arthralgias  Neurological: Negative  Psychiatric/Behavioral: Negative for suicidal ideas (denies HI)  Hx depression and anxiety - struggles with this; on medication and has therapist and psychiatrist  Nova young routinely   Has good support system       Objective:    /76 (BP Location: Left arm, Patient Position: Sitting, Cuff Size: Large)   Pulse 104   Temp 98 1 °F (36 7 °C)   Ht 5' 7" (1 702 m)   Wt 135 kg (297 lb 3 2 oz)   BMI 46 55 kg/m²     Physical Exam  Vitals and nursing note reviewed  Constitutional:       Appearance: Normal appearance  She is obese  HENT:      Head: Normocephalic and atraumatic  Eyes:      Extraocular Movements: Extraocular movements intact  Pupils: Pupils are equal, round, and reactive to light  Cardiovascular:      Rate and Rhythm: Normal rate  Pulmonary:      Effort: Pulmonary effort is normal    Musculoskeletal:         General: Normal range of motion  Cervical back: Normal range of motion  Skin:     General: Skin is warm and dry  Neurological:      General: No focal deficit present  Mental Status: She is alert and oriented to person, place, and time     Psychiatric:         Mood and Affect: Mood normal

## 2021-07-19 ENCOUNTER — TELEPHONE (OUTPATIENT)
Dept: FAMILY MEDICINE CLINIC | Facility: CLINIC | Age: 41
End: 2021-07-19

## 2021-07-19 DIAGNOSIS — E78.5 HYPERLIPIDEMIA, UNSPECIFIED HYPERLIPIDEMIA TYPE: ICD-10-CM

## 2021-07-19 DIAGNOSIS — I10 HYPERTENSION, UNSPECIFIED TYPE: Primary | ICD-10-CM

## 2021-07-19 NOTE — TELEPHONE ENCOUNTER
Notified patient additional views needed per Alix at [facility]. Orders sent to her. She will call patient.   Pt thanked you for rescheduling out  She asked if any labs would be needed for Nov, to place in her chart and she'll obtain prior to appt

## 2021-07-19 NOTE — TELEPHONE ENCOUNTER
Pt left msg on voicemail regarding upcoming appt on Friday with Carmel Horowitz  She is asking if this is necessary? She stated she had surgery a few months ago and had all kinds of blood work and physical appointments

## 2021-07-19 NOTE — TELEPHONE ENCOUNTER
This appt was made in jan for her routine 6 month follow up   Since she has been seen in our office since, latest in may for preop clearance and is also following with specialists, she can push this out - recommend 6 month follow up from her preop clearance in may, so maybe shoot for Nov

## 2021-07-22 ENCOUNTER — TELEPHONE (OUTPATIENT)
Dept: CARDIOLOGY CLINIC | Facility: CLINIC | Age: 41
End: 2021-07-22

## 2021-07-22 ENCOUNTER — OFFICE VISIT (OUTPATIENT)
Dept: CARDIOLOGY CLINIC | Facility: CLINIC | Age: 41
End: 2021-07-22
Payer: COMMERCIAL

## 2021-07-22 VITALS
HEIGHT: 67 IN | SYSTOLIC BLOOD PRESSURE: 130 MMHG | BODY MASS INDEX: 45.99 KG/M2 | HEART RATE: 100 BPM | WEIGHT: 293 LBS | DIASTOLIC BLOOD PRESSURE: 84 MMHG

## 2021-07-22 DIAGNOSIS — E78.5 HYPERLIPIDEMIA, UNSPECIFIED HYPERLIPIDEMIA TYPE: ICD-10-CM

## 2021-07-22 DIAGNOSIS — E66.01 MORBID OBESITY (HCC): ICD-10-CM

## 2021-07-22 DIAGNOSIS — R07.9 CHEST PAIN, UNSPECIFIED TYPE: Primary | ICD-10-CM

## 2021-07-22 DIAGNOSIS — I10 HYPERTENSION, UNSPECIFIED TYPE: ICD-10-CM

## 2021-07-22 PROCEDURE — 99215 OFFICE O/P EST HI 40 MIN: CPT | Performed by: NURSE PRACTITIONER

## 2021-07-22 PROCEDURE — 1036F TOBACCO NON-USER: CPT | Performed by: NURSE PRACTITIONER

## 2021-07-22 PROCEDURE — 93000 ELECTROCARDIOGRAM COMPLETE: CPT | Performed by: NURSE PRACTITIONER

## 2021-07-22 RX ORDER — ASPIRIN 81 MG/1
81 TABLET, CHEWABLE ORAL DAILY
Qty: 30 TABLET | Refills: 30 | Status: SHIPPED | OUTPATIENT
Start: 2021-07-22

## 2021-07-22 NOTE — TELEPHONE ENCOUNTER
P/c'd states she was able to get her stress test sooner, next Wednesday  She is having CP taht she is not sure could be due to sleeping on left side recently  She has had Right shoulder surgery recently also  When she exercises she feels the chest pain  Pt hesitant to go to [facility], who said she could see Skipper   Called pt back and gave appt for today

## 2021-07-22 NOTE — PROGRESS NOTES
Cardiology Follow Up    Jannette Baugh  1980  [id number]  [facility]  [facility]  [facility] [address] [address]   [phone]    1  Chest pain, unspecified type  POCT ECG    aspirin 81 mg chewable tablet       Interval History: Today our office received a phone call from Ms Palomo Milder she was experiencing chest pain with exercising  She was hesitant to go to the ER  She was advised to be seen in our office  Ms Laurel Greenberg presents to our office with complaints of left sided chest pain and left anterior shoulder, Left upper quadrant pain with walking for 15 minutes, squeezing pain,  7/10, no associated with shortness of breath, diaphoresis or lightheadedness  CP relieved with rest   According to Jannette  yesterday the CP pain lasted one hour               HPI:  Hypertension  RBBB  Hyperlipidemia 8/26/20 , , HDL 45,   HgbA1C 5 1  Morbid Obesity BMI 46 34  [facility] 2009 patent coronary arteries   2 months ago right shoulder surgery   Strong family hx of CAD  Patient Active Problem List   Diagnosis    Thyroid nodule    Trigger finger, right middle finger    Carpal tunnel syndrome, bilateral    Asthma, mild intermittent    Essential hypertension    Breast hypertrophy    Bulging lumbar disc    Calcific tendinitis of shoulder    Chronic low back pain    Chronic right-sided low back pain without sciatica    Chronic sinusitis    Depression with anxiety    Dermatitis, eczematoid    Disorder of bursae of shoulder region    Esophageal reflux    Fibromyalgia    H/O laminectomy    Hemorrhoids    Hyperlipidemia    Mammographic microcalcification    Morbid obesity    Obstructive sleep apnea    Pars defect of lumbar spine    RBBB (right bundle branch block)    Spondylolysis of lumbar region    Vitamin D insufficiency    Atypical chest pain    Sebaceous cyst of breast, left    Left ear pain  Left cervical lymphadenopathy    Eliz-Danlos syndrome    Preop cardiovascular exam    History of sepsis    Otitis externa/media    Asthma    High triglycerides    Chronic headache with new features    Impaired fasting glucose    Moderate recurrent major depression (HCC)    Generalized anxiety disorder     Past Medical History:   Diagnosis Date    Allergic rhinitis     Anxiety     Arthritis     Chronic pain disorder     BACK SHOULDERS NECK    Colon polyp     Depression     Disease of thyroid gland     nodule    Fibromyalgia, primary     GERD (gastroesophageal reflux disease)     Hearing difficulty of right ear     Hyperlipidemia     Hypertension     Irritable bowel syndrome     Perforation of tympanic membrane     Right    PONV (postoperative nausea and vomiting)     RBBB     Right bundle branch blockage     Sleep apnea     no cpap    Wears glasses      Social History     Socioeconomic History    Marital status: /Civil Union     Spouse name: Not on file    Number of children: Not on file    Years of education: Not on file    Highest education level: Not on file   Occupational History    Not on file   Tobacco Use    Smoking status: Never Smoker    Smokeless tobacco: Never Used   Vaping Use    Vaping Use: Never used   Substance and Sexual Activity    Alcohol use: Not Currently     Comment: social    Drug use: No    Sexual activity: Yes     Partners: Male     Birth control/protection: Injection     Comment: per allscripts   Other Topics Concern    Not on file   Social History Narrative    Daily caffeine use- 1 cup of coffee     Social Determinants of Health     Financial Resource Strain:     Difficulty of Paying Living Expenses:    Food Insecurity:     Worried About Running Out of Food in the Last Year:     Ran Out of Food in the Last Year:    Transportation Needs: No Transportation Needs    Lack of Transportation (Medical):  No    Lack of Transportation (Non-Medical):  No   Physical Activity: Unknown    Days of Exercise per Week: 2 days    Minutes of Exercise per Session: Not asked   Stress:     Feeling of Stress :    Social Connections:     Frequency of Communication with Friends and Family:     Frequency of Social Gatherings with Friends and Family:     Attends Buddhism Services:     Active Member of Clubs or Organizations:     Attends Club or Organization Meetings:     Marital Status:    Intimate Partner Violence:     Fear of Current or Ex-Partner:     Emotionally Abused:     Physically Abused:     Sexually Abused:       Family History   Problem Relation Age of Onset    Hypertension Mother     Hyperlipidemia Mother     Diabetes Mother     Hypertension Father     Hyperlipidemia Father     Heart disease Father         cardiac disorder-myocardial infarction arrhythmias    Heart attack Father     Diabetes unspecified Maternal Grandmother     Crohn's disease Brother     Arthritis Brother     Thyroid disease Paternal Grandmother     No Known Problems Daughter     No Known Problems Maternal Aunt     No Known Problems Maternal Aunt     No Known Problems Maternal Aunt     No Known Problems Maternal Aunt     No Known Problems Paternal Aunt     No Known Problems Paternal Aunt     Heart attack Paternal Uncle      Past Surgical History:   Procedure Laterality Date    ANAL FISTULOTOMY N/A 2018    Procedure: FISTULOTOMY;  Surgeon: Karlos Santillan MD;  [facility];  Service: Colorectal    BACK SURGERY      lumbar herniated disc    BREAST CYST EXCISION Left 13 yrs ago  benign    BREAST SURGERY Left 2006    cyst removal    CARPAL TUNNEL RELEASE       SECTION      CHOLECYSTECTOMY      COLONOSCOPY      CYST REMOVAL      DENTAL SURGERY      INCISION AND DRAINAGE OF WOUND N/A 2018    Procedure: INCISION AND DRAINAGE (I&D) BUTTOCK;  Surgeon: Karlos Santillan MD;  [facility];  Service: Colorectal  MYRINGOTOMY W/ TUBES Right 08/17/2015    [place]    IL COLONOSCOPY FLX DX W/COLLJ SPEC WHEN PFRMD N/A 8/2/2017    Procedure: COLONOSCOPY;  Surgeon: Yoselin Motley MD;  [facility]; Service: Gastroenterology    IL INCISE FINGER TENDON SHEATH Right 3/13/2018    Procedure: LONG TRIGGER FINGER RELEASE;  Surgeon: Alka Greco DO;  [facility];  Service: Orthopedics    IL SURG DIAGNOSTIC EXAM, ANORECTAL N/A 11/2/2018    Procedure: EXAM UNDER ANESTHESIA (EUA); Surgeon: Abigail Brody MD;  [facility];  Service: Colorectal    IL TYMPANOPLASTY Right 5/24/2017    Procedure: TYMPANOPLASTY WITH PERICHONDRIN GRAFT;  Surgeon:  Melly Caruso DO;  [facility];  Service: ENT    SHOULDER SURGERY Right     ULNAR NERVE TRANSPOSITION Left     WISDOM TOOTH EXTRACTION         Current Outpatient Medications:     albuterol (PROVENTIL HFA,VENTOLIN HFA) 90 mcg/act inhaler, Inhale 2 puffs every 6 (six) hours as needed for shortness of breath (cough), Disp: 1 Inhaler, Rfl: 0    ALPRAZolam (XANAX) 0 25 mg tablet, Take 1 tablet (0 25 mg total) by mouth 2 (two) times a day as needed for anxiety, Disp: 4 tablet, Rfl: 0    amitriptyline (ELAVIL) 100 mg tablet, Take 1 tablet (100 mg total) by mouth daily at bedtime, Disp: 30 tablet, Rfl: 1    Ascorbic Acid (VITAMIN C) 500 MG CAPS, Take 1 capsule by mouth daily, Disp: , Rfl:     atorvastatin (LIPITOR) 40 mg tablet, Take 1 tablet (40 mg total) by mouth daily, Disp: 90 tablet, Rfl: 1    busPIRone (BUSPAR) 10 mg tablet, Take 1 tablet (10 mg total) by mouth 3 (three) times a day, Disp: 90 tablet, Rfl: 1    cetirizine (ZyrTEC) 10 mg tablet, Take 10 mg by mouth daily, Disp: , Rfl:     Cholecalciferol (VITAMIN D3) 5000 units CAPS, Take 1 capsule by mouth daily  , Disp: , Rfl:     ciprofloxacin-dexamethasone (CIPRODEX) otic suspension, Administer 4 drops to the right ear 2 (two) times a day, Disp: 7 5 mL, Rfl: 0    cyanocobalamin (VITAMIN B-12) 1,000 mcg tablet, Take 1,000 mcg by mouth daily , Disp: , Rfl:     esomeprazole (NexIUM 24HR) 20 mg capsule, Take 1 capsule (20 mg total) by mouth every morning, Disp: 90 capsule, Rfl: 3    methocarbamol (ROBAXIN) 750 mg tablet, Take 1 tablet (750 mg total) by mouth every 8 (eight) hours as needed for muscle spasms, Disp: 30 tablet, Rfl: 0    metoprolol succinate (TOPROL-XL) 50 mg 24 hr tablet, Take 1 tablet (50 mg total) by mouth daily, Disp: 90 tablet, Rfl: 1    Multiple Vitamins-Minerals (CENTRUM ADULTS PO), Take 1 tablet by mouth daily, Disp: , Rfl:     nystatin (MYCOSTATIN) cream, Apply topically 2 (two) times a day as needed (rash), Disp: 30 g, Rfl: 0    venlafaxine (EFFEXOR-XR) 150 mg 24 hr capsule, Take 1 capsule (150 mg total) by mouth daily, Disp: 90 capsule, Rfl: 0    venlafaxine (EFFEXOR-XR) 75 mg 24 hr capsule, Take 1 capsule (75 mg total) by mouth daily, Disp: 90 capsule, Rfl: 0  Allergies   Allergen Reactions    Amoxicillin Swelling, Anaphylaxis and Angioedema    Neurontin [Gabapentin] Other (See Comments)     Other reaction(s): SUICIDE IDEATION  Other reaction(s): Other (See Comments)  suicidal  suicidal    Penicillin V Angioedema and Swelling     Other reaction(s): Throat closure    Penicillins Swelling and Anaphylaxis    Aripiprazole Other (See Comments)     Other reaction(s): low dose 2 mg ; curving in and locking in of hands/dystonia  Other reaction(s): Other (See Comments)  Other reaction(s): low dose 2 mg ; curving in and locking in of hands/dystonia    Lorazepam Other (See Comments)     Other reaction(s): higher dose > anxiety and depression  Other reaction(s): Other (See Comments)  Other reaction(s): higher dose > anxiety and depression    Carbamazepine Other (See Comments)     Other reaction(s): Unknown Reaction    Doxycycline Hives    Lamotrigine Rash    Other Hives     Adhesive tape       Labs:  No visits with results within 2 Month(s) from this visit     Latest known visit with results is:   Hospital Outpatient Visit on 05/13/2021   Component Date Value    EXT Preg Test, Ur 05/13/2021 Negative     Control 05/13/2021 Valid     Case Report 05/13/2021                      Value:Surgical Pathology Report                         Case: [id number]                                   Authorizing Provider:  Gene Renteria MD       Collected:           05/13/2021 1044              Ordering Location:     [facility]        Received:            05/13/2021 [address] [place] [address] Endoscopy                                                           Pathologist:           Wili Carrera MD                                                              Specimens:   A) - Duodenum, RO celiac, cold forceps                                                              B) - Stomach, RO Hpylori, cold forceps                                                              C) - Stomach, Gastric polyp x4, cold forceps                                                        D) - Colon, Random biopsies, RO microscopic colitis                                                 E) - Polyp, Colorectal, sigmoid, cold forceps                                              Final Diagnosis 05/13/2021                      Value: This result contains rich text formatting which cannot be displayed here   Note 05/13/2021                      Value: This result contains rich text formatting which cannot be displayed here   Additional Information 05/13/2021                      Value: This result contains rich text formatting which cannot be displayed here  [facility] Description 05/13/2021                      Value: This result contains rich text formatting which cannot be displayed here  Imaging: No results found  Review of Systems:  Review of Systems   Constitutional: Positive for fatigue  Cardiovascular: Positive for chest pain     Musculoskeletal: Positive for arthralgias and myalgias  All other systems reviewed and are negative  Physical Exam:  Physical Exam  Vitals reviewed  Constitutional:       Appearance: She is obese  HENT:      Head: Normocephalic  Eyes:      Pupils: Pupils are equal, round, and reactive to light  Cardiovascular:      Rate and Rhythm: Regular rhythm  Tachycardia present  Pulses: Normal pulses  Heart sounds: Normal heart sounds  Pulmonary:      Effort: Pulmonary effort is normal       Breath sounds: Normal breath sounds  Abdominal:      General: Bowel sounds are normal       Palpations: Abdomen is soft  Musculoskeletal:         General: Tenderness present  Normal range of motion  Cervical back: Normal range of motion and neck supple  Right lower leg: No edema  Left lower leg: No edema  Comments: Left chest and Right anterior lower rib cage with palpation   Skin:     General: Skin is warm  Capillary Refill: Capillary refill takes less than 2 seconds  Neurological:      General: No focal deficit present  Mental Status: She is alert and oriented to person, place, and time  Psychiatric:         Mood and Affect: Mood normal          Behavior: Behavior normal          Discussion/Summary:  1  Chest pain- occurring with walking after 15 minutes, squeezing, relieved with rest, EKG Sinus tachycardia 100 BPM, poor R wave progression  Jannette is scheduled for an exercise stress test next week  I will request this be done tomorrow  Start ASA 81mg daily with breakfast,  She is aware if CP worsens to present to the ED for further evaluation  Continue on  Metoprolol succinate 50 mg daily, Lipitor 40 mg daily   2  Hypertension- RUE sitting 144/86, home /84,  Continue on metoprolol succinate 50 mg daily, 2gm sodium diet   3  Hyperlipidemia 8/26/20 , , HDL 45, - continue on Lipitor 40mg daily, heart healthy diet   4   Morbid Obesity BMI 46 34 actively working on weight loss with diet and exercise

## 2021-07-23 ENCOUNTER — TELEPHONE (OUTPATIENT)
Dept: CARDIOLOGY CLINIC | Facility: CLINIC | Age: 41
End: 2021-07-23

## 2021-07-23 ENCOUNTER — HOSPITAL ENCOUNTER (OUTPATIENT)
Dept: NON INVASIVE DIAGNOSTICS | Facility: HOSPITAL | Age: 41
Discharge: HOME | End: 2021-07-23
Attending: INTERNAL MEDICINE
Payer: COMMERCIAL

## 2021-07-23 DIAGNOSIS — I45.10 RBBB (RIGHT BUNDLE BRANCH BLOCK): ICD-10-CM

## 2021-07-23 DIAGNOSIS — I10 ESSENTIAL HYPERTENSION: ICD-10-CM

## 2021-07-23 PROCEDURE — 93017 CV STRESS TEST TRACING ONLY: CPT

## 2021-07-23 PROCEDURE — 93016 CV STRESS TEST SUPVJ ONLY: CPT | Performed by: INTERNAL MEDICINE

## 2021-07-23 PROCEDURE — 93018 CV STRESS TEST I&R ONLY: CPT | Performed by: INTERNAL MEDICINE

## 2021-07-28 NOTE — TELEPHONE ENCOUNTER
Jannette called back again today, asking for stress test result, and what , if anything, to do next  Please advise

## 2021-07-29 DIAGNOSIS — I10 HYPERTENSION, UNSPECIFIED TYPE: ICD-10-CM

## 2021-07-29 NOTE — TELEPHONE ENCOUNTER
P/c'd for 3 rd time  She is having HTN  REhab would not allow her to have her session today because BP was 150/100 at rest     Did tigdeb text Dr Kiara Rolon for response to task      Taking Toprol-xl 50 mg qd    Please advise

## 2021-07-30 RX ORDER — METOPROLOL SUCCINATE 100 MG/1
50 TABLET, EXTENDED RELEASE ORAL DAILY
Qty: 90 TABLET | Refills: 3 | Status: SHIPPED | OUTPATIENT
Start: 2021-07-30 | End: 2021-08-06

## 2021-08-03 ENCOUNTER — SOCIAL WORK (OUTPATIENT)
Dept: BEHAVIORAL/MENTAL HEALTH CLINIC | Facility: CLINIC | Age: 41
End: 2021-08-03
Payer: COMMERCIAL

## 2021-08-03 DIAGNOSIS — F33.1 MODERATE RECURRENT MAJOR DEPRESSION (HCC): Primary | ICD-10-CM

## 2021-08-03 DIAGNOSIS — F41.1 GENERALIZED ANXIETY DISORDER: ICD-10-CM

## 2021-08-03 PROCEDURE — 90834 PSYTX W PT 45 MINUTES: CPT | Performed by: SOCIAL WORKER

## 2021-08-03 NOTE — PSYCH
This note was not shared with the patient due to this is a psychotherapy note     Psychotherapy Provided: Individual Psychotherapy 45 minutes     Length of time in session: 45 minutes, follow up in 2 week    Goals addressed in session: Goal 1     Pain:      none    0    Current suicide risk : Low     Met with Jannette Bhatia is reporting an increase in anxiety  Since her last session, her dog Ivan's seizures became unmanageable with medications and her family made the difficult decision to put the dog down  She also received news that her Rexene Parody is on hospice  Session focused on Jannette processing her emotions about the recent loss' that are triggering symptoms  Jannette described her initial challenge of expressing her thoughts and feelings to her loved ones due to feelings of guilt associated with the loss of her dog, Ivan  She shared how she finally expressed her feelings of guilt to her  by text message 3 days after her dog's passing  Her  provided her emotional support which she found helpful  She processed those feelings of guilt in session  She processed daily triggers to symptoms with Jannette being the primary caregiver to their dog  Education provided on the grieving process  We discussed outlets for self expression including therapy, talking to supports, and other creative outlets  Dariana Jefferson will continue to implement wellness tools; share successes and challenges  Continue biweekly support       Mental status:  Appearance calm and cooperative , adequate hygiene and grooming and good eye contact    Mood depressed and anxious   Affect affect was tearful   Speech a normal rate and volume   Thought Processes coherent/organized and normal thought processes   Hallucinations no hallucinations present    Thought Content no delusions, cognitive distortions   Abnormal Thoughts no suicidal thoughts  and no homicidal thoughts    Orientation  oriented to person and place and time   Remote Memory short term memory intact and long term memory intact   Attention Span concentration intact   Intellect Appears to be of Average Intelligence   Fund of Knowledge displays adequate knowledge of current events   Insight fair   Judgement fair   Muscle Strength Muscle strength and tone were normal and Normal gait    Language no difficulty naming common objects   Pain none   Pain Scale 0         Behavioral Health Treatment Plan [facility]: Diagnosis and Treatment Plan explained to Eryn Liu relates understanding diagnosis and is agreeable to Treatment Plan   Yes

## 2021-08-06 ENCOUNTER — TELEPHONE (OUTPATIENT)
Dept: FAMILY MEDICINE CLINIC | Facility: CLINIC | Age: 41
End: 2021-08-06

## 2021-08-06 ENCOUNTER — APPOINTMENT (OUTPATIENT)
Dept: LAB | Facility: CLINIC | Age: 41
End: 2021-08-06
Payer: COMMERCIAL

## 2021-08-06 ENCOUNTER — TELEPHONE (OUTPATIENT)
Dept: GASTROENTEROLOGY | Facility: CLINIC | Age: 41
End: 2021-08-06

## 2021-08-06 DIAGNOSIS — R10.9 ABDOMINAL PAIN, UNSPECIFIED ABDOMINAL LOCATION: ICD-10-CM

## 2021-08-06 DIAGNOSIS — R19.7 DIARRHEA, UNSPECIFIED TYPE: ICD-10-CM

## 2021-08-06 DIAGNOSIS — Z86.19 HISTORY OF CLOSTRIDIOIDES DIFFICILE INFECTION: ICD-10-CM

## 2021-08-06 DIAGNOSIS — I10 HYPERTENSION, UNSPECIFIED TYPE: ICD-10-CM

## 2021-08-06 DIAGNOSIS — Z86.19 HISTORY OF CLOSTRIDIOIDES DIFFICILE INFECTION: Primary | ICD-10-CM

## 2021-08-06 PROCEDURE — 87493 C DIFF AMPLIFIED PROBE: CPT

## 2021-08-06 RX ORDER — METOPROLOL SUCCINATE 100 MG/1
100 TABLET, EXTENDED RELEASE ORAL DAILY
Qty: 90 TABLET | Refills: 3 | Status: SHIPPED | OUTPATIENT
Start: 2021-08-06

## 2021-08-06 NOTE — TELEPHONE ENCOUNTER
Patient called stating a couple months back she had C-Diff andher stomach has been terrible the last two weeks  Could it be C-Diff again or just anxiety? Can she have testing to try to figure it out?

## 2021-08-06 NOTE — TELEPHONE ENCOUNTER
I spoke with Aiyana Escobar stated she is having abdominal pain, afebrile during the day but having night sweats, having stools 6-10 times a day, was on a [place] a month ago and has blood down there but not sure if it in the stool or from being raw down there  She has scheduled an appointment for Monday  I advised her if symptoms worsen to go to the [facility] or E/R for evaluation

## 2021-08-06 NOTE — TELEPHONE ENCOUNTER
Patient of Dr Eula Valencia, last seen 5/13/21    History of diarrhea, gastritis, c diff    Called and spoke with patient  She states that she feels exactly like she did when she had c diff before  She is having 6-10 episodes of watery diarrhea per day and just not feeling well  She already spoke to her PCP who ordered c diff testing and patient is going to get it done  I advised we would recommend the same thing  Advised her to follow up if c diff is negative   No further questions

## 2021-08-06 NOTE — TELEPHONE ENCOUNTER
Patients GI provider:  Dr Hue An    Number to return call: [phone]    Reason for call: Pt calling to speak to someone about possible C-diff      Scheduled procedure/appointment date if applicable: NA

## 2021-08-06 NOTE — TELEPHONE ENCOUNTER
Please obtain more information and offer visit  Does she have abd pain? Fevers? How many stools per day? Any recent abx use? Blood in stool?

## 2021-08-07 LAB — C DIFF TOX GENS STL QL NAA+PROBE: NEGATIVE

## 2021-08-09 ENCOUNTER — OFFICE VISIT (OUTPATIENT)
Dept: FAMILY MEDICINE CLINIC | Facility: CLINIC | Age: 41
End: 2021-08-09
Payer: COMMERCIAL

## 2021-08-09 VITALS
OXYGEN SATURATION: 99 % | BODY MASS INDEX: 45.99 KG/M2 | HEART RATE: 100 BPM | TEMPERATURE: 97.5 F | HEIGHT: 67 IN | DIASTOLIC BLOOD PRESSURE: 110 MMHG | SYSTOLIC BLOOD PRESSURE: 144 MMHG | WEIGHT: 293 LBS

## 2021-08-09 DIAGNOSIS — L08.9 INFECTED SEBACEOUS CYST: ICD-10-CM

## 2021-08-09 DIAGNOSIS — L72.3 INFECTED SEBACEOUS CYST: ICD-10-CM

## 2021-08-09 DIAGNOSIS — F33.9 DEPRESSION, RECURRENT (HCC): ICD-10-CM

## 2021-08-09 DIAGNOSIS — Z12.31 BREAST CANCER SCREENING BY MAMMOGRAM: ICD-10-CM

## 2021-08-09 DIAGNOSIS — F41.9 ANXIETY: ICD-10-CM

## 2021-08-09 DIAGNOSIS — R19.7 DIARRHEA, UNSPECIFIED TYPE: Primary | ICD-10-CM

## 2021-08-09 PROCEDURE — 99214 OFFICE O/P EST MOD 30 MIN: CPT | Performed by: PHYSICIAN ASSISTANT

## 2021-08-09 PROCEDURE — 3008F BODY MASS INDEX DOCD: CPT | Performed by: NURSE PRACTITIONER

## 2021-08-09 RX ORDER — CLINDAMYCIN HYDROCHLORIDE 300 MG/1
300 CAPSULE ORAL 4 TIMES DAILY
Qty: 28 CAPSULE | Refills: 0 | Status: SHIPPED | OUTPATIENT
Start: 2021-08-09 | End: 2021-08-16

## 2021-08-09 RX ORDER — DICYCLOMINE HYDROCHLORIDE 10 MG/1
10 CAPSULE ORAL 3 TIMES DAILY PRN
Qty: 60 CAPSULE | Refills: 0 | Status: ON HOLD | OUTPATIENT
Start: 2021-08-09 | End: 2021-09-20

## 2021-08-09 NOTE — PROGRESS NOTES
Assessment/Plan:      Diagnoses and all orders for this visit:    Diarrhea, unspecified type  -     dicyclomine (BENTYL) 10 mg capsule; Take 1 capsule (10 mg total) by mouth 3 (three) times a day as needed (diarrhea)  - acute on chronic, recurrent diarrhea  - appreciate GI follow up  - negative c diff stool sample  - this could be consistent with IBS, due to recent increased anxiety due to personal stressors as noted in hpi  - afebrile    Depression, recurrent ([address] ); Anxiety  - appreciates psychiatry follow up    Breast cancer screening by mammogram  -     Mammo screening bilateral w 3d & cad; Future    Infected sebaceous cyst  -     clindamycin (CLEOCIN) 300 MG capsule; Take 1 capsule (300 mg total) by mouth 4 (four) times a day for 7 days  -     Ambulatory referral to General Surgery; Future  - established with Dr Allison Paredes in the past, wishes to follow up with her            Subjective:     Patient ID: Yair Chapman is a 39 y o  female  Chief Complaint   Patient presents with    Follow-up     Discuse c-diff    Cyst     Pt states she has cysts in the groin area in front  HPI   Patient is a 38 yo female who presents for same day appt due to increased diarrhea  She reports recurrent diarrhea like she had prior to her recent EGD/colonoscopy with GI  This increase has been for the past few weeks, but then states could also be a few months  She was having loose stools 3-5 times per day and most of the time when eats  She feels her stomach is hard, bloated  She completed c diff stool sample over the weekend and was negative  Upon review of chart, GI sent bentyl to pharmacy in Feb to help with these symptoms but she does not recall trying it or filling it  She is open to trying  She wonders if this is all related to anxiety  She recently lost 2 uncles and third once is on hospice  They had to put her cat down recently  She was put on medication from psychiatry for anxiety   It was buspar but then felt worse on it, tapered herself off  She has follow up with psychiatry on Thursday  She did a work up with cardiology for chest pains, heart function was okay  Her metoprolol was increased by cardiology due to BP  Her BP has never been this high, lately has been fluctuating  She noticed a cyst in groin, used to get them often in arm pit/breast from sweating  She had to one removed from Dr Debbie Raygoza  She reports having 2-3 of them along lower abd wall/ upper pubic area  They drain on their own but fill back up again  They are painful  No fevers  Review of Systems   Constitutional: Negative for chills, diaphoresis and fever  Respiratory: Negative  Cardiovascular: Negative  Gastrointestinal: Positive for abdominal pain and diarrhea  Negative for constipation and vomiting  Rectal pain: has had rectal irritation and blood from irritationf rom diarrhea in the past    Genitourinary: Negative for difficulty urinating and dysuria  Skin: Positive for wound (recurrent cyst )  Neurological: Negative  Psychiatric/Behavioral: Positive for dysphoric mood  Negative for suicidal ideas  The patient is nervous/anxious  Objective:  Vitals:    08/09/21 1003   BP: (!) 144/110   Pulse: 100   Temp: 97 5 °F (36 4 °C)   SpO2: 99%      Physical Exam  Constitutional:       General: She is not in acute distress  Appearance: Normal appearance  She is obese  She is not ill-appearing  HENT:      Head: Normocephalic and atraumatic  Right Ear: External ear normal       Left Ear: External ear normal    Eyes:      Conjunctiva/sclera: Conjunctivae normal       Pupils: Pupils are equal, round, and reactive to light  Cardiovascular:      Rate and Rhythm: Normal rate and regular rhythm  Pulmonary:      Effort: Pulmonary effort is normal  No respiratory distress  Abdominal:      Palpations: Abdomen is soft  Tenderness: There is no guarding        Comments: 2 sebaceous cyst along lower abdominal fold, 1 actively draining with serosanguineous fluid, erythema and tendern to touch; a third sebaceous cyst on upper pubic area within pubic hair, not actively draining but there is a 1 5 cm area of open skin healing over, no erythema but is tender to touch    Musculoskeletal:         General: No deformity  Cervical back: Normal range of motion and neck supple  Skin:     General: Skin is warm and dry  Coloration: Skin is not pale  Neurological:      General: No focal deficit present  Mental Status: She is alert and oriented to person, place, and time  Psychiatric:         Mood and Affect: Affect normal  Mood is anxious  Speech: Speech normal          Behavior: Behavior normal          Thought Content:  Thought content normal

## 2021-08-11 ENCOUNTER — OFFICE VISIT (OUTPATIENT)
Dept: SURGERY | Facility: CLINIC | Age: 41
End: 2021-08-11
Payer: COMMERCIAL

## 2021-08-11 VITALS
HEART RATE: 97 BPM | HEIGHT: 67 IN | BODY MASS INDEX: 45.99 KG/M2 | TEMPERATURE: 96.7 F | SYSTOLIC BLOOD PRESSURE: 130 MMHG | DIASTOLIC BLOOD PRESSURE: 92 MMHG | WEIGHT: 293 LBS

## 2021-08-11 DIAGNOSIS — L73.2 HYDRADENITIS: Primary | ICD-10-CM

## 2021-08-11 DIAGNOSIS — L08.9 INFECTED SEBACEOUS CYST: ICD-10-CM

## 2021-08-11 DIAGNOSIS — L72.3 INFECTED SEBACEOUS CYST: ICD-10-CM

## 2021-08-11 PROCEDURE — 99243 OFF/OP CNSLTJ NEW/EST LOW 30: CPT | Performed by: PHYSICIAN ASSISTANT

## 2021-08-11 PROCEDURE — 3008F BODY MASS INDEX DOCD: CPT | Performed by: REGISTERED NURSE

## 2021-08-11 NOTE — PROGRESS NOTES
Assessment/Plan:   Delvis Madison is a 39 y  o female who is here for The primary encounter diagnosis was Hydradenitis  A diagnosis of Infected sebaceous cyst was also pertinent to this visit  On exam found to have hydradenitis of the : abdominal wall and mon pubis  Plan: Continue to observe with recommendation for follow-up if lesion changes  Avoid moisture  Follow up in 3 weeks   Possible referral to dermatology    Physical Exam  Skin:                 _______________________________________________________  CC: Follow-up    HPI:  Delvis Madison is a 39 y  o female who was referred for evaluation of Follow-up    Currently patient reports draining lesions of abdominal wall at fold of pannus and groin areas  Just started on oral antibiotics    Reports: increasing in number of lesions, painful and draining    Location: abdominal wall  and mons pubis      ROS:  General ROS: negative  negative for - chills, fatigue, fever or night sweats, weight loss  Respiratory ROS: no cough, shortness of breath, or wheezing  Cardiovascular ROS: no chest pain or dyspnea on exertion  Genito-Urinary ROS: no dysuria, trouble voiding, or hematuria  Musculoskeletal ROS: negative for - gait disturbance, joint pain or muscle pain  Neurological ROS: no TIA or stroke symptoms  Skin ROS: See HPI  GI ROS: see HPI  Skin ROS: no new rashes or lesions   Lymphatic ROS: no new adenopathy noted by pt     GYN ROS: see HPI, no new GYN history or bleeding noted  Psy ROS: no new mental or behavioral disturbances       Patient Active Problem List   Diagnosis    Thyroid nodule    Trigger finger, right middle finger    Carpal tunnel syndrome, bilateral    Asthma, mild intermittent    Essential hypertension    Breast hypertrophy    Bulging lumbar disc    Calcific tendinitis of shoulder    Chronic low back pain    Chronic right-sided low back pain without sciatica    Chronic sinusitis    Depression with anxiety    Dermatitis, eczematoid    Disorder of bursae of shoulder region    Esophageal reflux    Fibromyalgia    H/O laminectomy    Hemorrhoids    Hyperlipidemia    Mammographic microcalcification    Morbid obesity    Obstructive sleep apnea    Pars defect of lumbar spine    RBBB (right bundle branch block)    Spondylolysis of lumbar region    Vitamin D insufficiency    Atypical chest pain    Sebaceous cyst of breast, left    Left ear pain    Left cervical lymphadenopathy    Eliz-Danlos syndrome    Preop cardiovascular exam    History of sepsis    Otitis externa/media    Asthma    High triglycerides    Chronic headache with new features    Impaired fasting glucose    Moderate recurrent major depression (HCC)    Generalized anxiety disorder    Depression, recurrent (HCC)         Allergies:  Amoxicillin, Neurontin [gabapentin], Penicillin v, Penicillins, Aripiprazole, Lorazepam, Carbamazepine, Doxycycline, Lamotrigine, and Other      Current Outpatient Medications:     albuterol (PROVENTIL HFA,VENTOLIN HFA) 90 mcg/act inhaler, Inhale 2 puffs every 6 (six) hours as needed for shortness of breath (cough), Disp: 1 Inhaler, Rfl: 0    amitriptyline (ELAVIL) 100 mg tablet, Take 1 tablet (100 mg total) by mouth daily at bedtime, Disp: 30 tablet, Rfl: 1    Ascorbic Acid (VITAMIN C) 500 MG CAPS, Take 1 capsule by mouth daily, Disp: , Rfl:     aspirin 81 mg chewable tablet, Chew 1 tablet (81 mg total) daily, Disp: 30 tablet, Rfl: 30    atorvastatin (LIPITOR) 40 mg tablet, Take 1 tablet (40 mg total) by mouth daily, Disp: 90 tablet, Rfl: 1    cetirizine (ZyrTEC) 10 mg tablet, Take 10 mg by mouth daily, Disp: , Rfl:     Cholecalciferol (VITAMIN D3) 5000 units CAPS, Take 1 capsule by mouth daily  , Disp: , Rfl:     ciprofloxacin-dexamethasone (CIPRODEX) otic suspension, Administer 4 drops to the right ear 2 (two) times a day, Disp: 7 5 mL, Rfl: 0    clindamycin (CLEOCIN) 300 MG capsule, Take 1 capsule (300 mg total) by mouth 4 (four) times a day for 7 days, Disp: 28 capsule, Rfl: 0    cyanocobalamin (VITAMIN B-12) 1,000 mcg tablet, Take 1,000 mcg by mouth daily , Disp: , Rfl:     dicyclomine (BENTYL) 10 mg capsule, Take 1 capsule (10 mg total) by mouth 3 (three) times a day as needed (diarrhea), Disp: 60 capsule, Rfl: 0    metoprolol succinate (TOPROL-XL) 100 mg 24 hr tablet, Take 1 tablet (100 mg total) by mouth daily, Disp: 90 tablet, Rfl: 3    Multiple Vitamins-Minerals (CENTRUM ADULTS PO), Take 1 tablet by mouth daily, Disp: , Rfl:     nystatin (MYCOSTATIN) cream, Apply topically 2 (two) times a day as needed (rash), Disp: 30 g, Rfl: 0    venlafaxine (EFFEXOR-XR) 150 mg 24 hr capsule, Take 1 capsule (150 mg total) by mouth daily, Disp: 90 capsule, Rfl: 0    venlafaxine (EFFEXOR-XR) 75 mg 24 hr capsule, Take 1 capsule (75 mg total) by mouth daily, Disp: 90 capsule, Rfl: 0    ALPRAZolam (XANAX) 0 25 mg tablet, Take 1 tablet (0 25 mg total) by mouth 2 (two) times a day as needed for anxiety (Patient not taking: Reported on 7/22/2021), Disp: 4 tablet, Rfl: 0    esomeprazole (NexIUM 24HR) 20 mg capsule, Take 1 capsule (20 mg total) by mouth every morning, Disp: 90 capsule, Rfl: 3    Past Medical History:   Diagnosis Date    Allergic rhinitis     Anxiety     Arthritis     Chronic pain disorder     BACK SHOULDERS NECK    Colon polyp     Depression     Disease of thyroid gland     nodule    Fibromyalgia, primary     GERD (gastroesophageal reflux disease)     Hearing difficulty of right ear     Hyperlipidemia     Hypertension     Irritable bowel syndrome     Perforation of tympanic membrane     Right    PONV (postoperative nausea and vomiting)     RBBB     Right bundle branch blockage     Sleep apnea     no cpap    Wears glasses        Past Surgical History:   Procedure Laterality Date    ANAL FISTULOTOMY N/A 11/2/2018    Procedure: FISTULOTOMY;  Surgeon: Eva De León MD;  [facility];  Service: Colorectal    BACK SURGERY      lumbar herniated disc    BREAST CYST EXCISION Left 13 yrs ago  benign    BREAST SURGERY Left 2006    cyst removal    CARPAL TUNNEL RELEASE       SECTION      CHOLECYSTECTOMY      COLONOSCOPY      CYST REMOVAL      DENTAL SURGERY      INCISION AND DRAINAGE OF WOUND N/A 2018    Procedure: INCISION AND DRAINAGE (I&D) BUTTOCK;  Surgeon: Dae Vang MD;  [facility];  Service: Colorectal    MYRINGOTOMY W/ TUBES Right 2015    [place]    AK COLONOSCOPY FLX DX W/COLLJ SPEC WHEN PFRMD N/A 2017    Procedure: COLONOSCOPY;  Surgeon: Shannon Conklin MD;  [facility]; Service: Gastroenterology    AK INCISE FINGER TENDON SHEATH Right 3/13/2018    Procedure: LONG TRIGGER FINGER RELEASE;  Surgeon: Richard Dahl DO;  [facility];  Service: Orthopedics    AK SURG DIAGNOSTIC EXAM, ANORECTAL N/A 2018    Procedure: EXAM UNDER ANESTHESIA (EUA); Surgeon: Dae Vang MD;  [facility];  Service: Colorectal    AK TYMPANOPLASTY Right 2017    Procedure: TYMPANOPLASTY WITH PERICHONDRIN GRAFT;  Surgeon:  Mike Connelly DO;  [facility];  Service: ENT    SHOULDER SURGERY Right     ULNAR NERVE TRANSPOSITION Left     WISDOM TOOTH EXTRACTION         Family History   Problem Relation Age of Onset    Hypertension Mother     Hyperlipidemia Mother     Diabetes Mother     Hypertension Father     Hyperlipidemia Father     Heart disease Father         cardiac disorder-myocardial infarction arrhythmias    Heart attack Father     Diabetes unspecified Maternal Grandmother     Crohn's disease Brother     Arthritis Brother     Thyroid disease Paternal Grandmother     No Known Problems Daughter     No Known Problems Maternal Aunt     No Known Problems Maternal Aunt     No Known Problems Maternal Aunt     No Known Problems Maternal Aunt     No Known Problems Paternal Aunt     No Known Problems Paternal Aunt     Heart attack Paternal Uncle         reports that she has never smoked  She has never used smokeless tobacco  She reports previous alcohol use  She reports that she does not use drugs  Vitals:    08/11/21 0905   BP: 130/92   Pulse: 97   Temp: (!) 96 7 °F (35 9 °C)        PHYSICAL EXAM  General Appearance:    Alert, cooperative, no distress, healthy   Head:    Normocephalic without obvious abnormality   Eyes:    PERRL, conjunctiva/corneas clear     Neck:   Supple, no adenopathy, no JVD   Back:     Symmetric, no spinal or CVA tenderness   Lungs:     Clear to auscultation bilaterally, no wheezing or rhonchi   Heart:    Regular rate and rhythm, S1 and S2 normal, no murmur   Abdomen:     Benign, no rebound or guarding  Extremities:   Extremities normal  No clubbing, cyanosis or edema   Psych:   Normal Affect, AOx3  Neurologic:  Skin:   CNII-XII intact  Strength symmetric, speech intact    Warm, dry, intact, no visible rashes or lesions except as follows: multiple skin lesions with opening and ulceration consistent with hydradenitis               Some portions of this record may have been generated with voice recognition software  There may be translation, syntax,  or grammatical errors  Occasional wrong word or "sound-a-like" substitutions may have occurred due to the inherent limitations of the voice recognition software  Read the chart carefully and recognize, using context, where substitutions may have occurred  If you have any questions, please contact the dictating provider for clarification or correction, as needed  This encounter has been coded by a non-certified coder         Cary Alexis PA-C    Date: 8/11/2021 Time: 9:14 AM

## 2021-08-12 ENCOUNTER — TELEPHONE (OUTPATIENT)
Dept: PSYCHIATRY | Facility: CLINIC | Age: 41
End: 2021-08-12

## 2021-08-12 ENCOUNTER — OFFICE VISIT (OUTPATIENT)
Dept: PSYCHIATRY | Facility: CLINIC | Age: 41
End: 2021-08-12
Payer: COMMERCIAL

## 2021-08-12 DIAGNOSIS — F41.1 GENERALIZED ANXIETY DISORDER: ICD-10-CM

## 2021-08-12 DIAGNOSIS — F33.9 DEPRESSION, RECURRENT (HCC): ICD-10-CM

## 2021-08-12 DIAGNOSIS — F33.1 MODERATE RECURRENT MAJOR DEPRESSION (HCC): Primary | ICD-10-CM

## 2021-08-12 PROCEDURE — 99214 OFFICE O/P EST MOD 30 MIN: CPT | Performed by: REGISTERED NURSE

## 2021-08-12 PROCEDURE — 1036F TOBACCO NON-USER: CPT | Performed by: REGISTERED NURSE

## 2021-08-12 RX ORDER — HYDROXYZINE HYDROCHLORIDE 25 MG/1
25 TABLET, FILM COATED ORAL EVERY 8 HOURS PRN
Qty: 30 TABLET | Refills: 0 | Status: SHIPPED | OUTPATIENT
Start: 2021-08-12 | End: 2021-09-09 | Stop reason: ALTCHOICE

## 2021-08-12 NOTE — PSYCH
MEDICATION MANAGEMENT NOTE        [address] [place] [address]    Name and Date of Birth:  Niya Thorpe 39 y o  1980 MRN: [id number]    Date of Visit: August 12, 2021    Allergies   Allergen Reactions    Amoxicillin Swelling, Anaphylaxis and Angioedema    Neurontin [Gabapentin] Other (See Comments)     Other reaction(s): SUICIDE IDEATION  Other reaction(s): Other (See Comments)  suicidal  suicidal    Penicillin V Angioedema and Swelling     Other reaction(s): Throat closure    Penicillins Swelling and Anaphylaxis    Aripiprazole Other (See Comments)     Other reaction(s): low dose 2 mg ; curving in and locking in of hands/dystonia  Other reaction(s): Other (See Comments)  Other reaction(s): low dose 2 mg ; curving in and locking in of hands/dystonia    Lorazepam Other (See Comments)     Other reaction(s): higher dose > anxiety and depression  Other reaction(s): Other (See Comments)  Other reaction(s): higher dose > anxiety and depression    Carbamazepine Other (See Comments)     Other reaction(s): Unknown Reaction    Doxycycline Hives    Lamotrigine Rash    Other Hives     Adhesive tape     SUBJECTIVE:    Jannette is seen today for a follow up for depression and anxiety  She continues to experience ongoing symptoms since the last visit 6/24/2021  She reports that she has not been "doing so good"  She reports increased anxiety and multiple stressors  She reports that she felt the increase in Buspar actually made her anxiety worse and stopped the medication  She reports that since stopping the Buspar she actually feels "slightly better"  She reports sleep is "ok" and takes Melatonin 10 mg po at HS  She reports many stressors that include her Godfather recently being placed on hospice  She also recently put her dog down 2 weeks ago due to failing health  She also recently had a stress test after experiencing chest pain   She reports her blood pressure has been been high and her PCP increased her blood pressure medication  Jannette also spoke about the passing of two of her uncles this year  Jannette also had C diff 6 months ago and shoulder surgery  She reports now that regained mobility of her shoulder she started to paint and do crafts  She reports that distraction assists her with her depression and anxiety  Jannette also shared that she is seeing a weight loss physician through [facility] and is hoping to be placed on a medication to aid her in weight loss (Ozempic)  She stated her goal before her next appointment with weight loss physician was to lose 6 pounds but instead she gained 2 pounds  Jannette denies any panic attacks  She denies any suicidal or homicidal ideation  She denies any drug or alcohol use  Blood pressure at today's visit  136/92 and pulse 80  We discussed the partial program and Jannette was in agreement to this suggestion  She stated that she was not able to start the program until 8/23/2021 because she has a few upcoming trips planned  She reports depression 7/10 and anxiety 6-7/10 on scale with 10 being worse symptoms  We discussed trying Hydroxyzine prn for anxiety symptoms and she was in agreement to this suggestion  She denies any side effects from medications  PLAN:  Did complete referral for partial hospitalization program   Follow up in 4 weeks or sooner if needed  Discontinue Buspar  Continue Effexor 225 mg po daily  Will add Hydroxyzine 25 mg po every 8 hours as needed for anxiety  Will continue Amitriptyline 100 mg po HS (patient states that this medication is effective for fibromyalgia)   Aware of 24 hour and weekend coverage for urgent situations accessed by calling [facility] main practice number  Continue psychotherapy with therapist    Diagnoses and all orders for this visit:    Moderate recurrent major depression (HCC)    Generalized anxiety disorder  -     hydrOXYzine HCL (ATARAX) 25 mg tablet;  Take 1 tablet (25 mg total) by mouth every 8 (eight) hours as needed for anxiety    Depression, recurrent (HCC)        Current Outpatient Medications on File Prior to Visit   Medication Sig Dispense Refill    albuterol (PROVENTIL HFA,VENTOLIN HFA) 90 mcg/act inhaler Inhale 2 puffs every 6 (six) hours as needed for shortness of breath (cough) 1 Inhaler 0    amitriptyline (ELAVIL) 100 mg tablet Take 1 tablet (100 mg total) by mouth daily at bedtime 30 tablet 1    Ascorbic Acid (VITAMIN C) 500 MG CAPS Take 1 capsule by mouth daily      aspirin 81 mg chewable tablet Chew 1 tablet (81 mg total) daily 30 tablet 30    atorvastatin (LIPITOR) 40 mg tablet Take 1 tablet (40 mg total) by mouth daily 90 tablet 1    cetirizine (ZyrTEC) 10 mg tablet Take 10 mg by mouth daily      Cholecalciferol (VITAMIN D3) 5000 units CAPS Take 1 capsule by mouth daily        ciprofloxacin-dexamethasone (CIPRODEX) otic suspension Administer 4 drops to the right ear 2 (two) times a day 7 5 mL 0    clindamycin (CLEOCIN) 300 MG capsule Take 1 capsule (300 mg total) by mouth 4 (four) times a day for 7 days 28 capsule 0    cyanocobalamin (VITAMIN B-12) 1,000 mcg tablet Take 1,000 mcg by mouth daily       dicyclomine (BENTYL) 10 mg capsule Take 1 capsule (10 mg total) by mouth 3 (three) times a day as needed (diarrhea) 60 capsule 0    esomeprazole (NexIUM 24HR) 20 mg capsule Take 1 capsule (20 mg total) by mouth every morning 90 capsule 3    metoprolol succinate (TOPROL-XL) 100 mg 24 hr tablet Take 1 tablet (100 mg total) by mouth daily 90 tablet 3    Multiple Vitamins-Minerals (CENTRUM ADULTS PO) Take 1 tablet by mouth daily      nystatin (MYCOSTATIN) cream Apply topically 2 (two) times a day as needed (rash) 30 g 0    venlafaxine (EFFEXOR-XR) 150 mg 24 hr capsule Take 1 capsule (150 mg total) by mouth daily 90 capsule 0    venlafaxine (EFFEXOR-XR) 75 mg 24 hr capsule Take 1 capsule (75 mg total) by mouth daily 90 capsule 0    [DISCONTINUED] ALPRAZolam Kehinde Franco) 0 25 mg tablet Take 1 tablet (0 25 mg total) by mouth 2 (two) times a day as needed for anxiety (Patient not taking: Reported on 7/22/2021) 4 tablet 0     No current facility-administered medications on file prior to visit  Psychotherapy Provided:     Individual psychotherapy provided: Supportive counseling provided  HPI ROS Appetite Changes and Sleep:     She reports normal sleep, increased appetite, normal energy level   Patient denies suicidal or homicidal ideation    Review Of Systems:      HPI ROS:               Medication Side Effects:  denies     Depression (10 worst): 7/10    Anxiety (10 worst): 6-7/10    Safety concerns (SI, HI, etc): denies    Sleep: "ok"    Energy: "ok"    Appetite: increased    Weight Change: Gained 2 pounds       General emotional problems and increased appetite   Personality no change in personality   Constitutional negative   ENT as noted in HPI   Cardiovascular as noted in HPI   Respiratory negative   Gastrointestinal as noted in HPI   Genitourinary negative   Musculoskeletal as noted in HPI   Integumentary negative   Neurological negative   Endocrine negative   Other Symptoms none, all other systems are negative     Mental Status Evaluation:    Appearance Adequate hygiene and grooming and Good eye contact   Behavior cooperative   Mood anxious  Depression Scale - 7 of 10 (0 = No depression)  Anxiety Scale - 6-7 of 10 (0 = No anxiety)   Speech Normal rate and volume   Affect mood-congruent   Thought Processes Goal directed and coherent   Thought Content Does not verbalize delusional material   Associations Tightly connected   Perceptual Disturbances Denies hallucinations and does not appear to be responding to internal stimuli   Risk Potential Suicidal/Homicidal Ideation - No evidence of suicidal or homicidal ideation and patient does not verbalize suicidal or homicidal ideation  Risk of Violence - No evidence of risk for violence found on assessment  Risk of Self Mutilation - No evidence of risk for self mutilation found on assessment   Orientation oriented to person, place, time/date and situation   Memory recent and remote memory grossly intact   Consciousness alert and awake   Attention/Concentration attention span and concentration are age appropriate   Insight intact   Judgement intact   Muscle Strength and Gait normal muscle strength and normal muscle tone, normal gait/station and normal balance   Motor Activity no abnormal movements   Language no difficulty naming common objects, no difficulty repeating a phrase, no difficulty writing a sentence   Fund of Knowledge adequate knowledge of current events  adequate fund of knowledge regarding past history  adequate fund of knowledge regarding vocabulary      Past Psychiatric History Update:     Inpatient Psychiatric Admission Since Last Encounter:   no  Changes to Outpatient Psychiatric Treatment Team:    no  Suicide Attempt Or Self Mutilation Since Last Encounter:   no  Incidence of Violent Behavior Since Last Encounter:   no    Traumatic History Update:     New Onset of Abuse Since Last Encounter:   no  Traumatic Events Since Last Encounter:   no    Past Medical History:    Past Medical History:   Diagnosis Date    Allergic rhinitis     Anxiety     Arthritis     Chronic pain disorder     BACK SHOULDERS NECK    Colon polyp     Depression     Disease of thyroid gland     nodule    Fibromyalgia, primary     GERD (gastroesophageal reflux disease)     Hearing difficulty of right ear     Hyperlipidemia     Hypertension     Irritable bowel syndrome     Perforation of tympanic membrane     Right    PONV (postoperative nausea and vomiting)     RBBB     Right bundle branch blockage     Sleep apnea     no cpap    Wears glasses      No past medical history pertinent negatives    Past Surgical History:   Procedure Laterality Date    ANAL FISTULOTOMY N/A 11/2/2018    Procedure: FISTULOTOMY;  Surgeon: Tripp Alexandre Susana Sharma MD;  [facility];  Service: Colorectal    BACK SURGERY      lumbar herniated disc    BREAST CYST EXCISION Left 13 yrs ago  benign    BREAST SURGERY Left 2006    cyst removal    CARPAL TUNNEL RELEASE       SECTION      CHOLECYSTECTOMY      COLONOSCOPY      CYST REMOVAL      DENTAL SURGERY      INCISION AND DRAINAGE OF WOUND N/A 2018    Procedure: INCISION AND DRAINAGE (I&D) BUTTOCK;  Surgeon: Carly Van MD;  [facility];  Service: Colorectal    MYRINGOTOMY W/ TUBES Right 2015    [place]    MS COLONOSCOPY FLX DX W/COLLJ SPEC WHEN PFRMD N/A 2017    Procedure: COLONOSCOPY;  Surgeon: Tin Otoole MD;  [facility]; Service: Gastroenterology    MS INCISE FINGER TENDON SHEATH Right 3/13/2018    Procedure: LONG TRIGGER FINGER RELEASE;  Surgeon: Claudette Fraser DO;  [facility];  Service: Orthopedics    MS SURG DIAGNOSTIC EXAM, ANORECTAL N/A 2018    Procedure: EXAM UNDER ANESTHESIA (EUA); Surgeon: Carly Van MD;  [facility];  Service: Colorectal    MS TYMPANOPLASTY Right 2017    Procedure: TYMPANOPLASTY WITH PERICHONDRIN GRAFT;  Surgeon: Estelita Clark DO;  [facility];  Service: ENT    SHOULDER SURGERY Right     ULNAR NERVE TRANSPOSITION Left     WISDOM TOOTH EXTRACTION       Allergies   Allergen Reactions    Amoxicillin Swelling, Anaphylaxis and Angioedema    Neurontin [Gabapentin] Other (See Comments)     Other reaction(s): SUICIDE IDEATION  Other reaction(s): Other (See Comments)  suicidal  suicidal    Penicillin V Angioedema and Swelling     Other reaction(s): Throat closure    Penicillins Swelling and Anaphylaxis    Aripiprazole Other (See Comments)     Other reaction(s): low dose 2 mg ; curving in and locking in of hands/dystonia  Other reaction(s):  Other (See Comments)  Other reaction(s): low dose 2 mg ; curving in and locking in of hands/dystonia    Lorazepam Other (See Comments)     Other reaction(s): higher dose > anxiety and depression  Other reaction(s): Other (See Comments)  Other reaction(s): higher dose > anxiety and depression    Carbamazepine Other (See Comments)     Other reaction(s): Unknown Reaction    Doxycycline Hives    Lamotrigine Rash    Other Hives     Adhesive tape     Substance Abuse History:    Social History     Substance and Sexual Activity   Alcohol Use Not Currently    Comment: social     Social History     Substance and Sexual Activity   Drug Use No     Social History:    Social History     Socioeconomic History    Marital status: /Civil Union     Spouse name: Not on file    Number of children: Not on file    Years of education: Not on file    Highest education level: Not on file   Occupational History    Not on file   Tobacco Use    Smoking status: Never Smoker    Smokeless tobacco: Never Used   Vaping Use    Vaping Use: Never used   Substance and Sexual Activity    Alcohol use: Not Currently     Comment: social    Drug use: No    Sexual activity: Yes     Partners: Male     Birth control/protection: Injection     Comment: per allscripts   Other Topics Concern    Not on file   Social History Narrative    Daily caffeine use- 1 cup of coffee     Social Determinants of Health     Financial Resource Strain:     Difficulty of Paying Living Expenses:    Food Insecurity:     Worried About Running Out of Food in the Last Year:     Ran Out of Food in the Last Year:    Transportation Needs: No Transportation Needs    Lack of Transportation (Medical): No    Lack of Transportation (Non-Medical):  No   Physical Activity: Unknown    Days of Exercise per Week: 2 days    Minutes of Exercise per Session: Not asked   Stress:     Feeling of Stress :    Social Connections:     Frequency of Communication with Friends and Family:     Frequency of Social Gatherings with Friends and Family:     Attends Voodoo Services:     Active Member of Clubs or Organizations:     Attends Club or Organization Meetings:     Marital Status:    Intimate Partner Violence:     Fear of Current or Ex-Partner:     Emotionally Abused:     Physically Abused:     Sexually Abused:      Family Psychiatric History:     Family History   Problem Relation Age of Onset    Hypertension Mother     Hyperlipidemia Mother     Diabetes Mother     Hypertension Father     Hyperlipidemia Father     Heart disease Father         cardiac disorder-myocardial infarction arrhythmias    Heart attack Father     Diabetes unspecified Maternal Grandmother     Crohn's disease Brother     Arthritis Brother     Thyroid disease Paternal Grandmother     No Known Problems Daughter     No Known Problems Maternal Aunt     No Known Problems Maternal Aunt     No Known Problems Maternal Aunt     No Known Problems Maternal Aunt     No Known Problems Paternal Aunt     No Known Problems Paternal Aunt     Heart attack Paternal Uncle      History Review: The following portions of the patient's history were reviewed and updated as appropriate: allergies, current medications, past family history, past medical history, past social history, past surgical history and problem list     OBJECTIVE:     Vital signs in last 24 hours: There were no vitals filed for this visit  Laboratory Results: I have personally reviewed all pertinent laboratory/tests results      Suicide/Homicide Risk Assessment:    Risk of Harm to Self:  Based on today's assessment, Jannette presents the following risk of harm to self: low    Risk of Harm to Others:  Based on today's assessment, Jannette presents the following risk of harm to others: none    The following interventions are recommended: refrred to Partial Program for intensive psychiatric monitoring    Medications Risks/Benefits:      Risks, Benefits And Possible Side Effects Of Medications:    Discussed risks and benefits of treatment with patient including risk of suicidality, serotonin syndrome and SIADH related to treatment with antidepressants; Risk of induction of manic symptoms in certain patient populations     Controlled Medication Discussion:     Not applicable - controlled prescriptions are not prescribed by this practice    Treatment Plan:    Due for update/Updated:   no Kristan Dance, [address] 08/12/21    This note was shared with patient

## 2021-08-12 NOTE — TELEPHONE ENCOUNTER
[facility] Intake Assessment     Presenting Stressors: multiple stressors including multiple deaths in the family over the last few months, recently put her dog down, Godfather put on hospice and multiple medical issues  Referral Source: Current CRNP, [facility]  she is employed at no  Access to weapons? No  Is she a smoker?  no    Symptoms: increased anxiety        Current Outpatient Medications:     albuterol (PROVENTIL HFA,VENTOLIN HFA) 90 mcg/act inhaler, Inhale 2 puffs every 6 (six) hours as needed for shortness of breath (cough), Disp: 1 Inhaler, Rfl: 0    ALPRAZolam (XANAX) 0 25 mg tablet, Take 1 tablet (0 25 mg total) by mouth 2 (two) times a day as needed for anxiety (Patient not taking: Reported on 7/22/2021), Disp: 4 tablet, Rfl: 0    amitriptyline (ELAVIL) 100 mg tablet, Take 1 tablet (100 mg total) by mouth daily at bedtime, Disp: 30 tablet, Rfl: 1    Ascorbic Acid (VITAMIN C) 500 MG CAPS, Take 1 capsule by mouth daily, Disp: , Rfl:     aspirin 81 mg chewable tablet, Chew 1 tablet (81 mg total) daily, Disp: 30 tablet, Rfl: 30    atorvastatin (LIPITOR) 40 mg tablet, Take 1 tablet (40 mg total) by mouth daily, Disp: 90 tablet, Rfl: 1    cetirizine (ZyrTEC) 10 mg tablet, Take 10 mg by mouth daily, Disp: , Rfl:     Cholecalciferol (VITAMIN D3) 5000 units CAPS, Take 1 capsule by mouth daily  , Disp: , Rfl:     ciprofloxacin-dexamethasone (CIPRODEX) otic suspension, Administer 4 drops to the right ear 2 (two) times a day, Disp: 7 5 mL, Rfl: 0    clindamycin (CLEOCIN) 300 MG capsule, Take 1 capsule (300 mg total) by mouth 4 (four) times a day for 7 days, Disp: 28 capsule, Rfl: 0    cyanocobalamin (VITAMIN B-12) 1,000 mcg tablet, Take 1,000 mcg by mouth daily , Disp: , Rfl:     dicyclomine (BENTYL) 10 mg capsule, Take 1 capsule (10 mg total) by mouth 3 (three) times a day as needed (diarrhea), Disp: 60 capsule, Rfl: 0    esomeprazole (NexIUM 24HR) 20 mg capsule, Take 1 capsule (20 mg total) by mouth every morning, Disp: 90 capsule, Rfl: 3    metoprolol succinate (TOPROL-XL) 100 mg 24 hr tablet, Take 1 tablet (100 mg total) by mouth daily, Disp: 90 tablet, Rfl: 3    Multiple Vitamins-Minerals (CENTRUM ADULTS PO), Take 1 tablet by mouth daily, Disp: , Rfl:     nystatin (MYCOSTATIN) cream, Apply topically 2 (two) times a day as needed (rash), Disp: 30 g, Rfl: 0    venlafaxine (EFFEXOR-XR) 150 mg 24 hr capsule, Take 1 capsule (150 mg total) by mouth daily, Disp: 90 capsule, Rfl: 0    venlafaxine (EFFEXOR-XR) 75 mg 24 hr capsule, Take 1 capsule (75 mg total) by mouth daily, Disp: 90 capsule, Rfl: 0    Medications: Effexor  mg po daily  Hydroxyzine 25 mg po TID as needed for anxeity    Allergies   Allergen Reactions    Amoxicillin Swelling, Anaphylaxis and Angioedema    Neurontin [Gabapentin] Other (See Comments)     Other reaction(s): SUICIDE IDEATION  Other reaction(s): Other (See Comments)  suicidal  suicidal    Penicillin V Angioedema and Swelling     Other reaction(s): Throat closure    Penicillins Swelling and Anaphylaxis    Aripiprazole Other (See Comments)     Other reaction(s): low dose 2 mg ; curving in and locking in of hands/dystonia  Other reaction(s): Other (See Comments)  Other reaction(s): low dose 2 mg ; curving in and locking in of hands/dystonia    Lorazepam Other (See Comments)     Other reaction(s): higher dose > anxiety and depression  Other reaction(s): Other (See Comments)  Other reaction(s): higher dose > anxiety and depression    Carbamazepine Other (See Comments)     Other reaction(s): Unknown Reaction    Doxycycline Hives    Lamotrigine Rash    Other Hives     Adhesive tape       Allergies: see clinical record    Provisional Diagnosis:   Axis I:MDD and MILADYS   Axis II:     Substance Abuse:No concerns of substance abuse are reported      Psychiatric Treatment History:     Current psychiatrist: Linda ANSARI  Therapist: Devora Dia Supports:    The patient requires ambulatory assistance:none    Legal Issues: none    Action:     ACCEPTED Appointment Date:     DENIED Reason:

## 2021-08-24 DIAGNOSIS — F41.1 GENERALIZED ANXIETY DISORDER: ICD-10-CM

## 2021-08-24 DIAGNOSIS — F41.8 DEPRESSION WITH ANXIETY: ICD-10-CM

## 2021-08-24 DIAGNOSIS — E78.5 HYPERLIPIDEMIA, UNSPECIFIED HYPERLIPIDEMIA TYPE: ICD-10-CM

## 2021-08-24 RX ORDER — ATORVASTATIN CALCIUM 40 MG/1
40 TABLET, FILM COATED ORAL DAILY
Qty: 90 TABLET | Refills: 1 | Status: SHIPPED | OUTPATIENT
Start: 2021-08-24 | End: 2021-11-17 | Stop reason: SDUPTHER

## 2021-08-24 RX ORDER — VENLAFAXINE HYDROCHLORIDE 75 MG/1
75 CAPSULE, EXTENDED RELEASE ORAL DAILY
Qty: 90 CAPSULE | Refills: 0
Start: 2021-08-24 | End: 2021-09-01 | Stop reason: SDUPTHER

## 2021-08-24 NOTE — TELEPHONE ENCOUNTER
Requested medication(s) are due for refill today: Yes  Patient has already received a courtesy refill: No  Other reason request has been forwarded to provider:  Protocol failed due to no completed lipid panel

## 2021-09-01 ENCOUNTER — SOCIAL WORK (OUTPATIENT)
Dept: BEHAVIORAL/MENTAL HEALTH CLINIC | Facility: CLINIC | Age: 41
End: 2021-09-01
Payer: COMMERCIAL

## 2021-09-01 DIAGNOSIS — F33.1 MODERATE RECURRENT MAJOR DEPRESSION (HCC): Primary | ICD-10-CM

## 2021-09-01 DIAGNOSIS — M79.7 FIBROMYALGIA: ICD-10-CM

## 2021-09-01 DIAGNOSIS — F41.8 DEPRESSION WITH ANXIETY: ICD-10-CM

## 2021-09-01 DIAGNOSIS — F41.1 GENERALIZED ANXIETY DISORDER: ICD-10-CM

## 2021-09-01 PROCEDURE — 90834 PSYTX W PT 45 MINUTES: CPT | Performed by: SOCIAL WORKER

## 2021-09-01 RX ORDER — AMITRIPTYLINE HYDROCHLORIDE 100 MG/1
100 TABLET, FILM COATED ORAL
Qty: 30 TABLET | Refills: 1 | OUTPATIENT
Start: 2021-09-01

## 2021-09-01 RX ORDER — VENLAFAXINE HYDROCHLORIDE 75 MG/1
75 CAPSULE, EXTENDED RELEASE ORAL DAILY
Qty: 90 CAPSULE | Refills: 0
Start: 2021-09-01 | End: 2021-09-09 | Stop reason: SDUPTHER

## 2021-09-01 NOTE — BH TREATMENT PLAN
Treatment Plan not completed within required time limits due to: Jannette presented with emotional concerns requiring clinical intervention  Treatment plan will be completed at next session

## 2021-09-01 NOTE — PSYCH
This note was not shared with the patient due to this is a psychotherapy note      Psychotherapy Provided: Individual Psychotherapy 50 minutes     Length of time in session: 50 minutes, follow up in 1 week    Goals addressed in session: Goal 1     Pain:      mild    3    Current suicide risk : Low     Met with Jannette Bhatia reports struggling with an increase in anxiety and depression due to multiple stressors including several deaths in the family over the last few months; most recently was at her Godfather's  this past weekend, recently putting her dog down, multiple medical issues, friends with COVID-19, and being a [id number] and following [id number] restrictions/policies  Session focused on Jannette processing her emotions, validating that she is coping with many stressors, and focusing on what is in her control to reduce anxiety and depressive symptoms  Jannette processed her thoughts and feelings about triggers to her depression and anxiety symptoms  She processed her emotions regarding situations out of her control; specifically several deaths in her family/dog and COVID-19 stating, "people keep saying next year will be different, but we said that last year "  Validation and support was provided  She specifically focused on how [facility] restrictions/policies have caused conflict for the [employer]  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  She processed her emotions about this  We discussed meeting her own emotional needs vs focusing on meeting the needs of others by identifying specific examples in which she is trying to meet the emotional needs of others to serve the behavioral purpose of validation  We discussed creating firmer limits and using assertiveness skills with the parents to express her needs effectively  Jannette was introduced to DBT tool, [facility]  We practiced the skill in session using the Girl  incident         Jose Olmstead will continue to implement wellness tools learned in session; with focus on limit setting and assertiveness skills  We discussed the PHP referral made by her OP psychiatric provider on 8/2/21  She states she did not receive a call to begin program and is unable to complete at this time due to medical appts and upcoming surgery  Weekly OP psychotherapy will be provided based on availability due to emotional state  Jannette will also attend Anxiety psychotherapy group biweekly  Mental status:  Appearance adequate hygiene and grooming and good eye contact    Mood depressed and anxious   Affect Mood congruent   Speech a normal rate and volume   Thought Processes coherent/organized and normal thought processes   Hallucinations no hallucinations present    Thought Content no delusions   Abnormal Thoughts no suicidal thoughts  and no homicidal thoughts    Orientation  oriented to person and place and time   Remote Memory short term memory intact and long term memory intact   Attention Span concentration intact   Intellect Appears to be of Average Intelligence   Fund of Knowledge displays adequate knowledge of current events   Insight fair   Judgement fair   Muscle Strength Muscle strength and tone were normal and Normal gait    Language no difficulty naming common objects   Pain mild   Pain Scale 3           Behavioral Health Treatment Plan [facility]: Diagnosis and Treatment Plan explained to Vedia Alpers relates understanding diagnosis and is agreeable to Treatment Plan   Yes

## 2021-09-01 NOTE — TELEPHONE ENCOUNTER
This is a Joanie patient  The scripts  that were sent to [facility] did not go to the pharmacy  Can you help with this?

## 2021-09-08 ENCOUNTER — SOCIAL WORK (OUTPATIENT)
Dept: BEHAVIORAL/MENTAL HEALTH CLINIC | Facility: CLINIC | Age: 41
End: 2021-09-08
Payer: COMMERCIAL

## 2021-09-08 DIAGNOSIS — F41.1 GENERALIZED ANXIETY DISORDER: ICD-10-CM

## 2021-09-08 DIAGNOSIS — F33.1 MODERATE RECURRENT MAJOR DEPRESSION (HCC): Primary | ICD-10-CM

## 2021-09-08 PROCEDURE — 90834 PSYTX W PT 45 MINUTES: CPT | Performed by: SOCIAL WORKER
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
## 2021-09-09 ENCOUNTER — OFFICE VISIT (OUTPATIENT)
Dept: PSYCHIATRY | Facility: CLINIC | Age: 41
End: 2021-09-09
Payer: COMMERCIAL

## 2021-09-09 DIAGNOSIS — F33.9 DEPRESSION, RECURRENT (HCC): ICD-10-CM

## 2021-09-09 DIAGNOSIS — F33.1 MODERATE RECURRENT MAJOR DEPRESSION (HCC): Primary | ICD-10-CM

## 2021-09-09 DIAGNOSIS — M79.7 FIBROMYALGIA: ICD-10-CM

## 2021-09-09 DIAGNOSIS — F41.1 GENERALIZED ANXIETY DISORDER: ICD-10-CM

## 2021-09-09 DIAGNOSIS — F41.8 DEPRESSION WITH ANXIETY: ICD-10-CM

## 2021-09-09 PROCEDURE — 99214 OFFICE O/P EST MOD 30 MIN: CPT | Performed by: REGISTERED NURSE

## 2021-09-09 RX ORDER — HYDROXYZINE HYDROCHLORIDE 10 MG/1
10 TABLET, FILM COATED ORAL 2 TIMES DAILY PRN
Qty: 60 TABLET | Refills: 0 | Status: SHIPPED | OUTPATIENT
Start: 2021-09-09 | End: 2021-10-07 | Stop reason: SDUPTHER

## 2021-09-09 RX ORDER — VENLAFAXINE HYDROCHLORIDE 75 MG/1
75 CAPSULE, EXTENDED RELEASE ORAL DAILY
Qty: 90 CAPSULE | Refills: 0 | Status: SHIPPED | OUTPATIENT
Start: 2021-09-09 | End: 2021-09-09

## 2021-09-09 RX ORDER — HYDROXYZINE HYDROCHLORIDE 10 MG/1
10 TABLET, FILM COATED ORAL 2 TIMES DAILY PRN
Qty: 60 TABLET | Refills: 0 | Status: SHIPPED | OUTPATIENT
Start: 2021-09-09 | End: 2021-09-09 | Stop reason: CLARIF

## 2021-09-09 RX ORDER — AMITRIPTYLINE HYDROCHLORIDE 25 MG/1
TABLET, FILM COATED ORAL
Qty: 42 TABLET | Refills: 0 | Status: SHIPPED | OUTPATIENT
Start: 2021-09-09 | End: 2021-10-11 | Stop reason: ALTCHOICE

## 2021-09-09 RX ORDER — AMITRIPTYLINE HYDROCHLORIDE 100 MG/1
100 TABLET, FILM COATED ORAL
Qty: 90 TABLET | Refills: 0 | Status: SHIPPED | OUTPATIENT
Start: 2021-09-09 | End: 2021-09-09 | Stop reason: DRUGHIGH

## 2021-09-09 RX ORDER — VENLAFAXINE HYDROCHLORIDE 75 MG/1
75 CAPSULE, EXTENDED RELEASE ORAL DAILY
Qty: 30 CAPSULE | Refills: 0 | Status: SHIPPED | OUTPATIENT
Start: 2021-09-09 | End: 2021-09-09 | Stop reason: SDUPTHER

## 2021-09-09 RX ORDER — VENLAFAXINE HYDROCHLORIDE 75 MG/1
75 CAPSULE, EXTENDED RELEASE ORAL DAILY
Qty: 90 CAPSULE | Refills: 0 | Status: SHIPPED | OUTPATIENT
Start: 2021-09-09 | End: 2021-09-17 | Stop reason: SDUPTHER

## 2021-09-09 RX ORDER — VENLAFAXINE HYDROCHLORIDE 75 MG/1
75 CAPSULE, EXTENDED RELEASE ORAL DAILY
Qty: 30 CAPSULE | Refills: 0 | Status: SHIPPED | OUTPATIENT
Start: 2021-09-09 | End: 2021-11-11 | Stop reason: SDUPTHER

## 2021-09-09 RX ORDER — VENLAFAXINE HYDROCHLORIDE 150 MG/1
150 CAPSULE, EXTENDED RELEASE ORAL DAILY
Qty: 90 CAPSULE | Refills: 0 | Status: SHIPPED | OUTPATIENT
Start: 2021-09-09 | End: 2021-11-11 | Stop reason: SDUPTHER

## 2021-09-09 RX ORDER — QUETIAPINE FUMARATE 25 MG/1
25 TABLET, FILM COATED ORAL
Qty: 30 TABLET | Refills: 0 | Status: SHIPPED | OUTPATIENT
Start: 2021-09-09 | End: 2021-10-07 | Stop reason: ALTCHOICE

## 2021-09-09 NOTE — PSYCH
MEDICATION MANAGEMENT NOTE        [address] [place] [address]    Name and Date of Birth:  Yenifer Morgan 39 y o  1980 MRN: [id number]    Date of Visit: September 9, 2021    Allergies   Allergen Reactions    Amoxicillin Swelling, Anaphylaxis and Angioedema    Neurontin [Gabapentin] Other (See Comments)     Other reaction(s): SUICIDE IDEATION  Other reaction(s): Other (See Comments)  suicidal  suicidal    Penicillin V Angioedema and Swelling     Other reaction(s): Throat closure    Penicillins Swelling and Anaphylaxis    Aripiprazole Other (See Comments)     Other reaction(s): low dose 2 mg ; curving in and locking in of hands/dystonia  Other reaction(s): Other (See Comments)  Other reaction(s): low dose 2 mg ; curving in and locking in of hands/dystonia    Lorazepam Other (See Comments)     Other reaction(s): higher dose > anxiety and depression  Other reaction(s): Other (See Comments)  Other reaction(s): higher dose > anxiety and depression    Carbamazepine Other (See Comments)     Other reaction(s): Unknown Reaction    Doxycycline Hives    Lamotrigine Rash    Other Hives     Adhesive tape     SUBJECTIVE:    Jannette is seen today for a follow up for depression and anxiety  She continues to experience significant symptoms since the last visit on 8/12/2021  Provider did send referral in August for partial program, but may have not sent an email to the program  As a result, Jannette did not receive a call to start the partial hospitalization program  Dariana Jefferson is now set up weekly with her therapist, Humaira Hope LCSW  Jannette has multiple obligations and would not be able to start the partial program until October 11th due to 2 surgical procedures she has scheduled  Jannette has multiple stressors including her medical issues (upcoming trigger finger surgery and an gyn ablation)   She also reports that her daughter was having tachycardia and is now having to see a cardiologist at [facility]  She reports that she continues to struggle with overeating and recently saw a dietician  Jannette reports that she does not want to be on any "weight gaining medications if possible"  She reports a great deal of anxiety related to her weight  She also reports poor sleep with frequent awakenings and weird dreams  She does have sleep apnea, but is unable to utilize CPAP per her report  Jannette describes her mood as irritable, anxious  and "blah"  She states that she constantly feels jittery  She reports that increase in Amitriptyline never seemed to help and that she does not believe she has fibromyalgia  She was willing to taper off this medication as it is a weight gaining medication and she is reporting that she feels more irritable as of recently  We discussed decreasing Hydroxyzine to 10 mg po BID prn since the Hydroxyzine 25 mg was too sedating  We did discuss attempting to change Effexor XR in the future  Jannette reports low energy levels and feeling fatigues  She denies any suicidal or homicidal ideation  She denies any psychosis  She denies any side effects from medications      PLAN:  Follow up in 3 weeks or sooner if needed  Will continue Effexor  mg po daily (provider had to send 30 day supply of Effexor XR 75 mg to cover patient until 90 day mail order would come)  Will taper off Amitriptyline 75 mg po HS for one week, 50 mg for one week, 25 mg for one week and then stop  Will add Seroquel 25 mg po HS  Will decrease Hydroxyzine to 10 mg po BID as needed for anxiety  Lipid and CMP ordered by PCP in clinical record to obtain, Frank Cueva reported she will get these labs drawn  Jannette will continue therapy in office, she and her therapist will reassess the need for partial program   Utilize crisis as needed    Aware of 24 hour and weekend coverage for urgent situations accessed by calling [facility] main practice number  Continue psychotherapy with therapist    Diagnoses and all orders for this visit:    Moderate recurrent major depression (HCC)  -     QUEtiapine (SEROquel) 25 mg tablet; Take 1 tablet (25 mg total) by mouth daily at bedtime    Generalized anxiety disorder  -     venlafaxine (EFFEXOR-XR) 150 mg 24 hr capsule; Take 1 capsule (150 mg total) by mouth daily  -     Discontinue: venlafaxine (EFFEXOR-XR) 75 mg 24 hr capsule; Take 1 capsule (75 mg total) by mouth daily  -     Discontinue: venlafaxine (EFFEXOR-XR) 75 mg 24 hr capsule; Take 1 capsule (75 mg total) by mouth daily  -     Discontinue: hydrOXYzine HCL (ATARAX) 10 mg tablet; Take 1 tablet (10 mg total) by mouth 2 (two) times a day as needed for anxiety  -     venlafaxine (EFFEXOR-XR) 75 mg 24 hr capsule; Take 1 capsule (75 mg total) by mouth daily  -     venlafaxine (EFFEXOR-XR) 75 mg 24 hr capsule; Take 1 capsule (75 mg total) by mouth daily  -     hydrOXYzine HCL (ATARAX) 10 mg tablet; Take 1 tablet (10 mg total) by mouth 2 (two) times a day as needed for anxiety  -     amitriptyline (ELAVIL) 25 mg tablet; Take 3 tablets po HS for one week (75 mg),  then take 2 tablets po HS for one week (50 mg) and then take one tablet HS for one week (25 mg) and then stop medication  Depression with anxiety  -     venlafaxine (EFFEXOR-XR) 150 mg 24 hr capsule; Take 1 capsule (150 mg total) by mouth daily  -     Discontinue: venlafaxine (EFFEXOR-XR) 75 mg 24 hr capsule; Take 1 capsule (75 mg total) by mouth daily  -     Discontinue: venlafaxine (EFFEXOR-XR) 75 mg 24 hr capsule; Take 1 capsule (75 mg total) by mouth daily  -     amitriptyline (ELAVIL) 25 mg tablet; Take 3 tablets po HS for one week (75 mg),  then take 2 tablets po HS for one week (50 mg) and then take one tablet HS for one week (25 mg) and then stop medication  Depression, recurrent (HCC)    Fibromyalgia  -     Discontinue: amitriptyline (ELAVIL) 100 mg tablet;  Take 1 tablet (100 mg total) by mouth daily at bedtime        Current Outpatient Medications on File Prior to Visit   Medication Sig Dispense Refill    albuterol (PROVENTIL HFA,VENTOLIN HFA) 90 mcg/act inhaler Inhale 2 puffs every 6 (six) hours as needed for shortness of breath (cough) 1 Inhaler 0    Ascorbic Acid (VITAMIN C) 500 MG CAPS Take 1 capsule by mouth daily      aspirin 81 mg chewable tablet Chew 1 tablet (81 mg total) daily 30 tablet 30    atorvastatin (LIPITOR) 40 mg tablet Take 1 tablet (40 mg total) by mouth daily 90 tablet 1    cetirizine (ZyrTEC) 10 mg tablet Take 10 mg by mouth daily      Cholecalciferol (VITAMIN D3) 5000 units CAPS Take 1 capsule by mouth daily        ciprofloxacin-dexamethasone (CIPRODEX) otic suspension Administer 4 drops to the right ear 2 (two) times a day 7 5 mL 0    cyanocobalamin (VITAMIN B-12) 1,000 mcg tablet Take 1,000 mcg by mouth daily       dicyclomine (BENTYL) 10 mg capsule Take 1 capsule (10 mg total) by mouth 3 (three) times a day as needed (diarrhea) 60 capsule 0    esomeprazole (NexIUM 24HR) 20 mg capsule Take 1 capsule (20 mg total) by mouth every morning 90 capsule 3    metoprolol succinate (TOPROL-XL) 100 mg 24 hr tablet Take 1 tablet (100 mg total) by mouth daily 90 tablet 3    Multiple Vitamins-Minerals (CENTRUM ADULTS PO) Take 1 tablet by mouth daily      nystatin (MYCOSTATIN) cream Apply topically 2 (two) times a day as needed (rash) 30 g 0    [DISCONTINUED] amitriptyline (ELAVIL) 100 mg tablet Take 1 tablet (100 mg total) by mouth daily at bedtime 30 tablet 1    [DISCONTINUED] hydrOXYzine HCL (ATARAX) 25 mg tablet Take 1 tablet (25 mg total) by mouth every 8 (eight) hours as needed for anxiety 30 tablet 0    [DISCONTINUED] venlafaxine (EFFEXOR-XR) 150 mg 24 hr capsule Take 1 capsule (150 mg total) by mouth daily 90 capsule 0    [DISCONTINUED] venlafaxine (EFFEXOR-XR) 75 mg 24 hr capsule Take 1 capsule (75 mg total) by mouth daily 90 capsule 0     No current facility-administered medications on file prior to visit         Psychotherapy Provided:     Individual psychotherapy provided: Supportive counseling provided  HPI ROS Appetite Changes and Sleep:     She reports frequent awakenings, increased appetite, low energy   Patient denies suicidal or homicidal ideation    Review Of Systems:      HPI ROS:               Medication Side Effects:  denies     Depression (10 worst): 6-7/10    Anxiety (10 worst): 8/10    Safety concerns (SI, HI, etc): denies    Sleep: Frequent awakenings    Energy: low    Appetite: Good "overeats"    Weight Change: No significant change      General emotional problems, sleep disturbances and appetite disturbances   Personality irritable and no change in personality   Constitutional low energy   ENT negative   Cardiovascular as noted in HPI   Respiratory as noted in HPI   Gastrointestinal negative   Genitourinary negative   Musculoskeletal as noted in HPI   Integumentary negative   Neurological negative   Endocrine negative   Other Symptoms "trigger finger surgery this week"     Mental Status Evaluation:    Appearance Adequate hygiene and grooming and Good eye contact   Behavior restless and fidgety   Mood anxious and depressed  Depression Scale - 6-7 of 10 (0 = No depression)  Anxiety Scale - 8 of 10 (0 = No anxiety)   Speech Normal rate and volume   Affect mood-congruent   Thought Processes Goal directed and coherent   Thought Content Does not verbalize delusional material   Associations Tightly connected   Perceptual Disturbances Denies hallucinations and does not appear to be responding to internal stimuli   Risk Potential Suicidal/Homicidal Ideation - No evidence of suicidal or homicidal ideation and patient does not verbalize suicidal or homicidal ideation  Risk of Violence - No evidence of risk for violence found on assessment  Risk of Self Mutilation - No evidence of risk for self mutilation found on assessment   Orientation oriented to person, place, time/date and situation   Memory recent and remote memory grossly intact   Consciousness alert and awake   Attention/Concentration attention span and concentration are age appropriate   Insight partial   Judgement intact   Muscle Strength and Gait normal muscle strength and normal muscle tone, normal gait/station and normal balance   Motor Activity no abnormal movements   Language no difficulty naming common objects, no difficulty repeating a phrase, no difficulty writing a sentence   Fund of Knowledge adequate knowledge of current events  adequate fund of knowledge regarding past history  adequate fund of knowledge regarding vocabulary      Past Psychiatric History Update:     Inpatient Psychiatric Admission Since Last Encounter:   no  Changes to Outpatient Psychiatric Treatment Team:    no  Suicide Attempt Or Self Mutilation Since Last Encounter:   no  Incidence of Violent Behavior Since Last Encounter:   no    Traumatic History Update:     New Onset of Abuse Since Last Encounter:   no  Traumatic Events Since Last Encounter:   no    Past Medical History:    Past Medical History:   Diagnosis Date    Allergic rhinitis     Anxiety     Arthritis     Chronic pain disorder     BACK SHOULDERS NECK    Colon polyp     Depression     Diarrhea     Disease of thyroid gland     nodule    Eating disorder     Fibromyalgia, primary     GERD (gastroesophageal reflux disease)     Hearing difficulty of right ear     Hyperlipidemia     Hypertension     Irritable bowel syndrome     Perforation of tympanic membrane     Right    PONV (postoperative nausea and vomiting)     RBBB     Right bundle branch blockage     Sleep apnea     no cpap    Wears glasses      No past medical history pertinent negatives    Past Surgical History:   Procedure Laterality Date    ANAL FISTULOTOMY N/A 11/2/2018    Procedure: FISTULOTOMY;  Surgeon: Sony Ballard MD;  [facility];  Service: Colorectal    BACK SURGERY      lumbar herniated disc    BREAST CYST EXCISION Left 13 yrs ago benign    BREAST SURGERY Left 2006    cyst removal    CARPAL TUNNEL RELEASE       SECTION      CHOLECYSTECTOMY      COLONOSCOPY      CYST REMOVAL      DENTAL SURGERY      INCISION AND DRAINAGE OF WOUND N/A 2018    Procedure: INCISION AND DRAINAGE (I&D) BUTTOCK;  Surgeon: Diane Smith MD;  [facility];  Service: Colorectal    MYRINGOTOMY W/ TUBES Right 2015    [place]    [place] COLONOSCOPY FLX DX W/COLLJ SPEC WHEN PFRMD N/A 2017    Procedure: COLONOSCOPY;  Surgeon: Steven Guido MD;  [facility]; Service: Gastroenterology    [place] INCISE FINGER TENDON SHEATH Right 3/13/2018    Procedure: LONG TRIGGER FINGER RELEASE;  Surgeon: Richelle Benson DO;  [facility];  Service: Orthopedics    [place] SURG DIAGNOSTIC EXAM, ANORECTAL N/A 2018    Procedure: EXAM UNDER ANESTHESIA (EUA); Surgeon: Diane Smith MD;  [facility];  Service: Colorectal    [place] TYMPANOPLASTY Right 2017    Procedure: TYMPANOPLASTY WITH PERICHONDRIN GRAFT;  Surgeon: Yolis Canseco DO;  [facility];  Service: ENT    SHOULDER SURGERY Right     ULNAR NERVE TRANSPOSITION Left     WISDOM TOOTH EXTRACTION       Allergies   Allergen Reactions    Amoxicillin Swelling, Anaphylaxis and Angioedema    Neurontin [Gabapentin] Other (See Comments)     Other reaction(s): SUICIDE IDEATION  Other reaction(s): Other (See Comments)  suicidal  suicidal    Penicillin V Angioedema and Swelling     Other reaction(s): Throat closure    Penicillins Swelling and Anaphylaxis    Aripiprazole Other (See Comments)     Other reaction(s): low dose 2 mg ; curving in and locking in of hands/dystonia  Other reaction(s): Other (See Comments)  Other reaction(s): low dose 2 mg ; curving in and locking in of hands/dystonia    Lorazepam Other (See Comments)     Other reaction(s): higher dose > anxiety and depression  Other reaction(s):  Other (See Comments)  Other reaction(s): higher dose > anxiety and depression    Carbamazepine Other (See Comments)     Other reaction(s): Unknown Reaction    Doxycycline Hives    Lamotrigine Rash    Other Hives     Adhesive tape     Substance Abuse History:    Social History     Substance and Sexual Activity   Alcohol Use Not Currently    Comment: social     Social History     Substance and Sexual Activity   Drug Use No     Social History:    Social History     Socioeconomic History    Marital status: /Civil Union     Spouse name: Not on file    Number of children: Not on file    Years of education: Not on file    Highest education level: Not on file   Occupational History    Not on file   Tobacco Use    Smoking status: Never Smoker    Smokeless tobacco: Never Used   Vaping Use    Vaping Use: Never used   Substance and Sexual Activity    Alcohol use: Not Currently     Comment: social    Drug use: No    Sexual activity: Yes     Partners: Male     Birth control/protection: Injection     Comment: per allscripts   Other Topics Concern    Not on file   Social History Narrative    Daily caffeine use- 1 cup of coffee     Social Determinants of Health     Financial Resource Strain:     Difficulty of Paying Living Expenses:    Food Insecurity:     Worried About Running Out of Food in the Last Year:     Ran Out of Food in the Last Year:    Transportation Needs: No Transportation Needs    Lack of Transportation (Medical): No    Lack of Transportation (Non-Medical):  No   Physical Activity: Unknown    Days of Exercise per Week: 2 days    Minutes of Exercise per Session: Not asked   Stress:     Feeling of Stress :    Social Connections:     Frequency of Communication with Friends and Family:     Frequency of Social Gatherings with Friends and Family:     Attends [employer] Services:     Active Member of Clubs or Organizations:     Attends Club or Organization Meetings:     Marital Status:    Intimate Partner Violence:     Fear of Current or Ex-Partner:     Emotionally Abused:     Physically Abused:     Sexually Abused:      Family Psychiatric History:     Family History   Problem Relation Age of Onset    Hypertension Mother     Hyperlipidemia Mother     Diabetes Mother     Hypertension Father     Hyperlipidemia Father     Heart disease Father         cardiac disorder-myocardial infarction arrhythmias    Heart attack Father     Crohn's disease Brother     Arthritis Brother     Diabetes unspecified Maternal Grandmother     Thyroid disease Paternal Grandmother     No Known Problems Daughter     No Known Problems Maternal Aunt     No Known Problems Maternal Aunt     No Known Problems Maternal Aunt     No Known Problems Maternal Aunt     No Known Problems Paternal Aunt     No Known Problems Paternal Aunt     Heart attack Paternal Uncle      History Review: The following portions of the patient's history were reviewed and updated as appropriate: allergies, current medications, past family history, past medical history, past social history, past surgical history and problem list     OBJECTIVE:     Vital signs in last 24 hours: There were no vitals filed for this visit  Laboratory Results: I have personally reviewed all pertinent laboratory/tests results  Suicide/Homicide Risk Assessment:    Risk of Harm to Self:  Based on today's assessment, Jannette presents the following risk of harm to self: low    Risk of Harm to Others:  Based on today's assessment, Jannette presents the following risk of harm to others: none    The following interventions are recommended: no intervention changes needed    Medications Risks/Benefits:      Risks, Benefits And Possible Side Effects Of Medications:    Discussed risks and benefits of treatment with patient including risk of suicidality, serotonin syndrome and SIADH related to treatment with antidepressants;  Risk of induction of manic symptoms in certain patient populations and risk of parkinsonian symptoms, metabolic syndrome, tardive dyskinesia and neuroleptic malignant syndrome related to treatment with antipsychotic medications     Controlled Medication Discussion:     Not applicable - controlled prescriptions are not prescribed by this practice    Treatment Plan:    Due for update/Updated:   no  Last treatment plan done 9/8/2021 by Lana Goff LCSW  Treatment Plan due on 3/8/2021  ELAN Ulrich 09/09/21    This note was shared with patient

## 2021-09-15 ENCOUNTER — SOCIAL WORK (OUTPATIENT)
Dept: BEHAVIORAL/MENTAL HEALTH CLINIC | Facility: CLINIC | Age: 41
End: 2021-09-15
Payer: COMMERCIAL

## 2021-09-15 ENCOUNTER — APPOINTMENT (OUTPATIENT)
Dept: LAB | Facility: HOSPITAL | Age: 41
End: 2021-09-15
Attending: SPECIALIST
Payer: COMMERCIAL

## 2021-09-15 ENCOUNTER — APPOINTMENT (OUTPATIENT)
Dept: LAB | Facility: CLINIC | Age: 41
End: 2021-09-15
Payer: COMMERCIAL

## 2021-09-15 DIAGNOSIS — F33.1 MODERATE RECURRENT MAJOR DEPRESSION (HCC): Primary | ICD-10-CM

## 2021-09-15 DIAGNOSIS — R73.01 IMPAIRED FASTING GLUCOSE: ICD-10-CM

## 2021-09-15 DIAGNOSIS — F41.1 GENERALIZED ANXIETY DISORDER: ICD-10-CM

## 2021-09-15 DIAGNOSIS — F33.1 MODERATE RECURRENT MAJOR DEPRESSION (HCC): ICD-10-CM

## 2021-09-15 DIAGNOSIS — Z01.818 PRE-OP TESTING: ICD-10-CM

## 2021-09-15 LAB
ANION GAP SERPL CALCULATED.3IONS-SCNC: 7 MMOL/L (ref 4–13)
BUN SERPL-MCNC: 9 MG/DL (ref 7–25)
CALCIUM SERPL-MCNC: 9.5 MG/DL (ref 8.6–10.5)
CHLORIDE SERPL-SCNC: 100 MMOL/L (ref 98–107)
CO2 SERPL-SCNC: 29 MMOL/L (ref 21–31)
CREAT SERPL-MCNC: 0.52 MG/DL (ref 0.6–1.2)
ERYTHROCYTE [DISTWIDTH] IN BLOOD BY AUTOMATED COUNT: 14.1 % (ref 11.5–14.5)
EST. AVERAGE GLUCOSE BLD GHB EST-MCNC: 108 MG/DL
GFR SERPL CREATININE-BSD FRML MDRD: 119 ML/MIN/1.73SQ M
GLUCOSE P FAST SERPL-MCNC: 117 MG/DL (ref 65–99)
HBA1C MFR BLD: 5.4 %
HCT VFR BLD AUTO: 42 % (ref 42–47)
HGB BLD-MCNC: 14.1 G/DL (ref 12–16)
MCH RBC QN AUTO: 28.9 PG (ref 26–34)
MCHC RBC AUTO-ENTMCNC: 33.6 G/DL (ref 31–37)
MCV RBC AUTO: 86 FL (ref 81–99)
PLATELET # BLD AUTO: 185 THOUSANDS/UL (ref 149–390)
PMV BLD AUTO: 8.5 FL (ref 8.6–11.7)
POTASSIUM SERPL-SCNC: 3.8 MMOL/L (ref 3.5–5.5)
RBC # BLD AUTO: 4.87 MILLION/UL (ref 3.9–5.2)
SODIUM SERPL-SCNC: 136 MMOL/L (ref 134–143)
WBC # BLD AUTO: 9.1 THOUSAND/UL (ref 4.8–10.8)

## 2021-09-15 PROCEDURE — 90834 PSYTX W PT 45 MINUTES: CPT | Performed by: SOCIAL WORKER

## 2021-09-15 PROCEDURE — 80048 BASIC METABOLIC PNL TOTAL CA: CPT

## 2021-09-15 PROCEDURE — 36415 COLL VENOUS BLD VENIPUNCTURE: CPT

## 2021-09-15 PROCEDURE — 85027 COMPLETE CBC AUTOMATED: CPT

## 2021-09-15 PROCEDURE — G0480 DRUG TEST DEF 1-7 CLASSES: HCPCS

## 2021-09-15 PROCEDURE — 83036 HEMOGLOBIN GLYCOSYLATED A1C: CPT

## 2021-09-15 PROCEDURE — 80335 ANTIDEPRESSANT TRICYCLIC 1/2: CPT

## 2021-09-15 NOTE — PSYCH
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  She recently had surgery on her hand and shared that it went well  Jannette completed her homework from last session by practicing being more aware of unhealthy thinking habits  She wrote down several cognitive distortions and brought them to session today  Session focused on reviewing her homework and developing healthier cognitive patterns to be more fair to herself  Through reviewing her homework, Jannette gained insight into her unhealthy thinking habits being mostly mind reading and unhealthy views about herself; specifically surrounding her weight  She processed her thoughts and feelings about this in session  We discussed how her low self esteem is impacting her coping  We worked on beginning to establish an inward sense of self worth by decreasing negative statements of self while increasing positive statements  Throughout session, we practiced replacing cognitive self talk supporting low mood  She was introduced to the CBT tool, putting your thoughts on trial to dispute negative cognitive patterns  Jannette will practice tool, putting her thoughts on trial to be more fair to herself  She will attend her weight management appt tomorrow  Continue weekly support  She will continue to attend the anxiety psychotherapy group on site             Mental status:  Appearance calm and cooperative , adequate hygiene and grooming and good eye contact    Mood depressed and anxious   Affect Mood congruent   Speech a normal rate and volume   Thought Processes coherent/organized and normal thought processes   Hallucinations no hallucinations present , negative self talk   Thought Content no delusions   Abnormal Thoughts no suicidal thoughts  and no homicidal thoughts    Orientation  oriented to person and place and time   Remote Memory short term memory intact and long term memory intact   Attention Span concentration intact   Intellect Appears to be of Average Intelligence   Fund of Knowledge displays adequate knowledge of current events   Insight fair   Judgement fair   Muscle Strength Muscle strength and tone were normal and Normal gait    Language no difficulty naming common objects   Pain none   Pain Scale 0         [address]: Diagnosis and Treatment Plan explained to Ignacio Jalloh relates understanding diagnosis and is agreeable to Treatment Plan   Yes

## 2021-09-16 ENCOUNTER — OFFICE VISIT (OUTPATIENT)
Dept: BARIATRICS | Facility: CLINIC | Age: 41
End: 2021-09-16
Payer: COMMERCIAL

## 2021-09-16 VITALS
WEIGHT: 293 LBS | BODY MASS INDEX: 45.99 KG/M2 | DIASTOLIC BLOOD PRESSURE: 90 MMHG | RESPIRATION RATE: 16 BRPM | TEMPERATURE: 98.9 F | HEART RATE: 92 BPM | SYSTOLIC BLOOD PRESSURE: 142 MMHG | HEIGHT: 67 IN

## 2021-09-16 DIAGNOSIS — F41.1 GENERALIZED ANXIETY DISORDER: ICD-10-CM

## 2021-09-16 DIAGNOSIS — F41.8 DEPRESSION WITH ANXIETY: ICD-10-CM

## 2021-09-16 DIAGNOSIS — R73.01 IMPAIRED FASTING GLUCOSE: ICD-10-CM

## 2021-09-16 DIAGNOSIS — G47.33 OBSTRUCTIVE SLEEP APNEA: ICD-10-CM

## 2021-09-16 DIAGNOSIS — K21.9 ESOPHAGEAL REFLUX: ICD-10-CM

## 2021-09-16 DIAGNOSIS — E66.01 MORBID OBESITY (HCC): Primary | ICD-10-CM

## 2021-09-16 DIAGNOSIS — I45.10 RBBB (RIGHT BUNDLE BRANCH BLOCK): ICD-10-CM

## 2021-09-16 PROBLEM — G56.20 CUBITAL TUNNEL SYNDROME: Status: ACTIVE | Noted: 2020-02-18

## 2021-09-16 PROBLEM — M47.26 OSTEOARTHRITIS OF SPINE WITH RADICULOPATHY, LUMBAR REGION: Status: ACTIVE | Noted: 2017-07-25

## 2021-09-16 PROBLEM — M24.119 ARTICULAR CARTILAGE DISORDER OF SHOULDER REGION: Status: ACTIVE | Noted: 2021-04-29

## 2021-09-16 PROBLEM — M75.31 CALCIFIC TENDINITIS OF RIGHT SHOULDER: Status: ACTIVE | Noted: 2018-07-12

## 2021-09-16 PROBLEM — M25.511 PAIN IN JOINT OF RIGHT SHOULDER: Status: ACTIVE | Noted: 2021-04-29

## 2021-09-16 PROBLEM — S43.429A SPRAIN OF ROTATOR CUFF CAPSULE: Status: ACTIVE | Noted: 2021-04-29

## 2021-09-16 PROCEDURE — 99214 OFFICE O/P EST MOD 30 MIN: CPT | Performed by: FAMILY MEDICINE

## 2021-09-16 RX ORDER — OXYCODONE HYDROCHLORIDE 5 MG/1
1 TABLET ORAL 3 TIMES DAILY PRN
COMMUNITY
End: 2021-11-17

## 2021-09-16 NOTE — PROGRESS NOTES
Assessment/Plan:    No problem-specific Assessment & Plan notes found for this encounter  Diagnoses and all orders for this visit:    Morbid obesity    Obstructive sleep apnea    Generalized anxiety disorder    Esophageal reflux    Impaired fasting glucose    Depression with anxiety    RBBB (right bundle branch block)    Other orders  -     oxyCODONE (ROXICODONE) 5 mg immediate release tablet; Take 1 tablet by mouth 3 (three) times a day as needed        -Patient is pursuing Conservative Program  -Initial weight loss goal of 5-10% weight loss for improved health  - not interested in surgery at this time  - of note, states that unless takes Nexium daily has heartburn  EGD and colonoscopy done 2/2021 (results in epic)  - Screening labs utd 3/2021, EKG 7/22/21  -Patient is interested in pursuing Conservative Program with [facility]  - sample menus given, will start increasing steps, encouraged food login  - will see SW next visit for behavioral counseling   - discussed [facility] she will need to check coverage with her insurance  Initial: 297 2lbs  Current: 297 6lbs  Change: +0 4lbs  Goal: 225lbs    Goals:  Food log (ie ) www myfitnesspal com,sparkpeople  com,[employer] com,calorieking  com,etc  baritastic  No sugary beverages  At least 64oz of water daily  Increase physical activity by 10 minutes daily  Gradually increase physical activity to a goal of 5 days per week for 30 minutes of MODERATE intensity PLUS 2 days per week of FULL BODY resistance training  3812-1315 calories per day    Follow up in approximately 2 weeks with Non-Surgical Physician/Advanced Practitioner  Subjective:   Chief Complaint   Patient presents with    Follow-up     6 week MWM follow up        Patient ID: Dorys Fernandez  is a 39 y o  female with excess weight/obesity here to pursue weight management  Patient is pursuing Conservative Program      HPI     2 month follow up  Has maintained weight    Not food login       She feels like she turns into food as soon as she gets any stressors and losses quickly her motivation  She would be interested in seeing our SW and also [facility]  Avoid sympathomimetics due to RBBB      The following portions of the patient's history were reviewed and updated as appropriate: allergies, current medications, past family history, past medical history, past social history, past surgical history and problem list     Review of Systems   Constitutional: Negative for activity change and appetite change  Respiratory: Negative  Cardiovascular: Negative  Gastrointestinal: Negative  Genitourinary: Negative  Musculoskeletal: Negative for arthralgias  Skin: Negative for rash  Psychiatric/Behavioral: Negative  Objective:    /90 (BP Location: Left arm, Patient Position: Sitting, Cuff Size: Adult)   Pulse 92   Temp 98 9 °F (37 2 °C) (Tympanic)   Resp 16   Ht 5' 7" (1 702 m)   Wt 135 kg (297 lb 9 6 oz)   BMI 46 61 kg/m²      Physical Exam    Constitutional   General appearance: Abnormal   well developed and morbidly obese  Eyes No conjunctival pallor     Musculoskeletal   Gait and station: Normal     Psychiatric   Orientation to person, place and time: Normal     Affect: appropriate

## 2021-09-17 LAB
AMITRIP SERPL-MCNC: 40 NG/ML
AMITRIP+NOR SERPL-MCNC: <60 NG/ML (ref 80–200)
NORTRIP SERPL-MCNC: <20 NG/ML

## 2021-09-17 NOTE — PRE-PROCEDURE INSTRUCTIONS
Pre-Surgery Instructions:   Medication Instructions    albuterol (PROVENTIL HFA,VENTOLIN HFA) 90 mcg/act inhaler Instructed patient per Anesthesia Guidelines   amitriptyline (ELAVIL) 25 mg tablet Instructed patient per Anesthesia Guidelines   Ascorbic Acid (VITAMIN C) 500 MG CAPS Instructed patient per Anesthesia Guidelines   aspirin 81 mg chewable tablet Instructed patient per Anesthesia Guidelines   atorvastatin (LIPITOR) 40 mg tablet Instructed patient per Anesthesia Guidelines   cetirizine (ZyrTEC) 10 mg tablet Instructed patient per Anesthesia Guidelines   Cholecalciferol (VITAMIN D3) 5000 units CAPS Instructed patient per Anesthesia Guidelines   ciprofloxacin-dexamethasone (CIPRODEX) otic suspension Instructed patient per Anesthesia Guidelines   cyanocobalamin (VITAMIN B-12) 1,000 mcg tablet Instructed patient per Anesthesia Guidelines   esomeprazole (NexIUM 24HR) 20 mg capsule Instructed patient per Anesthesia Guidelines   hydrOXYzine HCL (ATARAX) 10 mg tablet Instructed patient per Anesthesia Guidelines   metoprolol succinate (TOPROL-XL) 100 mg 24 hr tablet Instructed patient per Anesthesia Guidelines   Multiple Vitamins-Minerals (CENTRUM ADULTS PO) Instructed patient per Anesthesia Guidelines   nystatin (MYCOSTATIN) cream Instructed patient per Anesthesia Guidelines   venlafaxine (EFFEXOR-XR) 150 mg 24 hr capsule Instructed patient per Anesthesia Guidelines   venlafaxine (EFFEXOR-XR) 75 mg 24 hr capsule Instructed patient per Anesthesia Guidelines       Pt verbalizes understanding of the following:  - NPO after MN prior  - Last dose ASA 9/15  - Bathing instructions, has prior experience  - Hold OTC Vit/ Suppl/ Herbals until after procedure  - Takes all meds in the pm

## 2021-09-20 ENCOUNTER — ANESTHESIA EVENT (OUTPATIENT)
Dept: PERIOP | Facility: HOSPITAL | Age: 41
End: 2021-09-20
Payer: COMMERCIAL

## 2021-09-20 ENCOUNTER — HOSPITAL ENCOUNTER (OUTPATIENT)
Facility: HOSPITAL | Age: 41
Setting detail: OUTPATIENT SURGERY
Discharge: HOME | End: 2021-09-20
Attending: SPECIALIST | Admitting: SPECIALIST
Payer: COMMERCIAL

## 2021-09-20 ENCOUNTER — ANESTHESIA (OUTPATIENT)
Dept: PERIOP | Facility: HOSPITAL | Age: 41
End: 2021-09-20
Payer: COMMERCIAL

## 2021-09-20 VITALS
TEMPERATURE: 98.1 F | SYSTOLIC BLOOD PRESSURE: 143 MMHG | HEIGHT: 67 IN | BODY MASS INDEX: 45.99 KG/M2 | RESPIRATION RATE: 18 BRPM | WEIGHT: 293 LBS | HEART RATE: 82 BPM | OXYGEN SATURATION: 93 % | DIASTOLIC BLOOD PRESSURE: 65 MMHG

## 2021-09-20 DIAGNOSIS — N92.0 EXCESSIVE AND FREQUENT MENSTRUATION WITH REGULAR CYCLE: ICD-10-CM

## 2021-09-20 LAB
EXT PREGNANCY TEST URINE: NEGATIVE
EXT. CONTROL: NORMAL

## 2021-09-20 PROCEDURE — 88305 TISSUE EXAM BY PATHOLOGIST: CPT | Performed by: PATHOLOGY

## 2021-09-20 PROCEDURE — 81025 URINE PREGNANCY TEST: CPT | Performed by: SPECIALIST

## 2021-09-20 RX ORDER — ALBUTEROL SULFATE 90 UG/1
2 AEROSOL, METERED RESPIRATORY (INHALATION) EVERY 6 HOURS PRN
Status: DISCONTINUED | OUTPATIENT
Start: 2021-09-20 | End: 2021-09-21 | Stop reason: HOSPADM

## 2021-09-20 RX ORDER — IBUPROFEN 400 MG/1
600 TABLET ORAL EVERY 6 HOURS PRN
Status: DISCONTINUED | OUTPATIENT
Start: 2021-09-20 | End: 2021-09-21 | Stop reason: HOSPADM

## 2021-09-20 RX ORDER — LIDOCAINE HYDROCHLORIDE 10 MG/ML
INJECTION, SOLUTION EPIDURAL; INFILTRATION; INTRACAUDAL; PERINEURAL AS NEEDED
Status: DISCONTINUED | OUTPATIENT
Start: 2021-09-20 | End: 2021-09-20

## 2021-09-20 RX ORDER — ONDANSETRON 2 MG/ML
INJECTION INTRAMUSCULAR; INTRAVENOUS AS NEEDED
Status: DISCONTINUED | OUTPATIENT
Start: 2021-09-20 | End: 2021-09-20

## 2021-09-20 RX ORDER — OXYCODONE HYDROCHLORIDE 5 MG/1
5 TABLET ORAL EVERY 4 HOURS PRN
Status: DISCONTINUED | OUTPATIENT
Start: 2021-09-20 | End: 2021-09-21 | Stop reason: HOSPADM

## 2021-09-20 RX ORDER — DEXAMETHASONE SODIUM PHOSPHATE 4 MG/ML
INJECTION, SOLUTION INTRA-ARTICULAR; INTRALESIONAL; INTRAMUSCULAR; INTRAVENOUS; SOFT TISSUE AS NEEDED
Status: DISCONTINUED | OUTPATIENT
Start: 2021-09-20 | End: 2021-09-20

## 2021-09-20 RX ORDER — FENTANYL CITRATE 50 UG/ML
INJECTION, SOLUTION INTRAMUSCULAR; INTRAVENOUS AS NEEDED
Status: DISCONTINUED | OUTPATIENT
Start: 2021-09-20 | End: 2021-09-20

## 2021-09-20 RX ORDER — OXYCODONE HYDROCHLORIDE 5 MG/1
10 TABLET ORAL EVERY 4 HOURS PRN
Status: DISCONTINUED | OUTPATIENT
Start: 2021-09-20 | End: 2021-09-21 | Stop reason: HOSPADM

## 2021-09-20 RX ORDER — METOCLOPRAMIDE HYDROCHLORIDE 5 MG/ML
10 INJECTION INTRAMUSCULAR; INTRAVENOUS ONCE AS NEEDED
Status: DISCONTINUED | OUTPATIENT
Start: 2021-09-20 | End: 2021-09-20 | Stop reason: HOSPADM

## 2021-09-20 RX ORDER — PROPOFOL 10 MG/ML
INJECTION, EMULSION INTRAVENOUS AS NEEDED
Status: DISCONTINUED | OUTPATIENT
Start: 2021-09-20 | End: 2021-09-20

## 2021-09-20 RX ORDER — SODIUM CHLORIDE, SODIUM LACTATE, POTASSIUM CHLORIDE, CALCIUM CHLORIDE 600; 310; 30; 20 MG/100ML; MG/100ML; MG/100ML; MG/100ML
125 INJECTION, SOLUTION INTRAVENOUS CONTINUOUS
Status: DISCONTINUED | OUTPATIENT
Start: 2021-09-20 | End: 2021-09-21 | Stop reason: HOSPADM

## 2021-09-20 RX ORDER — MAGNESIUM HYDROXIDE 1200 MG/15ML
LIQUID ORAL AS NEEDED
Status: DISCONTINUED | OUTPATIENT
Start: 2021-09-20 | End: 2021-09-20 | Stop reason: HOSPADM

## 2021-09-20 RX ORDER — ONDANSETRON 2 MG/ML
4 INJECTION INTRAMUSCULAR; INTRAVENOUS EVERY 6 HOURS PRN
Status: DISCONTINUED | OUTPATIENT
Start: 2021-09-20 | End: 2021-09-21 | Stop reason: HOSPADM

## 2021-09-20 RX ORDER — KETOROLAC TROMETHAMINE 30 MG/ML
INJECTION, SOLUTION INTRAMUSCULAR; INTRAVENOUS AS NEEDED
Status: DISCONTINUED | OUTPATIENT
Start: 2021-09-20 | End: 2021-09-20

## 2021-09-20 RX ORDER — ACETAMINOPHEN 325 MG/1
650 TABLET ORAL EVERY 6 HOURS PRN
Status: DISCONTINUED | OUTPATIENT
Start: 2021-09-20 | End: 2021-09-21 | Stop reason: HOSPADM

## 2021-09-20 RX ORDER — DOCUSATE SODIUM 100 MG/1
100 CAPSULE, LIQUID FILLED ORAL 2 TIMES DAILY
Status: DISCONTINUED | OUTPATIENT
Start: 2021-09-20 | End: 2021-09-21 | Stop reason: HOSPADM

## 2021-09-20 RX ORDER — HYDROMORPHONE HCL/PF 1 MG/ML
0.5 SYRINGE (ML) INJECTION EVERY 6 HOURS PRN
Status: DISCONTINUED | OUTPATIENT
Start: 2021-09-20 | End: 2021-09-21 | Stop reason: HOSPADM

## 2021-09-20 RX ORDER — MIDAZOLAM HYDROCHLORIDE 2 MG/2ML
INJECTION, SOLUTION INTRAMUSCULAR; INTRAVENOUS AS NEEDED
Status: DISCONTINUED | OUTPATIENT
Start: 2021-09-20 | End: 2021-09-20

## 2021-09-20 RX ORDER — FENTANYL CITRATE/PF 50 MCG/ML
50 SYRINGE (ML) INJECTION
Status: DISCONTINUED | OUTPATIENT
Start: 2021-09-20 | End: 2021-09-20 | Stop reason: HOSPADM

## 2021-09-20 RX ADMIN — FENTANYL CITRATE 50 MCG: 50 INJECTION INTRAMUSCULAR; INTRAVENOUS at 14:43

## 2021-09-20 RX ADMIN — SODIUM CHLORIDE, SODIUM LACTATE, POTASSIUM CHLORIDE, AND CALCIUM CHLORIDE 125 ML/HR: .6; .31; .03; .02 INJECTION, SOLUTION INTRAVENOUS at 11:34

## 2021-09-20 RX ADMIN — ONDANSETRON 4 MG: 2 INJECTION INTRAMUSCULAR; INTRAVENOUS at 14:03

## 2021-09-20 RX ADMIN — KETOROLAC TROMETHAMINE 30 MG: 30 INJECTION, SOLUTION INTRAMUSCULAR; INTRAVENOUS at 14:03

## 2021-09-20 RX ADMIN — FENTANYL CITRATE 100 MCG: 50 INJECTION INTRAMUSCULAR; INTRAVENOUS at 13:59

## 2021-09-20 RX ADMIN — DEXAMETHASONE SODIUM PHOSPHATE 8 MG: 4 INJECTION, SOLUTION INTRA-ARTICULAR; INTRALESIONAL; INTRAMUSCULAR; INTRAVENOUS; SOFT TISSUE at 14:03

## 2021-09-20 RX ADMIN — LIDOCAINE HYDROCHLORIDE 50 MG: 10 INJECTION, SOLUTION EPIDURAL; INFILTRATION; INTRACAUDAL; PERINEURAL at 13:59

## 2021-09-20 RX ADMIN — FENTANYL CITRATE 50 MCG: 50 INJECTION INTRAMUSCULAR; INTRAVENOUS at 15:00

## 2021-09-20 RX ADMIN — PROPOFOL 200 MG: 10 INJECTION, EMULSION INTRAVENOUS at 13:59

## 2021-09-20 RX ADMIN — MIDAZOLAM HYDROCHLORIDE 2 MG: 1 INJECTION, SOLUTION INTRAMUSCULAR; INTRAVENOUS at 13:59

## 2021-09-20 NOTE — ANESTHESIA POSTPROCEDURE EVALUATION
Post-Op Assessment Note    CV Status:  Stable  Pain Score: 0    Pain management: adequate     Mental Status:  Alert   Hydration Status:  Stable   PONV Controlled:  Controlled   Airway Patency:  Patent      Post Op Vitals Reviewed: Yes      Staff: CRNA         No complications documented      BP      Temp     Pulse  88   Resp      SpO2 97

## 2021-09-20 NOTE — ANESTHESIA PREPROCEDURE EVALUATION
Procedure:  D&C, ABLATION ENDOMETRIAL MARIEL (N/A Uterus)    Relevant Problems   CARDIO   (+) Atypical chest pain   (+) Essential hypertension   (+) High triglycerides   (+) Hyperlipidemia   (+) RBBB (right bundle branch block)      GI/HEPATIC   (+) Esophageal reflux      MUSCULOSKELETAL   (+) Chronic low back pain   (+) Chronic right-sided low back pain without sciatica   (+) Fibromyalgia   (+) Osteoarthritis of spine with radiculopathy, lumbar region      NEURO/PSYCH   (+) Chronic headache with new features   (+) Depression with anxiety   (+) Depression, recurrent (HCC)   (+) Fibromyalgia   (+) Generalized anxiety disorder   (+) History of sepsis   (+) Moderate recurrent major depression (HCC)      PULMONARY   (+) Asthma   (+) Asthma, mild intermittent   (+) Obstructive sleep apnea      Other   (+) Left cervical lymphadenopathy        Physical Exam    Airway    Mallampati score: III  TM Distance: >3 FB  Neck ROM: full     Dental   No notable dental hx     Cardiovascular      Pulmonary      Other Findings        Anesthesia Plan  ASA Score- 3     Anesthesia Type- general with ASA Monitors  Additional Monitors:   Airway Plan: LMA  Plan Factors-Exercise tolerance (METS): >4 METS  Chart reviewed  EKG reviewed  Existing labs reviewed  Patient summary reviewed  Patient is not a current smoker  Induction- intravenous  Postoperative Plan- Plan for postoperative opioid use  Informed Consent- Anesthetic plan and risks discussed with patient  I personally reviewed this patient with the CRNA  Discussed and agreed on the Anesthesia Plan with the CRNA  Rashard Yao

## 2021-09-20 NOTE — DISCHARGE INSTRUCTIONS
Endometrial Ablation   AMBULATORY CARE:   What you need to know about endometrial ablation:  Endometrial ablation is a procedure to remove the endometrium (lining of your uterus)  You may need this procedure if you have heavy or abnormal vaginal bleeding  How to prepare for the procedure: Your healthcare provider will talk to you about how to prepare for the procedure  You may be told not to eat or drink anything after midnight on the day of your procedure  You will also be told what medicines to take or not take on the day of your procedure  You may need someone to drive you home after the procedure and stay with you to make sure you are okay  What will happen during the procedure: You may be given local anesthesia to numb the area  You may instead be given general anesthesia to keep you asleep and free from pain during the procedure  Your healthcare provider will widen the opening of your cervix with medicine or medical tools  Ice, heated fluid, or electric energy may be used to remove the lining of your uterus  What to expect after the procedure: You may feel some discomfort for a few days  This is normal and should stop soon  Contact your healthcare provider if any of the following becomes severe or continues:  · Cramps, similar to menstrual cramps, for 1 to 2 days    · Watery, bloody discharge for 2 to 3 days that may become light and last a few weeks    · Frequent urination for 24 hours    · Nausea    Risks of endometrial ablation:  You may not be able to get pregnant after endometrial ablation  You may bleed more than expected or get an infection in your vagina, urinary tract, or uterus  Your cervix, uterus, or nearby organs may be burned or damaged  You may get a blood clot in your leg or arm  A blockage may form over months to years and cause blood to pool inside your uterus  This blockage may cause severe pain and you may need a hysterectomy   You may also need a hysterectomy if endometrial ablation does not work  Call 911 if:   · You feel lightheaded, short of breath, and have chest pain  · You cough up blood  Seek care immediately if:   · Your arm or leg feels warm, tender, and painful  It may look swollen and red  · You feel dizzy, weak, and confused  · You cannot stop vomiting  · You have severe pain  · You are not able to urinate  Contact your healthcare provider if:   · You have a fever  · You have vaginal bleeding and it is not time for your monthly period  · The bleeding during your monthly period has not decreased  · You have pain when you urinate or see blood in your urine  · You have questions or concerns about your condition or care  Medicines:   · Medicines  can help decrease pain, calm your stomach, and control vomiting  · Take your medicine as directed  Contact your healthcare provider if you think your medicine is not helping or if you have side effects  Tell him or her if you are allergic to any medicine  Keep a list of the medicines, vitamins, and herbs you take  Include the amounts, and when and why you take them  Bring the list or the pill bottles to follow-up visits  Carry your medicine list with you in case of an emergency  Activity:  Ask when you can return to your usual activities  Do not have sex or use tampons or douches for 6 weeks after your procedure, or as directed  Birth control: You may still need to use birth control to prevent pregnancy  Pregnancy risks, such as a miscarriage and tubal pregnancy, are higher after this procedure  Talk to your healthcare provider about birth control or pregnancy after endometrial ablation  Follow up with your healthcare provider as directed:  Write down your questions so you remember to ask them during your visits  © Copyright [employer] 2021 Information is for End User's use only and may not be sold, redistributed or otherwise used for commercial purposes   All illustrations and images included in CareNotes® are the copyrighted property of A D A M , Inc  or Aniyah Mc   The above information is an  only  It is not intended as medical advice for individual conditions or treatments  Talk to your doctor, nurse or pharmacist before following any medical regimen to see if it is safe and effective for you

## 2021-09-20 NOTE — OP NOTE
OPERATIVE REPORT  PATIENT NAME: Nannette Favre    :  1980  MRN: [id number]  Pt Location: [address] [place] [address] OR ROOM 02    SURGERY DATE: 2021    Surgeon(s) and Role:     * Ramesh Collier MD - Primary     * Roddy White MD - Assisting    Preop Diagnosis:  Excessive and frequent menstruation with regular cycle [N92 0]    Post-Op Diagnosis Codes:     * Excessive and frequent menstruation with regular cycle [N92 0]    Procedure(s) (LRB):  D&C, ABLATION ENDOMETRIAL MARIEL (N/A)    Specimen(s):  ID Type Source Tests Collected by Time Destination   1 :  Tissue Endometrium TISSUE EXAM Ramesh Collier MD 2021 1408    2 :  Tissue Endocervical TISSUE EXAM Ramesh Collier MD 2021 1411        Estimated Blood Loss:   Minimal    Drains:  * No LDAs found *    Anesthesia Type:   IV Sedation with Anesthesia    Operative Indications:  Excessive and frequent menstruation with regular cycle [N92 0]      Operative Findings:  1  Morbid obese body habitus  External genitalia normal  2  Uterus 10 weeks in size, no mass palpated and bilateral adnexa nonpalpable    Complications:   None    Procedure and Technique:  Patient was taken to the operating room where timeout was performed to confirm correct patient and correct procedure  General anesthesia was established  Patient was positioned on the operating table in dorsal lithotomy position with legs supported using yellowfin stirrups    An exam under anesthesia revealed an anteverted uterus which was freely mobile with no palpable adnexal masses  The patient was then prepped and draped in the usual sterile fashion       A weighted speculum was placed in the patient's vagina and the anterior lip of the cervix is visualized with the assistance of a Walker retractor  The anterior lip of the cervix was grasped using a single-toothed tenaculum  The uterus was gently sounded to approximately 9 cm  The cervix was then gradually dilated to [address] [place] using Brooks Ashley dilators   The cervix was measured at 3 5 cm      Endometrial curettings were obtained using a medium-sized sharp curette  Curettage of the endometrial cavity was undertaken in a systematic fashion, encompassing all 360° of the endometrial cavity  These were sent for routine pathology       The Ginette endometrial ablation device was inserted into the endometrial cavity  The device was deployed and the cervical balloon was inflated  Safety checks were performed and the device was deployed  A full 2 minutes cycle was completed  The Ginette device was then removed from the endometrial cavity    Patient tolerated the procedure well and went to recovery in stable condition       I was present for the entire procedure    Patient Disposition:  PACU  and extubated and stable    SIGNATURE: Staci Villafuerte MD  DATE: September 20, 2021  TIME: 2:20 PM

## 2021-09-20 NOTE — H&P
H&P Exam - Gynecology   Rama Baugh 39 y o  female MRN: [id number]  Unit/Bed#: OR POOL Encounter: [id number]      History of Present Illness     HPI:  Zaki Thao is a 39 y o  female who presents with menorrhagia, she was using IUD and Depo-Provera  Her bleedings and pelvic pain were persistent  The decision made to proceed dilation and curettage, and endometrial ablation  Patient endometrial biopsy was resulted as benign  Pap smear negative for intraepithelial neoplasia  Patient past medical history significant morbid obesity  Review of Systems   Constitutional: Negative  HENT: Negative  Respiratory: Negative  Cardiovascular: Negative  Gastrointestinal: Negative  Genitourinary: Negative  Musculoskeletal: Negative  Neurological: Negative  Psychiatric/Behavioral: Negative          Historical Information   Past Medical History:   Diagnosis Date    Allergic rhinitis     Anxiety     Arthritis     C  difficile diarrhea 2020    Chronic pain disorder     BACK SHOULDERS NECK    Colon polyp     Depression     Diarrhea     Disease of thyroid gland     nodule    Eating disorder     Fibromyalgia, primary     GERD (gastroesophageal reflux disease)     Hearing difficulty of right ear     Hyperlipidemia     Hypertension     Irritable bowel syndrome     Perforation of tympanic membrane     Right    PONV (postoperative nausea and vomiting)     RBBB     Right bundle branch blockage     Sleep apnea     no cpap    Wears glasses      Past Surgical History:   Procedure Laterality Date    ANAL FISTULOTOMY N/A 2018    Procedure: FISTULOTOMY;  Surgeon: Elin Pompa MD;  [facility];  Service: Colorectal    BACK SURGERY      lumbar herniated disc    BREAST CYST EXCISION Left 13 yrs ago  benign    BREAST SURGERY Left 2006    cyst removal    CARPAL TUNNEL RELEASE       SECTION      CHOLECYSTECTOMY      COLONOSCOPY      CYST REMOVAL      DENTAL SURGERY      HAND SURGERY      INCISION AND DRAINAGE OF WOUND N/A 11/2/2018    Procedure: INCISION AND DRAINAGE (I&D) BUTTOCK;  Surgeon: Dolly Saravia MD;  [facility];  Service: Colorectal    MYRINGOTOMY W/ TUBES Right 08/17/2015    [place]    OR COLONOSCOPY FLX DX W/COLLJ SPEC WHEN PFRMD N/A 8/2/2017    Procedure: COLONOSCOPY;  Surgeon: Mina Carbajal MD;  [facility]; Service: Gastroenterology    OR INCISE FINGER TENDON SHEATH Right 3/13/2018    Procedure: LONG TRIGGER FINGER RELEASE;  Surgeon: Mendy Jaramillo DO;  [facility];  Service: Orthopedics    OR SURG DIAGNOSTIC EXAM, ANORECTAL N/A 11/2/2018    Procedure: EXAM UNDER ANESTHESIA (EUA); Surgeon: Dolly Saravia MD;  [facility];  Service: Colorectal    OR TYMPANOPLASTY Right 5/24/2017    Procedure: TYMPANOPLASTY WITH PERICHONDRIN GRAFT;  Surgeon:  Cheryle Guadeloupe, DO;  [facility];  Service: ENT    SHOULDER SURGERY Right     TRIGGER FINGER RELEASE      ULNAR NERVE TRANSPOSITION Left     WISDOM TOOTH EXTRACTION       OB/GYN History:   Family History   Problem Relation Age of Onset    Hypertension Mother     Hyperlipidemia Mother     Diabetes Mother     Hypertension Father     Hyperlipidemia Father     Heart disease Father         cardiac disorder-myocardial infarction arrhythmias    Heart attack Father     Crohn's disease Brother     Arthritis Brother     Diabetes unspecified Maternal Grandmother     Thyroid disease Paternal Grandmother     No Known Problems Daughter     No Known Problems Maternal Aunt     No Known Problems Maternal Aunt     No Known Problems Maternal Aunt     No Known Problems Maternal Aunt     No Known Problems Paternal Aunt     No Known Problems Paternal Aunt     Heart attack Paternal Uncle      Social History   Social History     Substance and Sexual Activity   Alcohol Use Not Currently    Comment: social     Social History     Substance and Sexual Activity   Drug Use No     Social History     Tobacco Use   Smoking Status Never Smoker   Smokeless Tobacco Never Used       Meds/Allergies   Medications Prior to Admission   Medication    amitriptyline (ELAVIL) 25 mg tablet    Ascorbic Acid (VITAMIN C) 500 MG CAPS    aspirin 81 mg chewable tablet    atorvastatin (LIPITOR) 40 mg tablet    cetirizine (ZyrTEC) 10 mg tablet    Cholecalciferol (VITAMIN D3) 5000 units CAPS    ciprofloxacin-dexamethasone (CIPRODEX) otic suspension    cyanocobalamin (VITAMIN B-12) 1,000 mcg tablet    esomeprazole (NexIUM 24HR) 20 mg capsule    hydrOXYzine HCL (ATARAX) 10 mg tablet    metoprolol succinate (TOPROL-XL) 100 mg 24 hr tablet    Multiple Vitamins-Minerals (CENTRUM ADULTS PO)    nystatin (MYCOSTATIN) cream    oxyCODONE (ROXICODONE) 5 mg immediate release tablet    venlafaxine (EFFEXOR-XR) 150 mg 24 hr capsule    venlafaxine (EFFEXOR-XR) 75 mg 24 hr capsule    albuterol (PROVENTIL HFA,VENTOLIN HFA) 90 mcg/act inhaler    QUEtiapine (SEROquel) 25 mg tablet     Allergies   Allergen Reactions    Amoxicillin Swelling, Anaphylaxis and Angioedema    Neurontin [Gabapentin] Other (See Comments)     Other reaction(s): SUICIDE IDEATION  Other reaction(s): Other (See Comments)  suicidal  suicidal    Penicillin V Angioedema and Swelling     Other reaction(s): Throat closure    Penicillins Swelling and Anaphylaxis    Aripiprazole Other (See Comments)     Other reaction(s): low dose 2 mg ; curving in and locking in of hands/dystonia  Other reaction(s): Other (See Comments)  Other reaction(s): low dose 2 mg ; curving in and locking in of hands/dystonia    Lorazepam Other (See Comments)     Other reaction(s): higher dose > anxiety and depression  Other reaction(s):  Other (See Comments)  Other reaction(s): higher dose > anxiety and depression    Carbamazepine Other (See Comments)     Other reaction(s): Unknown Reaction    Doxycycline Hives    Lamotrigine Rash    Other Hives     Adhesive tape       Objective   /84   Pulse 86   Temp (!) 97 3 °F (36 3 °C) (Temporal)   Resp 18   Ht 5' 7" (1 702 m)   Wt 135 kg (297 lb)   LMP 09/17/2021 Comment: has had it x6 wks today  SpO2 99%   BMI 46 52 kg/m²       Intake/Output Summary (Last 24 hours) at 9/20/2021 1355  Last data filed at 9/20/2021 1134  Gross per 24 hour   Intake 1000 ml   Output --   Net 1000 ml       Physical Exam  Constitutional:       Appearance: Normal appearance  She is obese  Cardiovascular:      Rate and Rhythm: Normal rate  Pulses: Normal pulses  Pulmonary:      Effort: Pulmonary effort is normal    Abdominal:      Palpations: Abdomen is soft  Musculoskeletal:         General: Normal range of motion  Cervical back: Normal range of motion  Skin:     General: Skin is warm  Neurological:      General: No focal deficit present  Mental Status: She is alert and oriented to person, place, and time  Psychiatric:         Mood and Affect: Mood normal          Behavior: Behavior normal          Lab Results:   Admission on 09/20/2021   Component Date Value    EXT Preg Test, Ur 09/20/2021 Negative     Control 09/20/2021 Valid       Imaging: I have personally reviewed pertinent reports  EKG, Pathology, and Other Studies: I have personally reviewed pertinent reports  Assessment/Plan     A/P:  Menorrhagia resistant to medical treatment  1) menorrhagia  -patient was using IUD and Depo-Provera, she was still complaining of pelvic pressure and pain  She desired surgical management  Endometrial biopsy obtained and which was benign  Endometrial ablation has been offered, and reasoning behind discussed with patient  She was agreeable to proceed endometrial ablation with dilation and curettage      Code Status: Prior    Franky Berg MD  3/63/9428  4:19 PM

## 2021-09-29 ENCOUNTER — SOCIAL WORK (OUTPATIENT)
Dept: BEHAVIORAL/MENTAL HEALTH CLINIC | Facility: CLINIC | Age: 41
End: 2021-09-29
Payer: COMMERCIAL

## 2021-09-29 DIAGNOSIS — F41.1 GENERALIZED ANXIETY DISORDER: ICD-10-CM

## 2021-09-29 DIAGNOSIS — F33.1 MODERATE RECURRENT MAJOR DEPRESSION (HCC): Primary | ICD-10-CM

## 2021-09-29 PROCEDURE — 90834 PSYTX W PT 45 MINUTES: CPT | Performed by: SOCIAL WORKER

## 2021-09-29 NOTE — PSYCH
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, and running an art class  Since her last session, her business crafting has caused her to become physically ill from her anxiety  Session focused on Jannette processing her emotions and providing psychoeducation on the DBT Emotional Regulation Model  Through processing, Jannette gained awareness and insight into how a prompting event triggers a dysregulated emotional response leading to wanting to attempt/escape an emotional experience, leading to an ineffective/unhealthy behavior, leading to temporary relief but negative long term consequences with the cycle continuing  Jannette processed her thoughts and feelings using the Emotional Regulation Model with examples she provided in session about her girl scouts, daughter's infusions, her own business crafting, running an art class, and her own medical concerns  Through processing, Jannette gained improved awareness and insight into difficulty managing the emotions of stress and anxiety leading to eating when not hungry  We discussed each specific example and found a common trend of needing to improve limit and boundary setting and planning/organization  Jannette will buy a planner and begin using it daily  She will bring it to next session  Jannette will read and complete exercises provided from [employer] to Emotional Eating- bring to session in 2 weeks       PHQ-9 Depression Screening    PHQ-9:   Frequency of the following problems over the past two weeks:      Little interest or pleasure in doing things: 2 - more than half the days  Feeling down, depressed, or hopeless: 2 - more than half the days  Trouble falling or staying asleep, or sleeping too much: 3 - nearly every day  Feeling tired or having little energy: 3 - nearly every day  Poor appetite or overeating: 3 - nearly every day  Feeling bad about yourself - or that you are a failure or have let yourself or your family down: 3 - nearly every day  Trouble concentrating on things, such as reading the newspaper or watching television: 1 - several days  Moving or speaking so slowly that other people could have noticed  Or the opposite - being so fidgety or restless that you have been moving around a lot more than usual: 1 - several days  Thoughts that you would be better off dead, or of hurting yourself in some way: 0 - not at all  PHQ-2 Score: 4  PHQ-9 Score: 18       MILADYS-7 Flowsheet Screening      Most Recent Value   Over the last 2 weeks, how often have you been bothered by any of the following problems?    Feeling nervous, anxious, or on edge  3   Not being able to stop or control worrying  3   Worrying too much about different things  3   Trouble relaxing  3   Being so restless that it is hard to sit still  3   Becoming easily annoyed or irritable  3   Feeling afraid as if something awful might happen  1   MILADYS-7 Total Score  19            Mental status:  Appearance calm and cooperative , adequate hygiene and grooming and good eye contact    Mood anxious   Affect Mood congruent   Speech a normal rate and volume   Thought Processes coherent/organized and normal thought processes   Hallucinations no hallucinations present    Thought Content no delusions, ruminations, negative self talk   Abnormal Thoughts no suicidal thoughts  and no homicidal thoughts    Orientation  oriented to person and place and time   Remote Memory short term memory intact and long term memory intact   Attention Span concentration intact   Intellect Appears to be of Average Intelligence   Fund of Knowledge displays adequate knowledge of current events   Insight fair   Judgement fair   Muscle Strength Muscle strength and tone were normal and Normal gait    Language no difficulty naming common objects   Pain none   Pain Scale 0       Behavioral Health Treatment Plan [facility]: Diagnosis and Treatment Plan explained to Julio C Castellon relates understanding diagnosis and is agreeable to Treatment Plan   Yes

## 2021-09-30 ENCOUNTER — OFFICE VISIT (OUTPATIENT)
Dept: BARIATRICS | Facility: CLINIC | Age: 41
End: 2021-09-30
Payer: COMMERCIAL

## 2021-09-30 VITALS
TEMPERATURE: 98.5 F | WEIGHT: 293 LBS | RESPIRATION RATE: 16 BRPM | SYSTOLIC BLOOD PRESSURE: 132 MMHG | HEART RATE: 90 BPM | BODY MASS INDEX: 45.99 KG/M2 | DIASTOLIC BLOOD PRESSURE: 84 MMHG | HEIGHT: 67 IN

## 2021-09-30 DIAGNOSIS — E66.01 OBESITY, CLASS III, BMI 40-49.9 (MORBID OBESITY) (HCC): Primary | ICD-10-CM

## 2021-09-30 DIAGNOSIS — E78.5 HYPERLIPIDEMIA, UNSPECIFIED HYPERLIPIDEMIA TYPE: ICD-10-CM

## 2021-09-30 DIAGNOSIS — F41.1 GENERALIZED ANXIETY DISORDER: ICD-10-CM

## 2021-09-30 DIAGNOSIS — I10 ESSENTIAL HYPERTENSION: ICD-10-CM

## 2021-09-30 DIAGNOSIS — G47.33 OBSTRUCTIVE SLEEP APNEA: ICD-10-CM

## 2021-09-30 DIAGNOSIS — R73.01 IMPAIRED FASTING GLUCOSE: ICD-10-CM

## 2021-09-30 DIAGNOSIS — I45.10 RBBB (RIGHT BUNDLE BRANCH BLOCK): ICD-10-CM

## 2021-09-30 DIAGNOSIS — K21.9 ESOPHAGEAL REFLUX: ICD-10-CM

## 2021-09-30 PROCEDURE — 3075F SYST BP GE 130 - 139MM HG: CPT | Performed by: FAMILY MEDICINE

## 2021-09-30 PROCEDURE — 3008F BODY MASS INDEX DOCD: CPT | Performed by: FAMILY MEDICINE

## 2021-09-30 PROCEDURE — 1036F TOBACCO NON-USER: CPT | Performed by: FAMILY MEDICINE

## 2021-09-30 PROCEDURE — 99214 OFFICE O/P EST MOD 30 MIN: CPT | Performed by: FAMILY MEDICINE

## 2021-09-30 PROCEDURE — 3079F DIAST BP 80-89 MM HG: CPT | Performed by: FAMILY MEDICINE

## 2021-09-30 RX ORDER — PEN NEEDLE, DIABETIC 32 GX 1/6"
NEEDLE, DISPOSABLE MISCELLANEOUS DAILY
Qty: 90 EACH | Refills: 1 | Status: SHIPPED | OUTPATIENT
Start: 2021-09-30 | End: 2021-11-17

## 2021-09-30 NOTE — PROGRESS NOTES
Assessment/Plan:    No problem-specific Assessment & Plan notes found for this encounter  Diagnoses and all orders for this visit:    Obesity, Class III, BMI 40-49 9 (morbid obesity) (HCC)  -     liraglutide (SAXENDA) injection; Inject 0 6mg subcutaneously once per day for first week  Increase in weekly intervals by 0 6mg until a dose of 3mg is reached  -     Insulin Pen Needle (NovoFine Plus Pen Needle) 32G X 4 MM MISC; Use daily    Esophageal reflux  -     liraglutide (SAXENDA) injection; Inject 0 6mg subcutaneously once per day for first week  Increase in weekly intervals by 0 6mg until a dose of 3mg is reached  -     Insulin Pen Needle (NovoFine Plus Pen Needle) 32G X 4 MM MISC; Use daily    Hyperlipidemia, unspecified hyperlipidemia type  -     liraglutide (SAXENDA) injection; Inject 0 6mg subcutaneously once per day for first week  Increase in weekly intervals by 0 6mg until a dose of 3mg is reached  -     Insulin Pen Needle (NovoFine Plus Pen Needle) 32G X 4 MM MISC; Use daily    Impaired fasting glucose  -     liraglutide (SAXENDA) injection; Inject 0 6mg subcutaneously once per day for first week  Increase in weekly intervals by 0 6mg until a dose of 3mg is reached  -     Insulin Pen Needle (NovoFine Plus Pen Needle) 32G X 4 MM MISC; Use daily    Obstructive sleep apnea  -     liraglutide (SAXENDA) injection; Inject 0 6mg subcutaneously once per day for first week  Increase in weekly intervals by 0 6mg until a dose of 3mg is reached  -     Insulin Pen Needle (NovoFine Plus Pen Needle) 32G X 4 MM MISC; Use daily    Essential hypertension  -     liraglutide (SAXENDA) injection; Inject 0 6mg subcutaneously once per day for first week  Increase in weekly intervals by 0 6mg until a dose of 3mg is reached  -     Insulin Pen Needle (NovoFine Plus Pen Needle) 32G X 4 MM MISC; Use daily    RBBB (right bundle branch block)  -     liraglutide (SAXENDA) injection;  Inject 0 6mg subcutaneously once per day for first week  Increase in weekly intervals by 0 6mg until a dose of 3mg is reached  -     Insulin Pen Needle (NovoFine Plus Pen Needle) 32G X 4 MM MISC; Use daily    Generalized anxiety disorder  -     liraglutide (SAXENDA) injection; Inject 0 6mg subcutaneously once per day for first week  Increase in weekly intervals by 0 6mg until a dose of 3mg is reached  -     Insulin Pen Needle (NovoFine Plus Pen Needle) 32G X 4 MM MISC; Use daily        -Patient is pursuing Conservative Program  -Initial weight loss goal of 5-10% weight loss for improved health  - not interested in surgery at this time  - of note, states that unless takes Nexium daily has heartburn  EGD and colonoscopy done 2/2021 (results in epic)  - Screening labs utd 3/2021, EKG 7/22/21  - sample menus given, will start increasing steps, encouraged food login  - will see SW next visit for behavioral counseling   - discussed saxenda administration, titration and common side effects  Patient denies personal and family history of MCT, MEN2 tumors and medullary CA  Patient denies personal history of pancreatitis       Initial: 297 2lbs  Current: 298 5lbs  Change: +0 9lbs  Goal: 225lbs     Goals:  Food log (ie ) www myfitnesspal com,sparkpeople  com,loseit com,calorieking  com,etc  baritastic  No sugary beverages  At least 64oz of water daily  Increase physical activity by 10 minutes daily  Gradually increase physical activity to a goal of 5 days per week for 30 minutes of MODERATE intensity PLUS 2 days per week of FULL BODY resistance training  5452-4955 calories per day    Follow up in approximately 6 weeks with Non-Surgical Physician/Advanced Practitioner  Subjective:   Chief Complaint   Patient presents with    Follow-up     2 week MWM follow up        Patient ID: Carri Soto  is a 39 y o  female with excess weight/obesity here to pursue weight management  Patient is pursuing Conservative Program      HPI     2  weeks follow up       Not food login       She feels like she turns into food as soon as she gets any stressors and losses quickly her motivation       She would be interested in seeing our SW        The following portions of the patient's history were reviewed and updated as appropriate: allergies, current medications, past family history, past medical history, past social history, past surgical history and problem list     Review of Systems   Constitutional: Negative for activity change and appetite change  Respiratory: Negative  Cardiovascular: Negative  Gastrointestinal: Negative  Genitourinary: Negative  Musculoskeletal: Negative for arthralgias  Skin: Negative for rash  Psychiatric/Behavioral: Negative  Objective:    /84 (BP Location: Left arm, Patient Position: Sitting, Cuff Size: Adult)   Pulse 90   Temp 98 5 °F (36 9 °C) (Tympanic)   Resp 16   Ht 5' 7" (1 702 m)   Wt 135 kg (298 lb 8 oz)   LMP 09/17/2021 Comment: has had it x6 wks today  BMI 46 75 kg/m²      Physical Exam     Constitutional   General appearance: Abnormal   well developed and morbidly obese  Eyes No conjunctival pallor     Musculoskeletal   Gait and station: Normal     Psychiatric   Orientation to person, place and time: Normal     Affect: appropriate

## 2021-09-30 NOTE — PATIENT INSTRUCTIONS
VISIT SAXENDA  COM  INJECT SAXENDA DAILY SUBCUTANEOUSLY  -WEEK 1: 0 6mg daily  -if tolerated WEEK 2: 1 2mg daily  -if tolerated WEEK 3: 1 8mg daily  -if tolerated WEEK 4: 2 4mg daily  -if tolerated WEEK 5: 3mg daily  -IF NAUSEA/VOMITING DEVELOP STOP MEDICATION FOR A FEW DAYS AND DECREASE TO PREVIOUSLY TOLERATED DOSE  STAY HYDRATED  -IF YOU DEVELOP SEVERE ABDOMINAL PAIN WHICH MAY RADIATE TO THE BACK, SOMETIMES ASSOCIATED WITH FEVER, AND VOMITING, STOP MEDICATION AND SEEK URGENT CARE AS THIS MAY BE A SIGN OF PANCREATITIS         Saxenda the potential side effects include increased heart rate, headache, low blood sugar, GI/abdominal upset, heartburn, fatigue and dizziness

## 2021-10-04 ENCOUNTER — SOCIAL WORK (OUTPATIENT)
Dept: BEHAVIORAL/MENTAL HEALTH CLINIC | Facility: CLINIC | Age: 41
End: 2021-10-04
Payer: COMMERCIAL

## 2021-10-04 DIAGNOSIS — F33.1 MODERATE RECURRENT MAJOR DEPRESSION (HCC): Primary | ICD-10-CM

## 2021-10-04 DIAGNOSIS — F41.1 GENERALIZED ANXIETY DISORDER: ICD-10-CM

## 2021-10-04 PROCEDURE — 90834 PSYTX W PT 45 MINUTES: CPT | Performed by: SOCIAL WORKER

## 2021-10-07 ENCOUNTER — OFFICE VISIT (OUTPATIENT)
Dept: PSYCHIATRY | Facility: CLINIC | Age: 41
End: 2021-10-07
Payer: COMMERCIAL

## 2021-10-07 DIAGNOSIS — F33.1 MODERATE RECURRENT MAJOR DEPRESSION (HCC): Primary | ICD-10-CM

## 2021-10-07 DIAGNOSIS — F41.1 GENERALIZED ANXIETY DISORDER: ICD-10-CM

## 2021-10-07 PROCEDURE — 3074F SYST BP LT 130 MM HG: CPT | Performed by: PHYSICIAN ASSISTANT

## 2021-10-07 PROCEDURE — 3079F DIAST BP 80-89 MM HG: CPT | Performed by: PHYSICIAN ASSISTANT

## 2021-10-07 PROCEDURE — 99213 OFFICE O/P EST LOW 20 MIN: CPT | Performed by: PHYSICIAN ASSISTANT

## 2021-10-07 RX ORDER — HYDROXYZINE HYDROCHLORIDE 10 MG/1
TABLET, FILM COATED ORAL
COMMUNITY
Start: 2021-10-07 | End: 2021-10-21 | Stop reason: ALTCHOICE

## 2021-10-08 ENCOUNTER — OFFICE VISIT (OUTPATIENT)
Dept: BARIATRICS | Facility: CLINIC | Age: 41
End: 2021-10-08

## 2021-10-08 VITALS — BODY MASS INDEX: 46.06 KG/M2 | WEIGHT: 293 LBS

## 2021-10-08 DIAGNOSIS — E66.01 OBESITY, CLASS III, BMI 40-49.9 (MORBID OBESITY) (HCC): Primary | ICD-10-CM

## 2021-10-08 PROCEDURE — RECHECK

## 2021-10-11 VITALS — SYSTOLIC BLOOD PRESSURE: 126 MMHG | HEART RATE: 88 BPM | DIASTOLIC BLOOD PRESSURE: 87 MMHG

## 2021-10-18 ENCOUNTER — TELEPHONE (OUTPATIENT)
Dept: PSYCHIATRY | Facility: CLINIC | Age: 41
End: 2021-10-18

## 2021-10-18 ENCOUNTER — CLINICAL SUPPORT (OUTPATIENT)
Dept: FAMILY MEDICINE CLINIC | Facility: CLINIC | Age: 41
End: 2021-10-18
Payer: COMMERCIAL

## 2021-10-18 DIAGNOSIS — F33.1 MODERATE RECURRENT MAJOR DEPRESSION (HCC): ICD-10-CM

## 2021-10-18 DIAGNOSIS — F33.1 MODERATE RECURRENT MAJOR DEPRESSION (HCC): Primary | ICD-10-CM

## 2021-10-18 DIAGNOSIS — Z23 FLU VACCINE NEED: Primary | ICD-10-CM

## 2021-10-18 PROCEDURE — 90471 IMMUNIZATION ADMIN: CPT

## 2021-10-18 PROCEDURE — 90686 IIV4 VACC NO PRSV 0.5 ML IM: CPT

## 2021-10-18 RX ORDER — AMITRIPTYLINE HYDROCHLORIDE 25 MG/1
25 TABLET, FILM COATED ORAL
Qty: 30 TABLET | Refills: 1 | Status: SHIPPED | OUTPATIENT
Start: 2021-10-18 | End: 2021-10-18 | Stop reason: SDUPTHER

## 2021-10-18 RX ORDER — AMITRIPTYLINE HYDROCHLORIDE 25 MG/1
25 TABLET, FILM COATED ORAL
Qty: 30 TABLET | Refills: 1 | Status: SHIPPED | OUTPATIENT
Start: 2021-10-18 | End: 2021-11-11 | Stop reason: SDUPTHER

## 2021-10-19 ENCOUNTER — SOCIAL WORK (OUTPATIENT)
Dept: BEHAVIORAL/MENTAL HEALTH CLINIC | Facility: CLINIC | Age: 41
End: 2021-10-19
Payer: COMMERCIAL

## 2021-10-19 DIAGNOSIS — F33.1 MODERATE RECURRENT MAJOR DEPRESSION (HCC): Primary | ICD-10-CM

## 2021-10-19 DIAGNOSIS — F41.1 GENERALIZED ANXIETY DISORDER: ICD-10-CM

## 2021-10-19 PROCEDURE — 90834 PSYTX W PT 45 MINUTES: CPT | Performed by: SOCIAL WORKER

## 2021-10-21 ENCOUNTER — OFFICE VISIT (OUTPATIENT)
Dept: PSYCHIATRY | Facility: CLINIC | Age: 41
End: 2021-10-21
Payer: COMMERCIAL

## 2021-10-21 DIAGNOSIS — F41.1 GENERALIZED ANXIETY DISORDER: ICD-10-CM

## 2021-10-21 DIAGNOSIS — F33.1 MODERATE RECURRENT MAJOR DEPRESSION (HCC): Primary | ICD-10-CM

## 2021-10-21 PROCEDURE — 99213 OFFICE O/P EST LOW 20 MIN: CPT | Performed by: PHYSICIAN ASSISTANT

## 2021-11-09 ENCOUNTER — OFFICE VISIT (OUTPATIENT)
Dept: CARDIOLOGY CLINIC | Facility: HOSPITAL | Age: 41
End: 2021-11-09
Payer: COMMERCIAL

## 2021-11-09 VITALS
HEART RATE: 101 BPM | DIASTOLIC BLOOD PRESSURE: 92 MMHG | HEIGHT: 67 IN | OXYGEN SATURATION: 100 % | SYSTOLIC BLOOD PRESSURE: 138 MMHG | BODY MASS INDEX: 45.99 KG/M2 | WEIGHT: 293 LBS

## 2021-11-09 DIAGNOSIS — I45.10 RBBB (RIGHT BUNDLE BRANCH BLOCK): ICD-10-CM

## 2021-11-09 DIAGNOSIS — R07.89 ATYPICAL CHEST PAIN: ICD-10-CM

## 2021-11-09 DIAGNOSIS — I10 PRIMARY HYPERTENSION: Primary | ICD-10-CM

## 2021-11-09 DIAGNOSIS — E78.2 MIXED HYPERLIPIDEMIA: ICD-10-CM

## 2021-11-09 DIAGNOSIS — E66.01 MORBID OBESITY (HCC): ICD-10-CM

## 2021-11-09 PROBLEM — Z01.810 PREOP CARDIOVASCULAR EXAM: Status: RESOLVED | Noted: 2020-02-29 | Resolved: 2021-11-09

## 2021-11-09 PROCEDURE — 3080F DIAST BP >= 90 MM HG: CPT | Performed by: INTERNAL MEDICINE

## 2021-11-09 PROCEDURE — 3075F SYST BP GE 130 - 139MM HG: CPT | Performed by: INTERNAL MEDICINE

## 2021-11-09 PROCEDURE — 99213 OFFICE O/P EST LOW 20 MIN: CPT | Performed by: INTERNAL MEDICINE

## 2021-11-09 RX ORDER — LISINOPRIL 10 MG/1
10 TABLET ORAL DAILY
Qty: 90 TABLET | Refills: 4 | Status: SHIPPED | OUTPATIENT
Start: 2021-11-09 | End: 2021-11-23 | Stop reason: ALTCHOICE

## 2021-11-10 ENCOUNTER — SOCIAL WORK (OUTPATIENT)
Dept: BEHAVIORAL/MENTAL HEALTH CLINIC | Facility: CLINIC | Age: 41
End: 2021-11-10
Payer: COMMERCIAL

## 2021-11-10 DIAGNOSIS — F41.1 GENERALIZED ANXIETY DISORDER: ICD-10-CM

## 2021-11-10 DIAGNOSIS — F33.1 MODERATE RECURRENT MAJOR DEPRESSION (HCC): Primary | ICD-10-CM

## 2021-11-10 PROCEDURE — 90834 PSYTX W PT 45 MINUTES: CPT | Performed by: SOCIAL WORKER

## 2021-11-11 ENCOUNTER — APPOINTMENT (OUTPATIENT)
Dept: LAB | Facility: CLINIC | Age: 41
End: 2021-11-11
Payer: COMMERCIAL

## 2021-11-11 ENCOUNTER — OFFICE VISIT (OUTPATIENT)
Dept: PSYCHIATRY | Facility: CLINIC | Age: 41
End: 2021-11-11
Payer: COMMERCIAL

## 2021-11-11 DIAGNOSIS — M79.7 FIBROMYALGIA: ICD-10-CM

## 2021-11-11 DIAGNOSIS — F41.1 GENERALIZED ANXIETY DISORDER: ICD-10-CM

## 2021-11-11 DIAGNOSIS — E78.5 HYPERLIPIDEMIA, UNSPECIFIED HYPERLIPIDEMIA TYPE: ICD-10-CM

## 2021-11-11 DIAGNOSIS — I10 HYPERTENSION, UNSPECIFIED TYPE: ICD-10-CM

## 2021-11-11 DIAGNOSIS — F33.1 MODERATE RECURRENT MAJOR DEPRESSION (HCC): Primary | ICD-10-CM

## 2021-11-11 DIAGNOSIS — F41.8 DEPRESSION WITH ANXIETY: ICD-10-CM

## 2021-11-11 LAB
ALBUMIN SERPL BCP-MCNC: 3.7 G/DL (ref 3.5–5)
ALP SERPL-CCNC: 64 U/L (ref 46–116)
ALT SERPL W P-5'-P-CCNC: 71 U/L (ref 12–78)
ANION GAP SERPL CALCULATED.3IONS-SCNC: 5 MMOL/L (ref 4–13)
AST SERPL W P-5'-P-CCNC: 32 U/L (ref 5–45)
BILIRUB SERPL-MCNC: 0.56 MG/DL (ref 0.2–1)
BUN SERPL-MCNC: 16 MG/DL (ref 5–25)
CALCIUM SERPL-MCNC: 9.2 MG/DL (ref 8.3–10.1)
CHLORIDE SERPL-SCNC: 104 MMOL/L (ref 100–108)
CHOLEST SERPL-MCNC: 195 MG/DL (ref 50–200)
CO2 SERPL-SCNC: 29 MMOL/L (ref 21–32)
CREAT SERPL-MCNC: 0.61 MG/DL (ref 0.6–1.3)
GFR SERPL CREATININE-BSD FRML MDRD: 113 ML/MIN/1.73SQ M
GLUCOSE P FAST SERPL-MCNC: 120 MG/DL (ref 65–99)
HDLC SERPL-MCNC: 43 MG/DL
LDLC SERPL CALC-MCNC: 96 MG/DL (ref 0–100)
NONHDLC SERPL-MCNC: 152 MG/DL
POTASSIUM SERPL-SCNC: 3.9 MMOL/L (ref 3.5–5.3)
PROT SERPL-MCNC: 7.3 G/DL (ref 6.4–8.2)
SODIUM SERPL-SCNC: 138 MMOL/L (ref 136–145)
TRIGL SERPL-MCNC: 282 MG/DL

## 2021-11-11 PROCEDURE — 80053 COMPREHEN METABOLIC PANEL: CPT

## 2021-11-11 PROCEDURE — 99213 OFFICE O/P EST LOW 20 MIN: CPT | Performed by: PHYSICIAN ASSISTANT

## 2021-11-11 PROCEDURE — 80061 LIPID PANEL: CPT

## 2021-11-11 PROCEDURE — 36415 COLL VENOUS BLD VENIPUNCTURE: CPT

## 2021-11-11 RX ORDER — VENLAFAXINE HYDROCHLORIDE 150 MG/1
150 CAPSULE, EXTENDED RELEASE ORAL DAILY
Qty: 90 CAPSULE | Refills: 0 | Status: SHIPPED | OUTPATIENT
Start: 2021-11-11 | End: 2022-02-10 | Stop reason: SDUPTHER

## 2021-11-11 RX ORDER — VENLAFAXINE HYDROCHLORIDE 75 MG/1
75 CAPSULE, EXTENDED RELEASE ORAL DAILY
Qty: 90 CAPSULE | Refills: 0 | Status: SHIPPED | OUTPATIENT
Start: 2021-11-11 | End: 2022-02-10 | Stop reason: SDUPTHER

## 2021-11-11 RX ORDER — AMITRIPTYLINE HYDROCHLORIDE 25 MG/1
TABLET, FILM COATED ORAL
COMMUNITY
Start: 2021-11-11 | End: 2022-02-10 | Stop reason: DRUGHIGH

## 2021-11-17 ENCOUNTER — OFFICE VISIT (OUTPATIENT)
Dept: FAMILY MEDICINE CLINIC | Facility: CLINIC | Age: 41
End: 2021-11-17
Payer: COMMERCIAL

## 2021-11-17 VITALS
DIASTOLIC BLOOD PRESSURE: 80 MMHG | TEMPERATURE: 98.6 F | OXYGEN SATURATION: 98 % | WEIGHT: 293 LBS | BODY MASS INDEX: 45.99 KG/M2 | SYSTOLIC BLOOD PRESSURE: 122 MMHG | HEART RATE: 102 BPM | HEIGHT: 67 IN

## 2021-11-17 DIAGNOSIS — L73.2 HIDRADENITIS: ICD-10-CM

## 2021-11-17 DIAGNOSIS — R73.01 IMPAIRED FASTING BLOOD SUGAR: ICD-10-CM

## 2021-11-17 DIAGNOSIS — Z11.59 NEED FOR HEPATITIS C SCREENING TEST: ICD-10-CM

## 2021-11-17 DIAGNOSIS — Z12.4 SCREENING FOR CERVICAL CANCER: ICD-10-CM

## 2021-11-17 DIAGNOSIS — E78.5 HYPERLIPIDEMIA, UNSPECIFIED HYPERLIPIDEMIA TYPE: Primary | ICD-10-CM

## 2021-11-17 DIAGNOSIS — I10 ESSENTIAL HYPERTENSION: ICD-10-CM

## 2021-11-17 DIAGNOSIS — J45.20 MILD INTERMITTENT REACTIVE AIRWAY DISEASE WITHOUT COMPLICATION: ICD-10-CM

## 2021-11-17 DIAGNOSIS — G44.221 CHRONIC TENSION-TYPE HEADACHE, INTRACTABLE: ICD-10-CM

## 2021-11-17 DIAGNOSIS — F33.9 DEPRESSION, RECURRENT (HCC): ICD-10-CM

## 2021-11-17 PROCEDURE — 99215 OFFICE O/P EST HI 40 MIN: CPT | Performed by: PHYSICIAN ASSISTANT

## 2021-11-17 PROCEDURE — 1036F TOBACCO NON-USER: CPT | Performed by: PHYSICIAN ASSISTANT

## 2021-11-17 PROCEDURE — 3008F BODY MASS INDEX DOCD: CPT | Performed by: PHYSICIAN ASSISTANT

## 2021-11-17 RX ORDER — ALBUTEROL SULFATE 90 UG/1
2 AEROSOL, METERED RESPIRATORY (INHALATION) EVERY 6 HOURS PRN
Qty: 18 G | Refills: 1 | Status: SHIPPED | OUTPATIENT
Start: 2021-11-17

## 2021-11-17 RX ORDER — CYCLOBENZAPRINE HCL 10 MG
10 TABLET ORAL 3 TIMES DAILY PRN
Qty: 60 TABLET | Refills: 1 | Status: SHIPPED | OUTPATIENT
Start: 2021-11-17 | End: 2022-01-06 | Stop reason: ALTCHOICE

## 2021-11-17 RX ORDER — ATORVASTATIN CALCIUM 40 MG/1
40 TABLET, FILM COATED ORAL DAILY
Qty: 90 TABLET | Refills: 1 | Status: SHIPPED | OUTPATIENT
Start: 2021-11-17 | End: 2022-05-18 | Stop reason: SDUPTHER

## 2021-11-22 ENCOUNTER — TELEPHONE (OUTPATIENT)
Dept: CARDIOLOGY CLINIC | Facility: CLINIC | Age: 41
End: 2021-11-22

## 2021-11-23 ENCOUNTER — TELEPHONE (OUTPATIENT)
Dept: PSYCHIATRY | Facility: CLINIC | Age: 41
End: 2021-11-23

## 2021-11-23 DIAGNOSIS — I10 PRIMARY HYPERTENSION: Primary | ICD-10-CM

## 2021-11-26 RX ORDER — LOSARTAN POTASSIUM 100 MG/1
100 TABLET ORAL DAILY
Qty: 90 TABLET | Refills: 4 | Status: SHIPPED | OUTPATIENT
Start: 2021-11-26 | End: 2022-05-02 | Stop reason: SDUPTHER

## 2021-12-02 ENCOUNTER — CONSULT (OUTPATIENT)
Dept: DERMATOLOGY | Facility: CLINIC | Age: 41
End: 2021-12-02
Payer: COMMERCIAL

## 2021-12-02 VITALS — BODY MASS INDEX: 47.09 KG/M2 | HEIGHT: 66 IN | WEIGHT: 293 LBS | TEMPERATURE: 96.7 F

## 2021-12-02 DIAGNOSIS — L73.2 HYDRADENITIS: ICD-10-CM

## 2021-12-02 PROCEDURE — 99203 OFFICE O/P NEW LOW 30 MIN: CPT | Performed by: DERMATOLOGY

## 2021-12-03 ENCOUNTER — CONSULT (OUTPATIENT)
Dept: SURGERY | Facility: CLINIC | Age: 41
End: 2021-12-03
Payer: COMMERCIAL

## 2021-12-03 VITALS
RESPIRATION RATE: 18 BRPM | WEIGHT: 293 LBS | TEMPERATURE: 97.6 F | BODY MASS INDEX: 45.99 KG/M2 | DIASTOLIC BLOOD PRESSURE: 80 MMHG | HEIGHT: 67 IN | HEART RATE: 80 BPM | SYSTOLIC BLOOD PRESSURE: 128 MMHG

## 2021-12-03 DIAGNOSIS — L73.2 HYDRADENITIS: Primary | ICD-10-CM

## 2021-12-03 PROCEDURE — 10060 I&D ABSCESS SIMPLE/SINGLE: CPT | Performed by: SURGERY

## 2021-12-03 PROCEDURE — 99203 OFFICE O/P NEW LOW 30 MIN: CPT | Performed by: SURGERY

## 2021-12-08 ENCOUNTER — SOCIAL WORK (OUTPATIENT)
Dept: BEHAVIORAL/MENTAL HEALTH CLINIC | Facility: CLINIC | Age: 41
End: 2021-12-08
Payer: COMMERCIAL

## 2021-12-08 DIAGNOSIS — F33.1 MODERATE RECURRENT MAJOR DEPRESSION (HCC): Primary | ICD-10-CM

## 2021-12-08 PROCEDURE — 90834 PSYTX W PT 45 MINUTES: CPT | Performed by: SOCIAL WORKER

## 2021-12-09 ENCOUNTER — OFFICE VISIT (OUTPATIENT)
Dept: PSYCHIATRY | Facility: CLINIC | Age: 41
End: 2021-12-09
Payer: COMMERCIAL

## 2021-12-09 ENCOUNTER — TELEPHONE (OUTPATIENT)
Dept: SURGERY | Facility: CLINIC | Age: 41
End: 2021-12-09

## 2021-12-09 DIAGNOSIS — F41.1 GENERALIZED ANXIETY DISORDER: ICD-10-CM

## 2021-12-09 DIAGNOSIS — F33.1 MODERATE RECURRENT MAJOR DEPRESSION (HCC): Primary | ICD-10-CM

## 2021-12-09 PROCEDURE — 99213 OFFICE O/P EST LOW 20 MIN: CPT | Performed by: PHYSICIAN ASSISTANT

## 2021-12-10 ENCOUNTER — TELEPHONE (OUTPATIENT)
Dept: NEUROLOGY | Facility: CLINIC | Age: 41
End: 2021-12-10

## 2021-12-15 ENCOUNTER — TELEPHONE (OUTPATIENT)
Dept: BEHAVIORAL/MENTAL HEALTH CLINIC | Facility: CLINIC | Age: 41
End: 2021-12-15

## 2021-12-17 ENCOUNTER — HOSPITAL ENCOUNTER (OUTPATIENT)
Dept: CT IMAGING | Facility: HOSPITAL | Age: 41
Discharge: HOME | End: 2021-12-17
Payer: COMMERCIAL

## 2021-12-17 DIAGNOSIS — J31.0 CHRONIC RHINITIS: ICD-10-CM

## 2021-12-17 DIAGNOSIS — G89.29 CHRONIC NONINTRACTABLE HEADACHE, UNSPECIFIED HEADACHE TYPE: ICD-10-CM

## 2021-12-17 DIAGNOSIS — R51.9 CHRONIC NONINTRACTABLE HEADACHE, UNSPECIFIED HEADACHE TYPE: ICD-10-CM

## 2021-12-17 DIAGNOSIS — J32.0 CHRONIC MAXILLARY SINUSITIS: ICD-10-CM

## 2021-12-17 PROCEDURE — G1004 CDSM NDSC: HCPCS

## 2021-12-17 PROCEDURE — 70486 CT MAXILLOFACIAL W/O DYE: CPT

## 2021-12-20 ENCOUNTER — OFFICE VISIT (OUTPATIENT)
Dept: SURGERY | Facility: CLINIC | Age: 41
End: 2021-12-20

## 2021-12-20 VITALS
WEIGHT: 293 LBS | DIASTOLIC BLOOD PRESSURE: 74 MMHG | SYSTOLIC BLOOD PRESSURE: 122 MMHG | HEART RATE: 80 BPM | RESPIRATION RATE: 18 BRPM | HEIGHT: 67 IN | TEMPERATURE: 97.1 F | BODY MASS INDEX: 45.99 KG/M2

## 2021-12-20 DIAGNOSIS — L73.2 HYDRADENITIS: Primary | ICD-10-CM

## 2021-12-20 PROCEDURE — 99024 POSTOP FOLLOW-UP VISIT: CPT | Performed by: SURGERY

## 2021-12-20 PROCEDURE — 3008F BODY MASS INDEX DOCD: CPT | Performed by: SURGERY

## 2021-12-27 ENCOUNTER — OFFICE VISIT (OUTPATIENT)
Dept: SURGERY | Facility: CLINIC | Age: 41
End: 2021-12-27
Payer: COMMERCIAL

## 2021-12-27 VITALS
SYSTOLIC BLOOD PRESSURE: 140 MMHG | HEART RATE: 92 BPM | TEMPERATURE: 97.3 F | DIASTOLIC BLOOD PRESSURE: 80 MMHG | HEIGHT: 67 IN | RESPIRATION RATE: 18 BRPM | BODY MASS INDEX: 46.67 KG/M2

## 2021-12-27 DIAGNOSIS — L73.2 HYDRADENITIS: Primary | ICD-10-CM

## 2021-12-27 DIAGNOSIS — L02.211 CUTANEOUS ABSCESS OF ABDOMINAL WALL: ICD-10-CM

## 2021-12-27 PROCEDURE — 3077F SYST BP >= 140 MM HG: CPT | Performed by: SURGERY

## 2021-12-27 PROCEDURE — 99213 OFFICE O/P EST LOW 20 MIN: CPT | Performed by: SURGERY

## 2021-12-27 PROCEDURE — 1036F TOBACCO NON-USER: CPT | Performed by: SURGERY

## 2021-12-27 PROCEDURE — 3079F DIAST BP 80-89 MM HG: CPT | Performed by: SURGERY

## 2021-12-27 PROCEDURE — 10060 I&D ABSCESS SIMPLE/SINGLE: CPT | Performed by: SURGERY

## 2021-12-27 RX ORDER — LEVOFLOXACIN 5 MG/ML
750 INJECTION, SOLUTION INTRAVENOUS ONCE
Status: CANCELLED | OUTPATIENT
Start: 2021-12-27 | End: 2021-12-27

## 2021-12-27 NOTE — H&P (VIEW-ONLY)
Assessment/Plan:  Recurrent hidradenitis suppurativa lower abdominal wall/groin  Unroofing/I&D done today  Wide excision planned for a few weeks from now    No problem-specific Assessment & Plan notes found for this encounter  Diagnoses and all orders for this visit:    Hydradenitis  -     CBC and Platelet; Future  -     Comprehensive metabolic panel; Future  -     EKG 12 lead; Future  -     Case request operating room: EXCISION HIDRADENITIS GROIN/ lower abdominal wall; Standing  -     Case request operating room: EXCISION HIDRADENITIS GROIN/ lower abdominal wall    Cutaneous abscess of abdominal wall    Other orders  -     Incision and Drainage          Subjective:      Patient ID: Latrell Farmer is a 39 y o  female  The patient is a 77-year-old morbidly obese female who has been suffering with hidradenitis suppurativa for years  She states that she really does not get much of an axillary problem anymore but she does have recurrent draining sinuses on her lower abdominal wall at the level of the groin  Actually it is at the level of her old  scar  I had and removed a couple of these a few weeks ago and that I had a come back afterwards and unfortunately there was still draining  She comes in today stating that there is another new 1 which is tender      The following portions of the patient's history were reviewed and updated as appropriate: allergies, current medications, past family history, past medical history, past social history, past surgical history and problem list     Review of Systems   Constitutional:        Covid vaccinated x3   HENT: Negative  Eyes:        Wears glasses   Respiratory: Negative  Cardiovascular:        "weakened heart muscles" but no chf  RBBB  Hypertension and hyperlipidemia   Gastrointestinal:        Gb out  Reflux  IBS   Endocrine:        Thyroid nodules   Genitourinary:        Had ablation   Musculoskeletal: Positive for myalgias          Fibromyalgia Neurological: Negative  Hematological: Negative  Psychiatric/Behavioral:        Depression         Objective:      /80 (BP Location: Right arm, Patient Position: Sitting, Cuff Size: Adult)   Pulse 92   Temp (!) 97 3 °F (36 3 °C)   Resp 18   Ht 5' 7" (1 702 m)   BMI 46 67 kg/m²          Physical Exam  Vitals reviewed  Constitutional:       General: She is not in acute distress  Appearance: She is obese  She is not ill-appearing  Comments: She is morbidly obese   HENT:      Head: Normocephalic and atraumatic  Eyes:      Conjunctiva/sclera: Conjunctivae normal    Cardiovascular:      Heart sounds: Normal heart sounds  No murmur heard  Pulmonary:      Effort: No respiratory distress  Breath sounds: Normal breath sounds  No stridor  No wheezing, rhonchi or rales  Abdominal:      General: Abdomen is flat  There is no distension  Palpations: Abdomen is soft  Tenderness: There is no abdominal tenderness  Musculoskeletal:         General: Normal range of motion  Cervical back: Neck supple  Lymphadenopathy:      Cervical: No cervical adenopathy  Skin:     General: Skin is warm and dry  Findings: Erythema present  Comments: She has a small abscess located 3 cm superior medial to the area I drained about a month ago  She has 2 sinuses present, 1 in each groin   Neurological:      Mental Status: She is alert and oriented to person, place, and time  Psychiatric:         Mood and Affect: Mood normal          Behavior: Behavior normal          Thought Content: Thought content normal          Judgment: Judgment normal        Incision and Drainage    Date/Time: 12/27/2021 1:52 PM  Performed by: Khalida Echols MD  Authorized by: Khalida Echols MD   Universal Protocol:  Consent: Written consent obtained      Patient location:  Clinic  Location:     Type:  Abscess  Pre-procedure details:     Skin preparation:  Betadine  Anesthesia (see MAR for exact dosages): Anesthesia method:  Local infiltration    Local anesthetic:  Lidocaine 2% WITH epi  Procedure details:     Complexity:  Simple    Needle aspiration: no      Incision types:  Elliptical    Scalpel blade:  15    Approach:  Open    Incision depth:  Subcutaneous    Wound management:  Probed and deloculated    Drainage:  Purulent    Drainage amount: Moderate    Wound treatment:  Packing placed    Packing materials:  1/4 in gauze  Comments: The patient was identified by me and placed in the supine position upon the operating room table  The patient's lower abdomen was prepped and draped in a normal surgical manner  2% lidocaine with epinephrine was infused as a local  anesthetic  Elliptical skin incision was now made centered over the most fluctuant area  Pus was seen and removed    Wound was packed and a dressing applied

## 2022-01-03 ENCOUNTER — HOSPITAL ENCOUNTER (OUTPATIENT)
Dept: MAMMOGRAPHY | Facility: HOSPITAL | Age: 42
Discharge: HOME | End: 2022-01-03
Payer: COMMERCIAL

## 2022-01-03 ENCOUNTER — APPOINTMENT (OUTPATIENT)
Dept: LAB | Facility: HOSPITAL | Age: 42
End: 2022-01-03
Payer: COMMERCIAL

## 2022-01-03 VITALS — HEIGHT: 67 IN | WEIGHT: 293 LBS | BODY MASS INDEX: 45.99 KG/M2

## 2022-01-03 DIAGNOSIS — L73.2 HYDRADENITIS: ICD-10-CM

## 2022-01-03 DIAGNOSIS — Z12.31 BREAST CANCER SCREENING BY MAMMOGRAM: ICD-10-CM

## 2022-01-03 LAB
ALBUMIN SERPL BCP-MCNC: 4.1 G/DL (ref 3.5–5)
ALP SERPL-CCNC: 69 U/L (ref 46–116)
ALT SERPL W P-5'-P-CCNC: 75 U/L (ref 12–78)
ANION GAP SERPL CALCULATED.3IONS-SCNC: 6 MMOL/L (ref 4–13)
AST SERPL W P-5'-P-CCNC: 41 U/L (ref 5–45)
BILIRUB SERPL-MCNC: 0.53 MG/DL (ref 0.2–1)
BUN SERPL-MCNC: 14 MG/DL (ref 5–25)
CALCIUM SERPL-MCNC: 9.7 MG/DL (ref 8.3–10.1)
CHLORIDE SERPL-SCNC: 106 MMOL/L (ref 100–108)
CO2 SERPL-SCNC: 26 MMOL/L (ref 21–32)
CREAT SERPL-MCNC: 0.75 MG/DL (ref 0.6–1.3)
ERYTHROCYTE [DISTWIDTH] IN BLOOD BY AUTOMATED COUNT: 13.6 % (ref 11.6–15.1)
GFR SERPL CREATININE-BSD FRML MDRD: 99 ML/MIN/1.73SQ M
GLUCOSE SERPL-MCNC: 150 MG/DL (ref 65–140)
HCT VFR BLD AUTO: 44.3 % (ref 34.8–46.1)
HGB BLD-MCNC: 15.3 G/DL (ref 11.5–15.4)
MCH RBC QN AUTO: 29.9 PG (ref 26.8–34.3)
MCHC RBC AUTO-ENTMCNC: 34.5 G/DL (ref 31.4–37.4)
MCV RBC AUTO: 87 FL (ref 82–98)
PLATELET # BLD AUTO: 188 THOUSANDS/UL (ref 149–390)
PMV BLD AUTO: 10.4 FL (ref 8.9–12.7)
POTASSIUM SERPL-SCNC: 3.9 MMOL/L (ref 3.5–5.3)
PROT SERPL-MCNC: 7.3 G/DL (ref 6.4–8.2)
RBC # BLD AUTO: 5.11 MILLION/UL (ref 3.81–5.12)
SODIUM SERPL-SCNC: 138 MMOL/L (ref 136–145)
WBC # BLD AUTO: 9.42 THOUSAND/UL (ref 4.31–10.16)

## 2022-01-03 PROCEDURE — 77063 BREAST TOMOSYNTHESIS BI: CPT

## 2022-01-03 PROCEDURE — 36415 COLL VENOUS BLD VENIPUNCTURE: CPT

## 2022-01-03 PROCEDURE — 80053 COMPREHEN METABOLIC PANEL: CPT

## 2022-01-03 PROCEDURE — 77067 SCR MAMMO BI INCL CAD: CPT

## 2022-01-03 PROCEDURE — 85027 COMPLETE CBC AUTOMATED: CPT

## 2022-01-04 ENCOUNTER — APPOINTMENT (OUTPATIENT)
Dept: URGENT CARE | Facility: CLINIC | Age: 42
End: 2022-01-04
Payer: COMMERCIAL

## 2022-01-04 ENCOUNTER — APPOINTMENT (OUTPATIENT)
Dept: LAB | Facility: CLINIC | Age: 42
End: 2022-01-04
Payer: COMMERCIAL

## 2022-01-04 ENCOUNTER — OFFICE VISIT (OUTPATIENT)
Dept: LAB | Facility: HOSPITAL | Age: 42
End: 2022-01-04
Payer: COMMERCIAL

## 2022-01-04 DIAGNOSIS — L73.2 HYDRADENITIS: ICD-10-CM

## 2022-01-04 DIAGNOSIS — R73.01 IMPAIRED FASTING BLOOD SUGAR: ICD-10-CM

## 2022-01-04 LAB
EST. AVERAGE GLUCOSE BLD GHB EST-MCNC: 111 MG/DL
HBA1C MFR BLD: 5.5 %

## 2022-01-04 PROCEDURE — 83036 HEMOGLOBIN GLYCOSYLATED A1C: CPT

## 2022-01-04 PROCEDURE — 93005 ELECTROCARDIOGRAM TRACING: CPT

## 2022-01-04 PROCEDURE — 36415 COLL VENOUS BLD VENIPUNCTURE: CPT

## 2022-01-05 ENCOUNTER — TELEMEDICINE (OUTPATIENT)
Dept: BEHAVIORAL/MENTAL HEALTH CLINIC | Facility: CLINIC | Age: 42
End: 2022-01-05
Payer: COMMERCIAL

## 2022-01-05 DIAGNOSIS — F33.9 DEPRESSION, RECURRENT (HCC): Primary | ICD-10-CM

## 2022-01-05 DIAGNOSIS — F41.1 GENERALIZED ANXIETY DISORDER: ICD-10-CM

## 2022-01-05 LAB
ATRIAL RATE: 101 BPM
P AXIS: 47 DEGREES
PR INTERVAL: 154 MS
QRS AXIS: -26 DEGREES
QRSD INTERVAL: 126 MS
QT INTERVAL: 386 MS
QTC INTERVAL: 500 MS
T WAVE AXIS: 25 DEGREES
VENTRICULAR RATE: 101 BPM

## 2022-01-05 PROCEDURE — 93010 ELECTROCARDIOGRAM REPORT: CPT | Performed by: INTERNAL MEDICINE

## 2022-01-05 PROCEDURE — 90834 PSYTX W PT 45 MINUTES: CPT | Performed by: SOCIAL WORKER

## 2022-01-05 NOTE — PSYCH
This note was not shared with the patient due to this is a psychotherapy note    Virtual Regular Visit    Verification of patient location:    Patient is located in the following state in which I hold an active license [place]      Assessment/Plan:    Problem List Items Addressed This Visit        Other    Generalized anxiety disorder    Depression, recurrent ([address] ) - Primary          Goals addressed in session: Goal 1          Reason for visit is   Chief Complaint   Patient presents with    Virtual Regular Visit        Encounter provider [facility]    Provider located at [address] [place] [id number] [phone]      The patient was identified by name and date of birth  Jannette CYNTHIA Kimbleraghu was informed that this is a telemedicine visit and that the visit is being conducted throughEpic Embedded and patient was informed this is a secure, HIPAA-complaint platform  She agrees to proceed     My office door was closed  No one else was in the room  She acknowledged consent and understanding of privacy and security of the video platform  The patient has agreed to participate and understands they can discontinue the visit at any time  Patient is aware this is a billable service  Subjective  Kwaku Parsons is a 39 y o  female   Met with Jannette Fink says that she will be having surgery on Tuesday, 22 to close a cyst that has not healed on its own  She expresses feelings of frustration with her physical health as she hoped she would not need any surgeries this year  She described the surgery; expecting the surgeon to open her  scar and leave the hospital with a drain  Session focused on discussing how Jannette is coping after receiving the news of needing another surgery, and how she is remaining clear about using her coping skills      Jannette expressed her thoughts and feelings; voicing improved awareness of needing to prioritize herself  We discussed the skills she is used to decrease her worry and anxiety- including planning ahead with delegating tasks for the [employer], setting limits and boundaries with people/places, and expressing her thoughts/feelings/wants/needs to family, friends  She was praised for her use of skills and was encouraged to look at her growth in self respect and assertiveness skills  Tyrell Marie will continue to implement wellness tools; share successes and challenges  Continue biweekly support  Jannette is still interested in EMDR refresher of skills  Will remain in contact with therapist, Pradeep Eubanks to schedule  Mental status:  Appearance calm and cooperative , adequate hygiene and grooming and good eye contact    Mood anxious   Affect Affect appropriate, mood congruent   Speech a normal rate and volume   Thought Processes coherent/organized and normal thought processes   Hallucinations no hallucinations present    Thought Content no delusions, worry, help seeking   Abnormal Thoughts no suicidal thoughts  and no homicidal thoughts    Orientation  oriented to person and place and time   Remote Memory short term memory intact and long term memory intact   Attention Span concentration intact   Intellect Appears to be of Average Intelligence   Fund of Knowledge displays adequate knowledge of current events   Insight fair   Judgement fair   Muscle Strength Unable to assess due to virtual session   Language no difficulty naming common objects   Pain none   Pain Scale 0       I spent 45 minutes directly with the patient during this visit    [address] verbally agrees to participate in [employer]   Pt is aware that [employer] could be limited without vital signs or the ability to perform a full hands-on physical exam  Jannette CYNTHIA Baugh understands she or the provider may request at any time to terminate the video visit and request the patient to seek care or treatment in person

## 2022-01-06 ENCOUNTER — OFFICE VISIT (OUTPATIENT)
Dept: SURGERY | Facility: CLINIC | Age: 42
End: 2022-01-06

## 2022-01-06 VITALS
RESPIRATION RATE: 18 BRPM | BODY MASS INDEX: 45.99 KG/M2 | WEIGHT: 293 LBS | TEMPERATURE: 97.5 F | HEIGHT: 67 IN | HEART RATE: 100 BPM | SYSTOLIC BLOOD PRESSURE: 124 MMHG | DIASTOLIC BLOOD PRESSURE: 80 MMHG

## 2022-01-06 DIAGNOSIS — L73.2 HYDRADENITIS: Primary | ICD-10-CM

## 2022-01-06 PROCEDURE — 99024 POSTOP FOLLOW-UP VISIT: CPT | Performed by: SURGERY

## 2022-01-06 NOTE — PROGRESS NOTES
The patient returns  She is scheduled for a wide excision with possible skin graft of her lower abdominal wall next week  She comes in basically for me to look at the area and discuss timing with her  There are 3 sites on her lower abdominal wall, right left and middle which are old drainage sites  These have not fully healed as of yet but there is no inflammation around them and there is no cellulitis or anything to drain  I told the patient that without question, she is going to end up needing further drainage procedures and that she will eventually need a full excision  She does not however need that right now    She will return as needed

## 2022-01-11 ENCOUNTER — PROCEDURE VISIT (OUTPATIENT)
Dept: SURGERY | Facility: CLINIC | Age: 42
End: 2022-01-11
Payer: COMMERCIAL

## 2022-01-11 VITALS
RESPIRATION RATE: 18 BRPM | BODY MASS INDEX: 45.99 KG/M2 | HEART RATE: 98 BPM | SYSTOLIC BLOOD PRESSURE: 130 MMHG | HEIGHT: 67 IN | TEMPERATURE: 98.9 F | DIASTOLIC BLOOD PRESSURE: 88 MMHG | WEIGHT: 293 LBS

## 2022-01-11 DIAGNOSIS — L73.2 HYDRADENITIS: Primary | ICD-10-CM

## 2022-01-11 PROCEDURE — 1036F TOBACCO NON-USER: CPT | Performed by: SURGERY

## 2022-01-11 PROCEDURE — 99212 OFFICE O/P EST SF 10 MIN: CPT | Performed by: SURGERY

## 2022-01-11 NOTE — PROGRESS NOTES
The patient returns stating that she started draining from her lower abdominal sinus tract on the left  On examination that is so  There is nothing to drain but the patient has now changed her mind verses a couple days ago and wishes to undergo the surgery  This is rescheduled for next week  She also had me look at an area on the right upper quadrant which has a 2-1/2 mm open area and about 1 cm around it being discolored but not raised or fluctuant    There is nothing to drain here

## 2022-01-11 NOTE — LETTER
January 11, 2022     Namrata Beth PA-C  [address] [facility] [address]    Patient: Luz Jara   YOB: 1980   Date of Visit: 1/11/2022       Dear Dr Joaquin Judd: Thank you for referring Pravin Mckeon to me for evaluation  Below are my notes for this consultation  If you have questions, please do not hesitate to call me  I look forward to following your patient along with you  Sincerely,        Tam Lyles MD        CC: No Recipients  Tam Lyles MD  1/11/2022 11:46 AM  Sign when Signing Visit  The patient returns stating that she started draining from her lower abdominal sinus tract on the left  On examination that is so  There is nothing to drain but the patient has now changed her mind verses a couple days ago and wishes to undergo the surgery  This is rescheduled for next week  She also had me look at an area on the right upper quadrant which has a 2-1/2 mm open area and about 1 cm around it being discolored but not raised or fluctuant    There is nothing to drain here

## 2022-01-12 ENCOUNTER — TELEPHONE (OUTPATIENT)
Dept: PSYCHIATRY | Facility: CLINIC | Age: 42
End: 2022-01-12

## 2022-01-12 NOTE — TELEPHONE ENCOUNTER
Patient canceled her 1/19/2022 appointment  She is having surgery the same day   She will let the office know if she has to do future appointments virtual

## 2022-01-17 ENCOUNTER — ANESTHESIA EVENT (OUTPATIENT)
Dept: PERIOP | Facility: HOSPITAL | Age: 42
End: 2022-01-17
Payer: COMMERCIAL

## 2022-01-17 NOTE — ANESTHESIA PREPROCEDURE EVALUATION
Procedure:  WIDE EXCISION HIDRADENITIS ABDOMINAL WALL (Bilateral Groin)    Neg stress test 2021    Neg cardiac workup 2015    PONV history documented    Relevant Problems   ANESTHESIA   (+) PONV (postoperative nausea and vomiting)      CARDIO   (+) Atypical chest pain   (+) High triglycerides   (+) Hyperlipidemia   (+) RBBB (right bundle branch block)      GI/HEPATIC   (+) Esophageal reflux      MUSCULOSKELETAL   (+) Chronic low back pain   (+) Chronic right-sided low back pain without sciatica   (+) Fibromyalgia   (+) Osteoarthritis of spine with radiculopathy, lumbar region      NEURO/PSYCH   (+) Chronic headache with new features   (+) Depression with anxiety   (+) Depression, recurrent (HCC)   (+) Fibromyalgia   (+) Generalized anxiety disorder   (+) History of sepsis   (+) Moderate recurrent major depression (HCC)      PULMONARY   (+) Asthma   (+) Asthma, mild intermittent   (+) Obstructive sleep apnea      Other   (+) Left cervical lymphadenopathy        Physical Exam    Airway    Mallampati score: II  TM Distance: <3 FB  Neck ROM: full     Dental       Cardiovascular  Rhythm: regular, Rate: normal, Cardiovascular exam normal    Pulmonary  Pulmonary exam normal Breath sounds clear to auscultation,     Other Findings  Long thick neck, small TM distance      Anesthesia Plan  ASA Score- 3     Anesthesia Type- general with ASA Monitors  Additional Monitors:   Airway Plan: ETT  Comment: Risks/benefits and alternatives discussed with patient including likely possibility of PONV and sore throat, as well as the rare possibilities of aspiration, dental/oropharyngeal/ocular injuries, or grave/life threatening anesthetic and surgical emergencies          Plan Factors-Exercise tolerance (METS): >4 METS  Patient summary reviewed  Patient instructed to abstain from smoking on day of procedure  Patient did not smoke on day of surgery  Induction- intravenous      Postoperative Plan- Plan for postoperative opioid use  Planned trial extubation    Informed Consent- Anesthetic plan and risks discussed with patient  I personally reviewed this patient with the CRNA  Discussed and agreed on the Anesthesia Plan with the PETROS Garcia

## 2022-01-18 ENCOUNTER — ANESTHESIA (OUTPATIENT)
Dept: PERIOP | Facility: HOSPITAL | Age: 42
End: 2022-01-18
Payer: COMMERCIAL

## 2022-01-18 ENCOUNTER — HOSPITAL ENCOUNTER (OUTPATIENT)
Facility: HOSPITAL | Age: 42
Setting detail: OUTPATIENT SURGERY
Discharge: HOME | End: 2022-01-18
Attending: SURGERY | Admitting: SURGERY
Payer: COMMERCIAL

## 2022-01-18 VITALS
TEMPERATURE: 97.8 F | OXYGEN SATURATION: 97 % | RESPIRATION RATE: 14 BRPM | DIASTOLIC BLOOD PRESSURE: 56 MMHG | SYSTOLIC BLOOD PRESSURE: 104 MMHG | HEART RATE: 99 BPM

## 2022-01-18 DIAGNOSIS — L73.2 HIDRADENITIS: Primary | ICD-10-CM

## 2022-01-18 LAB
EXT PREGNANCY TEST URINE: NEGATIVE
EXT. CONTROL: NORMAL

## 2022-01-18 PROCEDURE — 88304 TISSUE EXAM BY PATHOLOGIST: CPT | Performed by: PATHOLOGY

## 2022-01-18 PROCEDURE — 81025 URINE PREGNANCY TEST: CPT | Performed by: SURGERY

## 2022-01-18 PROCEDURE — 11463 EXC SKN HDRDNT ING COMPLEX: CPT | Performed by: SURGERY

## 2022-01-18 RX ORDER — KETAMINE HCL IN NACL, ISO-OSM 100MG/10ML
SYRINGE (ML) INJECTION AS NEEDED
Status: DISCONTINUED | OUTPATIENT
Start: 2022-01-18 | End: 2022-01-18

## 2022-01-18 RX ORDER — LEVOFLOXACIN 5 MG/ML
750 INJECTION, SOLUTION INTRAVENOUS ONCE
Status: COMPLETED | OUTPATIENT
Start: 2022-01-18 | End: 2022-01-18

## 2022-01-18 RX ORDER — OXYCODONE HYDROCHLORIDE AND ACETAMINOPHEN 5; 325 MG/1; MG/1
1 TABLET ORAL ONCE
Status: COMPLETED | OUTPATIENT
Start: 2022-01-18 | End: 2022-01-18

## 2022-01-18 RX ORDER — PROPOFOL 10 MG/ML
INJECTION, EMULSION INTRAVENOUS CONTINUOUS PRN
Status: DISCONTINUED | OUTPATIENT
Start: 2022-01-18 | End: 2022-01-18

## 2022-01-18 RX ORDER — METOCLOPRAMIDE HYDROCHLORIDE 5 MG/ML
INJECTION INTRAMUSCULAR; INTRAVENOUS AS NEEDED
Status: DISCONTINUED | OUTPATIENT
Start: 2022-01-18 | End: 2022-01-18

## 2022-01-18 RX ORDER — METOCLOPRAMIDE HYDROCHLORIDE 5 MG/ML
10 INJECTION INTRAMUSCULAR; INTRAVENOUS ONCE AS NEEDED
Status: DISCONTINUED | OUTPATIENT
Start: 2022-01-18 | End: 2022-01-18 | Stop reason: HOSPADM

## 2022-01-18 RX ORDER — DEXMEDETOMIDINE HYDROCHLORIDE 100 UG/ML
INJECTION, SOLUTION INTRAVENOUS AS NEEDED
Status: DISCONTINUED | OUTPATIENT
Start: 2022-01-18 | End: 2022-01-18

## 2022-01-18 RX ORDER — FENTANYL CITRATE 50 UG/ML
INJECTION, SOLUTION INTRAMUSCULAR; INTRAVENOUS AS NEEDED
Status: DISCONTINUED | OUTPATIENT
Start: 2022-01-18 | End: 2022-01-18

## 2022-01-18 RX ORDER — ONDANSETRON 2 MG/ML
4 INJECTION INTRAMUSCULAR; INTRAVENOUS ONCE AS NEEDED
Status: DISCONTINUED | OUTPATIENT
Start: 2022-01-18 | End: 2022-01-18 | Stop reason: HOSPADM

## 2022-01-18 RX ORDER — GLYCOPYRROLATE 0.2 MG/ML
INJECTION INTRAMUSCULAR; INTRAVENOUS AS NEEDED
Status: DISCONTINUED | OUTPATIENT
Start: 2022-01-18 | End: 2022-01-18

## 2022-01-18 RX ORDER — FENTANYL CITRATE/PF 50 MCG/ML
50 SYRINGE (ML) INJECTION
Status: COMPLETED | OUTPATIENT
Start: 2022-01-18 | End: 2022-01-18

## 2022-01-18 RX ORDER — SUCCINYLCHOLINE/SOD CL,ISO/PF 100 MG/5ML
SYRINGE (ML) INTRAVENOUS AS NEEDED
Status: DISCONTINUED | OUTPATIENT
Start: 2022-01-18 | End: 2022-01-18

## 2022-01-18 RX ORDER — SODIUM CHLORIDE 9 MG/ML
INJECTION, SOLUTION INTRAVENOUS AS NEEDED
Status: DISCONTINUED | OUTPATIENT
Start: 2022-01-18 | End: 2022-01-18 | Stop reason: HOSPADM

## 2022-01-18 RX ORDER — ONDANSETRON 2 MG/ML
INJECTION INTRAMUSCULAR; INTRAVENOUS AS NEEDED
Status: DISCONTINUED | OUTPATIENT
Start: 2022-01-18 | End: 2022-01-18

## 2022-01-18 RX ORDER — PROPOFOL 10 MG/ML
INJECTION, EMULSION INTRAVENOUS AS NEEDED
Status: DISCONTINUED | OUTPATIENT
Start: 2022-01-18 | End: 2022-01-18

## 2022-01-18 RX ORDER — DIPHENHYDRAMINE HYDROCHLORIDE 50 MG/ML
INJECTION INTRAMUSCULAR; INTRAVENOUS AS NEEDED
Status: DISCONTINUED | OUTPATIENT
Start: 2022-01-18 | End: 2022-01-18

## 2022-01-18 RX ORDER — SODIUM CHLORIDE, SODIUM LACTATE, POTASSIUM CHLORIDE, CALCIUM CHLORIDE 600; 310; 30; 20 MG/100ML; MG/100ML; MG/100ML; MG/100ML
50 INJECTION, SOLUTION INTRAVENOUS CONTINUOUS
Status: DISCONTINUED | OUTPATIENT
Start: 2022-01-18 | End: 2022-01-18 | Stop reason: HOSPADM

## 2022-01-18 RX ORDER — ALBUTEROL SULFATE 2.5 MG/3ML
2.5 SOLUTION RESPIRATORY (INHALATION) ONCE AS NEEDED
Status: DISCONTINUED | OUTPATIENT
Start: 2022-01-18 | End: 2022-01-18 | Stop reason: HOSPADM

## 2022-01-18 RX ORDER — OXYCODONE HYDROCHLORIDE AND ACETAMINOPHEN 5; 325 MG/1; MG/1
1 TABLET ORAL EVERY 4 HOURS PRN
Qty: 15 TABLET | Refills: 0 | Status: SHIPPED | OUTPATIENT
Start: 2022-01-18 | End: 2022-05-18

## 2022-01-18 RX ORDER — ACETAMINOPHEN 325 MG/1
650 TABLET ORAL ONCE
Status: DISCONTINUED | OUTPATIENT
Start: 2022-01-18 | End: 2022-01-18 | Stop reason: HOSPADM

## 2022-01-18 RX ORDER — LEVOFLOXACIN 500 MG/1
500 TABLET, FILM COATED ORAL EVERY 24 HOURS
Qty: 10 TABLET | Refills: 0 | Status: SHIPPED | OUTPATIENT
Start: 2022-01-18 | End: 2022-01-28

## 2022-01-18 RX ORDER — SODIUM CHLORIDE, SODIUM LACTATE, POTASSIUM CHLORIDE, CALCIUM CHLORIDE 600; 310; 30; 20 MG/100ML; MG/100ML; MG/100ML; MG/100ML
INJECTION, SOLUTION INTRAVENOUS CONTINUOUS PRN
Status: DISCONTINUED | OUTPATIENT
Start: 2022-01-18 | End: 2022-01-18

## 2022-01-18 RX ORDER — DEXAMETHASONE SODIUM PHOSPHATE 4 MG/ML
INJECTION, SOLUTION INTRA-ARTICULAR; INTRALESIONAL; INTRAMUSCULAR; INTRAVENOUS; SOFT TISSUE AS NEEDED
Status: DISCONTINUED | OUTPATIENT
Start: 2022-01-18 | End: 2022-01-18

## 2022-01-18 RX ADMIN — PROPOFOL 50 MG: 10 INJECTION, EMULSION INTRAVENOUS at 09:53

## 2022-01-18 RX ADMIN — LEVOFLOXACIN: 5 INJECTION, SOLUTION INTRAVENOUS at 09:10

## 2022-01-18 RX ADMIN — DEXMEDETOMIDINE HCL 12 MCG: 100 INJECTION INTRAVENOUS at 09:36

## 2022-01-18 RX ADMIN — DEXMEDETOMIDINE HCL 12 MCG: 100 INJECTION INTRAVENOUS at 09:28

## 2022-01-18 RX ADMIN — FENTANYL CITRATE 50 MCG: 50 INJECTION, SOLUTION INTRAMUSCULAR; INTRAVENOUS at 09:53

## 2022-01-18 RX ADMIN — FENTANYL CITRATE 50 MCG: 50 INJECTION, SOLUTION INTRAMUSCULAR; INTRAVENOUS at 09:42

## 2022-01-18 RX ADMIN — Medication 20 MG: at 09:55

## 2022-01-18 RX ADMIN — FENTANYL CITRATE 50 MCG: 50 INJECTION INTRAMUSCULAR; INTRAVENOUS at 11:15

## 2022-01-18 RX ADMIN — PROPOFOL 50 MG: 10 INJECTION, EMULSION INTRAVENOUS at 10:17

## 2022-01-18 RX ADMIN — Medication 180 MG: at 09:29

## 2022-01-18 RX ADMIN — FENTANYL CITRATE 50 MCG: 50 INJECTION, SOLUTION INTRAMUSCULAR; INTRAVENOUS at 09:28

## 2022-01-18 RX ADMIN — METOCLOPRAMIDE HYDROCHLORIDE 10 MG: 5 INJECTION INTRAMUSCULAR; INTRAVENOUS at 09:42

## 2022-01-18 RX ADMIN — OXYCODONE HYDROCHLORIDE AND ACETAMINOPHEN 1 TABLET: 5; 325 TABLET ORAL at 13:10

## 2022-01-18 RX ADMIN — PROPOFOL 300 MG: 10 INJECTION, EMULSION INTRAVENOUS at 09:28

## 2022-01-18 RX ADMIN — Medication 20 MG: at 09:34

## 2022-01-18 RX ADMIN — Medication 10 MG: at 09:31

## 2022-01-18 RX ADMIN — SODIUM CHLORIDE, SODIUM LACTATE, POTASSIUM CHLORIDE, AND CALCIUM CHLORIDE: .6; .31; .03; .02 INJECTION, SOLUTION INTRAVENOUS at 09:10

## 2022-01-18 RX ADMIN — FENTANYL CITRATE 50 MCG: 50 INJECTION, SOLUTION INTRAMUSCULAR; INTRAVENOUS at 10:17

## 2022-01-18 RX ADMIN — DIPHENHYDRAMINE HYDROCHLORIDE 12.5 MG: 50 INJECTION, SOLUTION INTRAMUSCULAR; INTRAVENOUS at 09:29

## 2022-01-18 RX ADMIN — ONDANSETRON 4 MG: 2 INJECTION INTRAMUSCULAR; INTRAVENOUS at 09:29

## 2022-01-18 RX ADMIN — GLYCOPYRROLATE 0.1 MG: 0.2 INJECTION, SOLUTION INTRAMUSCULAR; INTRAVENOUS at 09:55

## 2022-01-18 RX ADMIN — Medication 20 MG: at 09:54

## 2022-01-18 RX ADMIN — Medication 20 MG: at 10:03

## 2022-01-18 RX ADMIN — DEXMEDETOMIDINE HCL 4 MCG: 100 INJECTION INTRAVENOUS at 09:43

## 2022-01-18 RX ADMIN — Medication 10 MG: at 10:13

## 2022-01-18 RX ADMIN — DEXAMETHASONE SODIUM PHOSPHATE 8 MG: 4 INJECTION, SOLUTION INTRAMUSCULAR; INTRAVENOUS at 09:29

## 2022-01-18 RX ADMIN — PROPOFOL 100 MCG/KG/MIN: 10 INJECTION, EMULSION INTRAVENOUS at 09:31

## 2022-01-18 RX ADMIN — Medication 10 MG: at 09:42

## 2022-01-18 RX ADMIN — DEXMEDETOMIDINE HCL 12 MCG: 100 INJECTION INTRAVENOUS at 09:31

## 2022-01-18 RX ADMIN — PROPOFOL 50 MG: 10 INJECTION, EMULSION INTRAVENOUS at 09:54

## 2022-01-18 RX ADMIN — LIDOCAINE HYDROCHLORIDE 100 MG: 20 INJECTION, SOLUTION INTRAVENOUS at 09:27

## 2022-01-18 RX ADMIN — Medication 10 MG: at 09:37

## 2022-01-18 RX ADMIN — FENTANYL CITRATE 50 MCG: 50 INJECTION INTRAMUSCULAR; INTRAVENOUS at 11:09

## 2022-01-18 RX ADMIN — SODIUM CHLORIDE, SODIUM LACTATE, POTASSIUM CHLORIDE, AND CALCIUM CHLORIDE: .6; .31; .03; .02 INJECTION, SOLUTION INTRAVENOUS at 10:12

## 2022-01-18 NOTE — OP NOTE
PERATIVE REPORT  PATIENT NAME: Ricky Smith    :  1980  MRN: [id number]  Pt Location: [facility] OR ROOM 01    SURGERY DATE: 2022    Surgeon(s) and Role:     * Simona Coy MD - Primary     * MOISES Abarca-C - Assisting    Preop Diagnosis:  Hidradenitis [L73 2]    Post-Op Diagnosis Codes:     * Hidradenitis [L73 2]    Procedure(s) (LRB):  WIDE EXCISION HIDRADENITIS ABDOMINAL WALL (Bilateral)    Specimen(s):  ID Type Source Tests Collected by Time Destination   1 : abdomen stitch in middle upper margin Tissue Skin, Other TISSUE EXAM Simona Coy MD 2022  9:47 AM        Estimated Blood Loss:   Minimal    Drains:  Closed/Suction Drain Anterior;Right; Inferior Abdomen Bulb 10 Fr  (Active)   Dressing Status Clean; Intact;Dry 22 1012   Number of days: 0       Anesthesia Type:   General    Operative Indications:  Hidradenitis [L73 2]      Operative Findings:  Same    Complications:   None    Procedure and Technique:  The patient was identified by me and placed in supine position upon the operating room table  General endotracheal anesthesia was now induced  The patient's abdomen was taped up and then prepped and draped in a normal surgical manner  A time-out was done by everyone in the room  I marked a elliptical incision on the lower abdominal wall to encompass all of the areas that had been draining  0 5% Marcaine with epinephrine is infused as a local anesthetic and then a long elliptical incision is made with knife and continued with Bovie  This was carried down to subcutaneous tissue and the sample was removed  Wounds were now irrigated with  irrigant and then closed with two layers of interrupted Vicryl followed by staples  A Francisco-Griffith drain had been left below the lower subcutaneous layer and was sewn in place with nylon  A silver dressing was now placed  There was no qualified resident to assist, evan DE LEON was the 1st assistant    She was necessary for help in the dissection and closure   I was present for the entire procedure    Patient Disposition:  PACU       SIGNATURE: Francois Rodrigues MD  DATE: January 18, 2022  TIME: 10:43 AM

## 2022-01-18 NOTE — DISCHARGE INSTRUCTIONS
DISCHARGE INSTRUCTIONS:  FOLLOW UP APPOINTMENT: Following discharge from the hospital call the office in 1-2 days to set up a post operative appointment to be seen in 1 week by Dr Kendrick Valenzuela  AFTER YOU LEAVE: Following discharge from the hospital, you may have some questions about your procedure, your activities or your general condition  These instructions may answer some of your questions and help you adjust during the first few days following your operation  You can expect to be sore and tender mostly around the incisions  This pain can last approximally 5 days and should gradually improve daily  INCISION SITES:  - You may apply ice to the incisions to help with pain  Avoid heat as this may make skin glue tacky  - It is normal to have some bruising, swelling or mild discoloration around the incision  If increasing redness or pain develops, call our office immediately    -Do not apply any creams, lotions, or ointments  If you have dressings:  - You may remove the gauze dressing from your incisions 48 hours after surgery  Liane Stands will be removed at the office  WOUND CARE:  -Leave steri-strips on, allow to fall off naturally     -Avoid tight adhering, irritative clothing   -You may gently shower, let water and soap run down and pat dry; no scrubbing the incision sites    -No baths, hot tubs, or swimming x 6 weeks or until cleared after follow up appointment  PAIN CONTROL/MEDICATIONS:  -Recommend taking over the counter pain medication such as Tylenol, Aleve OR Ibuprofen first; read and follow labels  You may alternate Tylenol and Ibuprofen every 3 hours   -Only use prescribed pain medications as needed and try to taper down use overtime  Do not drive or operate heavy machinery while on prescription pain medications   Do not take with alcohol   - If you were given a prescription for Percocet, Norco, or Vicodin for pain be sure to eat prior to taking as these medications as they may cause nausea and vomiting on an empty stomach  -DO NOT take Tylenol  (acetaminophen) with prescribed pain medication for a fever or for further pain control as these medications already contain Tylenol in them  Take one or the other  Do not exceed more than 4000 mg of acetaminophen in 24 hours or 3000 mg if you have liver disease    -You may apply ice at the incision site as needed for 20-30 minutes on/off to decrease pain/swelling the first few days  - If you were given an antibiotic take it until it is finished  DIET/LIFE HABITS:  -Advance diet as tolerated  Start with bland foods  Once tolerating normal diet, include fiber-rich foods such as fruits and vegetables to help with bowel movements   -Stay hydrated  Drink plenty of non-caffienated, non-alcoholic beverages such as water, juices, popsicles, etc   -Refrain from alcohol consumption the first few weeks as this can impair wound healing and increase the risk of unwanted bleeding    -No smoking at least 2 weeks post surgery, this can also delay wound healing  Smoking cessation is encouraged to improve your overall health  We can provide you with options to facilitate cessation   -If you are having constipation, ensure you are eating a high fiber diet, stay active, and if requiring more, you may take over the counter stool softners as needed  ACTIVITY/RESTRICTIONS:  -No strenuous exercise and no heavy lifting, pulling, or pushing >10-15 lbs x 4 weeks or until cleared by surgeon   -The evening following the procedure you should rest as much as possible, sitting, lying or reclining  You should be sure someone remains with you until the next morning  Gradually increase your activity daily  Walking 3-4 times daily is good and stairs are ok  Listen to your body  If you start to get tired or sore then rest    -No driving for 5 days or while taking narcotics for pain   You may begin to drive again once you can get in and out of a car comfortably and once off prescribed pain medication x 24 hours           CALL THE OFFICE IF/RETURN TO ED:  -Fevers/chills with uncontrolled temperature > 100 4 F   -Increasing severe pain uncontrolled with pain medicine   -Incision site is having thick, yellow drainage, increasing redness, is warm to the touch, or becoming increasingly tender   -Any changes in overall sophie such as: nausea/vomitting, fevers/chills, diarrhea/constipation, inability to urinate, chest pains, palpations, trouble breathing, coughing, etc

## 2022-01-18 NOTE — INTERVAL H&P NOTE
H&P reviewed  After examining the patient I find no changes in the patients condition since the H&P had been written  Since this was written, the patient canceled her surgery and then came back to the office a few days later for me to do another incision and drainage    She has now asked that the area be removed    Vitals:    01/18/22 0857   BP: 120/70   Pulse: 87   Resp: 16   Temp: 98 7 °F (37 1 °C)   SpO2: 100%

## 2022-01-18 NOTE — ANESTHESIA POSTPROCEDURE EVALUATION
Post-Op Assessment Note    CV Status:  Stable  Pain Score: 0    Pain management: adequate     Mental Status:  Awake   Hydration Status:  Stable   PONV Controlled:  Controlled   Airway Patency:  Patent      Post Op Vitals Reviewed: Yes      Staff: CRNA         No complications documented      BP   127/90   Temp   97 7   Pulse  109   Resp  17   SpO2   100

## 2022-01-21 ENCOUNTER — OFFICE VISIT (OUTPATIENT)
Dept: SURGERY | Facility: CLINIC | Age: 42
End: 2022-01-21

## 2022-01-21 VITALS
TEMPERATURE: 96.3 F | SYSTOLIC BLOOD PRESSURE: 130 MMHG | BODY MASS INDEX: 45.99 KG/M2 | HEIGHT: 67 IN | OXYGEN SATURATION: 96 % | HEART RATE: 102 BPM | DIASTOLIC BLOOD PRESSURE: 80 MMHG | WEIGHT: 293 LBS

## 2022-01-21 DIAGNOSIS — L73.2 HYDRADENITIS: Primary | ICD-10-CM

## 2022-01-21 PROCEDURE — 99024 POSTOP FOLLOW-UP VISIT: CPT | Performed by: SURGERY

## 2022-01-21 NOTE — PROGRESS NOTES
1st postoperative visit after wide excision of hidradenitis from the lower abdomen  The patient comes in today absence of Sánchez Wharton for me to remove her drain  The drain, by the way, is draining about 30-35 cc over the last 16 hours and has to be left in Situ  The wound looks fine  There is no evidence of a wound infection    I redressed the wound will see her on Monday to take out the drain

## 2022-01-24 ENCOUNTER — OFFICE VISIT (OUTPATIENT)
Dept: SURGERY | Facility: CLINIC | Age: 42
End: 2022-01-24

## 2022-01-24 VITALS
WEIGHT: 293 LBS | TEMPERATURE: 97 F | SYSTOLIC BLOOD PRESSURE: 126 MMHG | DIASTOLIC BLOOD PRESSURE: 88 MMHG | BODY MASS INDEX: 45.99 KG/M2 | RESPIRATION RATE: 18 BRPM | HEART RATE: 100 BPM | HEIGHT: 67 IN

## 2022-01-24 DIAGNOSIS — L73.2 HYDRADENITIS: Primary | ICD-10-CM

## 2022-01-24 DIAGNOSIS — K21.9 GASTROESOPHAGEAL REFLUX DISEASE WITHOUT ESOPHAGITIS: ICD-10-CM

## 2022-01-24 PROCEDURE — 99024 POSTOP FOLLOW-UP VISIT: CPT | Performed by: SURGERY

## 2022-01-24 NOTE — PROGRESS NOTES
Second postoperative visit after wide excision of her lower abdominal wall done for hidradenitis suppurativa  She comes in today for me to remove her drain  Drain fluid is serosanguineous and it is not blocked  She has drained less than 30 cc for the past day and a half  This is ready to be removed and I did so    She will return in a couple days for me to start taking her staples out

## 2022-01-27 ENCOUNTER — OFFICE VISIT (OUTPATIENT)
Dept: SURGERY | Facility: CLINIC | Age: 42
End: 2022-01-27

## 2022-01-27 VITALS
WEIGHT: 293 LBS | SYSTOLIC BLOOD PRESSURE: 122 MMHG | HEART RATE: 98 BPM | DIASTOLIC BLOOD PRESSURE: 88 MMHG | HEIGHT: 67 IN | RESPIRATION RATE: 18 BRPM | TEMPERATURE: 97.6 F | BODY MASS INDEX: 45.99 KG/M2

## 2022-01-27 DIAGNOSIS — L73.2 HYDRADENITIS: Primary | ICD-10-CM

## 2022-01-27 PROCEDURE — 99024 POSTOP FOLLOW-UP VISIT: CPT | Performed by: SURGERY

## 2022-01-27 RX ORDER — SULFAMETHOXAZOLE AND TRIMETHOPRIM 800; 160 MG/1; MG/1
1 TABLET ORAL EVERY 12 HOURS SCHEDULED
Qty: 14 TABLET | Refills: 0 | Status: SHIPPED | OUTPATIENT
Start: 2022-01-27 | End: 2022-02-03

## 2022-01-27 NOTE — PROGRESS NOTES
Third postoperative visit  I noticed some drainage to the left of the midline in on exam it looks like 2 sutures of pulled through  That is 2 staples have pulled through  This area is open for about 3 cm to 4  The rest looks like it is held intact and I removed half of her staples  She has a little bit red to the right side going lateral   This is superiorly not inferiorly    For this up putting her back on antibiotics

## 2022-01-31 ENCOUNTER — OFFICE VISIT (OUTPATIENT)
Dept: SURGERY | Facility: CLINIC | Age: 42
End: 2022-01-31

## 2022-01-31 VITALS
WEIGHT: 293 LBS | OXYGEN SATURATION: 98 % | SYSTOLIC BLOOD PRESSURE: 120 MMHG | BODY MASS INDEX: 45.99 KG/M2 | HEIGHT: 67 IN | DIASTOLIC BLOOD PRESSURE: 88 MMHG | TEMPERATURE: 98.2 F | HEART RATE: 96 BPM | RESPIRATION RATE: 20 BRPM

## 2022-01-31 DIAGNOSIS — L73.2 HYDRADENITIS: Primary | ICD-10-CM

## 2022-01-31 PROCEDURE — 99024 POSTOP FOLLOW-UP VISIT: CPT | Performed by: SURGERY

## 2022-01-31 PROCEDURE — 3008F BODY MASS INDEX DOCD: CPT | Performed by: SURGERY

## 2022-01-31 NOTE — PROGRESS NOTES
Postop after a UGI excision of a hidradenitis suppurativa  I took half of her staples out last week and took the other half out today  The right 60% to 65% of the wound appears to have healed  She has broken down in 2 areas on the left side  The slight redness which was present is gone  I am going to treat this simply with dry dressings at present    In a week I will make a decision as to whether or not I need to send her to a wound center for advanced dressings

## 2022-02-03 ENCOUNTER — TELEMEDICINE (OUTPATIENT)
Dept: BEHAVIORAL/MENTAL HEALTH CLINIC | Facility: CLINIC | Age: 42
End: 2022-02-03
Payer: COMMERCIAL

## 2022-02-03 DIAGNOSIS — F41.1 GENERALIZED ANXIETY DISORDER: ICD-10-CM

## 2022-02-03 DIAGNOSIS — F33.1 MODERATE RECURRENT MAJOR DEPRESSION (HCC): Primary | ICD-10-CM

## 2022-02-03 PROCEDURE — 90834 PSYTX W PT 45 MINUTES: CPT | Performed by: SOCIAL WORKER

## 2022-02-03 NOTE — PSYCH
This note was not shared with the patient due to this is a psychotherapy note      Virtual Regular Visit    Verification of patient location:    Patient is located in the following state in which I hold an active license [place]      Assessment/Plan:    Problem List Items Addressed This Visit        Other    Moderate recurrent major depression ([address] ) - Primary    Generalized anxiety disorder          Goals addressed in session: Goal 1          Reason for visit is   Chief Complaint   Patient presents with    Virtual Regular Visit        Encounter provider [facility]    Provider located at [address] [place] [id number] [phone]     The patient was identified by name and date of birth  Jannette Baugh was informed that this is a telemedicine visit and that the visit is being conducted throughEpic Embedded and patient was informed this is a secure, HIPAA-complaint platform  She agrees to proceed     My office door was closed  No one else was in the room  She acknowledged consent and understanding of privacy and security of the video platform  The patient has agreed to participate and understands they can discontinue the visit at any time  Patient is aware this is a billable service  Grisel Meza is a 43 y o  female   Psychotherapy Provided: Individual Psychotherapy 45 minutes     Length of time in session: 45 minutes, follow up in 2 week    Goals addressed in session: Goal 1     Pain:      mild    3    Current suicide risk : Low     Met with Jannette Bhatia had her surgery on 1/12/22  She has been dealing with the complication of the wound not healing correctly and following closely with her Dr Lieutenant Varela focused on discussing how she is coping, and how she is remaining clear about using her coping skills      Her  has been picking up extra responsibilities around the house 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[employer]--Jannette expresses gratitude for this  She says that the surgery has forced her to be more patient and give up some control; voicing awareness and insight into this being a barrier towards her wellness in the past and expressing her thoughts and feelings about this  The parents of the girl  trooper remain a trigger as she plans their upcoming [place] trip  She expressed her thoughts and feelings about unrealistic expectations of Jannette by the parents  Jannette is reporting physiological symptoms- feeling like she is shaking internally, urge to 'say how it is' when having to deal with the parents  We discussed how the parents speak to her, challenging her to look at situations from a helicopter view, and providing education on the difference between assertive and aggressive communication of self and others  She was challenged to explore her fears of assertiveness  She was also challenged to explore whether 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 are still being met, or if her mental health is now being sacrificed  Jannette deflects the conversation of 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, rationalizing, saying she only has 1 year left, the kids will be driving so they'll be having less meetings  Jannette is looking forward to EMDR and is hopeful that it will help her work through some past trauma effecting her current self worth, behaviors she allows  Continue biweekly support  Tx plan update due at next session        Mental status:  Appearance calm and cooperative , adequate hygiene and grooming and good eye contact    Mood anxious   Affect Affect appropriate, mood congruent   Speech a normal rate and volume   Thought Processes coherent/organized and normal thought processes   Hallucinations no hallucinations present    Thought Content no delusions, feelings of guilt   Abnormal Thoughts no suicidal thoughts  and no homicidal thoughts Orientation  oriented to person and place and time   Remote Memory short term memory intact and long term memory intact   Attention Span concentration intact   Intellect Appears to be of Average Intelligence   Fund of Knowledge displays adequate knowledge of current events   Insight fair   Judgement fair   Muscle Strength Unable to assess due to virtual session   Language no difficulty naming common objects   Pain mild   Pain Scale 3     [address]: Diagnosis and Treatment Plan explained to Aura Garcia relates understanding diagnosis and is agreeable to Treatment Plan  Yes     I spent 45 minutes directly with the patient during this visit    [address] verbally agrees to participate in [employer]  Pt is aware that [employer] could be limited without vital signs or the ability to perform a full hands-on physical exam  Jannette Baugh understands she or the provider may request at any time to terminate the video visit and request the patient to seek care or treatment in person

## 2022-02-07 ENCOUNTER — OFFICE VISIT (OUTPATIENT)
Dept: SURGERY | Facility: CLINIC | Age: 42
End: 2022-02-07

## 2022-02-07 VITALS
TEMPERATURE: 97.3 F | HEART RATE: 100 BPM | DIASTOLIC BLOOD PRESSURE: 88 MMHG | RESPIRATION RATE: 18 BRPM | WEIGHT: 293 LBS | BODY MASS INDEX: 45.99 KG/M2 | OXYGEN SATURATION: 97 % | HEIGHT: 67 IN | SYSTOLIC BLOOD PRESSURE: 138 MMHG

## 2022-02-07 DIAGNOSIS — L73.2 HYDRADENITIS: Primary | ICD-10-CM

## 2022-02-07 PROCEDURE — 99024 POSTOP FOLLOW-UP VISIT: CPT | Performed by: SURGERY

## 2022-02-07 NOTE — PROGRESS NOTES
Follow-up after removing her staples and switching her over to Xeroform  She states that she is feeling considerably better and she notices much less drainage  On examination about 95% of her abdomen is closed  One of the incisions is coapted now and the other 1 has a healthy granulation base for about 2 cm by slightly less than 1 cm  There is no cellulitis    I told to continue the present care and return and see me in 2 weeks

## 2022-02-10 ENCOUNTER — OFFICE VISIT (OUTPATIENT)
Dept: PSYCHIATRY | Facility: CLINIC | Age: 42
End: 2022-02-10
Payer: COMMERCIAL

## 2022-02-10 DIAGNOSIS — F41.1 GENERALIZED ANXIETY DISORDER: ICD-10-CM

## 2022-02-10 DIAGNOSIS — F33.1 MODERATE RECURRENT MAJOR DEPRESSION (HCC): Primary | ICD-10-CM

## 2022-02-10 PROCEDURE — 1036F TOBACCO NON-USER: CPT | Performed by: PHYSICIAN ASSISTANT

## 2022-02-10 PROCEDURE — 99213 OFFICE O/P EST LOW 20 MIN: CPT | Performed by: PHYSICIAN ASSISTANT

## 2022-02-10 RX ORDER — VENLAFAXINE HYDROCHLORIDE 150 MG/1
150 CAPSULE, EXTENDED RELEASE ORAL DAILY
Qty: 90 CAPSULE | Refills: 0 | Status: SHIPPED | OUTPATIENT
Start: 2022-02-10 | End: 2022-04-26 | Stop reason: SDUPTHER

## 2022-02-10 RX ORDER — AMITRIPTYLINE HYDROCHLORIDE 50 MG/1
50 TABLET, FILM COATED ORAL
Qty: 90 TABLET | Refills: 0 | Status: SHIPPED | OUTPATIENT
Start: 2022-02-10 | End: 2022-04-26 | Stop reason: SDUPTHER

## 2022-02-10 RX ORDER — VENLAFAXINE HYDROCHLORIDE 75 MG/1
75 CAPSULE, EXTENDED RELEASE ORAL DAILY
Qty: 90 CAPSULE | Refills: 0 | Status: SHIPPED | OUTPATIENT
Start: 2022-02-10 | End: 2022-04-26 | Stop reason: SDUPTHER

## 2022-02-10 NOTE — PSYCH
MEDICATION MANAGEMENT NOTE        [facility] [place]  [address] [facility]  [place] [address] [phone]        Name and Date of Birth:  Romelia Mondragon 43 y o  1980    Date of Visit: February 10, 2022    SUBJECTIVE:     Jannette seen by [facility] 12/9 at which time meds unchanged  Saw sleep medicine and home study done this week -awaiting results  Continues to f/u with [address] for ind therapy and these notes were reviewed  Primary symptoms reported today: anxiety accompanied by shakiness and excessive worry; building back her physical stamina  -recovering from surgical excision of hidradenitis  Also discussed long hx of no libido, even during her late 25s when she had 6 mth period of time off all psychotropics  Jannette states she is hypersensitive to touch and we discussed the possibility that this may be a trauma response  Jannette will discuss further with Anthony Michelle who she is consulting with next month  Looking forward to trip to [facility] with family and friends in April  Review of Systems   Constitutional: Positive for fatigue  Neurological: Negative for dizziness and syncope  Psychiatric History      This office since 2/25/20 (Dr Joie Wood)  Ind therapy [place] [facility] 5/11/20 and Z[facility] Goff starting 1/22/21  In OP tx since 24 yo  Has seen Dr Reagan Wagner in the past   No IP or suicide attempts  FHx- mother and brother with depression  Trauma/Loss History       Death of father from MI when Jannette was 13 yo  Bullied by brother until age 13 yo  Social History       x 19 yrs  Lives with  and 14 yo daughter              OBJECTIVE:     MENTAL STATUS EXAM  Appearance:  age appropriate, dressed casually   Behavior:  Pleasant & cooperative   Speech:  Normal volume, regular rate and rhythm   Mood:  mildly anxious   Affect:  mood congruent   Language: intact and appropriate for age, education, and intellect   Thought Process:  goal directed   Associations: intact associations   Thought Content:  negative ruminations   Perceptual Disturbances: no auditory or visual hallcunations   Risk Potential / Abnormal Thoughts: Suicidal ideation - None  Homicidal ideation - None  Potential for aggression - No       Consciousness:  Alert & Awake   Sensorium:  Grossly oriented   Attention: attention span and concentration are age appropriate       Fund of Knowledge:  Memory: awareness of current events: yes  recent and remote memory grossly intact   Insight:  good   Judgment: good   Muscle Strength Muscle Tone: Grossly normal  normal   Gait/Station: normal gait/station with good balance   Motor Activity: no abnormal movements       Lab Review: I have reviewed all pertinent labs  Lab Results   Component Value Date    HGBA1C 5 5 01/04/2022     Lab Results   Component Value Date    CHOLESTEROL 195 11/11/2021     Lab Results   Component Value Date    HDL 43 11/11/2021     Lab Results   Component Value Date    TRIG 282 (H) 11/11/2021     Lab Results   Component Value Date    NONHDLC 152 11/11/2021     Lab Results   Component Value Date     04/05/2017    SODIUM 138 01/03/2022    K 3 9 01/03/2022     01/03/2022    CO2 26 01/03/2022    ANIONGAP 7 09/19/2014    AGAP 6 01/03/2022    BUN 14 01/03/2022    CREATININE 0 75 01/03/2022    GLUC 150 (H) 01/03/2022    GLUF 120 (H) 11/11/2021    CALCIUM 9 7 01/03/2022    AST 41 01/03/2022    ALT 75 01/03/2022    ALKPHOS 69 01/03/2022    PROT 6 7 04/05/2017    TP 7 3 01/03/2022    BILITOT 0 6 04/05/2017    TBILI 0 53 01/03/2022    EGFR 99 01/03/2022     Lab Results   Component Value Date    WBC 9 42 01/03/2022    HGB 15 3 01/03/2022    HCT 44 3 01/03/2022    MCV 87 01/03/2022     01/03/2022       Lab Results   Component Value Date    JNH8BXAHJXRC 0 869 03/13/2021    TSH 0 85 10/23/2018           ASSESSMENT & PLAN          Diagnoses and all orders for this visit:    Moderate recurrent major depression (HCC)  -     amitriptyline (ELAVIL) 50 mg tablet; Take 1 tablet (50 mg total) by mouth daily at bedtime    Generalized anxiety disorder  -     amitriptyline (ELAVIL) 50 mg tablet; Take 1 tablet (50 mg total) by mouth daily at bedtime  -     venlafaxine (EFFEXOR-XR) 150 mg 24 hr capsule; Take 1 capsule (150 mg total) by mouth daily With 75 mg  -     venlafaxine (EFFEXOR-XR) 75 mg 24 hr capsule;  Take 1 capsule (75 mg total) by mouth daily With 150 mg        Current Outpatient Medications   Medication Sig Dispense Refill    albuterol (PROVENTIL HFA,VENTOLIN HFA) 90 mcg/act inhaler Inhale 2 puffs every 6 (six) hours as needed for wheezing or shortness of breath (cough) 18 g 1    amitriptyline (ELAVIL) 50 mg tablet Take 1 tablet (50 mg total) by mouth daily at bedtime 90 tablet 0    Ascorbic Acid (VITAMIN C) 500 MG CAPS Take 1 capsule by mouth daily      aspirin 81 mg chewable tablet Chew 1 tablet (81 mg total) daily 30 tablet 30    atorvastatin (LIPITOR) 40 mg tablet Take 1 tablet (40 mg total) by mouth daily 90 tablet 1    azelastine (ASTELIN) 0 1 % nasal spray 2 sprays into each nostril 2 (two) times a day Use in each nostril as directed 30 mL 11    cetirizine (ZyrTEC) 10 mg tablet Take 10 mg by mouth daily      Cholecalciferol (VITAMIN D3) 5000 units CAPS Take 1 capsule by mouth daily        ciprofloxacin-dexamethasone (CIPRODEX) otic suspension Administer 4 drops to the right ear 2 (two) times a day (Patient taking differently: Administer 4 drops to the right ear as needed BID) 7 5 mL 0    cyanocobalamin (VITAMIN B-12) 1,000 mcg tablet Take 1,000 mcg by mouth daily       esomeprazole (NexIUM 24HR) 20 mg capsule Take 1 capsule (20 mg total) by mouth every morning 90 capsule 3    losartan (COZAAR) 100 MG tablet Take 1 tablet (100 mg total) by mouth daily 90 tablet 4    metoprolol succinate (TOPROL-XL) 100 mg 24 hr tablet Take 1 tablet (100 mg total) by mouth daily 90 tablet 3    Multiple Vitamins-Minerals (CENTRUM ADULTS PO) Take 1 tablet by mouth daily      nystatin (MYCOSTATIN) cream Apply topically 2 (two) times a day as needed (rash) 30 g 0    Omega-3 Fatty Acids (FISH OIL ADULT GUMMIES PO) Take by mouth      oxyCODONE-acetaminophen (Percocet) 5-325 mg per tablet Take 1 tablet by mouth every 4 (four) hours as needed for moderate pain or severe pain for up to 15 doses Max Daily Amount: 6 tablets 15 tablet 0    venlafaxine (EFFEXOR-XR) 150 mg 24 hr capsule Take 1 capsule (150 mg total) by mouth daily With 75 mg 90 capsule 0    venlafaxine (EFFEXOR-XR) 75 mg 24 hr capsule Take 1 capsule (75 mg total) by mouth daily With 150 mg 90 capsule 0     No current facility-administered medications for this visit  Plan:       Stable  Cont present meds: effexor xr 225 mg/d, amitriptyline 50 mg q bedtime  Reviewed risks, benefits, side effects of medications, including no medication  Patient understands and agrees to treatment plan  Cont f/u with Eric Tang and Chrissy Joaquin        F/u [facility] end of April, sooner prn     Patient has been informed of 24 hours and weekend coverage for urgent situations accessed by calling the main clinic phone number       Addison Harper PA-C

## 2022-02-21 ENCOUNTER — OFFICE VISIT (OUTPATIENT)
Dept: SURGERY | Facility: CLINIC | Age: 42
End: 2022-02-21

## 2022-02-21 VITALS
WEIGHT: 293 LBS | RESPIRATION RATE: 18 BRPM | TEMPERATURE: 98 F | OXYGEN SATURATION: 98 % | HEART RATE: 92 BPM | DIASTOLIC BLOOD PRESSURE: 80 MMHG | HEIGHT: 67 IN | BODY MASS INDEX: 45.99 KG/M2 | SYSTOLIC BLOOD PRESSURE: 122 MMHG

## 2022-02-21 DIAGNOSIS — L73.2 HYDRADENITIS: Primary | ICD-10-CM

## 2022-02-21 PROCEDURE — 99024 POSTOP FOLLOW-UP VISIT: CPT | Performed by: SURGERY

## 2022-02-21 NOTE — PROGRESS NOTES
Continuing postop after excision of a large area of her lower abdominal wall for hidradenitis  When I last saw her she had a couple hypergranulated areas  One of those is closed and the 1 on the right lateral is open only for couple mm  Left of the midline is a 1 2 cm hypergranulated area to which I put on silver nitrate today to cauterize    I told her that if this is still open in 2 or 3 weeks I need to see her again

## 2022-02-23 ENCOUNTER — OFFICE VISIT (OUTPATIENT)
Dept: BARIATRICS | Facility: CLINIC | Age: 42
End: 2022-02-23
Payer: COMMERCIAL

## 2022-02-23 VITALS
HEIGHT: 67 IN | WEIGHT: 293 LBS | SYSTOLIC BLOOD PRESSURE: 124 MMHG | RESPIRATION RATE: 14 BRPM | BODY MASS INDEX: 45.99 KG/M2 | TEMPERATURE: 97.9 F | DIASTOLIC BLOOD PRESSURE: 82 MMHG | HEART RATE: 98 BPM

## 2022-02-23 DIAGNOSIS — E78.5 HYPERLIPIDEMIA: ICD-10-CM

## 2022-02-23 DIAGNOSIS — E66.01 OBESITY, CLASS III, BMI 40-49.9 (MORBID OBESITY) (HCC): Primary | ICD-10-CM

## 2022-02-23 DIAGNOSIS — F33.1 MODERATE RECURRENT MAJOR DEPRESSION (HCC): ICD-10-CM

## 2022-02-23 DIAGNOSIS — K21.9 ESOPHAGEAL REFLUX: ICD-10-CM

## 2022-02-23 PROCEDURE — 99214 OFFICE O/P EST MOD 30 MIN: CPT | Performed by: PHYSICIAN ASSISTANT

## 2022-02-23 RX ORDER — METFORMIN HYDROCHLORIDE 750 MG/1
750 TABLET, EXTENDED RELEASE ORAL
Qty: 30 TABLET | Refills: 2 | Status: SHIPPED | OUTPATIENT
Start: 2022-02-23 | End: 2022-05-18

## 2022-02-23 NOTE — ASSESSMENT & PLAN NOTE
-Patient is pursuing conservative   -Initial weight loss goal of 5-10% weight loss for improved health  -had palpitations with saxenda   -phentermine avoid due to hx of palpitations     Initial:280 6 lbs   Current: 293 7 lbs   Change:+13 1 lbs   Goal:220 lbs     -Patient is pursuing Conservative Program  -Initial weight loss goal of 5-10% weight loss for improved health  - not interested in surgery at this time  - of note, states that unless takes Nexium daily has heartburn  EGD and colonoscopy done 2/2021 (results in epic)  - Screening labs utd 3/2021, EKG 7/22/21  - sample menus given, will start increasing steps, encouraged food login  - will see SW next visit for behavioral counseling   - discussed saxenda administration, titration and common side effects  Patient denies personal and family history of MCT, MEN2 tumors and medullary CA  Patient denies personal history of pancreatitis       Initial: 297 2lbs  Current: 298 5lbs  Change: +0 9lbs  Goal: 225lbs     Goals:  Food log (ie ) www myfitnesspal com,sparkpeople  com,loseit com,calorieking  com,etc  baritastic  No sugary beverages  At least 64oz of water daily  Increase physical activity by 10 minutes daily  Gradually increase physical activity to a goal of 5 days per week for 30 minutes of MODERATE intensity PLUS 2 days per week of FULL BODY resistance training    3294-5331 calories per day  Start metformin

## 2022-02-23 NOTE — ASSESSMENT & PLAN NOTE
Taking Nexium  -should improve with weight loss, dietary, and lifestyle changes  -continue management with prescribing provider

## 2022-02-23 NOTE — PROGRESS NOTES
Assessment/Plan:    Obesity, Class III, BMI 40-49 9 (morbid obesity) ([facility] [address] )  -Patient is pursuing conservative   -Initial weight loss goal of 5-10% weight loss for improved health  -had palpitations with saxenda   -phentermine avoid due to hx of palpitations     Initial:280 6 lbs   Current: 293 7 lbs   Change:+13 1 lbs   Goal:220 lbs     -Patient is pursuing Conservative Program  -Initial weight loss goal of 5-10% weight loss for improved health  - not interested in surgery at this time  - of note, states that unless takes Nexium daily has heartburn  EGD and colonoscopy done 2/2021 (results in epic)  - Screening labs utd 3/2021, EKG 7/22/21  - sample menus given, will start increasing steps, encouraged food login  - will see SW next visit for behavioral counseling   - discussed saxenda administration, titration and common side effects  Patient denies personal and family history of MCT, MEN2 tumors and medullary CA  Patient denies personal history of pancreatitis       Initial: 297 2lbs  Current: 298 5lbs  Change: +0 9lbs  Goal: 225lbs     Goals:  Food log (ie ) www myfitnesspal com,sparkpeople  com,loseit com,calorieking  com,etc  baritastic  No sugary beverages  At least 64oz of water daily  Increase physical activity by 10 minutes daily  Gradually increase physical activity to a goal of 5 days per week for 30 minutes of MODERATE intensity PLUS 2 days per week of FULL BODY resistance training    3387-4574 calories per day  Start metformin     Moderate recurrent major depression ([facility] [address] )  Taking effexor and elavil  -continue management with prescribing provider      Hyperlipidemia  Taking lipitor  -should improve with weight loss, dietary, and lifestyle changes  -continue management with prescribing provider      Esophageal reflux  Taking Nexium  -should improve with weight loss, dietary, and lifestyle changes  -continue management with prescribing provider          Follow up in approximately 2 months with Non-Surgical Physician/Advanced Practitioner  Diagnoses and all orders for this visit:    Obesity, Class III, BMI 40-49 9 (morbid obesity) (HCC)  -     metFORMIN (GLUCOPHAGE-XR) 750 mg 24 hr tablet; Take 1 tablet (750 mg total) by mouth daily with breakfast    Moderate recurrent major depression (HCC)  -     metFORMIN (GLUCOPHAGE-XR) 750 mg 24 hr tablet; Take 1 tablet (750 mg total) by mouth daily with breakfast    Hyperlipidemia  -     metFORMIN (GLUCOPHAGE-XR) 750 mg 24 hr tablet; Take 1 tablet (750 mg total) by mouth daily with breakfast    Esophageal reflux  -     metFORMIN (GLUCOPHAGE-XR) 750 mg 24 hr tablet; Take 1 tablet (750 mg total) by mouth daily with breakfast          Subjective:   Chief Complaint   Patient presents with    Follow-up     OD 6-8 WK MWM /FUP        Patient ID: Cr Moon  is a 43 y o  female with excess weight/obesity here to pursue weight managment  Patient is pursuing Conservative Program      HPI  Patient last here in September 2021  Had tried saxenda but go heart palpitations  Food logging: logging and staying around 1800 calories-most of the time weigh and measure   Fruit/Vegetable servings: 3 fruit and 2 vegetable   Exercise:not exercising with recent surgery   Hydration:80 oz water, 10 oz of coffee with creamer    Topamax vs metformin vs contrave -patient would like to try metformin -side effects discussed     B: coffee and banana  S: n/a  L: wrap with chicken and lettuce with valentine or honey mustard   S: fruit   D: protein and veggies and starch   S: feeling lonely at night so bingeing -oreos, cheese and salami, yan bar    The following portions of the patient's history were reviewed and updated as appropriate: allergies, current medications, past family history, past medical history, past social history, past surgical history and problem list     Review of Systems   HENT: Negative for sore throat  Respiratory: Negative for cough and shortness of breath      Cardiovascular: Negative for chest pain and palpitations  Gastrointestinal: Negative for abdominal pain, constipation, diarrhea, nausea and vomiting         + GERD-controlled   Skin: Negative for rash  Psychiatric/Behavioral: Negative for suicidal ideas (denies HI)  + depression and anxiety-somewhat controlled        Objective:    /82   Pulse 98   Temp 97 9 °F (36 6 °C) (Tympanic)   Resp 14   Ht 5' 7" (1 702 m)   Wt 133 kg (293 lb 11 2 oz)   BMI 46 00 kg/m²      Physical Exam  Vitals and nursing note reviewed  Constitutional   General appearance: Abnormal   well developed and morbidly obese  Eyes No conjunctival injection   Pulmonary   Respiratory effort: No increased work of breathing or signs of respiratory distress  Abdomen   Abdomen: Abnormal   The abdomen was obese  Musculoskeletal   Gait and station: Normal     Skin  Dry   No visible rashes   Psychiatric   Orientation to person, place and time: Normal     Affect: appropriate  Neurological   Normal gait

## 2022-02-24 ENCOUNTER — OFFICE VISIT (OUTPATIENT)
Dept: SURGERY | Facility: CLINIC | Age: 42
End: 2022-02-24

## 2022-02-24 VITALS
TEMPERATURE: 97 F | HEIGHT: 67 IN | WEIGHT: 293 LBS | DIASTOLIC BLOOD PRESSURE: 88 MMHG | SYSTOLIC BLOOD PRESSURE: 140 MMHG | HEART RATE: 96 BPM | RESPIRATION RATE: 18 BRPM | BODY MASS INDEX: 45.99 KG/M2 | OXYGEN SATURATION: 98 %

## 2022-02-24 DIAGNOSIS — L73.2 HYDRADENITIS: Primary | ICD-10-CM

## 2022-02-24 PROCEDURE — 3008F BODY MASS INDEX DOCD: CPT | Performed by: PHYSICIAN ASSISTANT

## 2022-02-24 PROCEDURE — 99024 POSTOP FOLLOW-UP VISIT: CPT | Performed by: SURGERY

## 2022-02-24 NOTE — PROGRESS NOTES
Patient returns stating that she had some bleeding from the left-sided wound yesterday  This was immediately after shower  On examination this is flat without the extra granulation tissue that I saw out when I cauterized with silver nitrate and that is what was bleeding  It is not bleeding now    I just switched her back to Xeroform dressings and I will see her as needed

## 2022-02-28 ENCOUNTER — TELEPHONE (OUTPATIENT)
Dept: PSYCHIATRY | Facility: CLINIC | Age: 42
End: 2022-02-28

## 2022-02-28 NOTE — TELEPHONE ENCOUNTER
LISS spoke to Osmar Hardinsimba Castro who says her provider is trying metformin first   Liss discussed drug interaction between topamax and amitriptyline  Has appt with Liss end of April

## 2022-03-02 ENCOUNTER — SOCIAL WORK (OUTPATIENT)
Dept: BEHAVIORAL/MENTAL HEALTH CLINIC | Facility: CLINIC | Age: 42
End: 2022-03-02
Payer: COMMERCIAL

## 2022-03-02 DIAGNOSIS — F33.1 MODERATE RECURRENT MAJOR DEPRESSION (HCC): Primary | ICD-10-CM

## 2022-03-02 DIAGNOSIS — F41.1 GENERALIZED ANXIETY DISORDER: ICD-10-CM

## 2022-03-02 PROCEDURE — 90834 PSYTX W PT 45 MINUTES: CPT | Performed by: SOCIAL WORKER

## 2022-03-02 NOTE — BH TREATMENT PLAN
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, good mom to Dino Ortega, creative, funny, good friend    Diagnosis:   1  Moderate recurrent major depression ([facility] [address] )     2  Generalized anxiety disorder         Area of Needs: improve self worth, process grief/loss, work through past trauma, meet own needs      Long Term Goal 1: "to get through my trauma to better manage my anxiety"    Target Date: 8/2/22  Completion Date: TBD         Short Term Objectives for Goal 1: see objectives below   1 1  Jannette will continue to demonstrate openness to engage in therapy as evidenced by regular attendance and communication of her thoughts and feelings  1 2  Jannette will continue to practice meeting her own emotional needs vs the needs of others by using STOP- what is my responsibility vs the responsibility of others, is this serving the behavioral purpose of validation, is than an avoidance skill  Share successes and challenges  1 3  Jannette will continue to use and develop calming and relaxation strategies to manage anxiety and improve emotional safety  1 4  Jannette will learn and implement alternatives to binge eating behaviors to manage symptoms of anxiety  1 5  Jannette will engage in EMDR to work through past trauma to decrease symptoms  1 6  Jannette will verbalize an understanding of how trauma has influenced her belief of self, and adopt a new belief of self through the use of EMDR  1 7  Jannette will continue to use and develop assertiveness skills to regain confidence and improve her ability to advocate for herself  1 8  Jannette will continue medication management          GOAL 1: Modality: Individual 2x per month   Completion Date TBD, Medication Management and The person(s) responsible for carrying out the plan is  Jodie Hollingsworth Antonella Dumont and Trista Parson   Modalities: CBT, psychoeducation, EMDR, mindfulness, communication skills, stress management skills, assertiveness skills, DBT skills, trauma informed treatment, guided imagery, emotional needs meeting      [address]: Diagnosis and Treatment Plan explained to Miki Wang relates understanding diagnosis and is agreeable to Treatment Plan  Client Comments : Please share your thoughts, feelings, need and/or experiences regarding your treatment plan:     Jannette Baugh, [birth year] has been involved in the development and review of this document and is agreeable to this treatment plan   3/2/22   [id number]

## 2022-03-07 ENCOUNTER — SOCIAL WORK (OUTPATIENT)
Dept: BEHAVIORAL/MENTAL HEALTH CLINIC | Facility: CLINIC | Age: 42
End: 2022-03-07
Payer: COMMERCIAL

## 2022-03-07 DIAGNOSIS — F41.1 GENERALIZED ANXIETY DISORDER: ICD-10-CM

## 2022-03-07 DIAGNOSIS — F33.1 MODERATE RECURRENT MAJOR DEPRESSION (HCC): Primary | ICD-10-CM

## 2022-03-07 PROCEDURE — 90834 PSYTX W PT 45 MINUTES: CPT | Performed by: SOCIAL WORKER

## 2022-03-07 RX ORDER — AZITHROMYCIN 250 MG/1
TABLET, FILM COATED ORAL
COMMUNITY
Start: 2022-02-24 | End: 2022-05-18

## 2022-03-07 NOTE — PSYCH
This note was not shared with the patient due to this is a psychotherapy note      Psychotherapy Provided: Individual Psychotherapy 50 minutes     Length of time in session: 50 minutes, follow up in 5 weeks, Jannette will be transferring to Leann Gomez for EMDR beginning next week     Goals addressed in session: Goal 1     Pain:      mild    2    Current suicide risk : Low     Met with Jannette Bhatia reports minimal change in depression and anxiety symptoms since last session  She recently began having complications with her surgical excision  She expressed frustration with her   Through discussion and Jannette sharing several other incidents, Jannette gained insight into her  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 but his responses being hurtful to her when Gladys Dueñas is looking for support and validation  We discussed Jannette expressing her wants and needs to her   She feels that when she does this his behaviors changes for several weeks, than return back to before  A family session was offered in the future  Jannette shared that a girl  parent also asked her over the weekend why 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 when the parents give her so much grief  Jannette briefly summarized the story of her father's death, her mother moving on with another man, and Jannette lacking an adult role model  She expressed her thoughts and feelings 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  Support was provided; reinforcing needing to take care of herself and recognizing when it is taking a toll on her mental health  We discussed her successes and challenges of assertiveness skills and meeting her own emotional needs since last session  Jannette shared how she'd like to begin taking care of herself better- putting lotion on her cracked heals, eating better, washing her face at night  We discussed the use of affirmations and positive self talk     Jannette is looking forward to beginning EMDR with Leann Gomez; saying there is a childhood incident she feels needs to be addressed  She will begin EMDR next week with Lalito Alcantar  She will return to therapy with this clinician once she completes EMDR  Mental status:  Appearance calm and cooperative , adequate hygiene and grooming and good eye contact    Mood depressed and anxious   Affect Affect appropriate, mood congruent   Speech a normal rate and volume   Thought Processes coherent/organized and normal thought processes, goal-directed   Hallucinations no hallucinations present    Thought Content no delusions  Negative self talk   Abnormal Thoughts no suicidal thoughts  and no homicidal thoughts    Orientation  oriented to person and place and time   Remote Memory short term memory intact and long term memory intact   Attention Span concentration intact   Intellect Appears to be of Average Intelligence   Fund of Knowledge displays adequate knowledge of current events   Insight fair   Judgement fair   Muscle Strength Muscle strength and tone were normal and Normal gait    Language no difficulty naming common objects   Pain mild   Pain Scale 2       [address]: Diagnosis and Treatment Plan explained to Aura Garcia relates understanding diagnosis and is agreeable to Treatment Plan   Yes

## 2022-03-09 ENCOUNTER — OFFICE VISIT (OUTPATIENT)
Dept: SURGERY | Facility: CLINIC | Age: 42
End: 2022-03-09

## 2022-03-09 VITALS
BODY MASS INDEX: 45.99 KG/M2 | HEIGHT: 67 IN | DIASTOLIC BLOOD PRESSURE: 80 MMHG | HEART RATE: 88 BPM | TEMPERATURE: 97.6 F | OXYGEN SATURATION: 98 % | RESPIRATION RATE: 20 BRPM | WEIGHT: 293 LBS | SYSTOLIC BLOOD PRESSURE: 130 MMHG

## 2022-03-09 DIAGNOSIS — L73.2 HYDRADENITIS: Primary | ICD-10-CM

## 2022-03-09 PROCEDURE — 99024 POSTOP FOLLOW-UP VISIT: CPT | Performed by: SURGERY

## 2022-03-09 NOTE — PROGRESS NOTES
80-year-old female patient came to my office with complaints of drainage from the corner of the incision since she had excision of hidradenitis suppurativa  This surgery was done on January 18th  Since then she has been seen in the office multiple times with drainage as well as hypergranulation of the incision  She has no fever  She has minimum pain  On clinical exam today the right corner of her incision in suprapubic and groin crease shows 2 mm of separation  There is clear drainage  There is no active bleeding  The middle portion has got 1 cm size hypergranulation tissue  I coagulated it with silver nitrate  She also has a 3 cm cystic area which is mildly tender associated with the right mid incision  There is no erythema  I think it likely has some seroma fluid  I told her to keep a close eye on incision site  Give me a call if it starts getting more painful or becomes red  She will need to see Dr Sandra Suarez next week  An appointment has been arranged

## 2022-03-15 ENCOUNTER — SOCIAL WORK (OUTPATIENT)
Dept: BEHAVIORAL/MENTAL HEALTH CLINIC | Facility: CLINIC | Age: 42
End: 2022-03-15
Payer: COMMERCIAL

## 2022-03-15 DIAGNOSIS — F33.1 MODERATE RECURRENT MAJOR DEPRESSION (HCC): Primary | ICD-10-CM

## 2022-03-15 DIAGNOSIS — F41.1 GENERALIZED ANXIETY DISORDER: ICD-10-CM

## 2022-03-15 PROCEDURE — 90791 PSYCH DIAGNOSTIC EVALUATION: CPT | Performed by: SOCIAL WORKER

## 2022-03-15 NOTE — BH TREATMENT PLAN
Jannette Baugh  1980       Date of Initial Treatment Plan: 3/15/22  Date of Current Treatment Plan: 03/15/22    Treatment Plan Number Initial     Strengths/Personal Resources for Self Care: Good with kids, good mom    Diagnosis:   1  Moderate recurrent major depression ([facility] [address] )     2  Generalized anxiety disorder         Area of Needs: Traumatic memories      Long Term Goal 1: "I will no longer feel uneasy about these memories  And I'd like to improve relationships "    Target Date: 8/15/22  Completion Date: To be determined by therapist and Jannette         Short Term Objectives for Goal 1:     1  Will attend therapy session and process thoughts/feelings regarding current stressors  2  Will consider how the past is present  3  Will reprocess traumatic events as able  4  Will continue to participate in med management and follow recommendations    Responsible:  Jannette and therapist    GOAL 1: Modality: Individual/weekly/client centered - trauma informed/EMDR - CBT      [address]: Diagnosis and Treatment Plan explained to Blanca Joyner relates understanding diagnosis and is agreeable to Treatment Plan         Client Comments : Please share your thoughts, feelings, need and/or experiences regarding your treatment plan: NA
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
## 2022-03-17 ENCOUNTER — OFFICE VISIT (OUTPATIENT)
Dept: SURGERY | Facility: CLINIC | Age: 42
End: 2022-03-17

## 2022-03-17 VITALS
HEART RATE: 100 BPM | DIASTOLIC BLOOD PRESSURE: 90 MMHG | BODY MASS INDEX: 45.67 KG/M2 | SYSTOLIC BLOOD PRESSURE: 130 MMHG | WEIGHT: 291 LBS | TEMPERATURE: 97.6 F | HEIGHT: 67 IN | OXYGEN SATURATION: 99 % | RESPIRATION RATE: 18 BRPM

## 2022-03-17 DIAGNOSIS — L73.2 HYDRADENITIS: Primary | ICD-10-CM

## 2022-03-17 PROCEDURE — 3008F BODY MASS INDEX DOCD: CPT | Performed by: PHYSICIAN ASSISTANT

## 2022-03-17 PROCEDURE — 99024 POSTOP FOLLOW-UP VISIT: CPT | Performed by: SURGERY

## 2022-03-17 NOTE — PROGRESS NOTES
This is another postoperative appointment for a 41-year-old morbidly obese female who also had hidradenitis suppurativa  About 2 months ago I did a wide excision of her lower abdominal wall  This has been taking its time to close but it is getting there  She saw Dr Nima Meek in my absence last week and I saw his note  On examination today the granulated area that was hyper granulated that I cauterized in the midline is almost closed  It is only open for less than a mm by about a cm  There is another opening on the right superior flap about half way to the lateral edge which is open for a mm  I probed it with a Q-tip and it does not appear to be deep  I told the patient to do this in a shower    I also told her to come back in 3 weeks if everything is not closed

## 2022-03-17 NOTE — PATIENT INSTRUCTIONS
Shower daily  Probed the 1 open area with a Q-tip and then put a dry dressing on    Return in 3 weeks if this is not close

## 2022-03-22 ENCOUNTER — SOCIAL WORK (OUTPATIENT)
Dept: BEHAVIORAL/MENTAL HEALTH CLINIC | Facility: CLINIC | Age: 42
End: 2022-03-22
Payer: COMMERCIAL

## 2022-03-22 DIAGNOSIS — F33.9 DEPRESSION, RECURRENT (HCC): Primary | ICD-10-CM

## 2022-03-22 DIAGNOSIS — F41.1 GENERALIZED ANXIETY DISORDER: ICD-10-CM

## 2022-03-22 PROCEDURE — 90834 PSYTX W PT 45 MINUTES: CPT | Performed by: SOCIAL WORKER
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
## 2022-03-29 ENCOUNTER — SOCIAL WORK (OUTPATIENT)
Dept: BEHAVIORAL/MENTAL HEALTH CLINIC | Facility: CLINIC | Age: 42
End: 2022-03-29
Payer: COMMERCIAL

## 2022-03-29 DIAGNOSIS — F41.1 GENERALIZED ANXIETY DISORDER: ICD-10-CM

## 2022-03-29 DIAGNOSIS — F33.9 DEPRESSION, RECURRENT (HCC): Primary | ICD-10-CM

## 2022-03-29 PROCEDURE — 90834 PSYTX W PT 45 MINUTES: CPT | Performed by: SOCIAL WORKER
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
## 2022-04-06 ENCOUNTER — SOCIAL WORK (OUTPATIENT)
Dept: BEHAVIORAL/MENTAL HEALTH CLINIC | Facility: CLINIC | Age: 42
End: 2022-04-06
Payer: COMMERCIAL

## 2022-04-06 DIAGNOSIS — F41.1 GENERALIZED ANXIETY DISORDER: ICD-10-CM

## 2022-04-06 DIAGNOSIS — F33.9 DEPRESSION, RECURRENT (HCC): Primary | ICD-10-CM

## 2022-04-06 PROCEDURE — 90834 PSYTX W PT 45 MINUTES: CPT | Performed by: SOCIAL WORKER

## 2022-04-07 NOTE — PSYCH
Psychotherapy Provided: Individual Psychotherapy 45 minutes     Length of time in session: 45 minutes  Jannette is an agreement that she will now return to her regular therapist Kristina Esteban  She will see this therapist in the future if/when she needs to re-address traumatic past     Encounter Diagnosis     ICD-10-CM    1  Depression, recurrent ([facility] [address] )  F33 9    2  Generalized anxiety disorder  F41 1        Goals addressed in session: Goal 1     Data:  Jannette reports that she is feeling much better about previously discussed issue involving a cousin  She adds that therapist's validation of how she was feeling about her mother's behavior after her father's death was helpful as well  She remarks, "Of course, my feelings are valid "  Today Jannette processes thoughts/feelings 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  Jannette explained that she is struggling to decide if she wishes to play softball in college; her mother has asked Jannette to provide emotional support  Therapist reviews appropriate boundaries, and suggests to Jannette that she  the girl's mother on how she might interact with her own daughter  We conclude the session by briefly discussing changes she is feeling with regard to her intimate relationship with her   Therapist re-iterates that it is unfair to hold her  accountable for society's objectification of women sexually  Assessment:  Jannette's reported mood is euthymic  Her affect is appropriate  She is cooperative and maintains good eye contact  Her thoughts are logical and goal oriented  She denies SI/HI  Plan:  Jannette will follow up with her regular therapist Kristina Esteban  She will be discharged by this therapist     Pain:      mild emotional distress reported    0    Current suicide risk : [address]: Diagnosis and Treatment Plan explained to Hank Gao relates understanding diagnosis and is agreeable to Treatment Plan   YES

## 2022-04-26 ENCOUNTER — OFFICE VISIT (OUTPATIENT)
Dept: PSYCHIATRY | Facility: CLINIC | Age: 42
End: 2022-04-26
Payer: COMMERCIAL

## 2022-04-26 DIAGNOSIS — F33.1 MODERATE RECURRENT MAJOR DEPRESSION (HCC): Primary | ICD-10-CM

## 2022-04-26 DIAGNOSIS — F41.1 GENERALIZED ANXIETY DISORDER: ICD-10-CM

## 2022-04-26 DIAGNOSIS — Z79.899 ENCOUNTER FOR LONG-TERM (CURRENT) USE OF OTHER MEDICATIONS: ICD-10-CM

## 2022-04-26 PROCEDURE — 1036F TOBACCO NON-USER: CPT | Performed by: PHYSICIAN ASSISTANT

## 2022-04-26 PROCEDURE — 99213 OFFICE O/P EST LOW 20 MIN: CPT | Performed by: PHYSICIAN ASSISTANT

## 2022-04-26 RX ORDER — AMITRIPTYLINE HYDROCHLORIDE 50 MG/1
50 TABLET, FILM COATED ORAL
Qty: 90 TABLET | Refills: 0 | Status: SHIPPED | OUTPATIENT
Start: 2022-04-26 | End: 2022-05-02 | Stop reason: SDUPTHER

## 2022-04-26 RX ORDER — VENLAFAXINE HYDROCHLORIDE 75 MG/1
75 CAPSULE, EXTENDED RELEASE ORAL DAILY
Qty: 90 CAPSULE | Refills: 0 | Status: SHIPPED | OUTPATIENT
Start: 2022-04-26 | End: 2022-05-02 | Stop reason: SDUPTHER

## 2022-04-26 RX ORDER — VENLAFAXINE HYDROCHLORIDE 150 MG/1
150 CAPSULE, EXTENDED RELEASE ORAL DAILY
Qty: 90 CAPSULE | Refills: 0 | Status: SHIPPED | OUTPATIENT
Start: 2022-04-26 | End: 2022-05-02 | Stop reason: SDUPTHER
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
## 2022-04-28 ENCOUNTER — HOSPITAL ENCOUNTER (OUTPATIENT)
Dept: ULTRASOUND IMAGING | Facility: HOSPITAL | Age: 42
Discharge: HOME | End: 2022-04-28
Payer: COMMERCIAL

## 2022-04-28 DIAGNOSIS — T88.8XXA FLUID COLLECTION AT SURGICAL SITE: ICD-10-CM

## 2022-04-28 PROCEDURE — 76705 ECHO EXAM OF ABDOMEN: CPT

## 2022-05-02 ENCOUNTER — TELEPHONE (OUTPATIENT)
Dept: PSYCHIATRY | Facility: CLINIC | Age: 42
End: 2022-05-02

## 2022-05-02 DIAGNOSIS — I10 PRIMARY HYPERTENSION: ICD-10-CM

## 2022-05-02 RX ORDER — LOSARTAN POTASSIUM 100 MG/1
100 TABLET ORAL DAILY
Qty: 90 TABLET | Refills: 2 | Status: SHIPPED | OUTPATIENT
Start: 2022-05-02

## 2022-05-05 ENCOUNTER — APPOINTMENT (OUTPATIENT)
Dept: LAB | Facility: CLINIC | Age: 42
End: 2022-05-05
Payer: COMMERCIAL

## 2022-05-05 DIAGNOSIS — Z79.899 ENCOUNTER FOR LONG-TERM (CURRENT) USE OF OTHER MEDICATIONS: ICD-10-CM

## 2022-05-05 DIAGNOSIS — E78.5 HYPERLIPIDEMIA, UNSPECIFIED HYPERLIPIDEMIA TYPE: ICD-10-CM

## 2022-05-05 DIAGNOSIS — R73.01 IMPAIRED FASTING BLOOD SUGAR: ICD-10-CM

## 2022-05-05 DIAGNOSIS — Z11.59 NEED FOR HEPATITIS C SCREENING TEST: ICD-10-CM

## 2022-05-05 LAB
ALBUMIN SERPL BCP-MCNC: 4 G/DL (ref 3.5–5)
ALP SERPL-CCNC: 62 U/L (ref 46–116)
ALT SERPL W P-5'-P-CCNC: 75 U/L (ref 12–78)
ANION GAP SERPL CALCULATED.3IONS-SCNC: 5 MMOL/L (ref 4–13)
AST SERPL W P-5'-P-CCNC: 32 U/L (ref 5–45)
BILIRUB SERPL-MCNC: 0.71 MG/DL (ref 0.2–1)
BUN SERPL-MCNC: 16 MG/DL (ref 5–25)
CALCIUM SERPL-MCNC: 9.3 MG/DL (ref 8.3–10.1)
CHLORIDE SERPL-SCNC: 102 MMOL/L (ref 100–108)
CHOLEST SERPL-MCNC: 210 MG/DL
CO2 SERPL-SCNC: 33 MMOL/L (ref 21–32)
CREAT SERPL-MCNC: 0.73 MG/DL (ref 0.6–1.3)
GFR SERPL CREATININE-BSD FRML MDRD: 101 ML/MIN/1.73SQ M
GLUCOSE P FAST SERPL-MCNC: 126 MG/DL (ref 65–99)
HCV AB SER QL: NORMAL
HDLC SERPL-MCNC: 45 MG/DL
LDLC SERPL CALC-MCNC: 118 MG/DL (ref 0–100)
NONHDLC SERPL-MCNC: 165 MG/DL
POTASSIUM SERPL-SCNC: 3.8 MMOL/L (ref 3.5–5.3)
PROT SERPL-MCNC: 7.3 G/DL (ref 6.4–8.2)
SODIUM SERPL-SCNC: 140 MMOL/L (ref 136–145)
TRIGL SERPL-MCNC: 235 MG/DL

## 2022-05-05 PROCEDURE — 80061 LIPID PANEL: CPT

## 2022-05-05 PROCEDURE — 80335 ANTIDEPRESSANT TRICYCLIC 1/2: CPT

## 2022-05-05 PROCEDURE — 80053 COMPREHEN METABOLIC PANEL: CPT

## 2022-05-05 PROCEDURE — G0480 DRUG TEST DEF 1-7 CLASSES: HCPCS

## 2022-05-05 PROCEDURE — 86803 HEPATITIS C AB TEST: CPT

## 2022-05-05 PROCEDURE — 36415 COLL VENOUS BLD VENIPUNCTURE: CPT

## 2022-05-10 ENCOUNTER — OFFICE VISIT (OUTPATIENT)
Dept: BARIATRICS | Facility: CLINIC | Age: 42
End: 2022-05-10
Payer: COMMERCIAL

## 2022-05-10 VITALS
OXYGEN SATURATION: 98 % | BODY MASS INDEX: 45.74 KG/M2 | WEIGHT: 291.4 LBS | HEART RATE: 102 BPM | TEMPERATURE: 97.9 F | HEIGHT: 67 IN | DIASTOLIC BLOOD PRESSURE: 84 MMHG | RESPIRATION RATE: 20 BRPM | SYSTOLIC BLOOD PRESSURE: 138 MMHG

## 2022-05-10 DIAGNOSIS — Z32.00 ENCOUNTER FOR PREGNANCY TEST: ICD-10-CM

## 2022-05-10 DIAGNOSIS — R73.01 IMPAIRED FASTING GLUCOSE: ICD-10-CM

## 2022-05-10 DIAGNOSIS — F41.8 DEPRESSION WITH ANXIETY: ICD-10-CM

## 2022-05-10 DIAGNOSIS — E66.01 OBESITY, CLASS III, BMI 40-49.9 (MORBID OBESITY) (HCC): Primary | ICD-10-CM

## 2022-05-10 LAB
AMITRIP SERPL-MCNC: 32 NG/ML
AMITRIP+NOR SERPL-MCNC: <52 NG/ML (ref 80–200)
NORTRIP SERPL-MCNC: <20 NG/ML
SL AMB POCT URINE HCG: NEGATIVE

## 2022-05-10 PROCEDURE — 81025 URINE PREGNANCY TEST: CPT | Performed by: PHYSICIAN ASSISTANT

## 2022-05-10 PROCEDURE — 99214 OFFICE O/P EST MOD 30 MIN: CPT | Performed by: PHYSICIAN ASSISTANT

## 2022-05-10 RX ORDER — TOPIRAMATE 50 MG/1
TABLET, FILM COATED ORAL
Qty: 30 TABLET | Refills: 1 | Status: SHIPPED | OUTPATIENT
Start: 2022-05-10

## 2022-05-10 NOTE — PROGRESS NOTES
Assessment/Plan:    Obesity, Class III, BMI 40-49 9 (morbid obesity) ([address] )  -Patient is pursuing conservative   -Initial weight loss goal of 5-10% weight loss for improved health  -had palpitations with saxenda   -phentermine avoid due to hx of palpitations   -unable to Tolerate Metformin  -discussed trial of Topamax to help with Binge eating  SE profile reviewed  Discussed with Amy Mcgraw PAUrsulaC, psych provider    -continues to follow with [address] [place] [address]  -Topamax consent signed and Urine pregnancy test negative     Initial:297 2 lbs   Current: 291 4bs   Change: -5 8 lbs   Goal:220 lbs     Impaired fasting glucose  -HgbA1c WNL  -can consider fasting insulin  -unable to tolerate Metformin or Saxenda  -avoidance of refined carbohydrates    Depression with anxiety  Taking effexor and elavil  -continue management with prescribing provider      Goals:    Food log (ie ) www myfitnesspal com,sparkpeople  com,loseit com,calorieking  com,etc    No sugary beverages  At least 64oz of water daily  Increase physical activity by 10 minutes daily  Gradually increase physical activity to a goal of 5 days per week for 30 minutes of MODERATE intensity PLUS 2 days per week of FULL BODY resistance training    Follow up in approximately 6 weeks with Non-Surgical Physician/Advanced Practitioner  Diagnoses and all orders for this visit:    Obesity, Class III, BMI 40-49 9 (morbid obesity) ([facility])  -     topiramate (Topamax) 50 MG tablet; Take 1/2 tablet po HS for 2 weeks, if tolerated increase to 1  Tablet po HS    Encounter for pregnancy test  -     POCT urine HCG    Impaired fasting glucose    Depression with anxiety          Subjective:   Chief Complaint   Patient presents with    Follow-up        Patient ID: Jacky Huang  is a 43 y o  female with excess weight/obesity here to pursue weight managment  Patient is pursuing Conservative Program      HPI Patient presents for MWM follow up   Unable to tolerate Metformin due to diarrhea and stomach pain  Struggles with binging eating in evening  If she can keep herself busy it is better  Food logging: uses My Fitness Pal - reports stops logging at dinner meal and snacks after dinner  Reports binging after dinner  Exercise: walking videos/dance videos  Hydration: 80oz of water, 12oz coffee with sweetened creamer (4 TBSP)    B: coffee + banana  S: skips  L: ham and cheese wrap with honey mustard + lettuce Or Lean cuisine meal  S: veggie straws + fruit  D: chicken + veggies + rice Or marisa salad  S: anything she can find ( eats when bored/lonely)    The following portions of the patient's history were reviewed and updated as appropriate: allergies, current medications, past family history, past medical history, past social history, past surgical history and problem list     Review of Systems   Respiratory: Negative  Cardiovascular: Negative  Objective:    /84   Pulse 102   Temp 97 9 °F (36 6 °C)   Resp 20   Ht 5' 7" (1 702 m)   Wt 132 kg (291 lb 6 4 oz)   SpO2 98%   BMI 45 64 kg/m²      Physical Exam  Vitals and nursing note reviewed  Constitutional   General appearance: Abnormal   well developed and morbidly obese  Eyes No conjunctival pallor  Ears, Nose, Mouth, and Throat Oral mucosa moist    Pulmonary   Respiratory effort: No increased work of breathing or signs of respiratory distress  Auscultation of lungs: Clear to auscultation, equal breath sounds bilaterally, no wheezes, no rales, no rhonci  Cardiovascular   Auscultation of heart: Normal rate and rhythm, normal S1 and S2, without murmurs  Examination of extremities for edema and/or varicosities: Normal   no edema  Abdomen   Abdomen: Abnormal   The abdomen was obese  Bowel sounds were normal  The abdomen was soft and nontender     Musculoskeletal   Gait and station: Normal     Psychiatric   Orientation to person, place and time: Normal     Affect: appropriate

## 2022-05-10 NOTE — ASSESSMENT & PLAN NOTE
-Patient is pursuing conservative   -Initial weight loss goal of 5-10% weight loss for improved health  -had palpitations with saxenda   -phentermine avoid due to hx of palpitations   -unable to Tolerate Metformin  -discussed trial of Topamax to help with Binge eating  SE profile reviewed   Discussed with Cuca Kurtz PA-C, psych provider    -continues to follow with [facility]  -Topamax consent signed and Urine pregnancy test negative     Initial:297 2 lbs   Current: 291 4bs   Change: -5 8 lbs   Goal:220 lbs

## 2022-05-11 ENCOUNTER — SOCIAL WORK (OUTPATIENT)
Dept: BEHAVIORAL/MENTAL HEALTH CLINIC | Facility: CLINIC | Age: 42
End: 2022-05-11
Payer: COMMERCIAL

## 2022-05-11 DIAGNOSIS — F41.1 GENERALIZED ANXIETY DISORDER: ICD-10-CM

## 2022-05-11 DIAGNOSIS — F33.1 MODERATE RECURRENT MAJOR DEPRESSION (HCC): Primary | ICD-10-CM

## 2022-05-11 PROCEDURE — 90834 PSYTX W PT 45 MINUTES: CPT | Performed by: SOCIAL WORKER

## 2022-05-12 NOTE — ASSESSMENT & PLAN NOTE
-HgbA1c WNL  -can consider fasting insulin  -unable to tolerate Metformin or Saxenda  -avoidance of refined carbohydrates

## 2022-05-12 NOTE — PSYCH
This note was not shared with the patient due to this is a psychotherapy note      Psychotherapy Provided: Individual Psychotherapy 45 minutes     Length of time in session: 45 minutes, follow up in 2 week    Goals addressed in session: Goal 1     Pain:      none    0    Current suicide risk : Low     Met with Jannette  Psychiatry note and therapy notes reviewed prior to session today  Jannette states, "I am okay "   Jannette went on vacation to [facility] several weeks ago and shared how much she enjoyed the trip  She expressed her thoughts and feelings; saying how she realized there is so much more out there she has not explored  She voiced that she felt unsettled when she returned home  She described what that meant to her; wanting a larger yard, less politics in her neighborhood  The [employer] upcoming trip to [facility] and the parents remain a trigger to her anxiety  Jannette helped out at an event the other week in which she did not want to volunteer  She expressed frustration with this  This weekend she has an event planned for the [employer]; however, she is struggling with back pain  She is working through this pain; with a lack of help from the other girl  parents  She does state that after the [employer] trip she plans to set greater limits with volunteering for the organization  Session focused on Jannette processing her emotions, providing validation of feelings and support, and discussing how she is remaining clear about using her coping skills  CBT and strengths based perspective was utilized; challenging Jannette to look at thoughts, feelings, actions, behaviors, and situations from different perspectives to be more fair to herself  Some progress toward goals as Jannette reports slight improvement in healthy decision making for self as evidenced by securing a scooter for [employer] despite her fears of people judging her    She also shows greater awareness that her emotions serve a purpose and uncomfortable emotions are okay  Lacy Kurtz will continue to implement wellness tools; share successes and challenges  Continue biweekly support  Mental status:  Appearance calm and cooperative , adequate hygiene and grooming and good eye contact    Mood low   Affect Appropriate, tearful at times   Speech a normal rate and volume   Thought Processes coherent/organized and goal-directed   Hallucinations no hallucinations present    Thought Content no delusions   Abnormal Thoughts no suicidal thoughts  and no homicidal thoughts    Orientation  oriented to person and place and time   Remote Memory short term memory intact and long term memory intact   Attention Span concentration intact   Intellect Appears to be of Average Intelligence   Fund of Knowledge displays adequate knowledge of current events   Insight fair   Judgement fair   Muscle Strength Muscle strength and tone were normal and Normal gait    Language no difficulty naming common objects   Pain none   Pain Scale 0           Behavioral Health Treatment Plan  [facility]: Diagnosis and Treatment Plan explained to Judy Sage relates understanding diagnosis and is agreeable to Treatment Plan   Yes

## 2022-05-17 ENCOUNTER — DOCUMENTATION (OUTPATIENT)
Dept: BEHAVIORAL/MENTAL HEALTH CLINIC | Facility: CLINIC | Age: 42
End: 2022-05-17

## 2022-05-17 DIAGNOSIS — F33.9 DEPRESSION, RECURRENT (HCC): Primary | ICD-10-CM

## 2022-05-17 DIAGNOSIS — F41.1 GENERALIZED ANXIETY DISORDER: ICD-10-CM
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
## 2022-05-18 ENCOUNTER — OFFICE VISIT (OUTPATIENT)
Dept: FAMILY MEDICINE CLINIC | Facility: CLINIC | Age: 42
End: 2022-05-18
Payer: COMMERCIAL

## 2022-05-18 VITALS
TEMPERATURE: 97.7 F | BODY MASS INDEX: 45.52 KG/M2 | SYSTOLIC BLOOD PRESSURE: 118 MMHG | HEIGHT: 67 IN | HEART RATE: 97 BPM | WEIGHT: 290 LBS | DIASTOLIC BLOOD PRESSURE: 86 MMHG | OXYGEN SATURATION: 97 %

## 2022-05-18 DIAGNOSIS — R73.01 IMPAIRED FASTING GLUCOSE: ICD-10-CM

## 2022-05-18 DIAGNOSIS — E78.5 HYPERLIPIDEMIA, UNSPECIFIED HYPERLIPIDEMIA TYPE: Primary | ICD-10-CM

## 2022-05-18 DIAGNOSIS — J45.20 MILD INTERMITTENT REACTIVE AIRWAY DISEASE WITHOUT COMPLICATION: ICD-10-CM

## 2022-05-18 DIAGNOSIS — F33.9 DEPRESSION, RECURRENT (HCC): ICD-10-CM

## 2022-05-18 DIAGNOSIS — L73.2 HIDRADENITIS: ICD-10-CM

## 2022-05-18 DIAGNOSIS — Z12.4 SCREENING FOR CERVICAL CANCER: ICD-10-CM

## 2022-05-18 PROCEDURE — 99214 OFFICE O/P EST MOD 30 MIN: CPT | Performed by: PHYSICIAN ASSISTANT

## 2022-05-18 RX ORDER — ATORVASTATIN CALCIUM 40 MG/1
40 TABLET, FILM COATED ORAL DAILY
Qty: 90 TABLET | Refills: 1 | Status: SHIPPED | OUTPATIENT
Start: 2022-05-18

## 2022-05-18 NOTE — PROGRESS NOTES
Routine Follow-up    Jannette Baugh 43 y o  female   Date:  5/20/2022      Assessment and Plan:    Jannette was seen today for follow-up  Diagnoses and all orders for this visit:    Hyperlipidemia, unspecified hyperlipidemia type  Comments:  statin, continue to monitor lipid  Orders:  -     atorvastatin (LIPITOR) 40 mg tablet; Take 1 tablet (40 mg total) by mouth in the morning   -     Lipid panel; Future  -     Basic metabolic panel; Future    Screening for cervical cancer  Comments:  due for follow up, alreast established with Dr Kavya Moncada    Depression, recurrent [facility])  Comments:  apprecaite close follow up with psychiatry     Mild intermittent reactive airway disease without complication  Comments:  uses albuterol when sick    Impaired fasting glucose  Comments:  a1c wnl, low cab diet and continued wt loss efforts  Orders:  -     Basic metabolic panel; Future  -     Hemoglobin A1C; Future    Hidradenitis  Comments:  no cellulitis of abd fold requiring abx, will keep close follow up with general surgery          HPI:  Chief Complaint   Patient presents with    Follow-up     6 month check up  No questions or concerns today  HPI   Patient is a 44 yo female who presents for routine follow up  She is following with general surgery for hidradenitis  She has recurrence of fluid collection and painful pockets within abd fold  She has upcoming appt for further eval  She has started topamax for weight and binge eating with weight management  She is tolerating it well so far  She is struggling with depression and following closely with psychiatry  She is pursing more therapy  She meets with therapy every 2 weeks  She is going to chiropractor for chronic lower back pain  She goes to Dr Kavya Moncada for OBGYN, will schedule an appt  She has no questions for me today  ROS: Review of Systems   Constitutional: Negative  Respiratory: Negative  Negative for shortness of breath  Cardiovascular: Negative  Gastrointestinal: Negative  Genitourinary: Negative  Musculoskeletal: Positive for arthralgias  Neurological: Negative  Psychiatric/Behavioral: Positive for dysphoric mood         Past Medical History:   Diagnosis Date    Allergic rhinitis     Anxiety     Arthritis     C  difficile diarrhea 2020    Chronic pain disorder     BACK SHOULDERS NECK    Colon polyp     Depression     Diarrhea     Disease of thyroid gland     nodule    Eating disorder     Fibromyalgia, primary     GERD (gastroesophageal reflux disease)     Hearing difficulty of right ear     Hyperlipidemia     Hypertension     Irritable bowel syndrome     Perforation of tympanic membrane     Right    PONV (postoperative nausea and vomiting)     RBBB     Right bundle branch blockage     Sleep apnea     no cpap    Wears glasses      Patient Active Problem List   Diagnosis    Thyroid nodule    Trigger finger, right middle finger    Carpal tunnel syndrome, bilateral    Asthma, mild intermittent    Breast hypertrophy    Bulging lumbar disc    Calcific tendinitis of right shoulder    Chronic low back pain    Chronic right-sided low back pain without sciatica    Chronic sinusitis    Depression with anxiety    Dermatitis, eczematoid    Disorder of bursae of shoulder region    Esophageal reflux    Fibromyalgia    H/O laminectomy    Hemorrhoids    Hydradenitis    Hyperlipidemia    Mammographic microcalcification    Obesity, Class III, BMI 40-49 9 (morbid obesity) (HCC)    Obstructive sleep apnea    Pars defect of lumbar spine    RBBB (right bundle branch block)    Osteoarthritis of spine with radiculopathy, lumbar region    Vitamin D insufficiency    Atypical chest pain    Sebaceous cyst of breast, left    Left ear pain    Left cervical lymphadenopathy    Eliz-Danlos syndrome    History of sepsis    Otitis externa/media    Asthma    High triglycerides    Chronic headache with new features    Impaired fasting glucose    Moderate recurrent major depression (HCC)    Generalized anxiety disorder    Depression, recurrent (HCC)    Pain in joint of right shoulder    Cubital tunnel syndrome    Articular cartilage disorder of shoulder region    Sprain of rotator cuff capsule    PONV (postoperative nausea and vomiting)       Past Surgical History:   Procedure Laterality Date    ANAL FISTULOTOMY N/A 2018    Procedure: FISTULOTOMY;  Surgeon: Ananya Lance MD;  [facility];  Service: Colorectal    BACK SURGERY      lumbar herniated disc    BREAST CYST EXCISION Left 13 yrs ago  benign    BREAST SURGERY Left 2006    cyst removal    CARPAL TUNNEL RELEASE       SECTION      CHOLECYSTECTOMY      COLONOSCOPY      CYST REMOVAL      DENTAL SURGERY      ENDOMETRIAL ABLATION N/A 2021    Procedure: D&C, ABLATION ENDOMETRIAL MARIEL;  Surgeon: John Olmstead MD;  Location: [address] MAIN OR;  Service: Gynecology    HAND SURGERY      INCISION AND DRAINAGE OF WOUND N/A 2018    Procedure: INCISION AND DRAINAGE (I&D) BUTTOCK;  Surgeon: Ananya Lance MD;  [facility];  Service: Colorectal    MYRINGOTOMY W/ TUBES Right 2015    [place]    AK COLONOSCOPY FLX DX W/COLLJ SPEC WHEN PFRMD N/A 2017    Procedure: COLONOSCOPY;  Surgeon: Dudley Wong MD;  [facility]; Service: Gastroenterology    AK EXC SWEAT GLAND Jaqueline Puja INGUIN,COMPLX Bilateral 2022    Procedure: WIDE EXCISION HIDRADENITIS ABDOMINAL WALL;  Surgeon: Adri Hdz MD;  [facility]: [facility];  Service: General    AK INCISE FINGER TENDON SHEATH Right 2018    Procedure: LONG TRIGGER FINGER RELEASE;  Surgeon: Stephanie Tracy DO;  [facility];  Service: Orthopedics    AK SURG DIAGNOSTIC EXAM, ANORECTAL N/A 2018    Procedure: EXAM UNDER ANESTHESIA (EUA);   Surgeon: Ananya Lance MD;  [facility];  Service: Colorectal    AK TYMPANOPLASTY Right 05/24/2017    Procedure: TYMPANOPLASTY WITH PERICHONDRIN GRAFT;  Surgeon:  Rangel Byrne DO;  [facility];  Service: ENT    SHOULDER SURGERY Right     TRIGGER FINGER RELEASE      ULNAR NERVE TRANSPOSITION Left     UMBILICAL HIDRADENITIS EXCISION  31/67/2783    subumbilical hidradenitis exicsion, Dr Omega Teran, [facility]    WISDOM TOOTH EXTRACTION         Social History     Socioeconomic History    Marital status: /Civil Union     Spouse name: None    Number of children: None    Years of education: None    Highest education level: None   Occupational History    None   Tobacco Use    Smoking status: Never Smoker    Smokeless tobacco: Never Used   Vaping Use    Vaping Use: Never used   Substance and Sexual Activity    Alcohol use: Not Currently     Comment: NICKI     Drug use: No    Sexual activity: None     Comment: per allscripts   Other Topics Concern    None   Social History Narrative    Daily caffeine use- 1 cup of coffee     Social Determinants of Health     Financial Resource Strain: Not on file   Food Insecurity: Not on file   Transportation Needs: Not on file   Physical Activity: Not on file   Stress: Not on file   Social Connections: Not on file   Intimate Partner Violence: Not on file   Housing Stability: Not on file       Family History   Problem Relation Age of Onset    Hypertension Mother     Hyperlipidemia Mother     Diabetes Mother     Hypertension Father     Hyperlipidemia Father     Heart disease Father         cardiac disorder-myocardial infarction arrhythmias    Heart attack Father     Crohn's disease Brother     Arthritis Brother     Diabetes unspecified Maternal Grandmother     Thyroid disease Paternal Grandmother     No Known Problems Daughter     No Known Problems Maternal Aunt     No Known Problems Maternal Aunt     No Known Problems Maternal Aunt     No Known Problems Maternal Aunt     No Known Problems Paternal Aunt     No Known Problems Paternal Aunt     Heart attack Paternal Uncle        Allergies   Allergen Reactions    Amoxicillin Swelling, Anaphylaxis and Angioedema    Neurontin [Gabapentin] Other (See Comments)     Other reaction(s): SUICIDE IDEATION  Other reaction(s): Other (See Comments)  suicidal  suicidal    Penicillin V Angioedema and Swelling     Other reaction(s): Throat closure    Penicillins Swelling and Anaphylaxis    Aripiprazole Other (See Comments)     Other reaction(s): low dose 2 mg ; curving in and locking in of hands/dystonia  Other reaction(s): Other (See Comments)  Other reaction(s): low dose 2 mg ; curving in and locking in of hands/dystonia    Lorazepam Other (See Comments)     Other reaction(s): higher dose > anxiety and depression  Other reaction(s):  Other (See Comments)  Other reaction(s): higher dose > anxiety and depression    Carbamazepine Other (See Comments)     Other reaction(s): Unknown Reaction    Doxycycline Hives    Lamotrigine Rash    Wound Dressing Adhesive Hives         Current Outpatient Medications:     albuterol (PROVENTIL HFA,VENTOLIN HFA) 90 mcg/act inhaler, Inhale 2 puffs every 6 (six) hours as needed for wheezing or shortness of breath (cough), Disp: 18 g, Rfl: 1    amitriptyline (ELAVIL) 50 mg tablet, Take 1 tablet (50 mg total) by mouth daily at bedtime, Disp: 90 tablet, Rfl: 0    Ascorbic Acid (VITAMIN C) 500 MG CAPS, Take 1 capsule by mouth daily, Disp: , Rfl:     aspirin 81 mg chewable tablet, Chew 1 tablet (81 mg total) daily, Disp: 30 tablet, Rfl: 30    atorvastatin (LIPITOR) 40 mg tablet, Take 1 tablet (40 mg total) by mouth in the morning , Disp: 90 tablet, Rfl: 1    cetirizine (ZyrTEC) 10 mg tablet, Take 10 mg by mouth daily, Disp: , Rfl:     Cholecalciferol (VITAMIN D3) 5000 units CAPS, Take 1 capsule by mouth daily  , Disp: , Rfl:     ciprofloxacin-dexamethasone (CIPRODEX) otic suspension, Administer 4 drops to the right ear in the morning and 4 drops in the evening , Disp: 7 5 mL, Rfl: 3    cyanocobalamin (VITAMIN B-12) 1,000 mcg tablet, Take 1,000 mcg by mouth daily , Disp: , Rfl:     esomeprazole (NexIUM 24HR) 20 mg capsule, Take 1 capsule (20 mg total) by mouth every morning, Disp: 90 capsule, Rfl: 3    losartan (COZAAR) 100 MG tablet, Take 1 tablet (100 mg total) by mouth daily, Disp: 90 tablet, Rfl: 2    metoprolol succinate (TOPROL-XL) 100 mg 24 hr tablet, Take 1 tablet (100 mg total) by mouth daily, Disp: 90 tablet, Rfl: 3    Multiple Vitamins-Minerals (CENTRUM ADULTS PO), Take 1 tablet by mouth daily, Disp: , Rfl:     nystatin (MYCOSTATIN) cream, Apply topically 2 (two) times a day as needed (rash), Disp: 30 g, Rfl: 0    Omega-3 Fatty Acids (FISH OIL ADULT GUMMIES PO), Take by mouth, Disp: , Rfl:     topiramate (Topamax) 50 MG tablet, Take 1/2 tablet po HS for 2 weeks, if tolerated increase to 1  Tablet po HS, Disp: 30 tablet, Rfl: 1    venlafaxine (EFFEXOR-XR) 150 mg 24 hr capsule, Take 1 capsule (150 mg total) by mouth daily With 75 mg, Disp: 90 capsule, Rfl: 0    venlafaxine (EFFEXOR-XR) 75 mg 24 hr capsule, Take 1 capsule (75 mg total) by mouth daily With 150 mg, Disp: 90 capsule, Rfl: 0    azelastine (ASTELIN) 0 1 % nasal spray, 2 sprays into each nostril 2 (two) times a day Use in each nostril as directed (Patient not taking: No sig reported), Disp: 30 mL, Rfl: 11      Physical Exam:  /86 (BP Location: Left arm, Patient Position: Sitting, Cuff Size: Large)   Pulse 97   Temp 97 7 °F (36 5 °C) (Tympanic)   Ht 5' 6 5" (1 689 m)   Wt 132 kg (290 lb)   SpO2 97%   BMI 46 11 kg/m²     Physical Exam  Constitutional:       General: She is not in acute distress  Appearance: Normal appearance  She is obese  HENT:      Head: Normocephalic and atraumatic  Right Ear: External ear normal       Left Ear: External ear normal    Eyes:      Conjunctiva/sclera: Conjunctivae normal       Pupils: Pupils are equal, round, and reactive to light  Cardiovascular:      Rate and Rhythm: Normal rate and regular rhythm  Heart sounds: No murmur heard  Pulmonary:      Effort: Pulmonary effort is normal  No respiratory distress  Breath sounds: Normal breath sounds  No wheezing  Abdominal:      General: There is no distension  Palpations: Abdomen is soft  Comments: No cellulitis within abd wall fold; healed lower abd incision; palpable hardened area along incision, no active drainage   Skin:     General: Skin is warm and dry  Neurological:      General: No focal deficit present  Mental Status: She is alert and oriented to person, place, and time  Gait: Gait normal    Psychiatric:         Mood and Affect: Mood normal          Behavior: Behavior normal          Thought Content:  Thought content normal            Labs:  Lab Results   Component Value Date    WBC 9 42 01/03/2022    HGB 15 3 01/03/2022    HCT 44 3 01/03/2022    MCV 87 01/03/2022     01/03/2022     Lab Results   Component Value Date     04/05/2017    K 3 8 05/05/2022     05/05/2022    CO2 33 (H) 05/05/2022    ANIONGAP 7 09/19/2014    BUN 16 05/05/2022    CREATININE 0 73 05/05/2022    GLUCOSE 96 04/05/2017    GLUF 126 (H) 05/05/2022    CALCIUM 9 3 05/05/2022    AST 32 05/05/2022    ALT 75 05/05/2022    ALKPHOS 62 05/05/2022    PROT 6 7 04/05/2017    BILITOT 0 6 04/05/2017    EGFR 101 05/05/2022

## 2022-05-23 ENCOUNTER — OFFICE VISIT (OUTPATIENT)
Dept: PSYCHIATRY | Facility: CLINIC | Age: 42
End: 2022-05-23
Payer: COMMERCIAL

## 2022-05-23 DIAGNOSIS — F41.1 GENERALIZED ANXIETY DISORDER: ICD-10-CM

## 2022-05-23 DIAGNOSIS — F33.1 MODERATE RECURRENT MAJOR DEPRESSION (HCC): ICD-10-CM

## 2022-05-23 PROCEDURE — 99213 OFFICE O/P EST LOW 20 MIN: CPT | Performed by: PHYSICIAN ASSISTANT

## 2022-05-23 RX ORDER — VENLAFAXINE HYDROCHLORIDE 75 MG/1
75 CAPSULE, EXTENDED RELEASE ORAL DAILY
Qty: 90 CAPSULE | Refills: 0 | Status: SHIPPED | OUTPATIENT
Start: 2022-05-23

## 2022-05-23 RX ORDER — AMITRIPTYLINE HYDROCHLORIDE 50 MG/1
50 TABLET, FILM COATED ORAL
Qty: 90 TABLET | Refills: 0 | Status: SHIPPED | OUTPATIENT
Start: 2022-05-23

## 2022-05-23 RX ORDER — VENLAFAXINE HYDROCHLORIDE 150 MG/1
150 CAPSULE, EXTENDED RELEASE ORAL DAILY
Qty: 90 CAPSULE | Refills: 0 | Status: SHIPPED | OUTPATIENT
Start: 2022-05-23
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
## 2022-05-25 ENCOUNTER — SOCIAL WORK (OUTPATIENT)
Dept: BEHAVIORAL/MENTAL HEALTH CLINIC | Facility: CLINIC | Age: 42
End: 2022-05-25
Payer: COMMERCIAL

## 2022-05-25 DIAGNOSIS — F41.1 GENERALIZED ANXIETY DISORDER: ICD-10-CM

## 2022-05-25 DIAGNOSIS — F33.1 MODERATE RECURRENT MAJOR DEPRESSION (HCC): Primary | ICD-10-CM

## 2022-05-25 PROCEDURE — 90834 PSYTX W PT 45 MINUTES: CPT | Performed by: SOCIAL WORKER

## 2022-05-25 NOTE — PSYCH
This note was not shared with the patient due to this is a psychotherapy note      Psychotherapy Provided: Individual Psychotherapy 50 minutes     Length of time in session: 50 minutes, follow up in 1 week    Goals addressed in session: Goal 1     Pain:      none    0    Current suicide risk : Low     Met with Jannette  Jannette is reporting an increase in depression and anxiety symptoms  She feels her increase in symptoms in contributed to a medication she recently started to curve her appetite  She met with OP psychiatry 2 days ago, and has since stopped this medication  Her current stressor is her daughter's Teodora Negrete through the school district and worry that she will not pass  Jannette expressed her worry and anxiety; and how she's begun looking into alternative options if her daughter does not pass  Mindfulness was utilized to reduce emotional reactivity, encouraging Jannette to be more in the present  We also discussed responsibility to self vs responsibility to/of others in which Jannette did agree she takes on some of her daughter's worries and anxieties for her  It was suggested that this can be seen in other areas of her life in which Jannette did agree  Some progress noted as today Jannette reports an increase in assertive communication; providing specific examples at a family wedding and with a parent of the girl mekhi prince  It was pointed out to Jannette how she has the ability to stick up for herself, express her thoughts/feelings/wants/needs when she wants to  Jannette states, "I think I am just sick of it, I went to [facility] and realized there is a whole world out there besides [place] " Yari Alexis is encouraged to continue implementing wellness tools; share successes and challenges  Continue support x 1-2 weeks           Mental status:  Appearance calm and cooperative , adequate hygiene and grooming and good eye contact    Mood depressed and anxious   Affect Mood congruent   Speech a normal rate and volume   Thought Processes coherent/organized and goal-directed   Hallucinations no hallucinations present    Thought Content no delusions, worry   Abnormal Thoughts no suicidal thoughts  and no homicidal thoughts    Orientation  oriented to person and place and time   Remote Memory short term memory intact and long term memory intact   Attention Span concentration intact   Intellect Appears to be of Average Intelligence   Fund of Knowledge displays adequate knowledge of current events   Insight fair   Judgement fair   Muscle Strength Muscle strength and tone were normal and Normal gait    Language no difficulty naming common objects   Pain none   Pain Scale 0         Behavioral Health Treatment Plan [facility]: Diagnosis and Treatment Plan explained to Bernadette Byrd relates understanding diagnosis and is agreeable to Treatment Plan   Yes

## 2022-05-27 ENCOUNTER — OFFICE VISIT (OUTPATIENT)
Dept: SURGERY | Facility: CLINIC | Age: 42
End: 2022-05-27
Payer: COMMERCIAL

## 2022-05-27 VITALS
WEIGHT: 292 LBS | SYSTOLIC BLOOD PRESSURE: 116 MMHG | RESPIRATION RATE: 18 BRPM | TEMPERATURE: 98.6 F | HEIGHT: 67 IN | BODY MASS INDEX: 45.83 KG/M2 | HEART RATE: 90 BPM | OXYGEN SATURATION: 98 % | DIASTOLIC BLOOD PRESSURE: 68 MMHG

## 2022-05-27 DIAGNOSIS — T14.8XXA OPEN WOUND: Primary | ICD-10-CM

## 2022-05-27 PROCEDURE — 99212 OFFICE O/P EST SF 10 MIN: CPT | Performed by: SURGERY

## 2022-05-27 PROCEDURE — 3078F DIAST BP <80 MM HG: CPT | Performed by: SURGERY

## 2022-05-27 PROCEDURE — 3008F BODY MASS INDEX DOCD: CPT | Performed by: SURGERY

## 2022-05-27 PROCEDURE — 1036F TOBACCO NON-USER: CPT | Performed by: SURGERY

## 2022-05-27 NOTE — PATIENT INSTRUCTIONS
She needs a CT scan of her abdominal wall which I have ordered    I have also switched her to Aquacel as a dressing

## 2022-05-27 NOTE — PROGRESS NOTES
The patient returns  Approximately 4 weeks ago she went for an ultrasound which showed 2 open areas, the largest of which was only about 11 mm  Since that time 1 of these has closed, that being the right lateral   The more medial of the 2 got larger and then burst open a few days ago and since then has been draining  On examination all of her wound is closed except for 1 area to the right of the midline about 12-14 mm across  Just superior to this there is a darkened area but there is nothing that I can feel there  No other words, it is not fluctuant and there is nothing to drain  I have ordered a CT scan for me to make sure that there is nothing deep    In addition, I gave her Aquacel to absorb the drainage and made plans to see her in a few week's time

## 2022-06-01 ENCOUNTER — HOSPITAL ENCOUNTER (OUTPATIENT)
Dept: CT IMAGING | Facility: HOSPITAL | Age: 42
Discharge: HOME | End: 2022-06-01
Payer: COMMERCIAL

## 2022-06-01 DIAGNOSIS — T14.8XXA OPEN WOUND: ICD-10-CM

## 2022-06-01 PROCEDURE — 74176 CT ABD & PELVIS W/O CONTRAST: CPT

## 2022-06-01 PROCEDURE — G1004 CDSM NDSC: HCPCS

## 2022-06-08 ENCOUNTER — SOCIAL WORK (OUTPATIENT)
Dept: BEHAVIORAL/MENTAL HEALTH CLINIC | Facility: CLINIC | Age: 42
End: 2022-06-08
Payer: COMMERCIAL

## 2022-06-08 DIAGNOSIS — F41.1 GENERALIZED ANXIETY DISORDER: ICD-10-CM

## 2022-06-08 DIAGNOSIS — F33.1 MODERATE RECURRENT MAJOR DEPRESSION (HCC): Primary | ICD-10-CM

## 2022-06-08 PROCEDURE — 90834 PSYTX W PT 45 MINUTES: CPT | Performed by: SOCIAL WORKER

## 2022-06-08 NOTE — PSYCH
This note was not shared with the patient due to this is a psychotherapy note      Psychotherapy Provided: Individual Psychotherapy 50 minutes     Length of time in session: 50 minutes, follow up in 1 week    Goals addressed in session: Goal 1     Pain:      none        Current suicide risk : Low     Met with Jannette Bhatia is reporting worsening depression and anxiety symptoms as evidenced by feelings of sadness, crying spells, low self-worth, and constant worry  Stressors include her aunt recently dying and her girl  trip to [facility] in 12 days  Denver Hakim says she was not close to her aunt; however, the accumulation of the several deaths within her family over the past year have weighed on her  She expressed her thoughts and feelings about this including her decision to not attend the  and crying in bed one night because of not being able to help another family member who is struggling with the death  Validation of feelings and support was provided  Jannette also says that several of the girl  parents have been talking negatively about Jannette and their upcoming trip to [facility] behind her back  Jannette described her worry and anxiety this has caused and her fear about confronting the girl  parents about this stating, I am not supposed to know "    Session focused on working toward establishing an inward sense of self-worth and confidence  We discussed how low self-esteem is impacting how she is coping with the girl  parents, passiveness  She identified fears toward toward assertiveness and CBT was utilized, challenging her to 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 to be more fair to herself  The DBT concepts, FAST and GIVE were utilized to help create a dialectical balance in Jannette asserting her thoughts, feelings, wants, and needs effectively  It was suggested she write an e-mail addressing each concern prior to leaving for [facility]    Jannette was agreeable to this  She also feels that when she arrives in [facility], she will be able to focus on the girl scouts  She is hopeful that when she returns home from this trip her anxiety and depression symptoms will significantly decrease  Jannette says that she reaches out to her to friends for support  She commented that her mother often invalidates her, cuts her off and shared a recent story in which she felt that way, saying she often felt like she was treated different than her brothers  Validation of feelings and support was provided  Jannette then became tearful, saying she misses her father who always provided her comfort  She shared memories of being able to sit with her father when emotionally she was not feeling well and this provided her comfort  It was suggested she begin journaling her thoughts and feelings as if she was sharing them with her father and/or talk to her father as if he still here  Session ended due to time constraint  Jannette agreed she would like to speak further about her father at next session  Jannette would like an appointment next week if 1 would become available  She will be put on cancellation list   If not, her next appointment will be in 3 weeks after she returns from [facility]      Mental status:  Appearance adequate hygiene and grooming and good eye contact    Mood depressed and anxious   Affect affect was tearful   Speech a normal rate and volume   Thought Processes coherent/organized and goal-directed   Hallucinations no hallucinations present    Thought Content no delusions   Abnormal Thoughts no suicidal thoughts  and no homicidal thoughts    Orientation  oriented to person and place and time   Remote Memory short term memory intact and long term memory intact   Attention Span concentration intact   Intellect Appears to be of Average Intelligence   Fund of Knowledge displays adequate knowledge of current events   Insight fair   Judgement fair   Muscle Strength Muscle strength and tone were normal and Normal gait    Language no difficulty naming common objects   Pain none   Pain Scale 0           Behavioral Health Treatment Plan [facility]: Diagnosis and Treatment Plan explained to Yessica Calvert relates understanding diagnosis and is agreeable to Treatment Plan   Yes

## 2022-06-17 ENCOUNTER — OFFICE VISIT (OUTPATIENT)
Dept: SURGERY | Facility: CLINIC | Age: 42
End: 2022-06-17

## 2022-06-17 VITALS
WEIGHT: 293 LBS | OXYGEN SATURATION: 98 % | BODY MASS INDEX: 45.99 KG/M2 | TEMPERATURE: 98.5 F | SYSTOLIC BLOOD PRESSURE: 118 MMHG | HEART RATE: 90 BPM | DIASTOLIC BLOOD PRESSURE: 74 MMHG | RESPIRATION RATE: 18 BRPM | HEIGHT: 67 IN

## 2022-06-17 DIAGNOSIS — T14.8XXA OPEN WOUND: Primary | ICD-10-CM

## 2022-06-17 PROCEDURE — 99212 OFFICE O/P EST SF 10 MIN: CPT | Performed by: SURGERY

## 2022-06-17 NOTE — PROGRESS NOTES
Patient returns  I did check a CT scan in there was no drainable fluid collection  She still states that she has pain on the right side lateral but never notices any drainage  The more medial wound drain some bloody drainage on a routine basis although it is minimal and not purulent  On examination the wound is closed except for the 2 areas in question  Actually the more lateral wound is closed but slightly tender  There is no fluctuance there or any cellulitis or any evidence of something to drain  I can express a tiny bit of red fluid out of the more medial the 2 wounds  Again there is no drainable collection  I think she has probably got some sutures that are dissolving and spitting out although I can not prove it  She will eventually need a local exploration to get this under control    She has a vacation starting next week so I will see her when she is done

## 2022-06-28 ENCOUNTER — SOCIAL WORK (OUTPATIENT)
Dept: BEHAVIORAL/MENTAL HEALTH CLINIC | Facility: CLINIC | Age: 42
End: 2022-06-28
Payer: COMMERCIAL

## 2022-06-28 DIAGNOSIS — F33.9 DEPRESSION, RECURRENT (HCC): Primary | ICD-10-CM

## 2022-06-28 DIAGNOSIS — F41.1 GENERALIZED ANXIETY DISORDER: ICD-10-CM

## 2022-06-28 PROCEDURE — 90834 PSYTX W PT 45 MINUTES: CPT | Performed by: SOCIAL WORKER

## 2022-06-28 NOTE — PSYCH
This note was not shared with the patient due to this is a psychotherapy note      Psychotherapy Provided: Individual Psychotherapy 45 minutes     Length of time in session: 45 minutes, follow up in 1 week    Goals addressed in session: Goal 1     Pain:      none    0    Current suicide risk : Low     Met with Jannette Bhatia recently returned from the girl  trip to [address] [place] [address]  She describes the trip as "horrible "   Session focused on Jannette primarily venting about the trip, providing validation and support, and encouraging her to refocus to problem-solving to help decrease/eliminate triggers in the future  Jannette expressed her 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 and how she felt she could not make them happy  Validation of her feelings was provided  She shared how she cried several times throughout the trip and was happy when she returned home  Support was provided  Through this experience, Abraham Lyman is able to verbalize several ways she plans to begin setting better limits with herself and the parents of the girl scouts moving forward; including no large trips, no events involving the parents to set a better limit  Jannette describes having "hatred" toward herself  Some psychoeducation was provided on how low self-esteem can impact coping; poor limit setting with herself and others for validation  She is able to recognize this correlation  CBT was utilized throughout session to dismantled unhelpful thoughts; encouraging Jannette to be more fair to herself  Plan to continue to work on improving Jannette's inward sense of self-worth and confidence  Continue biweekly support at this time  Behavioral Health Treatment Plan [facility]: Diagnosis and Treatment Plan explained to Alisha Free relates understanding diagnosis and is agreeable to Treatment Plan   Yes

## 2022-06-30 ENCOUNTER — PROCEDURE VISIT (OUTPATIENT)
Dept: SURGERY | Facility: CLINIC | Age: 42
End: 2022-06-30
Payer: COMMERCIAL

## 2022-06-30 VITALS
DIASTOLIC BLOOD PRESSURE: 76 MMHG | TEMPERATURE: 97 F | HEART RATE: 88 BPM | HEIGHT: 67 IN | SYSTOLIC BLOOD PRESSURE: 124 MMHG | BODY MASS INDEX: 45.83 KG/M2 | RESPIRATION RATE: 18 BRPM | OXYGEN SATURATION: 98 % | WEIGHT: 292 LBS

## 2022-06-30 DIAGNOSIS — L02.211 ABSCESS OF ABDOMINAL WALL: Primary | ICD-10-CM

## 2022-06-30 PROCEDURE — 3078F DIAST BP <80 MM HG: CPT | Performed by: SURGERY

## 2022-06-30 PROCEDURE — 3074F SYST BP LT 130 MM HG: CPT | Performed by: SURGERY

## 2022-06-30 PROCEDURE — 3008F BODY MASS INDEX DOCD: CPT | Performed by: SURGERY

## 2022-06-30 PROCEDURE — 10060 I&D ABSCESS SIMPLE/SINGLE: CPT | Performed by: SURGERY

## 2022-06-30 PROCEDURE — 1036F TOBACCO NON-USER: CPT | Performed by: SURGERY

## 2022-06-30 PROCEDURE — 99212 OFFICE O/P EST SF 10 MIN: CPT | Performed by: SURGERY

## 2022-06-30 NOTE — PROGRESS NOTES
Patient returns  She now has a significant swelling on the right lateral portion of her wound  I do not see anything else in the rest of the wound  This area is fluctuant in obviously needs to be drained  Incision and Drainage    Date/Time: 6/30/2022 3:04 PM  Performed by: Pieter Sterling MD  Authorized by: Pieter Sterling MD   Universal Protocol:  Consent: Written consent obtained  Patient location:  Clinic  Location:     Type:  Abscess    Location:  Trunk    Trunk location:  Abdomen  Pre-procedure details:     Skin preparation:  Chloraprep  Anesthesia (see MAR for exact dosages): Anesthesia method:  Local infiltration    Local anesthetic:  Lidocaine 2% WITH epi  Procedure details:     Complexity:  Simple    Needle aspiration: no      Incision types:  Elliptical    Scalpel blade:  15    Approach:  Open    Incision depth:  Subcutaneous    Wound management:  Probed and deloculated    Drainage:  Serous and purulent    Drainage amount: Moderate    Wound treatment:  Packing placed    Packing materials:  1/2 in gauze  Comments:      Patient is a identified by me in the area is prepped and draped in a normal surgical manner after 1st taping her abdominal wall up out of the way  2% lidocaine with epinephrine is infused as a local anesthetic an elliptical skin incision is made entering into a cavity  Area was drained  One bleeding point was treated with a silver nitrate stick    Packing is placed and a dressing is applied

## 2022-07-06 ENCOUNTER — OFFICE VISIT (OUTPATIENT)
Dept: FAMILY MEDICINE CLINIC | Facility: CLINIC | Age: 42
End: 2022-07-06
Payer: COMMERCIAL

## 2022-07-06 VITALS
HEART RATE: 82 BPM | BODY MASS INDEX: 45.99 KG/M2 | HEIGHT: 67 IN | DIASTOLIC BLOOD PRESSURE: 90 MMHG | OXYGEN SATURATION: 99 % | SYSTOLIC BLOOD PRESSURE: 124 MMHG | TEMPERATURE: 98.1 F | WEIGHT: 293 LBS

## 2022-07-06 DIAGNOSIS — R21 RASH: ICD-10-CM

## 2022-07-06 DIAGNOSIS — L28.0 LICHENIFICATION: ICD-10-CM

## 2022-07-06 DIAGNOSIS — B37.2 INTERTRIGINOUS CANDIDIASIS: Primary | ICD-10-CM

## 2022-07-06 PROCEDURE — 99213 OFFICE O/P EST LOW 20 MIN: CPT | Performed by: FAMILY MEDICINE

## 2022-07-06 RX ORDER — NYSTATIN 100000 U/G
CREAM TOPICAL 2 TIMES DAILY PRN
Qty: 30 G | Refills: 1 | Status: SHIPPED | OUTPATIENT
Start: 2022-07-06

## 2022-07-06 NOTE — PROGRESS NOTES
Assessment/Plan:       Diagnoses and all orders for this visit:    Intertriginous candidiasis  Comments:  left groin    Lichenification  Comments:  try to avoid scratching    Rash  -     nystatin (MYCOSTATIN) cream; Apply topically 2 (two) times a day as needed (rash)          Subjective:   Chief Complaint   Patient presents with    Mass     Lump on lt inner thigh, rubbed raw, painful been there for about 1 week        Patient ID: Tina Church is a 43 y o  female  Here for acute problem visit  - c/o left inner thigh painful lump started about 2 weeks ago "when we were in [address]" states that the lump is gone now, but still soreness left groin folds "and thought it smelled like might be yeast infection, so put [employer] powder on it- [facility], then tried aquaphor without benefit  She follows with other provider here routinely  Also c/o "right foot 2 middle toes go numb soemtimes- used to be just during the winter when my feet would get cold, but now happening in the summer, too, but my back has been getting worse, too, so don't know if that has something to do with it"- has had numerous back surgeries- sees spine surgeon at [place] [id number] and sees chiropractor- she will contact her spine specialist      The following portions of the patient's history were reviewed and updated as appropriate: allergies, current medications, past family history, past medical history, past social history, past surgical history and problem list     Review of Systems      Objective:      /90 (BP Location: Left arm, Patient Position: Sitting, Cuff Size: Standard)   Pulse 82   Temp 98 1 °F (36 7 °C) (Tympanic)   Ht 5' 6 5" (1 689 m)   Wt 135 kg (296 lb 12 8 oz)   SpO2 99%   BMI 47 19 kg/m²          Physical Exam  Constitutional:       General: She is not in acute distress  Appearance: She is well-developed  She is not ill-appearing, toxic-appearing or diaphoretic  HENT:      Head: Normocephalic and atraumatic  Mouth/Throat:      Pharynx: Uvula midline  Neck:      Trachea: Phonation normal    Pulmonary:      Effort: Pulmonary effort is normal    Skin:     General: Skin is warm and dry  Coloration: Skin is not pale  Neurological:      Mental Status: She is alert and oriented to person, place, and time  Psychiatric:         Behavior: Behavior is cooperative

## 2022-07-08 ENCOUNTER — TELEPHONE (OUTPATIENT)
Dept: FAMILY MEDICINE CLINIC | Facility: CLINIC | Age: 42
End: 2022-07-08

## 2022-07-08 DIAGNOSIS — B37.2 INTERTRIGINOUS CANDIDIASIS: Primary | ICD-10-CM

## 2022-07-08 RX ORDER — FLUCONAZOLE 150 MG/1
150 TABLET ORAL ONCE
Qty: 1 TABLET | Refills: 0 | Status: SHIPPED | OUTPATIENT
Start: 2022-07-08 | End: 2022-07-08

## 2022-07-08 NOTE — TELEPHONE ENCOUNTER
Patient called this morning and was advised to contact office if yeast infection was not better to send in pills for her to take  She was seen for a visit on 07/06 and the cream has not helped  Please send prescription to the [facility] in [place]

## 2022-07-08 NOTE — TELEPHONE ENCOUNTER
Patient was seen on 7/6 and given Nystatin cream  She called this morning and said the cream has not helped her rash and was advised to call the office for an oral medication if the cream didn't help  Please advise  Thanks!

## 2022-07-11 ENCOUNTER — OFFICE VISIT (OUTPATIENT)
Dept: SURGERY | Facility: CLINIC | Age: 42
End: 2022-07-11
Payer: COMMERCIAL

## 2022-07-11 VITALS
OXYGEN SATURATION: 98 % | RESPIRATION RATE: 18 BRPM | HEIGHT: 67 IN | WEIGHT: 293 LBS | SYSTOLIC BLOOD PRESSURE: 124 MMHG | TEMPERATURE: 98.5 F | HEART RATE: 70 BPM | DIASTOLIC BLOOD PRESSURE: 82 MMHG | BODY MASS INDEX: 45.99 KG/M2

## 2022-07-11 DIAGNOSIS — L02.211 ABSCESS OF ABDOMINAL WALL: Primary | ICD-10-CM

## 2022-07-11 PROCEDURE — 99212 OFFICE O/P EST SF 10 MIN: CPT | Performed by: SURGERY

## 2022-07-11 RX ORDER — LEVOFLOXACIN 5 MG/ML
750 INJECTION, SOLUTION INTRAVENOUS ONCE
Status: CANCELLED | OUTPATIENT
Start: 2022-07-19 | End: 2022-07-11

## 2022-07-11 NOTE — PATIENT INSTRUCTIONS
This has failed conservative management  I think she needs me to drain/debride the area and make sure all of the tracts are drained    I will do that in the OR next week

## 2022-07-11 NOTE — PROGRESS NOTES
The patient returns stating that she had increased pain, not where I drained her but inferior to it  That area decompressed on its own  On examination there were 3 areas of the skin that her opened in the immediate proximity to each other in the incision line laterally to the right  There is also a 4 mm opening closer to the midline  My guess is that these are all connected and represent recurrent disease or suture abscess  In any case, the area needs to be debrided and drained    This is being scheduled

## 2022-07-12 ENCOUNTER — APPOINTMENT (OUTPATIENT)
Dept: LAB | Facility: HOSPITAL | Age: 42
End: 2022-07-12
Payer: COMMERCIAL

## 2022-07-12 ENCOUNTER — TELEPHONE (OUTPATIENT)
Dept: FAMILY MEDICINE CLINIC | Facility: CLINIC | Age: 42
End: 2022-07-12

## 2022-07-12 ENCOUNTER — OFFICE VISIT (OUTPATIENT)
Dept: LAB | Facility: HOSPITAL | Age: 42
End: 2022-07-12
Payer: COMMERCIAL

## 2022-07-12 DIAGNOSIS — L02.211 ABSCESS OF ABDOMINAL WALL: ICD-10-CM

## 2022-07-12 DIAGNOSIS — B37.2 INTERTRIGINOUS CANDIDIASIS: Primary | ICD-10-CM

## 2022-07-12 LAB
ALBUMIN SERPL BCP-MCNC: 4.5 G/DL (ref 3.5–5)
ALP SERPL-CCNC: 54 U/L (ref 34–104)
ALT SERPL W P-5'-P-CCNC: 51 U/L (ref 7–52)
ANION GAP SERPL CALCULATED.3IONS-SCNC: 9 MMOL/L (ref 4–13)
AST SERPL W P-5'-P-CCNC: 36 U/L (ref 13–39)
BILIRUB SERPL-MCNC: 0.49 MG/DL (ref 0.2–1)
BUN SERPL-MCNC: 14 MG/DL (ref 5–25)
CALCIUM SERPL-MCNC: 9.4 MG/DL (ref 8.4–10.2)
CHLORIDE SERPL-SCNC: 101 MMOL/L (ref 96–108)
CO2 SERPL-SCNC: 28 MMOL/L (ref 21–32)
CREAT SERPL-MCNC: 0.61 MG/DL (ref 0.6–1.3)
GFR SERPL CREATININE-BSD FRML MDRD: 112 ML/MIN/1.73SQ M
GLUCOSE P FAST SERPL-MCNC: 107 MG/DL (ref 65–99)
POTASSIUM SERPL-SCNC: 4.1 MMOL/L (ref 3.5–5.3)
PROT SERPL-MCNC: 6.7 G/DL (ref 6.4–8.4)
SODIUM SERPL-SCNC: 138 MMOL/L (ref 135–147)

## 2022-07-12 PROCEDURE — 93005 ELECTROCARDIOGRAM TRACING: CPT

## 2022-07-12 PROCEDURE — 36415 COLL VENOUS BLD VENIPUNCTURE: CPT

## 2022-07-12 PROCEDURE — 80053 COMPREHEN METABOLIC PANEL: CPT

## 2022-07-12 RX ORDER — FLUCONAZOLE 150 MG/1
150 TABLET ORAL ONCE
Qty: 1 TABLET | Refills: 0 | Status: SHIPPED | OUTPATIENT
Start: 2022-07-12 | End: 2022-07-12

## 2022-07-12 NOTE — TELEPHONE ENCOUNTER
----- Message from Julián Baugh sent at 7/12/2022  3:01 PM EDT -----  Regarding: Yeast infection  Hello, just wanted to see Dr Mary Hopkins opinion  The yeast infection on my leg is better but not fully gone  Last time I had a regular yeast infection it took a couple of those fluconazole pills to get rid of it so I wanted to see if she would call in one more pill to see if that gets rid of it all? Please let me know   Thank you     Jannette Baugh

## 2022-07-13 LAB
ATRIAL RATE: 73 BPM
P AXIS: 32 DEGREES
PR INTERVAL: 144 MS
QRS AXIS: -24 DEGREES
QRSD INTERVAL: 126 MS
QT INTERVAL: 416 MS
QTC INTERVAL: 458 MS
T WAVE AXIS: 10 DEGREES
VENTRICULAR RATE: 73 BPM

## 2022-07-13 PROCEDURE — 93010 ELECTROCARDIOGRAM REPORT: CPT | Performed by: INTERNAL MEDICINE

## 2022-07-13 NOTE — TELEPHONE ENCOUNTER
6/23/2022 2:25 PM    Mr. Mercado [address] Audrey Esquivel [id number]      You are scheduled for the following procedure with Dr. Kassidy Moran:  AFIB Cryo ablation at [facility], [address] [place] [address], [address]      PLEASE be aware that your procedure date/time is tentative and subject to change due to emergency cases. Procedure date/time:    October 28, 2022  Time: 7:30 am - please arrive by 6:15 am      ARRIVAL time:  (You will need  to be discharged home with.)     [X]  [facility] procedures: arrive to check in on 1st floor near [address] [facility] entrance two hours prior to your procedure. Pre-procedure Labs/Imaging:    o PRE-PROCEDURE LABS NEEDED: YES   o Forms for labwork may be given at appointment. If not, they will be mailed to you. Labs should be completed within 5-7 days of procedure. These can be done at any [facility] lab (one is located in [address] [place] [address]. No appointment needed). o YOU MAY NEED PRE-PROCEDURE IMAGING, CHEST CTA OR CARDIAC MRI. [X]  Cardiac MRI    ([facility] scheduling to call you)  -  [phone]        Medication Instructions:    Please hold your Kamini Mor  2 days prior to your ablation. Nothing to eat or drink after midnight before your procedure. You may take all other medications as directed the morning of your procedure with a sip of water unless otherwise indicated. Please contact the office [phone] and ask for a member of Dr. Jesús Jones team for procedure questions. There is a physician on call for the office after hours for immediate needs. FOLLOW UP:   Your appointments will be made for you post procedure based off of discharge instructions. You may have driving restrictions for a short time after your procedure (usually 2-3 days). Pacemaker/ICD patients will be unable to have an MRI until 6 weeks after implant, NO dental work for 8 weeks after your implant. Please advise pt that I escribed a refill of the diflucan Sincerely,      Yoko Morrissey MD                             Atrial Fibrillation Ablation   Discharge Instructions      You have just had an Atrial Fibrillation Ablation. There were catheters temporarily placed in your heart through a puncture in the Veins and/or Arteries in your groin. WHAT TO EXPECT      If you have had an Atrial Fibrillation Ablation please be aware that you may experience mild chest pain that will resolve within 24-48 hours. If the Chest Pain begins radiating down your shoulders or arms, becomes severe or breathing becomes difficult, call 911 or go to the closest emergency room.  Mild to moderate, non-painful, bruising or mild swelling at the puncture site is not un-common, and will resolve in 7  14 days, and may extend down your thigh as it heals. Application of Ice to the site may help with any tenderness.  If a closure device was used to seal your artery or vein, a separate pamphlet will be given to you with these discharge instructions. It is very important that you review the information in the pamphlet for different restrictions and precautions.  You have a small gauze dressing applied to the puncture site in your groin. You may remove this the following morning.  It is not uncommon to feel palpitations during the healing phase after your ablation. If you feel as though you are having recurrence of atrial fibrillation lasting longer than 30 minutes, please contact the office.  You MAY receive a 30 day heart monitor in the mail to wear after your procedure. This is to evaluate your heart rhythm post ablation. MEDICATIONS      Take only the medications prescribed to you at discharge. ACTIVITY      A responsible adult must take you home. Do not drive a car for 24 hours.  Rest quietly for the remainder of the day.  Do not lift anything greater than 10 pounds for 3 days.    Limit bending at the puncture site and use of stairs for at least 3 days.  You may remove the bandage and shower the morning after the procedure. Do not take a bath for 3 days. SYMPTOMS THAT NEED TO BE REPORTED IMMEDIATELY      Bleeding at the puncture site. If there is bleeding, lie down and hold firm direct pressure for at least 5 minutes. If the bleeding does not stop, go to the closest emergency room, or call 911.  Temperature more than 100.5 F.   Redness or warmth at the puncture site.  Increasing pain, numbness, coolness or blue discoloration of the extremity where the puncture is located.  Pulsating mass at the puncture site.  A new lump at the puncture site, or increasing swelling at the site. Please be aware that a pea or marble sized lump is normal, anything larger or increasing in size should be reported.  Bruising at the puncture site that enlarges or becomes painful (some bruising at the site is common and will go away in 7-14 days).  Rapid heart rate or palpitations.  Dizziness, lightheadedness, fainting.  REMEMBER: If you feel something is an emergency or cannot be handled over the phone, call 911 or go to the closest emergency room. FOLLOW UP CARE     2 weeks after ablation.           Cardiac Electrophysiology / Cardiology    [address]  [address] [place] [address], [address] [place] [address]  [phone] / [phone] Fax  [phone] / [phone] Fax

## 2022-07-14 ENCOUNTER — TELEPHONE (OUTPATIENT)
Dept: FAMILY MEDICINE CLINIC | Facility: CLINIC | Age: 42
End: 2022-07-14

## 2022-07-14 DIAGNOSIS — B37.2 INTERTRIGINOUS CANDIDIASIS: Primary | ICD-10-CM

## 2022-07-14 RX ORDER — FLUCONAZOLE 150 MG/1
150 TABLET ORAL ONCE
Qty: 1 TABLET | Refills: 0 | Status: SHIPPED | OUTPATIENT
Start: 2022-07-14 | End: 2022-07-15

## 2022-07-14 NOTE — TELEPHONE ENCOUNTER
Pt pharmacy called states the fluconazole 150mgs one tab cant be filled has to go to a local pharmacy please send to [facility] in [place]  Please advise   thanks

## 2022-07-15 ENCOUNTER — TELEPHONE (OUTPATIENT)
Dept: FAMILY MEDICINE CLINIC | Facility: CLINIC | Age: 42
End: 2022-07-15

## 2022-07-15 NOTE — PRE-PROCEDURE INSTRUCTIONS
Pre-Surgery Instructions:   Medication Instructions    albuterol (PROVENTIL HFA,VENTOLIN HFA) 90 mcg/act inhaler Uses PRN- OK to take day of surgery    amitriptyline (ELAVIL) 50 mg tablet Take night before surgery    Ascorbic Acid (VITAMIN C) 500 MG CAPS Stop 7/15  Do not take morning of surgery    aspirin 81 mg chewable tablet Stop 7/15  Do not take morning of surgery    atorvastatin (LIPITOR) 40 mg tablet Take day of surgery   cetirizine (ZyrTEC) 10 mg tablet Take day of surgery   Cholecalciferol (VITAMIN D3) 5000 units CAPS Stop 7/15  Do not take morning of surgery    ciprofloxacin-dexamethasone (CIPRODEX) otic suspension Hold day of surgery   cyanocobalamin (VITAMIN B-12) 1,000 mcg tablet Stop 7/15  Do not take morning of surgery    esomeprazole (NexIUM 24HR) 20 mg capsule Hold day of surgery   fluconazole (DIFLUCAN) 150 mg tablet Instructions provided by MD    losartan (COZAAR) 100 MG tablet Hold day of surgery   metoprolol succinate (TOPROL-XL) 100 mg 24 hr tablet Take day of surgery   mometasone (ELOCON) 0 1 % cream Hold day of surgery   Multiple Vitamins-Minerals (CENTRUM ADULTS PO) Stop 7/15  Do not take morning of surgery    nystatin (MYCOSTATIN) cream Hold day of surgery   Omega-3 Fatty Acids (FISH OIL ADULT GUMMIES PO) Stop 7/15  Do not take morning of surgery    venlafaxine (EFFEXOR-XR) 150 mg 24 hr capsule Take day of surgery   venlafaxine (EFFEXOR-XR) 75 mg 24 hr capsule Take day of surgery  Covid screening negative as per patient  Fully vaccinated  Reviewed showering and medication instructions  Take medications morning of surgery with a small sip of water  Patient verbalized understanding  Advised NPO after MN and ASC will call with scheduled surgical time

## 2022-07-19 ENCOUNTER — ANESTHESIA (OUTPATIENT)
Dept: PERIOP | Facility: HOSPITAL | Age: 42
End: 2022-07-19
Payer: COMMERCIAL

## 2022-07-19 ENCOUNTER — HOSPITAL ENCOUNTER (OUTPATIENT)
Facility: HOSPITAL | Age: 42
Setting detail: OUTPATIENT SURGERY
Discharge: HOME | End: 2022-07-19
Attending: SURGERY | Admitting: SURGERY
Payer: COMMERCIAL

## 2022-07-19 ENCOUNTER — ANESTHESIA EVENT (OUTPATIENT)
Dept: PERIOP | Facility: HOSPITAL | Age: 42
End: 2022-07-19
Payer: COMMERCIAL

## 2022-07-19 VITALS
WEIGHT: 293 LBS | OXYGEN SATURATION: 94 % | RESPIRATION RATE: 16 BRPM | DIASTOLIC BLOOD PRESSURE: 59 MMHG | TEMPERATURE: 97.8 F | HEIGHT: 67 IN | SYSTOLIC BLOOD PRESSURE: 111 MMHG | HEART RATE: 97 BPM | BODY MASS INDEX: 45.99 KG/M2

## 2022-07-19 DIAGNOSIS — T14.8XXA OPEN WOUND: Primary | ICD-10-CM

## 2022-07-19 DIAGNOSIS — L02.211 ABSCESS OF ABDOMINAL WALL: ICD-10-CM

## 2022-07-19 LAB
EXT PREGNANCY TEST URINE: NEGATIVE
EXT. CONTROL: NORMAL

## 2022-07-19 PROCEDURE — 88304 TISSUE EXAM BY PATHOLOGIST: CPT | Performed by: PATHOLOGY

## 2022-07-19 PROCEDURE — 11470 EXC SKN H/P/P/U SMPL/NTRM: CPT | Performed by: PHYSICIAN ASSISTANT

## 2022-07-19 PROCEDURE — 11470 EXC SKN H/P/P/U SMPL/NTRM: CPT | Performed by: SURGERY

## 2022-07-19 PROCEDURE — 81025 URINE PREGNANCY TEST: CPT | Performed by: SURGERY

## 2022-07-19 RX ORDER — FENTANYL CITRATE 50 UG/ML
INJECTION, SOLUTION INTRAMUSCULAR; INTRAVENOUS AS NEEDED
Status: DISCONTINUED | OUTPATIENT
Start: 2022-07-19 | End: 2022-07-19

## 2022-07-19 RX ORDER — NEOSTIGMINE METHYLSULFATE 1 MG/ML
INJECTION INTRAVENOUS AS NEEDED
Status: DISCONTINUED | OUTPATIENT
Start: 2022-07-19 | End: 2022-07-19

## 2022-07-19 RX ORDER — LEVOFLOXACIN 5 MG/ML
750 INJECTION, SOLUTION INTRAVENOUS ONCE
Status: DISCONTINUED | OUTPATIENT
Start: 2022-07-19 | End: 2022-07-19

## 2022-07-19 RX ORDER — FENTANYL CITRATE/PF 50 MCG/ML
50 SYRINGE (ML) INJECTION
Status: DISCONTINUED | OUTPATIENT
Start: 2022-07-19 | End: 2022-07-19 | Stop reason: HOSPADM

## 2022-07-19 RX ORDER — PROPOFOL 10 MG/ML
INJECTION, EMULSION INTRAVENOUS AS NEEDED
Status: DISCONTINUED | OUTPATIENT
Start: 2022-07-19 | End: 2022-07-19

## 2022-07-19 RX ORDER — BUPIVACAINE HYDROCHLORIDE AND EPINEPHRINE 2.5; 5 MG/ML; UG/ML
INJECTION, SOLUTION INFILTRATION; PERINEURAL AS NEEDED
Status: DISCONTINUED | OUTPATIENT
Start: 2022-07-19 | End: 2022-07-19 | Stop reason: HOSPADM

## 2022-07-19 RX ORDER — HYDROMORPHONE HCL/PF 1 MG/ML
0.5 SYRINGE (ML) INJECTION
Status: DISCONTINUED | OUTPATIENT
Start: 2022-07-19 | End: 2022-07-19 | Stop reason: HOSPADM

## 2022-07-19 RX ORDER — LIDOCAINE HYDROCHLORIDE 10 MG/ML
0.5 INJECTION, SOLUTION EPIDURAL; INFILTRATION; INTRACAUDAL; PERINEURAL ONCE AS NEEDED
Status: DISCONTINUED | OUTPATIENT
Start: 2022-07-19 | End: 2022-07-19 | Stop reason: HOSPADM

## 2022-07-19 RX ORDER — DEXAMETHASONE SODIUM PHOSPHATE 10 MG/ML
INJECTION, SOLUTION INTRAMUSCULAR; INTRAVENOUS AS NEEDED
Status: DISCONTINUED | OUTPATIENT
Start: 2022-07-19 | End: 2022-07-19

## 2022-07-19 RX ORDER — METOCLOPRAMIDE HYDROCHLORIDE 5 MG/ML
10 INJECTION INTRAMUSCULAR; INTRAVENOUS ONCE AS NEEDED
Status: DISCONTINUED | OUTPATIENT
Start: 2022-07-19 | End: 2022-07-19 | Stop reason: HOSPADM

## 2022-07-19 RX ORDER — SODIUM CHLORIDE, SODIUM LACTATE, POTASSIUM CHLORIDE, CALCIUM CHLORIDE 600; 310; 30; 20 MG/100ML; MG/100ML; MG/100ML; MG/100ML
50 INJECTION, SOLUTION INTRAVENOUS CONTINUOUS
Status: CANCELLED | OUTPATIENT
Start: 2022-07-19

## 2022-07-19 RX ORDER — LIDOCAINE HYDROCHLORIDE 20 MG/ML
INJECTION, SOLUTION EPIDURAL; INFILTRATION; INTRACAUDAL; PERINEURAL AS NEEDED
Status: DISCONTINUED | OUTPATIENT
Start: 2022-07-19 | End: 2022-07-19

## 2022-07-19 RX ORDER — SUCCINYLCHOLINE/SOD CL,ISO/PF 100 MG/5ML
SYRINGE (ML) INTRAVENOUS AS NEEDED
Status: DISCONTINUED | OUTPATIENT
Start: 2022-07-19 | End: 2022-07-19

## 2022-07-19 RX ORDER — ONDANSETRON 2 MG/ML
INJECTION INTRAMUSCULAR; INTRAVENOUS AS NEEDED
Status: DISCONTINUED | OUTPATIENT
Start: 2022-07-19 | End: 2022-07-19

## 2022-07-19 RX ORDER — ALBUTEROL SULFATE 2.5 MG/3ML
2.5 SOLUTION RESPIRATORY (INHALATION) ONCE AS NEEDED
Status: COMPLETED | OUTPATIENT
Start: 2022-07-19 | End: 2022-07-19

## 2022-07-19 RX ORDER — SODIUM CHLORIDE, SODIUM LACTATE, POTASSIUM CHLORIDE, CALCIUM CHLORIDE 600; 310; 30; 20 MG/100ML; MG/100ML; MG/100ML; MG/100ML
125 INJECTION, SOLUTION INTRAVENOUS CONTINUOUS
Status: DISCONTINUED | OUTPATIENT
Start: 2022-07-19 | End: 2022-07-19 | Stop reason: HOSPADM

## 2022-07-19 RX ORDER — METOCLOPRAMIDE HYDROCHLORIDE 5 MG/ML
INJECTION INTRAMUSCULAR; INTRAVENOUS AS NEEDED
Status: DISCONTINUED | OUTPATIENT
Start: 2022-07-19 | End: 2022-07-19

## 2022-07-19 RX ORDER — LEVOFLOXACIN 5 MG/ML
750 INJECTION, SOLUTION INTRAVENOUS ONCE
Status: COMPLETED | OUTPATIENT
Start: 2022-07-19 | End: 2022-07-19

## 2022-07-19 RX ORDER — ONDANSETRON 2 MG/ML
4 INJECTION INTRAMUSCULAR; INTRAVENOUS ONCE AS NEEDED
Status: COMPLETED | OUTPATIENT
Start: 2022-07-19 | End: 2022-07-19

## 2022-07-19 RX ORDER — OXYCODONE HYDROCHLORIDE AND ACETAMINOPHEN 5; 325 MG/1; MG/1
1 TABLET ORAL EVERY 4 HOURS PRN
Qty: 8 TABLET | Refills: 0 | Status: SHIPPED | OUTPATIENT
Start: 2022-07-19 | End: 2022-07-28

## 2022-07-19 RX ORDER — GLYCOPYRROLATE 0.2 MG/ML
INJECTION INTRAMUSCULAR; INTRAVENOUS AS NEEDED
Status: DISCONTINUED | OUTPATIENT
Start: 2022-07-19 | End: 2022-07-19

## 2022-07-19 RX ORDER — ROCURONIUM BROMIDE 10 MG/ML
INJECTION, SOLUTION INTRAVENOUS AS NEEDED
Status: DISCONTINUED | OUTPATIENT
Start: 2022-07-19 | End: 2022-07-19

## 2022-07-19 RX ADMIN — PROPOFOL 50 MG: 10 INJECTION, EMULSION INTRAVENOUS at 10:57

## 2022-07-19 RX ADMIN — ROCURONIUM BROMIDE 15 MG: 50 INJECTION, SOLUTION INTRAVENOUS at 10:37

## 2022-07-19 RX ADMIN — METOCLOPRAMIDE 10 MG: 5 INJECTION, SOLUTION INTRAMUSCULAR; INTRAVENOUS at 10:24

## 2022-07-19 RX ADMIN — Medication 160 MG: at 10:20

## 2022-07-19 RX ADMIN — ALBUTEROL SULFATE 2.5 MG: 2.5 SOLUTION RESPIRATORY (INHALATION) at 11:51

## 2022-07-19 RX ADMIN — GLYCOPYRROLATE 0.4 MG: 0.2 INJECTION, SOLUTION INTRAMUSCULAR; INTRAVENOUS at 10:53

## 2022-07-19 RX ADMIN — SODIUM CHLORIDE, POTASSIUM CHLORIDE, SODIUM LACTATE AND CALCIUM CHLORIDE 125 ML/HR: 600; 310; 30; 20 INJECTION, SOLUTION INTRAVENOUS at 09:12

## 2022-07-19 RX ADMIN — LEVOFLOXACIN: 5 INJECTION, SOLUTION INTRAVENOUS at 10:14

## 2022-07-19 RX ADMIN — LIDOCAINE HYDROCHLORIDE 100 MG: 20 INJECTION, SOLUTION EPIDURAL; INFILTRATION; INTRACAUDAL; PERINEURAL at 10:20

## 2022-07-19 RX ADMIN — FENTANYL CITRATE 50 MCG: 50 INJECTION INTRAMUSCULAR; INTRAVENOUS at 10:20

## 2022-07-19 RX ADMIN — FENTANYL CITRATE 50 MCG: 50 INJECTION INTRAMUSCULAR; INTRAVENOUS at 10:39

## 2022-07-19 RX ADMIN — ONDANSETRON 4 MG: 2 INJECTION INTRAMUSCULAR; INTRAVENOUS at 12:16

## 2022-07-19 RX ADMIN — ONDANSETRON 4 MG: 2 INJECTION INTRAMUSCULAR; INTRAVENOUS at 10:53

## 2022-07-19 RX ADMIN — PROPOFOL 100 MG: 10 INJECTION, EMULSION INTRAVENOUS at 10:36

## 2022-07-19 RX ADMIN — PROPOFOL 300 MG: 10 INJECTION, EMULSION INTRAVENOUS at 10:20

## 2022-07-19 RX ADMIN — FENTANYL CITRATE 50 MCG: 50 INJECTION, SOLUTION INTRAMUSCULAR; INTRAVENOUS at 12:32

## 2022-07-19 RX ADMIN — NEOSTIGMINE METHYLSULFATE 3 MG: 1 INJECTION, SOLUTION INTRAVENOUS at 10:53

## 2022-07-19 RX ADMIN — DEXAMETHASONE SODIUM PHOSPHATE 10 MG: 10 INJECTION, SOLUTION INTRAMUSCULAR; INTRAVENOUS at 10:22

## 2022-07-19 RX ADMIN — PROPOFOL 50 MG: 10 INJECTION, EMULSION INTRAVENOUS at 10:56

## 2022-07-19 NOTE — ANESTHESIA POSTPROCEDURE EVALUATION
Post-Op Assessment Note    CV Status:  Stable  Pain Score: 0    Pain management: adequate     Mental Status:  Awake   Hydration Status:  Stable   PONV Controlled:  Controlled   Airway Patency:  Patent      Post Op Vitals Reviewed: Yes      Staff: CRNA         No complications documented      BP   139/67   Temp   97 1   Pulse  95   Resp   17   SpO2   100

## 2022-07-19 NOTE — QUICK NOTE
Post-procedure Diagnoses   1  Hydradenitis [L73 2]   Assessment/Plan: Mary stage I hidradenitis suppurativa  unroofing and cauterization performed in the office here today  I will follow her in a couple weeks and see if she needs to have an excision     No problem-specific Assessment & Plan notes found for this encounter          Diagnoses and all orders for this visit:     Hydradenitis  -     Incision and Drainage            Subjective:       Patient ID: Kwaku Parsons is a 39 y o  female       The patient is a super morbidly obese white female who is long had a problem with hidradenitis suppurativa  She states that originally this was in the axilla but the axilla has been quiescent for years  She complains of an area in the left groin which is constantly draining  She also had an area in the right groin which was draining for a while   But stopped over a month ago  She is sent here for wide  Excision  However I unroof doing cauterized the area and I am having a come back in a few week's time  If this does not heal, I will do a wide excision        The following portions of the patient's history were reviewed and updated as appropriate: allergies, current medications, past family history, past medical history, past social history, past surgical history and problem list      Review of Systems   She has 43 lines on her problem list   She has reflux, asthma, MARCELINO, right bundle branch block, arthritis, 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, back pain, fibromyalgia and morbid obesity to list a few     Objective:        /80 (BP Location: Right arm, Patient Position: Sitting, Cuff Size: Adult)   Pulse 80   Temp 97 6 °F (36 4 °C)   Resp 18   Ht 5' 7" (1 702 m)   Wt 134 kg (295 lb)   BMI 46 20 kg/m²             Physical Exam  Vitals reviewed  Constitutional:       General: She is not in acute distress  Appearance: She is obese  She is not ill-appearing  Comments:  This patient is morbidly obese   HENT: Head: Normocephalic and atraumatic  Eyes:      General: No scleral icterus  Conjunctiva/sclera: Conjunctivae normal    Musculoskeletal:      Cervical back: Normal range of motion  Skin:     Findings: Lesion present  Comments:  She has Oly Kruger level 1 hidradenitis suppurativa in both axilla and both groins  There is no draining sinus in the right groin  On the left side she has a 2 cm area with 2 punctum so in it and inflammation around it   There is no evidence of cellulitis or marked inflammation  Both of these are in the middle of a incision which is obviously from a    Neurological:      Mental Status: She is alert and oriented to person, place, and time  Psychiatric:         Mood and Affect: Mood normal          Behavior: Behavior normal          Thought Content: Thought content normal          Judgment: Judgment normal         Incision and Drainage     Date/Time: 12/3/2021 9:11 AM  Performed by: Argentina Emerson MD  Authorized by: Argentina Emerson MD      Patient location:  Clinic  Location:     Type:  Abscess    Location:  Trunk    Trunk location:  Abdomen  Pre-procedure details:     Skin preparation:  Chloraprep  Anesthesia (see MAR for exact dosages): Anesthesia method:  Local infiltration    Local anesthetic:  Lidocaine 2% WITH epi  Procedure details:     Complexity:  Intermediate    Incision types:  Single straight    Scalpel blade:  15    Approach:  Open    Incision depth:  Subcutaneous    Wound management:  Debrided    Drainage:  Purulent    Drainage amount:  Scant    Packing materials:  1/4 in gauze  Post-procedure details:     Patient tolerance of procedure: Tolerated well, no immediate complications  Comments: The patient was identified by me and placed in supine position upon the operating room table  The area involved was prepped and draped in a normal surgical manner  2% neutralized lidocaine with epinephrine is infused around the area    A skin incision is now made down to the subcutaneous tissue  This was about 2 cm in with  In the subcutaneous tissues there was chronic inflammatory reaction without an abscess  There was no tunneling medially or laterally  The inflammatory tissue was debrided sharply with a knife and then the base was cauterized with silver nitrate              Since the above was written, she has undergone a wide debridement/excision  This has been complicated by break down in two areas, R side lateral and midline  The left has stayed closed  I have done a few I&D's in the office, but she continues to reform these draining areas  The plan today is to excise and close if possible, excise and pack if needed  Today her VS are fine (see nurses notes)  Lungs are clear with good bilateral expansion and no abnormal sounds  She appears to be in RSR with no murmur or unusual heart sound

## 2022-07-19 NOTE — DISCHARGE INSTR - AVS FIRST PAGE
Remove dressing on Sunday, 7/24  Keep surgical incision clean and dry  Make follow up appointment with Dr Clive Aden for next week  Sutures will likely be removed in 2 weeks at a subsequent follow-up visit

## 2022-07-19 NOTE — DISCHARGE INSTRUCTIONS
Postoperative Care Instructions      1  General: You may feel pulling sensations around the wound or funny aches and pains around the incisions  This is normal  Even minor surgery is a change in your body and this is your body's reaction to it  If you have had abdominal surgery, it may help to support the incision with a small pillow or blanket for comfort when moving or coughing  2  Wound care: The glue over the incisions will fall off over the next week or two  If you have staples or stitches, they will be removed by the physician at your follow up appointment  3  Showering: You may shower  Please do not soak wound in standing water such as a bath, hot tub, pool, lake, etc  Do not scrub or use exfoliants on the surgical wounds  4  Activity: You may go up and down stairs, walk as much as you are comfortable, but walk at least 3 times each day  If you have had abdominal surgery, do not perform any strenuous exercise or lift anything heavier than 15 pounds for at least 4 weeks, unless cleared by your physician  5  Diet: You may resume your regular diet  Please drink lots of water  6  Medications: Resume all of your previous medications, unless told otherwise by the doctor  A good option for pain control is to start with acetaminophen (Tylenol) 650mg and ibuprofen (Advil) 600mg and alternate taking them every 3 hours  If this is not sufficient, you make take the narcotic pain medicine as prescribed  You do not need to take the narcotic pain medication unless you are having significant pain and discomfort  Please take the narcotic medication with food  Ensure that you do not take more than 4000 mg of Tylenol per day  7  Driving: You will need someone to drive you home on the day of surgery  Do not drive or make any important decisions while on narcotic pain medication  Generally, you may drive 48 hours after you've stopped taking all narcotic pain medications   Generally, you may drive when you are off all narcotic pain medications, and you can turn in your seat comfortably to check your blind spot and area able to slam on the brakes while driving if needed  8  Upset Stomach: You may take Maalox, Tums, or similar items for an upset stomach  If your narcotic pain medication causes an upset stomach, do not take it on an empty stomach  Try taking it with at least some crackers or toast      9  Constipation: Patients often experience constipation after surgery  We recommend starting an over-the-counter medication for this, such as Metamucil, Senokot, Colace, milk of magnesia, etc  You may stop taking these medications a couple days after your last dose of narcotic medication  If you experience significant nausea or vomiting after abdominal surgery, call the office before trying any of these medications  10  Call the office: If you are experiencing any of the following: fevers above 101 5°, significant nausea or vomiting, if the wound develops drainage and/or excessive redness around the wound, or if you have significant diarrhea or other worsening symptoms      11  Pain: A prescription for narcotic pain medication will be sent to your pharmacy upon discharge from the hospital

## 2022-07-19 NOTE — OP NOTE
OPERATIVE REPORT  PATIENT NAME: Conrado Baugh    :  1980  MRN: [id number]  Pt Location: [facility] OR ROOM 01    SURGERY DATE: 2022    Surgeon(s) and Role:     * Berto Morrow MD - Primary     * Larry Hartman PA-C    Preop Diagnosis:  Abscess of abdominal wall [L02 211]    Post-Op Diagnosis Codes:     * Abscess of abdominal wall [L02 211]    Procedure(s) (LRB):  INCISION AND DRAINAGE ABDOMINAL WALL (N/A)  Debridement abdominal wall (N/A)    Specimen(s):  ID Type Source Tests Collected by Time Destination   1 : HIDRADENITIS RIGHT LOWER ABDOMINAL WOUND Tissue Soft Tissue, Debridement TISSUE EXAM Berto Morrow MD 2022 10:49 AM        Estimated Blood Loss:   Minimal    Drains:  Closed/Suction Drain Anterior;Right; Inferior Abdomen Bulb 10 Fr  (Active)   Number of days: 182       Anesthesia Type:   General    Operative Indications:  Abscess of abdominal wall [L02 211]      Operative Findings:  Hidradenitis with small fistulous tract    Complications:   None    Procedure and Technique:  The patient was identified by me  She was placed in the supine position on the operating room table whereupon general endotracheal anesthesia was induced is done I now took methylene blue and injected it using a syringe and an IV in to 2 small areas on the right side of the abdominal wall  I now outlined an incision to encompass these and injected Marcaine with epi  Skin incision is made with a knife and then continued with the Bovie  There was evidence of some tunneling in that in 2 areas I saw methylene blue  I excised all of these tracks with the Bovie  The wounds were now copiously irrigated and closed with interrupted chromic followed by nylon  I had to tie off 1 vessel with a Vicryl suture ligature  A silver dressing was applied  There was no qualified resident to assist   Bri Nj acted as 1st assistant to help me with retraction and closure    Size of the excised area was 10 3 x 4 5 by 4 cm    Closure was intermediate   I was present for the entire procedure    Patient Disposition:  PACU       SIGNATURE: Bryan Jessica MD  DATE: July 19, 2022  TIME: 11:19 AM

## 2022-07-19 NOTE — ANESTHESIA PREPROCEDURE EVALUATION
Procedure:  INCISION AND DRAINAGE ABDOMINAL WALL (N/A Abdomen)  Debridement abdominal wall (N/A Abdomen)    Relevant Problems   ANESTHESIA   (+) PONV (postoperative nausea and vomiting)      CARDIO   (+) Atypical chest pain   (+) High triglycerides   (+) Hyperlipidemia   (+) RBBB (right bundle branch block)      GI/HEPATIC   (+) Esophageal reflux      MUSCULOSKELETAL   (+) Chronic low back pain   (+) Chronic right-sided low back pain without sciatica   (+) Fibromyalgia   (+) Osteoarthritis of spine with radiculopathy, lumbar region      NEURO/PSYCH   (+) Chronic headache with new features   (+) Chronic low back pain   (+) Chronic right-sided low back pain without sciatica   (+) Depression with anxiety   (+) Depression, recurrent (HCC)   (+) Fibromyalgia   (+) Generalized anxiety disorder   (+) History of sepsis   (+) Moderate recurrent major depression (HCC)      PULMONARY   (+) Asthma   (+) Asthma, mild intermittent   (+) Obstructive sleep apnea      Other   (+) Left cervical lymphadenopathy      Morbid obesity, BMi 46    Stress test 7/23/2021: IMPRESSIONS: No chest pain or ischemic ECG changes with exercise  Suboptimal functional capacity for age (7 METS) with mild sinus tachycardia noted at rest and an exaggerated heart rate response to exercise  Hypertension was noted at rest  (158/100) with a hypertensive response to exercise (peak /102)  No evidence of exercise induced hypoxia        Physical Exam    Airway  Comment: Short thick neck  Mallampati score: III  TM Distance: >3 FB  Neck ROM: full     Dental   No notable dental hx     Cardiovascular      Pulmonary      Other Findings        Anesthesia Plan  ASA Score- 3     Anesthesia Type- general with ASA Monitors  Additional Monitors:   Airway Plan: ETT  Plan Factors-    Chart reviewed  Patient summary reviewed  Induction- intravenous  Postoperative Plan- Plan for postoperative opioid use   Planned trial extubation    Informed Consent- Anesthetic plan and risks discussed with patient  I personally reviewed this patient with the CRNA  Discussed and agreed on the Anesthesia Plan with the PETROS La

## 2022-07-26 ENCOUNTER — OFFICE VISIT (OUTPATIENT)
Dept: FAMILY MEDICINE CLINIC | Facility: CLINIC | Age: 42
End: 2022-07-26
Payer: COMMERCIAL

## 2022-07-26 VITALS
HEART RATE: 90 BPM | SYSTOLIC BLOOD PRESSURE: 120 MMHG | HEIGHT: 67 IN | OXYGEN SATURATION: 98 % | TEMPERATURE: 98 F | WEIGHT: 293 LBS | DIASTOLIC BLOOD PRESSURE: 70 MMHG | BODY MASS INDEX: 45.99 KG/M2

## 2022-07-26 DIAGNOSIS — J02.9 SORE THROAT: Primary | ICD-10-CM

## 2022-07-26 PROCEDURE — 3074F SYST BP LT 130 MM HG: CPT | Performed by: FAMILY MEDICINE

## 2022-07-26 PROCEDURE — 3078F DIAST BP <80 MM HG: CPT | Performed by: FAMILY MEDICINE

## 2022-07-26 PROCEDURE — 99213 OFFICE O/P EST LOW 20 MIN: CPT | Performed by: FAMILY MEDICINE

## 2022-07-26 NOTE — ASSESSMENT & PLAN NOTE
Sore throat/oral pain with referred pain to ears likely from recent intubation/anesthesia  Exam completely unremarkable  No signs of infection swelling or lacerations  Tonsils normal size and no erythema or exudates  External ear canals and tympanic membranes normal   Recommended supportive care  Return and ED precautions discussed

## 2022-07-26 NOTE — PROGRESS NOTES
Assessment/Plan:    Sore throat  Sore throat/oral pain with referred pain to ears likely from recent intubation/anesthesia  Exam completely unremarkable  No signs of infection swelling or lacerations  Tonsils normal size and no erythema or exudates  External ear canals and tympanic membranes normal   Recommended supportive care  Return and ED precautions discussed  Problem List Items Addressed This Visit        Other    Sore throat - Primary     Sore throat/oral pain with referred pain to ears likely from recent intubation/anesthesia  Exam completely unremarkable  No signs of infection swelling or lacerations  Tonsils normal size and no erythema or exudates  External ear canals and tympanic membranes normal   Recommended supportive care  Return and ED precautions discussed  Subjective:      Patient ID: Marilyn Rodas is a 43 y o  female  Presents to office for sore throat and oral pain  She also been having pain to her left ear which is the same side where her mouth hurts  Had surgery approximately 1 week ago for hidradenitis under general anesthesia  That is when her symptoms started  They have not gotten any worse but they have not gone away  She is no difficulty breathing or swallowing  Tolerating p o  well  No gagging vomiting or bringing up undigested food  The following portions of the patient's history were reviewed and updated as appropriate: allergies, current medications, past family history, past medical history, past social history, past surgical history and problem list     Review of Systems      Objective:      /70 (BP Location: Left arm, Patient Position: Sitting)   Pulse 90   Temp 98 °F (36 7 °C)   Ht 5' 6 5" (1 689 m)   Wt 134 kg (296 lb)   SpO2 98%   BMI 47 06 kg/m²          Physical Exam  Vitals and nursing note reviewed  Constitutional:       General: She is not in acute distress  Appearance: Normal appearance   She is not ill-appearing, toxic-appearing or diaphoretic  HENT:      Right Ear: Tympanic membrane, ear canal and external ear normal  There is no impacted cerumen  Left Ear: Tympanic membrane, ear canal and external ear normal  There is no impacted cerumen  Nose: Nose normal       Mouth/Throat:      Mouth: Mucous membranes are moist       Pharynx: Oropharynx is clear  No oropharyngeal exudate or posterior oropharyngeal erythema  Eyes:      General:         Right eye: No discharge  Left eye: No discharge  Extraocular Movements: Extraocular movements intact  Conjunctiva/sclera: Conjunctivae normal    Cardiovascular:      Rate and Rhythm: Normal rate and regular rhythm  Pulses: Normal pulses  Heart sounds: Normal heart sounds  Pulmonary:      Effort: Pulmonary effort is normal       Breath sounds: Normal breath sounds  Abdominal:      Palpations: Abdomen is soft  Musculoskeletal:      Cervical back: Normal range of motion and neck supple  No rigidity or tenderness  Lymphadenopathy:      Cervical: No cervical adenopathy  Neurological:      Mental Status: She is alert and oriented to person, place, and time  Psychiatric:         Mood and Affect: Mood normal          Behavior: Behavior normal          Thought Content:  Thought content normal          Judgment: Judgment normal

## 2022-07-28 ENCOUNTER — OFFICE VISIT (OUTPATIENT)
Dept: SURGERY | Facility: CLINIC | Age: 42
End: 2022-07-28

## 2022-07-28 VITALS
DIASTOLIC BLOOD PRESSURE: 80 MMHG | BODY MASS INDEX: 47.09 KG/M2 | TEMPERATURE: 99.4 F | WEIGHT: 293 LBS | OXYGEN SATURATION: 98 % | HEIGHT: 66 IN | HEART RATE: 93 BPM | SYSTOLIC BLOOD PRESSURE: 126 MMHG

## 2022-07-28 DIAGNOSIS — L73.2 HYDRADENITIS: Primary | ICD-10-CM

## 2022-07-28 PROCEDURE — 99024 POSTOP FOLLOW-UP VISIT: CPT | Performed by: SURGERY

## 2022-07-28 NOTE — PROGRESS NOTES
First postoperative visit after re-excision of a chronic draining area in the right lower abdominal wall  She had undergone a major resection for hidradenitis and was left with an area that kept filling up with fluid and draining  I removed the whole area and reclosed  Pathology report was consistent with a draining sinus and foreign body reaction, or in other words, sutures  On exam this looks like this is healing nicely  There is no induration and there is no evidence of skin breakdown or infection  I had to close this with nylon skin sutures unfortunately    I took slightly over half of them out but she will have to come back next week and see Dr Elver Faith in get the other half out

## 2022-08-01 ENCOUNTER — OFFICE VISIT (OUTPATIENT)
Dept: SURGERY | Facility: CLINIC | Age: 42
End: 2022-08-01

## 2022-08-01 VITALS
TEMPERATURE: 97.8 F | OXYGEN SATURATION: 98 % | RESPIRATION RATE: 18 BRPM | BODY MASS INDEX: 47.09 KG/M2 | WEIGHT: 293 LBS | HEIGHT: 66 IN | HEART RATE: 88 BPM

## 2022-08-01 DIAGNOSIS — L73.2 HYDRADENITIS: Primary | ICD-10-CM

## 2022-08-01 PROCEDURE — 99024 POSTOP FOLLOW-UP VISIT: CPT | Performed by: SURGERY

## 2022-08-01 NOTE — PROGRESS NOTES
59-year-old female patient who is 2 weeks status post excision and drainage of hidradenitis suppurative of from her right abdominal wall  She had some sutures removed last week  She comes in today for removal of rest of her sutures  She has no complaints of drainage  No complaints of redness or fever  On clinical exam she is alert awake oriented  She is obese  Her abdomen is soft nontender  The incision site looks healthy  There is no erythema  There is no drainage from the suture line  There are some sutures present  I removed the sutures  I applied a dry dressing  She should follow-up juwan Villafeurte

## 2022-08-16 ENCOUNTER — OFFICE VISIT (OUTPATIENT)
Dept: PSYCHIATRY | Facility: CLINIC | Age: 42
End: 2022-08-16
Payer: COMMERCIAL

## 2022-08-16 DIAGNOSIS — F41.1 GENERALIZED ANXIETY DISORDER: ICD-10-CM

## 2022-08-16 DIAGNOSIS — F33.9 DEPRESSION, RECURRENT (HCC): Primary | ICD-10-CM

## 2022-08-16 PROCEDURE — 99213 OFFICE O/P EST LOW 20 MIN: CPT | Performed by: PHYSICIAN ASSISTANT

## 2022-08-16 NOTE — PSYCH
MEDICATION MANAGEMENT NOTE        [address]  [facility] [place] [id number] [phone]        Name and Date of Birth:  Luis Gotti 43 y o  1980    Date of Visit: August 16, 2022    SUBJECTIVE:    Jannette seen by [place] 5/23 at which time meds unchanged  Jannette currently has some problems with a previously healed surgical wound  It has started to drain again  She will f/u with surgery for this  Overall, mood is good  Continues to struggle with stress and anxiety and stress-eating  We discussed current ways she ella with stress (I e , crafting) and some other things she can do (guided meditations, sound practices)  Jannette agrees to look into OA as another resource and we got OA meeting sight up during appt and reviewed local meetings  Review of Systems   Cardiovascular:        Hx of stable RBBB  Sees cardiology yearly  Next appt Sept     Skin: Positive for wound  Psychiatric History      This office since 2/25/20 (Dr Aníbal Jones)  Ind therapy Tali Harbour 5/11/20 and Zita Goff starting 1/22/21  In OP tx since 24 yo  Has seen Dr Mandy Agudelo in the past   No IP or suicide attempts  FHx- mother and brother with depression  Trauma/Loss History       Death of father from MI when Jannette was 11 yo  Bullied by brother until age 11 yo  Social History       x 19 yrs  Lives with  and 14 yo daughter            OBJECTIVE:     MENTAL STATUS EXAM  Appearance:  age appropriate, dressed casually   Behavior:  Pleasant & cooperative   Speech:  Normal volume, regular rate and rhythm   Mood:  mildly anxious   Affect:  mood congruent   Language: intact and appropriate for age, education, and intellect   Thought Process:  goal directed   Associations: intact associations   Thought Content:  negative ruminations   Perceptual Disturbances: no auditory or visual hallcunations   Risk Potential / Abnormal Thoughts: Suicidal ideation - None  Homicidal ideation - None  Potential for aggression - No       Consciousness:  Alert & Awake   Sensorium:  Grossly oriented   Attention: attention span and concentration are age appropriate       Fund of Knowledge:  Memory: awareness of current events: yes  recent and remote memory grossly intact   Insight:  intact   Judgment: intact   Muscle Strength Muscle Tone: Grossly normal  normal   Gait/Station: normal gait/station with good balance   Motor Activity: no abnormal movements       Lab Review: I have reviewed all pertinent labs  Lab Results   Component Value Date    HGBA1C 5 5 01/04/2022     Lab Results   Component Value Date    CHOLESTEROL 210 (H) 05/05/2022     Lab Results   Component Value Date    HDL 45 (L) 05/05/2022     Lab Results   Component Value Date    TRIG 235 (H) 05/05/2022     Lab Results   Component Value Date    NONHDLC 165 05/05/2022     Lab Results   Component Value Date     04/05/2017    SODIUM 138 07/12/2022    K 4 1 07/12/2022     07/12/2022    CO2 28 07/12/2022    ANIONGAP 7 09/19/2014    AGAP 9 07/12/2022    BUN 14 07/12/2022    CREATININE 0 61 07/12/2022    GLUC 150 (H) 01/03/2022    GLUF 107 (H) 07/12/2022    CALCIUM 9 4 07/12/2022    AST 36 07/12/2022    ALT 51 07/12/2022    ALKPHOS 54 07/12/2022    PROT 6 7 04/05/2017    TP 6 7 07/12/2022    BILITOT 0 6 04/05/2017    TBILI 0 49 07/12/2022    EGFR 112 07/12/2022     Lab Results   Component Value Date    WBC 7 23 07/12/2022    HGB 13 4 07/12/2022    HCT 40 5 07/12/2022    MCV 90 07/12/2022     07/12/2022           ASSESSMENT & PLAN          Diagnoses and all orders for this visit:    Depression, recurrent (HCC)    Generalized anxiety disorder      Current Outpatient Medications   Medication Sig Dispense Refill    albuterol (PROVENTIL HFA,VENTOLIN HFA) 90 mcg/act inhaler Inhale 2 puffs every 6 (six) hours as needed for wheezing or shortness of breath (cough) 18 g 1    amitriptyline (ELAVIL) 50 mg tablet Take 1 tablet (50 mg total) by mouth daily at bedtime 90 tablet 0    Ascorbic Acid (VITAMIN C) 500 MG CAPS Take 1 capsule by mouth daily      aspirin 81 mg chewable tablet Chew 1 tablet (81 mg total) daily 30 tablet 30    atorvastatin (LIPITOR) 40 mg tablet Take 1 tablet (40 mg total) by mouth in the morning  90 tablet 1    azelastine (ASTELIN) 0 1 % nasal spray 2 sprays into each nostril 2 (two) times a day Use in each nostril as directed (Patient not taking: No sig reported) 30 mL 11    cetirizine (ZyrTEC) 10 mg tablet Take 10 mg by mouth daily      Cholecalciferol (VITAMIN D3) 5000 units CAPS Take 1 capsule by mouth daily        ciprofloxacin-dexamethasone (CIPRODEX) otic suspension Administer 4 drops to the right ear in the morning and 4 drops in the evening  7 5 mL 3    cyanocobalamin (VITAMIN B-12) 1,000 mcg tablet Take 1,000 mcg by mouth daily       esomeprazole (NexIUM 24HR) 20 mg capsule Take 1 capsule (20 mg total) by mouth every morning 90 capsule 3    losartan (COZAAR) 100 MG tablet Take 1 tablet (100 mg total) by mouth daily 90 tablet 2    metoprolol succinate (TOPROL-XL) 100 mg 24 hr tablet Take 1 tablet (100 mg total) by mouth daily 90 tablet 3    mometasone (ELOCON) 0 1 % cream Apply to b/l external ear BID for 5-7 days, then prn 45 g 1    Multiple Vitamins-Minerals (CENTRUM ADULTS PO) Take 1 tablet by mouth daily      nystatin (MYCOSTATIN) cream Apply topically 2 (two) times a day as needed (rash) 30 g 1    Omega-3 Fatty Acids (FISH OIL ADULT GUMMIES PO) Take by mouth      venlafaxine (EFFEXOR-XR) 150 mg 24 hr capsule Take 1 capsule (150 mg total) by mouth in the morning  With 75 mg  90 capsule 0    venlafaxine (EFFEXOR-XR) 75 mg 24 hr capsule Take 1 capsule (75 mg total) by mouth in the morning  With 150 mg  90 capsule 0     No current facility-administered medications for this visit                  Plan: Cont effexor xr 225 mg/d and elavil 50 mg q bedtime  Reviewed risks, benefits, side effects of medications, including no medication  Patient understands and agrees to treatment plan  Cont f/u Georges for ind therapy           Recommend OA            F/u Pa-C 3 mths, sooner prn       Patient has been informed of 24 hours and weekend coverage for urgent situations accessed by calling the main clinic phone number       Mitra Leija PA-C   Time In: 1908  Time Out: 2645

## 2022-08-18 ENCOUNTER — OFFICE VISIT (OUTPATIENT)
Dept: SURGERY | Facility: CLINIC | Age: 42
End: 2022-08-18

## 2022-08-18 VITALS
HEART RATE: 86 BPM | DIASTOLIC BLOOD PRESSURE: 82 MMHG | TEMPERATURE: 97.6 F | BODY MASS INDEX: 45.99 KG/M2 | HEIGHT: 67 IN | RESPIRATION RATE: 18 BRPM | WEIGHT: 293 LBS | SYSTOLIC BLOOD PRESSURE: 128 MMHG | OXYGEN SATURATION: 98 %

## 2022-08-18 DIAGNOSIS — T88.8XXA FLUID COLLECTION AT SURGICAL SITE: Primary | ICD-10-CM

## 2022-08-18 PROCEDURE — 99024 POSTOP FOLLOW-UP VISIT: CPT | Performed by: SURGERY

## 2022-08-18 NOTE — PROGRESS NOTES
Patient returns stating that for the past couple days she has noticed some drainage and a bump superior to the medial portion of the incision  She stated that today some blood and material came out and the bump went away  On examination she has got a 2-3 mm opening through which I can push a little bit of serosanguineous fluid  On ultrasound this is connected to a 1 x 2 cm area which is not completely fluid filled    Given that this is draining on its own I told her to continue putting warm soaks on and come back and see me next week

## 2022-08-25 ENCOUNTER — OFFICE VISIT (OUTPATIENT)
Dept: SURGERY | Facility: CLINIC | Age: 42
End: 2022-08-25

## 2022-08-25 VITALS
BODY MASS INDEX: 45.99 KG/M2 | HEART RATE: 90 BPM | RESPIRATION RATE: 16 BRPM | HEIGHT: 67 IN | SYSTOLIC BLOOD PRESSURE: 130 MMHG | TEMPERATURE: 98.5 F | WEIGHT: 293 LBS | DIASTOLIC BLOOD PRESSURE: 78 MMHG | OXYGEN SATURATION: 98 %

## 2022-08-25 DIAGNOSIS — L73.2 HYDRADENITIS: Primary | ICD-10-CM

## 2022-08-25 PROCEDURE — 99024 POSTOP FOLLOW-UP VISIT: CPT | Performed by: SURGERY

## 2022-08-25 RX ORDER — CHLORHEXIDINE GLUCONATE 0.12 MG/ML
RINSE ORAL
COMMUNITY
Start: 2022-08-23 | End: 2022-09-06

## 2022-08-25 RX ORDER — AZITHROMYCIN 250 MG/1
TABLET, FILM COATED ORAL
COMMUNITY
Start: 2022-08-23 | End: 2022-09-06

## 2022-08-25 NOTE — PROGRESS NOTES
Postop visit from a 2nd go around with hidradenitis suppurativa on the lower abdominal wall  I took her back to the operating room a couple weeks ago and then heard is postop and she was fine  The week that she get him draining sinus  I treated this conservatively and the really considerably better today  There is a suture in the lateral most portion of the wound  This was cut and removed  The rest of the wound is intact although the closure is very thin on the medial aspect  There is still a 1 cm swelling about half Plan the middle of the wound subcutaneously  There is however no obvious in fact shin    I told her to continue present care and that I would see her again in a couple weeks time No

## 2022-08-31 ENCOUNTER — TELEPHONE (OUTPATIENT)
Dept: CARDIOLOGY CLINIC | Facility: HOSPITAL | Age: 42
End: 2022-08-31

## 2022-08-31 NOTE — TELEPHONE ENCOUNTER
Attempted to contact Jannette multiple times to schedule her next appointmnet with cardiology  A letter has been sent to have her contact our office

## 2022-09-05 ENCOUNTER — PATIENT MESSAGE (OUTPATIENT)
Dept: FAMILY MEDICINE CLINIC | Facility: CLINIC | Age: 42
End: 2022-09-05

## 2022-09-06 ENCOUNTER — TELEMEDICINE (OUTPATIENT)
Dept: FAMILY MEDICINE CLINIC | Facility: CLINIC | Age: 42
End: 2022-09-06
Payer: COMMERCIAL

## 2022-09-06 VITALS — HEART RATE: 90 BPM | TEMPERATURE: 96.9 F | OXYGEN SATURATION: 97 %

## 2022-09-06 DIAGNOSIS — U07.1 COVID-19: Primary | ICD-10-CM

## 2022-09-06 PROCEDURE — 99213 OFFICE O/P EST LOW 20 MIN: CPT | Performed by: PHYSICIAN ASSISTANT

## 2022-09-06 RX ORDER — NIRMATRELVIR AND RITONAVIR 300-100 MG
3 KIT ORAL 2 TIMES DAILY
Qty: 30 TABLET | Refills: 0 | Status: SHIPPED | OUTPATIENT
Start: 2022-09-06 | End: 2022-09-11

## 2022-09-06 NOTE — PROGRESS NOTES
COVID-19 Outpatient Progress Note    Assessment/Plan:    Problem List Items Addressed This Visit    None     Visit Diagnoses     COVID-19    -  Primary    Relevant Medications    nirmatrelvir & ritonavir (Paxlovid, 300/100,) tablet therapy pack         Disposition:     Patient has asymptomatic or mild COVID-19 infection  Based off CDC guidelines, they were recommended to isolate for 5 days  If they are asymptomatic or symptoms are improving with no fevers in the past 24 hours, isolation may be ended followed by 5 days of wearing a mask when around othes to minimize risk of infecting others  If still have a fever or other symptoms have not improved, continue to isolate until they improve  Regardless of when they end isolation, avoid being around people who are more likely to get very sick from COVID-19 until at least day 11  Discussed symptom directed medication options with patient  Discussed vitamin D, vitamin C, and/or zinc supplementation with patient  If develops sob, may use prn albuterol and can consider pulmicort/flovent  Follow up as needed  Continue prn tylenol, motrin  She understands to stop her lipitor for the duration of the paxlovid  Patient meets criteria for PAXLOVID and they have been counseled appropriately according to EUA documentation released by the FDA  After discussion, patient agrees to treatment  Maryse Lenny is an investigational medicine used to treat mild-to-moderate COVID-19 in adults and children (15years of age and older weighing at least 80 pounds (40 kg)) with positive results of direct SARS-CoV-2 viral testing, and who are at high risk for progression to severe COVID-19, including hospitalization or death  PAXLOVID is investigational because it is still being studied  There is limited information about the safety and effectiveness of using PAXLOVID to treat people with mild-to-moderate COVID-19      The FDA has authorized the emergency use of PAXLOVID for the treatment of mild-tomoderate COVID-19 in adults and children (15years of age and older weighing at least 80 pounds (40 kg)) with a positive test for the virus that causes COVID-19, and who are at high risk for progression to severe COVID-19, including hospitalization or death, under an EUA  What should I tell my healthcare provider before I take PAXLOVID? Tell your healthcare provider if you:  - Have any allergies  - Have liver or kidney disease  - Are pregnant or plan to become pregnant  - Are breastfeeding a child  - Have any serious illnesses    Tell your healthcare provider about all the medicines you take, including prescription and over-the-counter medicines, vitamins, and herbal supplements  Some medicines may interact with PAXLOVID and may cause serious side effects  Keep a list of your medicines to show your healthcare provider and pharmacist when you get a new medicine  You can ask your healthcare provider or pharmacist for a list of medicines that interact with PAXLOVID  Do not start taking a new medicine without telling your healthcare provider  Your healthcare provider can tell you if it is safe to take PAXLOVID with other medicines  Tell your healthcare provider if you are taking combined hormonal contraceptive  PAXLOVID may affect how your birth control pills work  Females who are able to become pregnant should use another effective alternative form of contraception or an additional barrier method of contraception  Talk to your healthcare provider if you have any questions about contraceptive methods that might be right for you  How do I take PAXLOVID? PAXLOVID consists of 2 medicines: nirmatrelvir and ritonavir  - Take 2 pink tablets of nirmatrelvir with 1 white tablet of ritonavir by mouth 2 times each day (in the morning and in the evening) for 5 days  For each dose, take all 3 tablets at the same time  - If you have kidney disease, talk to your healthcare provider   You may need a different dose  - Swallow the tablets whole  Do not chew, break, or crush the tablets  - Take PAXLOVID with or without food  - Do not stop taking PAXLOVID without talking to your healthcare provider, even if you feel better  - If you miss a dose of PAXLOVID within 8 hours of the time it is usually taken, take it as soon as you remember  If you miss a dose by more than 8 hours, skip the missed dose and take the next dose at your regular time  Do not take 2 doses of PAXLOVID at the same time  - If you take too much PAXLOVID, call your healthcare provider or go to the nearest hospital emergency room right away  - If you are taking a ritonavir- or cobicistat-containing medicine to treat hepatitis C or Human Immunodeficiency Virus (HIV), you should continue to take your medicine as prescribed by your healthcare provider   - Talk to your healthcare provider if you do not feel better or if you feel worse after 5 days  Who should generally not take PAXLOVID? Do not take PAXLOVID if:  You are allergic to nirmatrelvir, ritonavir, or any of the ingredients in PAXLOVID  You are taking any of the following medicines:  - Alfuzosin  - Pethidine, piroxicam, propoxyphene  - Ranolazine  - Amiodarone, dronedarone, flecainide, propafenone, quinidine  - Colchicine  - Lurasidone, pimozide, clozapine  - Dihydroergotamine, ergotamine, methylergonovine  - Lovastatin, simvastatin  - Sildenafil (Revatio®) for pulmonary arterial hypertension (PAH)  - Triazolam, oral midazolam  - Apalutamide  - Carbamazepine, phenobarbital, phenytoin  - Rifampin  - St  Patricks Wort (hypericum perforatum)    What are the important possible side effects of PAXLOVID? Possible side effects of PAXLOVID are:  - Liver Problems   Tell your healthcare provider right away if you have any of these signs and symptoms of liver problems: loss of appetite, yellowing of your skin and the whites of eyes (jaundice), dark-colored urine, pale colored stools and itchy skin, stomach area (abdominal) pain  - Resistance to HIV Medicines  If you have untreated HIV infection, PAXLOVID may lead to some HIV medicines not working as well in the future  - Other possible side effects include: altered sense of taste, diarrhea, high blood pressure, or muscle aches    These are not all the possible side effects of PAXLOVID  Not many people have taken PAXLOVID  Serious and unexpected side effects may happen  [address] is still being studied, so it is possible that all of the risks are not known at this time  What other treatment choices are there? Like Dm Gladys may allow for the emergency use of other medicines to treat people with COVID-19  Go to https://[employer]/ for information on the emergency use of other medicines that are authorized by FDA to treat people with COVID-19  Your healthcare provider may talk with you about clinical trials for which you may be eligible  It is your choice to be treated or not to be treated with PAXLOVID  Should you decide not to receive it or for your child not to receive it, it will not change your standard medical care  What if I am pregnant or breastfeeding? There is no experience treating pregnant women or breastfeeding mothers with PAXLOVID  For a mother and unborn baby, the benefit of taking PAXLOVID may be greater than the risk from the treatment  If you are pregnant, discuss your options and specific situation with your healthcare provider  It is recommended that you use effective barrier contraception or do not have sexual activity while taking PAXLOVID  If you are breastfeeding, discuss your options and specific situation with your healthcare provider  How do I report side effects with PAXLOVID? Contact your healthcare provider if you have any side effects that bother you or do not go away      Report side effects to FDA MedWatch at www fda gov/medwatch or call 6-276-PFW5415 or you can report side effects to [facility]  at the contact information provided below  Website Fax number Telephone number   [employer] [phone]     How should I store [address]? Store PAXLOVID tablets at room temperature between 68°F to 77°F (20°C to 25°C)  Full fact sheet for patients, parents, and caregivers can be found at: [employer] nicole    I have spent 12 minutes directly with the patient  Encounter provider: Davey Santillan PA-C     Provider located at: [address] [place] [id number]     Recent Visits  No visits were found meeting these conditions  Showing recent visits within past 7 days and meeting all other requirements  Today's Visits  Date Type Provider Dept   09/06/22 Telemedicine Davey Santillan PA-C [facility] [address] [place] [address] today's visits and meeting all other requirements  Future Appointments  No visits were found meeting these conditions  Showing future appointments within next 150 days and meeting all other requirements     This virtual check-in was done via [address] and patient was informed that this is a secure, HIPAA-compliant platform  She agrees to proceed  Patient agrees to participate in a virtual check in via telephone or video visit instead of presenting to the office to address urgent/immediate medical needs  Patient is aware this is a billable service  She acknowledged consent and understanding of privacy and security of the video platform  The patient has agreed to participate and understands they can discontinue the visit at any time  After connecting through [facility], the patient was identified by name and date of birth   Jannette CYNTHIA Amauri was informed that this was a telemedicine visit and that the exam was being conducted confidentially over secure lines  My office door was closed  No one else was in the room  Jannette Baugh acknowledged consent and understanding of privacy and security of the telemedicine visit  I informed the patient that I have reviewed her record in Epic and presented the opportunity for her to ask any questions regarding the visit today  The patient agreed to participate  Verification of patient location:  Patient is located in the following state in which I hold an active license: [place]    Subjective:   Sandra Harris is a 43 y o  female who has been screened for COVID-19  Symptom change since last report: worsening  Patient's symptoms include fever (102 4), chills, fatigue, nasal congestion, sore throat, anosmia, cough, nausea, myalgias and headache (and dizziness)  Patient denies loss of taste, shortness of breath, chest tightness, vomiting and diarrhea  - Date of symptom onset: 9/4/2022  - Date of positive COVID-19 test: 9/5/2022  Type of test: Home antigen  Patient with typical symptoms of COVID-19 and they attest that they were positive on home rapid antigen testing  Image of positive result is not able to be uploaded into their chart  COVID-19 vaccination status: Fully vaccinated with booster    Jannette has been staying home and has isolated themselves in her home  She has been taking inuprofen and tylenol  She has not had to use her albuterol       No results found for: Otoniel Bingham, SARSCORONAVI, CORONAVIRUSR, SARSCOVAG, [facility]  Past Medical History:   Diagnosis Date    Allergic rhinitis     Anxiety     Arthritis     C  difficile diarrhea 2020    Chronic pain disorder     BACK SHOULDERS NECK    Colon polyp     COPD (chronic obstructive pulmonary disease) (HCC)     CPAP (continuous positive airway pressure) dependence     Depression     Diarrhea     Disease of thyroid gland     nodule    Eating disorder     Fibromyalgia, primary     Fissure, anal     GERD (gastroesophageal reflux disease)     Hearing difficulty of right ear     Hyperlipidemia     Hypertension     Irritable bowel syndrome     Obesity     Perforation of tympanic membrane     Right    PONV (postoperative nausea and vomiting)     RBBB     Right bundle branch blockage     Sleep apnea     no cpap    Thyroid nodule     Wears glasses      Past Surgical History:   Procedure Laterality Date    ANAL FISTULOTOMY N/A 2018    Procedure: FISTULOTOMY;  Surgeon: Stefanie Allred MD;  [facility];  Service: Colorectal    BACK SURGERY      lumbar herniated disc    BREAST CYST EXCISION Left 13 yrs ago  benign    BREAST SURGERY Left 2006    cyst removal    CARPAL TUNNEL RELEASE       SECTION      CHOLECYSTECTOMY      COLONOSCOPY      CYST REMOVAL      DENTAL SURGERY      ENDOMETRIAL ABLATION N/A 2021    Procedure: D&C, ABLATION ENDOMETRIAL MARIEL;  Surgeon: Gilbert Mendoza MD;  Location: [address] MAIN OR;  Service: Gynecology    HAND SURGERY      INCISION AND DRAINAGE OF WOUND N/A 2018    Procedure: INCISION AND DRAINAGE (I&D) BUTTOCK;  Surgeon: Stefanie Allred MD;  [facility];  Service: Colorectal    MYRINGOTOMY W/ TUBES Right 2015    [place]    W[place] COLONOSCOPY FLX DX W/COLLJ SPEC WHEN PFRMD N/A 2017    Procedure: COLONOSCOPY;  Surgeon: Julien Hernandez MD;  [facility];   Service: Gastroenterology    W[place] DEBRIDEMENT, SKIN, SUB-Q TISSUE,=<20 SQ CM N/A 2022    Procedure: Debridement abdominal wall;  Surgeon: Freddie Mckenna MD;  [facility];  Service: General    W[place] DRAIN SKIN ABSCESS SIMPLE N/A 2022    Procedure: INCISION AND DRAINAGE ABDOMINAL WALL;  Surgeon: Freddie Mckenna MD;  [facility];  Service: General    W[place] EXC SWEAT GLAND Brenda [place] INGUIN,COMPLX Bilateral 2022    Procedure: WIDE EXCISION HIDRADENITIS ABDOMINAL WALL;  Surgeon: Freddie Mckenna MD;  [facility];  Service: Giselle Mercer NC INCISE FINGER TENDON SHEATH Right 03/13/2018    Procedure: LONG TRIGGER FINGER RELEASE;  Surgeon: Rekha Padgett DO;  [facility];  Service: Orthopedics    NC SURG DIAGNOSTIC EXAM, ANORECTAL N/A 11/02/2018    Procedure: EXAM UNDER ANESTHESIA (EUA); Surgeon: Amarilis Lyles MD;  [facility];  Service: Colorectal    NC TYMPANOPLASTY Right 05/24/2017    Procedure: TYMPANOPLASTY WITH PERICHONDRIN GRAFT;  Surgeon: Aaliyah Llanes DO;  [facility];  Service: ENT    SHOULDER SURGERY Right     TRIGGER FINGER RELEASE      ULNAR NERVE TRANSPOSITION Left     UMBILICAL HIDRADENITIS EXCISION  45/11/3820    subumbilical hidradenitis exicsion, Dr Anuel Hanks, [facility]    WISDOM TOOTH EXTRACTION       Current Outpatient Medications   Medication Sig Dispense Refill    albuterol (PROVENTIL HFA,VENTOLIN HFA) 90 mcg/act inhaler Inhale 2 puffs every 6 (six) hours as needed for wheezing or shortness of breath (cough) 18 g 1    amitriptyline (ELAVIL) 50 mg tablet Take 1 tablet (50 mg total) by mouth daily at bedtime 90 tablet 0    Ascorbic Acid (VITAMIN C) 500 MG CAPS Take 1 capsule by mouth daily      aspirin 81 mg chewable tablet Chew 1 tablet (81 mg total) daily 30 tablet 30    atorvastatin (LIPITOR) 40 mg tablet Take 1 tablet (40 mg total) by mouth in the morning  90 tablet 1    cetirizine (ZyrTEC) 10 mg tablet Take 10 mg by mouth daily      Cholecalciferol (VITAMIN D3) 5000 units CAPS Take 1 capsule by mouth daily        ciprofloxacin-dexamethasone (CIPRODEX) otic suspension Administer 4 drops to the right ear in the morning and 4 drops in the evening   7 5 mL 3    cyanocobalamin (VITAMIN B-12) 1,000 mcg tablet Take 1,000 mcg by mouth daily       esomeprazole (NexIUM 24HR) 20 mg capsule Take 1 capsule (20 mg total) by mouth every morning 90 capsule 3    losartan (COZAAR) 100 MG tablet Take 1 tablet (100 mg total) by mouth daily 90 tablet 2    metoprolol succinate (TOPROL-XL) 100 mg 24 hr tablet Take 1 tablet (100 mg total) by mouth daily 90 tablet 3    mometasone (ELOCON) 0 1 % cream Apply to b/l external ear BID for 5-7 days, then prn 45 g 1    Multiple Vitamins-Minerals (CENTRUM ADULTS PO) Take 1 tablet by mouth daily      nirmatrelvir & ritonavir (Paxlovid, 300/100,) tablet therapy pack Take 3 tablets by mouth 2 (two) times a day for 5 days Take 2 nirmatrelvir tablets + 1 ritonavir tablet together per dose 30 tablet 0    nystatin (MYCOSTATIN) cream Apply topically 2 (two) times a day as needed (rash) 30 g 1    Omega-3 Fatty Acids (FISH OIL ADULT GUMMIES PO) Take by mouth      venlafaxine (EFFEXOR-XR) 150 mg 24 hr capsule Take 1 capsule (150 mg total) by mouth in the morning  With 75 mg  90 capsule 0    venlafaxine (EFFEXOR-XR) 75 mg 24 hr capsule Take 1 capsule (75 mg total) by mouth in the morning  With 150 mg  90 capsule 0     No current facility-administered medications for this visit  Allergies   Allergen Reactions    Neurontin [Gabapentin] Other (See Comments)     Other reaction(s): SUICIDE IDEATION  Other reaction(s): Other (See Comments)  suicidal  suicidal    Penicillins Anaphylaxis, Swelling and Angioedema    Aripiprazole Other (See Comments)     Other reaction(s): low dose 2 mg ; curving in and locking in of hands/dystonia  Other reaction(s): Other (See Comments)  Other reaction(s): low dose 2 mg ; curving in and locking in of hands/dystonia    Lorazepam Other (See Comments)     Other reaction(s): higher dose > anxiety and depression  Other reaction(s): Other (See Comments)  Other reaction(s): higher dose > anxiety and depression    Carbamazepine Other (See Comments)     Other reaction(s): Unknown Reaction    Doxycycline Hives    Lamotrigine Rash    Wound Dressing Adhesive Hives       Review of Systems   Constitutional: Positive for chills, fatigue and fever (102 4)  HENT: Positive for congestion and sore throat  Respiratory: Positive for cough  Negative for chest tightness and shortness of breath  Gastrointestinal: Positive for nausea  Negative for diarrhea and vomiting  Musculoskeletal: Positive for myalgias  Neurological: Positive for headaches (and dizziness)  Objective:    Vitals:    09/06/22 1000   Pulse: 90   Temp: (!) 96 9 °F (36 1 °C)   TempSrc: Oral   SpO2: 97%       Physical Exam  Constitutional:       General: She is not in acute distress  HENT:      Nose: Congestion present  Eyes:      Conjunctiva/sclera: Conjunctivae normal    Pulmonary:      Effort: No respiratory distress  Neurological:      Mental Status: She is alert     Psychiatric:         Mood and Affect: Mood normal          Behavior: Behavior normal

## 2022-09-08 ENCOUNTER — TELEPHONE (OUTPATIENT)
Dept: FAMILY MEDICINE CLINIC | Facility: CLINIC | Age: 42
End: 2022-09-08

## 2022-09-08 NOTE — TELEPHONE ENCOUNTER
Patient called stating she had a virtual visit on Tuesday and is COVID positive  She started the Paxlovid on Tuesday but says the congestion is unbearable  She wants to know if she can take Sudafed or some kind of nasal decongestant with the Paxlovid  Please advise  Thanks!

## 2022-09-08 NOTE — TELEPHONE ENCOUNTER
She can take otc coridicin (decongestant that is safe for high blood pressure) and or flonase nasal spray

## 2022-09-12 ENCOUNTER — TELEPHONE (OUTPATIENT)
Dept: FAMILY MEDICINE CLINIC | Facility: CLINIC | Age: 42
End: 2022-09-12

## 2022-09-12 NOTE — TELEPHONE ENCOUNTER
----- Message from Julián Baugh sent at 9/12/2022  1:44 PM EDT -----  Regarding: Covid issue  Hi, I'm on day 7 since testing positive for covid, most issues are slowly getting better, just congested with some coughing but I'd like to find out, I've had painful stomach cramps and diarrhea for like the last 5 days  Saturday night was my last dose of the antiviral, I was thinking maybe that was causing the stomach issues but it's no better  Is this just a covid thing, could it still be the antiviral or something else?  I have had c diff once before so that's always my concern

## 2022-09-12 NOTE — TELEPHONE ENCOUNTER
Patient had a telemedicine visit on 9/6/22  Please advise if there are any recommendations or if office is to refer to a walk in  There are no openings available for this week

## 2022-09-13 NOTE — TELEPHONE ENCOUNTER
Please advise pt to be evaluated at [facility] for these symptoms since we have only one provider here for the week and no openings

## 2022-09-21 ENCOUNTER — SOCIAL WORK (OUTPATIENT)
Dept: BEHAVIORAL/MENTAL HEALTH CLINIC | Facility: CLINIC | Age: 42
End: 2022-09-21
Payer: COMMERCIAL

## 2022-09-21 DIAGNOSIS — F41.1 GENERALIZED ANXIETY DISORDER: ICD-10-CM

## 2022-09-21 DIAGNOSIS — F33.9 DEPRESSION, RECURRENT (HCC): Primary | ICD-10-CM

## 2022-09-21 PROCEDURE — 90834 PSYTX W PT 45 MINUTES: CPT | Performed by: SOCIAL WORKER

## 2022-09-21 NOTE — BH TREATMENT PLAN
Jannette MarreroMarioadithya Baugh  1980       Date of Initial Treatment Plan: 5/11/2020  Date of Current Treatment Plan: 09/21/22    Treatment Plan Number 6    Strengths/Personal Resources for Self Care: good girl  leader, good mom to [place], creative, funny, good friend    Diagnosis:   1  Depression, recurrent ([address] )     2  Generalized anxiety disorder          Area of Needs: improve self worth/confidence, better manage her emotions      Long Term Goal 1: "to be able to look in the mirror and not hate what I see"    Target Date: 8/2/22  Completion Date: TBD         Short Term Objectives for Goal 1: see objectives below   1 1  Jannette will continue to demonstrate openness to engage in therapy as evidenced by regular attendance and communication of her thoughts and feelings  1 2  Jannette will utilize groups for her physical and emotional wellness for additional support  She will attend at least 1 group a month  1 3  Jannette will learn and utilize DBT skills to better manage her emotions, improve interpersonal relationships, and increase distress tolerance  Share successes and challenges  1 4  Jannette will learn and utilize cognitive coping mechanisms to dismantle unhelpful thoughts with the goal for Jannette to be more fair to herself  1 5  Jannette will practice accepting compliments, report an increase in being able to accept compliments rather than dispute them by 25%  Share successes and challenges  1 6  Jannette will continue to utilize coping strategies to separate responsibility to self vs responsibility to/of others  Share successes and challenges  1 7  Jannette will continue medication management          GOAL 1: Modality: Individual 2-3x per month   Completion Date TBD, Medication Management and The person(s) responsible for carrying out the plan is  La Hollingsworth, and Mcconnell (R)    Modalities: CBT, psychoeducation, mindfulness, communication skills, stress management skills, assertiveness skills, DBT skills, guided imagery, emotional needs meeting      Behavioral Health Treatment Plan [facility]: Diagnosis and Treatment Plan explained to Federico Francis relates understanding diagnosis and is agreeable to Treatment Plan       Client Comments : Please share your thoughts, feelings, need and/or experiences regarding your treatment plan:

## 2022-09-21 NOTE — PSYCH
This note was not shared with the patient due to this is a psychotherapy note      Psychotherapy Provided: Individual Psychotherapy 45 minutes     Length of time in session: 45 minutes, follow up in 1 week    Goals addressed in session: Goal 1     Pain:      mild    3    Current suicide risk : Low     Met with Jannette Bhatia was last seen by this clinician 22  Jannette states, "I've been having ups and downs with my mood "  Jannette's physical health remains a stressor  She had another surgery over the summer and has an appt after session today for her shoulder  Psychiatry recommended she attend support groups through [employer]  She has not yet attended one  Motivation is a barrier as virtual is also an option for groups  Jannette shared that a girl  friend passed away abruptly over the summer after being dx with an aggressive form of cancer  She shared the story and the difficulty with not being able to attend the  because it was only several days after her surgery  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  She's set some limits with the parents and has found this helpful for her emotion wellness  Today, Jannette reports ongoing struggles with 'being okay with myself' and managing her emotions  She says avoidance of her dealing with her problems is a barrier  We updated her tx plan and created objectives to better fit her current areas of needs  She was informed that a depression group will be starting on site in October and she was encouraged to attend  We also discussed beginning DBT work through a skills workbook  Jannette was agreeable to this and will look into buying skills workbook, use in session  Jannette wishes to have weekly therapy sessions at this time, transition to biweekly appts in 2022         Mental status:  Appearance calm and cooperative , adequate hygiene and grooming and good eye contact    Mood depressed   Affect affect appropriate    Speech a normal rate and volume   Thought Processes coherent/organized and goal-directed   Hallucinations no hallucinations present    Thought Content no delusions, cognitive distortions   Abnormal Thoughts no suicidal thoughts  and no homicidal thoughts    Orientation  oriented to person and place and time   Remote Memory short term memory intact and long term memory intact   Attention Span concentration intact   Intellect Appears to be of Average Intelligence   Fund of Knowledge displays adequate knowledge of current events   Insight fair   Judgement fair   Muscle Strength Muscle strength and tone were normal and Normal gait    Language no difficulty naming common objects   Pain mild   Pain Scale 3         Behavioral Health Treatment Plan [facility]: Diagnosis and Treatment Plan explained to Veronica Du relates understanding diagnosis and is agreeable to Treatment Plan   Yes

## 2022-10-03 DIAGNOSIS — I10 HYPERTENSION, UNSPECIFIED TYPE: ICD-10-CM

## 2022-10-03 RX ORDER — METOPROLOL SUCCINATE 100 MG/1
TABLET, EXTENDED RELEASE ORAL
Qty: 100 TABLET | Refills: 1 | Status: SHIPPED | OUTPATIENT
Start: 2022-10-03

## 2022-10-04 ENCOUNTER — SOCIAL WORK (OUTPATIENT)
Dept: BEHAVIORAL/MENTAL HEALTH CLINIC | Facility: CLINIC | Age: 42
End: 2022-10-04
Payer: COMMERCIAL

## 2022-10-04 DIAGNOSIS — F33.9 DEPRESSION, RECURRENT (HCC): Primary | ICD-10-CM

## 2022-10-04 DIAGNOSIS — F41.1 GENERALIZED ANXIETY DISORDER: ICD-10-CM

## 2022-10-04 PROCEDURE — 90834 PSYTX W PT 45 MINUTES: CPT | Performed by: SOCIAL WORKER

## 2022-10-04 NOTE — PSYCH
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, Mikal Muller says she knew it would be too much for her but agreed due to feeling disappointed in herself if she didn't  We discussed her emotion of feeling disappointed--it being uncomfortable and choosing short term relief of agreeing to do something with long term consequences to her emotional health and expectations others have her, creating an ongoing cycle  We discussed how this is a self-destructive coping skill and Jannette was re-introduced to STOP, REST tool with goal to set better intention for herself  We reviewed the material together in the DBT skills workbook she bought as suggested by this clinician  She struggles with anxiety-induced unhelpful thoughts  CBT was utilized to dismantle unhelpful thoughts, encouraging her to look at thoughts, feelings, actions, behaviors, and situations from different perspectives to be more fair to herself  Slight progress as Jannette is verbalizing awareness into her need to practice better limits and boundaries  She is able to name a few small ways she has begun setting greater limits with herself and others  Jannette will continue to implement wellness tools; utilize STOP/REST skill to set good intention for yourself and proceed mindfulness, being fair to herself  Continue biweekly support        Mental status:  Appearance adequate hygiene and grooming and good eye contact    Mood anxious   Affect affect appropriate    Speech a normal rate and volume   Thought Processes coherent/organized   Hallucinations no hallucinations present    Thought Content no delusions, cognitive distortions   Abnormal Thoughts no suicidal thoughts  and no homicidal thoughts    Orientation  oriented to person and place and time   Remote Memory short term memory intact and long term memory intact   Attention Span concentration intact   Intellect Appears to be of Average Intelligence   Fund of Knowledge displays adequate knowledge of current events   Insight fair   Judgement fair   Muscle Strength Muscle strength and tone were normal and Normal gait    Language no difficulty naming common objects   Pain none   Pain Scale 0       Behavioral Health Treatment Plan [facility]: Diagnosis and Treatment Plan explained to Radha Pacheco relates understanding diagnosis and is agreeable to Treatment Plan   Yes

## 2022-10-10 ENCOUNTER — SOCIAL WORK (OUTPATIENT)
Dept: BEHAVIORAL/MENTAL HEALTH CLINIC | Facility: CLINIC | Age: 42
End: 2022-10-10
Payer: COMMERCIAL

## 2022-10-10 DIAGNOSIS — F41.1 GENERALIZED ANXIETY DISORDER: ICD-10-CM

## 2022-10-10 DIAGNOSIS — F33.9 DEPRESSION, RECURRENT (HCC): Primary | ICD-10-CM

## 2022-10-10 PROCEDURE — 90834 PSYTX W PT 45 MINUTES: CPT | Performed by: SOCIAL WORKER

## 2022-10-10 NOTE — PSYCH
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 and needing reassurance by others about her decision prior to making it  She was able to complete the exercise using REST after this incident occurred, gaining better insight of what she could have done differently to set a better intention and proceed mindfully for herself  We discussed this in depth today  She struggles with unhelpful thoughts; generalizations, all or nothing, mental filter, and mind reading distortions  She places the blame of these unhelpful thoughts on her brother and her childhood, with him making her feel like she was never good enough  We briefly discussed radical acceptance  She was introduced to self-encouraging coping thoughts  We completed an exercise together with Jannette identifying distressing situations and developing a new coping thought  Jannette will complete this exercise prior to next session  Continue weekly support         Mental status:  Appearance adequate hygiene and grooming and good eye contact    Mood anxious   Affect affect appropriate    Speech a normal rate and volume   Thought Processes coherent/organized   Hallucinations no hallucinations present    Thought Content no delusions, cognitive distortions   Abnormal Thoughts no suicidal thoughts  and no homicidal thoughts    Orientation  oriented to person and place and time   Remote Memory short term memory intact and long term memory intact   Attention Span concentration intact   Intellect Appears to be of Average Intelligence   Fund of Knowledge displays adequate knowledge of current events   Insight fair   Judgement fair   Muscle Strength Muscle strength and tone were normal and Normal gait    Language no difficulty naming common objects   Pain none   Pain Scale 0       Behavioral Health Treatment Plan [facility]: Diagnosis and Treatment Plan explained to Sen Rizzo relates understanding diagnosis and is agreeable to Treatment Plan   Yes     EXACT TIME IN:  1200  EXACT TIME OUT: 1248  TOTAL MINUTES IN SESSION: 48

## 2022-10-18 ENCOUNTER — SOCIAL WORK (OUTPATIENT)
Dept: BEHAVIORAL/MENTAL HEALTH CLINIC | Facility: CLINIC | Age: 42
End: 2022-10-18

## 2022-10-18 DIAGNOSIS — F33.1 MODERATE RECURRENT MAJOR DEPRESSION (HCC): Primary | ICD-10-CM

## 2022-10-18 DIAGNOSIS — F41.1 GENERALIZED ANXIETY DISORDER: ICD-10-CM

## 2022-10-18 NOTE — PSYCH
This note was not shared with the patient due to this is a psychotherapy note      Psychotherapy Provided: Individual Psychotherapy 45 minutes     Length of time in session: 45 minutes, follow up in 1 week    Goals addressed in session: Goal 1     Pain:      none    0    Current suicide risk : Low     Met with Jannette  Session focused on reviewing homework assignment--naming distressing situations and identifying new coping thought  We discussed some distressing situations regarding the girl [employer] parade and her chronic medical issues, working toward identifying coping thoughts that can help her tolerate distressing situations better by giving her strength and motivation to endure those experiences  Jannette struggles with anxiety-induced distortions, negative view of self  Through exercise, Jannette gained improved awareness into how her unhelpful thoughts are increasing her emotional suffering  She verbalizes motivation toward positive change  Some progress in Jannette's ability to look at thoughts, actions, and situations from different perspectives to be more fair to herself  She is able to name several incidents in which she was able to decrease emotional reactivity, be more fair to herself  She also gained some insight into how her judgements of herself, others, and situations are increasing her emotional suffering  Jannette will complete reading/exercise on negative judgements/labels and judgement defusion  Bring to next session  Next appt in 1 week        Mental status:  Appearance calm and cooperative , adequate hygiene and grooming and good eye contact    Mood euthymic   Affect appropriate   Speech a normal rate and volume   Thought Processes coherent/organized and goal-directed   Hallucinations no hallucinations present    Thought Content no delusions   Abnormal Thoughts no suicidal thoughts  and no homicidal thoughts    Orientation  oriented to person and place and time   Remote Memory short term memory intact and long term memory intact   Attention Span concentration intact   Intellect Appears to be of Average Intelligence   Fund of Knowledge displays adequate knowledge of current events   Insight fair   Judgement fair   Muscle Strength Muscle strength and tone were normal and Normal gait    Language no difficulty naming common objects   Pain none   Pain Scale 0         Behavioral Health Treatment Plan [facility]: Diagnosis and Treatment Plan explained to Perla Barba relates understanding diagnosis and is agreeable to Treatment Plan   Yes     Visit Time    Visit Start Time:  4148  Visit Stop Time: 4070   Total Visit Duration: 45 minutes

## 2022-10-28 ENCOUNTER — SOCIAL WORK (OUTPATIENT)
Dept: BEHAVIORAL/MENTAL HEALTH CLINIC | Facility: CLINIC | Age: 42
End: 2022-10-28

## 2022-10-28 DIAGNOSIS — F41.1 GENERALIZED ANXIETY DISORDER: ICD-10-CM

## 2022-10-28 DIAGNOSIS — F33.1 MODERATE RECURRENT MAJOR DEPRESSION (HCC): Primary | ICD-10-CM

## 2022-10-28 NOTE — PSYCH
This note was not shared with the patient due to this is a psychotherapy note      Psychotherapy Provided: Individual Psychotherapy 43 minutes     Length of time in session: 43 minutes, follow up in 2 week    Goals addressed in session: Goal 1     Pain:      Moderate to severe    10    Current suicide risk : Low     Met with Jannette Bhatia has been dealing with pain in her neck and shoulders  She was tearful, sharing she most likely will have to get another surgery after her [facility] appt later today  She is struggling with unhelpful thoughts, judgements about herself due to her need to set limits with herself and others because of it  We discussed how judgements increase our emotional suffering  Thought/judgement defusion exercise completed  We discussed self compassion  Janntete was tearful as she processed her emotions surrounding this topic  Jannette struggles with unhelpful thoughts, such as believing others are more deserving than her  CBT was utilized to dismantle unhelpful thoughts as they arose, encouraging her to look at thoughts, feelings, actions, behaviors, and situations from different perspectives to be more fair to herself  Through discussion, Titi Carlton also gained awareness that her reaction of control/indepedence appears to also be a trauma response after her father  (needing no help from anyone, needing to do everything)  We discussed positive self talk, self respect, and limits with herself and others; reinforcing assertiveness  Jannette will make affirmation cards, use daily  She will also begin doing 1 thing for herself daily to show herself compassion  Share successes and challenges at next session  Continue biweekly support        Mental status:  Appearance adequate hygiene and grooming   Mood depressed and anxious   Affect Mood congruent, tearful   Speech a normal rate and volume   Thought Processes coherent/organized and goal-directed   Hallucinations no hallucinations present    Thought Content no delusions   Abnormal Thoughts no suicidal thoughts  and no homicidal thoughts    Orientation  oriented to person and place and time   Remote Memory short term memory intact and long term memory intact   Attention Span concentration intact   Intellect Appears to be of Average Intelligence   Fund of Knowledge displays adequate knowledge of current events   Insight fair   Judgement fair   Muscle Strength Muscle strength and tone were normal and Normal gait    Language no difficulty naming common objects   Pain moderate to severe   Pain Scale 10+         [address]: Diagnosis and Treatment Plan explained to David Allie relates understanding diagnosis and is agreeable to Treatment Plan   Yes     Visit Time    Visit Start Time: 0130  Visit Stop Time: 1298  Total Visit Duration: 43 minutes

## 2022-11-01 ENCOUNTER — SOCIAL WORK (OUTPATIENT)
Dept: BEHAVIORAL/MENTAL HEALTH CLINIC | Facility: CLINIC | Age: 42
End: 2022-11-01

## 2022-11-01 DIAGNOSIS — F41.1 GENERALIZED ANXIETY DISORDER: ICD-10-CM

## 2022-11-01 DIAGNOSIS — F33.9 DEPRESSION, RECURRENT (HCC): Primary | ICD-10-CM

## 2022-11-01 NOTE — PSYCH
This note was not shared with the patient due to this is a psychotherapy note      Psychotherapy Provided: Individual Psychotherapy 50 minutes     Length of time in session: 50 minutes, follow up in 2 week    Goals addressed in session: Goal 1     Pain:      moderate    7    Current suicide risk : Low     Met with Jannette Bhatia met with orthopedics and has several scans scheduled over the next few weeks, she anticipates surgery on her shoulder or neck in the near future  Physical pain has been a trigger to her depression and anxiety  She is struggling to set limits with herself, often overextending herself daily, causing herself more physical pain later in the day  Jannette's physical view of herself is another trigger to her depression and anxiety symptoms  She was tearful, as she expressed her thoughts and feelings, struggling with judgements about herself  She is a member of the medical weight management program through [employer]  She verbalizes feelings of shame as she struggles with self discipline with nutrition  CBT was utilized to dismantle unhelpful thoughts, encouraging Jannette to look at thoughts, feelings, actions, behaviors, and situations from different perspectives to be more fair to herself  Jannette has been implementing DBT skills; sharing her successes and challenges  She's hung affirmation cards throughout her home reminding her to take breaks, allow herself to feel her emotions  We discussed creating a healthier balance between helping others and helping herself  Jannette verbalizes awareness into the skills she needs to use to improve her emotional wellness, saying she tells others these things, she lacks the use of these skills with herself  A barrier is unhelpful thoughts about herself, feeling less deserving  She was encouraged to add affirmations about self image  She did not complete exercise on judgements and wishes to complete prior to next session         Mental status:  Appearance adequate hygiene and grooming   Mood depressed and anxious   Affect Mood congruent   Speech a normal rate and volume   Thought Processes coherent/organized and goal-directed   Hallucinations no hallucinations present    Thought Content no delusions, cognitive distortions   Abnormal Thoughts no suicidal thoughts  and no homicidal thoughts    Orientation  oriented to person and place and time   Remote Memory short term memory intact and long term memory intact   Attention Span concentration intact   Intellect Appears to be of Average Intelligence   Fund of Knowledge displays adequate knowledge of current events   Insight fair   Judgement fair   Muscle Strength Muscle strength and tone were normal and Normal gait    Language no difficulty naming common objects   Pain moderate   Pain Scale 7           Behavioral Health Treatment Plan [facility]: Diagnosis and Treatment Plan explained to Maxine Welsl relates understanding diagnosis and is agreeable to Treatment Plan   Yes     Visit start and stop times:    11/01/22  Start Time: 1001  Stop Time: 1051  Total Visit Time: 50 minutes

## 2022-11-04 DIAGNOSIS — L73.2 HIDRADENITIS SUPPURATIVA: Primary | ICD-10-CM

## 2022-11-04 RX ORDER — SULFAMETHOXAZOLE AND TRIMETHOPRIM 800; 160 MG/1; MG/1
1 TABLET ORAL EVERY 12 HOURS SCHEDULED
Qty: 14 TABLET | Refills: 0 | Status: SHIPPED | OUTPATIENT
Start: 2022-11-04 | End: 2022-11-11

## 2022-11-04 RX ORDER — SULFAMETHOXAZOLE AND TRIMETHOPRIM 800; 160 MG/1; MG/1
1 TABLET ORAL EVERY 12 HOURS SCHEDULED
Qty: 14 TABLET | Refills: 0 | Status: SHIPPED | OUTPATIENT
Start: 2022-11-04 | End: 2022-11-04 | Stop reason: CLARIF

## 2022-11-04 NOTE — PROGRESS NOTES
Phone call to patient. One area has got a little bit harder and redder. She is started warm soaks.   I will put her on antibiotics

## 2022-11-07 ENCOUNTER — PROCEDURE VISIT (OUTPATIENT)
Dept: SURGERY | Facility: CLINIC | Age: 42
End: 2022-11-07

## 2022-11-07 VITALS
HEIGHT: 67 IN | SYSTOLIC BLOOD PRESSURE: 126 MMHG | DIASTOLIC BLOOD PRESSURE: 82 MMHG | HEART RATE: 90 BPM | WEIGHT: 293 LBS | BODY MASS INDEX: 45.99 KG/M2 | TEMPERATURE: 99 F | OXYGEN SATURATION: 98 % | RESPIRATION RATE: 18 BRPM

## 2022-11-07 DIAGNOSIS — L73.2 HIDRADENITIS SUPPURATIVA: Primary | ICD-10-CM

## 2022-11-07 RX ORDER — NAPROXEN 500 MG/1
500 TABLET ORAL 2 TIMES DAILY
COMMUNITY
Start: 2022-09-21

## 2022-11-07 RX ORDER — CYCLOBENZAPRINE HCL 10 MG
10 TABLET ORAL 3 TIMES DAILY PRN
COMMUNITY
Start: 2022-10-28

## 2022-11-07 NOTE — PROGRESS NOTES
The patient is a 77-year-old white female who is status post excision of lower abdominal wall for hidradenitis  She is had residual/recurrent disease which has required aspirations and re-excision  She called last week stating that she had a swelling to the right of the midline on the inferior most portion of her incision  I put her on antibiotics and local care and she comes in today for follow-up  On examination there is nothing red, inflamed 0 5 cm area fluid draining, tenderness or reddened skin  On ultrasound there was a tiny bit of fluid there that most of it looks like fat    Accordingly I told her I am not going to drain this but will follow-up clinically

## 2022-11-15 ENCOUNTER — APPOINTMENT (OUTPATIENT)
Dept: LAB | Facility: CLINIC | Age: 42
End: 2022-11-15

## 2022-11-15 DIAGNOSIS — E78.5 HYPERLIPIDEMIA, UNSPECIFIED HYPERLIPIDEMIA TYPE: ICD-10-CM

## 2022-11-15 DIAGNOSIS — R73.01 IMPAIRED FASTING GLUCOSE: ICD-10-CM

## 2022-11-15 LAB
ANION GAP SERPL CALCULATED.3IONS-SCNC: 4 MMOL/L (ref 4–13)
BUN SERPL-MCNC: 14 MG/DL (ref 5–25)
CALCIUM SERPL-MCNC: 9 MG/DL (ref 8.3–10.1)
CHLORIDE SERPL-SCNC: 105 MMOL/L (ref 96–108)
CHOLEST SERPL-MCNC: 175 MG/DL
CO2 SERPL-SCNC: 30 MMOL/L (ref 21–32)
CREAT SERPL-MCNC: 0.58 MG/DL (ref 0.6–1.3)
EST. AVERAGE GLUCOSE BLD GHB EST-MCNC: 114 MG/DL
GFR SERPL CREATININE-BSD FRML MDRD: 114 ML/MIN/1.73SQ M
GLUCOSE P FAST SERPL-MCNC: 131 MG/DL (ref 65–99)
HBA1C MFR BLD: 5.6 %
HDLC SERPL-MCNC: 40 MG/DL
LDLC SERPL CALC-MCNC: 94 MG/DL (ref 0–100)
NONHDLC SERPL-MCNC: 135 MG/DL
POTASSIUM SERPL-SCNC: 3.9 MMOL/L (ref 3.5–5.3)
SODIUM SERPL-SCNC: 139 MMOL/L (ref 135–147)
TRIGL SERPL-MCNC: 203 MG/DL

## 2022-11-16 ENCOUNTER — SOCIAL WORK (OUTPATIENT)
Dept: BEHAVIORAL/MENTAL HEALTH CLINIC | Facility: CLINIC | Age: 42
End: 2022-11-16

## 2022-11-16 DIAGNOSIS — F41.1 GENERALIZED ANXIETY DISORDER: ICD-10-CM

## 2022-11-16 DIAGNOSIS — F33.9 DEPRESSION, RECURRENT (HCC): Primary | ICD-10-CM

## 2022-11-16 NOTE — PSYCH
This note was not shared with the patient due to this is a psychotherapy note      Psychotherapy Provided: Individual Psychotherapy 46 minutes     Length of time in session: 46 minutes, follow up in 2 week    Goals addressed in session: Goal 1     Pain:      none    0    Current suicide risk : Low     Met with Jannette  Ongoing medical issues are her main stressor; specifically her hand going numb throughout the day  She expressed feelings of frustration with her doctors and herself  Jannette has begun to use the affirmation phone renee recommended, she shared her successes and challenges--she is struggling with believing several of the affirmations  She struggles with judgements and unhelpful thoughts including mental filter and all or nothing thinking triggering depression and anxiety symptoms  DBT utilized throughout session, encouraging Jannette to step out of the black and white and into shades of gray in thoughts, feelings, actions, and behaviors  We worked toward creating a dialectic shift regarding her medical issues and adapting healthier living habits to be more fair to herself  Jannette verbalizes improved awareness; however, expressed difficulty in creating change  Validation of her feelings and support was provided  We discussed setting short term goals for herself, suggested sticky notes in her house saying 'judgement' or 'gray area' as a reminder to check in with herself, dispute unhelpful thoughts  Jannette will bring judgements exercise she's been working on to next session  Continue biweekly support  She is encouraged to call the office if a sooner appt is needed       Mental status:  Appearance adequate hygiene and grooming and good eye contact    Mood depressed, anxious   Affect Mood congruent, tearful at times   Speech a normal rate and volume   Thought Processes coherent/organized and goal-directed   Hallucinations no hallucinations present    Thought Content no delusions, unhelpful thoughts Abnormal Thoughts no suicidal thoughts  and no homicidal thoughts    Orientation  oriented to person and place and time   Remote Memory short term memory intact and long term memory intact   Attention Span concentration intact   Intellect Appears to be of Average Intelligence   Fund of Knowledge displays adequate knowledge of current events   Insight fair   Judgement fair   Muscle Strength Muscle strength and tone were normal and Normal gait    Language no difficulty naming common objects   Pain none   Pain Scale 0         [address]: Diagnosis and Treatment Plan explained to Leonardo Vikas relates understanding diagnosis and is agreeable to Treatment Plan   Yes     Visit start and stop times:    11/16/22  Start Time: 1000  Stop Time: 1046  Total Visit Time: 46 minutes

## 2022-11-17 ENCOUNTER — OFFICE VISIT (OUTPATIENT)
Dept: FAMILY MEDICINE CLINIC | Facility: CLINIC | Age: 42
End: 2022-11-17

## 2022-11-17 ENCOUNTER — TELEPHONE (OUTPATIENT)
Dept: FAMILY MEDICINE CLINIC | Facility: CLINIC | Age: 42
End: 2022-11-17

## 2022-11-17 VITALS
SYSTOLIC BLOOD PRESSURE: 128 MMHG | DIASTOLIC BLOOD PRESSURE: 82 MMHG | OXYGEN SATURATION: 97 % | WEIGHT: 293 LBS | TEMPERATURE: 98.7 F | HEART RATE: 88 BPM | BODY MASS INDEX: 45.99 KG/M2 | HEIGHT: 67 IN

## 2022-11-17 DIAGNOSIS — J45.20 MILD INTERMITTENT REACTIVE AIRWAY DISEASE WITHOUT COMPLICATION: Primary | ICD-10-CM

## 2022-11-17 DIAGNOSIS — Z86.16 PERSONAL HISTORY OF COVID-19: ICD-10-CM

## 2022-11-17 DIAGNOSIS — Z12.31 BREAST CANCER SCREENING BY MAMMOGRAM: ICD-10-CM

## 2022-11-17 DIAGNOSIS — Z12.4 CERVICAL CANCER SCREENING: ICD-10-CM

## 2022-11-17 DIAGNOSIS — M25.20 JOINT LAXITY: ICD-10-CM

## 2022-11-17 DIAGNOSIS — Q82.9 CONGENITAL MALFORMATION OF SKIN, UNSPECIFIED: ICD-10-CM

## 2022-11-17 DIAGNOSIS — M25.50 ARTHRALGIA OF MULTIPLE SITES, BILATERAL: ICD-10-CM

## 2022-11-17 DIAGNOSIS — Q79.60 EHLERS-DANLOS SYNDROME: ICD-10-CM

## 2022-11-17 DIAGNOSIS — Z23 IMMUNIZATION DUE: ICD-10-CM

## 2022-11-17 DIAGNOSIS — H60.92 OTITIS EXTERNA OF LEFT EAR, UNSPECIFIED CHRONICITY, UNSPECIFIED TYPE: ICD-10-CM

## 2022-11-17 RX ORDER — NEOMYCIN SULFATE, POLYMYXIN B SULFATE AND HYDROCORTISONE 10; 3.5; 1 MG/ML; MG/ML; [USP'U]/ML
4 SUSPENSION/ DROPS AURICULAR (OTIC) 4 TIMES DAILY
Qty: 10 ML | Refills: 0 | Status: SHIPPED | OUTPATIENT
Start: 2022-11-17

## 2022-11-17 NOTE — PROGRESS NOTES
Name: Nadine Christopher      : 1980      MRN: [id number]  Encounter Provider: Asha Deshpande DO  Encounter Date: 2022   Encounter department: [address] [place] [address]     1  Mild intermittent reactive airway disease without complication  Comments:  uses albuterol when sick    2  Congenital malformation of skin, unspecified  -     Ambulatory Referral to Genetics; Future    3  Joint laxity  -     Ambulatory Referral to Genetics; Future    4  Arthralgia of multiple sites, bilateral  -     Ambulatory Referral to Genetics; Future    5  Eliz-Danlos syndrome  Comments:  SUSPECTED by her ortho, Dr Mary Anne Burnette, never confirmed as dx;  am re-ordering genetics eval    6  Personal history of COVID-19  Comments:  Date of positive COVID-19 test: 2022  Type of test: Home antigen; fully recovered per pt    7  Cervical cancer screening  Comments:  sees Dr Dudley Culp- last 2022    8  Breast cancer screening by mammogram  -     Mammo screening bilateral w 3d & cad; Future; Expected date: 2023    9  Immunization due  -     Pneumococcal Conjugate Vaccine 20-valent (Pcv20)    10   Otitis externa of left ear, unspecified chronicity, unspecified type  -     neomycin-polymyxin-hydrocortisone (CORTISPORIN) 0 35%-10,000 units/mL-1% otic suspension; Administer 4 drops into the left ear 4 (four) times a day           Subjective      Chief Complaint   Patient presents with   • Follow-up       Scheduled routine f/u  Pt follows with other provider here who is on leave - since her last f/u with provider, pt had I&D of abd wall abscess by her surgeon in July and had [place] infection in September   Nica Sweet on problem list from 2019- in reviewing chart, [facility] ortho note from Dr Mary Anne Burnette 2019 had indicated he SUSPECTED Eliz-Danlos and had referred pt to genetics for testing due to "patient has some nonspecific pain issues, and given the fact that she has some generalized hyperlaxity and multiple joint complaints it would not be surprising if she didn't have an underlying Eliz-Danlos condition  I have recommended that she be seen by Medical Genetics to see if they can evaluate her for this possible elusive diagnosis"  I ask pt if this was ever done ( no record in chart) and pt reports that at the time, she was told that the genetics specialists were not seeing pt's for that dx  Pt discusses her ongoing upper extremity nerve sx and ongoing workup by her ortho dr Yanelis Kinney           Review of Systems    Current Outpatient Medications on File Prior to Visit   Medication Sig   • albuterol (PROVENTIL HFA,VENTOLIN HFA) 90 mcg/act inhaler Inhale 2 puffs every 6 (six) hours as needed for wheezing or shortness of breath (cough)   • amitriptyline (ELAVIL) 50 mg tablet TAKE 1 TABLET BY MOUTH ONCE DAILY AT BEDTIME   • Ascorbic Acid (VITAMIN C) 500 MG CAPS Take 1 capsule by mouth daily   • aspirin 81 mg chewable tablet Chew 1 tablet (81 mg total) daily   • atorvastatin (LIPITOR) 40 mg tablet TAKE 1 TABLET BY MOUTH ONCE DAILY IN THE MORNING   • cetirizine (ZyrTEC) 10 mg tablet Take 10 mg by mouth daily   • Cholecalciferol (VITAMIN D3) 5000 units CAPS Take 1 capsule by mouth daily     • ciprofloxacin-dexamethasone (CIPRODEX) otic suspension Administer 4 drops to the right ear in the morning and 4 drops in the evening     • cyanocobalamin (VITAMIN B-12) 1,000 mcg tablet Take 1,000 mcg by mouth daily    • cyclobenzaprine (FLEXERIL) 10 mg tablet Take 10 mg by mouth 3 (three) times a day as needed   • losartan (COZAAR) 100 MG tablet Take 1 tablet (100 mg total) by mouth daily   • metoprolol succinate (TOPROL-XL) 100 mg 24 hr tablet TAKE 1 TABLET BY MOUTH ONCE DAILY   • Multiple Vitamins-Minerals (CENTRUM ADULTS PO) Take 1 tablet by mouth daily   • naproxen (NAPROSYN) 500 mg tablet Take 500 mg by mouth 2 (two) times a day   • nystatin (MYCOSTATIN) cream Apply topically 2 (two) times a day as needed (rash)   • Omega-3 Fatty Acids (FISH OIL ADULT GUMMIES PO) Take by mouth   • venlafaxine (EFFEXOR-XR) 150 mg 24 hr capsule TAKE 1 CAPSULE BY MOUTH ONCE DIALY IN THE MORNING WITH 75 MG CAPSULE   • venlafaxine (EFFEXOR-XR) 75 mg 24 hr capsule TAKE 1 CAPSULE BY MOUTH ONCE DAILY IN THE MORNING WITH 150 MG CAPSULE   • esomeprazole (NexIUM 24HR) 20 mg capsule Take 1 capsule (20 mg total) by mouth every morning   • [DISCONTINUED] mometasone (ELOCON) 0 1 % cream Apply to b/l external ear BID for 5-7 days, then prn       Objective     /82 (BP Location: Left arm, Patient Position: Sitting, Cuff Size: Large)   Pulse 88   Temp 98 7 °F (37 1 °C) (Tympanic)   Ht 5' 7" (1 702 m)   Wt 135 kg (297 lb)   SpO2 97%   BMI 46 52 kg/m²     Physical Exam  Vitals and nursing note reviewed  Constitutional:       General: She is not in acute distress  Appearance: She is well-developed  She is not ill-appearing, toxic-appearing, sickly-appearing or diaphoretic  HENT:      Head: Normocephalic and atraumatic  Mouth/Throat:      Mouth: Oropharynx is clear and moist and mucous membranes are normal       Pharynx: Uvula midline  Neck:      Trachea: Phonation normal    Cardiovascular:      Rate and Rhythm: Normal rate and regular rhythm  Pulses: Normal pulses  Heart sounds: Normal heart sounds  Pulmonary:      Effort: Pulmonary effort is normal       Breath sounds: Normal breath sounds and air entry  Musculoskeletal:      Cervical back: Neck supple  Right lower leg: No edema  Left lower leg: No edema  Lymphadenopathy:      Cervical: No cervical adenopathy  Skin:     Coloration: Skin is not pale  Nails: There is no cyanosis  Neurological:      Mental Status: She is alert and oriented to person, place, and time  Gait: Gait normal    Psychiatric:         Mood and Affect: Mood and affect and mood normal          Behavior: Behavior normal  Behavior is cooperative         Celeste Andrade DO

## 2022-11-17 NOTE — TELEPHONE ENCOUNTER
Patient had an appointment today 11/17 but states that her blood work results were not discussed with her  Patient is asking for her results and states that her PCP orders blood work for the next visit  Wondering if that can be ordered as well 
45 y/o female with a PMHx of HTN is present with increased HR found while taking VS at [facility] earlier today. Pt states that she went to [facility] for runny nose, cough, sore throat, fever x1 day. Pt states that she was diagnosed with the flu and was given tamiflu and advised to come to the ED for increased HR and BP. Pt states that she normally takes norvasc, atrovastatin and asa, however this past monday, she met with dr. hughes her cardiologists who increased her dose of norvasc from 5 to 10 and added on hctz. Pt denies the following: chest pain, sob, difficulty breathing, smoking hx, no travel, no ocp use.

## 2022-11-21 RX ORDER — PREGABALIN 50 MG/1
CAPSULE ORAL EVERY 12 HOURS
COMMUNITY

## 2022-11-22 ENCOUNTER — TELEPHONE (OUTPATIENT)
Dept: PHYSICAL THERAPY | Facility: OTHER | Age: 42
End: 2022-11-22

## 2022-11-22 ENCOUNTER — OFFICE VISIT (OUTPATIENT)
Dept: FAMILY MEDICINE CLINIC | Facility: CLINIC | Age: 42
End: 2022-11-22

## 2022-11-22 VITALS
DIASTOLIC BLOOD PRESSURE: 74 MMHG | SYSTOLIC BLOOD PRESSURE: 124 MMHG | WEIGHT: 293 LBS | HEIGHT: 67 IN | HEART RATE: 91 BPM | TEMPERATURE: 98.8 F | BODY MASS INDEX: 45.99 KG/M2 | OXYGEN SATURATION: 100 %

## 2022-11-22 DIAGNOSIS — I10 PRIMARY HYPERTENSION: ICD-10-CM

## 2022-11-22 DIAGNOSIS — F41.8 DEPRESSION WITH ANXIETY: ICD-10-CM

## 2022-11-22 DIAGNOSIS — T50.Z95A ADVERSE EFFECT OF VACCINE, INITIAL ENCOUNTER: ICD-10-CM

## 2022-11-22 DIAGNOSIS — M50.323 OTHER CERVICAL DISC DEGENERATION AT C6-C7 LEVEL: Primary | ICD-10-CM

## 2022-11-22 NOTE — TELEPHONE ENCOUNTER
Patient filled out On-Line form for [facility]  Nurse reached out to discuss the program with her  Message left to CB and leave full name,  on VM and one of the nurses will return the call  Will await CB from patient

## 2022-11-22 NOTE — TELEPHONE ENCOUNTER
Patient returned call to csp  I explained csp to her and that we send for eval with advanced spine PT  Patient already is estab;ishandres with Back specialist at [facility] [place]  Is looking for PILO to our PM department  Patient was given phone number to [facility], and told she would need her records faxed to [facility] for doctor review   Patient will work on getting records faxed over    No CSP referral to close was online request form

## 2022-11-23 DIAGNOSIS — M25.50 ARTHRALGIA OF MULTIPLE SITES, BILATERAL: ICD-10-CM

## 2022-11-23 DIAGNOSIS — Q82.9 CONGENITAL MALFORMATION OF SKIN, UNSPECIFIED: ICD-10-CM

## 2022-11-23 DIAGNOSIS — M25.20 JOINT LAXITY: Primary | ICD-10-CM

## 2022-11-25 ENCOUNTER — OFFICE VISIT (OUTPATIENT)
Dept: PSYCHIATRY | Facility: CLINIC | Age: 42
End: 2022-11-25

## 2022-11-25 DIAGNOSIS — F33.1 MODERATE RECURRENT MAJOR DEPRESSION (HCC): Primary | ICD-10-CM

## 2022-11-25 DIAGNOSIS — F41.1 GENERALIZED ANXIETY DISORDER: ICD-10-CM

## 2022-11-25 RX ORDER — AMITRIPTYLINE HYDROCHLORIDE 50 MG/1
50 TABLET, FILM COATED ORAL
Qty: 90 TABLET | Refills: 0 | Status: SHIPPED | OUTPATIENT
Start: 2022-11-25

## 2022-11-25 RX ORDER — VENLAFAXINE HYDROCHLORIDE 150 MG/1
150 CAPSULE, EXTENDED RELEASE ORAL DAILY
Qty: 90 CAPSULE | Refills: 0 | Status: SHIPPED | OUTPATIENT
Start: 2022-11-25

## 2022-11-25 RX ORDER — VENLAFAXINE HYDROCHLORIDE 75 MG/1
75 CAPSULE, EXTENDED RELEASE ORAL DAILY
Qty: 90 CAPSULE | Refills: 0 | Status: SHIPPED | OUTPATIENT
Start: 2022-11-25
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
## 2022-11-27 PROBLEM — M24.119 ARTICULAR CARTILAGE DISORDER OF SHOULDER REGION: Status: RESOLVED | Noted: 2021-04-29 | Resolved: 2022-11-27

## 2022-11-27 PROBLEM — M71.9 DISORDER OF BURSAE OF SHOULDER REGION: Status: RESOLVED | Noted: 2018-07-12 | Resolved: 2022-11-27

## 2022-11-27 PROBLEM — H66.90 OTITIS MEDIA: Status: RESOLVED | Noted: 2020-07-12 | Resolved: 2022-11-27

## 2022-11-27 PROBLEM — J45.20 ASTHMA, MILD INTERMITTENT: Status: RESOLVED | Noted: 2017-02-19 | Resolved: 2022-11-27

## 2022-11-27 PROBLEM — M50.323 OTHER CERVICAL DISC DEGENERATION AT C6-C7 LEVEL: Status: ACTIVE | Noted: 2022-11-27

## 2022-11-28 NOTE — ASSESSMENT & PLAN NOTE
Reviewed CT scan of cervical s from earlier this month with patient  It does show degenerative disc disease at C6-C7 which is likely the cause of her neck pain and radiculopathy  She follows with out of network sports medicine and ortho at [facility] [place]  Encouraged 2nd opinion given her symptoms as her current next specialist only recs  conservative management  She will look for one  within their network in if can not find one  she is okay with seeing  [facility]  She will call me know and I can place referral for her

## 2022-11-28 NOTE — PROGRESS NOTES
Name: Jacky Huang      : 1980      MRN: [id number]  Encounter Provider: Mortimer Loa, MD  Encounter Date: 2022   Encounter department: [address] [place] [address]     1  Other cervical disc degeneration at C6-C7 level  Assessment & Plan:  Reviewed CT scan of cervical s from earlier this month with patient  It does show degenerative disc disease at C6-C7 which is likely the cause of her neck pain and radiculopathy  She follows with out of network sports medicine and ortho at [facility] [place]  Encouraged 2nd opinion given her symptoms as her current next specialist only recs  conservative management  She will look for one  within their network in if can not find one  she is okay with seeing  [facility]  She will call me know and I can place referral for her  2  Primary hypertension  Assessment & Plan:  Controlled  Continue losartan, metoprolol  3  Depression with anxiety  Assessment & Plan:  Stable  Continue Effexor and amitriptyline      4  Adverse effect of vaccine, initial encounter  Comments:  cyst devleoped under vaccine site  Has had similar reactions in the past   Follow-up with General surgery for removal       I have spent 46 minutes with Patient  today in which greater than 50% of this time was spent in counseling/coordination of care regarding Diagnostic results, Prognosis, Risks and benefits of tx options, Patient and family education, Risk factor reductions and Impressions  Subjective      Presents to office for reaction from vaccine  After getting the pneumococcal vaccine on her right shoulder she developed a subcutaneous swelling and it has hardened  This is not the 1st time this has happened  She experienced similar reactions to tetanus vaccines where she gets a cyst underneath the skin  She is seen surgery in the past and has had them removed  She also would like 2nd opinion on her neck pain    She sees specialist out of network and recently had CT scan done  She is a little frustrated with her current neck specialist as she does not feel comfortable with her  Patient has had problems for a while now and they are not getting any better and her current next specialist only wants to do conservative management  Has history of hypertension  On losartan and metoprolol  Denies any uncontrolled blood pressure symptoms  Also has history of anxiety and depression  On Effexor  Also on amitriptyline  Symptoms controlled  Neck Pain       Review of Systems   Musculoskeletal: Positive for neck pain  Current Outpatient Medications on File Prior to Visit   Medication Sig   • albuterol (PROVENTIL HFA,VENTOLIN HFA) 90 mcg/act inhaler Inhale 2 puffs every 6 (six) hours as needed for wheezing or shortness of breath (cough)   • Ascorbic Acid (VITAMIN C) 500 MG CAPS Take 1 capsule by mouth daily   • aspirin 81 mg chewable tablet Chew 1 tablet (81 mg total) daily   • atorvastatin (LIPITOR) 40 mg tablet TAKE 1 TABLET BY MOUTH ONCE DAILY IN THE MORNING   • cetirizine (ZyrTEC) 10 mg tablet Take 10 mg by mouth daily   • Cholecalciferol (VITAMIN D3) 5000 units CAPS Take 1 capsule by mouth daily     • ciprofloxacin-dexamethasone (CIPRODEX) otic suspension Administer 4 drops to the right ear in the morning and 4 drops in the evening     • cyanocobalamin (VITAMIN B-12) 1,000 mcg tablet Take 1,000 mcg by mouth daily    • cyclobenzaprine (FLEXERIL) 10 mg tablet Take 10 mg by mouth 3 (three) times a day as needed   • losartan (COZAAR) 100 MG tablet Take 1 tablet (100 mg total) by mouth daily   • metoprolol succinate (TOPROL-XL) 100 mg 24 hr tablet TAKE 1 TABLET BY MOUTH ONCE DAILY   • Multiple Vitamins-Minerals (CENTRUM ADULTS PO) Take 1 tablet by mouth daily   • naproxen (NAPROSYN) 500 mg tablet Take 500 mg by mouth 2 (two) times a day   • neomycin-polymyxin-hydrocortisone (CORTISPORIN) 0 35%-10,000 units/mL-1% otic suspension Administer 4 drops into the left ear 4 (four) times a day   • nystatin (MYCOSTATIN) cream Apply topically 2 (two) times a day as needed (rash)   • Omega-3 Fatty Acids (FISH OIL ADULT GUMMIES PO) Take by mouth   • pregabalin (LYRICA) 50 mg capsule Every 12 hours   • esomeprazole (NexIUM 24HR) 20 mg capsule Take 1 capsule (20 mg total) by mouth every morning       Objective     /74 (BP Location: Left arm, Patient Position: Sitting, Cuff Size: Large)   Pulse 91   Temp 98 8 °F (37 1 °C)   Ht 5' 7" (1 702 m)   Wt 135 kg (297 lb)   SpO2 100%   BMI 46 52 kg/m²     Physical Exam  Vitals and nursing note reviewed  Constitutional:       General: She is not in acute distress  Appearance: Normal appearance  She is not ill-appearing, toxic-appearing or diaphoretic  Eyes:      General:         Right eye: No discharge  Left eye: No discharge  Extraocular Movements: Extraocular movements intact  Conjunctiva/sclera: Conjunctivae normal    Cardiovascular:      Rate and Rhythm: Normal rate  Pulmonary:      Effort: Pulmonary effort is normal    Musculoskeletal:         General: Normal range of motion  Cervical back: Normal range of motion and neck supple  Neurological:      Mental Status: She is alert and oriented to person, place, and time  Psychiatric:         Mood and Affect: Mood normal          Behavior: Behavior normal          Thought Content:  Thought content normal          Judgment: Judgment normal        Evelina Fournier MD

## 2022-11-28 NOTE — ADDENDUM NOTE
Addended by: Awais Free on: 3/18/2020 08:53 AM     Modules accepted: Alise POST-CATH  RADIAL DISCHARGE INSTRUCTIONS:    Please follow instructions closely for prevention of complications. INSTRUCTIONS FOR CARE OF CATHETERIZATION SITE: RADIAL (WRIST) APPROACH:    DO NOT BEND wrist for 48 hours  DO NOT PUSH with affected wrist for 48 hrs. DO NOT submerge wrist in water for 3 days  NO blood pressure, IV or blood work in affected arm for 3- 5 days    KEEP SPLINT (ARMBOARD) ON UNTIL TOMORROW MORNING  Remove dressing from wrist tomorrow morning. Gently cleanse, pat dry, apply band aid for 24 hours. Call your physician if you notice:      *Increase in pain at catheterization site      *Increase in swelling at catheterization site      *Redness or drainage at the catheterization site      *Numbness, tingling or cramping in the catheterization arm at rest or with activity    CALL 911 if you have active bleeding from your catheterization site. DO NOT DRIVE YOURSELF TO THE HOSPITAL    Drink 3-4 extra glasses of water today    No driving, or working for 48 hrs    Continue low fat diet.

## 2022-11-30 ENCOUNTER — SOCIAL WORK (OUTPATIENT)
Dept: BEHAVIORAL/MENTAL HEALTH CLINIC | Facility: CLINIC | Age: 42
End: 2022-11-30

## 2022-11-30 DIAGNOSIS — F33.1 MODERATE RECURRENT MAJOR DEPRESSION (HCC): Primary | ICD-10-CM

## 2022-11-30 DIAGNOSIS — F41.1 GENERALIZED ANXIETY DISORDER: ICD-10-CM

## 2022-11-30 NOTE — PSYCH
This note was not shared with the patient due to this is a psychotherapy note      Psychotherapy Provided: Individual Psychotherapy 42 minutes     Length of time in session: 42 minutes, follow up in 2 week    Goals addressed in session: Goal 1     Pain:      none    0    Current suicide risk : Low     Met with Jannette  Psychiatry note reviewed prior to session today  This year Jannette decided to go to her friend, [place] sorenson for Thanksgiving rather than be with family  She enjoyed this time, sharing that she's trying to set greater limits with others who effect her emotional wellness  She is reporting a decrease in binge eating from stress, stating she is learning to step out of the black and white and into shades of gray in thoughts, feelings, actions, situations which has been helpful to her staying more on track  Jannette completed her homework- writing down judgements she has about herself, others, and the world  Jannette voices improved awareness and insight into how these judgements and assumptions are increasing her depression and anxiety  Jannette expressed her thoughts and feelings about herself, struggling with unhelpful thoughts, judgements about herself  CBT tool, putting thoughts on trial was utilized to dismantle unhelpful thoughts, helping Jannette be more fair to herself  Jannette will utilize this tool with ongoing judgements, dispute, and bring to next session  She will continue the use of daily affirmations  Overeaters Anonymous was encouraged by therapist as well  Continue biweekly support  Behavioral Health Treatment Plan [facility]: Diagnosis and Treatment Plan explained to Daveygene Angeline relates understanding diagnosis and is agreeable to Treatment Plan   Yes     Visit start and stop times:    11/30/22  Start Time: 1002  Stop Time: 1044  Total Visit Time: 42 minutes

## 2022-12-05 ENCOUNTER — OFFICE VISIT (OUTPATIENT)
Dept: CARDIOLOGY CLINIC | Facility: CLINIC | Age: 42
End: 2022-12-05

## 2022-12-05 VITALS
HEIGHT: 67 IN | OXYGEN SATURATION: 100 % | SYSTOLIC BLOOD PRESSURE: 128 MMHG | BODY MASS INDEX: 45.99 KG/M2 | WEIGHT: 293 LBS | DIASTOLIC BLOOD PRESSURE: 82 MMHG | HEART RATE: 87 BPM

## 2022-12-05 DIAGNOSIS — E78.2 MIXED HYPERLIPIDEMIA: ICD-10-CM

## 2022-12-05 DIAGNOSIS — G47.33 OBSTRUCTIVE SLEEP APNEA: ICD-10-CM

## 2022-12-05 DIAGNOSIS — I10 PRIMARY HYPERTENSION: ICD-10-CM

## 2022-12-05 DIAGNOSIS — I45.10 RBBB (RIGHT BUNDLE BRANCH BLOCK): Primary | ICD-10-CM

## 2022-12-05 NOTE — PROGRESS NOTES
Cardiology Follow Up    Jannette Baugh  1980  [id number]  [facility] [address]  [place] [address]  [address] [id number] [phone]    1  RBBB (right bundle branch block)        2  Primary hypertension        3  Obstructive sleep apnea        4  Mixed hyperlipidemia              Discussion/Summary: All of her assessed cardiac problems are stable  I have reviewed her medications and made no changes  No cardiac testing is ordered  RTO 1 year  Interval History: She has not had any cardiac problems since her last office visit  She is active and denies CP, SOB, palpitations    EST 7/2021 - normal  /82 - now controlled  LDL 94    Cardiac cath 2009 - normal        Patient Active Problem List   Diagnosis   • Thyroid nodule   • Trigger finger, right middle finger   • Carpal tunnel syndrome, bilateral   • Primary hypertension   • Breast hypertrophy   • Bulging lumbar disc   • Calcific tendinitis of right shoulder   • Chronic low back pain   • Chronic right-sided low back pain without sciatica   • Chronic sinusitis   • Depression with anxiety   • Dermatitis, eczematoid   • Esophageal reflux   • Fibromyalgia   • H/O laminectomy   • Hemorrhoids   • Hidradenitis suppurativa   • Hyperlipidemia   • Mammographic microcalcification   • Obesity, Class III, BMI 40-49 9 (morbid obesity) ([facility])   • Obstructive sleep apnea   • Pars defect of lumbar spine   • RBBB (right bundle branch block)   • Osteoarthritis of spine with radiculopathy, lumbar region   • Vitamin D insufficiency   • Atypical chest pain   • Sebaceous cyst of breast, left   • Left ear pain   • Left cervical lymphadenopathy   • Eliz-Danlos syndrome   • History of sepsis   • Asthma   • High triglycerides   • Chronic headache with new features   • Impaired fasting glucose   • Moderate recurrent major depression (HCC)   • Generalized anxiety disorder   • Depression, recurrent [facility])   • Pain in joint of right shoulder   • Cubital tunnel syndrome   • Sprain of rotator cuff capsule   • PONV (postoperative nausea and vomiting)   • Open wound   • Abscess of abdominal wall   • Sore throat   • Fluid collection at surgical site   • Other cervical disc degeneration at C6-C7 level     Past Medical History:   Diagnosis Date   • Allergic rhinitis    • Anxiety    • Arthritis    • C  difficile diarrhea 2020   • Chronic pain disorder     BACK SHOULDERS NECK   • Colon polyp    • COPD (chronic obstructive pulmonary disease) ([facility])    • CPAP (continuous positive airway pressure) dependence    • Depression    • Diarrhea    • Disease of thyroid gland     nodule   • Eating disorder    • Fibromyalgia, primary    • Fissure, anal    • GERD (gastroesophageal reflux disease)    • Hearing difficulty of right ear    • Hyperlipidemia    • Hypertension    • Irritable bowel syndrome    • Obesity    • Perforation of tympanic membrane     Right   • PONV (postoperative nausea and vomiting)    • RBBB     Right bundle branch blockage    • Sleep apnea     no cpap   • Thyroid nodule    • Wears glasses      Social History     Socioeconomic History   • Marital status: /Civil Union     Spouse name: Not on file   • Number of children: Not on file   • Years of education: Not on file   • Highest education level: Not on file   Occupational History   • Not on file   Tobacco Use   • Smoking status: Never   • Smokeless tobacco: Never   Vaping Use   • Vaping Use: Never used   Substance and Sexual Activity   • Alcohol use: Not Currently     Comment: NICKI    • Drug use: Never   • Sexual activity: Yes     Partners: Male     Birth control/protection: Condom Male     Comment: per ricky   Other Topics Concern   • Not on file   Social History Narrative    Daily caffeine use- 1 cup of coffee     Social Determinants of Health     Financial Resource Strain: Not on file   Food Insecurity: Not on file   Transportation Needs: Not on file Physical Activity: Not on file   Stress: Not on file   Social Connections: Not on file   Intimate Partner Violence: Not on file   Housing Stability: Not on file      Family History   Problem Relation Age of Onset   • Hypertension Mother    • Hyperlipidemia Mother    • Diabetes Mother    • Hypertension Father    • Hyperlipidemia Father    • Heart disease Father         cardiac disorder-myocardial infarction arrhythmias   • Heart attack Father    • Crohn's disease Brother    • Arthritis Brother    • Diabetes unspecified Maternal Grandmother    • Thyroid disease Paternal Grandmother    • No Known Problems Daughter    • No Known Problems Maternal Aunt    • No Known Problems Maternal Aunt    • No Known Problems Maternal Aunt    • No Known Problems Maternal Aunt    • No Known Problems Paternal Aunt    • No Known Problems Paternal Aunt    • Heart attack Paternal Uncle      Past Surgical History:   Procedure Laterality Date   • ANAL FISTULOTOMY N/A 2018    Procedure: FISTULOTOMY;  Surgeon: Joaquín Otero MD;  [facility];  Service: Colorectal   • BACK SURGERY      lumbar herniated disc   • BREAST CYST EXCISION Left 13 yrs ago  benign   • BREAST SURGERY Left 2006    cyst removal   • CARPAL TUNNEL RELEASE     •  SECTION     • CHOLECYSTECTOMY     • COLONOSCOPY     • CYST REMOVAL     • DENTAL SURGERY     • ENDOMETRIAL ABLATION N/A 2021    Procedure: D&C, ABLATION ENDOMETRIAL MARIEL;  Surgeon: Dirk Lewis MD;  Location: [facility] [address] MAIN OR;  Service: Gynecology   • HAND SURGERY     • INCISION AND DRAINAGE OF WOUND N/A 2018    Procedure: INCISION AND DRAINAGE (I&D) BUTTOCK;  Surgeon: Joaquín Otero MD;  [facility];  Service: Colorectal   • MYRINGOTOMY W/ TUBES Right 2015    [place]   • LA COLONOSCOPY FLX DX W/COLLJ SPEC WHEN PFRMD N/A 2017    Procedure: COLONOSCOPY;  Surgeon: Bindu Dockery MD;  [facility];   Service: Gastroenterology   • LA DEBRIDEMENT, SKIN, SUB-Q TISSUE,=<20 SQ CM N/A 7/19/2022    Procedure: Debridement abdominal wall;  Surgeon: Sandip Garber MD;  Location: [facility] OR;  Service: General   • NH DRAIN SKIN ABSCESS SIMPLE N/A 7/19/2022    Procedure: INCISION AND DRAINAGE ABDOMINAL WALL;  Surgeon: Sandip Garber MD;  Location: [facility];  Service: General   • NH EXC SWEAT GLAND [place] Sans INGUIN,COMPLX Bilateral 1/18/2022    Procedure: WIDE EXCISION HIDRADENITIS ABDOMINAL WALL;  Surgeon: Sandip Garber MD;  Location: [facility];  Service: General   • NH INCISE FINGER TENDON SHEATH Right 03/13/2018    Procedure: LONG TRIGGER FINGER RELEASE;  Surgeon: Marli Nunn DO;  [facility];  Service: Orthopedics   • NH SURG DIAGNOSTIC EXAM, ANORECTAL N/A 11/02/2018    Procedure: EXAM UNDER ANESTHESIA (EUA); Surgeon: Otto Casanova MD;  [facility];  Service: Colorectal   • NH TYMPANOPLASTY Right 05/24/2017    Procedure: TYMPANOPLASTY WITH PERICHONDRIN GRAFT;  Surgeon:  Sidney Hurley DO;  [facility];  Service: ENT   • SHOULDER SURGERY Right    • TRIGGER FINGER RELEASE     • ULNAR NERVE TRANSPOSITION Left    • UMBILICAL HIDRADENITIS EXCISION  40/86/1949    subumbilical hidradenitis exicsion, Dr Ana Moreno, [facility]   • WISDOM TOOTH EXTRACTION         Current Outpatient Medications:   •  albuterol (PROVENTIL HFA,VENTOLIN HFA) 90 mcg/act inhaler, Inhale 2 puffs every 6 (six) hours as needed for wheezing or shortness of breath (cough), Disp: 18 g, Rfl: 1  •  amitriptyline (ELAVIL) 50 mg tablet, Take 1 tablet (50 mg total) by mouth daily at bedtime, Disp: 90 tablet, Rfl: 0  •  Ascorbic Acid (VITAMIN C) 500 MG CAPS, Take 1 capsule by mouth daily, Disp: , Rfl:   •  aspirin 81 mg chewable tablet, Chew 1 tablet (81 mg total) daily, Disp: 30 tablet, Rfl: 30  •  atorvastatin (LIPITOR) 40 mg tablet, TAKE 1 TABLET BY MOUTH ONCE DAILY IN THE MORNING, Disp: 90 tablet, Rfl: 1  •  cetirizine (ZyrTEC) 10 mg tablet, Take 10 mg by mouth daily, Disp: , Rfl:   •  Cholecalciferol (VITAMIN D3) 5000 units CAPS, Take 1 capsule by mouth daily  , Disp: , Rfl:   •  ciprofloxacin-dexamethasone (CIPRODEX) otic suspension, Administer 4 drops to the right ear in the morning and 4 drops in the evening , Disp: 7 5 mL, Rfl: 3  •  cyanocobalamin (VITAMIN B-12) 1,000 mcg tablet, Take 1,000 mcg by mouth daily , Disp: , Rfl:   •  esomeprazole (NexIUM 24HR) 20 mg capsule, Take 1 capsule (20 mg total) by mouth every morning, Disp: 90 capsule, Rfl: 3  •  losartan (COZAAR) 100 MG tablet, Take 1 tablet (100 mg total) by mouth daily, Disp: 90 tablet, Rfl: 2  •  metoprolol succinate (TOPROL-XL) 100 mg 24 hr tablet, TAKE 1 TABLET BY MOUTH ONCE DAILY, Disp: 100 tablet, Rfl: 1  •  Multiple Vitamins-Minerals (CENTRUM ADULTS PO), Take 1 tablet by mouth daily, Disp: , Rfl:   •  neomycin-polymyxin-hydrocortisone (CORTISPORIN) 0 35%-10,000 units/mL-1% otic suspension, Administer 4 drops into the left ear 4 (four) times a day, Disp: 10 mL, Rfl: 0  •  nystatin (MYCOSTATIN) cream, Apply topically 2 (two) times a day as needed (rash), Disp: 30 g, Rfl: 1  •  Omega-3 Fatty Acids (FISH OIL ADULT GUMMIES PO), Take by mouth, Disp: , Rfl:   •  venlafaxine (EFFEXOR-XR) 150 mg 24 hr capsule, Take 1 capsule (150 mg total) by mouth daily With 75 mg, Disp: 90 capsule, Rfl: 0  •  venlafaxine (EFFEXOR-XR) 75 mg 24 hr capsule, Take 1 capsule (75 mg total) by mouth daily With 150 mg, Disp: 90 capsule, Rfl: 0  •  cyclobenzaprine (FLEXERIL) 10 mg tablet, Take 10 mg by mouth 3 (three) times a day as needed (Patient not taking: Reported on 12/5/2022), Disp: , Rfl:   •  naproxen (NAPROSYN) 500 mg tablet, Take 500 mg by mouth 2 (two) times a day (Patient not taking: Reported on 12/5/2022), Disp: , Rfl:   •  pregabalin (LYRICA) 50 mg capsule, Every 12 hours (Patient not taking: Reported on 12/5/2022), Disp: , Rfl:   Allergies   Allergen Reactions   • Neurontin [Gabapentin] Other (See Comments) Other reaction(s): SUICIDE IDEATION  Other reaction(s): Other (See Comments)  suicidal  suicidal   • Penicillins Anaphylaxis, Swelling and Angioedema   • Aripiprazole Other (See Comments)     Other reaction(s): low dose 2 mg ; curving in and locking in of hands/dystonia  Other reaction(s): Other (See Comments)  Other reaction(s): low dose 2 mg ; curving in and locking in of hands/dystonia   • Lorazepam Other (See Comments)     Other reaction(s): higher dose > anxiety and depression  Other reaction(s): Other (See Comments)  Other reaction(s): higher dose > anxiety and depression   • Carbamazepine Other (See Comments)     Other reaction(s): Unknown Reaction   • Doxycycline Hives   • Lamotrigine Rash   • Wound Dressing Adhesive Hives     Vitals:    12/05/22 0927   BP: 128/82   BP Location: Left arm   Patient Position: Sitting   Cuff Size: Large   Pulse: 87   SpO2: 100%   Weight: 133 kg (294 lb)   Height: 5' 7" (1 702 m)     Weight (last 2 days)     Date/Time Weight    12/05/22 0927 133 (294)         Blood pressure 128/82, pulse 87, height 5' 7" (1 702 m), weight 133 kg (294 lb), SpO2 100 %  , Body mass index is 46 05 kg/m²      Labs:  Appointment on 11/15/2022   Component Date Value   • Cholesterol 11/15/2022 175    • Triglycerides 11/15/2022 203 (H)    • HDL, Direct 11/15/2022 40 (L)    • LDL Calculated 11/15/2022 94    • Non-HDL-Chol (CHOL-HDL) 11/15/2022 135    • Sodium 11/15/2022 139    • Potassium 11/15/2022 3 9    • Chloride 11/15/2022 105    • CO2 11/15/2022 30    • ANION GAP 11/15/2022 4    • BUN 11/15/2022 14    • Creatinine 11/15/2022 0 58 (L)    • Glucose, Fasting 11/15/2022 131 (H)    • Calcium 11/15/2022 9 0    • eGFR 11/15/2022 114    • Hemoglobin A1C 11/15/2022 5 6    • EAG 11/15/2022 114    Admission on 07/19/2022, Discharged on 07/19/2022   Component Date Value   • EXT Preg Test, Ur 07/19/2022 Negative    • Control 07/19/2022 Valid    • Case Report 07/19/2022                      Value:Surgical Pathology Report                         Case: [id number]                                   Authorizing Provider:  Moises Núñez MD           Collected:           07/19/2022 1049              Ordering Location:     [facility]   Received:            07/19/2022 1215                                     Operating Room                                                               Pathologist:           Lisa Jain MD                                                    Specimen:    Hidradenitis, HIDRADENITIS RIGHT LOWER ABDOMINAL WOUND                                    • Final Diagnosis 07/19/2022                      Value: This result contains rich text formatting which cannot be displayed here  • Additional Information 07/19/2022                      Value: This result contains rich text formatting which cannot be displayed here  • Gross Description 07/19/2022                      Value: This result contains rich text formatting which cannot be displayed here     Appointment on 07/12/2022   Component Date Value   • Sodium 07/12/2022 138    • Potassium 07/12/2022 4 1    • Chloride 07/12/2022 101    • CO2 07/12/2022 28    • ANION GAP 07/12/2022 9    • BUN 07/12/2022 14    • Creatinine 07/12/2022 0 61    • Glucose, Fasting 07/12/2022 107 (H)    • Calcium 07/12/2022 9 4    • AST 07/12/2022 36    • ALT 07/12/2022 51    • Alkaline Phosphatase 07/12/2022 54    • Total Protein 07/12/2022 6 7    • Albumin 07/12/2022 4 5    • Total Bilirubin 07/12/2022 0 49    • eGFR 07/12/2022 112    Transcribe Orders on 07/12/2022   Component Date Value   • WBC 07/12/2022 7 23    • RBC 07/12/2022 4 48    • Hemoglobin 07/12/2022 13 4    • Hematocrit 07/12/2022 40 5    • MCV 07/12/2022 90    • MCH 07/12/2022 29 9    • MCHC 07/12/2022 33 1    • RDW 07/12/2022 13 6    • Platelets 42/20/1827 188    • MPV 07/12/2022 10 6    Office Visit on 07/12/2022   Component Date Value   • Ventricular Rate 07/12/2022 73    • Atrial Rate 07/12/2022 73    • CA Interval 07/12/2022 144    • QRSD Interval 07/12/2022 126    • QT Interval 07/12/2022 416    • QTC Interval 07/12/2022 458    • P Axis 07/12/2022 32    • QRS Axis 07/12/2022 -24    • T Wave Axis 07/12/2022 10      Imaging: No results found  Review of Systems:  Review of Systems   Constitutional: Negative for diaphoresis, fatigue, fever and unexpected weight change  HENT: Negative  Respiratory: Negative for cough, shortness of breath and wheezing  Cardiovascular: Negative for chest pain, palpitations and leg swelling  Gastrointestinal: Negative for abdominal pain, diarrhea and nausea  Musculoskeletal: Negative for gait problem and myalgias  Skin: Negative for rash  Neurological: Negative for dizziness and numbness  Psychiatric/Behavioral: Negative  Physical Exam:  Physical Exam  Constitutional:       Appearance: She is well-developed and well-nourished  HENT:      Head: Normocephalic and atraumatic  Eyes:      Pupils: Pupils are equal, round, and reactive to light  Neck:      Vascular: No JVD  Cardiovascular:      Rate and Rhythm: Regular rhythm  Pulses: Normal pulses  Carotid pulses are 2+ on the right side and 2+ on the left side  Heart sounds: S1 normal and S2 normal    Pulmonary:      Effort: Pulmonary effort is normal       Breath sounds: Normal breath sounds  No wheezing or rales  Abdominal:      General: Bowel sounds are normal       Palpations: Abdomen is soft  Tenderness: There is no abdominal tenderness  Musculoskeletal:         General: No tenderness or edema  Normal range of motion  Cervical back: Normal range of motion and neck supple  Skin:     General: Skin is warm  Neurological:      Mental Status: She is alert and oriented to person, place, and time  Cranial Nerves: No cranial nerve deficit  Deep Tendon Reflexes: Reflexes are normal and symmetric     Psychiatric:         Mood and Affect: Mood and affect normal

## 2022-12-12 DIAGNOSIS — I10 PRIMARY HYPERTENSION: ICD-10-CM

## 2022-12-12 DIAGNOSIS — K21.9 GASTROESOPHAGEAL REFLUX DISEASE WITHOUT ESOPHAGITIS: ICD-10-CM

## 2022-12-13 RX ORDER — ESOMEPRAZOLE 20 MG/1
CAPSULE, DELAYED RELEASE ORAL
Qty: 84 CAPSULE | Refills: 2 | Status: SHIPPED | OUTPATIENT
Start: 2022-12-13

## 2022-12-13 RX ORDER — LOSARTAN POTASSIUM 100 MG/1
TABLET ORAL
Qty: 90 TABLET | Refills: 2 | Status: SHIPPED | OUTPATIENT
Start: 2022-12-13

## 2022-12-13 NOTE — PRE-PROCEDURE INSTRUCTIONS
Pre-Surgery Instructions:   Medication Instructions   • albuterol (PROVENTIL HFA,VENTOLIN HFA) 90 mcg/act inhaler Uses PRN- OK to take day of surgery   • amitriptyline (ELAVIL) 50 mg tablet Take night before surgery   • Ascorbic Acid (VITAMIN C) 500 MG CAPS Hold day of surgery  • aspirin 81 mg chewable tablet Take night before surgery   • atorvastatin (LIPITOR) 40 mg tablet Take night before surgery   • cetirizine (ZyrTEC) 10 mg tablet Take night before surgery   • Cholecalciferol (VITAMIN D3) 5000 units CAPS Hold day of surgery  • cyanocobalamin (VITAMIN B-12) 1,000 mcg tablet Hold day of surgery  • GNP Esomeprazole Magnesium 20 MG capsule Take night before surgery   • losartan (COZAAR) 100 MG tablet Hold day of surgery  • metoprolol succinate (TOPROL-XL) 100 mg 24 hr tablet Take night before surgery   • Multiple Vitamins-Minerals (CENTRUM ADULTS PO) Hold day of surgery  • Omega-3 Fatty Acids (FISH OIL ADULT GUMMIES PO) Hold day of surgery     • venlafaxine (EFFEXOR-XR) 150 mg 24 hr capsule Take night before surgery   • venlafaxine (EFFEXOR-XR) 75 mg 24 hr capsule Take night before surgery

## 2022-12-19 ENCOUNTER — OFFICE VISIT (OUTPATIENT)
Dept: FAMILY MEDICINE CLINIC | Facility: CLINIC | Age: 42
End: 2022-12-19

## 2022-12-19 VITALS
HEART RATE: 87 BPM | BODY MASS INDEX: 45.99 KG/M2 | DIASTOLIC BLOOD PRESSURE: 53 MMHG | OXYGEN SATURATION: 99 % | SYSTOLIC BLOOD PRESSURE: 92 MMHG | HEIGHT: 67 IN | TEMPERATURE: 96.8 F | WEIGHT: 293 LBS

## 2022-12-19 DIAGNOSIS — M50.323 OTHER CERVICAL DISC DEGENERATION AT C6-C7 LEVEL: ICD-10-CM

## 2022-12-19 DIAGNOSIS — Z01.818 PREOP EXAMINATION: ICD-10-CM

## 2022-12-19 DIAGNOSIS — I10 PRIMARY HYPERTENSION: Primary | ICD-10-CM

## 2022-12-19 DIAGNOSIS — G47.33 OBSTRUCTIVE SLEEP APNEA: ICD-10-CM

## 2022-12-19 DIAGNOSIS — I45.10 RBBB (RIGHT BUNDLE BRANCH BLOCK): ICD-10-CM

## 2022-12-19 NOTE — ASSESSMENT & PLAN NOTE
Following with out of network specialties  On amitriptyline  Continue current treatment plan  Get MRI
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
## 2022-12-20 ENCOUNTER — ANESTHESIA EVENT (OUTPATIENT)
Dept: RADIOLOGY | Facility: HOSPITAL | Age: 42
End: 2022-12-20

## 2022-12-28 ENCOUNTER — SOCIAL WORK (OUTPATIENT)
Dept: BEHAVIORAL/MENTAL HEALTH CLINIC | Facility: CLINIC | Age: 42
End: 2022-12-28

## 2022-12-28 DIAGNOSIS — F41.1 GENERALIZED ANXIETY DISORDER: ICD-10-CM

## 2022-12-28 DIAGNOSIS — F33.1 MODERATE RECURRENT MAJOR DEPRESSION (HCC): Primary | ICD-10-CM
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
## 2023-01-03 NOTE — ANESTHESIA PREPROCEDURE EVALUATION
Procedure:  MRI SHOULDER LEFT WO CONTRAST  Just had MRI cervical spine 12/22/22 at [facility] (!!), ETT (macgrath 3, gr1v)    Prev LMA 4 in 2021 for MRI, TIVA    Relevant Problems   ANESTHESIA   (+) PONV (postoperative nausea and vomiting)      CARDIO   (+) Atypical chest pain   (+) High triglycerides   (+) Hyperlipidemia   (+) Primary hypertension   (+) RBBB (right bundle branch block)      GI/HEPATIC   (+) Esophageal reflux      MUSCULOSKELETAL   (+) Chronic low back pain   (+) Chronic right-sided low back pain without sciatica   (+) Fibromyalgia   (+) Osteoarthritis of spine with radiculopathy, lumbar region   (+) Other cervical disc degeneration at C6-C7 level      NEURO/PSYCH   (+) Chronic headache with new features   (+) Chronic low back pain   (+) Chronic right-sided low back pain without sciatica   (+) Depression with anxiety   (+) Depression, recurrent (HCC)   (+) Fibromyalgia   (+) Generalized anxiety disorder   (+) History of sepsis   (+) Moderate recurrent major depression (HCC)      PULMONARY   (+) Asthma   (+) Obstructive sleep apnea      Other   (+) Left cervical lymphadenopathy      Recent labs personally reviewed:  Lab Results   Component Value Date    WBC 7 23 07/12/2022    HGB 13 4 07/12/2022     07/12/2022     Lab Results   Component Value Date     04/05/2017    K 3 9 11/15/2022    BUN 14 11/15/2022    CREATININE 0 58 (L) 11/15/2022    GLUCOSE 96 04/05/2017     Lab Results   Component Value Date    PTT 30 09/19/2014      Lab Results   Component Value Date    INR 0 99 09/19/2014       Lab Results   Component Value Date    HGBA1C 5 6 11/15/2022         Physical Exam    Airway    Mallampati score: III  TM Distance: >3 FB  Neck ROM: full     Dental   No notable dental hx     Cardiovascular      Pulmonary      Other Findings        Anesthesia Plan  ASA Score- 4     Anesthesia Type- general with ASA Monitors  Additional Monitors:   Airway Plan: LMA            Plan Factors-Exercise tolerance (METS): <4 METS  Chart reviewed  Patient summary reviewed  Patient is not a current smoker  Induction- intravenous  Postoperative Plan-   Planned trial extubation    Informed Consent- Anesthetic plan and risks discussed with patient  I personally reviewed this patient with the CRNA  Discussed and agreed on the Anesthesia Plan with the CRNA  Doc Rodriguez

## 2023-01-04 ENCOUNTER — ANESTHESIA (OUTPATIENT)
Dept: RADIOLOGY | Facility: HOSPITAL | Age: 43
End: 2023-01-04

## 2023-01-04 ENCOUNTER — HOSPITAL ENCOUNTER (OUTPATIENT)
Dept: RADIOLOGY | Facility: HOSPITAL | Age: 43
End: 2023-01-04
Attending: ORTHOPAEDIC SURGERY

## 2023-01-04 ENCOUNTER — HOSPITAL ENCOUNTER (OUTPATIENT)
Dept: RADIOLOGY | Facility: HOSPITAL | Age: 43
Discharge: HOME | End: 2023-01-04
Attending: STUDENT IN AN ORGANIZED HEALTH CARE EDUCATION/TRAINING PROGRAM

## 2023-01-04 VITALS
BODY MASS INDEX: 45.99 KG/M2 | HEART RATE: 83 BPM | HEIGHT: 67 IN | RESPIRATION RATE: 18 BRPM | DIASTOLIC BLOOD PRESSURE: 72 MMHG | OXYGEN SATURATION: 97 % | TEMPERATURE: 98 F | WEIGHT: 293 LBS | SYSTOLIC BLOOD PRESSURE: 120 MMHG

## 2023-01-04 DIAGNOSIS — M75.32 CALCIFIC TENDINITIS OF LEFT SHOULDER: ICD-10-CM

## 2023-01-04 LAB
EXT PREGNANCY TEST URINE: NEGATIVE
EXT. CONTROL: NORMAL

## 2023-01-04 RX ORDER — LIDOCAINE HYDROCHLORIDE 10 MG/ML
INJECTION, SOLUTION EPIDURAL; INFILTRATION; INTRACAUDAL; PERINEURAL AS NEEDED
Status: DISCONTINUED | OUTPATIENT
Start: 2023-01-04 | End: 2023-01-04

## 2023-01-04 RX ORDER — PROPOFOL 10 MG/ML
INJECTION, EMULSION INTRAVENOUS AS NEEDED
Status: DISCONTINUED | OUTPATIENT
Start: 2023-01-04 | End: 2023-01-04

## 2023-01-04 RX ORDER — SODIUM CHLORIDE, SODIUM LACTATE, POTASSIUM CHLORIDE, CALCIUM CHLORIDE 600; 310; 30; 20 MG/100ML; MG/100ML; MG/100ML; MG/100ML
125 INJECTION, SOLUTION INTRAVENOUS CONTINUOUS
Status: DISCONTINUED | OUTPATIENT
Start: 2023-01-04 | End: 2023-01-05 | Stop reason: HOSPADM

## 2023-01-04 RX ORDER — GLYCOPYRROLATE 0.2 MG/ML
INJECTION INTRAMUSCULAR; INTRAVENOUS AS NEEDED
Status: DISCONTINUED | OUTPATIENT
Start: 2023-01-04 | End: 2023-01-04

## 2023-01-04 RX ORDER — DEXAMETHASONE SODIUM PHOSPHATE 10 MG/ML
INJECTION, SOLUTION INTRAMUSCULAR; INTRAVENOUS AS NEEDED
Status: DISCONTINUED | OUTPATIENT
Start: 2023-01-04 | End: 2023-01-04

## 2023-01-04 RX ORDER — SODIUM CHLORIDE, SODIUM LACTATE, POTASSIUM CHLORIDE, CALCIUM CHLORIDE 600; 310; 30; 20 MG/100ML; MG/100ML; MG/100ML; MG/100ML
INJECTION, SOLUTION INTRAVENOUS CONTINUOUS PRN
Status: DISCONTINUED | OUTPATIENT
Start: 2023-01-04 | End: 2023-01-04

## 2023-01-04 RX ORDER — METOCLOPRAMIDE HYDROCHLORIDE 5 MG/ML
INJECTION INTRAMUSCULAR; INTRAVENOUS AS NEEDED
Status: DISCONTINUED | OUTPATIENT
Start: 2023-01-04 | End: 2023-01-04

## 2023-01-04 RX ORDER — ONDANSETRON 2 MG/ML
INJECTION INTRAMUSCULAR; INTRAVENOUS AS NEEDED
Status: DISCONTINUED | OUTPATIENT
Start: 2023-01-04 | End: 2023-01-04

## 2023-01-04 RX ADMIN — SODIUM CHLORIDE, SODIUM LACTATE, POTASSIUM CHLORIDE, AND CALCIUM CHLORIDE: .6; .31; .03; .02 INJECTION, SOLUTION INTRAVENOUS at 13:21

## 2023-01-04 RX ADMIN — ONDANSETRON 4 MG: 2 INJECTION INTRAMUSCULAR; INTRAVENOUS at 13:29

## 2023-01-04 RX ADMIN — PROPOFOL 200 MG: 10 INJECTION, EMULSION INTRAVENOUS at 13:28

## 2023-01-04 RX ADMIN — GLYCOPYRROLATE 0.2 MG: 0.2 INJECTION, SOLUTION INTRAMUSCULAR; INTRAVENOUS at 13:28

## 2023-01-04 RX ADMIN — SODIUM CHLORIDE, SODIUM LACTATE, POTASSIUM CHLORIDE, AND CALCIUM CHLORIDE 125 ML/HR: .6; .31; .03; .02 INJECTION, SOLUTION INTRAVENOUS at 12:58

## 2023-01-04 RX ADMIN — DEXAMETHASONE SODIUM PHOSPHATE 10 MG: 10 INJECTION, SOLUTION INTRAMUSCULAR; INTRAVENOUS at 13:29

## 2023-01-04 RX ADMIN — METOCLOPRAMIDE HYDROCHLORIDE 10 MG: 5 INJECTION INTRAMUSCULAR; INTRAVENOUS at 13:29

## 2023-01-04 RX ADMIN — LIDOCAINE HYDROCHLORIDE 50 MG: 10 INJECTION, SOLUTION EPIDURAL; INFILTRATION; INTRACAUDAL; PERINEURAL at 13:28

## 2023-01-04 NOTE — NURSING NOTE
Left shoulder MRI completed, patient tolerated procedure  Post vital signs taken and recorded  Report given to  [address] [place] [address] 30  Patient placed in transport to be taken to [address]  Patient offers no complaints or verbalizing any issues upon leaving MRI

## 2023-01-04 NOTE — ANESTHESIA POSTPROCEDURE EVALUATION
Post-Op Assessment Note    CV Status:  Stable    Pain management: adequate     Mental Status:  Alert and awake   Hydration Status:  Euvolemic   PONV Controlled:  Controlled   Airway Patency:  Patent      Post Op Vitals Reviewed: Yes            No notable events documented      BP   139/79   Temp   99 9   Pulse  90   Resp   14   SpO2   97

## 2023-01-11 ENCOUNTER — SOCIAL WORK (OUTPATIENT)
Dept: BEHAVIORAL/MENTAL HEALTH CLINIC | Facility: CLINIC | Age: 43
End: 2023-01-11

## 2023-01-11 DIAGNOSIS — F41.1 GENERALIZED ANXIETY DISORDER: ICD-10-CM

## 2023-01-11 DIAGNOSIS — F33.9 DEPRESSION, RECURRENT (HCC): Primary | ICD-10-CM

## 2023-01-11 NOTE — PSYCH
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  Jannette became tearful sharing how she saw pictures of herself at a girl  event and began to experience unhelpful thoughts such as "I don't deserve to be here" due to her physical appearance  Jannette expressed her thoughts and feelings  This is not the first time she felt this way after seeing a picture of herself, Becki Davilafast reached out to a friend for support  Jannette has begun expressing some limits she's setting for herself this year, trying to focus more on meeting her own emotional and physical needs  She bought a journal, tracking weekly goals for herself  Jannette was happy to share that she is back on track with her eating habits, recently losing 7 lbs  She is planning a cruise with her family for the Spring after her surgery and cookie season  She also has 2 social events scheduled this month that she is looking forward too  She is finding having things to look forward to beneficial to her emotional wellness  Jannette presents with anxious mood, affect congruent, tearful at times  Her thoughts process is coherent, organized  She does require prompting for deeper exploration of thoughts and feelings in session  Jannette appears to be in the preparation, action stage of change  Unhelpful thoughts about herself are a barrier toward the action stage at times  CBT and motivational interviewing was utilized in session  She was introduced to 2 phone apps for ongoing support and accountability between sessions--- Sendy Birdin to improve self care, meeting her own needs and Thought Diary to continue to work through unhelpful thoughts between sessions  Jannette will utilize these tools and other wellness tools learned in therapy sessions, share successes and challenges at next session  Continue biweekly support  She is scheduled to start DBT group the end of February  Behavioral Health Treatment Plan [facility]: Diagnosis and Treatment Plan explained to Roger Theodore relates understanding diagnosis and is agreeable to Treatment Plan   Yes     Visit start and stop times:    01/11/23  Start Time: 0950  Stop Time: 1035  Total Visit Time: 45 minutes

## 2023-01-19 ENCOUNTER — TELEPHONE (OUTPATIENT)
Dept: PSYCHIATRY | Facility: CLINIC | Age: 43
End: 2023-01-19

## 2023-01-19 NOTE — TELEPHONE ENCOUNTER
Patient is having a lot of anxiety the last two weeks, she thought maybe her amitriptyline could be increased  Her next scheduled appointment is February

## 2023-01-20 ENCOUNTER — OFFICE VISIT (OUTPATIENT)
Dept: PSYCHIATRY | Facility: CLINIC | Age: 43
End: 2023-01-20

## 2023-01-20 DIAGNOSIS — F41.1 GENERALIZED ANXIETY DISORDER: ICD-10-CM

## 2023-01-20 DIAGNOSIS — F33.1 MODERATE RECURRENT MAJOR DEPRESSION (HCC): Primary | ICD-10-CM

## 2023-01-20 RX ORDER — CLONAZEPAM 0.5 MG/1
0.5 TABLET ORAL
Qty: 10 TABLET | Refills: 0 | Status: SHIPPED | OUTPATIENT
Start: 2023-01-20 | End: 2023-01-30

## 2023-01-20 NOTE — TELEPHONE ENCOUNTER
Called Jannette back and left message  Offered her appt today for 3:30  Asked her to call back and confirm
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
## 2023-01-25 ENCOUNTER — SOCIAL WORK (OUTPATIENT)
Dept: BEHAVIORAL/MENTAL HEALTH CLINIC | Facility: CLINIC | Age: 43
End: 2023-01-25

## 2023-01-25 DIAGNOSIS — F33.1 MODERATE RECURRENT MAJOR DEPRESSION (HCC): Primary | ICD-10-CM

## 2023-01-25 DIAGNOSIS — F41.1 GENERALIZED ANXIETY DISORDER: ICD-10-CM
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
## 2023-02-13 ENCOUNTER — OFFICE VISIT (OUTPATIENT)
Dept: FAMILY MEDICINE CLINIC | Facility: CLINIC | Age: 43
End: 2023-02-13

## 2023-02-13 ENCOUNTER — APPOINTMENT (OUTPATIENT)
Dept: LAB | Facility: CLINIC | Age: 43
End: 2023-02-13

## 2023-02-13 VITALS
DIASTOLIC BLOOD PRESSURE: 80 MMHG | TEMPERATURE: 98.6 F | HEIGHT: 67 IN | WEIGHT: 293 LBS | BODY MASS INDEX: 45.99 KG/M2 | HEART RATE: 94 BPM | OXYGEN SATURATION: 97 % | SYSTOLIC BLOOD PRESSURE: 130 MMHG

## 2023-02-13 DIAGNOSIS — F33.9 DEPRESSION, RECURRENT (HCC): ICD-10-CM

## 2023-02-13 DIAGNOSIS — M50.323 OTHER CERVICAL DISC DEGENERATION AT C6-C7 LEVEL: Primary | ICD-10-CM

## 2023-02-13 DIAGNOSIS — F41.1 GENERALIZED ANXIETY DISORDER: ICD-10-CM

## 2023-02-13 DIAGNOSIS — I45.10 RBBB (RIGHT BUNDLE BRANCH BLOCK): ICD-10-CM

## 2023-02-13 DIAGNOSIS — G47.33 OBSTRUCTIVE SLEEP APNEA: ICD-10-CM

## 2023-02-13 DIAGNOSIS — I10 PRIMARY HYPERTENSION: ICD-10-CM

## 2023-02-13 DIAGNOSIS — Z01.818 ENCOUNTER FOR OTHER PREPROCEDURAL EXAMINATION: ICD-10-CM

## 2023-02-13 DIAGNOSIS — Z01.810 PREOP CARDIOVASCULAR EXAM: ICD-10-CM

## 2023-02-13 DIAGNOSIS — H60.502 ACUTE OTITIS EXTERNA OF LEFT EAR, UNSPECIFIED TYPE: ICD-10-CM

## 2023-02-13 LAB
ALBUMIN SERPL BCP-MCNC: 3.9 G/DL (ref 3.5–5)
ALP SERPL-CCNC: 67 U/L (ref 46–116)
ALT SERPL W P-5'-P-CCNC: 72 U/L (ref 12–78)
ANION GAP SERPL CALCULATED.3IONS-SCNC: 6 MMOL/L (ref 4–13)
AST SERPL W P-5'-P-CCNC: 40 U/L (ref 5–45)
BASOPHILS # BLD AUTO: 0.05 THOUSANDS/ÂΜL (ref 0–0.1)
BASOPHILS NFR BLD AUTO: 1 % (ref 0–1)
BILIRUB SERPL-MCNC: 0.48 MG/DL (ref 0.2–1)
BUN SERPL-MCNC: 13 MG/DL (ref 5–25)
CALCIUM SERPL-MCNC: 9.2 MG/DL (ref 8.3–10.1)
CHLORIDE SERPL-SCNC: 104 MMOL/L (ref 96–108)
CO2 SERPL-SCNC: 29 MMOL/L (ref 21–32)
CREAT SERPL-MCNC: 0.58 MG/DL (ref 0.6–1.3)
EOSINOPHIL # BLD AUTO: 0.18 THOUSAND/ÂΜL (ref 0–0.61)
EOSINOPHIL NFR BLD AUTO: 3 % (ref 0–6)
ERYTHROCYTE [DISTWIDTH] IN BLOOD BY AUTOMATED COUNT: 13.3 % (ref 11.6–15.1)
GFR SERPL CREATININE-BSD FRML MDRD: 113 ML/MIN/1.73SQ M
GLUCOSE P FAST SERPL-MCNC: 124 MG/DL (ref 65–99)
HCT VFR BLD AUTO: 41.3 % (ref 34.8–46.1)
HGB BLD-MCNC: 13.9 G/DL (ref 11.5–15.4)
IMM GRANULOCYTES # BLD AUTO: 0.01 THOUSAND/UL (ref 0–0.2)
IMM GRANULOCYTES NFR BLD AUTO: 0 % (ref 0–2)
LYMPHOCYTES # BLD AUTO: 1.63 THOUSANDS/ÂΜL (ref 0.6–4.47)
LYMPHOCYTES NFR BLD AUTO: 23 % (ref 14–44)
MCH RBC QN AUTO: 30 PG (ref 26.8–34.3)
MCHC RBC AUTO-ENTMCNC: 33.7 G/DL (ref 31.4–37.4)
MCV RBC AUTO: 89 FL (ref 82–98)
MONOCYTES # BLD AUTO: 0.47 THOUSAND/ÂΜL (ref 0.17–1.22)
MONOCYTES NFR BLD AUTO: 7 % (ref 4–12)
NEUTROPHILS # BLD AUTO: 4.75 THOUSANDS/ÂΜL (ref 1.85–7.62)
NEUTS SEG NFR BLD AUTO: 66 % (ref 43–75)
NRBC BLD AUTO-RTO: 0 /100 WBCS
PLATELET # BLD AUTO: 172 THOUSANDS/UL (ref 149–390)
PMV BLD AUTO: 10.9 FL (ref 8.9–12.7)
POTASSIUM SERPL-SCNC: 3.7 MMOL/L (ref 3.5–5.3)
PROT SERPL-MCNC: 6.9 G/DL (ref 6.4–8.4)
RBC # BLD AUTO: 4.63 MILLION/UL (ref 3.81–5.12)
SODIUM SERPL-SCNC: 139 MMOL/L (ref 135–147)
WBC # BLD AUTO: 7.09 THOUSAND/UL (ref 4.31–10.16)

## 2023-02-13 NOTE — PROGRESS NOTES
PRE-OPERATIVE EXAMINATION  Jannette Baugh  1980    Jannette Dinero is a 37 y o  female with spinal stenosis of cervical spine who is planning to undergo ACDF C5-C7 under general by  [facility] on 03/13/2023   Patient has not  had complications with anesthesia in the past     ROS:   Chest pain: no   Shortness of breath: no  Shortness of breath with exertion: no  Orthopnea: no  Dizziness: no  Unexplained weight change: no    PMH:  CAD: no  HTN: yes  CKD: no  DM: no on insulin: no  History of CVA: no     reports that she has never smoked  She has never used smokeless tobacco  She reports that she does not currently use alcohol  She reports that she does not use drugs  /80 (BP Location: Left arm, Patient Position: Sitting, Cuff Size: Large)   Pulse 94   Temp 98 6 °F (37 °C) (Tympanic)   Ht 5' 7" (1 702 m)   Wt 135 kg (296 lb 9 6 oz)   SpO2 97%   BMI 46 45 kg/m²   Physical Exam  Vitals and nursing note reviewed  Constitutional:       General: She is not in acute distress  Appearance: Normal appearance  She is well-developed  She is not ill-appearing, toxic-appearing or diaphoretic  HENT:      Head: Normocephalic and atraumatic  Mouth/Throat:      Mouth: Mucous membranes are moist       Pharynx: Oropharynx is clear  No oropharyngeal exudate  Eyes:      General:         Right eye: No discharge  Left eye: No discharge  Extraocular Movements: Extraocular movements intact  Conjunctiva/sclera: Conjunctivae normal       Pupils: Pupils are equal, round, and reactive to light  Neck:      Comments: No jvd   Cardiovascular:      Rate and Rhythm: Normal rate and regular rhythm  Pulses: Normal pulses  Heart sounds: Normal heart sounds  No murmur heard  Pulmonary:      Effort: Pulmonary effort is normal  No respiratory distress  Breath sounds: Normal breath sounds  Abdominal:      General: There is no distension  Palpations: Abdomen is soft        Tenderness: There is no abdominal tenderness  Musculoskeletal:         General: No swelling  Normal range of motion  Cervical back: Normal range of motion and neck supple  No rigidity or tenderness  Lymphadenopathy:      Cervical: No cervical adenopathy  Skin:     General: Skin is warm and dry  Capillary Refill: Capillary refill takes less than 2 seconds  Neurological:      General: No focal deficit present  Mental Status: She is alert and oriented to person, place, and time  Psychiatric:         Mood and Affect: Mood normal          Behavior: Behavior normal          Thought Content:  Thought content normal          Judgment: Judgment normal          Revised Cardiac Risk Index (RCRI) for Pre-Operative Risk   (estimates risk of cardiac complications after noncardiac surgery)    · High-risk surgery: No 0 / Yes +1  · Intraperitoneal, intrathoracic, suprainguinal vascular  · History of ischemic heart disease: No 0 / Yes +1  · Hx of MI, (+) exercise test, current chest pain considered due to myocardial ischemia, use of nitrate therapy or ECG with pathological Q waves)  · History of CHF: No 0 / Yes +1  · Pulmonary edema, B/L rales or S3 gallop; FRIEDMAN, orthopnea, PND, CXR showing pulmonary vascular redistribution)  · History of cerebrovascular disease: No 0 / Yes +1  · Prior TIA or stroke  · Pre-operative treatment with insulin: No 0 / Yes +1  · Pre-operative creatinine >2 mg/dL: No 0 / Yes +1    RCRI Scoring:  · 0 points: Class I Risk, 0 4% Risk of Major Cardiac Event  · 1 point: Class II Risk, 0 9% Risk of Major Cardiac Event  · 2 points: Class III Risk, 6 6% Risk of Major Cardiac Event  · 3 points: Class IV Risk, 11% Risk of Major Cardiac Event  · 4 points: Class IV Risk, 11% Risk of Major Cardiac Event  · 5 points: Class IV Risk, 11% Risk of Major Cardiac Event  · 6 points: Class IV Risk, 11% Risk of Major Cardiac Event    Lab Results   Component Value Date    CREATININE 0 58 (L) 02/13/2023       Jannette was seen today for pre-op exam     Diagnoses and all orders for this visit:    Other cervical disc degeneration at C6-C7 level    Preop cardiovascular exam    Generalized anxiety disorder    Depression, recurrent (HCC)    RBBB (right bundle branch block)    Primary hypertension    Acute otitis externa of left ear, unspecified type  -     neomycin-polymyxin-hydrocortisone (CORTISPORIN) otic solution; Administer 4 drops into the left ear 3 (three) times a day for 10 days    Obstructive sleep apnea      BP stable  continue current treatment plan  Mood stable   continue current treatment plan  Reviewed preop EKG ,CBC and CMP  EKG showed normal sinus rhythm with left axis deviation and right bundle branch block  No changes were found when compared to previous EKG in July 2022  Recommendations:  Jannette Baugh is undergoing an elective Moderate Risk surgery  She is RCRI class I risk  for major adverse cardiac event (MACE)  She may proceed with surgery as planned without further workup  Preoperative form signed and faxed   HUMPHREY Michel 26

## 2023-02-20 ENCOUNTER — OFFICE VISIT (OUTPATIENT)
Dept: LAB | Facility: HOSPITAL | Age: 43
End: 2023-02-20

## 2023-02-20 ENCOUNTER — OFFICE VISIT (OUTPATIENT)
Dept: PSYCHIATRY | Facility: CLINIC | Age: 43
End: 2023-02-20

## 2023-02-20 DIAGNOSIS — F33.1 MODERATE RECURRENT MAJOR DEPRESSION (HCC): Primary | ICD-10-CM

## 2023-02-20 DIAGNOSIS — F41.1 GENERALIZED ANXIETY DISORDER: ICD-10-CM

## 2023-02-20 DIAGNOSIS — E78.5 HYPERLIPIDEMIA, UNSPECIFIED HYPERLIPIDEMIA TYPE: ICD-10-CM

## 2023-02-20 DIAGNOSIS — I10 HYPERTENSION, UNSPECIFIED TYPE: ICD-10-CM

## 2023-02-20 DIAGNOSIS — Z01.818 OTHER SPECIFIED PRE-OPERATIVE EXAMINATION: ICD-10-CM

## 2023-02-20 PROBLEM — J02.9 SORE THROAT: Status: RESOLVED | Noted: 2022-07-26 | Resolved: 2023-02-20

## 2023-02-20 PROBLEM — L02.211 ABSCESS OF ABDOMINAL WALL: Status: RESOLVED | Noted: 2022-07-11 | Resolved: 2023-02-20

## 2023-02-20 PROBLEM — M25.511 PAIN IN JOINT OF RIGHT SHOULDER: Status: RESOLVED | Noted: 2021-04-29 | Resolved: 2023-02-20

## 2023-02-20 PROBLEM — H92.02 LEFT EAR PAIN: Status: RESOLVED | Noted: 2019-08-28 | Resolved: 2023-02-20

## 2023-02-20 LAB
ATRIAL RATE: 80 BPM
P AXIS: 27 DEGREES
PR INTERVAL: 146 MS
QRS AXIS: -30 DEGREES
QRSD INTERVAL: 126 MS
QT INTERVAL: 396 MS
QTC INTERVAL: 456 MS
T WAVE AXIS: 16 DEGREES
VENTRICULAR RATE: 80 BPM

## 2023-02-20 RX ORDER — AMITRIPTYLINE HYDROCHLORIDE 75 MG/1
75 TABLET, FILM COATED ORAL
Qty: 90 TABLET | Refills: 0 | Status: SHIPPED | OUTPATIENT
Start: 2023-02-20

## 2023-02-20 RX ORDER — VENLAFAXINE HYDROCHLORIDE 75 MG/1
75 CAPSULE, EXTENDED RELEASE ORAL DAILY
Qty: 90 CAPSULE | Refills: 0 | Status: SHIPPED | OUTPATIENT
Start: 2023-02-20

## 2023-02-20 RX ORDER — VENLAFAXINE HYDROCHLORIDE 150 MG/1
150 CAPSULE, EXTENDED RELEASE ORAL DAILY
Qty: 90 CAPSULE | Refills: 0 | Status: SHIPPED | OUTPATIENT
Start: 2023-02-20
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
## 2023-02-21 RX ORDER — METOPROLOL SUCCINATE 100 MG/1
TABLET, EXTENDED RELEASE ORAL
Qty: 100 TABLET | Refills: 1 | Status: SHIPPED | OUTPATIENT
Start: 2023-02-21

## 2023-02-21 RX ORDER — ATORVASTATIN CALCIUM 40 MG/1
TABLET, FILM COATED ORAL
Qty: 90 TABLET | Refills: 1 | Status: SHIPPED | OUTPATIENT
Start: 2023-02-21

## 2023-02-22 ENCOUNTER — TELEMEDICINE (OUTPATIENT)
Dept: BEHAVIORAL/MENTAL HEALTH CLINIC | Facility: CLINIC | Age: 43
End: 2023-02-22

## 2023-02-22 DIAGNOSIS — F41.1 GENERALIZED ANXIETY DISORDER: ICD-10-CM

## 2023-02-22 DIAGNOSIS — F33.1 MODERATE RECURRENT MAJOR DEPRESSION (HCC): Primary | ICD-10-CM
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
## 2023-02-22 NOTE — PSYCH
Treatment Plan not completed within required time limits due to: [employer] system was down during patient's session today, used [employer] to connect  Will complete at next session

## 2023-02-23 ENCOUNTER — OFFICE VISIT (OUTPATIENT)
Dept: BEHAVIORAL/MENTAL HEALTH CLINIC | Facility: CLINIC | Age: 43
End: 2023-02-23

## 2023-02-23 DIAGNOSIS — F41.1 GENERALIZED ANXIETY DISORDER: ICD-10-CM

## 2023-02-23 DIAGNOSIS — F33.1 MODERATE RECURRENT MAJOR DEPRESSION (HCC): Primary | ICD-10-CM

## 2023-02-23 NOTE — PSYCH
This note was not shared with the patient due to this is a psychotherapy note      Behavioral Health Psychotherapy Progress Note    Psychotherapy Provided: Group Therapy    1  Moderate recurrent major depression ([address] )        2  Generalized anxiety disorder            Goals addressed in session: Goal 1     DATA: Psychotherapy group introducing Dialectical Behavioral therapy, group expectations, and beginning the core module of distress tolerance skills  We discussed the cost of self-destructive coping strategies working toward creating more effective decisions for oneself  Jannette shared several self-destructive coping strategies she wishes to decrease/eliminate through group  RELAX/STOP skill was introduced, video was watched to further elaborate  Jannette actively participated in the group discussion  She provided appropriate feedback and was supportive to her peers  She shared a way she used STOP skill over the last week without knowing  Group ended reviewing that increasing ability to tolerate distress starts with a change in attitude  Radical acceptance briefly introduced through coping statements focused on decreasing our emotional suffering  During this session, this clinician used the following therapeutic modalities: Engagement Strategies, Dialectical Behavior Therapy and Supportive Psychotherapy    Substance Abuse was not addressed during this session  If the client is diagnosed with a co-occurring substance use disorder, please indicate any changes in the frequency or amount of use: na  Stage of change for addressing substance use diagnoses: No substance use/Not applicable    ASSESSMENT:  Jannette Baugh presents with a Anxious mood  her affect is Normal range and intensity, which is congruent, with her mood and the content of the session  The client has made progress on their goals       Jannette Baugh presents with a none risk of suicide, none risk of self-harm, and none risk of harm to others  For any risk assessment that surpasses a "low" rating, a safety plan must be developed  A safety plan was indicated: no  If yes, describe in detail na    PLAN: Between sessions, Jannette Baugh will  Homework to practice RELAX/STOP strategy, use worksheet to reflect on past scenario where this skill could have been helpful    At the next session, the therapist will use Engagement Strategies, Dialectical Behavior Therapy and Supportive Psychotherapy to address distraction as a distress tolerance tool  Behavioral Health Treatment Plan and Discharge Planning: Renaldo Hale is aware of and agrees to continue to work on their treatment plan  They have identified and are working toward their discharge goals   yes    Visit start and stop times:    02/23/23  Start Time: 1100  Stop Time: 1205  Total Visit Time: 65 minutes

## 2023-03-02 ENCOUNTER — OFFICE VISIT (OUTPATIENT)
Dept: URGENT CARE | Facility: CLINIC | Age: 43
End: 2023-03-02

## 2023-03-02 ENCOUNTER — TELEPHONE (OUTPATIENT)
Dept: FAMILY MEDICINE CLINIC | Facility: CLINIC | Age: 43
End: 2023-03-02

## 2023-03-02 VITALS
RESPIRATION RATE: 18 BRPM | WEIGHT: 293 LBS | OXYGEN SATURATION: 98 % | HEART RATE: 84 BPM | TEMPERATURE: 98.4 F | BODY MASS INDEX: 45.99 KG/M2 | HEIGHT: 67 IN

## 2023-03-02 DIAGNOSIS — R05.9 COUGH, UNSPECIFIED TYPE: Primary | ICD-10-CM

## 2023-03-02 DIAGNOSIS — R05.1 ACUTE COUGH: Primary | ICD-10-CM

## 2023-03-02 LAB
SARS-COV-2 AG UPPER RESP QL IA: NEGATIVE
VALID CONTROL: NORMAL

## 2023-03-02 NOTE — TELEPHONE ENCOUNTER
Pt called pt is scheduled for surgery on 3/13 ilan is asking for pt to be recheck in office ( chest recheck) since she has been sick with   upper resp  Infection  ( pt has already been medical cleared) Will need updated progress notes to send to the surgeron  Can we schedule for same day to be seen on March 7th or 9th?

## 2023-03-02 NOTE — PROGRESS NOTES
[facility]        NAME: Jennifer Tillman is a 37 y o  female  : 1980    MRN: [id number]  DATE: 2023  TIME: 1:46 PM    Assessment and Plan   Acute cough [R05 1]  1  Acute cough  Poct Covid 19 Rapid Antigen Test        Rapid covid negative    Patient Instructions     Patient Instructions   Take medication as directed  Rest and drink plenty of fluids  A cool mist humidifier can be helpful  If you develop a worsening cough, chest pain, shortness of breath, palpitations, coughing up blood, prolonged high fever, any new or concerning symptoms please return or proceed ER  Recommend following up with PCP in 3-5 days            Chief Complaint     Chief Complaint   Patient presents with   • Cold Like Symptoms     Cough, congestion, sore throat, post nasal drip, chest tight for 1-2 days         History of Present Illness       URI   This is a new problem  Episode onset: 2 days ago  The problem has been unchanged  There has been no fever  Associated symptoms include congestion, coughing, rhinorrhea and a sore throat  Pertinent negatives include no abdominal pain, chest pain, ear pain, headaches, joint swelling, nausea, neck pain, rash, sinus pain, sneezing, swollen glands, vomiting or wheezing  She has tried nothing for the symptoms  The treatment provided no relief  Review of Systems   Review of Systems   Constitutional: Negative for chills, diaphoresis, fatigue and fever  HENT: Positive for congestion, postnasal drip, rhinorrhea and sore throat  Negative for ear pain, facial swelling, sinus pressure, sinus pain, sneezing, tinnitus and trouble swallowing  Eyes: Negative  Respiratory: Positive for cough  Negative for chest tightness, shortness of breath, wheezing and stridor  Cardiovascular: Negative for chest pain and palpitations  Gastrointestinal: Negative  Negative for abdominal pain, nausea and vomiting     Musculoskeletal: Negative for arthralgias, back pain, joint swelling, myalgias, neck pain and neck stiffness  Skin: Negative for rash  Neurological: Negative for dizziness, facial asymmetry, weakness, light-headedness, numbness and headaches           Current Medications       Current Outpatient Medications:   •  albuterol (PROVENTIL HFA,VENTOLIN HFA) 90 mcg/act inhaler, Inhale 2 puffs every 6 (six) hours as needed for wheezing or shortness of breath (cough), Disp: 18 g, Rfl: 1  •  amitriptyline (ELAVIL) 75 mg tablet, Take 1 tablet (75 mg total) by mouth daily at bedtime, Disp: 90 tablet, Rfl: 0  •  Ascorbic Acid (VITAMIN C) 500 MG CAPS, Take 1 capsule by mouth daily, Disp: , Rfl:   •  aspirin 81 mg chewable tablet, Chew 1 tablet (81 mg total) daily, Disp: 30 tablet, Rfl: 30  •  atorvastatin (LIPITOR) 40 mg tablet, TAKE 1 TABLET BY MOUTH ONCE DAILY IN THE MORNING, Disp: 90 tablet, Rfl: 1  •  cetirizine (ZyrTEC) 10 mg tablet, Take 10 mg by mouth daily, Disp: , Rfl:   •  Cholecalciferol (VITAMIN D3) 5000 units CAPS, Take 1 capsule by mouth daily  , Disp: , Rfl:   •  cyanocobalamin (VITAMIN B-12) 1,000 mcg tablet, Take 1,000 mcg by mouth daily , Disp: , Rfl:   •  GNP Esomeprazole Magnesium 20 MG capsule, TAKE 1 CAPSULE BY MOUTH ONCE DAILY IN THE MORNING, Disp: 84 capsule, Rfl: 2  •  losartan (COZAAR) 100 MG tablet, TAKE 1 TABLET BY MOUTH ONCE DAILY, Disp: 90 tablet, Rfl: 2  •  metoprolol succinate (TOPROL-XL) 100 mg 24 hr tablet, TAKE 1 TABLET BY MOUTH ONCE DAILY, Disp: 100 tablet, Rfl: 1  •  Multiple Vitamins-Minerals (CENTRUM ADULTS PO), Take 1 tablet by mouth daily, Disp: , Rfl:   •  Omega-3 Fatty Acids (FISH OIL ADULT GUMMIES PO), Take by mouth, Disp: , Rfl:   •  venlafaxine (EFFEXOR-XR) 150 mg 24 hr capsule, Take 1 capsule (150 mg total) by mouth daily With 75 mg, Disp: 90 capsule, Rfl: 0  •  venlafaxine (EFFEXOR-XR) 75 mg 24 hr capsule, Take 1 capsule (75 mg total) by mouth daily With 150 mg, Disp: 90 capsule, Rfl: 0  •  ciprofloxacin-dexamethasone (CIPRODEX) otic suspension, Administer 4 drops to the right ear in the morning and 4 drops in the evening   (Patient not taking: Reported on 3/2/2023), Disp: 7 5 mL, Rfl: 3  •  neomycin-polymyxin-hydrocortisone (CORTISPORIN) otic solution, Administer 4 drops into the left ear 3 (three) times a day for 10 days, Disp: 10 mL, Rfl: 0  •  nystatin (MYCOSTATIN) cream, Apply topically 2 (two) times a day as needed (rash) (Patient not taking: Reported on 3/2/2023), Disp: 30 g, Rfl: 1    Current Allergies     Allergies as of 03/02/2023 - Reviewed 03/02/2023   Allergen Reaction Noted   • Neurontin [gabapentin] Other (See Comments) 10/16/2014   • Penicillins Anaphylaxis, Swelling, and Angioedema 05/16/2017   • Aripiprazole Other (See Comments) 11/06/2013   • Lorazepam Other (See Comments) 06/11/2014   • Carbamazepine Other (See Comments) 10/16/2012   • Doxycycline Hives 04/10/2013   • Lamotrigine Rash 08/28/2012   • Wound dressing adhesive Hives 01/18/2022            The following portions of the patient's history were reviewed and updated as appropriate: allergies, current medications, past family history, past medical history, past social history, past surgical history and problem list      Past Medical History:   Diagnosis Date   • Allergic rhinitis    • Anxiety    • Arthritis    • C  difficile diarrhea 2020   • Chronic pain disorder     BACK SHOULDERS NECK   • Colon polyp    • COPD (chronic obstructive pulmonary disease) ([facility])    • CPAP (continuous positive airway pressure) dependence    • Depression    • Diarrhea    • Disease of thyroid gland     nodule   • Eating disorder    • Fibromyalgia, primary    • Fissure, anal    • GERD (gastroesophageal reflux disease)    • Hearing difficulty of right ear    • Hyperlipidemia    • Hypertension    • Irritable bowel syndrome    • Obesity    • Perforation of tympanic membrane     Right   • PONV (postoperative nausea and vomiting)    • RBBB     Right bundle branch blockage    • Sleep apnea     no cpap • Thyroid nodule    • Wears glasses        Past Surgical History:   Procedure Laterality Date   • ANAL FISTULOTOMY N/A 2018    Procedure: FISTULOTOMY;  Surgeon: Denice Guerra MD;  [facility];  Service: Colorectal   • BACK SURGERY      lumbar herniated disc   • BREAST CYST EXCISION Left 13 yrs ago  benign   • BREAST SURGERY Left 2006    cyst removal   • CARPAL TUNNEL RELEASE     •  SECTION     • CHOLECYSTECTOMY     • COLONOSCOPY     • CYST REMOVAL     • DENTAL SURGERY     • ENDOMETRIAL ABLATION N/A 2021    Procedure: D&C, ABLATION ENDOMETRIAL MARIEL;  Surgeon: Moon Marc MD;  Location: [address] MAIN OR;  Service: Gynecology   • HAND SURGERY     • INCISION AND DRAINAGE OF WOUND N/A 2018    Procedure: INCISION AND DRAINAGE (I&D) BUTTOCK;  Surgeon: Denice Guerra MD;  [facility];  Service: Colorectal   • MYRINGOTOMY W/ TUBES Right 2015    [place]   • CT ANRCT XM SURG REQ ANES GENERAL SPI/EDRL DX N/A 2018    Procedure: EXAM UNDER ANESTHESIA (EUA); Surgeon: Denice Guerra MD;  [facility];  Service: Colorectal   • CT COLONOSCOPY FLX DX W/[facility] INPATIENT REHABILITATION WHEN PFRMD N/A 2017    Procedure: COLONOSCOPY;  Surgeon: Zach Ferguson MD;  [facility];   Service: Gastroenterology   • CT DEBRIDEMENT SUBCUTANEOUS TISSUE 20 SQ CM/< N/A 2022    Procedure: Debridement abdominal wall;  Surgeon: Justine Barillas MD;  [facility]: [facility] MAIN OR;  Service: General   • CT EXCISION HIDRADENITIS INGUINAL COMPLEX REPAIR Bilateral 2022    Procedure: WIDE EXCISION HIDRADENITIS ABDOMINAL WALL;  Surgeon: Justine Barillas MD;  Location: [facility] MAIN OR;  Service: General   • CT INCISION & DRAINAGE ABSCESS SIMPLE/SINGLE N/A 2022    Procedure: INCISION AND DRAINAGE ABDOMINAL WALL;  Surgeon: Justine Barillas MD;  Location: [facility] MAIN OR;  Service: General   • CT TENDON SHEATH INCISION Right 2018    Procedure: LONG TRIGGER FINGER RELEASE;  Surgeon: Krystle Gillespie DO;  [facility];  Service: Orthopedics   • LA TYMPANOPLASTY W/O MASTOIDECT W/O OSSICLE RECNSTJ Right 05/24/2017    Procedure: TYMPANOPLASTY WITH PERICHONDRIN GRAFT;  Surgeon: Angel Lind DO;  [facility];  Service: ENT   • SHOULDER SURGERY Right    • TRIGGER FINGER RELEASE     • ULNAR NERVE TRANSPOSITION Left    • UMBILICAL HIDRADENITIS EXCISION  42/98/4704    subumbilical hidradenitis exicsion, Dr Ayah Clark, [facility]   • WISDOM TOOTH EXTRACTION         Family History   Problem Relation Age of Onset   • Hypertension Mother    • Hyperlipidemia Mother    • Diabetes Mother    • Hypertension Father    • Hyperlipidemia Father    • Heart disease Father         cardiac disorder-myocardial infarction arrhythmias   • Heart attack Father    • Crohn's disease Brother    • Arthritis Brother    • Diabetes unspecified Maternal Grandmother    • Thyroid disease Paternal Grandmother    • No Known Problems Daughter    • No Known Problems Maternal Aunt    • No Known Problems Maternal Aunt    • No Known Problems Maternal Aunt    • No Known Problems Maternal Aunt    • No Known Problems Paternal Aunt    • No Known Problems Paternal Aunt    • Heart attack Paternal Uncle          Medications have been verified  Objective   Pulse 84   Temp 98 4 °F (36 9 °C) (Temporal)   Resp 18   Ht 5' 7" (1 702 m)   Wt 134 kg (296 lb)   SpO2 98%   BMI 46 36 kg/m²   No LMP recorded  Patient has had an ablation  Physical Exam     Physical Exam  Constitutional:       General: She is not in acute distress  Appearance: She is well-developed  She is not diaphoretic  HENT:      Head: Normocephalic and atraumatic  Right Ear: Hearing, tympanic membrane, ear canal and external ear normal       Left Ear: Hearing, tympanic membrane, ear canal and external ear normal       Nose: Congestion and rhinorrhea present  No mucosal edema        Right Sinus: No maxillary sinus tenderness or frontal sinus tenderness  Left Sinus: No maxillary sinus tenderness or frontal sinus tenderness  Mouth/Throat:      Mouth: Mucous membranes are moist       Pharynx: Oropharynx is clear  Uvula midline  Tonsils: No tonsillar exudate or tonsillar abscesses  Cardiovascular:      Rate and Rhythm: Normal rate and regular rhythm  Heart sounds: Normal heart sounds, S1 normal and S2 normal    Pulmonary:      Effort: Pulmonary effort is normal       Breath sounds: Normal breath sounds and air entry  Lymphadenopathy:      Cervical: No cervical adenopathy  Skin:     General: Skin is warm and dry  Capillary Refill: Capillary refill takes less than 2 seconds  Neurological:      Mental Status: She is alert and oriented to person, place, and time

## 2023-03-02 NOTE — TELEPHONE ENCOUNTER
We can do March 7th  Please let her know I will also order chest x-ray for her to complete in the meantime to rule out any lower respiratory infection  Thank you

## 2023-03-04 ENCOUNTER — OFFICE VISIT (OUTPATIENT)
Dept: URGENT CARE | Facility: CLINIC | Age: 43
End: 2023-03-04

## 2023-03-04 VITALS
OXYGEN SATURATION: 99 % | DIASTOLIC BLOOD PRESSURE: 74 MMHG | TEMPERATURE: 97.9 F | WEIGHT: 293 LBS | HEART RATE: 94 BPM | BODY MASS INDEX: 45.99 KG/M2 | RESPIRATION RATE: 18 BRPM | SYSTOLIC BLOOD PRESSURE: 134 MMHG | HEIGHT: 67 IN

## 2023-03-04 DIAGNOSIS — H60.392 OTHER INFECTIVE ACUTE OTITIS EXTERNA OF LEFT EAR: Primary | ICD-10-CM

## 2023-03-04 RX ORDER — OFLOXACIN 3 MG/ML
10 SOLUTION AURICULAR (OTIC) DAILY
Qty: 2.5 ML | Refills: 0 | Status: SHIPPED | OUTPATIENT
Start: 2023-03-04 | End: 2023-03-09

## 2023-03-04 NOTE — PATIENT INSTRUCTIONS
Please use antibiotic ear drops daily for the next 5 days  1   Drink plenty fluids  2   Take probiotics [i e  Yogurt, Acidophilus, Florastor (liquid)] daily  3   Over-the-counter medications as needed for symptomatic care  4    Advance activities as tolerated  5    Follow-up with your primary care physician in 3-4 days  6   Go to emergency room if symptoms are worsening      7   Use a humidifier at bedtime

## 2023-03-04 NOTE — PROGRESS NOTES
[facility]        NAME: Milli Sheehan is a 37 y o  female  : 1980    MRN: [id number]  DATE: 2023  TIME: 10:47 AM    Assessment and Plan   Other infective acute otitis externa of left ear [H60 392]  1  Other infective acute otitis externa of left ear  ofloxacin (FLOXIN) 0 3 % otic solution            Patient Instructions     Please use antibiotic eardrops daily for the next 5 days  Follow up with PCP in 3-5 days  Proceed to  ER if symptoms worsen  Chief Complaint     Chief Complaint   Patient presents with   • Earache     Left earache started yesterday         History of Present Illness       Patient presenting for evaluation of upper respiratory symptoms including cough, congestion and sore throat  Patient states that her sore throat has resolved, but feels as if her cough is getting worse  She also complains of ear pain that has worsened over the past 24 hours  She denies any drainage from her ear at this time  She states that she has been taking Coricidin with minimal relief of her symptoms  Review of Systems   Review of Systems   Constitutional: Negative for chills and fever  HENT: Positive for congestion, ear pain and sore throat  Respiratory: Positive for cough  Negative for shortness of breath  Cardiovascular: Negative for chest pain  Gastrointestinal: Negative for diarrhea, nausea and vomiting  All other systems reviewed and are negative          Current Medications       Current Outpatient Medications:   •  albuterol (PROVENTIL HFA,VENTOLIN HFA) 90 mcg/act inhaler, Inhale 2 puffs every 6 (six) hours as needed for wheezing or shortness of breath (cough), Disp: 18 g, Rfl: 1  •  amitriptyline (ELAVIL) 75 mg tablet, Take 1 tablet (75 mg total) by mouth daily at bedtime, Disp: 90 tablet, Rfl: 0  •  Ascorbic Acid (VITAMIN C) 500 MG CAPS, Take 1 capsule by mouth daily, Disp: , Rfl:   •  aspirin 81 mg chewable tablet, Chew 1 tablet (81 mg total) daily, Disp: 30 tablet, Rfl: 30  •  atorvastatin (LIPITOR) 40 mg tablet, TAKE 1 TABLET BY MOUTH ONCE DAILY IN THE MORNING, Disp: 90 tablet, Rfl: 1  •  cetirizine (ZyrTEC) 10 mg tablet, Take 10 mg by mouth daily, Disp: , Rfl:   •  Cholecalciferol (VITAMIN D3) 5000 units CAPS, Take 1 capsule by mouth daily  , Disp: , Rfl:   •  cyanocobalamin (VITAMIN B-12) 1,000 mcg tablet, Take 1,000 mcg by mouth daily , Disp: , Rfl:   •  GNP Esomeprazole Magnesium 20 MG capsule, TAKE 1 CAPSULE BY MOUTH ONCE DAILY IN THE MORNING, Disp: 84 capsule, Rfl: 2  •  losartan (COZAAR) 100 MG tablet, TAKE 1 TABLET BY MOUTH ONCE DAILY, Disp: 90 tablet, Rfl: 2  •  metoprolol succinate (TOPROL-XL) 100 mg 24 hr tablet, TAKE 1 TABLET BY MOUTH ONCE DAILY, Disp: 100 tablet, Rfl: 1  •  Multiple Vitamins-Minerals (CENTRUM ADULTS PO), Take 1 tablet by mouth daily, Disp: , Rfl:   •  ofloxacin (FLOXIN) 0 3 % otic solution, Administer 10 drops into the left ear daily for 5 days, Disp: 2 5 mL, Rfl: 0  •  Omega-3 Fatty Acids (FISH OIL ADULT GUMMIES PO), Take by mouth, Disp: , Rfl:   •  venlafaxine (EFFEXOR-XR) 150 mg 24 hr capsule, Take 1 capsule (150 mg total) by mouth daily With 75 mg, Disp: 90 capsule, Rfl: 0  •  venlafaxine (EFFEXOR-XR) 75 mg 24 hr capsule, Take 1 capsule (75 mg total) by mouth daily With 150 mg, Disp: 90 capsule, Rfl: 0  •  ciprofloxacin-dexamethasone (CIPRODEX) otic suspension, Administer 4 drops to the right ear in the morning and 4 drops in the evening   (Patient not taking: Reported on 3/2/2023), Disp: 7 5 mL, Rfl: 3  •  neomycin-polymyxin-hydrocortisone (CORTISPORIN) otic solution, Administer 4 drops into the left ear 3 (three) times a day for 10 days, Disp: 10 mL, Rfl: 0  •  nystatin (MYCOSTATIN) cream, Apply topically 2 (two) times a day as needed (rash) (Patient not taking: Reported on 3/2/2023), Disp: 30 g, Rfl: 1    Current Allergies     Allergies as of 03/04/2023 - Reviewed 03/04/2023   Allergen Reaction Noted   • Neurontin [gabapentin] Other (See Comments) 10/16/2014   • Penicillins Anaphylaxis, Swelling, and Angioedema 2017   • Aripiprazole Other (See Comments) 2013   • Lorazepam Other (See Comments) 2014   • Carbamazepine Other (See Comments) 10/16/2012   • Doxycycline Hives 04/10/2013   • Lamotrigine Rash 2012   • Wound dressing adhesive Hives 2022            The following portions of the patient's history were reviewed and updated as appropriate: allergies, current medications, past family history, past medical history, past social history, past surgical history and problem list      Past Medical History:   Diagnosis Date   • Allergic rhinitis    • Anxiety    • Arthritis    • C  difficile diarrhea    • Chronic pain disorder     BACK SHOULDERS NECK   • Colon polyp    • COPD (chronic obstructive pulmonary disease) ([facility] [address] )    • CPAP (continuous positive airway pressure) dependence    • Depression    • Diarrhea    • Disease of thyroid gland     nodule   • Eating disorder    • Fibromyalgia, primary    • Fissure, anal    • GERD (gastroesophageal reflux disease)    • Hearing difficulty of right ear    • Hyperlipidemia    • Hypertension    • Irritable bowel syndrome    • Obesity    • Perforation of tympanic membrane     Right   • PONV (postoperative nausea and vomiting)    • RBBB     Right bundle branch blockage    • Sleep apnea     no cpap   • Thyroid nodule    • Wears glasses        Past Surgical History:   Procedure Laterality Date   • ANAL FISTULOTOMY N/A 2018    Procedure: FISTULOTOMY;  Surgeon: Jesse Castaneda MD;  [facility];  Service: Colorectal   • BACK SURGERY      lumbar herniated disc   • BREAST CYST EXCISION Left 13 yrs ago  benign   • BREAST SURGERY Left 2006    cyst removal   • CARPAL TUNNEL RELEASE     •  SECTION     • CHOLECYSTECTOMY     • COLONOSCOPY     • CYST REMOVAL     • DENTAL SURGERY     • ENDOMETRIAL ABLATION N/A 2021    Procedure: D&C, ABLATION ENDOMETRIAL MARIEL;  Surgeon: Ellison Rubinstein, MD;  Location: [facility] MAIN OR;  Service: Gynecology   • HAND SURGERY     • INCISION AND DRAINAGE OF WOUND N/A 11/02/2018    Procedure: INCISION AND DRAINAGE (I&D) BUTTOCK;  Surgeon: Monique Morse MD;  [facility];  Service: Colorectal   • MYRINGOTOMY W/ TUBES Right 08/17/2015    [place]   • TN ANRCT XM SURG REQ ANES GENERAL SPI/EDRL DX N/A 11/02/2018    Procedure: EXAM UNDER ANESTHESIA (EUA); Surgeon: Monique Morse MD;  [facility];  Service: Colorectal   • [facility] WHEN PFRMD N/A 08/02/2017    Procedure: COLONOSCOPY;  Surgeon: Wiliam Jon MD;  [facility]; Service: Gastroenterology   • TN DEBRIDEMENT SUBCUTANEOUS TISSUE 20 SQ CM/< N/A 7/19/2022    Procedure: Debridement abdominal wall;  Surgeon: Reilly Savage MD;  [facility]: [facility];  Service: General   • TN EXCISION HIDRADENITIS INGUINAL COMPLEX REPAIR Bilateral 1/18/2022    Procedure: WIDE EXCISION HIDRADENITIS ABDOMINAL WALL;  Surgeon: Reilly Savage MD;  [facility]: [facility];  Service: General   • TN INCISION & DRAINAGE ABSCESS SIMPLE/SINGLE N/A 7/19/2022    Procedure: INCISION AND DRAINAGE ABDOMINAL WALL;  Surgeon: Reilly Savage MD;  [facility]: [facility];  Service: General   • TN TENDON SHEATH INCISION Right 03/13/2018    Procedure: LONG TRIGGER FINGER RELEASE;  Surgeon: Harpreet Shelley DO;  [facility];  Service: Orthopedics   • TN TYMPANOPLASTY W/O MASTOIDECT W/O OSSICLE RECNSTJ Right 05/24/2017    Procedure: TYMPANOPLASTY WITH PERICHONDRIN GRAFT;  Surgeon:  Fred Palacios DO;  [facility];  Service: ENT   • SHOULDER SURGERY Right    • TRIGGER FINGER RELEASE     • ULNAR NERVE TRANSPOSITION Left    • UMBILICAL HIDRADENITIS EXCISION  97/55/5450    subumbilical hidradenitis exicsion, Dr Nilesh Andrade, [facility]   • WISDOM TOOTH EXTRACTION         Family History   Problem Relation Age of Onset   • Hypertension Mother    • Hyperlipidemia Mother    • Diabetes Mother    • Hypertension Father    • Hyperlipidemia Father    • Heart disease Father         cardiac disorder-myocardial infarction arrhythmias   • Heart attack Father    • Crohn's disease Brother    • Arthritis Brother    • Diabetes unspecified Maternal Grandmother    • Thyroid disease Paternal Grandmother    • No Known Problems Daughter    • No Known Problems Maternal Aunt    • No Known Problems Maternal Aunt    • No Known Problems Maternal Aunt    • No Known Problems Maternal Aunt    • No Known Problems Paternal Aunt    • No Known Problems Paternal Aunt    • Heart attack Paternal Uncle          Medications have been verified  Objective   /74   Pulse 94   Temp 97 9 °F (36 6 °C)   Resp 18   Ht 5' 7" (1 702 m)   Wt 134 kg (296 lb)   SpO2 99%   BMI 46 36 kg/m²        Physical Exam     Physical Exam  Vitals and nursing note reviewed  Constitutional:       General: She is not in acute distress  Appearance: Normal appearance  She is obese  She is not ill-appearing, toxic-appearing or diaphoretic  HENT:      Head: Normocephalic and atraumatic  Right Ear: Tympanic membrane normal       Left Ear: Tympanic membrane normal       Ears:      Comments: Bilateral tympanic membranes within normal limits, left ear canal noted to be erythematous and swollen, mild tenderness     Nose: Nose normal  No congestion or rhinorrhea  Eyes:      General:         Right eye: No discharge  Left eye: No discharge  Cardiovascular:      Rate and Rhythm: Normal rate and regular rhythm  Pulses: Normal pulses  Heart sounds: Normal heart sounds  No murmur heard  No friction rub  No gallop  Pulmonary:      Effort: Pulmonary effort is normal  No respiratory distress  Breath sounds: Normal breath sounds  No stridor  No wheezing, rhonchi or rales  Chest:      Chest wall: No tenderness     Abdominal:      General: Bowel sounds are normal       Palpations: Abdomen is soft  Tenderness: There is no abdominal tenderness  Skin:     General: Skin is warm and dry  Neurological:      Mental Status: She is alert     Psychiatric:         Mood and Affect: Mood normal          Behavior: Behavior normal

## 2023-03-08 ENCOUNTER — SOCIAL WORK (OUTPATIENT)
Dept: BEHAVIORAL/MENTAL HEALTH CLINIC | Facility: CLINIC | Age: 43
End: 2023-03-08

## 2023-03-08 ENCOUNTER — HOSPITAL ENCOUNTER (OUTPATIENT)
Dept: RADIOLOGY | Facility: HOSPITAL | Age: 43
Discharge: HOME | End: 2023-03-08

## 2023-03-08 DIAGNOSIS — F41.1 GENERALIZED ANXIETY DISORDER: ICD-10-CM

## 2023-03-08 DIAGNOSIS — R05.9 COUGH, UNSPECIFIED TYPE: ICD-10-CM

## 2023-03-08 DIAGNOSIS — F33.1 MODERATE RECURRENT MAJOR DEPRESSION (HCC): Primary | ICD-10-CM

## 2023-03-08 NOTE — PSYCH
This note was not shared with the patient due to this is a psychotherapy note      Behavioral Health Psychotherapy Progress Note    Psychotherapy Provided: Individual Psychotherapy     1  Moderate recurrent major depression ([address] )        2  Generalized anxiety disorder            Goals addressed in session: Goal 1     DATA: Met with Jannette Bhatia's surgery has been pushed back to April due to physical illness  She is okay with this, wanting to be overall healthy prior to anesthesia  Jannette expressed her thoughts and feelings about her views of self  She shared several incidents that occurred in which she struggled with negative self talk, self-blame  Jannette was encouraged to look at thoughts, actions, behaviors, and situations from different perspectives to be more fair to herself  Jannette's upcoming surgery has been triggering anxiety-induced unhelpful thoughts including fortune telling, catastrophizing  When this occurrs, Victor Manuel Lyle says when this occurs she lays in bed and cries  She focuses on specific dates, ages with feelings of dread that she will not live past them  She is able to use WISE mind; however, identifies "convincing herself" it is true as a barrier  It was suggested she also write down the WISE mind thoughts, positive self talk, read out loud  She is agreeable to this  Jannette is using the [employer]! renee for accountability with self care- she does report often writing negatives daily instead of positive/fair things from the day  We discussed how this fuels depression/anxiety  Encouraged practice of more gratitude/fair thoughts in renee  Jannette says she is using affirmation renee; however, just flipping through the ones she doesn't believe  We also discussed how this is not benefiting her emotional wellness  She verbalizes understanding  Her treatment plan update was due today  We discussed her progress toward goals along with any barriers    Some positive progress as Jannette has begun to set limits with herself and others for her emotional wellness  She also is able to identify several positive traits about herself, progress from the past    She identifies a barrier toward ongoing progress at this time being avoidance of issues  Jannette identifies herself in the contemplation stage of change as we discussed this today in session more in depth  We discussed her areas of need moving forward in therapy and revisions were made to her tx plan as appropriate  During this session, this clinician used the following therapeutic modalities: Engagement Strategies, Cognitive Behavioral Therapy, Dialectical Behavior Therapy, Mindfulness-based Strategies and Supportive Psychotherapy    Substance Abuse was not addressed during this session  If the client is diagnosed with a co-occurring substance use disorder, please indicate any changes in the frequency or amount of use: na  Stage of change for addressing substance use diagnoses: No substance use/Not applicable    ASSESSMENT:  Jannette Baugh presents with a Anxious and Depressed mood  Jannette struggles with unhelpful thoughts triggering depression and anxiety symptoms  her affect is Constricted which is congruent, with her mood and the content of the session  The client has made some progress on their goals  Jannette Baugh presents with a none risk of suicide, none risk of self-harm, and none risk of harm to others  For any risk assessment that surpasses a "low" rating, a safety plan must be developed  A safety plan was indicated: no  If yes, describe in detail na    PLAN: Between sessions, Jannette Baugh will practice CBT tools from session today, continue to attend DBT Skills Group  At the next session, the therapist will use Cognitive Behavioral Therapy, Dialectical Behavior Therapy, Mindfulness-based Strategies and Supportive Psychotherapy to address view of self, unhelpful thoughts      Behavioral Health Treatment Plan and Discharge Planning: Darryle Ny is aware of and agrees to continue to work on their treatment plan  They have identified and are working toward their discharge goals   yes    Visit start and stop times:    03/08/23  Start Time: 1000  Stop Time: 1052  Total Visit Time: 52 minutes

## 2023-03-08 NOTE — BH TREATMENT PLAN
Outpatient Behavioral Health Psychotherapy Treatment Plan    Jannette Kimbleraghu  1980     Date of Initial Psychotherapy Assessment: 1/22/21  Date of Current Treatment Plan: 03/08/23  Treatment Plan Target Date: 8/8/23  Treatment Plan Expiration Date: 8/8/23      Diagnosis:   1  Moderate recurrent major depression ([facility] [address] )        2  Generalized anxiety disorder            Area(s) of Need: improve self esteem    Long Term Goal 1 (in the client's own words): learn to like myself, prioritize self    Stage of Change: Contemplation    Target Date for completion: 8/8/23     Anticipated therapeutic modalities: Cognitive Behavioral Therapy, Client Centered Approach, Dialectical Behavioral Therapy, Cognitive Processing Therapy, Supportive Therapy, Mindfulness Strategies     People identified to complete this goal: Jannette Baugh and Zita Goff      Objective 1: (identify the means of measuring success in meeting the objective):   1 1  Jannette will demonstrate openness to engage in therapy as evidenced by regular attendance and communication of her thoughts and feelings  1 2  Jannette will identify 5 strengths about herself  1 3  Jannette will continue to be able to identify cognitive distortions as they arise, continue to use and develop cognitive coping mechanisms to dismantle unhelpful thoughts as they arise  1 4  Jannette will continue to use and develop positive self talk, share successes and challenges  1 5  Jannette will continue to practice setting limits with herself and others  1 6  Jannette will decrease overcompensating with finding validation through others, focusing on others instead of herself as unhealthy coping mechanisms  1 7  Jannette will continue to utilize coping strategies to separate responsibility to self vs responsibility to/of others  Share successes and challenges      Long Term Goal 2 (in the client's own words): learn to better manage my emotions    Stage of Change: Contemplation    Target Date for completion: 8/2/23     Anticipated therapeutic modalities: Dialectical Behavioral Therapy, Mindfulness Strategies, Cognitive Behavioral Therapy, Supportive Therapy     People identified to complete this goal: Jannette Baugh and Zita Denver      Objective 1: (identify the means of measuring success in meeting the objective):   1 1  Jannette will demonstrate openness to engage in therapy as evidenced by regular attendance and communication of her thoughts and feelings  1 2  Jannette will continue to use and develop DBT tools to learn how to better manage her emotions, improve interpersonal skills, improve frustration tolerance  1 3  Jannette will attend DBT Skills Group weekly  1 4  Jannette will continue to use and develop calming/relaxations tools to decrease emotional reactivity  1 5  Jannette will continue to take medications as they arise, share questions and concerns as they arise  I am currently under the care of a [facility] psychiatric provider: yes    My [facility] psychiatric provider is: Angelita Hardwick PA-C    I am currently taking psychiatric medications: Yes, as prescribed    I feel that I will be ready for discharge from mental health care when I reach the following (measurable goal/objective): to be able to look in the mirror and not constantly criticize myself    For children and adults who have a legal guardian:   Has there been any change to custody orders and/or guardianship status? NA  If yes, attach updated documentation  No Crisis Plan developed  Behavioral Health Treatment Plan [facility]: Diagnosis and Treatment Plan explained to [address]  [facility]  acknowledges an understanding of their diagnosis  Jannette Baugh agrees to this treatment plan      I have been offered a copy of this Treatment Plan  yes

## 2023-03-09 ENCOUNTER — OFFICE VISIT (OUTPATIENT)
Dept: FAMILY MEDICINE CLINIC | Facility: CLINIC | Age: 43
End: 2023-03-09

## 2023-03-09 ENCOUNTER — OFFICE VISIT (OUTPATIENT)
Dept: BEHAVIORAL/MENTAL HEALTH CLINIC | Facility: CLINIC | Age: 43
End: 2023-03-09

## 2023-03-09 VITALS
WEIGHT: 293 LBS | OXYGEN SATURATION: 100 % | SYSTOLIC BLOOD PRESSURE: 127 MMHG | TEMPERATURE: 97.7 F | BODY MASS INDEX: 45.99 KG/M2 | DIASTOLIC BLOOD PRESSURE: 84 MMHG | HEIGHT: 67 IN | HEART RATE: 90 BPM

## 2023-03-09 DIAGNOSIS — F41.1 GENERALIZED ANXIETY DISORDER: ICD-10-CM

## 2023-03-09 DIAGNOSIS — J45.20 MILD INTERMITTENT ASTHMA, UNSPECIFIED WHETHER COMPLICATED: ICD-10-CM

## 2023-03-09 DIAGNOSIS — Z01.810 PREOP CARDIOVASCULAR EXAM: ICD-10-CM

## 2023-03-09 DIAGNOSIS — M50.323 OTHER CERVICAL DISC DEGENERATION AT C6-C7 LEVEL: ICD-10-CM

## 2023-03-09 DIAGNOSIS — I10 PRIMARY HYPERTENSION: ICD-10-CM

## 2023-03-09 DIAGNOSIS — J06.9 VIRAL UPPER RESPIRATORY TRACT INFECTION: Primary | ICD-10-CM

## 2023-03-09 DIAGNOSIS — F33.1 MODERATE RECURRENT MAJOR DEPRESSION (HCC): Primary | ICD-10-CM

## 2023-03-09 NOTE — PSYCH
This note was not shared with the patient due to this is a psychotherapy note      Behavioral Health Psychotherapy Group Progress Note    Psychotherapy Provided: Group Therapy    1  Moderate recurrent major depression ([address] )        2  Generalized anxiety disorder            Goals addressed in session: Goal 1 and Goal 2     Group Name: DBT Skills Group    Topic(s) covered: Distress Tolerance     Skill(s) covered: Self Soothing    Group summary:    Session began reviewing DBT diaries and use of skills  Session focused on learning and practicing self soothing for distress tolerance  We discussed barriers to self soothe skills including judgements about self, judgements about deserving, and environmental factors  We then went through the 5 senses, discussing ways we can self soothe  She was encouraged to make a self soothe box  Prior to group ending, Monica Sutton participated in as safe place visualization guided by this clinician  Data: Jannette attended today's group  She shared one of her own barriers toward self soothing is judgements about herself  She felt comfortable enough to mention some of her struggles with the view of herself  She became tearful after the exercise, safe place visualization  She shared this was an exercise she did in the past and forgot  She found it very beneficial and plans to restart  Substance Abuse was not addressed during this session  If the client is diagnosed with a co-occurring substance use disorder, please indicate any changes in the frequency or amount of use: na  Stage of change for addressing substance use diagnoses: No substance use/Not applicable    ASSESSMENT:  Jannette appeared  with a Anxious and Depressed mood  Her affect is Normal range and intensity, which is congruent, with his mood and the content of the session  She appeared to actively participated in the group and interacted appropriately with and was supportive of the other group members       Jannette Baugh presents with a nonerisk of suicide,nonerisk of self-harm, and none risk of harm to others  For any risk assessment that surpasses a "low" rating, a safety plan must be developed  A safety plan was indicated: no  If yes, describe in detail na    PLAN: Jannette will practice self soothing, complete DBT diary for accountability of self with skills  The next group is scheduled for 3/16/23 and will cover the topic of Committing to [employer]  Behavioral Health Treatment Plan and Discharge Planning: Jennifer Tillman is aware of and agrees to continue to work on their treatment plan  They have identified and are working toward their discharge goals   yes    Visit start and stop times:    03/09/23  Start Time: 1100  Stop Time: 1200  Total Visit Time: 60 minutes

## 2023-03-10 NOTE — PROGRESS NOTES
Name: Beau Storey      : 1980      MRN: [id number]  Encounter Provider: Vicki Giordano MD  Encounter Date: 3/9/2023   Encounter department: [address] [place] [address]     1  Viral upper respiratory tract infection    2  Other cervical disc degeneration at C6-C7 level    3  Primary hypertension    4  Preop cardiovascular exam    5  Mild intermittent asthma, unspecified whether complicated    Chest x-ray was negative  Symptoms are improving  May continue with the planned surgery for cervical degenerative disease  Its been pushed back to a month  She will contact Ortho office and see if they need new paperwork signed which they will fax over  She is clear for surgery  See previous note for preop clearance  Blood pressures controlled  Continue current treatment plan  Lungs clear to auscultation  No wheezing  Asthma stable  Continue albuterol as needed  Subjective      Presents office for follow-up  Initially saw patient a few weeks ago for preop clearance however she developed a URI since then and her surgical team wanted her to follow-up with PCP  We get a chest x-ray which was clear  She is feeling better today  Still has some lingering symptoms  No fevers  Her surgery was pushed back until   Review of Systems   Constitutional: Negative for chills and fever  HENT: Negative for ear pain and sore throat  Eyes: Negative for pain and visual disturbance  Respiratory: Negative for cough and shortness of breath  Cardiovascular: Negative for chest pain and palpitations  Gastrointestinal: Negative for abdominal pain and vomiting  Genitourinary: Negative for dysuria and hematuria  Musculoskeletal: Negative for arthralgias and back pain  Skin: Negative for color change and rash  Neurological: Negative for seizures and syncope  All other systems reviewed and are negative        Current Outpatient Medications on File Prior to Visit   Medication Sig   • albuterol (PROVENTIL HFA,VENTOLIN HFA) 90 mcg/act inhaler Inhale 2 puffs every 6 (six) hours as needed for wheezing or shortness of breath (cough)   • amitriptyline (ELAVIL) 75 mg tablet Take 1 tablet (75 mg total) by mouth daily at bedtime   • Ascorbic Acid (VITAMIN C) 500 MG CAPS Take 1 capsule by mouth daily   • aspirin 81 mg chewable tablet Chew 1 tablet (81 mg total) daily   • atorvastatin (LIPITOR) 40 mg tablet TAKE 1 TABLET BY MOUTH ONCE DAILY IN THE MORNING   • cetirizine (ZyrTEC) 10 mg tablet Take 10 mg by mouth daily   • Cholecalciferol (VITAMIN D3) 5000 units CAPS Take 1 capsule by mouth daily     • ciprofloxacin-dexamethasone (CIPRODEX) otic suspension Administer 4 drops to the right ear in the morning and 4 drops in the evening     • cyanocobalamin (VITAMIN B-12) 1,000 mcg tablet Take 1,000 mcg by mouth daily    • GNP Esomeprazole Magnesium 20 MG capsule TAKE 1 CAPSULE BY MOUTH ONCE DAILY IN THE MORNING   • losartan (COZAAR) 100 MG tablet TAKE 1 TABLET BY MOUTH ONCE DAILY   • metoprolol succinate (TOPROL-XL) 100 mg 24 hr tablet TAKE 1 TABLET BY MOUTH ONCE DAILY   • Multiple Vitamins-Minerals (CENTRUM ADULTS PO) Take 1 tablet by mouth daily   • neomycin-polymyxin-hydrocortisone (CORTISPORIN) otic solution Administer 4 drops into the left ear 3 (three) times a day for 10 days   • nystatin (MYCOSTATIN) cream Apply topically 2 (two) times a day as needed (rash)   • Omega-3 Fatty Acids (FISH OIL ADULT GUMMIES PO) Take by mouth   • venlafaxine (EFFEXOR-XR) 150 mg 24 hr capsule Take 1 capsule (150 mg total) by mouth daily With 75 mg   • venlafaxine (EFFEXOR-XR) 75 mg 24 hr capsule Take 1 capsule (75 mg total) by mouth daily With 150 mg   • [] ofloxacin (FLOXIN) 0 3 % otic solution Administer 10 drops into the left ear daily for 5 days (Patient not taking: Reported on 3/9/2023)       Objective     /84 (BP Location: Left arm, Patient Position: Sitting, Cuff Size: Standard) Pulse 90   Temp 97 7 °F (36 5 °C) (Tympanic)   Ht 5' 7" (1 702 m)   Wt (!) 137 kg (301 lb 6 4 oz)   SpO2 100%   BMI 47 21 kg/m²     Physical Exam  Vitals and nursing note reviewed  Constitutional:       General: She is not in acute distress  Appearance: Normal appearance  She is not ill-appearing, toxic-appearing or diaphoretic  HENT:      Right Ear: Tympanic membrane, ear canal and external ear normal       Left Ear: Tympanic membrane, ear canal and external ear normal    Eyes:      General:         Right eye: No discharge  Left eye: No discharge  Extraocular Movements: Extraocular movements intact  Conjunctiva/sclera: Conjunctivae normal    Cardiovascular:      Rate and Rhythm: Normal rate and regular rhythm  Pulses: Normal pulses  Heart sounds: Normal heart sounds  Pulmonary:      Effort: Pulmonary effort is normal       Breath sounds: Normal breath sounds  Musculoskeletal:      Cervical back: Normal range of motion and neck supple  Neurological:      Mental Status: She is alert and oriented to person, place, and time  Psychiatric:         Mood and Affect: Mood normal          Behavior: Behavior normal          Thought Content:  Thought content normal          Judgment: Judgment normal        Joanne Pickens MD

## 2023-03-16 ENCOUNTER — OFFICE VISIT (OUTPATIENT)
Dept: BEHAVIORAL/MENTAL HEALTH CLINIC | Facility: CLINIC | Age: 43
End: 2023-03-16

## 2023-03-16 DIAGNOSIS — F33.1 MODERATE RECURRENT MAJOR DEPRESSION (HCC): Primary | ICD-10-CM

## 2023-03-16 DIAGNOSIS — F41.1 GENERALIZED ANXIETY DISORDER: ICD-10-CM

## 2023-03-16 NOTE — PSYCH
This note was not shared with the patient due to this is a psychotherapy note      Behavioral Health Psychotherapy Group Progress Note    Psychotherapy Provided: Group Therapy    1  Moderate recurrent major depression ([address] )        2  Generalized anxiety disorder            Goals addressed in session: Goal 1 and Goal 2     Group Name: DBT Skills Group    Topic(s) covered: Distress Tolerance Skills    Skill(s) covered: Values, Cognitive Rehearsal    Group summary:  We began session with the guided imagery, Candle Wax  We then reviewed individual DBT diary’s and use of skills learned thus far  Today’s session focused on rediscovering your values and expressing them through your behaviors  Jannette completed a Valued Living Questionnaire, encouraging reflection on importance of each component ideally vs actually  The goal of the exercise was to consider working on those components with the largest spread first in order to help shift your life toward doing more of what you value  She then completed a Committed Action plan formulating intentions and committed actions based on her values  The goal of the exercises are to increase healthy decision making/effective actions for oneself  We discussed implementation through Cognitive Rehearsal of Values-Based Behavior  Data: Jannette attended today's group  She shared her successes and challenges of her implementation of skills  She identifies herself as her own barrier toward progress due to avoidance, not meeting her own needs  Jannette participated in the group exercises today  She shared values she wishes to strengthen including self care, romantic relationships  She struggled to identify intentions to committed action, was receptive toward feedback from peers  Substance Abuse was not addressed during this session   If the client is diagnosed with a co-occurring substance use disorder, please indicate any changes in the frequency or amount of use: na  Stage of change for addressing substance use diagnoses: No substance use/Not applicable    ASSESSMENT:  Jannette appeared  with a Anxious and Depressed mood  Her affect is Normal range and intensity, which is congruent, with his mood and the content of the session  She appeared to actively participated in the group and interacted appropriately with and was supportive of the other group members  Jannette Baugh presents with a nonerisk of suicide,nonerisk of self-harm, and none risk of harm to others  For any risk assessment that surpasses a "low" rating, a safety plan must be developed  A safety plan was indicated: no  If yes, describe in detail na    PLAN: Kaylyn Camacho will continue to use and develop DBT tools; use DBT diary for accountability and awareness  The next group is scheduled for 3/23/23 and will cover the topic of distress tolerance skill- radical acceptance  Behavioral Health Treatment Plan and Discharge Planning: David Chang is aware of and agrees to continue to work on their treatment plan  They have identified and are working toward their discharge goals   yes    Visit start and stop times:    03/16/23  Start Time: 1100  Stop Time: 1200  Total Visit Time: 60 minutes

## 2023-03-22 ENCOUNTER — SOCIAL WORK (OUTPATIENT)
Dept: BEHAVIORAL/MENTAL HEALTH CLINIC | Facility: CLINIC | Age: 43
End: 2023-03-22

## 2023-03-22 DIAGNOSIS — F33.1 MODERATE RECURRENT MAJOR DEPRESSION (HCC): Primary | ICD-10-CM

## 2023-03-22 DIAGNOSIS — F41.1 GENERALIZED ANXIETY DISORDER: ICD-10-CM

## 2023-03-22 NOTE — PSYCH
This note was not shared with the patient due to this is a psychotherapy note      Behavioral Health Psychotherapy Progress Note    Psychotherapy Provided: Individual Psychotherapy     1  Moderate recurrent major depression ([address] )        2  Generalized anxiety disorder            Goals addressed in session: Goal 1     DATA: Met with Jannette Bhatia has been stressed with her upcoming orthopedics surgery scheduled for 4/17/23  The pain in her shoulder has worsened, effecting her functioning and overall mood  She shared how she found last group session's skill on values and committed action difficult for herself  She identifies avoidance as a barrier  Today, we focused on creating an exposure hierarchy to begin taking action toward meeting goals, decrease level of avoidance  Jannette identified social situations as a trigger to her anxiety and she identified several specific anxiety-provoking situations and rated them using the SUDS scale-- 1) dealing with the girl  parents SUDS 40   2) going to [employer] family's house SUDS 50  3) going to the leader meetings  SUDS 70   4) answering phone calls SUDS 80   5) how I look out in public  SUDS 783  We focused on the the situation that caused the least level of distress-- dealing with the girl  parents  She expressed her worries and anxieties and she broke activities with the parents that cause distress- creating SMART goals for exposure, lessen distress tolerance  Jannette will create a limit with the parents of the girl  troop- setting aside specific dates/times during the week she will answer phone calls/texts/emails  Write in newsletter to report to parents  Current SUDS score 40  Kalli Davis feels this will begin to decrease her anxiety, discomfort with the situation         During this session, this clinician used the following therapeutic modalities: Dialectical Behavior Therapy and exposure therapy    Substance Abuse was not addressed during this session  If the client is diagnosed with a co-occurring substance use disorder, please indicate any changes in the frequency or amount of use: na  Stage of change for addressing substance use diagnoses: No substance use/Not applicable    ASSESSMENT:  Jannette Baugh presents with a Anxious and Depressed mood  Jannette struggles with unhelpful thoughts triggering depression and anxiety symptoms  her affect is Normal range and intensity, which is congruent, with her mood and the content of the session  The client has made some progress on their goals  Jannette Baugh presents with a none risk of suicide, none risk of self-harm, and none risk of harm to others  For any risk assessment that surpasses a "low" rating, a safety plan must be developed  A safety plan was indicated: no  If yes, describe in detail na    PLAN: Between sessions, Yessica Pino will create a limit with the parents of the girl  troop- setting aside specific dates/times during the week she will answer phone calls/texts/emails  Write in newsletter to report to parents- bring to next session to show therapist  At the next session, the therapist will use Cognitive Behavioral Therapy, Dialectical Behavior Therapy and exposure therapy to address avoidance  Behavioral Health Treatment Plan and Discharge Planning: Yessica Pino is aware of and agrees to continue to work on their treatment plan  They have identified and are working toward their discharge goals   yes    Visit start and stop times:    03/22/23  Start Time: 118 [facility]  Stop Time: 4432  Total Visit Time: 45 minutes

## 2023-03-23 ENCOUNTER — OFFICE VISIT (OUTPATIENT)
Dept: BEHAVIORAL/MENTAL HEALTH CLINIC | Facility: CLINIC | Age: 43
End: 2023-03-23

## 2023-03-23 DIAGNOSIS — F33.1 MODERATE RECURRENT MAJOR DEPRESSION (HCC): Primary | ICD-10-CM

## 2023-03-23 DIAGNOSIS — F41.1 GENERALIZED ANXIETY DISORDER: ICD-10-CM

## 2023-03-23 NOTE — PSYCH
This note was not shared with the patient due to this is a psychotherapy note      Behavioral Health Psychotherapy Group Progress Note    Psychotherapy Provided: Group Therapy    1  Moderate recurrent major depression ([address] )        2  Generalized anxiety disorder            Goals addressed in session: Goal 2     Group Name: DBT Skills Group    Topic(s) covered: Mindfulness    Skill(s) covered: WISE mind    Group summary:  Psychotherapy group focused on learning and implementing DBT skills  Session began by reviewing diary card and implementation of skills  Jannette was educated on what mindfulness is and the benefits of mindfulness  She was introduced to WISE mind  We focused on the [facility] skill of WISE mind  She completed exercises to help observe our thoughts and emotions through 1) single minute meditation  2) object meditation  3) Cue Relaxation  3) Thought Diffusion  Data: Jannette attended today's group  She shared her successes and challenges of skills thus far, verbalizing that she is trying to decrease her avoidance  She shared that she often responds with emotional mind  She participated in the mindfulness exercises  She demonstrated understanding of topic today  Substance Abuse was not addressed during this session  If the client is diagnosed with a co-occurring substance use disorder, please indicate any changes in the frequency or amount of use: na  Stage of change for addressing substance use diagnoses: No substance use/Not applicable    ASSESSMENT:  Jannette appeared  with a slightly anxious mood  Her affect is Normal range and intensity, which is congruent, with his mood and the content of the session  She appeared to actively participated in the group and interacted appropriately with and was supportive of the other group members  Jannette Baugh presents with a nonerisk of suicide,nonerisk of self-harm, and none risk of harm to others      For any risk assessment that surpasses a "low" rating, a safety plan must be developed  A safety plan was indicated: no  If yes, describe in detail na    PLAN: Jannette will practice mindfulness exercises, complete DBT diary for ongoing accountability and support  The next group is scheduled for 3/30/23 and will cover the topic of Mindfulness  Behavioral Health Treatment Plan and Discharge Planning: Brandenreece Liam is aware of and agrees to continue to work on their treatment plan  They have identified and are working toward their discharge goals   yes    Visit start and stop times:    03/23/23  Start Time: 1100  Stop Time: 1158  Total Visit Time: 58 minutes

## 2023-03-30 ENCOUNTER — APPOINTMENT (OUTPATIENT)
Dept: LAB | Facility: HOSPITAL | Age: 43
End: 2023-03-30

## 2023-03-30 ENCOUNTER — OFFICE VISIT (OUTPATIENT)
Dept: BEHAVIORAL/MENTAL HEALTH CLINIC | Facility: CLINIC | Age: 43
End: 2023-03-30

## 2023-03-30 DIAGNOSIS — Z01.818 ENCOUNTER FOR OTHER PREPROCEDURAL EXAMINATION: ICD-10-CM

## 2023-03-30 DIAGNOSIS — F41.1 GENERALIZED ANXIETY DISORDER: ICD-10-CM

## 2023-03-30 DIAGNOSIS — F33.1 MODERATE RECURRENT MAJOR DEPRESSION (HCC): Primary | ICD-10-CM

## 2023-03-30 LAB
ALBUMIN SERPL BCP-MCNC: 4.5 G/DL (ref 3.5–5)
ALP SERPL-CCNC: 58 U/L (ref 34–104)
ALT SERPL W P-5'-P-CCNC: 61 U/L (ref 7–52)
ANION GAP SERPL CALCULATED.3IONS-SCNC: 9 MMOL/L (ref 4–13)
AST SERPL W P-5'-P-CCNC: 34 U/L (ref 13–39)
BASOPHILS # BLD AUTO: 0.04 THOUSANDS/ÂΜL (ref 0–0.1)
BASOPHILS NFR BLD AUTO: 1 % (ref 0–1)
BILIRUB SERPL-MCNC: 0.59 MG/DL (ref 0.2–1)
BUN SERPL-MCNC: 13 MG/DL (ref 5–25)
CALCIUM SERPL-MCNC: 9.4 MG/DL (ref 8.4–10.2)
CHLORIDE SERPL-SCNC: 102 MMOL/L (ref 96–108)
CO2 SERPL-SCNC: 29 MMOL/L (ref 21–32)
CREAT SERPL-MCNC: 0.59 MG/DL (ref 0.6–1.3)
EOSINOPHIL # BLD AUTO: 0.17 THOUSAND/ÂΜL (ref 0–0.61)
EOSINOPHIL NFR BLD AUTO: 2 % (ref 0–6)
ERYTHROCYTE [DISTWIDTH] IN BLOOD BY AUTOMATED COUNT: 13.4 % (ref 11.6–15.1)
GFR SERPL CREATININE-BSD FRML MDRD: 112 ML/MIN/1.73SQ M
GLUCOSE P FAST SERPL-MCNC: 114 MG/DL (ref 65–99)
HCT VFR BLD AUTO: 42.4 % (ref 34.8–46.1)
HGB BLD-MCNC: 14.1 G/DL (ref 11.5–15.4)
IMM GRANULOCYTES # BLD AUTO: 0.02 THOUSAND/UL (ref 0–0.2)
IMM GRANULOCYTES NFR BLD AUTO: 0 % (ref 0–2)
LYMPHOCYTES # BLD AUTO: 1.63 THOUSANDS/ÂΜL (ref 0.6–4.47)
LYMPHOCYTES NFR BLD AUTO: 22 % (ref 14–44)
MCH RBC QN AUTO: 30.5 PG (ref 26.8–34.3)
MCHC RBC AUTO-ENTMCNC: 33.3 G/DL (ref 31.4–37.4)
MCV RBC AUTO: 92 FL (ref 82–98)
MONOCYTES # BLD AUTO: 0.54 THOUSAND/ÂΜL (ref 0.17–1.22)
MONOCYTES NFR BLD AUTO: 7 % (ref 4–12)
NEUTROPHILS # BLD AUTO: 5.09 THOUSANDS/ÂΜL (ref 1.85–7.62)
NEUTS SEG NFR BLD AUTO: 68 % (ref 43–75)
NRBC BLD AUTO-RTO: 0 /100 WBCS
PLATELET # BLD AUTO: 165 THOUSANDS/UL (ref 149–390)
PMV BLD AUTO: 10.7 FL (ref 8.9–12.7)
POTASSIUM SERPL-SCNC: 3.9 MMOL/L (ref 3.5–5.3)
PROT SERPL-MCNC: 6.7 G/DL (ref 6.4–8.4)
RBC # BLD AUTO: 4.62 MILLION/UL (ref 3.81–5.12)
SODIUM SERPL-SCNC: 140 MMOL/L (ref 135–147)
WBC # BLD AUTO: 7.49 THOUSAND/UL (ref 4.31–10.16)

## 2023-03-30 NOTE — PSYCH
This note was not shared with the patient due to this is a psychotherapy note      Behavioral Health Psychotherapy Group Progress Note    Psychotherapy Provided: Individual Psychotherapy     1  Moderate recurrent major depression ([address] )        2  Generalized anxiety disorder            Goals addressed in session: Goal 1 and Goal 2     Group Name: DBT Skills Group    Topic(s) covered: Mindfulness    Skill(s) covered: WISE mind- How Skill    Group summary:    Psychotherapy group focused on learning and implementing DBT skills  Session began by reviewing diary card and implementation of skills learned thus far  Today’s session focused on the [facility] skill of WISE mind--decreasing judgements and labels and being more compassionate to ourselves and others  Jannette completed an exercise on negative judgements with goal to gain improved awareness of how her thoughts affect her mood and behaviors  Introduced to [employer] (similar to thought defusion)  She was also introduced to the tool, putting thoughts on trial to decrease unhelpful thoughts, be more fair to self, others, and the world  We discussed the importance of compassion with ourselves and others  Group ended with Self Compassion meditation  She will continue to attend DBT skills group, implement skills, share successes and challenges at next session  Data: Jannette attended today's group  She says that she is practicing skills learned thus far, reporting minimal relief of anxiety symptoms  She struggles with unhelpful thoughts about herself  We discussed this in depth today with the topic of judgements  She shared judgements she makes about herself and how it increases her anxiety  She participated in the Self Compassion meditation which included affirmations, Jannette shared she often rolls her eyes and don't believe them  Substance Abuse was not addressed during this session   If the client is diagnosed with a co-occurring substance use "disorder, please indicate any changes in the frequency or amount of use: na  Stage of change for addressing substance use diagnoses: No substance use/Not applicable    ASSESSMENT:  Jannette appeared  with a Euthymic/ normal mood  Her affect is Normal range and intensity and Constricted, which is congruent, with his mood and the content of the session  She appeared to actively participated in the group and interacted appropriately with and was supportive of the other group members  Jannette Baugh presents with a nonerisk of suicide,nonerisk of self-harm, and none risk of harm to others  For any risk assessment that surpasses a \"low\" rating, a safety plan must be developed  A safety plan was indicated: no  If yes, describe in detail na    PLAN: Norma Saunders will continue to use and develop DBT tools, complete DBT diary for ongoing awareness and accountability  The next group is scheduled for 4/6/23 and will cover the topic of Radical Acceptance  Behavioral Health Treatment Plan and Discharge Planning: Tamica Kruger is aware of and agrees to continue to work on their treatment plan  They have identified and are working toward their discharge goals   yes    Visit start and stop times:    03/30/23  Start Time: 1100  Stop Time: 1200  Total Visit Time: 60 minutes    "

## 2023-04-05 ENCOUNTER — SOCIAL WORK (OUTPATIENT)
Dept: BEHAVIORAL/MENTAL HEALTH CLINIC | Facility: CLINIC | Age: 43
End: 2023-04-05

## 2023-04-05 DIAGNOSIS — F41.1 GENERALIZED ANXIETY DISORDER: ICD-10-CM

## 2023-04-05 DIAGNOSIS — F33.1 MODERATE RECURRENT MAJOR DEPRESSION (HCC): Primary | ICD-10-CM

## 2023-04-05 NOTE — PSYCH
This note was not shared with the patient due to this is a psychotherapy note      Behavioral Health Psychotherapy Progress Note    Psychotherapy Provided: Individual Psychotherapy     1  Moderate recurrent major depression ([address] )        2  Generalized anxiety disorder            Goals addressed in session: Goal 1     DATA:     Met with Jannette  Since last session, Brannon Stewart has been working on setting limits with the girl  parents to decrease her SUDS score of 40  She set aside specific dates/times during the week to answer phone call/texts/emails  She says that she is abiding by the limits she set for herself and also spoke to a parent about their poor boundaries  Jannette states that speaking to the parent did provide her some emotional relief; however, she continues to rate her SUDS score as a 40  She feels by continuing limits and boundaries with herself and the girl  parents that her SUDS score will begin to decrease  Will continue to work toward decreasing this SUDS score before moving up on exposure hierarchy scale  We did discuss how 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 that has triggered symptoms of depression and anxiety  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  Jannette says that girl scouts makes her feel like good leader and she feels her past trauma helps her be the best she can be with the girls  We discussed responsibility to self vs responsibility to/from others, limit setting  Jannette stated multiple times in session today that she knows what she needs to do for wellness as she tells others to do these things, she just doesn't do them herself  We discussed beginning a daily gratitude list of her physical and emotional strengths to improve self esteem  Jannette admits to 'ignoring' prompts in self care renee with gratitude  Discussed how CBT & DBT involves daily practice outside of session with tools from group and individual sessions    We "discussed this as a barrier toward progress, encouraging Jannette to look at her motivation toward change and willingness to prioritize self for progress  During this session, this clinician used the following therapeutic modalities: Engagement Strategies, Cognitive Behavioral Therapy, Dialectical Behavior Therapy and Supportive Psychotherapy    Substance Abuse was not addressed during this session  If the client is diagnosed with a co-occurring substance use disorder, please indicate any changes in the frequency or amount of use: na  Stage of change for addressing substance use diagnoses: No substance use/Not applicable    ASSESSMENT:  Jannette Baugh presents with a Anxious and Depressed mood  her affect is Normal range and intensity and Constricted, which is congruent, with her mood and the content of the session  The client has made progress on their goals  Jannette Baugh presents with a none risk of suicide, none risk of self-harm, and none risk of harm to others  For any risk assessment that surpasses a \"low\" rating, a safety plan must be developed  A safety plan was indicated: no  If yes, describe in detail na    PLAN: Between sessions, Roger Mcqueen will continue skills from session to work to decrease SUDS score of dealing with girl  parents  She will begin a daily gratitude list of her physical and emotional strengths, bring to next session    At the next session, the therapist will use Engagement Strategies, Cognitive Behavioral Therapy and Dialectical Behavior Therapy to address decreasing anxiety  Behavioral Health Treatment Plan and Discharge Planning: Roger Mcqueen is aware of and agrees to continue to work on their treatment plan  They have identified and are working toward their discharge goals   yes    Visit start and stop times:    04/05/23  Start Time: 1000  Stop Time: 1045  Total Visit Time: 45 minutes  "

## 2023-04-20 NOTE — PSYCH
Virtual Regular Visit    Problem List Items Addressed This Visit        Other    Depression with anxiety - Primary              Reason for visit is Depression    Encounter provider Betsy Ryan MD    Provider located at [id number] [address] [facility] Visits  No visits were found meeting these conditions  Showing recent visits within past 7 days and meeting all other requirements     Today's Visits  Date Type Provider Dept   03/31/20 Telemedicine Betsy Ryan MD [facility]   Showing today's visits and meeting all other requirements     Future Appointments  No visits were found meeting these conditions  Showing future appointments within next 150 days and meeting all other requirements           The patient was identified by name and date of birth  Jannette Baugh was informed that this is a telemedicine visit and that the visit is being conducted through [employer]  My office door was closed  No one else was in the room  She acknowledged consent and understanding of privacy and security of the video platform  The patient has agreed to participate and understands they can discontinue the visit at any time  Patient is aware this is a billable service  Subjective  Jannette Barone is a 36 y o  female   Patient was last seen in the office for follow-up on March 17th  We have connected with the patient today for a virtual telemedicine visit  On her last visit March 17th patient continued to have some feelings of anxiety, ongoing ruminating thoughts of her daily stressors but expressed improvement of her depression  Her medications were adjusted on her last visit to increase her Effexor XR from  mg daily  Today Jannette coyne   Continues to have maintained her improvement in terms of decrease depression and is not having panic attacks  She however still states she feels anxious  She admits that she is quite anxious about the current situation of [id number] [id number]   She is concerned for her  who is a  and is frequently going into [place] for deliveries  The general state of this situation has been somewhat anxiety provoking  We discussed in view of current circumstances it is not unusual for patient to feeling anxious  Patient was to have her initial evaluation with therapist later this week however due to office visits being limited and her desire to have face-to-face visit with a therapist on the 1st evaluation she has canceled this appointment  We discussed ways in which patient can use relaxation techniques through this somewhat anxiety provoking time  Suggestion was made for 5 minutes of deep breathing 3 times a day with a positive month for a patient states she would engage in this practice until she can see her therapist   Patient reports adequate energy and motivation  Sleep and appetite are stable  There is no feelings of hopelessness or helplessness  She denies any suicidal or homicidal ideations  There is no evidence of psychosis or siva      PLAN  Patient will have no change in medications today  Patient will continue Effexor  mg daily  We will follow up with patient in 8 weeks    Past Medical History:   Diagnosis Date    Allergic rhinitis     Anxiety     Arthritis     Chronic pain disorder     BACK SHOULDERS NECK    Depression     Disease of thyroid gland     nodule    Fibromyalgia, primary     GERD (gastroesophageal reflux disease)     Hearing difficulty of right ear     Hyperlipidemia     Hypertension     Irritable bowel syndrome     Perforation of tympanic membrane     Right    PONV (postoperative nausea and vomiting)     RBBB     Right bundle branch blockage     Sleep apnea     no cpap    Wears glasses        Past Surgical History:   Procedure Laterality Date    ANAL FISTULOTOMY N/A 11/2/2018    Procedure: FISTULOTOMY;  Surgeon: Jazlyn Hernandez MD;  [facility];  Service: Colorectal    BACK SURGERY lumbar herniated disc    BREAST SURGERY Left 2006    cyst removal    CARPAL TUNNEL RELEASE       SECTION      CHOLECYSTECTOMY      COLONOSCOPY      CYST REMOVAL      DENTAL SURGERY      INCISION AND DRAINAGE OF WOUND N/A 2018    Procedure: INCISION AND DRAINAGE (I&D) BUTTOCK;  Surgeon: Jazlyn Hernandez MD;  [facility];  Service: Colorectal    AL COLONOSCOPY FLX DX W/COLLJ SPEC WHEN PFRMD N/A 2017    Procedure: COLONOSCOPY;  Surgeon: Bertha Mae MD;  [facility]; Service: Gastroenterology    AL INCISE FINGER TENDON SHEATH Right 3/13/2018    Procedure: LONG TRIGGER FINGER RELEASE;  Surgeon: Edwar Yañez DO;  [facility];  Service: Orthopedics    AL SURG DIAGNOSTIC EXAM, ANORECTAL N/A 2018    Procedure: EXAM UNDER ANESTHESIA (EUA); Surgeon: Jazlyn Hernandez MD;  [facility];  Service: Colorectal    AL TYMPANOPLASTY Right 2017    Procedure: TYMPANOPLASTY WITH PERICHONDRIN GRAFT;  Surgeon:  Sue Leal DO;  [facility];  Service: ENT    SHOULDER SURGERY Right     WISDOM TOOTH EXTRACTION         Current Outpatient Medications   Medication Sig Dispense Refill    albuterol (PROVENTIL HFA,VENTOLIN HFA) 90 mcg/act inhaler Inhale 2 puffs every 6 (six) hours as needed for shortness of breath (cough) 1 Inhaler 0    amitriptyline (ELAVIL) 50 mg tablet Take 1 tablet (50 mg total) by mouth daily at bedtime 90 tablet 1    Ascorbic Acid (VITAMIN C) 500 MG CAPS Take 1 capsule by mouth daily      atorvastatin (LIPITOR) 40 mg tablet Take 1 tablet (40 mg total) by mouth daily 90 tablet 1    cetirizine (ZyrTEC) 10 mg tablet Take 10 mg by mouth daily      Cholecalciferol (VITAMIN D3) 5000 units CAPS Take 1 capsule by mouth daily        clotrimazole-betamethasone (LOTRISONE) 1-0 05 % cream       cyanocobalamin (VITAMIN B-12) 1,000 mcg tablet Take 3 tablets by mouth 2 (two) times a day      esomeprazole (NEXIUM 24HR) 20 mg capsule Take 1 capsule (20 mg total) by mouth every morning 30 capsule 3    medroxyPROGESTERone (DEPO-PROVERA) 150 mg/mL injection Inject 150 mg into a muscle every 3 (three) months      metoprolol succinate (TOPROL-XL) 50 mg 24 hr tablet Take 1 tablet (50 mg total) by mouth daily 90 tablet 1    Multiple Vitamins-Minerals (CENTRUM ADULTS PO) Take 1 tablet by mouth daily      venlafaxine (EFFEXOR-XR) 150 mg 24 hr capsule Take 1 capsule (150 mg total) by mouth daily 30 capsule 1     No current facility-administered medications for this visit  Allergies   Allergen Reactions    Amoxicillin Swelling, Anaphylaxis and Angioedema    Neurontin [Gabapentin] Other (See Comments)     Other reaction(s): SUICIDE IDEATION  Other reaction(s): Other (See Comments)  suicidal  suicidal    Penicillin V Angioedema and Swelling     Other reaction(s): Throat closure    Penicillins Swelling and Anaphylaxis    Aripiprazole Other (See Comments)     Other reaction(s): low dose 2 mg ; curving in and locking in of hands/dystonia  Other reaction(s): Other (See Comments)  Other reaction(s): low dose 2 mg ; curving in and locking in of hands/dystonia    Lorazepam Other (See Comments)     Other reaction(s): higher dose > anxiety and depression  Other reaction(s): Other (See Comments)  Other reaction(s): higher dose > anxiety and depression    Carbamazepine Other (See Comments)     Other reaction(s): Unknown Reaction    Doxycycline Hives    Lamotrigine Rash    Other Hives     Adhesive tape       Review of Systems  As in HPI otherwise negative    MSE  Well built female casually dressed observed through telemedicine screen  She is pleasant and cooperative in interview  Her speech is average rate and volume  She describes her mood as sort of anxious   Her affect is full fluid and appropriate to situation and content  Speech is average rate and volume    She has intact syntax and grammar, language are normal   She is logical and goal oriented in thought processes, no delusional thinking, no auditory or visual hallucinations  There are no suicidal or homicidal ideations  Patient is alert awake and oriented x3  Memory, concentration, fund of knowledge intact  Insight and judgment fair  I spent 25 minutes with the patient during this visit  Billing Type: Third-Party Bill

## 2023-04-26 ENCOUNTER — TELEMEDICINE (OUTPATIENT)
Dept: BEHAVIORAL/MENTAL HEALTH CLINIC | Facility: CLINIC | Age: 43
End: 2023-04-26

## 2023-04-26 DIAGNOSIS — F33.1 MODERATE RECURRENT MAJOR DEPRESSION (HCC): Primary | ICD-10-CM

## 2023-04-26 DIAGNOSIS — F41.1 GENERALIZED ANXIETY DISORDER: ICD-10-CM

## 2023-04-27 ENCOUNTER — TELEMEDICINE (OUTPATIENT)
Dept: BEHAVIORAL/MENTAL HEALTH CLINIC | Facility: CLINIC | Age: 43
End: 2023-04-27

## 2023-04-27 DIAGNOSIS — F33.9 DEPRESSION, RECURRENT (HCC): Primary | ICD-10-CM

## 2023-04-27 DIAGNOSIS — F41.1 GENERALIZED ANXIETY DISORDER: ICD-10-CM

## 2023-04-27 NOTE — PSYCH
This note was not shared with the patient due to this is a psychotherapy note      Behavioral Health Psychotherapy Group Progress Note    Psychotherapy Provided: Group Therapy    1  Depression, recurrent ([address] )        2  Generalized anxiety disorder            Goals addressed in session: Goal 1     Group Name: DBT Skills Group    Topic(s) covered: Emotional Regulation    Skill(s) covered: Recognizing Emotions    Group summary:  Psychotherapy group focused on learning and implementing DBT skills  Group began by reviewing DBT diaries  Today, we moving into the core module, emotional regulation  We discussed what emotions are, how they work, and recognizing our emotions  Jannette completed an exercise working through 6 steps to helps to slow down the emotional process---help recognize primary and secondary emotions, physical sensations, urges, and behaviors to help gain awareness and better control high-intensity emotional reactions  We also discussed saying how we feel out loud to highlight the emotion, possibly deflate the emotion, and have less urge to do something about it  Data: Jannette attended today's group  She shared her successes and challenges of her implementation of skills thus far  She participated in the 'recognizing emotions' exercise in session today, sharing how it helped her gain further awareness and insight to better manage emotions, improve frustration tolerance, choose effective action for herself  Substance Abuse was not addressed during this session  If the client is diagnosed with a co-occurring substance use disorder, please indicate any changes in the frequency or amount of use: na  Stage of change for addressing substance use diagnoses: No substance use/Not applicable    ASSESSMENT:  Jannette appeared  with a Euthymic/ normal mood  Her affect is Normal range and intensity, which is congruent, with his mood and the content of the session   She appeared to actively participated in the "group and interacted appropriately with and was supportive of the other group members  Jannette Baugh presents with a nonerisk of suicide,nonerisk of self-harm, and none risk of harm to others  For any risk assessment that surpasses a \"low\" rating, a safety plan must be developed  A safety plan was indicated: no  If yes, describe in detail na    PLAN: Janae Smith will continue to use and develop DBT tools; share successes and challenges  Continue to use DBT diary for ongoing accountability and awareness  The next group is scheduled for 6/4/23 and will cover the topic of emotional regulation  Behavioral Health Treatment Plan and Discharge Planning: Mat Crouch is aware of and agrees to continue to work on their treatment plan  They have identified and are working toward their discharge goals   yes    Visit start and stop times:    04/27/23  Start Time: 1100  Stop Time: 1153  Total Visit Time: 53 minutes    "

## 2023-05-02 NOTE — PSYCH
This note was not shared with the patient due to this is a psychotherapy note    Virtual Regular Visit    Verification of patient location:    Patient is located at Home in the following state in which I hold an active license [place]      Assessment/Plan:    Problem List Items Addressed This Visit    None      Goals addressed in session: Goal 1 and Goal 2          Reason for visit is No chief complaint on file  Encounter provider Sandra Richardson    Provider located at [address] [place] [address] [address] [facility] [address] [phone]     The patient was identified by name and date of birth  Jannette Baugh was informed that this is a telemedicine visit and that [facility]  She agrees to proceed     My office door was closed  No one else was in the room  She acknowledged consent and understanding of privacy and security of the video platform  The patient has agreed to participate and understands they can discontinue the visit at any time  Patient is aware this is a billable service  Subjective  Jannette Forrester is a 37 y o  female   Behavioral Health Psychotherapy Progress Note    Psychotherapy Provided: Individual Psychotherapy     1  Moderate recurrent major depression ([facility] [address] )        2  Generalized anxiety disorder            Goals addressed in session: Goal 1 and Goal 2     DATA: Met with Jannette Bhatia is recovering from her orthopedic surgery which is why her session is virtual today  Jannette feels she is doing fairly well  She feels the surgery has forced her to take a step back, take time for herself, set limits with herself and others, and re-evaluate how her routine has been effecting her mental health  She shared her thoughts and feelings about this in depth in session today     She shared her successes and challenges of her implementation of wellness tools-- feeling it "has been easier for her to use skills than in the past    She continues to struggle with unhelpful thoughts at times; however, is aware of this and is practicing  cognitive coping mechanisms to dismantle and be more fair to herself  Overall, Jannette feels she is doing fairly well  She verbalizes that it is important as she continues to get better physically to maintain these coping strategies for herself and her overall wellness  During this session, this clinician used the following therapeutic modalities: Client-centered Therapy, Dialectical Behavior Therapy and Supportive Psychotherapy    Substance Abuse was not addressed during this session  If the client is diagnosed with a co-occurring substance use disorder, please indicate any changes in the frequency or amount of use: na  Stage of change for addressing substance use diagnoses: No substance use/Not applicable    ASSESSMENT:  Jannette Baugh presents with a slightly anxious mood  her affect is Normal range and intensity, which is congruent, with her mood and the content of the session  The client has made progress on their goals  Jannette Baugh presents with a none risk of suicide, none risk of self-harm, and none risk of harm to others  For any risk assessment that surpasses a \"low\" rating, a safety plan must be developed  A safety plan was indicated: no  If yes, describe in detail na    PLAN: Between sessions, Sailaja Cavazos will continue to use and develop skills from session  At the next session, the therapist will use Dialectical Behavior Therapy to address emotional reactivity  Behavioral Health Treatment Plan and Discharge Planning: Sailaja Cavazos is aware of and agrees to continue to work on their treatment plan  They have identified and are working toward their discharge goals   yes    Visit start and stop times:    4/26/23  Start Time: 1500  Stop Time: 1538  Total Visit Time: 38 minutes  "

## 2023-05-11 ENCOUNTER — OFFICE VISIT (OUTPATIENT)
Dept: BEHAVIORAL/MENTAL HEALTH CLINIC | Facility: CLINIC | Age: 43
End: 2023-05-11

## 2023-05-11 DIAGNOSIS — F41.1 GENERALIZED ANXIETY DISORDER: ICD-10-CM

## 2023-05-11 DIAGNOSIS — F33.1 MODERATE RECURRENT MAJOR DEPRESSION (HCC): Primary | ICD-10-CM

## 2023-05-11 NOTE — PSYCH
This note was not shared with the patient due to this is a psychotherapy note      Psychotherapy Discharge Summary    Preferred Name: Sia Becerra  YOB: 1980    Admission date to group psychotherapy: 2/23/23    Referred by: Zita Goff LCSW    Presenting Problem:   Difficulty managing her emotions, increased anxiety, low self esteem    Course of treatment included : group counseling    Progress/Outcome of Treatment Goals (brief summary of course of treatment)   Jannette Baugh attended 9 of 12 DBT Skills Groups offered  Each group Jannette had the opportunity to learn a new DBT Skill-- 4 sessions were based on Distress Tolerance Skills, 3 sessions based on mindfulness skills, 2 sessions based on interpersonal effectiveness, and 3 sessions based on Emotional Regulation  Jannette actively participated in the majority of groups that were available to her  She connected with peers and felt comfortable enough to 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, her marriage, physical health, and views of herself  She provided support and unconditional positive regard to her peers  She shared her implementation of skills and how she has utilized STOP skill, WISE mind, mindful communication, [place], safe place meditation, and coping statements as a means to cope  Treatment Complications (if any): Jannette has surgery during the course of the DBT Skill group causing her to miss a few sessions and be virtual for 1-2 as well  Treatment Progress: good    Current [facility] Psychiatric Provider: Gillian Canada PA-C    Discharge Medications include: Effexor XR    Discharge Date: 5/11/23    Discharge Diagnosis:   1  Moderate recurrent major depression ([facility] [address] )        2   Generalized anxiety disorder            Criteria for Discharge: completed DBT Skills Group    Aftercare recommendations include (include specific referral names and phone numbers, if appropriate):   Continue medication management with Yasmani Maurer PA-C  Continue OP individual therapy with Zita Goff LCSW    Prognosis: good

## 2023-05-11 NOTE — PSYCH
"This note was not shared with the patient due to this is a psychotherapy note      Behavioral Health Psychotherapy Group Progress Note    Psychotherapy Provided: Group Therapy    1  Moderate recurrent major depression ([address] )        2  Generalized anxiety disorder            Goals addressed in session: Goal 1     Group Name: DBT Skills Group    Topic(s) covered: Emotional Regulation    Skill(s) covered: Opposite to emotion, Building Positive Experiences    Group summary:  Psychotherapy group focused on learning and implementing DBT skills  Group began by reviewing DBT diaries  Today, we continued discussing emotional regulation skills  We discussed the core concepts-  Building Positive Experiences, Mood Momentum, and Opposite to Emotion  Today is group 12 of 12 DBT Skills Groups  Reviewed skills, group members shared successes and challenges as well as their growth over the past 12 weeks  Data: Jannette attended today's group  She felt comfortable enough to talk about some stress in her marriage and her use of interpersonal skills  She verbalizes emotional growth over the past 12 sessions, building greater awareness and insight and actively using DBT tools daily  Substance Abuse was not addressed during this session  If the client is diagnosed with a co-occurring substance use disorder, please indicate any changes in the frequency or amount of use: na  Stage of change for addressing substance use diagnoses: No substance use/Not applicable    ASSESSMENT:  Jannette appeared  with a Euthymic/ normal mood  Her affect is Normal range and intensity, which is congruent, with his mood and the content of the session  She appeared to actively participated in the group and interacted appropriately with and was supportive of the other group members  Jannette Baugh presents with a nonerisk of suicide,nonerisk of self-harm, and none risk of harm to others      For any risk assessment that surpasses a \"low\" rating, " a safety plan must be developed  A safety plan was indicated: no  If yes, describe in detail na    PLAN: Group 12/12 DBT Skills Group  Discharge from group  Behavioral Health Treatment Plan and Discharge Planning: Elias rCain is aware of and agrees to continue to work on their treatment plan  They have identified and are working toward their discharge goals   yes    Visit start and stop times:    05/11/23  Start Time: 1108  Stop Time: 1205  Total Visit Time: 57 minutes

## 2023-05-17 ENCOUNTER — SOCIAL WORK (OUTPATIENT)
Dept: BEHAVIORAL/MENTAL HEALTH CLINIC | Facility: CLINIC | Age: 43
End: 2023-05-17

## 2023-05-17 DIAGNOSIS — F41.1 GENERALIZED ANXIETY DISORDER: ICD-10-CM

## 2023-05-17 DIAGNOSIS — F33.1 MODERATE RECURRENT MAJOR DEPRESSION (HCC): Primary | ICD-10-CM

## 2023-05-17 RX ORDER — METHOCARBAMOL 750 MG/1
TABLET, FILM COATED ORAL
COMMUNITY
End: 2023-05-18

## 2023-05-17 RX ORDER — OXYCODONE HYDROCHLORIDE 5 MG/1
TABLET ORAL
COMMUNITY
Start: 2023-04-18 | End: 2023-05-18

## 2023-05-17 NOTE — PSYCH
This note was not shared with the patient due to this is a psychotherapy note      Behavioral Health Psychotherapy Progress Note    Psychotherapy Provided: Individual Psychotherapy     1  Moderate recurrent major depression ([address] )        2  Generalized anxiety disorder            Goals addressed in session: Goal 1     DATA:   Met with Jannette  PHQ9 and GAD7 scores updated today  She continues to recover from her orthopedic surgery  She is reporting an improvement in mood, decrease in dread with her use of DBT tools; however, she does express that she feels like she is just 'going through the motions '   A positive since last session is her mother took accountability for some of her behaviors over the years  Jannette says this shocked her and did provide her a sense of healing  She says she is learning that not everything is her fault, should be put on her  Jannette did express having a sense of loneliness in life  She struggles with a negative view of self; specifically focused on her physical features  She also describes her and her 's relationship being like 'roommates '  Her  is a  and often not around  She also blames herself for his lack of physical affection, saying overtime she told him to stop because of how she feels about herself  Jannette has been adapting healthier living habits to improve her view of self  She has been focusing on improving her nutrition and lost 10lbs  She is excited to share the news with her doctor  We discussed using exposure therapy to work toward improving view of self  At this time, Ann Rao does not look in the mirror other than to say 'you can do it' to start her day through self care renee  We created an exposure hierarchy today  Jannette will begin sitting or doing an activity in front of a mirror 2-3 days a week- ex: self compassion meditation, affirmation, gratitude, just talking to self, crafting  SUDS level 50 with goal to decrease    She also agreed to talk to her  about some of her wants/needs in their relationship, encouraged use of [facility], discussed what this can look like  Encouraged scheduling enjoyable time with one another  PHQ-2/9 Depression Screening    Little interest or pleasure in doing things: 3 - nearly every day  Feeling down, depressed, or hopeless: 2 - more than half the days  Trouble falling or staying asleep, or sleeping too much: 3 - nearly every day  Feeling tired or having little energy: 3 - nearly every day  Poor appetite or overeating: 3 - nearly every day  Feeling bad about yourself - or that you are a failure or have let yourself or your family down: 3 - nearly every day  Trouble concentrating on things, such as reading the newspaper or watching television: 2 - more than half the days  Moving or speaking so slowly that other people could have noticed  Or the opposite - being so fidgety or restless that you have been moving around a lot more than usual: 1 - several days  Thoughts that you would be better off dead, or of hurting yourself in some way: 0 - not at all  PHQ-9 Score: 20   PHQ-9 Interpretation: Severe depression        MILADYS-7 Flowsheet Screening    Flowsheet Row Most Recent Value   Over the last 2 weeks, how often have you been bothered by any of the following problems? Feeling nervous, anxious, or on edge 3   Not being able to stop or control worrying 3   Worrying too much about different things 3   Trouble relaxing 2   Being so restless that it is hard to sit still 1   Becoming easily annoyed or irritable 3   Feeling afraid as if something awful might happen 0   MILADYS-7 Total Score 15          During this session, this clinician used the following therapeutic modalities: Dialectical Behavior Therapy and exposure therapy, supportive therapy    Substance Abuse was not addressed during this session   If the client is diagnosed with a co-occurring substance use disorder, please indicate any changes in the "frequency or amount of use: na  Stage of change for addressing substance use diagnoses: No substance use/Not applicable    ASSESSMENT:  Jannette Baugh presents with a Anxious and Depressed mood  her affect is Constricted, which is congruent, with her mood and the content of the session  The client has made progress on their goals  Jannette Baugh presents with a none risk of suicide, none risk of self-harm, and none risk of harm to others  For any risk assessment that surpasses a \"low\" rating, a safety plan must be developed  A safety plan was indicated: no  If yes, describe in detail na    PLAN: Between sessions, Jannette Baugh will practice skills from session  At the next session, the therapist will use Cognitive Behavioral Therapy, Dialectical Behavior Therapy and exposure therapy to address better managing her emotions, self esteem  Behavioral Health Treatment Plan and Discharge Planning: Desmond Velazquez is aware of and agrees to continue to work on their treatment plan  They have identified and are working toward their discharge goals   yes    Visit start and stop times:    05/17/23  Start Time: 0954  Stop Time: 1041  Total Visit Time: 47 minutes  "

## 2023-05-17 NOTE — BH CRISIS PLAN
"Client Name: Cuca Baker       Client YOB: 1980  : 1980    Treatment Team (include name and contact information):     Psychotherapist: Markus Sarabia   [phone]    Psychiatrist: Lisa Perrin PA-C  [phone]   Release of information completed: no    (part of [facility])    Healthcare Provider  Sofia Cortez MD  [address] [place] [id number] [phone]    Type of Plan   * Child plans (children 15 yo and younger) must be completed and signed by the child's legal guardian   * Plans for all individuals 15 yo and above must be signed by the client  Plan Type: adolescent/adult (15 and over) Initial      My Personal Strengths are (in the client's own words):  \"I enjoy doing stuff with my [employer], I love 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, I enjoy my business    The stressors and triggers that may put me at risk are:  being physically tired, being hungry, boredom, loneliness, feeling a lack of control, being treated unfairly, people (describe - names, etc) brother, girl  mom Bebeto King, mom, events (include important dates that might be a trigger) my dad  , thoughts (describe) negative self talk and emotions (describe) loneliness    Coping skills I can use to keep myself calm and safe: Take a shower, Call a friend or family member and Other (describe) crafting    Coping skills/supports I can use to maintain abstinence from substance use:   n/a    The people that provide me with help and support: (Include name, contact, and how they can help)     Support person #1: friend Florwillem Gates    * Phone number: in contacts in phone    * How can they help me?  She listens, she validates me    In the past, the following has helped me in times of crisis:    Being with other people, Taking medications, Calling a friend, Calling a family member, Taking a walk or exercising, Breathing exercises (or other mindfulness-based activities), Using de-escalation tools that I have " learned, Watching television or a movie and Other: crafting      If it is an emergency and you need immediate help, call 9-1-1    If there is a possibility of danger to yourself or others, call the following crisis hotline resources:     Adult Crisis Numbers  Suicide Prevention Hotline - Dial 9-8-8  [facility]: [address]: [address]: [phone]  [facility] [facility] ([facility]): [address] [facility] [address]: [address] [place] [address] Avenue: [address] [place] [address] St: [phone] (daytime)  [phone] (after hours, weekends, holidays)     Child/Adolescent Crisis Numbers   [facility]: [address]: [phone]   Juan Savers: [phone]   [address] [facility] ([facility]): [phone]    Please note: Some [facility] do not have a separate number for Child/Adolescent specific crisis  If your county is not listed under Child/Adolescent, please call the adult number for your county     National Talk to Text Line   All Ages - [phone]    In the event your feelings become unmanageable, and you cannot reach your support system, you will call 911 immediately or go to the nearest hospital emergency room

## 2023-05-18 ENCOUNTER — TELEPHONE (OUTPATIENT)
Dept: FAMILY MEDICINE CLINIC | Facility: CLINIC | Age: 43
End: 2023-05-18

## 2023-05-18 ENCOUNTER — OFFICE VISIT (OUTPATIENT)
Dept: FAMILY MEDICINE CLINIC | Facility: CLINIC | Age: 43
End: 2023-05-18

## 2023-05-18 VITALS
BODY MASS INDEX: 45.67 KG/M2 | TEMPERATURE: 97.9 F | DIASTOLIC BLOOD PRESSURE: 88 MMHG | HEART RATE: 80 BPM | SYSTOLIC BLOOD PRESSURE: 132 MMHG | HEIGHT: 67 IN | OXYGEN SATURATION: 97 % | WEIGHT: 291 LBS

## 2023-05-18 DIAGNOSIS — I10 PRIMARY HYPERTENSION: Primary | ICD-10-CM

## 2023-05-18 DIAGNOSIS — F41.1 GENERALIZED ANXIETY DISORDER: ICD-10-CM

## 2023-05-18 DIAGNOSIS — Q79.60 EHLERS-DANLOS SYNDROME: ICD-10-CM

## 2023-05-18 DIAGNOSIS — E78.5 HYPERLIPIDEMIA, UNSPECIFIED HYPERLIPIDEMIA TYPE: ICD-10-CM

## 2023-05-18 RX ORDER — ATORVASTATIN CALCIUM 40 MG/1
40 TABLET, FILM COATED ORAL EVERY MORNING
Qty: 90 TABLET | Refills: 1 | Status: SHIPPED | OUTPATIENT
Start: 2023-05-18

## 2023-05-18 NOTE — TELEPHONE ENCOUNTER
Jannette called into the office after her visit with Dr Johnny Jackson today and forgot to discuss her nodule  Patient would like to know if the Dr can place an order for an US to get the thyroid checked as she states that she gets it checked every 6 months   Patient can be reached back @ [phone]

## 2023-05-18 NOTE — PROGRESS NOTES
Name: Aida Copeland      : 1980      MRN: [id number]  Encounter Provider: Yaritza Wiggins MD  Encounter Date: 2023   Encounter department: [address] [place] [address]  Primary hypertension    2  Hyperlipidemia, unspecified hyperlipidemia type  Comments:  statin, continue to monitor lipid  Orders:  -     atorvastatin (LIPITOR) 40 mg tablet; Take 1 tablet (40 mg total) by mouth every morning    3  Eliz-Danlos syndrome    4  Generalized anxiety disorder    Hypertension controlled  /88 today  Continue current antihypertensive regiment  Hyperlipidemia stable  Refills of Lipitor sent to pharmacy  Anxiety stable  Continue Effexor  BMI Counseling: Body mass index is 45 58 kg/m²  The BMI is above normal  Nutrition recommendations include decreasing portion sizes, encouraging healthy choices of fruits and vegetables, decreasing fast food intake, consuming healthier snacks, limiting drinks that contain sugar, moderation in carbohydrate intake, increasing intake of lean protein, reducing intake of saturated and trans fat and reducing intake of cholesterol  Exercise recommendations include exercising 3-5 times per week  No pharmacotherapy was ordered  Rationale for BMI follow-up plan is due to patient being overweight or obese  Subjective      Presents to the office for follow-up on chronic conditions  Has history of hypertension hyperlipidemia and anxiety  Tolerating medications well no side effects  Needs refills on Lipitor  Chronic conditions are stable  Along with Effexor she is going to therapy and feels like it is helping  She did have surgery on her neck and does feel better but symptoms are fully resolved  She says surgeon cannot do full surgery because of her weight and will need to lose some weight before he can do C7 level  She is lost 11 pounds    Still gets some numbness and tingling in the left arm but symptoms are better than before surgery  Review of Systems   Constitutional: Negative for chills and fever  HENT: Negative for ear pain and sore throat  Eyes: Negative for pain and visual disturbance  Respiratory: Negative for cough and shortness of breath  Cardiovascular: Negative for chest pain and palpitations  Gastrointestinal: Negative for abdominal pain and vomiting  Genitourinary: Negative for dysuria and hematuria  Musculoskeletal: Positive for arthralgias  Negative for back pain  Skin: Negative for color change and rash  Neurological: Negative for seizures and syncope  All other systems reviewed and are negative  Current Outpatient Medications on File Prior to Visit   Medication Sig   • albuterol (PROVENTIL HFA,VENTOLIN HFA) 90 mcg/act inhaler Inhale 2 puffs every 6 (six) hours as needed for wheezing or shortness of breath (cough)   • amitriptyline (ELAVIL) 75 mg tablet Take 1 tablet (75 mg total) by mouth daily at bedtime   • Ascorbic Acid (VITAMIN C) 500 MG CAPS Take 1 capsule by mouth daily   • aspirin 81 mg chewable tablet Chew 1 tablet (81 mg total) daily   • cetirizine (ZyrTEC) 10 mg tablet Take 10 mg by mouth daily   • Cholecalciferol (VITAMIN D3) 5000 units CAPS Take 1 capsule by mouth daily     • ciprofloxacin-dexamethasone (CIPRODEX) otic suspension Administer 4 drops to the right ear in the morning and 4 drops in the evening     • cyanocobalamin (VITAMIN B-12) 1,000 mcg tablet Take 1,000 mcg by mouth daily    • GNP Esomeprazole Magnesium 20 MG capsule TAKE 1 CAPSULE BY MOUTH ONCE DAILY IN THE MORNING   • losartan (COZAAR) 100 MG tablet TAKE 1 TABLET BY MOUTH ONCE DAILY   • metoprolol succinate (TOPROL-XL) 100 mg 24 hr tablet TAKE 1 TABLET BY MOUTH ONCE DAILY   • Multiple Vitamins-Minerals (CENTRUM ADULTS PO) Take 1 tablet by mouth daily   • neomycin-polymyxin-hydrocortisone (CORTISPORIN) otic solution Administer 4 drops into the left ear 3 (three) times a day for 10 days   • nystatin "(MYCOSTATIN) cream Apply topically 2 (two) times a day as needed (rash)   • Omega-3 Fatty Acids (FISH OIL ADULT GUMMIES PO) Take by mouth   • venlafaxine (EFFEXOR-XR) 150 mg 24 hr capsule Take 1 capsule (150 mg total) by mouth daily With 75 mg   • venlafaxine (EFFEXOR-XR) 75 mg 24 hr capsule Take 1 capsule (75 mg total) by mouth daily With 150 mg   • [DISCONTINUED] atorvastatin (LIPITOR) 40 mg tablet TAKE 1 TABLET BY MOUTH ONCE DAILY IN THE MORNING   • [DISCONTINUED] ketoconazole (NIZORAL) 2 % cream 1 application  daily Apply to affected area (Patient not taking: Reported on 5/18/2023)   • [DISCONTINUED] methocarbamol (ROBAXIN) 750 mg tablet methocarbamol 750 mg tablet   take 1 tablet by mouth every 6 hours if needed for muscle spasm (Patient not taking: Reported on 5/18/2023)   • [DISCONTINUED] oxyCODONE (ROXICODONE) 5 immediate release tablet take 1 tablet by mouth every 4 hours if needed for MODERATE PAIN ( PAIN SCALE 4-6 ) (Patient not taking: Reported on 5/18/2023)       Objective     /88 (BP Location: Left arm, Patient Position: Sitting, Cuff Size: Large)   Pulse 80   Temp 97 9 °F (36 6 °C) (Tympanic)   Ht 5' 7\" (1 702 m)   Wt 132 kg (291 lb)   SpO2 97%   BMI 45 58 kg/m²     Physical Exam  Vitals and nursing note reviewed  Constitutional:       General: She is not in acute distress  Appearance: Normal appearance  She is not ill-appearing, toxic-appearing or diaphoretic  Eyes:      General:         Right eye: No discharge  Left eye: No discharge  Extraocular Movements: Extraocular movements intact  Conjunctiva/sclera: Conjunctivae normal    Cardiovascular:      Rate and Rhythm: Normal rate and regular rhythm  Pulses: Normal pulses  Heart sounds: Normal heart sounds  Pulmonary:      Effort: Pulmonary effort is normal       Breath sounds: Normal breath sounds  Musculoskeletal:      Cervical back: Normal range of motion and neck supple     Neurological:      " Mental Status: She is alert and oriented to person, place, and time  Mental status is at baseline  Psychiatric:         Mood and Affect: Mood normal          Behavior: Behavior normal          Thought Content:  Thought content normal          Judgment: Judgment normal        Bharati Merrill MD

## 2023-05-22 ENCOUNTER — OFFICE VISIT (OUTPATIENT)
Dept: PSYCHIATRY | Facility: CLINIC | Age: 43
End: 2023-05-22

## 2023-05-22 DIAGNOSIS — E04.1 THYROID NODULE: Primary | ICD-10-CM

## 2023-05-22 DIAGNOSIS — F41.1 GENERALIZED ANXIETY DISORDER: ICD-10-CM

## 2023-05-22 DIAGNOSIS — F33.1 MODERATE RECURRENT MAJOR DEPRESSION (HCC): Primary | ICD-10-CM
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
## 2023-05-22 NOTE — TELEPHONE ENCOUNTER
Per chart review it looks like her last one was 2 years ago with recommendations to get new ultrasound in 2 years  New ultrasound ordered  Would recommend she call and schedule as soon as possible

## 2023-05-23 NOTE — TELEPHONE ENCOUNTER
Due to patient being on 2 medications already would feel more comfortable if psychiatry initiated the next choice of medication
Patient is weaned off of Wellbutrin  She is asking if you would start her on Effexor       She can not get into Psych until 2/25/2020
Pt returned call- she is already aware of the following and patient r/s for a sooner appt with Susie Scott 
yes

## 2023-05-30 NOTE — PROGRESS NOTES
Cardiology  Acute  Office Visit Note  Nazia Gonzalez   37 y o    female   MRN: [id number]  [address]  [address] [facility] [address] [facility] [address]  [phone]    PCP: Nyle Ormond, MD  Cardiologist: Dr Sushil Fang            Summary of recommendations  Heart healthy diet  Educational information provided re: lowering TG with lifestyle  Stress echocardiogram  Switching atorvastatin 40 to rosuvastatin 40 mg daily, to optimize LDL, HTG  Reassess fasting lipids 1 month, with TSH, hemoglobin A1c  Follow up will be scheduled with Dr Sushil Fang in a few months  Colon Ca screenin2021, up-to-date          Assessment/plan  Chest pain, atypical  · Reports throat discomfort 5 weeks after anterior cervical fusion, radiating down center of her chest, across the precordium and her arm  Was able to do a 3 mile hike with their [employer], after her surgery  Likely related to the procedure  · We will pursue a stress echocardiogram  · EKG: Normal sinus rhythm' right bundle branch block, similar to 2022  RBBB  Mild coronary artery calcifications noted on CT 2023  Hypertension, essential   /84  Hyperlipidemia,mixed, with HTG  on atorvastatin 40 mg daily  11/15/2022: Calculated LDL 94, non-   11/15/2022: A1c 5 6  Today, stop atorvastatin 40 mg daily, start rosuvastatin 40 mg daily  Heart healthy diet reassess 1 to 3 months    Reassess hemoglobin A1c, check TSH for secondary causes   Latest Reference Range & Units 19 08:08 19 08:31 20 09:10 21 08:22 22 08:53 11/15/22 08:58   Cholesterol See Comment mg/dL 198 180 196 195 210 (H) 175   Triglycerides See Comment mg/dL 199 (H) 219 (H) 226 (H) 282 (H) 235 (H) 203 (H)   HDL >=50 mg/dL 43 36 (L) 45 43 45 (L) 40 (L)   Non-HDL Cholesterol mg/dl 155 144 151 152 165 135   LDL Calculated 0 - 100 mg/dL 115 (H) 100 106 (H) 96 118 (H) 94   MARCELINO  Morbid obesity  Family history of premature disease: Father  at 55years old from a massive heart attack  Cardiac testing  · [facility]  patent coronary arteries   · EKG stress 21  No chest pain or ischemic ECG changes with exercise  Suboptimal functional capacity for age (7 METS) with mild sinus tachycardia noted at rest and an exaggerated heart rate response to exercise  Hypertension was noted at rest (158/100) with a hypertensive response to exercise (peak /102)  No evidence of exercise induced hypoxia                  HPI  Jannette Rizo is a 38 yo female with, hyperlipidemia, MARCELINO, morbid obesity with a family history of premature heart disease  Follows with Dr Kyle Mace  She was last seen in the office 2022 and was felt to be stable  She went a left heart catheterization in  showing patent coronary arteries  She had an EKG stress test 2021 that showed no evidence of exercise-induced hypoxia at peak exercise, 7 METS of activity  Did have an exaggerated heart rate response to exercise hypertensive response to exercise  She has been maintained on an aspirin, high intensity statin beta-blocker, ARB      23  Acute visit: Chief complaint chest pain  HPI underwent anterior cervical surgery  23  ACDF C5-C7, given spinal stenosis with myelopathy   For the first few weeks she had pain and then it improved  More recently, it feels like there is something stuck in her throat, then radiates centrally down her chest and then across her chest and down her left arm  EKG normal sinus rhythm ,right bundle branch block  84 bpm   Similar to prior  /84  Since her operation, she went on a 3 mile hike with the [employer]  She had no increase in symptoms, with exercise  She denies any pleuritic pain  She denies dyspnea  No leg swelling  No palpitations  She expresses concern given her family history of premature heart disease  We discussed her lipids, and her hypertriglyceridemia    After shared decision making we will enhance therapy, will stop atorvastatin 40, and start rosuvastatin 40 mg daily  Will assess for secondary causes  We will also pursue a stress echocardiogram   I did convey that her symptoms are atypical for coronary disease  We also discussed the mild coronary artery calcifications that were seen on a CT of the chest 5/30/2023, ordered by her surgeon  Otherwise there were no acute abnormalities        Assessment  Diagnoses and all orders for this visit:    Chest pain, unspecified type  -     POCT ECG    History of cardiovascular calcifications  -     Echo stress test, exercise; Future    Primary hypertension  -     Echo stress test, exercise; Future    Mixed hyperlipidemia  -     Echo stress test, exercise; Future  -     rosuvastatin (CRESTOR) 40 MG tablet; Take 1 tablet (40 mg total) by mouth daily  -     Lipid Panel With Direct LDL; Future  -     TSH, 3rd generation with Free T4 reflex; Future  -     Hemoglobin A1C; Future    Obstructive sleep apnea    RBBB (right bundle branch block)    Obesity, Class III, BMI 40-49 9 (morbid obesity) ([facility])    Family history of early CAD  -     Echo stress test, exercise;  Future          Past Medical History:   Diagnosis Date   • Allergic rhinitis    • Anxiety    • Arthritis    • C  difficile diarrhea 2020   • Chronic pain disorder     BACK SHOULDERS NECK   • Colon polyp    • COPD (chronic obstructive pulmonary disease) ([facility])    • CPAP (continuous positive airway pressure) dependence    • Depression    • Diarrhea    • Disease of thyroid gland     nodule   • Eating disorder    • Fibromyalgia, primary    • Fissure, anal    • GERD (gastroesophageal reflux disease)    • Hearing difficulty of right ear    • Hyperlipidemia    • Hypertension    • Irritable bowel syndrome    • Obesity    • Perforation of tympanic membrane     Right   • PONV (postoperative nausea and vomiting)    • RBBB     Right bundle branch blockage    • Sleep apnea     no cpap   • Thyroid nodule    • Wears glasses        Review of Systems   Constitutional: Negative for chills  Cardiovascular: Positive for chest pain  Negative for claudication, cyanosis, dyspnea on exertion, irregular heartbeat, leg swelling, near-syncope, orthopnea, palpitations, paroxysmal nocturnal dyspnea and syncope  See HPI   Respiratory: Negative for cough and shortness of breath  Gastrointestinal: Negative for heartburn and nausea  Neurological: Negative for dizziness, focal weakness, headaches, light-headedness and weakness  All other systems reviewed and are negative  Allergies   Allergen Reactions   • Neurontin [Gabapentin] Other (See Comments)     Other reaction(s): SUICIDE IDEATION  Other reaction(s): Other (See Comments)  suicidal  suicidal   • Penicillins Anaphylaxis, Swelling and Angioedema   • Aripiprazole Other (See Comments)     Other reaction(s): low dose 2 mg ; curving in and locking in of hands/dystonia  Other reaction(s): Other (See Comments)  Other reaction(s): low dose 2 mg ; curving in and locking in of hands/dystonia   • Lorazepam Other (See Comments)     Other reaction(s): higher dose > anxiety and depression  Other reaction(s): Other (See Comments)  Other reaction(s): higher dose > anxiety and depression   • Carbamazepine Other (See Comments)     Other reaction(s): Unknown Reaction   • Doxycycline Hives   • Lamotrigine Rash   • Wound Dressing Adhesive Hives           Current Outpatient Medications:   •  albuterol (PROVENTIL HFA,VENTOLIN HFA) 90 mcg/act inhaler, Inhale 2 puffs every 6 (six) hours as needed for wheezing or shortness of breath (cough), Disp: 18 g, Rfl: 1  •  amitriptyline (ELAVIL) 75 mg tablet, TAKE 1 TABLET (75 MG) BY MOUTH ONCE DAILY AT BEDTIME, Disp: 90 tablet, Rfl: 0  •  Ascorbic Acid (VITAMIN C) 500 MG CAPS, Take 1 capsule by mouth daily, Disp: , Rfl:   •  aspirin 81 mg chewable tablet, Chew 1 tablet (81 mg total) daily, Disp: 30 tablet, Rfl: 30  •  cetirizine (ZyrTEC) 10 mg tablet, Take 10 mg by mouth daily, Disp: , Rfl:   •  Cholecalciferol (VITAMIN D3) 5000 units CAPS, Take 1 capsule by mouth daily  , Disp: , Rfl:   •  ciprofloxacin-dexamethasone (CIPRODEX) otic suspension, Administer 4 drops to the right ear in the morning and 4 drops in the evening , Disp: 7 5 mL, Rfl: 3  •  cyanocobalamin (VITAMIN B-12) 1,000 mcg tablet, Take 1,000 mcg by mouth daily , Disp: , Rfl:   •  GNP Esomeprazole Magnesium 20 MG capsule, TAKE 1 CAPSULE BY MOUTH ONCE DAILY IN THE MORNING, Disp: 84 capsule, Rfl: 2  •  losartan (COZAAR) 100 MG tablet, TAKE 1 TABLET BY MOUTH ONCE DAILY, Disp: 90 tablet, Rfl: 2  •  metoprolol succinate (TOPROL-XL) 100 mg 24 hr tablet, TAKE 1 TABLET BY MOUTH ONCE DAILY, Disp: 100 tablet, Rfl: 1  •  Multiple Vitamins-Minerals (CENTRUM ADULTS PO), Take 1 tablet by mouth daily, Disp: , Rfl:   •  nystatin (MYCOSTATIN) cream, Apply topically 2 (two) times a day as needed (rash), Disp: 30 g, Rfl: 1  •  Omega-3 Fatty Acids (FISH OIL ADULT GUMMIES PO), Take by mouth, Disp: , Rfl:   •  rosuvastatin (CRESTOR) 40 MG tablet, Take 1 tablet (40 mg total) by mouth daily, Disp: 30 tablet, Rfl: 5  •  venlafaxine (EFFEXOR-XR) 150 mg 24 hr capsule, TAKE 1 CAPSULE (150 MG) BY MOUTH ONCE DAILY WITH 75 MG CAPSULE FOR A TOTAL DAILY DOSE  MG, Disp: 90 capsule, Rfl: 0  •  venlafaxine (EFFEXOR-XR) 75 mg 24 hr capsule, TAKE 1 CAPSULE (75 MG) BY MOUTH ONCE DAILY WITH 150 MG CAPSULE FOR A TOTAL DAILY DOSE  MG, Disp: 90 capsule, Rfl: 0  •  neomycin-polymyxin-hydrocortisone (CORTISPORIN) otic solution, Administer 4 drops into the left ear 3 (three) times a day for 10 days, Disp: 10 mL, Rfl: 0        Social History     Socioeconomic History   • Marital status: /Civil Union     Spouse name: Not on file   • Number of children: Not on file   • Years of education: Not on file   • Highest education level: Not on file   Occupational History   • Not on file   Tobacco Use   • Smoking status: Never   • Smokeless tobacco: Never   Vaping Use   • Vaping Use: Never used   Substance and Sexual Activity   • Alcohol use: Not Currently     Comment: NICKI    • Drug use: Never   • Sexual activity: Yes     Partners: Male     Birth control/protection: Condom Male     Comment: per allscripts   Other Topics Concern   • Not on file   Social History Narrative    Daily caffeine use- 1 cup of coffee     Social Determinants of Health     Financial Resource Strain: Not on file   Food Insecurity: Not on file   Transportation Needs: No Transportation Needs (5/10/2021)    PRAPARE - Transportation    • Lack of Transportation (Medical): No    • Lack of Transportation (Non-Medical): No   Physical Activity: Unknown (5/10/2021)    Exercise Vital Sign    • Days of Exercise per Week: 2 days    • Minutes of Exercise per Session: Not asked   Stress: Not on file   Social Connections: Not on file   Intimate Partner Violence: Not on file   Housing Stability: Not on file       Family History   Problem Relation Age of Onset   • Hypertension Mother    • Hyperlipidemia Mother    • Diabetes Mother    • Hypertension Father    • Hyperlipidemia Father    • Heart disease Father         cardiac disorder-myocardial infarction arrhythmias   • Heart attack Father    • Crohn's disease Brother    • Arthritis Brother    • Diabetes unspecified Maternal Grandmother    • Thyroid disease Paternal Grandmother    • No Known Problems Daughter    • No Known Problems Maternal Aunt    • No Known Problems Maternal Aunt    • No Known Problems Maternal Aunt    • No Known Problems Maternal Aunt    • No Known Problems Paternal Aunt    • No Known Problems Paternal Aunt    • Heart attack Paternal Uncle        Physical Exam  Vitals and nursing note reviewed  Constitutional:       General: She is not in acute distress  Appearance: She is obese  She is not diaphoretic  HENT:      Head: Normocephalic and atraumatic     Eyes:      Conjunctiva/sclera: Conjunctivae normal  "  Cardiovascular:      Rate and Rhythm: Normal rate and regular rhythm  Pulses: Intact distal pulses  Heart sounds: Normal heart sounds  Pulmonary:      Effort: Pulmonary effort is normal       Breath sounds: Normal breath sounds  Abdominal:      General: Bowel sounds are normal       Palpations: Abdomen is soft  Musculoskeletal:         General: Normal range of motion  Cervical back: Normal range of motion and neck supple  Skin:     General: Skin is warm and dry  Comments: Healing anterior neck scar   Neurological:      Mental Status: She is alert and oriented to person, place, and time  Vitals: Blood pressure 110/84, pulse 85, resp  rate 18, height 5' 7\" (1 702 m), weight 133 kg (293 lb 4 oz), SpO2 99 %  Wt Readings from Last 3 Encounters:   05/31/23 133 kg (293 lb 4 oz)   05/18/23 132 kg (291 lb)   04/14/23 136 kg (300 lb)         Labs & Results:  Lab Results   Component Value Date    HCT 42 4 03/30/2023    HGB 14 1 03/30/2023    MCV 92 03/30/2023     03/30/2023    WBC 7 49 03/30/2023     No results found for: \"BNP\"  No components found for: \"CHEM\"  No results found for: \"CKMBINDEX\", \"CKTOTAL\", \"TROPONINI\", \"TROPONINT\"  No results found for this or any previous visit  No results found for this or any previous visit  This note was completed in part utilizing m-modal fluency direct voice recognition software  Grammatical errors, random word insertion, spelling mistakes, and incomplete sentences may be an occasional consequence of the system secondary to software limitations, ambient noise and hardware issues  At the time of dictation, efforts were made to edit, clarify and /or correct errors  Please read the chart carefully and recognize, using context, where substitutions have occurred    If you have any questions or concerns about the context, text or information contained within the body of this dictation, please contact myself, the provider, for further " clarification

## 2023-05-31 ENCOUNTER — OFFICE VISIT (OUTPATIENT)
Dept: CARDIOLOGY CLINIC | Facility: CLINIC | Age: 43
End: 2023-05-31

## 2023-05-31 ENCOUNTER — HOSPITAL ENCOUNTER (OUTPATIENT)
Dept: ULTRASOUND IMAGING | Facility: HOSPITAL | Age: 43
Discharge: HOME | End: 2023-05-31
Payer: COMMERCIAL

## 2023-05-31 ENCOUNTER — SOCIAL WORK (OUTPATIENT)
Dept: BEHAVIORAL/MENTAL HEALTH CLINIC | Facility: CLINIC | Age: 43
End: 2023-05-31

## 2023-05-31 VITALS
RESPIRATION RATE: 18 BRPM | BODY MASS INDEX: 45.99 KG/M2 | SYSTOLIC BLOOD PRESSURE: 110 MMHG | DIASTOLIC BLOOD PRESSURE: 84 MMHG | HEIGHT: 67 IN | HEART RATE: 85 BPM | WEIGHT: 293 LBS | OXYGEN SATURATION: 99 %

## 2023-05-31 DIAGNOSIS — Z86.79 HISTORY OF CARDIOVASCULAR CALCIFICATIONS: ICD-10-CM

## 2023-05-31 DIAGNOSIS — E04.1 THYROID NODULE: ICD-10-CM

## 2023-05-31 DIAGNOSIS — G47.33 OBSTRUCTIVE SLEEP APNEA: ICD-10-CM

## 2023-05-31 DIAGNOSIS — R07.9 CHEST PAIN, UNSPECIFIED TYPE: Primary | ICD-10-CM

## 2023-05-31 DIAGNOSIS — E66.01 OBESITY, CLASS III, BMI 40-49.9 (MORBID OBESITY) (HCC): ICD-10-CM

## 2023-05-31 DIAGNOSIS — F41.1 GENERALIZED ANXIETY DISORDER: ICD-10-CM

## 2023-05-31 DIAGNOSIS — I10 PRIMARY HYPERTENSION: ICD-10-CM

## 2023-05-31 DIAGNOSIS — E78.2 MIXED HYPERLIPIDEMIA: ICD-10-CM

## 2023-05-31 DIAGNOSIS — F33.1 MODERATE RECURRENT MAJOR DEPRESSION (HCC): Primary | ICD-10-CM

## 2023-05-31 DIAGNOSIS — I45.10 RBBB (RIGHT BUNDLE BRANCH BLOCK): ICD-10-CM

## 2023-05-31 DIAGNOSIS — Z82.49 FAMILY HISTORY OF EARLY CAD: ICD-10-CM

## 2023-05-31 PROCEDURE — 76536 US EXAM OF HEAD AND NECK: CPT

## 2023-05-31 RX ORDER — ROSUVASTATIN CALCIUM 40 MG/1
40 TABLET, COATED ORAL DAILY
Qty: 30 TABLET | Refills: 5 | Status: SHIPPED | OUTPATIENT
Start: 2023-05-31

## 2023-05-31 NOTE — PSYCH
"This note was not shared with the patient due to this is a psychotherapy note      Behavioral Health Psychotherapy Progress Note    Psychotherapy Provided: Individual Psychotherapy     1  Moderate recurrent major depression ([address] )        2  Generalized anxiety disorder            Goals addressed in session: Goal 1 and Goal 2     DATA:   Met with Jannette  Medical issues remain a large stressor, she is practicing the use of mindfulness tools to decrease emotional reactivity, try to be more present, decrease unhelpful thoughts such as catastrophisizing and fortune telling  She shared her successes and challenges  She remains compliant with specialists, sharing she had a positive visit with her cardiologist today- feeling validated with her concerns  Jannette has been practicing interpersonal skills to improve her communication with her - she shared her successes and challenges, voicing some improvement in communication with her   Sania Lemons continues to adapt healthier living habits to improve her view of self- focusing on improving her nutrition- lost 12 lbs since surgery  She continues to struggle with negative self talk, body image issues  We discussed her use of exposure activities to improve view of self-- avoidance has been a barrier in the past    SUDS score of looking in mirror 50 prior to today's session  Jannette has been practicing daily affirmations in the mirror, talking to self while doing an activity in mirror like crafting  She says \"it is getting easier\"- when looking in the mirror from the waist up, using tools for exposure- reports slight decrease in SUDS score  Will continue to encourage practice of current exposure  She also expresses concern with seeing pictures of herself, negative thoughts about self- said to self and out loud    She expressed her thoughts and feelings about this in session- we discussed a specific recent picture and she was able to reframe, look at the " "picture from a different perspective to be kinder to herself  Will continue to practice outside of session, will not forward pictures to friends as she currently does- recognizing this fuels anxiety and unhelpful thoughts  During this session, this clinician used the following therapeutic modalities: Cognitive Behavioral Therapy and ERP, mindfulness strategies    Substance Abuse was not addressed during this session  If the client is diagnosed with a co-occurring substance use disorder, please indicate any changes in the frequency or amount of use: na  Stage of change for addressing substance use diagnoses: No substance use/Not applicable    ASSESSMENT:  Jannette Baugh presents with a Anxious and Depressed mood  her affect is Constricted, which is congruent, with her mood and the content of the session  The client has made progress on their goals  Jannette Baugh presents with a none risk of suicide, none risk of self-harm, and none risk of harm to others  For any risk assessment that surpasses a \"low\" rating, a safety plan must be developed  A safety plan was indicated: no  If yes, describe in detail na    PLAN: Between sessions, Jannette Baugh will practice tools from session  At the next session, the therapist will use Cognitive Behavioral Therapy, Mindfulness-based Strategies and ERP to address anxiety, views of self  Behavioral Health Treatment Plan and Discharge Planning: Yuki Paredes is aware of and agrees to continue to work on their treatment plan  They have identified and are working toward their discharge goals   yes    Visit start and stop times:    05/31/23  Start Time: 0950  Stop Time: 1031  Total Visit Time: 41 minutes  "

## 2023-06-02 ENCOUNTER — OFFICE VISIT (OUTPATIENT)
Dept: GASTROENTEROLOGY | Facility: CLINIC | Age: 43
End: 2023-06-02

## 2023-06-02 VITALS
SYSTOLIC BLOOD PRESSURE: 118 MMHG | HEIGHT: 67 IN | WEIGHT: 290 LBS | BODY MASS INDEX: 45.52 KG/M2 | DIASTOLIC BLOOD PRESSURE: 85 MMHG | OXYGEN SATURATION: 95 % | HEART RATE: 83 BPM

## 2023-06-02 DIAGNOSIS — R74.01 ELEVATED ALT MEASUREMENT: Primary | ICD-10-CM

## 2023-06-02 DIAGNOSIS — K21.9 GASTROESOPHAGEAL REFLUX DISEASE WITHOUT ESOPHAGITIS: ICD-10-CM

## 2023-06-02 DIAGNOSIS — R13.19 ESOPHAGEAL DYSPHAGIA: ICD-10-CM

## 2023-06-02 NOTE — PROGRESS NOTES
[facility]  Progress Note - Isidra Baugh 37 y o  female MRN: [id number]    Unit/Bed#:  Encounter: [id number]    Assessment/Plan:    1  GERD with occasional symptoms of dysphagia-started after cervical spinal surgery  Suspect symptoms may be secondary to external compression from the cervical spine, will obtain barium swallow to confirm this   -Patient has been having symptoms of acid reflux with substernal chest discomfort since his surgery as well therefore would recommend increasing the esomeprazole to 20 mg twice daily instead of once before bedtime  I suspect this may be secondary to esophagitis versus esophageal dysmotility, has history of Eliz Danlos syndrome   -Recommend to take medications with large glass of water and stay upright for at least 45 minutes  -Follow GERD diet   -Antireflux measures  -Recommend barium swallow, if there is any evidence of esophageal strictures or narrowing, would recommend EGD otherwise proceed with dietary modifications and acid suppressive therapy first     2   Elevated ALT-likely nonalcoholic fatty liver disease, BMI approaching 46   - Repeat LFTs again   -Obtain right upper quad ultrasound   - Recommend weight loss  Subjective: This is a 42-year-old female with history of depression, anxiety, gastritis, hyperplastic polyp, who presents for follow-up  Patient reports that she underwent cervical spinal fusion surgery following which she started experiencing acute symptoms of dysphagia  She reports that she was not able to eat anything for almost 2 weeks  Patient reports that she ultimately started swallowing however this resulted in chest pain in the mid chest area which radiated to both sides  She reports that symptoms only occurred after eating  Also reports that she has been noticing few episodes of acid reflux which have been waking her up at nighttime but are transient    She reports taking Nexium 20 mg before dinner for many "years  She reports that her acid reflux symptoms were previously controlled  No NSAID use  No steroid use  No use of steroid inhalers  No hematemesis, coffee emesis or melena  No unintentional weight loss  EGD was notable for mild gastritis and innumerable gastric polyps  Biopsies were negative for celiac disease and H  pylori  Polyps were fundic gland type  Colonoscopy notable for internal hemorrhoids and single colon polyp  Colon polyp was hyperplastic without any evidence of microscopic colitis  Objective:     Vitals: Blood pressure 118/85, pulse 83, height 5' 7\" (1 702 m), weight 132 kg (290 lb), SpO2 95 %  ,Body mass index is 45 42 kg/m²  [unfilled]    Physical Exam:    GEN: wn/wd, NAD  HEENT: MMM, no cervical or supraclavicular LAD, anciteric  CV: RRR, no m/r/g  CHEST: CTA b/l, no w/r/r  ABD: +BS, soft, NT/ND, no hepatosplenomegaly  EXT: no c/c/e  SKIN: no rashes  NEURO: aaox3      Invasive Devices     Drain  Duration           Closed/Suction Drain Anterior;Right; Inferior Abdomen Bulb 10 Fr  500 days                        Lab, Imaging and other studies:     No visits with results within 1 Day(s) from this visit     Latest known visit with results is:   Appointment on 03/30/2023   Component Date Value   • WBC 03/30/2023 7 49    • RBC 03/30/2023 4 62    • Hemoglobin 03/30/2023 14 1    • Hematocrit 03/30/2023 42 4    • MCV 03/30/2023 92    • MCH 03/30/2023 30 5    • MCHC 03/30/2023 33 3    • RDW 03/30/2023 13 4    • MPV 03/30/2023 10 7    • Platelets 30/36/9835 165    • nRBC 03/30/2023 0    • Neutrophils Relative 03/30/2023 68    • Immat GRANS % 03/30/2023 0    • Lymphocytes Relative 03/30/2023 22    • Monocytes Relative 03/30/2023 7    • Eosinophils Relative 03/30/2023 2    • Basophils Relative 03/30/2023 1    • Neutrophils Absolute 03/30/2023 5 09    • Immature Grans Absolute 03/30/2023 0 02    • Lymphocytes Absolute 03/30/2023 1 63    • Monocytes Absolute 03/30/2023 0 54    • Eosinophils " Absolute 03/30/2023 0 17    • Basophils Absolute 03/30/2023 0 04    • Sodium 03/30/2023 140    • Potassium 03/30/2023 3 9    • Chloride 03/30/2023 102    • CO2 03/30/2023 29    • ANION GAP 03/30/2023 9    • BUN 03/30/2023 13    • Creatinine 03/30/2023 0 59 (L)    • Glucose, Fasting 03/30/2023 114 (H)    • Calcium 03/30/2023 9 4    • AST 03/30/2023 34    • ALT 03/30/2023 61 (H)    • Alkaline Phosphatase 03/30/2023 58    • Total Protein 03/30/2023 6 7    • Albumin 03/30/2023 4 5    • Total Bilirubin 03/30/2023 0 59    • eGFR 03/30/2023 112          I have personally reviewed pertinent films in PACS    No current facility-administered medications for this visit

## 2023-06-07 ENCOUNTER — HOSPITAL ENCOUNTER (OUTPATIENT)
Dept: NON INVASIVE DIAGNOSTICS | Facility: HOSPITAL | Age: 43
Discharge: HOME | End: 2023-06-07
Payer: COMMERCIAL

## 2023-06-07 VITALS
BODY MASS INDEX: 45.52 KG/M2 | OXYGEN SATURATION: 98 % | RESPIRATION RATE: 16 BRPM | HEIGHT: 67 IN | HEART RATE: 72 BPM | WEIGHT: 290 LBS | SYSTOLIC BLOOD PRESSURE: 124 MMHG | DIASTOLIC BLOOD PRESSURE: 80 MMHG

## 2023-06-07 DIAGNOSIS — Z86.79 HISTORY OF CARDIOVASCULAR CALCIFICATIONS: ICD-10-CM

## 2023-06-07 DIAGNOSIS — E78.2 MIXED HYPERLIPIDEMIA: ICD-10-CM

## 2023-06-07 DIAGNOSIS — I10 PRIMARY HYPERTENSION: ICD-10-CM

## 2023-06-07 DIAGNOSIS — Z82.49 FAMILY HISTORY OF EARLY CAD: ICD-10-CM

## 2023-06-07 LAB
MAX HR PERCENT: 86 %
MAX HR: 153 BPM
RATE PRESSURE PRODUCT: NORMAL
SL CV STRESS RECOVERY BP: NORMAL MMHG
SL CV STRESS RECOVERY HR: 100 BPM
SL CV STRESS RECOVERY O2 SAT: 99 %
SL CV STRESS STAGE REACHED: 3
STRESS ANGINA INDEX: 0
STRESS BASELINE BP: NORMAL MMHG
STRESS BASELINE HR: 72 BPM
STRESS O2 SAT REST: 98 %
STRESS PEAK HR: 148 BPM
STRESS POST ESTIMATED WORKLOAD: 7.7 METS
STRESS POST EXERCISE DUR MIN: 6 MIN
STRESS POST EXERCISE DUR SEC: 30 SEC
STRESS POST O2 SAT PEAK: 98 %
STRESS POST PEAK BP: 164 MMHG

## 2023-06-07 PROCEDURE — 93350 STRESS TTE ONLY: CPT | Performed by: INTERNAL MEDICINE

## 2023-06-07 PROCEDURE — 93350 STRESS TTE ONLY: CPT

## 2023-06-07 RX ADMIN — PERFLUTREN 0.6 ML/MIN: 6.52 INJECTION, SUSPENSION INTRAVENOUS at 09:56

## 2023-06-08 ENCOUNTER — APPOINTMENT (OUTPATIENT)
Dept: ULTRASOUND IMAGING | Facility: HOSPITAL | Age: 43
End: 2023-06-08
Payer: COMMERCIAL

## 2023-06-08 ENCOUNTER — HOSPITAL ENCOUNTER (OUTPATIENT)
Dept: ULTRASOUND IMAGING | Facility: HOSPITAL | Age: 43
End: 2023-06-08
Payer: COMMERCIAL

## 2023-06-08 ENCOUNTER — HOSPITAL ENCOUNTER (OUTPATIENT)
Dept: RADIOLOGY | Facility: HOSPITAL | Age: 43
End: 2023-06-08
Payer: COMMERCIAL

## 2023-06-08 DIAGNOSIS — R74.01 ELEVATED ALT MEASUREMENT: ICD-10-CM

## 2023-06-08 DIAGNOSIS — R13.19 ESOPHAGEAL DYSPHAGIA: ICD-10-CM

## 2023-06-08 LAB
CHEST PAIN STATEMENT: NORMAL
ECG INTERP BEFORE EX: NORMAL
MAX DIASTOLIC BP: 90 MMHG
MAX HEART RATE: 153 BPM
MAX PREDICTED HEART RATE: 177 BPM
MAX. SYSTOLIC BP: 164 MMHG
PROTOCOL NAME: NORMAL
TARGET HR FORMULA: NORMAL
TEST INDICATION: NORMAL
TIME IN EXERCISE PHASE: NORMAL

## 2023-06-08 PROCEDURE — 74220 X-RAY XM ESOPHAGUS 1CNTRST: CPT

## 2023-06-08 PROCEDURE — 76705 ECHO EXAM OF ABDOMEN: CPT

## 2023-06-12 ENCOUNTER — TELEMEDICINE (OUTPATIENT)
Dept: FAMILY MEDICINE CLINIC | Facility: CLINIC | Age: 43
End: 2023-06-12
Payer: COMMERCIAL

## 2023-06-12 DIAGNOSIS — E04.1 THYROID NODULE: Primary | ICD-10-CM

## 2023-06-12 PROCEDURE — 99213 OFFICE O/P EST LOW 20 MIN: CPT | Performed by: FAMILY MEDICINE

## 2023-06-12 NOTE — PROGRESS NOTES
Virtual Regular Visit    Verification of patient location:    Patient is located at Home in the following state in which I hold an active license [place]      Assessment/Plan:    Problem List Items Addressed This Visit        Endocrine    Thyroid nodule - Primary    Relevant Orders    US guided thyroid biopsy     Thyroid ultrasound results discussed today  Thyroid biopsy ordered  We will follow-up after results  Reason for visit is   Chief Complaint   Patient presents with   • Virtual Regular Visit        Encounter provider Tiffani Monge MD    Provider located at [address] [facility],   [address] [facility] [id number]      Recent Visits  No visits were found meeting these conditions  Showing recent visits within past 7 days and meeting all other requirements  Today's Visits  Date Type Provider Dept   06/12/23 Telemedicine Tiffani Monge MD [facility] [address] [facility] today's visits and meeting all other requirements  Future Appointments  No visits were found meeting these conditions  Showing future appointments within next 150 days and meeting all other requirements       The patient was identified by name and date of birth  Jannette CYNTHIA Baugh was informed that this is a telemedicine visit and that the visit is being conducted through the [employer]  She agrees to proceed     My office door was closed  No one else was in the room  She acknowledged consent and understanding of privacy and security of the video platform  The patient has agreed to participate and understands they can discontinue the visit at any time  Patient is aware this is a billable service  Subjective  Jannette MARIE Kathryn Dent is a 37 y o  female      Today's visit is to discuss recent thyroid ultrasound and follow-up plan  She has a history of thyroid nodules and had it biopsied in 2017  Repeat ultrasound showed enlarged biopsied nodule    She is aware that she is likely going to need biopsy  Has no other acute concerns or questions         Past Medical History:   Diagnosis Date   • Allergic rhinitis    • Anxiety    • Arthritis    • C  difficile diarrhea    • Chest pain    • Chronic pain disorder     BACK SHOULDERS NECK   • Colon polyp    • COPD (chronic obstructive pulmonary disease) (HCC)    • CPAP (continuous positive airway pressure) dependence    • Depression    • Diarrhea    • Disease of thyroid gland     nodule   • Eating disorder    • Fibromyalgia, primary    • Fissure, anal    • GERD (gastroesophageal reflux disease)    • Hearing difficulty of right ear    • Hyperlipidemia    • Hypertension    • Irritable bowel syndrome    • Obesity    • Perforation of tympanic membrane     Right   • PONV (postoperative nausea and vomiting)    • RBBB     Right bundle branch blockage    • Sleep apnea     no cpap   • Thyroid nodule    • Wears glasses        Past Surgical History:   Procedure Laterality Date   • ANAL FISTULOTOMY N/A 2018    Procedure: FISTULOTOMY;  Surgeon: Jono Kowalski MD;  [facility];  Service: Colorectal   • BACK SURGERY      lumbar herniated disc   • BREAST CYST EXCISION Left 13 yrs ago  benign   • BREAST SURGERY Left 2006    cyst removal   • CARPAL TUNNEL RELEASE     • CERVICAL FUSION  2023   •  SECTION     • CHOLECYSTECTOMY     • COLONOSCOPY     • CYST REMOVAL     • DENTAL SURGERY     • ENDOMETRIAL ABLATION N/A 2021    Procedure: D&C, ABLATION ENDOMETRIAL MARIEL;  Surgeon: Rubina Enamorado MD;  Location: [address] MAIN OR;  Service: Gynecology   • FL MYELOGRAM CERVICAL  2022   • HAND SURGERY     • INCISION AND DRAINAGE OF WOUND N/A 2018    Procedure: INCISION AND DRAINAGE (I&D) BUTTOCK;  Surgeon: Jono Kowalski MD;  [facility];  Service: Colorectal   • MYRINGOTOMY W/ TUBES Right 2015    [place]   • [place] ANRCT XM SURG REQ ANES GENERAL SPI/EDRL DX N/A 2018    Procedure: Aziza Butler UNDER ANESTHESIA (EUA); Surgeon: North Andrade MD;  [facility];  Service: Colorectal   • [place] COLONOSCOPY FLX DX W/COLLJ [facility] INPATIENT [facility]HAB[facility] WHEN PFRMD N/A 08/02/2017    Procedure: COLONOSCOPY;  Surgeon: Cheryl Reid MD;  [facility]; Service: Gastroenterology   • [place] DEBRIDEMENT SUBCUTANEOUS TISSUE 20 SQ CM/< N/A 07/19/2022    Procedure: Debridement abdominal wall;  Surgeon: Sallie Brooks MD;  Location: [facility];  Service: General   • NJ EXCISION HIDRADENITIS INGUINAL COMPLEX REPAIR Bilateral 01/18/2022    Procedure: WIDE EXCISION HIDRADENITIS ABDOMINAL WALL;  Surgeon: Sallie Brooks MD;  Location: [facility];  Service: General   • [address] [place] [address] Pky N/A 07/19/2022    Procedure: INCISION AND DRAINAGE ABDOMINAL WALL;  Surgeon: Sallie Brooks MD;  Location: [facility];  Service: General   • NJ TENDON SHEATH INCISION Right 03/13/2018    Procedure: LONG TRIGGER FINGER RELEASE;  Surgeon: Korey Dasilva DO;  [facility];  Service: Orthopedics   • NJ TYMPANOPLASTY W/O MASTOIDECT W/O OSSICLE RECNSTJ Right 05/24/2017    Procedure: TYMPANOPLASTY WITH PERICHONDRIN GRAFT;  Surgeon:  Triston Ashley DO;  [facility];  Service: ENT   • SHOULDER SURGERY Right    • TRIGGER FINGER RELEASE     • ULNAR NERVE TRANSPOSITION Left    • UMBILICAL HIDRADENITIS EXCISION  06/34/0136    subumbilical hidradenitis exicsion, Dr Eryn Figueroa, [facility]   • WISDOM TOOTH EXTRACTION         Current Outpatient Medications   Medication Sig Dispense Refill   • albuterol (PROVENTIL HFA,VENTOLIN HFA) 90 mcg/act inhaler Inhale 2 puffs every 6 (six) hours as needed for wheezing or shortness of breath (cough) 18 g 1   • amitriptyline (ELAVIL) 75 mg tablet TAKE 1 TABLET (75 MG) BY MOUTH ONCE DAILY AT BEDTIME 90 tablet 0   • Ascorbic Acid (VITAMIN C) 500 MG CAPS Take 1 capsule by mouth daily     • aspirin 81 mg chewable tablet Chew 1 tablet (81 mg total) daily 30 tablet 30   • cetirizine (ZyrTEC) 10 mg tablet Take 10 mg by mouth daily     • Cholecalciferol (VITAMIN D3) 5000 units CAPS Take 1 capsule by mouth daily       • ciprofloxacin-dexamethasone (CIPRODEX) otic suspension Administer 4 drops to the right ear in the morning and 4 drops in the evening  7 5 mL 3   • cyanocobalamin (VITAMIN B-12) 1,000 mcg tablet Take 1,000 mcg by mouth daily      • esomeprazole (GNP Esomeprazole Magnesium) 20 mg capsule Take 1 capsule (20 mg total) by mouth 2 (two) times a day before meals 180 capsule 3   • losartan (COZAAR) 100 MG tablet TAKE 1 TABLET BY MOUTH ONCE DAILY 90 tablet 2   • metoprolol succinate (TOPROL-XL) 100 mg 24 hr tablet TAKE 1 TABLET BY MOUTH ONCE DAILY 100 tablet 1   • Multiple Vitamins-Minerals (CENTRUM ADULTS PO) Take 1 tablet by mouth daily     • neomycin-polymyxin-hydrocortisone (CORTISPORIN) otic solution Administer 4 drops into the left ear 3 (three) times a day for 10 days (Patient not taking: Reported on 6/2/2023) 10 mL 0   • nystatin (MYCOSTATIN) cream Apply topically 2 (two) times a day as needed (rash) 30 g 1   • Omega-3 Fatty Acids (FISH OIL ADULT GUMMIES PO) Take by mouth     • rosuvastatin (CRESTOR) 40 MG tablet Take 1 tablet (40 mg total) by mouth daily 30 tablet 5   • venlafaxine (EFFEXOR-XR) 150 mg 24 hr capsule TAKE 1 CAPSULE (150 MG) BY MOUTH ONCE DAILY WITH 75 MG CAPSULE FOR A TOTAL DAILY DOSE  MG 90 capsule 0   • venlafaxine (EFFEXOR-XR) 75 mg 24 hr capsule TAKE 1 CAPSULE (75 MG) BY MOUTH ONCE DAILY WITH 150 MG CAPSULE FOR A TOTAL DAILY DOSE  MG 90 capsule 0     No current facility-administered medications for this visit  Allergies   Allergen Reactions   • Neurontin [Gabapentin] Other (See Comments)     Other reaction(s): SUICIDE IDEATION  Other reaction(s):  Other (See Comments)  suicidal  suicidal   • Penicillins Anaphylaxis, Swelling and Angioedema   • Aripiprazole Other (See Comments)     Other reaction(s): low dose 2 mg ; curving in and locking in of hands/dystonia  Other reaction(s): Other (See Comments)  Other reaction(s): low dose 2 mg ; curving in and locking in of hands/dystonia   • Lorazepam Other (See Comments)     Other reaction(s): higher dose > anxiety and depression  Other reaction(s): Other (See Comments)  Other reaction(s): higher dose > anxiety and depression   • Carbamazepine Other (See Comments)     Other reaction(s): Unknown Reaction   • Doxycycline Hives   • Lamotrigine Rash   • Wound Dressing Adhesive Hives       Review of Systems   All other systems reviewed and are negative  Video Exam    There were no vitals filed for this visit  Physical Exam  Vitals and nursing note reviewed  Constitutional:       General: She is not in acute distress  Appearance: Normal appearance  She is not ill-appearing, toxic-appearing or diaphoretic  Neurological:      Mental Status: She is alert and oriented to person, place, and time     Psychiatric:         Mood and Affect: Mood normal          Behavior: Behavior normal           Visit Time  Total Visit Duration:

## 2023-06-20 NOTE — NURSING NOTE
Call placed to patient to discuss upcoming appointment at [facility] radiology department and complete consultation with patient. Patient is having a thyroid biopsy  utilizing US  guidance. Reviewed  patient's allergies, current anticoagulant medication of ASA 81 mg present per patient but not required to stop per periprocedural management of coagulation status and hemostasis risk in percutaneous image guided procedure guidelines, patient has had procedure in the past, no questions when asked if any questions or concerns. Reminded patient of location and time expected for procedure, Patient expressed understanding by verbalizing and repeating instructions.

## 2023-06-21 ENCOUNTER — TELEPHONE (OUTPATIENT)
Dept: CARDIOLOGY CLINIC | Facility: CLINIC | Age: 43
End: 2023-06-21

## 2023-06-21 DIAGNOSIS — E78.2 MIXED HYPERLIPIDEMIA: ICD-10-CM

## 2023-06-21 RX ORDER — ROSUVASTATIN CALCIUM 40 MG/1
40 TABLET, COATED ORAL DAILY
Qty: 30 TABLET | Refills: 5 | Status: SHIPPED | OUTPATIENT
Start: 2023-06-21 | End: 2023-06-27 | Stop reason: SDUPTHER

## 2023-06-27 DIAGNOSIS — E78.2 MIXED HYPERLIPIDEMIA: ICD-10-CM

## 2023-06-27 RX ORDER — ROSUVASTATIN CALCIUM 40 MG/1
40 TABLET, COATED ORAL DAILY
Qty: 30 TABLET | Refills: 0 | Status: SHIPPED | OUTPATIENT
Start: 2023-06-27

## 2023-07-03 ENCOUNTER — HOSPITAL ENCOUNTER (OUTPATIENT)
Dept: RADIOLOGY | Facility: HOSPITAL | Age: 43
Discharge: HOME | End: 2023-07-03
Attending: FAMILY MEDICINE | Admitting: RADIOLOGY
Payer: COMMERCIAL

## 2023-07-03 DIAGNOSIS — E04.1 NONTOXIC SINGLE THYROID NODULE: ICD-10-CM

## 2023-07-03 PROCEDURE — 10005 FNA BX W/US GDN 1ST LES: CPT

## 2023-07-03 PROCEDURE — 88173 CYTOPATH EVAL FNA REPORT: CPT | Performed by: PATHOLOGY

## 2023-07-03 RX ORDER — LIDOCAINE HYDROCHLORIDE 10 MG/ML
5 INJECTION, SOLUTION EPIDURAL; INFILTRATION; INTRACAUDAL; PERINEURAL ONCE
Status: COMPLETED | OUTPATIENT
Start: 2023-07-03 | End: 2023-07-03

## 2023-07-03 RX ADMIN — LIDOCAINE HYDROCHLORIDE 1 ML: 10 INJECTION, SOLUTION EPIDURAL; INFILTRATION; INTRACAUDAL; PERINEURAL at 10:57

## 2023-07-06 ENCOUNTER — HOSPITAL ENCOUNTER (OUTPATIENT)
Dept: PERIOP | Facility: HOSPITAL | Age: 43
Setting detail: OUTPATIENT SURGERY
End: 2023-07-06
Attending: INTERNAL MEDICINE
Payer: COMMERCIAL

## 2023-07-06 ENCOUNTER — ANESTHESIA EVENT (OUTPATIENT)
Dept: PERIOP | Facility: HOSPITAL | Age: 43
End: 2023-07-06

## 2023-07-06 ENCOUNTER — ANESTHESIA (OUTPATIENT)
Dept: PERIOP | Facility: HOSPITAL | Age: 43
End: 2023-07-06

## 2023-07-06 VITALS
RESPIRATION RATE: 18 BRPM | TEMPERATURE: 97.8 F | OXYGEN SATURATION: 98 % | HEART RATE: 82 BPM | BODY MASS INDEX: 45.74 KG/M2 | DIASTOLIC BLOOD PRESSURE: 63 MMHG | SYSTOLIC BLOOD PRESSURE: 118 MMHG | WEIGHT: 291.45 LBS | HEIGHT: 67 IN

## 2023-07-06 DIAGNOSIS — R13.19 ESOPHAGEAL DYSPHAGIA: ICD-10-CM

## 2023-07-06 DIAGNOSIS — K27.9 PUD (PEPTIC ULCER DISEASE): Primary | ICD-10-CM

## 2023-07-06 DIAGNOSIS — K21.9 GASTROESOPHAGEAL REFLUX DISEASE WITHOUT ESOPHAGITIS: ICD-10-CM

## 2023-07-06 DIAGNOSIS — R74.01 ELEVATED ALT MEASUREMENT: ICD-10-CM

## 2023-07-06 LAB
EXT PREGNANCY TEST URINE: NEGATIVE
EXT. CONTROL: NORMAL

## 2023-07-06 PROCEDURE — 88313 SPECIAL STAINS GROUP 2: CPT | Performed by: PATHOLOGY

## 2023-07-06 PROCEDURE — 88312 SPECIAL STAINS GROUP 1: CPT | Performed by: PATHOLOGY

## 2023-07-06 PROCEDURE — 81025 URINE PREGNANCY TEST: CPT | Performed by: ANESTHESIOLOGY

## 2023-07-06 PROCEDURE — 88305 TISSUE EXAM BY PATHOLOGIST: CPT | Performed by: PATHOLOGY

## 2023-07-06 PROCEDURE — 43239 EGD BIOPSY SINGLE/MULTIPLE: CPT | Performed by: INTERNAL MEDICINE

## 2023-07-06 PROCEDURE — 43248 EGD GUIDE WIRE INSERTION: CPT | Performed by: INTERNAL MEDICINE

## 2023-07-06 RX ORDER — SODIUM CHLORIDE, SODIUM LACTATE, POTASSIUM CHLORIDE, CALCIUM CHLORIDE 600; 310; 30; 20 MG/100ML; MG/100ML; MG/100ML; MG/100ML
125 INJECTION, SOLUTION INTRAVENOUS CONTINUOUS
Status: DISCONTINUED | OUTPATIENT
Start: 2023-07-06 | End: 2023-07-10 | Stop reason: HOSPADM

## 2023-07-06 RX ORDER — LIDOCAINE HYDROCHLORIDE 10 MG/ML
INJECTION, SOLUTION EPIDURAL; INFILTRATION; INTRACAUDAL; PERINEURAL AS NEEDED
Status: DISCONTINUED | OUTPATIENT
Start: 2023-07-06 | End: 2023-07-06

## 2023-07-06 RX ORDER — PROPOFOL 10 MG/ML
INJECTION, EMULSION INTRAVENOUS AS NEEDED
Status: DISCONTINUED | OUTPATIENT
Start: 2023-07-06 | End: 2023-07-06

## 2023-07-06 RX ORDER — ESOMEPRAZOLE MAGNESIUM 40 MG/1
40 CAPSULE, DELAYED RELEASE ORAL
Qty: 60 CAPSULE | Refills: 0 | Status: SHIPPED | OUTPATIENT
Start: 2023-07-06 | End: 2023-08-05

## 2023-07-06 RX ORDER — ONDANSETRON 2 MG/ML
INJECTION INTRAMUSCULAR; INTRAVENOUS AS NEEDED
Status: DISCONTINUED | OUTPATIENT
Start: 2023-07-06 | End: 2023-07-06

## 2023-07-06 RX ADMIN — PROPOFOL 50 MG: 10 INJECTION, EMULSION INTRAVENOUS at 13:30

## 2023-07-06 RX ADMIN — ONDANSETRON 4 MG: 2 INJECTION INTRAMUSCULAR; INTRAVENOUS at 13:24

## 2023-07-06 RX ADMIN — LIDOCAINE HYDROCHLORIDE 50 MG: 10 INJECTION, SOLUTION EPIDURAL; INFILTRATION; INTRACAUDAL; PERINEURAL at 13:24

## 2023-07-06 RX ADMIN — PROPOFOL 50 MG: 10 INJECTION, EMULSION INTRAVENOUS at 13:33

## 2023-07-06 RX ADMIN — SODIUM CHLORIDE, SODIUM LACTATE, POTASSIUM CHLORIDE, AND CALCIUM CHLORIDE 125 ML/HR: .6; .31; .03; .02 INJECTION, SOLUTION INTRAVENOUS at 12:57

## 2023-07-06 RX ADMIN — PROPOFOL 150 MG: 10 INJECTION, EMULSION INTRAVENOUS at 13:24

## 2023-07-06 RX ADMIN — PROPOFOL 50 MG: 10 INJECTION, EMULSION INTRAVENOUS at 13:28

## 2023-07-06 RX ADMIN — PROPOFOL 50 MG: 10 INJECTION, EMULSION INTRAVENOUS at 13:26

## 2023-07-06 RX ADMIN — PROPOFOL 50 MG: 10 INJECTION, EMULSION INTRAVENOUS at 13:35

## 2023-07-06 NOTE — ANESTHESIA PREPROCEDURE EVALUATION
Procedure:  EGD    Relevant Problems   ANESTHESIA   (+) PONV (postoperative nausea and vomiting)      CARDIO  Stress test negative, good  LV fn   (+) Atypical chest pain   (+) High triglycerides   (+) Hyperlipidemia   (+) Primary hypertension   (+) RBBB (right bundle branch block)      GI/HEPATIC   (+) Esophageal dysphagia   (+) Esophageal reflux      MUSCULOSKELETAL   (+) Chronic right-sided low back pain without sciatica   (+) Fibromyalgia   (+) Osteoarthritis of spine with radiculopathy, lumbar region   (+) Other cervical disc degeneration at C6-C7 level      NEURO/PSYCH   (+) Chronic headache with new features   (+) Chronic right-sided low back pain without sciatica   (+) Depression, recurrent (HCC)   (+) Fibromyalgia   (+) Generalized anxiety disorder   (+) Moderate recurrent major depression (HCC)      PULMONARY   (+) Asthma   (+) Sleep apnea      Musculoskeletal and Integument   (+) Eliz-Danlos syndrome      Other   (+) H/O laminectomy   (+) Left cervical lymphadenopathy   (+) Obesity, Class III, BMI 40-49.9 (morbid obesity) (HCC)        Physical Exam    Airway    Mallampati score: II  TM Distance: >3 FB  Neck ROM: full     Dental   No notable dental hx     Cardiovascular      Pulmonary      Other Findings        Anesthesia Plan  ASA Score- 3     Anesthesia Type- IV sedation with anesthesia with ASA Monitors. Additional Monitors:   Airway Plan:     Comment: Give  Ondansetron for PONV history. Plan Factors-Exercise tolerance (METS): >4 METS. Chart reviewed. EKG reviewed. Existing labs reviewed. Patient summary reviewed. Patient is not a current smoker. Induction-     Postoperative Plan-     Informed Consent- Anesthetic plan and risks discussed with patient. I personally reviewed this patient with the CRNA. Discussed and agreed on the Anesthesia Plan with the CRNA. Uzma Brooms

## 2023-07-06 NOTE — H&P
History and Physical - [facility]  Iona Baugh 37 y.o. female MRN: [id number]    HPI: Emiliano Nixon is a 37y.o. year old female who presents for evaluation of dysphagia and GERD.       Review of Systems    Historical Information   Past Medical History:   Diagnosis Date   • Allergic rhinitis    • Anxiety    • Arthritis    • C. difficile diarrhea    • Chest pain    • Chronic pain disorder     BACK SHOULDERS NECK   • Colon polyp    • COPD (chronic obstructive pulmonary disease) (HCC)    • CPAP (continuous positive airway pressure) dependence    • Depression    • Diarrhea    • Disease of thyroid gland     nodule   • Dysphagia     with pain "feels like a blockage" in the esophagus   • Eating disorder    • Fibromyalgia, primary    • Fissure, anal    • GERD (gastroesophageal reflux disease)    • Hearing difficulty of right ear     frequent infections-(x1 lead to sepsis)   • Hyperlipidemia    • Hypertension    • Irritable bowel syndrome    • Obesity    • Perforation of tympanic membrane     Right   • PONV (postoperative nausea and vomiting)    • RBBB     Right bundle branch blockage    • Sleep apnea     no cpap   • Thyroid nodule    • Wears glasses      Past Surgical History:   Procedure Laterality Date   • ANAL FISTULOTOMY N/A 2018    Procedure: FISTULOTOMY;  Surgeon: Carlton Garrison MD;  [facility];  Service: Colorectal   • BACK SURGERY      lumbar herniated disc   • BREAST CYST EXCISION Left 13 yrs ago  benign   • BREAST SURGERY Left 2006    cyst removal   • CARPAL TUNNEL RELEASE     • CERVICAL FUSION  2023    C5-6   •  SECTION     • CHOLECYSTECTOMY     • COLONOSCOPY     • CYST REMOVAL     • DENTAL SURGERY     • ENDOMETRIAL ABLATION N/A 2021    Procedure: D&C, ABLATION ENDOMETRIAL MARIEL;  Surgeon: Nancy Dunlap MD;  Location: [facility] MAIN OR;  Service: Gynecology   • FL MYELOGRAM CERVICAL  2022   • HAND SURGERY     • INCISION AND DRAINAGE OF WOUND N/A 11/02/2018    Procedure: INCISION AND DRAINAGE (I&D) BUTTOCK;  Surgeon: Omari Cain MD;  [facility];  Service: Colorectal   • MYRINGOTOMY W/ TUBES Right 08/17/2015    [place]   • WY ANRCT XM SURG REQ ANES GENERAL SPI/EDRL DX N/A 11/02/2018    Procedure: EXAM UNDER ANESTHESIA (EUA); Surgeon: Omari Cain MD;  [facility];  Service: Colorectal   • WY COLONOSCOPY FLX DX W/COLLJ [facility] WHEN PFRMD N/A 08/02/2017    Procedure: COLONOSCOPY;  Surgeon: Bradley River MD;  [facility]; Service: Gastroenterology   • WY DEBRIDEMENT SUBCUTANEOUS TISSUE 20 SQ CM/< N/A 07/19/2022    Procedure: Debridement abdominal wall;  Surgeon: Barbara Shah MD;  [facility]: [facility];  Service: General   • WY EXCISION HIDRADENITIS INGUINAL COMPLEX REPAIR Bilateral 01/18/2022    Procedure: WIDE EXCISION HIDRADENITIS ABDOMINAL WALL;  Surgeon: Barbara Shah MD;  Location: [facility];  Service: General   • [address] N/A 07/19/2022    Procedure: INCISION AND DRAINAGE ABDOMINAL WALL;  Surgeon: Barbara Shah MD;  [facility];  Service: General   • WY TENDON SHEATH INCISION Right 03/13/2018    Procedure: LONG TRIGGER FINGER RELEASE;  Surgeon: Cyn Monique DO;  [facility];  Service: Orthopedics   • WY TYMPANOPLASTY W/O MASTOIDECT W/O OSSICLE RECNSTJ Right 05/24/2017    Procedure: TYMPANOPLASTY WITH PERICHONDRIN GRAFT;  Surgeon:  Alexus Prescott DO;  [facility];  Service: ENT   • SHOULDER SURGERY Right    • TRIGGER FINGER RELEASE     • ULNAR NERVE TRANSPOSITION Left    • UMBILICAL HIDRADENITIS EXCISION  93/04/5417    subumbilical hidradenitis exicsion, Dr. Mathew Cohen, [facility] THYROID BIOPSY  7/3/2023   • WISDOM TOOTH EXTRACTION       Social History   Social History     Substance and Sexual Activity   Alcohol Use Not Currently    Comment: NICKI      Social History     Substance and Sexual Activity   Drug Use Never Social History     Tobacco Use   Smoking Status Never   Smokeless Tobacco Never     Family History   Problem Relation Age of Onset   • Hypertension Mother    • Hyperlipidemia Mother    • Diabetes Mother    • Hypertension Father    • Hyperlipidemia Father    • Heart disease Father         cardiac disorder-myocardial infarction arrhythmias   • Heart attack Father    • Crohn's disease Brother    • Arthritis Brother    • Diabetes unspecified Maternal Grandmother    • Thyroid disease Paternal Grandmother    • No Known Problems Daughter    • No Known Problems Maternal Aunt    • No Known Problems Maternal Aunt    • No Known Problems Maternal Aunt    • No Known Problems Maternal Aunt    • No Known Problems Paternal Aunt    • No Known Problems Paternal Aunt    • Heart attack Paternal Uncle        Meds/Allergies     (Not in a hospital admission)      Allergies   Allergen Reactions   • Neurontin [Gabapentin] Other (See Comments)     Other reaction(s): SUICIDE IDEATION  Other reaction(s): Other (See Comments)  suicidal  suicidal   • Penicillins Anaphylaxis, Swelling and Angioedema   • Aripiprazole Other (See Comments)     Other reaction(s): low dose 2 mg.; curving in and locking in of hands/dystonia  Other reaction(s): Other (See Comments)  Other reaction(s): low dose 2 mg.; curving in and locking in of hands/dystonia   • Lorazepam Other (See Comments)     Other reaction(s): higher dose > anxiety and depression  Other reaction(s):  Other (See Comments)  Other reaction(s): higher dose > anxiety and depression   • Carbamazepine Other (See Comments)     Other reaction(s): Unknown Reaction   • Doxycycline Hives   • Lamotrigine Rash   • Wound Dressing Adhesive Hives       Objective     /72   Pulse 75   Temp 97.8 °F (36.6 °C) (Temporal)   Resp 18   Ht 5' 7" (1.702 m)   Wt 132 kg (291 lb 7.2 oz)   SpO2 97%   BMI 45.65 kg/m²       PHYSICAL EXAM    Gen: NAD  CV: RRR  CHEST: Clear  ABD: soft, NT/ND  EXT: no edema  Neuro: AAO      ASSESSMENT/PLAN:  This is a 37y.o. year old female here for evaluation of dysphagia and GERD. PLAN:   Procedure: EGD with possible dilation.

## 2023-07-06 NOTE — ANESTHESIA POSTPROCEDURE EVALUATION
Post-Op Assessment Note    CV Status:  Stable  Pain Score: 0    Pain management: adequate     Mental Status:  Lethargic   Hydration Status:  Euvolemic and stable   PONV Controlled:  Controlled   Airway Patency:  Patent      Post Op Vitals Reviewed: Yes      Staff: CRNA         No notable events documented.     BP   165/85   Temp      Pulse  87   Resp   14   SpO2 94%

## 2023-07-07 PROCEDURE — 88313 SPECIAL STAINS GROUP 2: CPT | Performed by: PATHOLOGY

## 2023-07-07 PROCEDURE — 88305 TISSUE EXAM BY PATHOLOGIST: CPT | Performed by: PATHOLOGY

## 2023-07-07 PROCEDURE — 88312 SPECIAL STAINS GROUP 1: CPT | Performed by: PATHOLOGY

## 2023-07-10 ENCOUNTER — TELEPHONE (OUTPATIENT)
Age: 43
End: 2023-07-10

## 2023-07-11 NOTE — TELEPHONE ENCOUNTER
Called pharmacy  Was cycled through the answering system 6 time. LM in voice mail to have someone call our call center with insurance info for pt so a prior auth can be done.

## 2023-07-17 ENCOUNTER — TELEPHONE (OUTPATIENT)
Age: 43
End: 2023-07-17

## 2023-07-18 ENCOUNTER — TELEPHONE (OUTPATIENT)
Dept: GASTROENTEROLOGY | Facility: CLINIC | Age: 43
End: 2023-07-18

## 2023-07-18 NOTE — TELEPHONE ENCOUNTER
Flora Ends,   I just got a pretty blurry fax from what looks like [facility].   I'll ask Jo Egan to scan this in, but they wrote on it, and it looks like    [phone] or [phone]    Local [address] [place] [address] Way: [id number]  PCN: [id number]  Group: [id number]  ID: [id number]

## 2023-07-19 NOTE — TELEPHONE ENCOUNTER
Called pharmacy. Pharmacy has the discount card listed. Nothing comes up for an elegibilty search . Patient does not have medication insurance. Medication is $52.98 with this discount card  Suggested pt goggle [employer].

## 2023-07-19 NOTE — TELEPHONE ENCOUNTER
[employer] could not produce a form with the insurance listed. Sent a chat request to [facility] to help find a form. Chat representative stated: Looking at the information provided by the pharmacy, it appears that it is from a prescription program or a discount card. PA's would have to go through the patient's actual prescription insurance.

## 2023-07-24 ENCOUNTER — TELEPHONE (OUTPATIENT)
Age: 43
End: 2023-07-24

## 2023-07-24 DIAGNOSIS — K27.9 PUD (PEPTIC ULCER DISEASE): ICD-10-CM

## 2023-07-24 DIAGNOSIS — K21.9 GASTROESOPHAGEAL REFLUX DISEASE, UNSPECIFIED WHETHER ESOPHAGITIS PRESENT: Primary | ICD-10-CM

## 2023-07-24 NOTE — TELEPHONE ENCOUNTER
Caller: Jannette    Doctor: Tom Keyes    Reason for call: Patient's insurance will not pay to have the 40 mg 2 times daily of Nexium she needs to have a new script to have 20 mg x4 daily to get her 80 mg daily.     Pharmacy:  [address] [place] [address] #[id number] Ela Connolly, [facility]     After that     Call back#: [phone]

## 2023-07-26 ENCOUNTER — SOCIAL WORK (OUTPATIENT)
Dept: BEHAVIORAL/MENTAL HEALTH CLINIC | Facility: CLINIC | Age: 43
End: 2023-07-26
Payer: COMMERCIAL

## 2023-07-26 DIAGNOSIS — F33.1 MODERATE RECURRENT MAJOR DEPRESSION (HCC): Primary | ICD-10-CM

## 2023-07-26 DIAGNOSIS — F41.1 GENERALIZED ANXIETY DISORDER: ICD-10-CM

## 2023-07-26 PROCEDURE — 90834 PSYTX W PT 45 MINUTES: CPT | Performed by: SOCIAL WORKER
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
## 2023-08-01 ENCOUNTER — APPOINTMENT (OUTPATIENT)
Dept: LAB | Facility: CLINIC | Age: 43
End: 2023-08-01
Payer: COMMERCIAL

## 2023-08-01 DIAGNOSIS — R74.01 ELEVATED ALT MEASUREMENT: ICD-10-CM

## 2023-08-01 DIAGNOSIS — E78.2 MIXED HYPERLIPIDEMIA: ICD-10-CM

## 2023-08-01 LAB
ALBUMIN SERPL BCP-MCNC: 4.3 G/DL (ref 3.5–5)
ALP SERPL-CCNC: 62 U/L (ref 46–116)
ALT SERPL W P-5'-P-CCNC: 48 U/L (ref 12–78)
AST SERPL W P-5'-P-CCNC: 20 U/L (ref 5–45)
BILIRUB DIRECT SERPL-MCNC: 0.14 MG/DL (ref 0–0.2)
BILIRUB SERPL-MCNC: 0.43 MG/DL (ref 0.2–1)
CHOLEST SERPL-MCNC: 181 MG/DL
EST. AVERAGE GLUCOSE BLD GHB EST-MCNC: 100 MG/DL
HBA1C MFR BLD: 5.1 %
HDLC SERPL-MCNC: 51 MG/DL
LDLC SERPL CALC-MCNC: 97 MG/DL (ref 0–100)
PROT SERPL-MCNC: 7.4 G/DL (ref 6.4–8.4)
TRIGL SERPL-MCNC: 164 MG/DL
TSH SERPL DL<=0.05 MIU/L-ACNC: 1.75 UIU/ML (ref 0.45–4.5)

## 2023-08-01 PROCEDURE — 80076 HEPATIC FUNCTION PANEL: CPT

## 2023-08-01 PROCEDURE — 84443 ASSAY THYROID STIM HORMONE: CPT

## 2023-08-01 PROCEDURE — 80061 LIPID PANEL: CPT

## 2023-08-01 PROCEDURE — 36415 COLL VENOUS BLD VENIPUNCTURE: CPT

## 2023-08-01 PROCEDURE — 83036 HEMOGLOBIN GLYCOSYLATED A1C: CPT

## 2023-08-09 ENCOUNTER — SOCIAL WORK (OUTPATIENT)
Dept: BEHAVIORAL/MENTAL HEALTH CLINIC | Facility: CLINIC | Age: 43
End: 2023-08-09
Payer: COMMERCIAL

## 2023-08-09 DIAGNOSIS — F41.1 GENERALIZED ANXIETY DISORDER: ICD-10-CM

## 2023-08-09 DIAGNOSIS — F33.1 MODERATE RECURRENT MAJOR DEPRESSION (HCC): Primary | ICD-10-CM

## 2023-08-09 PROCEDURE — 90834 PSYTX W PT 45 MINUTES: CPT | Performed by: SOCIAL WORKER

## 2023-08-09 NOTE — BH TREATMENT PLAN
Outpatient Behavioral Health Psychotherapy Treatment Plan    Jannette Baugh  1980     Date of Initial Psychotherapy Assessment: 1/22/21  Date of Current Treatment Plan: 08/09/23  Treatment Plan Target Date: 1/9/24  Treatment Plan Expiration Date: 1/9/24    Diagnosis:   1. Moderate recurrent major depression ([address] )        2. Generalized anxiety disorder            Area(s) of Need: improve view of self, better manage emotions    Long Term Goal 1 (in the client's own words): "to not  myself so much"    Stage of Change: Preparation    Target Date for completion: 1/9/24     Anticipated therapeutic modalities: EMDR, Client Centered, Dialectical Behavioral Therapy, Cognitive Behavioral Therapy     People identified to complete this goal: Jannette CYNTHIA Marreroselina and Zita Goff      Objective 1: (identify the means of measuring success in meeting the objective):   1.1  Jannette will demonstrate openness to engage in therapy as evidenced by regular attendance and communication of her thoughts and feelings. 1.2  Jannette will be educated on the cognitive, biological and behavioral symptoms of trauma  1.3  Jannette will identify how symptoms are impacting current coping. 1.4  Jannette will engage in EMDR to decrease symptoms of depression and anxiety, improve view of self. 1.5   Jannette will learn and implement calming and relaxation strategies to manage anxiety and improve emotional safety. 1.6  Jannette will recognize negative thinking patterns and implement strategies to adopt more positive patterns  1.7  Jannette will verbalize understanding of how trauma has influenced belief of self, and adopt new belief of self through the use of EMDR, and developmental needs meeting techniques. 1.8  Jannette will implement healthy boundaries and healthy expressions of care within relationships.    1.9  Jannette will learn and implement guided self dialogue to manage thoughts, feelings and urges brought on  y encounters with trauma related stimuli       Long Term Goal 2 (in the client's own words):   1.1 Jannette will continue to be able to identify cognitive distortions as they arise, continue to use and develop cognitive coping mechanisms to dismantle unhelpful thoughts as they arise. 1.2  Jannette will continue to use and develop positive self talk, share successes and challenges. 1.3  Jannette will continue to practice setting limits with herself and others. 1.4  Jannette will decrease overcompensating with finding validation through others, focusing on others instead of herself as unhealthy coping mechanisms. 1.5  Jannette will continue to utilize coping strategies to separate responsibility to self vs responsibility to/of others. Henri Dougherty successes and challenges.        Long Term Goal 2 (in the client's own words): "to be able to de-escalate myself better when I'm overwhelmed"    Stage of Change: Preparation/Action    Target Date for completion: 1/9/24     Anticipated therapeutic modalities: Dialectical Behavioral Therapy, Mindfulness Strategies     People identified to complete this goal: Jannette Baugh and Zita Goff      Objective 1: (identify the means of measuring success in meeting the objective):   1.1  Jannette will demonstrate openness to engage in therapy as evidenced by regular attendance and communication of her thoughts and feelings. 1.2  Jannette will continue to use and develop DBT tools to learn how to better manage her emotions, improve interpersonal skills, improve frustration tolerance. 1.4  Jannette will continue to use and develop calming/relaxations tools to decrease emotional reactivity. 1.5  Jannette will continue to take medications as they arise, share questions and concerns as they arise.        I am currently under the care of a [facility] psychiatric provider: yes    My [facility] psychiatric provider is: Alisha Abraham PA-C    I am currently taking psychiatric medications: Yes, as prescribed    I feel that I will be ready for discharge from mental health care when I reach the following (measurable goal/objective): PHQ9 and GAD7 score decrease each by 5, Jacquelin Gavia Scale Score increased to within normal range, decrease SUDS    For children and adults who have a legal guardian:   Has there been any change to custody orders and/or guardianship status? NA. If yes, attach updated documentation. I have created my Crisis Plan on 5/17/23 and have been offered a copy of this plan    [address]: Diagnosis and Treatment Plan explained to [address] [place] [address]  acknowledges an understanding of their diagnosis. Jannette Baugh agrees to this treatment plan.     I have been offered a copy of this Treatment Plan. yes
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
## 2023-08-16 ENCOUNTER — OFFICE VISIT (OUTPATIENT)
Dept: CARDIOLOGY CLINIC | Facility: CLINIC | Age: 43
End: 2023-08-16
Payer: COMMERCIAL

## 2023-08-16 VITALS
BODY MASS INDEX: 44.7 KG/M2 | DIASTOLIC BLOOD PRESSURE: 80 MMHG | OXYGEN SATURATION: 98 % | SYSTOLIC BLOOD PRESSURE: 120 MMHG | HEART RATE: 103 BPM | WEIGHT: 285.4 LBS

## 2023-08-16 DIAGNOSIS — E78.1 HIGH TRIGLYCERIDES: ICD-10-CM

## 2023-08-16 DIAGNOSIS — E78.2 MIXED HYPERLIPIDEMIA: ICD-10-CM

## 2023-08-16 DIAGNOSIS — I10 PRIMARY HYPERTENSION: Primary | ICD-10-CM

## 2023-08-16 DIAGNOSIS — I45.10 RBBB (RIGHT BUNDLE BRANCH BLOCK): ICD-10-CM

## 2023-08-16 PROCEDURE — 99213 OFFICE O/P EST LOW 20 MIN: CPT | Performed by: INTERNAL MEDICINE

## 2023-08-16 NOTE — PROGRESS NOTES
Cardiology Follow Up    Jannette Baugh  1980  [id number]  [address] [facility]  [phone]    1. Primary hypertension        2. RBBB (right bundle branch block)        3. High triglycerides        4. Mixed hyperlipidemia              Discussion/Summary: All of her assessed cardiac problems are stable. I have reviewed her medications and made no changes. No cardiac testing is ordered. RTO 1 year. Interval History: Her recent stress echo showed fairly good exercise tolerance without CP or ischemia. She is still active and doing 30 minute hikes. She does have chronic back problems.   /80  LDL 94  Trigs down to 164    Cardiac cath 2009 - normal.    Patient Active Problem List   Diagnosis   • Thyroid nodule   • Trigger finger, right middle finger   • Carpal tunnel syndrome, bilateral   • Primary hypertension   • Breast hypertrophy   • Bulging lumbar disc   • Calcific tendinitis of right shoulder   • Chronic right-sided low back pain without sciatica   • Chronic sinusitis   • Dermatitis, eczematoid   • Esophageal reflux   • Fibromyalgia   • H/O laminectomy   • Hemorrhoids   • Hidradenitis suppurativa   • Hyperlipidemia   • Mammographic microcalcification   • Obesity, Class III, BMI 40-49.9 (morbid obesity) ([facility])   • Sleep apnea   • Pars defect of lumbar spine   • RBBB (right bundle branch block)   • Osteoarthritis of spine with radiculopathy, lumbar region   • Vitamin D insufficiency   • Atypical chest pain   • Sebaceous cyst of breast, left   • Left cervical lymphadenopathy   • Eliz-Danlos syndrome   • History of sepsis   • Asthma   • High triglycerides   • Chronic headache with new features   • Impaired fasting glucose   • Moderate recurrent major depression ([facility])   • Generalized anxiety disorder   • Depression, recurrent ([facility])   • Cubital tunnel syndrome   • Sprain of rotator cuff capsule   • PONV (postoperative nausea and vomiting)   • Open wound   • Fluid collection at surgical site   • Other cervical disc degeneration at C6-C7 level   • Esophageal dysphagia   • Elevated ALT measurement     Past Medical History:   Diagnosis Date   • Allergic rhinitis    • Anxiety    • Arthritis    • C. difficile diarrhea 2020   • Chest pain    • Chronic pain disorder     BACK SHOULDERS NECK   • Colon polyp    • COPD (chronic obstructive pulmonary disease) ([facility])    • CPAP (continuous positive airway pressure) dependence    • Depression    • Diarrhea    • Disease of thyroid gland     nodule   • Dysphagia     with pain "feels like a blockage" in the esophagus   • Eating disorder    • Fibromyalgia, primary    • Fissure, anal    • GERD (gastroesophageal reflux disease)    • Hearing difficulty of right ear     frequent infections-(x1 lead to sepsis)   • Hyperlipidemia    • Hypertension    • Irritable bowel syndrome    • Obesity    • Perforation of tympanic membrane     Right   • PONV (postoperative nausea and vomiting)    • RBBB     Right bundle branch blockage    • Sleep apnea     no cpap   • Thyroid nodule    • Wears glasses      Social History     Socioeconomic History   • Marital status: /Civil Union     Spouse name: Not on file   • Number of children: Not on file   • Years of education: Not on file   • Highest education level: Not on file   Occupational History   • Not on file   Tobacco Use   • Smoking status: Never   • Smokeless tobacco: Never   Vaping Use   • Vaping Use: Never used   Substance and Sexual Activity   • Alcohol use: Not Currently     Comment: NICKI    • Drug use: Never   • Sexual activity: Yes     Partners: Male     Birth control/protection: Condom Male     Comment: ablation   Other Topics Concern   • Not on file   Social History Narrative    Daily caffeine use- 1 cup of coffee     Social Determinants of Health     Financial Resource Strain: Not on file   Food Insecurity: Not on file Transportation Needs: No Transportation Needs (5/10/2021)    PRAPARE - Transportation    • Lack of Transportation (Medical): No    • Lack of Transportation (Non-Medical):  No   Physical Activity: Unknown (5/10/2021)    Exercise Vital Sign    • Days of Exercise per Week: 2 days    • Minutes of Exercise per Session: Not asked   Stress: Not on file   Social Connections: Not on file   Intimate Partner Violence: Not on file   Housing Stability: Not on file      Family History   Problem Relation Age of Onset   • Hypertension Mother    • Hyperlipidemia Mother    • Diabetes Mother    • Hypertension Father    • Hyperlipidemia Father    • Heart disease Father         cardiac disorder-myocardial infarction arrhythmias   • Heart attack Father    • Crohn's disease Brother    • Arthritis Brother    • Diabetes unspecified Maternal Grandmother    • Thyroid disease Paternal Grandmother    • No Known Problems Daughter    • No Known Problems Maternal Aunt    • No Known Problems Maternal Aunt    • No Known Problems Maternal Aunt    • No Known Problems Maternal Aunt    • No Known Problems Paternal Aunt    • No Known Problems Paternal Aunt    • Heart attack Paternal Uncle      Past Surgical History:   Procedure Laterality Date   • ANAL FISTULOTOMY N/A 2018    Procedure: FISTULOTOMY;  Surgeon: Sobia Gallagher MD;  [facility];  Service: Colorectal   • BACK SURGERY      lumbar herniated disc   • BREAST CYST EXCISION Left 13 yrs ago  benign   • BREAST SURGERY Left 2006    cyst removal   • CARPAL TUNNEL RELEASE     • CERVICAL FUSION  2023    C5-6   •  SECTION     • CHOLECYSTECTOMY     • COLONOSCOPY     • CYST REMOVAL     • DENTAL SURGERY     • ENDOMETRIAL ABLATION N/A 2021    Procedure: D&C, ABLATION ENDOMETRIAL MARIEL;  Surgeon: Bong Perez MD;  Location: [facility] MAIN OR;  Service: Gynecology   • FL MYELOGRAM CERVICAL  2022   • HAND SURGERY     • INCISION AND DRAINAGE OF WOUND N/A 11/02/2018    Procedure: INCISION AND DRAINAGE (I&D) BUTTOCK;  Surgeon: No Pena MD;  [facility];  Service: Colorectal   • MYRINGOTOMY W/ TUBES Right 08/17/2015    [place]   • CO ANRCT XM SURG REQ ANES GENERAL SPI/EDRL DX N/A 11/02/2018    Procedure: EXAM UNDER ANESTHESIA (EUA); Surgeon: No Pena MD;  [facility];  Service: Colorectal   • CO COLONOSCOPY FLX DX W/COLLJ [facility] WHEN PFRMD N/A 08/02/2017    Procedure: COLONOSCOPY;  Surgeon: Ashlee Cordova MD;  [facility]; Service: Gastroenterology   • CO DEBRIDEMENT SUBCUTANEOUS TISSUE 20 SQ CM/< N/A 07/19/2022    Procedure: Debridement abdominal wall;  Surgeon: Dori Avila MD;  Location: [facility];  Service: General   • CO EXCISION HIDRADENITIS INGUINAL COMPLEX REPAIR Bilateral 01/18/2022    Procedure: WIDE EXCISION HIDRADENITIS ABDOMINAL WALL;  Surgeon: Dori Avila MD;  Location: [facility];  Service: General   • [address] N/A 07/19/2022    Procedure: INCISION AND DRAINAGE ABDOMINAL WALL;  Surgeon: Dori Avila MD;  Location: [facility];  Service: General   • CO TENDON SHEATH INCISION Right 03/13/2018    Procedure: LONG TRIGGER FINGER RELEASE;  Surgeon: Alysa Rojas DO;  [facility];  Service: Orthopedics   • CO TYMPANOPLASTY W/O MASTOIDECT W/O OSSICLE RECNSTJ Right 05/24/2017    Procedure: TYMPANOPLASTY WITH PERICHONDRIN GRAFT;  Surgeon:  Compa Mendez DO;  [facility];  Service: ENT   • SHOULDER SURGERY Right    • TRIGGER FINGER RELEASE     • ULNAR NERVE TRANSPOSITION Left    • UMBILICAL HIDRADENITIS EXCISION  44/99/1159    subumbilical hidradenitis exicsion, Dr. Ana Teague, [address] [facility]  7/3/2023   • WISDOM TOOTH EXTRACTION         Current Outpatient Medications:   •  albuterol (PROVENTIL HFA,VENTOLIN HFA) 90 mcg/act inhaler, Inhale 2 puffs every 6 (six) hours as needed for wheezing or shortness of breath (cough), Disp: 18 g, Rfl: 1  •  amitriptyline (ELAVIL) 75 mg tablet, TAKE 1 TABLET (75 MG) BY MOUTH ONCE DAILY AT BEDTIME, Disp: 90 tablet, Rfl: 0  •  Ascorbic Acid (VITAMIN C) 500 MG CAPS, Take 1 capsule by mouth daily at bedtime, Disp: , Rfl:   •  aspirin 81 mg chewable tablet, Chew 1 tablet (81 mg total) daily (Patient taking differently: Chew 81 mg daily at bedtime), Disp: 30 tablet, Rfl: 30  •  cetirizine (ZyrTEC) 10 mg tablet, Take 10 mg by mouth daily, Disp: , Rfl:   •  Cholecalciferol (VITAMIN D3) 5000 units CAPS, Take 5,000 Units by mouth daily at bedtime, Disp: , Rfl:   •  ciprofloxacin-dexamethasone (CIPRODEX) otic suspension, Administer 4 drops to the right ear in the morning and 4 drops in the evening., Disp: 7.5 mL, Rfl: 3  •  cyanocobalamin (VITAMIN B-12) 1,000 mcg tablet, Take 1,000 mcg by mouth daily , Disp: , Rfl:   •  docusate sodium (DULCOLAX) 100 mg capsule, Take by mouth daily at bedtime, Disp: , Rfl:   •  esomeprazole (NexIUM) 20 mg capsule, Take 2 tablets PO twice daily before meals. , Disp: 120 capsule, Rfl: 3  •  levofloxacin (LEVAQUIN) 500 mg tablet, Take 500 mg by mouth daily, Disp: , Rfl:   •  losartan (COZAAR) 100 MG tablet, TAKE 1 TABLET BY MOUTH ONCE DAILY (Patient taking differently: 100 mg daily at bedtime), Disp: 90 tablet, Rfl: 2  •  Melatonin 10 MG TABS, Take by mouth daily at bedtime, Disp: , Rfl:   •  metoprolol succinate (TOPROL-XL) 100 mg 24 hr tablet, TAKE 1 TABLET BY MOUTH ONCE DAILY (Patient taking differently: 100 mg daily at bedtime), Disp: 100 tablet, Rfl: 1  •  metroNIDAZOLE (FLAGYL) 250 mg tablet, Take 250 mg by mouth 3 (three) times a day, Disp: , Rfl:   •  Multiple Vitamins-Minerals (CENTRUM ADULTS PO), Take 1 tablet by mouth daily at bedtime, Disp: , Rfl:   •  nystatin (MYCOSTATIN) cream, Apply topically 2 (two) times a day as needed (rash), Disp: 30 g, Rfl: 1  •  Omega-3 Fatty Acids (FISH OIL ADULT GUMMIES PO), Take by mouth daily at bedtime, Disp: , Rfl:   •  rosuvastatin (CRESTOR) 40 MG tablet, Take 1 tablet (40 mg total) by mouth daily (Patient taking differently: Take 40 mg by mouth daily at bedtime), Disp: 30 tablet, Rfl: 0  •  venlafaxine (EFFEXOR-XR) 150 mg 24 hr capsule, TAKE 1 CAPSULE (150 MG) BY MOUTH ONCE DAILY WITH 75 MG CAPSULE FOR A TOTAL DAILY DOSE  MG (Patient taking differently: daily at bedtime), Disp: 90 capsule, Rfl: 0  •  venlafaxine (EFFEXOR-XR) 75 mg 24 hr capsule, TAKE 1 CAPSULE (75 MG) BY MOUTH ONCE DAILY WITH 150 MG CAPSULE FOR A TOTAL DAILY DOSE  MG (Patient taking differently: daily at bedtime), Disp: 90 capsule, Rfl: 0  •  Zinc 50 MG TABS, Take by mouth daily at bedtime, Disp: , Rfl:   •  neomycin-polymyxin-hydrocortisone (CORTISPORIN) otic solution, Administer 4 drops into the left ear 3 (three) times a day for 10 days, Disp: 10 mL, Rfl: 0  Allergies   Allergen Reactions   • Neurontin [Gabapentin] Other (See Comments)     Other reaction(s): SUICIDE IDEATION  Other reaction(s): Other (See Comments)  suicidal  suicidal   • Penicillins Anaphylaxis, Swelling and Angioedema   • Aripiprazole Other (See Comments)     Other reaction(s): low dose 2 mg.; curving in and locking in of hands/dystonia  Other reaction(s): Other (See Comments)  Other reaction(s): low dose 2 mg.; curving in and locking in of hands/dystonia   • Lorazepam Other (See Comments)     Other reaction(s): higher dose > anxiety and depression  Other reaction(s):  Other (See Comments)  Other reaction(s): higher dose > anxiety and depression   • Carbamazepine Other (See Comments)     Other reaction(s): Unknown Reaction   • Doxycycline Hives   • Lamotrigine Rash   • Wound Dressing Adhesive Hives     Vitals:    08/16/23 1426   BP: 120/80   BP Location: Right arm   Patient Position: Sitting   Cuff Size: Large   Pulse: 103   SpO2: 98%   Weight: 129 kg (285 lb 6.4 oz)     Weight (last 2 days)     Date/Time Weight    08/16/23 1426 129 (285.4)         Blood pressure 120/80, pulse 103, weight 129 kg (285 lb 6.4 oz), SpO2 98 %. , Body mass index is 44.7 kg/m². Labs:  Appointment on 08/01/2023   Component Date Value   • TSH 3RD GENERATON 08/01/2023 1.750    • Hemoglobin A1C 08/01/2023 5.1    • EAG 08/01/2023 100    • Total Bilirubin 08/01/2023 0.43    • Bilirubin, Direct 08/01/2023 0.14    • Alkaline Phosphatase 08/01/2023 62    • AST 08/01/2023 20    • ALT 08/01/2023 48    • Total Protein 08/01/2023 7.4    • Albumin 08/01/2023 4.3    • Cholesterol 08/01/2023 181    • Triglycerides 08/01/2023 164 (H)    • HDL, Direct 08/01/2023 51    • LDL Calculated 08/01/2023 [facility] Outpatient Visit on 07/06/2023   Component Date Value   • EXT Preg Test, Ur 07/06/2023 Negative    • Control 07/06/2023 Valid    • Case Report 07/06/2023                      Value:Surgical Pathology Report                         Case: [id number]                                   Authorizing Provider:  Geno Castro MD       Collected:           07/06/2023 1330              Ordering Location:     [address] Received:            07/06/2023 1407                                     Operating Room                                                               Pathologist:           Agusto Levine MD                                                                    Specimens:   A) - Stomach, gastric ulcer edge biopsy                                                             B) - Stomach, bx. gastric , R/O H. Pylori                                                           C) - Stomach, bx. Gastric polyp                                                                     D) - Esophagus, esophagus bx. R/O EOE                                                     • Addendum 07/06/2023                      Value: This result contains rich text formatting which cannot be displayed here. • Final Diagnosis 07/06/2023                      Value: This result contains rich text formatting which cannot be displayed here.    • Additional Information 07/06/2023                      Value: This result contains rich text formatting which cannot be displayed here. • Gross Description 07/06/2023                      Value: This result contains rich text formatting which cannot be displayed here. • Clinical Information 07/06/2023                      Value:Per EGD,  INDICATION:  Esophageal dysphagia, Gastroesophageal reflux disease without esophagitis, Elevated ALT measurement     FINDINGS:  · Regular Z-line 40 cm from the incisors  · External bulge noted at 26 cm from incisors with mild narrowing distally just below the bulge, therefore an empiric dilation was performed. · Dilated in the esophagus with [employer] dilator from 48 Fr starting size. Dilation caused improved passage of the scope. Mucosa was reexamined, there were no tears noted in the esophagus, biopsies of the esophagus were obtained subsequently to assess for EOE.   · Single small, superficial, linear ulcer in the fundus of the stomach with clean base (Cristian III); performed cold forceps biopsy  · Ten or more semi-pedunculated fundic gland polyps measuring smaller than 5 mm in the fundus of the stomach and body of the stomach; performed partial en bloc removal by cold forceps biopsy  · The duodenal bulb, 1st part of the duodenum and 2nd part of the duodenum appeared                           normal.  · Moderate, patchy edematous and erythematous mucosa with erosion in the antrum and prepyloric region; performed cold forceps biopsy to rule out H. pylori  · Performed forceps biopsies to rule out eosinophilic esophagitis in the middle third of the esophagus and lower third of the esophagus     Hospital Outpatient Visit on 07/03/2023   Component Date Value   • Case Report 07/03/2023                      Value:Non-gynecologic Cytology                          Case: [id number]                                  Authorizing Provider:  Thompson Ohara MD   Collected:           07/03/2023 1054 Ordering Location:     [facility]      Received:            07/03/2023 [address] [facility] Ultrasound                                                          Pathologist:           Walter Landers DO                                                            Specimens:   A) - Thyroid, Right, Lower pole                                                                     B) - Thyroid, Right, Lower                                                                • Final Diagnosis 07/03/2023                      Value: This result contains rich text formatting which cannot be displayed here. • Note 07/03/2023                      Value: This result contains rich text formatting which cannot be displayed here. • Intraoperative Consultat* 07/03/2023                      Value: This result contains rich text formatting which cannot be displayed here. • Gross Description 07/03/2023                      Value: This result contains rich text formatting which cannot be displayed here. • Clinical Information 07/03/2023                      Value:2.3 x 1.9 x 1.4 cm right lower thyroid nodule   • Additional Information 07/03/2023                      Value: This result contains rich text formatting which cannot be displayed here.    Hospital Outpatient Visit on 06/07/2023   Component Date Value   • Baseline HR 06/07/2023 72    • Baseline BP 06/07/2023 124/80    • O2 sat rest 06/07/2023 98    • Stress peak HR 06/07/2023 148    • Post peak BP 06/07/2023 164    • Rate Pressure Product 06/07/2023 24,272.0    • O2 sat peak 06/07/2023 98    • Recovery HR 06/07/2023 100    • Recovery BP 06/07/2023 140/80    • O2 sat recovery 06/07/2023 99    • Max HR 06/07/2023 153    • Max HR Percent 06/07/2023 86    • Exercise duration (min) 06/07/2023 6    • Exercise duration (sec) 06/07/2023 30    • Estimated workload 06/07/2023 7.7    • Angina Index 06/07/2023 0    • Stress Stage Reached 06/07/2023 3.0    • Protocol Name 06/07/2023 LUIS    • Time In Exercise Phase 06/07/2023 00:06:30    • MAX.  SYSTOLIC BP 03/04/5789 981    • Max Diastolic Bp 41/59/8520 90    • Max Heart Rate 06/07/2023 153    • Max Predicted Heart Rate 06/07/2023 177    • Reason for Termination 06/07/2023                      Value:Fatigue  Dyspnea  Target Heart Rate Achieved     • Test Indication 06/07/2023 Screening for CAD    • Target Hr Formular 06/07/2023 (220 - Age)*85%    • Arrhy During Ex 06/07/2023                      Value:Bundle branch block  ventricular premature beats-isolated     • ECG Interp Before Ex 06/07/2023 right bundle branch block    • Chest Pain Statement 06/07/2023 none    Appointment on 03/30/2023   Component Date Value   • WBC 03/30/2023 7.49    • RBC 03/30/2023 4.62    • Hemoglobin 03/30/2023 14.1    • Hematocrit 03/30/2023 42.4    • MCV 03/30/2023 92    • MCH 03/30/2023 30.5    • MCHC 03/30/2023 33.3    • RDW 03/30/2023 13.4    • MPV 03/30/2023 10.7    • Platelets 20/69/7307 165    • nRBC 03/30/2023 0    • Neutrophils Relative 03/30/2023 68    • Immat GRANS % 03/30/2023 0    • Lymphocytes Relative 03/30/2023 22    • Monocytes Relative 03/30/2023 7    • Eosinophils Relative 03/30/2023 2    • Basophils Relative 03/30/2023 1    • Neutrophils Absolute 03/30/2023 5.09    • Immature Grans Absolute 03/30/2023 0.02    • Lymphocytes Absolute 03/30/2023 1.63    • Monocytes Absolute 03/30/2023 0.54    • Eosinophils Absolute 03/30/2023 0.17    • Basophils Absolute 03/30/2023 0.04    • Sodium 03/30/2023 140    • Potassium 03/30/2023 3.9    • Chloride 03/30/2023 102    • CO2 03/30/2023 29    • ANION GAP 03/30/2023 9    • BUN 03/30/2023 13    • Creatinine 03/30/2023 0.59 (L)    • Glucose, Fasting 03/30/2023 114 (H)    • Calcium 03/30/2023 9.4    • AST 03/30/2023 34    • ALT 03/30/2023 61 (H)    • Alkaline Phosphatase 03/30/2023 58    • Total Protein 03/30/2023 6.7    • Albumin 03/30/2023 4.5    • Total Bilirubin 03/30/2023 0.59    • eGFR 03/30/2023 112    Office Visit on 03/02/2023   Component Date Value   • POCT SARS-CoV-2 Ag 03/02/2023 Negative    • VALID CONTROL 03/02/2023 Valid    Office Visit on 02/20/2023   Component Date Value   • Ventricular Rate 02/20/2023 80    • Atrial Rate 02/20/2023 80    • MT Interval 02/20/2023 146    • QRSD Interval 02/20/2023 126    • QT Interval 02/20/2023 396    • QTC Interval 02/20/2023 456    • P Axis 02/20/2023 27    • QRS Axis 02/20/2023 -30    • T Wave Axis 02/20/2023 16      Imaging: No results found. Review of Systems:  Review of Systems   Constitutional: Negative for diaphoresis, fatigue, fever and unexpected weight change. HENT: Negative. Respiratory: Negative for cough, shortness of breath and wheezing. Cardiovascular: Negative for chest pain, palpitations and leg swelling. Gastrointestinal: Negative for abdominal pain, diarrhea and nausea. Musculoskeletal: Negative for gait problem and myalgias. Skin: Negative for rash. Neurological: Negative for dizziness and numbness. Psychiatric/Behavioral: Negative. Physical Exam:  Physical Exam  Constitutional:       Appearance: She is well-developed. HENT:      Head: Normocephalic and atraumatic. Eyes:      Pupils: Pupils are equal, round, and reactive to light. Neck:      Vascular: No JVD. Cardiovascular:      Rate and Rhythm: Regular rhythm. Pulses: Normal pulses. Carotid pulses are 2+ on the right side and 2+ on the left side. Heart sounds: S1 normal and S2 normal.   Pulmonary:      Effort: Pulmonary effort is normal.      Breath sounds: Normal breath sounds. No wheezing or rales. Abdominal:      General: Bowel sounds are normal.      Palpations: Abdomen is soft. Tenderness: There is no abdominal tenderness. Musculoskeletal:         General: No tenderness. Normal range of motion. Cervical back: Normal range of motion and neck supple. Skin:     General: Skin is warm. Neurological:      Mental Status: She is alert and oriented to person, place, and time. Cranial Nerves: No cranial nerve deficit. Deep Tendon Reflexes: Reflexes are normal and symmetric.

## 2023-08-17 ENCOUNTER — TELEPHONE (OUTPATIENT)
Age: 43
End: 2023-08-17

## 2023-08-17 DIAGNOSIS — K27.9 PUD (PEPTIC ULCER DISEASE): ICD-10-CM

## 2023-08-17 DIAGNOSIS — K21.9 GASTROESOPHAGEAL REFLUX DISEASE, UNSPECIFIED WHETHER ESOPHAGITIS PRESENT: Primary | ICD-10-CM

## 2023-08-17 NOTE — TELEPHONE ENCOUNTER
Tobi Rose from [address] [place] [address] called to stated the nexium needs a prior auth. The pharmacist further explained that pts insurance will cover OTC nexium two-20 mg BID however the rx is back ordered until October. Also its simpler to have nexium one-40 mg BID. Pts insurance will likely cover nexium 40 mg bid if a prior auth/letter of necessity is filled out.  There also may be an issue as the union must give insurance approval.     I stated I would send msg to prior auth team.    If any question for Tobi Rose please call [phone]

## 2023-08-18 NOTE — TELEPHONE ENCOUNTER
Routing to Provider  Do you want to change meds as per message from [address] [place] [address]?   Thank you

## 2023-08-19 DIAGNOSIS — K27.9 PUD (PEPTIC ULCER DISEASE): ICD-10-CM

## 2023-08-19 DIAGNOSIS — R13.19 ESOPHAGEAL DYSPHAGIA: ICD-10-CM

## 2023-08-19 DIAGNOSIS — K21.00 GASTROESOPHAGEAL REFLUX DISEASE WITH ESOPHAGITIS WITHOUT HEMORRHAGE: Primary | ICD-10-CM

## 2023-08-19 RX ORDER — ESOMEPRAZOLE MAGNESIUM 40 MG/1
40 CAPSULE, DELAYED RELEASE ORAL
Qty: 180 CAPSULE | Refills: 3 | Status: SHIPPED | OUTPATIENT
Start: 2023-08-19 | End: 2023-08-19

## 2023-08-19 RX ORDER — ESOMEPRAZOLE MAGNESIUM 40 MG/1
40 CAPSULE, DELAYED RELEASE ORAL
Qty: 180 CAPSULE | Refills: 3 | Status: SHIPPED | OUTPATIENT
Start: 2023-08-19 | End: 2023-09-06 | Stop reason: SDUPTHER

## 2023-08-21 NOTE — TELEPHONE ENCOUNTER
Could not pull up form in [employer]. Was told only pharmacy can request a PA , not a Dr office. Also was advised pt picked up script on Aug 1 for Esemopraoze 20 mg for 120 quantity.   Pt should have enough pills until Oct.

## 2023-08-21 NOTE — TELEPHONE ENCOUNTER
[address]  [id number]  PCN  [id number]  [facility]  [id number]  ID  [id number]  [phone]

## 2023-08-22 ENCOUNTER — OFFICE VISIT (OUTPATIENT)
Dept: PSYCHIATRY | Facility: CLINIC | Age: 43
End: 2023-08-22

## 2023-08-22 DIAGNOSIS — F41.1 GENERALIZED ANXIETY DISORDER: ICD-10-CM

## 2023-08-22 DIAGNOSIS — F33.1 MODERATE RECURRENT MAJOR DEPRESSION (HCC): Primary | ICD-10-CM

## 2023-08-22 RX ORDER — AMITRIPTYLINE HYDROCHLORIDE 75 MG/1
75 TABLET ORAL
Qty: 90 TABLET | Refills: 0 | Status: SHIPPED | OUTPATIENT
Start: 2023-08-22

## 2023-08-22 RX ORDER — VENLAFAXINE HYDROCHLORIDE 75 MG/1
75 CAPSULE, EXTENDED RELEASE ORAL
Qty: 90 CAPSULE | Refills: 0 | Status: SHIPPED | OUTPATIENT
Start: 2023-08-22

## 2023-08-22 RX ORDER — VENLAFAXINE HYDROCHLORIDE 150 MG/1
150 CAPSULE, EXTENDED RELEASE ORAL
Qty: 90 CAPSULE | Refills: 0 | Status: SHIPPED | OUTPATIENT
Start: 2023-08-22

## 2023-08-23 ENCOUNTER — SOCIAL WORK (OUTPATIENT)
Dept: BEHAVIORAL/MENTAL HEALTH CLINIC | Facility: CLINIC | Age: 43
End: 2023-08-23
Payer: COMMERCIAL

## 2023-08-23 DIAGNOSIS — F41.1 GENERALIZED ANXIETY DISORDER: ICD-10-CM

## 2023-08-23 DIAGNOSIS — F33.9 DEPRESSION, RECURRENT (HCC): Primary | ICD-10-CM

## 2023-08-23 PROCEDURE — 90834 PSYTX W PT 45 MINUTES: CPT | Performed by: SOCIAL WORKER
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
## 2023-09-05 DIAGNOSIS — K27.9 PUD (PEPTIC ULCER DISEASE): ICD-10-CM

## 2023-09-05 DIAGNOSIS — K21.9 GASTROESOPHAGEAL REFLUX DISEASE, UNSPECIFIED WHETHER ESOPHAGITIS PRESENT: ICD-10-CM

## 2023-09-05 RX ORDER — ESOMEPRAZOLE 20 MG/1
CAPSULE, DELAYED RELEASE ORAL
Qty: 182 CAPSULE | Refills: 3 | OUTPATIENT
Start: 2023-09-05

## 2023-09-06 ENCOUNTER — SOCIAL WORK (OUTPATIENT)
Dept: BEHAVIORAL/MENTAL HEALTH CLINIC | Facility: CLINIC | Age: 43
End: 2023-09-06
Payer: COMMERCIAL

## 2023-09-06 DIAGNOSIS — F33.9 DEPRESSION, RECURRENT (HCC): Primary | ICD-10-CM

## 2023-09-06 DIAGNOSIS — K27.9 PUD (PEPTIC ULCER DISEASE): ICD-10-CM

## 2023-09-06 DIAGNOSIS — R13.19 ESOPHAGEAL DYSPHAGIA: ICD-10-CM

## 2023-09-06 DIAGNOSIS — F41.1 GENERALIZED ANXIETY DISORDER: ICD-10-CM

## 2023-09-06 DIAGNOSIS — K21.00 GASTROESOPHAGEAL REFLUX DISEASE WITH ESOPHAGITIS WITHOUT HEMORRHAGE: ICD-10-CM

## 2023-09-06 PROCEDURE — 90834 PSYTX W PT 45 MINUTES: CPT | Performed by: SOCIAL WORKER

## 2023-09-07 DIAGNOSIS — R13.19 ESOPHAGEAL DYSPHAGIA: ICD-10-CM

## 2023-09-07 DIAGNOSIS — K21.00 GASTROESOPHAGEAL REFLUX DISEASE WITH ESOPHAGITIS WITHOUT HEMORRHAGE: ICD-10-CM

## 2023-09-07 DIAGNOSIS — K27.9 PUD (PEPTIC ULCER DISEASE): ICD-10-CM

## 2023-09-07 NOTE — PSYCH
This note was not shared with the patient due to this is a psychotherapy note      Behavioral Health Psychotherapy Progress Note    Psychotherapy Provided: Individual Psychotherapy     1. Depression, recurrent ([address])        2. Generalized anxiety disorder            Goals addressed in session: Goal 1     DATA: Met with Jannette. Re-evaluation of EMDR since last session. Jannette is reporting 'realizations' since last session regarding the dynamics of her family and relationships. Jannette states that despite these things, she continues to struggle with unhelpful thoughts about herself as a person. Focused on dialectical thinking, self validation today- stepping out of the black and white and into shades of acosta. Jannette was successfully able to do this in session with her own examples as well as through a practice worksheet. Jannette states she is also able to use this with others; however, struggles to use this with herself outside of session. Encouraged daily practice, writing down negative thought- creating dialectical thought, bring to next session. During this session, this clinician used the following therapeutic modalities: Client-centered Therapy and Dialectical Behavior Therapy    Substance Abuse was not addressed during this session. If the client is diagnosed with a co-occurring substance use disorder, please indicate any changes in the frequency or amount of use: na. Stage of change for addressing substance use diagnoses: No substance use/Not applicable    ASSESSMENT:  Jannette Baugh presents with a Anxious and Depressed mood. her affect is Constricted, which is congruent, with her mood and the content of the session. The client has not made progress on their goals since last session. Jannette Baugh presents with a none risk of suicide, none risk of self-harm, and none risk of harm to others. For any risk assessment that surpasses a "low" rating, a safety plan must be developed.     A safety plan was indicated: no  If yes, describe in detail na    PLAN: Between sessions, Jannette CYNTHIA Baugh will practice dialectical thinking. At the next session, the therapist will use EMDR (or other form of bilateral Stimulation) to address painful memories affecting view of self, depression and anxiety. Behavioral Health Treatment Plan and Discharge Planning: Mary Anne Lopez is aware of and agrees to continue to work on their treatment plan. They have identified and are working toward their discharge goals.  yes    Visit start and stop times:    09/06/23  Start Time: 1005  Stop Time: 1045  Total Visit Time: 40 minutes

## 2023-09-13 NOTE — PRE-PROCEDURE INSTRUCTIONS
Pre-Surgery Instructions:   Medication Instructions   • albuterol (PROVENTIL HFA,VENTOLIN HFA) 90 mcg/act inhaler Uses PRN- OK to take day of surgery   • amitriptyline (ELAVIL) 75 mg tablet    • Ascorbic Acid (VITAMIN C) 500 MG CAPS    • aspirin 81 mg chewable tablet    • cetirizine (ZyrTEC) 10 mg tablet    • Cholecalciferol (VITAMIN D3) 5000 units CAPS    • cyanocobalamin (VITAMIN B-12) 1,000 mcg tablet    • docusate sodium (DULCOLAX) 100 mg capsule    • esomeprazole (NexIUM) 20 mg capsule    • levofloxacin (LEVAQUIN) 500 mg tablet    • losartan (COZAAR) 100 MG tablet    • Melatonin 10 MG TABS    • metoprolol succinate (TOPROL-XL) 100 mg 24 hr tablet    • Multiple Vitamins-Minerals (CENTRUM ADULTS PO)    • Omega-3 Fatty Acids (FISH OIL ADULT GUMMIES PO)    • rosuvastatin (CRESTOR) 40 MG tablet    • venlafaxine (EFFEXOR-XR) 150 mg 24 hr capsule    • venlafaxine (EFFEXOR-XR) 75 mg 24 hr capsule    • Zinc 50 MG TABS       PER PATIENT, SHE TAKES ALL OF HER MEDICATIONS, VITAMINS, AND SUPPLEMENTS IN THE EVENING/NIGHT. INSTRUCTED TO TAKE ALL OF THE ABOVE PER HER NORMAL ROUTINE AT NIGHT. The patient should have nothing to eat or drink after 11 pm the night before the MRI. The patient may take their medications with a sip of water at least 2 hours prior to their arrival time. You will receive a call the evening before your MRI appointment with additional instructions. Please leave your jewelry and valuables at home, wedding rings are the exception. Please bring your physician order, insurance cards, a form of photo ID and a list of your medications with you. Arrive 15 minutes prior to your appointment time in order to register. Please bring any prior CT or MRI studies of this area that were not performed at a [facility].

## 2023-09-20 ENCOUNTER — SOCIAL WORK (OUTPATIENT)
Dept: BEHAVIORAL/MENTAL HEALTH CLINIC | Facility: CLINIC | Age: 43
End: 2023-09-20
Payer: COMMERCIAL

## 2023-09-20 DIAGNOSIS — F33.1 MODERATE RECURRENT MAJOR DEPRESSION (HCC): Primary | ICD-10-CM

## 2023-09-20 DIAGNOSIS — F41.1 GENERALIZED ANXIETY DISORDER: ICD-10-CM

## 2023-09-20 PROCEDURE — 90834 PSYTX W PT 45 MINUTES: CPT | Performed by: SOCIAL WORKER

## 2023-09-20 NOTE — PSYCH
This note was not shared with the patient due to this is a psychotherapy note      Behavioral Health Psychotherapy Progress Note    Psychotherapy Provided: Individual Psychotherapy     1. Moderate recurrent major depression ([address])        2. Generalized anxiety disorder            Goals addressed in session: Goal 1     DATA: Met with Jannette. Jannette shared how she had a negative experience at an [employer] last week, the concert she was then looking forward to was cancelled. She states this led to a 'break down', crying. Support therapy provided, suggested to Jannette that these triggers are not the root of what is actually going on. Jannette did agree with this. She began expressing her thoughts and feelings, unhelpful thoughts about herself-- personalizing, all or nothing thinking, catastrophising, emotional reasoning. Dialectical thinking utilized- helping Jannette gain awareness into extreme thinking, encouraging her to step out of the black and white and into shades of gray in thoughts. Also, encouraged her to explore where she learned some of her unhelpful beliefs that she needs to be the fixer of everything, it's her responsibility to make everyone else happy. CBT utilized to dismantle unhelpful thoughts as they arose, encouraging her to gain improved awareness and insight into how these types of thoughts are affecting her thoughts and behaviors. Jannette was informed of depression group that will begin on site in October. We discussed how this group can be of benefit to her.   She was agreeable to the referral.    PHQ-2/9 Depression Screening    Little interest or pleasure in doing things: 3 - nearly every day  Feeling down, depressed, or hopeless: 3 - nearly every day  Trouble falling or staying asleep, or sleeping too much: 3 - nearly every day  Feeling tired or having little energy: 3 - nearly every day  Poor appetite or overeating: 3 - nearly every day  Feeling bad about yourself - or that you are a failure or have let yourself or your family down: 3 - nearly every day  Trouble concentrating on things, such as reading the newspaper or watching television: 2 - more than half the days  Moving or speaking so slowly that other people could have noticed. Or the opposite - being so fidgety or restless that you have been moving around a lot more than usual: 0 - not at all  Thoughts that you would be better off dead, or of hurting yourself in some way: 0 - not at all  PHQ-9 Score: 20   PHQ-9 Interpretation: Severe depression            During this session, this clinician used the following therapeutic modalities: Cognitive Behavioral Therapy and Dialectical Behavior Therapy    Substance Abuse was not addressed during this session. If the client is diagnosed with a co-occurring substance use disorder, please indicate any changes in the frequency or amount of use: na. Stage of change for addressing substance use diagnoses: No substance use/Not applicable    ASSESSMENT:  Jannette Baugh presents with a Depressed mood. her affect is Constricted, which is congruent, with her mood and the content of the session. The client has made progress on their goals. Jannette Baugh presents with a none risk of suicide, none risk of self-harm, and none risk of harm to others. For any risk assessment that surpasses a "low" rating, a safety plan must be developed. A safety plan was indicated: no  If yes, describe in detail na    PLAN: Between sessions, Jannette Baugh will practice skills from session. At the next session, the therapist will use Cognitive Behavioral Therapy and Dialectical Behavior Therapy to address unhelpful thoughts. Behavioral Health Treatment Plan and Discharge Planning: Dudley Cochran is aware of and agrees to continue to work on their treatment plan. They have identified and are working toward their discharge goals.  yes    Visit start and stop times:    09/20/23  Start Time: 1000  Stop Time: 1040  Total Visit Time: 40 minutes

## 2023-09-22 ENCOUNTER — OFFICE VISIT (OUTPATIENT)
Dept: VASCULAR SURGERY | Facility: CLINIC | Age: 43
End: 2023-09-22
Payer: COMMERCIAL

## 2023-09-22 VITALS
DIASTOLIC BLOOD PRESSURE: 82 MMHG | RESPIRATION RATE: 16 BRPM | SYSTOLIC BLOOD PRESSURE: 120 MMHG | HEART RATE: 86 BPM | BODY MASS INDEX: 44.54 KG/M2 | OXYGEN SATURATION: 99 % | TEMPERATURE: 97.9 F | HEIGHT: 67 IN | WEIGHT: 283.8 LBS

## 2023-09-22 DIAGNOSIS — R20.9 UNSPECIFIED DISTURBANCES OF SKIN SENSATION: ICD-10-CM

## 2023-09-22 DIAGNOSIS — M79.604 PAIN OF RIGHT LOWER EXTREMITY: Primary | ICD-10-CM

## 2023-09-22 PROCEDURE — 99203 OFFICE O/P NEW LOW 30 MIN: CPT

## 2023-09-22 NOTE — ASSESSMENT & PLAN NOTE
37 y.o. female, nonsmoker with PMH of obesity, HTN, HLD, cervical disc degeneration s/p cervical fusion, lumbar disc disease with right sided radiculopathy s/p discectomy and lumbar fusion, fibromyalgia. She presents today for evaluation of intermittent right foot discoloration, coldness, and pain.   -Symptoms ongoing for several years. Initially worse in the winter/cold months but have worsened. She reports intermittent episodes of "pins and needles" in the right foot with certain positions. This is associated with coolness and red/white discoloration.   -Reports pain in the right leg with ambulation. Distance varies and pain does not subside with rest but improves with bending forward or with stretching.   -No tissue loss. -BLE warm without discoloration or edema. 2+ DP/PT pulses bilaterally. Motor and sensation intact. Patient reports right great toe numbness on exam. No ischemic ulcerations. Plan:   -Symptoms not consistent with arterial etiology and are likely neuropathic in nature. May have aspect of raynauds disease and I have asked her to decrease caffeine consumption and wear warm garments in the winter to avoid flares.   -No further vascular work up indicated. -Follow up as needed. -Instructed to notify the office with questions, concerns, or new symptoms.

## 2023-09-22 NOTE — PROGRESS NOTES
Assessment/Plan:    Pain of right lower extremity  37 y.o. female, nonsmoker with PMH of obesity, HTN, HLD, cervical disc degeneration s/p cervical fusion, lumbar disc disease with right sided radiculopathy s/p discectomy and lumbar fusion, fibromyalgia. She presents today for evaluation of intermittent right foot discoloration, coldness, and pain.     -Symptoms ongoing for several years. Initially worse in the winter/cold months but have worsened. She reports intermittent episodes of "pins and needles" sensation in the right foot with certain positions. This is associated with coolness and red/white discoloration.   -Reports pain in the right leg with ambulation, worse up inclines. Distance varies and pain does not subside with rest but improves with bending forward or with stretching. Reports recently walking 3 miles without issue while in [place]. -No tissue loss. -BLE warm without discoloration or edema. 2+ DP/PT pulses bilaterally. Motor and sensation intact. Patient reports right great toe numbness on exam. No ischemic ulcerations noted. Plan:   -Symptoms not consistent with arterial etiology and are likely neuropathic in nature. May have aspect of raynauds disease and I have asked her to decrease caffeine consumption and wear warm garments in the winter to avoid flares.   -No further vascular work up indicated. -Follow up as needed. -Instructed to notify the office with questions, concerns, or new symptoms. Diagnoses and all orders for this visit:    Pain of right lower extremity    Unspecified disturbances of skin sensation  -     Ambulatory Referral to Vascular Surgery          Subjective:      Patient ID: Abram Bradley is a 37 y.o. female. KAYCE Caballero is a 37 y.o. female, nonsmoker with PMH of obesity, HTN, HLD, cervical disc degeneration s/p cervical fusion, lumbar disc disease with right sided radiculopathy s/p discectomy and lumbar fusion, fibromyalgia.      Patient is here to establish care, referred by her cardiologist, Dr. Leonides Llanes. Patient reports intermittent episodes of cold right toes that will turn red / white. She does get numbness and pain in the right foot and states she notices this more with her legs crossed and that the leg will go "dead". Patient stated it started about 2 years ago but has worsened this past year. She has seen podiatry for this in the past and was told it was raynauds however her cardiologist did not agree and wanted a 2nd opinion. She states when her symptoms first started they would occur more in the winter months but now have become more frequent. She has known lumbar disc disease with hx of multiple interventions. She states her back pain has been getting worse and believes this is causing her leg pain. She reports right hip pain that radiates down the leg with ambulation. She states the distance varies before getting pain. She states sometimes she is only able to walk a block before getting the pain and other times she can walk 3 miles. She thinks inclines are worse. Her pain does not totally subside with rest and improves with bending motions and gentle stretching. She is a nonsmoker, no hx of diabetes. Her HLD is controlled on rosuvastatin. She is taking ASA 81 mg. The following portions of the patient's history were reviewed and updated as appropriate: allergies, current medications, past family history, past medical history, past social history, past surgical history and problem list.    Review of Systems   Constitutional: Negative. HENT: Negative. Eyes: Negative. Respiratory: Negative. Cardiovascular: Negative. Gastrointestinal: Negative. Endocrine: Negative. Genitourinary: Negative. Musculoskeletal: Negative. Skin: Positive for color change. Allergic/Immunologic: Positive for environmental allergies. Neurological: Positive for numbness. Hematological: Negative.     Psychiatric/Behavioral:        Anxiety / Depression. I have personally reviewed and made appropriate changes to the ROS that was input by the medical assistant.        Objective:      Vitals:    09/22/23 1127   BP: 120/82   BP Location: Left arm   Patient Position: Sitting   Cuff Size: Adult   Pulse: 86   Resp: 16   Temp: 97.9 °F (36.6 °C)   TempSrc: Temporal   SpO2: 99%   Weight: 129 kg (283 lb 12.8 oz)   Height: 5' 7" (1.702 m)       Patient Active Problem List   Diagnosis   • Thyroid nodule   • Trigger finger, right middle finger   • Carpal tunnel syndrome, bilateral   • Primary hypertension   • Breast hypertrophy   • Bulging lumbar disc   • Calcific tendinitis of right shoulder   • Chronic right-sided low back pain without sciatica   • Chronic sinusitis   • Dermatitis, eczematoid   • Esophageal reflux   • Fibromyalgia   • H/O laminectomy   • Hemorrhoids   • Hidradenitis suppurativa   • Hyperlipidemia   • Mammographic microcalcification   • Obesity, Class III, BMI 40-49.9 (morbid obesity) (Formerly [facility])   • Sleep apnea   • Pars defect of lumbar spine   • RBBB (right bundle branch block)   • Osteoarthritis of spine with radiculopathy, lumbar region   • Vitamin D insufficiency   • Atypical chest pain   • Sebaceous cyst of breast, left   • Left cervical lymphadenopathy   • Eliz-Danlos syndrome   • History of sepsis   • Asthma   • High triglycerides   • Chronic headache with new features   • Impaired fasting glucose   • Moderate recurrent major depression (Former[facility])   • Generalized anxiety disorder   • Depression, recurrent ([facility])   • Cubital tunnel syndrome   • Sprain of rotator cuff capsule   • PONV (postoperative nausea and vomiting)   • Open wound   • Fluid collection at surgical site   • Other cervical disc degeneration at C6-C7 level   • Esophageal dysphagia   • Elevated ALT measurement   • Pain of right lower extremity       Past Surgical History:   Procedure Laterality Date   • ANAL FISTULOTOMY N/A 11/02/2018    Procedure: FISTULOTOMY;  Surgeon: Conrado Boland Gwen De La Cruz MD;  [facility];  Service: Colorectal   • BACK SURGERY      lumbar herniated disc   • BREAST CYST EXCISION Left 13 yrs ago  benign   • BREAST SURGERY Left 2006    cyst removal   • CARPAL TUNNEL RELEASE     • CERVICAL FUSION  2023    C5-6   •  SECTION     • CHOLECYSTECTOMY     • COLONOSCOPY     • CYST REMOVAL     • DENTAL SURGERY     • ENDOMETRIAL ABLATION N/A 2021    Procedure: D&C, ABLATION ENDOMETRIAL MARIEL;  Surgeon: Gloria Saavedra MD;  Location: [facility] MAIN OR;  Service: Gynecology   • FL MYELOGRAM CERVICAL  2022   • HAND SURGERY     • INCISION AND DRAINAGE OF WOUND N/A 2018    Procedure: INCISION AND DRAINAGE (I&D) BUTTOCK;  Surgeon: Nova Smiley MD;  [facility];  Service: Colorectal   • MYRINGOTOMY W/ TUBES Right 2015    [place]   • NJ ANRCT XM SURG REQ ANES GENERAL SPI/EDRL DX N/A 2018    Procedure: EXAM UNDER ANESTHESIA (EUA); Surgeon: Nova Smiley MD;  [facility];  Service: Colorectal   • NJ COLONOSCOPY FLX DX W/[facility] WHEN PFRMD N/A 2017    Procedure: COLONOSCOPY;  Surgeon: [place] Jimenez MD;  [facility];   Service: Gastroenterology   • NJ DEBRIDEMENT SUBCUTANEOUS TISSUE 20 SQ CM/< N/A 2022    Procedure: Debridement abdominal wall;  Surgeon: Cesar Coyne MD;  Location: [facility];  Service: General   • NJ EXCISION HIDRADENITIS INGUINAL COMPLEX REPAIR Bilateral 2022    Procedure: WIDE EXCISION HIDRADENITIS ABDOMINAL WALL;  Surgeon: Cesar Coyne MD;  [facility]: [facility];  Service: General   • NJ INCISION & DRAINAGE ABSCESS SIMPLE/SINGLE N/A 2022    Procedure: INCISION AND DRAINAGE ABDOMINAL WALL;  Surgeon: Cesar Coyne MD;  [facility]: [facility];  Service: General   • NJ TENDON SHEATH INCISION Right 2018    Procedure: LONG TRIGGER FINGER RELEASE;  Surgeon: Chetan Mendez DO;  [facility];  Service: Orthopedics   • NJ TYMPANOPLASTY W/O MASTOIDECT W/O OSSICLE RECNSTJ Right 05/24/2017    Procedure: TYMPANOPLASTY WITH PERICHONDRIN GRAFT;  Surgeon:  Bonnie Doan DO;  [facility];  Service: ENT   • SHOULDER SURGERY Right    • TRIGGER FINGER RELEASE     • ULNAR NERVE TRANSPOSITION Left    • UMBILICAL HIDRADENITIS EXCISION  68/10/0063    subumbilical hidradenitis exicsion, Dr. Nyasia Barney, [facility] THYROID BIOPSY  7/3/2023   • WISDOM TOOTH EXTRACTION         Family History   Problem Relation Age of Onset   • Hypertension Mother    • Hyperlipidemia Mother    • Diabetes Mother    • Hypertension Father    • Hyperlipidemia Father    • Heart disease Father         cardiac disorder-myocardial infarction arrhythmias   • Heart attack Father    • Crohn's disease Brother    • Arthritis Brother    • Diabetes unspecified Maternal Grandmother    • Thyroid disease Paternal Grandmother    • No Known Problems Daughter    • No Known Problems Maternal Aunt    • No Known Problems Maternal Aunt    • No Known Problems Maternal Aunt    • No Known Problems Maternal Aunt    • No Known Problems Paternal Aunt    • No Known Problems Paternal Aunt    • Heart attack Paternal Uncle        Social History     Socioeconomic History   • Marital status: /Civil Union     Spouse name: Not on file   • Number of children: Not on file   • Years of education: Not on file   • Highest education level: Not on file   Occupational History   • Not on file   Tobacco Use   • Smoking status: Never   • Smokeless tobacco: Never   Vaping Use   • Vaping Use: Never used   Substance and Sexual Activity   • Alcohol use: Not Currently     Comment: NICKI    • Drug use: Never   • Sexual activity: Yes     Partners: Male     Birth control/protection: Condom Male     Comment: ablation   Other Topics Concern   • Not on file   Social History Narrative    Daily caffeine use- 1 cup of coffee     Social Determinants of Health     Financial Resource Strain: Not on file   Food Insecurity: Not on file   Transportation Needs: No Transportation Needs (5/10/2021)    PRAPARE - Transportation    • Lack of Transportation (Medical): No    • Lack of Transportation (Non-Medical): No   Physical Activity: Unknown (5/10/2021)    Exercise Vital Sign    • Days of Exercise per Week: 2 days    • Minutes of Exercise per Session: Not asked   Stress: Not on file   Social Connections: Not on file   Intimate Partner Violence: Not on file   Housing Stability: Not on file       Allergies   Allergen Reactions   • Neurontin [Gabapentin] Other (See Comments)     Other reaction(s): SUICIDE IDEATION  Other reaction(s): Other (See Comments)  suicidal  suicidal   • Penicillins Anaphylaxis, Swelling and Angioedema   • Aripiprazole Other (See Comments)     Other reaction(s): low dose 2 mg.; curving in and locking in of hands/dystonia  Other reaction(s): Other (See Comments)  Other reaction(s): low dose 2 mg.; curving in and locking in of hands/dystonia   • Lorazepam Other (See Comments)     Other reaction(s): higher dose > anxiety and depression  Other reaction(s):  Other (See Comments)  Other reaction(s): higher dose > anxiety and depression   • Carbamazepine Other (See Comments)     Other reaction(s): Unknown Reaction   • Doxycycline Hives   • Lamotrigine Rash   • Wound Dressing Adhesive Hives         Current Outpatient Medications:   •  albuterol (PROVENTIL HFA,VENTOLIN HFA) 90 mcg/act inhaler, Inhale 2 puffs every 6 (six) hours as needed for wheezing or shortness of breath (cough), Disp: 18 g, Rfl: 1  •  amitriptyline (ELAVIL) 75 mg tablet, Take 1 tablet (75 mg total) by mouth daily at bedtime, Disp: 90 tablet, Rfl: 0  •  Ascorbic Acid (VITAMIN C) 500 MG CAPS, Take 1 capsule by mouth daily at bedtime, Disp: , Rfl:   •  aspirin 81 mg chewable tablet, Chew 1 tablet (81 mg total) daily (Patient taking differently: Chew 81 mg daily at bedtime), Disp: 30 tablet, Rfl: 30  •  cetirizine (ZyrTEC) 10 mg tablet, Take 10 mg by mouth daily, Disp: , Rfl:   •  Cholecalciferol (VITAMIN D3) 5000 units CAPS, Take 5,000 Units by mouth daily at bedtime, Disp: , Rfl:   •  ciprofloxacin-dexamethasone (CIPRODEX) otic suspension, Administer 4 drops to the right ear in the morning and 4 drops in the evening., Disp: 7.5 mL, Rfl: 3  •  cyanocobalamin (VITAMIN B-12) 1,000 mcg tablet, Take 1,000 mcg by mouth daily , Disp: , Rfl:   •  docusate sodium (DULCOLAX) 100 mg capsule, Take by mouth daily at bedtime, Disp: , Rfl:   •  esomeprazole (NexIUM) 20 mg capsule, Take 2 capsules (40 mg total) by mouth in the morning, Disp: 180 capsule, Rfl: 0  •  levofloxacin (LEVAQUIN) 500 mg tablet, Take 500 mg by mouth daily, Disp: , Rfl:   •  losartan (COZAAR) 100 MG tablet, TAKE 1 TABLET BY MOUTH ONCE DAILY (Patient taking differently: 100 mg daily at bedtime), Disp: 90 tablet, Rfl: 2  •  Melatonin 10 MG TABS, Take by mouth daily at bedtime, Disp: , Rfl:   •  metoprolol succinate (TOPROL-XL) 100 mg 24 hr tablet, TAKE 1 TABLET BY MOUTH ONCE DAILY (Patient taking differently: 100 mg daily at bedtime), Disp: 100 tablet, Rfl: 1  •  metroNIDAZOLE (FLAGYL) 250 mg tablet, Take 250 mg by mouth 3 (three) times a day, Disp: , Rfl:   •  Multiple Vitamins-Minerals (CENTRUM ADULTS PO), Take 1 tablet by mouth daily at bedtime, Disp: , Rfl:   •  neomycin-polymyxin-hydrocortisone (CORTISPORIN) otic solution, Administer 4 drops into the left ear 3 (three) times a day for 10 days (Patient taking differently: Administer 4 drops into the left ear 2 (two) times a day as needed), Disp: 10 mL, Rfl: 0  •  nystatin (MYCOSTATIN) cream, Apply topically 2 (two) times a day as needed (rash), Disp: 30 g, Rfl: 1  •  Omega-3 Fatty Acids (FISH OIL ADULT GUMMIES PO), Take by mouth daily at bedtime, Disp: , Rfl:   •  rosuvastatin (CRESTOR) 40 MG tablet, Take 1 tablet (40 mg total) by mouth daily (Patient taking differently: Take 40 mg by mouth daily at bedtime), Disp: 30 tablet, Rfl: 0  •  venlafaxine (EFFEXOR-XR) 150 mg 24 hr capsule, Take 1 capsule (150 mg total) by mouth daily at bedtime, Disp: 90 capsule, Rfl: 0  •  venlafaxine (EFFEXOR-XR) 75 mg 24 hr capsule, Take 1 capsule (75 mg total) by mouth daily at bedtime, Disp: 90 capsule, Rfl: 0  •  Zinc 50 MG TABS, Take by mouth daily at bedtime, Disp: , Rfl:       /82 (BP Location: Left arm, Patient Position: Sitting, Cuff Size: Adult)   Pulse 86   Temp 97.9 °F (36.6 °C) (Temporal)   Resp 16   Ht 5' 7" (1.702 m)   Wt 129 kg (283 lb 12.8 oz)   SpO2 99%   BMI 44.45 kg/m²          Physical Exam  Vitals and nursing note reviewed. Constitutional:       Appearance: Normal appearance. She is obese. HENT:      Head: Normocephalic and atraumatic. Cardiovascular:      Rate and Rhythm: Normal rate and regular rhythm. Pulses:           Carotid pulses are 2+ on the right side and 2+ on the left side. Radial pulses are 2+ on the right side and 2+ on the left side. Dorsalis pedis pulses are 2+ on the right side and 2+ on the left side. Posterior tibial pulses are 2+ on the right side and 2+ on the left side. Heart sounds: Normal heart sounds. Pulmonary:      Effort: Pulmonary effort is normal. No respiratory distress. Breath sounds: Normal breath sounds. Musculoskeletal:         General: No swelling or tenderness. Normal range of motion. Cervical back: Normal range of motion and neck supple. Right lower leg: No edema. Left lower leg: No edema. Skin:     General: Skin is warm. Capillary Refill: Capillary refill takes less than 2 seconds. Findings: No erythema. Neurological:      General: No focal deficit present. Mental Status: She is alert and oriented to person, place, and time.    Psychiatric:         Mood and Affect: Mood normal.         Behavior: Behavior normal.       Mamie Love PA-C  [facility]  [phone]    I have spent a total time of 30 minutes on 09/22/23 in caring for this patient including Diagnostic results, Prognosis, Risks and benefits of tx options, Instructions for management, Patient and family education, Importance of tx compliance, Risk factor reductions, Impressions, Counseling / Coordination of care, Documenting in the medical record, Reviewing / ordering tests, medicine, procedures   and Obtaining or reviewing history  .

## 2023-09-29 ENCOUNTER — OFFICE VISIT (OUTPATIENT)
Dept: FAMILY MEDICINE CLINIC | Facility: CLINIC | Age: 43
End: 2023-09-29
Payer: COMMERCIAL

## 2023-09-29 VITALS
DIASTOLIC BLOOD PRESSURE: 88 MMHG | WEIGHT: 286 LBS | HEART RATE: 97 BPM | TEMPERATURE: 98.2 F | HEIGHT: 67 IN | BODY MASS INDEX: 44.89 KG/M2 | SYSTOLIC BLOOD PRESSURE: 130 MMHG | OXYGEN SATURATION: 99 %

## 2023-09-29 DIAGNOSIS — K61.0 PERIANAL ABSCESS: Primary | ICD-10-CM

## 2023-09-29 DIAGNOSIS — Z01.818 PRE-OPERATIVE CLEARANCE: ICD-10-CM

## 2023-09-29 DIAGNOSIS — Z12.31 ENCOUNTER FOR SCREENING MAMMOGRAM FOR MALIGNANT NEOPLASM OF BREAST: ICD-10-CM

## 2023-09-29 PROCEDURE — 99213 OFFICE O/P EST LOW 20 MIN: CPT | Performed by: FAMILY MEDICINE

## 2023-09-29 NOTE — PROGRESS NOTES
MRI under anesthesia clearance  Jannette Baugh  1980    Jannette Baugh is a 37 y.o. female with perianal abscess with suspicion of fistula who is planning to undergo MRI under anesthesia. Patient has not  had complications with anesthesia in the past.    ROS:   Chest pain: no   Shortness of breath: no  Shortness of breath with exertion: no  Orthopnea: no  Dizziness: no  Unexplained weight change: no    PMH:  CAD: no  HTN: yes  CKD: no  DM: no on insulin: no  History of CVA: no     reports that she has never smoked. She has never used smokeless tobacco. She reports that she does not currently use alcohol. She reports that she does not use drugs. /88 (BP Location: Left arm, Patient Position: Sitting, Cuff Size: Standard)   Pulse 97   Temp 98.2 °F (36.8 °C) (Tympanic)   Ht 5' 7" (1.702 m)   Wt 130 kg (286 lb)   SpO2 99%   BMI 44.79 kg/m²   Physical Exam  Vitals and nursing note reviewed. Constitutional:       General: She is not in acute distress. Appearance: Normal appearance. She is well-developed. She is not ill-appearing, toxic-appearing or diaphoretic. HENT:      Head: Normocephalic and atraumatic. Right Ear: External ear normal.      Left Ear: External ear normal.      Nose: Nose normal.      Mouth/Throat:      Mouth: Mucous membranes are moist.      Pharynx: Oropharynx is clear. No oropharyngeal exudate. Eyes:      General:         Right eye: No discharge. Left eye: No discharge. Extraocular Movements: Extraocular movements intact. Conjunctiva/sclera: Conjunctivae normal.      Pupils: Pupils are equal, round, and reactive to light. Cardiovascular:      Rate and Rhythm: Normal rate and regular rhythm. Pulses: Normal pulses. Heart sounds: Normal heart sounds. No murmur heard. Pulmonary:      Effort: Pulmonary effort is normal. No respiratory distress. Breath sounds: Normal breath sounds. Abdominal:      General: There is no distension. Palpations: Abdomen is soft. There is no mass. Tenderness: There is no abdominal tenderness. Hernia: No hernia is present. Musculoskeletal:         General: No swelling. Normal range of motion. Cervical back: Normal range of motion and neck supple. No rigidity or tenderness. Lymphadenopathy:      Cervical: No cervical adenopathy. Skin:     General: Skin is warm and dry. Capillary Refill: Capillary refill takes less than 2 seconds. Neurological:      General: No focal deficit present. Mental Status: She is alert and oriented to person, place, and time. Psychiatric:         Mood and Affect: Mood normal.         Behavior: Behavior normal.         Thought Content:  Thought content normal.         Judgment: Judgment normal.         Revised Cardiac Risk Index (RCRI) for Pre-Operative Risk   (estimates risk of cardiac complications after noncardiac surgery)    · High-risk surgery: No 0 / Yes +1  · Intraperitoneal, intrathoracic, suprainguinal vascular  · History of ischemic heart disease: No 0 / Yes +1  · Hx of MI, (+) exercise test, current chest pain considered due to myocardial ischemia, use of nitrate therapy or ECG with pathological Q waves)  · History of CHF: No 0 / Yes +1  · Pulmonary edema, B/L rales or S3 gallop; FRIEDMAN, orthopnea, PND, CXR showing pulmonary vascular redistribution)  · History of cerebrovascular disease: No 0 / Yes +1  · Prior TIA or stroke  · Pre-operative treatment with insulin: No 0 / Yes +1  · Pre-operative creatinine >2 mg/dL: No 0 / Yes +1    RCRI Scoring:  · 0 points: Class I Risk, 0.4% Risk of Major Cardiac Event  · 1 point: Class II Risk, 0.9% Risk of Major Cardiac Event  · 2 points: Class III Risk, 6.6% Risk of Major Cardiac Event  · 3 points: Class IV Risk, 11% Risk of Major Cardiac Event  · 4 points: Class IV Risk, 11% Risk of Major Cardiac Event  · 5 points: Class IV Risk, 11% Risk of Major Cardiac Event  · 6 points: Class IV Risk, 11% Risk of Major Cardiac Event    Lab Results   Component Value Date    CREATININE 0.59 (L) 03/30/2023       Jannette was seen today for pre-op exam.    Diagnoses and all orders for this visit:    Perianal abscess    Pre-operative clearance    Encounter for screening mammogram for malignant neoplasm of breast  -     Mammo screening bilateral w 3d & cad; Future        Recommendations:  Jannette Baugh is undergoing an elective Low Risk procedure. She is RCRI class I risk  risk for major adverse cardiac event (MACE). She may proceed with surgery as planned without further workup. Ramakrishna Carlos M.D.    [address] [place] [address]

## 2023-10-02 ENCOUNTER — ANESTHESIA EVENT (OUTPATIENT)
Dept: RADIOLOGY | Facility: HOSPITAL | Age: 43
End: 2023-10-02

## 2023-10-03 ENCOUNTER — OFFICE VISIT (OUTPATIENT)
Dept: BEHAVIORAL/MENTAL HEALTH CLINIC | Facility: CLINIC | Age: 43
End: 2023-10-03
Payer: COMMERCIAL

## 2023-10-03 DIAGNOSIS — F33.1 MODERATE RECURRENT MAJOR DEPRESSION (HCC): Primary | ICD-10-CM

## 2023-10-03 DIAGNOSIS — F41.1 GENERALIZED ANXIETY DISORDER: ICD-10-CM

## 2023-10-03 PROCEDURE — 90853 GROUP PSYCHOTHERAPY: CPT | Performed by: SOCIAL WORKER

## 2023-10-03 NOTE — ANESTHESIA PREPROCEDURE EVALUATION
Procedure:  MRI PELVIS W WO CONTRAST      LMA#4 in the past    Relevant Problems   ANESTHESIA   (+) PONV (postoperative nausea and vomiting)      CARDIO   (+) Atypical chest pain   (+) High triglycerides   (+) Hyperlipidemia   (+) Primary hypertension   (+) RBBB (right bundle branch block)      GI/HEPATIC   (+) Esophageal dysphagia   (+) Esophageal reflux      MUSCULOSKELETAL   (+) Chronic right-sided low back pain without sciatica   (+) Fibromyalgia   (+) Other cervical disc degeneration at C6-C7 level      NEURO/PSYCH   (+) Chronic right-sided low back pain without sciatica   (+) Depression, recurrent (HCC)   (+) Fibromyalgia   (+) Generalized anxiety disorder   (+) Moderate recurrent major depression (HCC)      PULMONARY   (+) Asthma   (+) Sleep apnea      Musculoskeletal and Integument   (+) Eliz-Danlos syndrome      Other   (+) Left cervical lymphadenopathy      Stress Echo June 2023:    Stress ECG: Arrhythmias during recovery: rare PVCs. Right bundle branch block was seen. The stress ECG is negative for ischemia after maximal exercise, without reproduction of symptoms. •  Left Ventricle: Left ventricular cavity size is normal. Systolic function is normal. Wall motion is normal.  •  Peak Stress Echo: Left ventricle cavity has normal reduction in size at peak stress. The left ventricle systolic function is normal at peak stress. The peak stress echo showed normal wall motion. •  Echo Post Impression: The study is normal.  •  Results reviewed with patient. EKG Jne 2023: SR, can not rule out MI    CBC: wnl    CMP: [id number]       Anesthesia Plan  ASA Score- 3     Anesthesia Type- general with ASA Monitors. Additional Monitors:   Airway Plan: LMA. Plan Factors-    Chart reviewed. Patient summary reviewed. Patient is not a current smoker. Patient did not smoke on day of surgery. Induction- intravenous.     Postoperative Plan-     Informed Consent- Anesthetic plan and risks discussed with patient. I personally reviewed this patient with the CRNA. Discussed and agreed on the Anesthesia Plan with the CRNA. Lucía Fleming

## 2023-10-03 NOTE — PSYCH
Behavioral Health Psychotherapy Group Progress Note    Psychotherapy Provided: Group Therapy    1. Moderate recurrent major depression ([address])        2. Generalized anxiety disorder            Goals addressed in session: Goal 1     Group Name: Depression Support Group    Topic(s) covered: common symptoms and making connections    Skill(s) covered: progressive muscle relaxation    Group summary:  Group began will discussion of rules/expectations and each participant was given the PHQ-9. Initial discussion was focused on identifying symptoms of depression, and group participants were in agreement that loss of interest and generalized lack of motivation were common symptoms they experienced. Therapist opined that depression is largely a feeling of loss of connectedness - either to others or even meaningful activity - and this was discussed at length. Group participants identified ways in which they remain connected. Therapist concluded group by challenging members to strive to connect in some way this week. Data: Jannette attended today's group. 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 She confides, however, that the group will be disbanding next year and she is at a loss as to what she will do. She comments that she finds reward "in being for the girls what she didn't have growing up."  Therapist proposes to Jannette that she is grieving and recommends searching for another meaningful activity. Today Jannette scores a 21 on the PHQ-9. Substance Abuse was not addressed during this session. If the client is diagnosed with a co-occurring substance use disorder, please indicate any changes in the frequency or amount of use: NA. Stage of change for addressing substance use diagnoses: No substance use/Not applicable    ASSESSMENT:  Jannette appeared  with a depressed and tearful mood.  Her affect is Normal range and intensity and Tearful, which is congruent, with his mood and the content of the session. She appeared to actively participated in the group and interacted appropriately with and was supportive of the other group members. Jannette Baugh presents with a lowrisk of suicide,lowrisk of self-harm, and low risk of harm to others. For any risk assessment that surpasses a "low" rating, a safety plan must be developed. A safety plan was indicated: no  If yes, describe in detail NA    PLAN: aJnnette will strive to make connections this week. The next group is scheduled for Tuesday, October 10 and will cover the topic of increasing connectedness and growing supports. Behavioral Health Treatment Plan and Discharge Planning: Bonnie Loza is aware of and agrees to continue to work on their treatment plan. They have identified and are working toward their discharge goals.  YES    Visit start and stop times:    10/03/23  Start Time: 1600  Stop Time: 1651  Total Visit Time: 51 minutes

## 2023-10-04 ENCOUNTER — HOSPITAL ENCOUNTER (OUTPATIENT)
Dept: RADIOLOGY | Facility: HOSPITAL | Age: 43
Discharge: HOME | End: 2023-10-04
Attending: COLON & RECTAL SURGERY
Payer: COMMERCIAL

## 2023-10-04 ENCOUNTER — ANESTHESIA (OUTPATIENT)
Dept: RADIOLOGY | Facility: HOSPITAL | Age: 43
End: 2023-10-04

## 2023-10-04 VITALS
SYSTOLIC BLOOD PRESSURE: 110 MMHG | OXYGEN SATURATION: 95 % | HEART RATE: 93 BPM | BODY MASS INDEX: 44.89 KG/M2 | DIASTOLIC BLOOD PRESSURE: 59 MMHG | TEMPERATURE: 97 F | WEIGHT: 286 LBS | RESPIRATION RATE: 16 BRPM | HEIGHT: 67 IN

## 2023-10-04 DIAGNOSIS — K61.0 ANAL ABSCESS: ICD-10-CM

## 2023-10-04 LAB
EXT PREGNANCY TEST URINE: NEGATIVE
EXT. CONTROL: NORMAL

## 2023-10-04 PROCEDURE — G1004 CDSM NDSC: HCPCS

## 2023-10-04 PROCEDURE — 72197 MRI PELVIS W/O & W/DYE: CPT

## 2023-10-04 PROCEDURE — 81025 URINE PREGNANCY TEST: CPT | Performed by: COLON & RECTAL SURGERY

## 2023-10-04 PROCEDURE — A9585 GADOBUTROL INJECTION: HCPCS | Performed by: COLON & RECTAL SURGERY

## 2023-10-04 RX ORDER — ONDANSETRON 2 MG/ML
INJECTION INTRAMUSCULAR; INTRAVENOUS AS NEEDED
Status: DISCONTINUED | OUTPATIENT
Start: 2023-10-04 | End: 2023-10-04

## 2023-10-04 RX ORDER — PROPOFOL 10 MG/ML
INJECTION, EMULSION INTRAVENOUS AS NEEDED
Status: DISCONTINUED | OUTPATIENT
Start: 2023-10-04 | End: 2023-10-04

## 2023-10-04 RX ORDER — ACETAMINOPHEN 325 MG/1
975 TABLET ORAL ONCE
Status: COMPLETED | OUTPATIENT
Start: 2023-10-04 | End: 2023-10-04

## 2023-10-04 RX ORDER — GADOBUTROL 604.72 MG/ML
13 INJECTION INTRAVENOUS
Status: COMPLETED | OUTPATIENT
Start: 2023-10-04 | End: 2023-10-04

## 2023-10-04 RX ORDER — LIDOCAINE HYDROCHLORIDE 20 MG/ML
INJECTION, SOLUTION EPIDURAL; INFILTRATION; INTRACAUDAL; PERINEURAL AS NEEDED
Status: DISCONTINUED | OUTPATIENT
Start: 2023-10-04 | End: 2023-10-04

## 2023-10-04 RX ORDER — SODIUM CHLORIDE, SODIUM LACTATE, POTASSIUM CHLORIDE, CALCIUM CHLORIDE 600; 310; 30; 20 MG/100ML; MG/100ML; MG/100ML; MG/100ML
125 INJECTION, SOLUTION INTRAVENOUS CONTINUOUS
Status: DISPENSED | OUTPATIENT
Start: 2023-10-04 | End: 2023-10-04

## 2023-10-04 RX ORDER — SODIUM CHLORIDE, SODIUM LACTATE, POTASSIUM CHLORIDE, CALCIUM CHLORIDE 600; 310; 30; 20 MG/100ML; MG/100ML; MG/100ML; MG/100ML
20 INJECTION, SOLUTION INTRAVENOUS CONTINUOUS
Status: DISCONTINUED | OUTPATIENT
Start: 2023-10-04 | End: 2023-10-05 | Stop reason: HOSPADM

## 2023-10-04 RX ORDER — SODIUM CHLORIDE, SODIUM LACTATE, POTASSIUM CHLORIDE, CALCIUM CHLORIDE 600; 310; 30; 20 MG/100ML; MG/100ML; MG/100ML; MG/100ML
INJECTION, SOLUTION INTRAVENOUS CONTINUOUS PRN
Status: DISCONTINUED | OUTPATIENT
Start: 2023-10-04 | End: 2023-10-04

## 2023-10-04 RX ADMIN — GADOBUTROL 13 ML: 604.72 INJECTION INTRAVENOUS at 09:31

## 2023-10-04 RX ADMIN — SODIUM CHLORIDE, SODIUM LACTATE, POTASSIUM CHLORIDE, AND CALCIUM CHLORIDE 20 ML/HR: .6; .31; .03; .02 INJECTION, SOLUTION INTRAVENOUS at 07:40

## 2023-10-04 RX ADMIN — ONDANSETRON 4 MG: 2 INJECTION INTRAMUSCULAR; INTRAVENOUS at 09:29

## 2023-10-04 RX ADMIN — ACETAMINOPHEN 975 MG: 325 TABLET, FILM COATED ORAL at 10:30

## 2023-10-04 RX ADMIN — LIDOCAINE HYDROCHLORIDE 100 MG: 20 INJECTION, SOLUTION EPIDURAL; INFILTRATION; INTRACAUDAL; PERINEURAL at 08:01

## 2023-10-04 RX ADMIN — SODIUM CHLORIDE, SODIUM LACTATE, POTASSIUM CHLORIDE, AND CALCIUM CHLORIDE: .6; .31; .03; .02 INJECTION, SOLUTION INTRAVENOUS at 07:30

## 2023-10-04 RX ADMIN — PROPOFOL 200 MG: 10 INJECTION, EMULSION INTRAVENOUS at 08:02

## 2023-10-04 NOTE — NURSING NOTE
Pelvis MRI with and w/o contrast completed and patient tolerated procedure well. Post-procedure vital signs taken and recorded. Report given to [address] [place] [address]. Patient placed in for transport to be taken to [facility]. Patient offers no complaints or verbalizes any issues upon leaving MRI. Patient given her glasses back and patient placed them on her face.

## 2023-10-04 NOTE — ANESTHESIA POSTPROCEDURE EVALUATION
Post-Op Assessment Note    CV Status:  Stable    Pain management: adequate     Mental Status:  Awake and sleepy   Hydration Status:  Euvolemic   PONV Controlled:  Controlled   Airway Patency:  Patent      Post Op Vitals Reviewed: Yes      Staff: CRNA         No notable events documented.     /56 (10/04/23 0938)    Temp 99.5 °F (37.5 °C) (10/04/23 0938)    Pulse 91 (10/04/23 0938)   Resp 20 (10/04/23 0938)    SpO2 96 % (10/04/23 0938)

## 2023-10-10 ENCOUNTER — OFFICE VISIT (OUTPATIENT)
Dept: BEHAVIORAL/MENTAL HEALTH CLINIC | Facility: CLINIC | Age: 43
End: 2023-10-10
Payer: COMMERCIAL

## 2023-10-10 DIAGNOSIS — F33.1 MODERATE RECURRENT MAJOR DEPRESSION (HCC): Primary | ICD-10-CM

## 2023-10-10 DIAGNOSIS — F41.1 GENERALIZED ANXIETY DISORDER: ICD-10-CM

## 2023-10-10 PROCEDURE — 90853 GROUP PSYCHOTHERAPY: CPT | Performed by: SOCIAL WORKER

## 2023-10-11 NOTE — PSYCH
Behavioral Health Psychotherapy Group Progress Note    Psychotherapy Provided: Group Therapy    1. Moderate recurrent major depression ([address])        2. Generalized anxiety disorder            Goals addressed in session: Goal 1     Group Name: Depression Support Group    Topic(s) covered: Building connections and the nature of our Perceptions    Skill(s) covered: Pausing to consider how our perceptions are shaping our thoughts    Group summary:  Group started with meditation and followed with processing of week's activities with regard to efforts to connect. Therapist presented Psychological ABCs and provided education regarding the CBT triangle. The impact of perceptions on thoughts was considered and participants were assigned a worksheet to explore the relationship between their thoughts and feelings. Data: Jannette attended today's group. She shared at length with group members regarding her own tendency to perceive things negatively. Substance Abuse was not addressed during this session. If the client is diagnosed with a co-occurring substance use disorder, please indicate any changes in the frequency or amount of use: NA. Stage of change for addressing substance use diagnoses: No substance use/Not applicable    ASSESSMENT:  Jannette appeared  with a depressed mood. Her affect is Normal range and intensity, which is congruent, with his mood and the content of the session. She appeared to actively participated in the group and interacted appropriately with and was supportive of the other group members. Jannette Baugh presents with a lowrisk of suicide,lowrisk of self-harm, and low risk of harm to others. For any risk assessment that surpasses a "low" rating, a safety plan must be developed. A safety plan was indicated: no  If yes, describe in detail NA    PLAN: Jannette will complete assigned worksheet.  The next group is scheduled for October 17 and will cover the topic of Perceptions and Irrational Beliefs    Behavioral Health Treatment Plan and Discharge Planning: Mark Khoury is aware of and agrees to continue to work on their treatment plan. They have identified and are working toward their discharge goals.  yes    Visit start and stop times:    10/10/23  Start Time: 1559  Stop Time: 6558  Total Visit Time: 59 minutes

## 2023-10-17 ENCOUNTER — OFFICE VISIT (OUTPATIENT)
Dept: BEHAVIORAL/MENTAL HEALTH CLINIC | Facility: CLINIC | Age: 43
End: 2023-10-17
Payer: COMMERCIAL

## 2023-10-17 DIAGNOSIS — F41.1 GENERALIZED ANXIETY DISORDER: ICD-10-CM

## 2023-10-17 DIAGNOSIS — F33.1 MODERATE RECURRENT MAJOR DEPRESSION (HCC): Primary | ICD-10-CM

## 2023-10-17 PROCEDURE — 90853 GROUP PSYCHOTHERAPY: CPT | Performed by: SOCIAL WORKER

## 2023-10-17 NOTE — PSYCH
Behavioral Health Psychotherapy Group Progress Note    Psychotherapy Provided: Group Therapy    1. Moderate recurrent major depression ([address])        2. Generalized anxiety disorder            Goals addressed in session: Goal 1     Group Name: Depression Support Group    Topic(s) covered: Perceptions and Irrational Beliefs    Skill(s) covered: Examining how perceptions/beliefs affect thoughts, feelings, behavior    Group summary:  Group began with meditation. Therapist sought feedback regarding last week's session and homework to examine one's perceptions before reacting. Celeste Quinones' 12 Irrational Beliefs was presented, and participants were challenged discuss reactions. Homework was assigned to explore personal responses to Beliefs. Data: Jannette attended today's group. She shared that she automatically thinks negatively/the worst and she struggles to change this. Substance Abuse was not addressed during this session. If the client is diagnosed with a co-occurring substance use disorder, please indicate any changes in the frequency or amount of use: NA. Stage of change for addressing substance use diagnoses: No substance use/Not applicable    ASSESSMENT:  Jannette appeared  with a depressed mood. Her affect is Normal range and intensity, which is congruent, with his mood and the content of the session. She appeared to actively participated in the group and interacted appropriately with and was supportive of the other group members. Jannette Baugh presents with a lowrisk of suicide,lowrisk of self-harm, and low risk of harm to others. For any risk assessment that surpasses a "low" rating, a safety plan must be developed. A safety plan was indicated: no  If yes, describe in detail NA    PLAN: Jannette will complete homework.  The next group is scheduled for October 24 and will cover the topic of cognitive distortions    Behavioral Health Treatment Plan and Discharge Planning: Bonnie Loza is aware of and agrees to continue to work on their treatment plan. They have identified and are working toward their discharge goals.  YEs    Visit start and stop times:    10/17/23  Start Time: 1600  Stop Time: 6246  Total Visit Time: 59 minutes

## 2023-10-18 ENCOUNTER — HOSPITAL ENCOUNTER (OUTPATIENT)
Dept: MAMMOGRAPHY | Facility: HOSPITAL | Age: 43
Discharge: HOME | End: 2023-10-18
Payer: COMMERCIAL

## 2023-10-18 ENCOUNTER — SOCIAL WORK (OUTPATIENT)
Dept: BEHAVIORAL/MENTAL HEALTH CLINIC | Facility: CLINIC | Age: 43
End: 2023-10-18
Payer: COMMERCIAL

## 2023-10-18 VITALS — BODY MASS INDEX: 44.89 KG/M2 | WEIGHT: 286 LBS | HEIGHT: 67 IN

## 2023-10-18 DIAGNOSIS — Z12.31 ENCOUNTER FOR SCREENING MAMMOGRAM FOR MALIGNANT NEOPLASM OF BREAST: ICD-10-CM

## 2023-10-18 DIAGNOSIS — F41.1 GENERALIZED ANXIETY DISORDER: ICD-10-CM

## 2023-10-18 DIAGNOSIS — F33.1 MODERATE RECURRENT MAJOR DEPRESSION (HCC): Primary | ICD-10-CM

## 2023-10-18 PROCEDURE — 77063 BREAST TOMOSYNTHESIS BI: CPT

## 2023-10-18 PROCEDURE — 77067 SCR MAMMO BI INCL CAD: CPT

## 2023-10-18 PROCEDURE — 90834 PSYTX W PT 45 MINUTES: CPT | Performed by: SOCIAL WORKER

## 2023-10-18 NOTE — PSYCH
This note was not shared with the patient due to this is a psychotherapy note      Behavioral Health Psychotherapy Progress Note    Psychotherapy Provided: Individual Psychotherapy     1. Moderate recurrent major depression ([address])        2. Generalized anxiety disorder            Goals addressed in session: Goal 1     DATA:   Met with Jannette. Jannette continues to struggle with unhelpful thoughts about herself- disqualifying the positives, mind reading, emotional reasoning. Through discussion, Zachary Jones gained greater awareness into her use of distraction- keeping herself busy to avoid dealing with her emotions and feelings, not setting limits with herself and others- not saying no- leading to feeling stressed, anxious and becoming emotionally reactive. We reviewed some of the skills she has learned thus far through individual therapy, DBT group, and currrently the depression group she is involved in. She struggles with implementation of skills learned in therapy thus far- stating she knows what she should be doing, tells others to do it, just doesn't understand why she can't do it herself. Discussed negative core beliefs, explored secondary gains. She describes herself feeling like her mind and body wants her to go down a dark hole, describes it being painful to work against it. She states she knows the origin of her unhealthy attachments- people pleasing, needing to affirmation from others. She states when her father  when she was 15y/o she feels she took on the role of her father, because her mother did not step up. She described herself taking care of her brothers when issues arose for them. She stated it made her feel good. Some psychoeducation was provided of attachments. . Discussed how her and her brothers are now grown adults and all can take care of themselves, discussed Jannette beginning to put more effort into her own emotional wellness than the people around her.   She became tearful- this is a fear for her to focus on herself with her daughter graduating in May and no longer participating in [employer]. Discussed working on small changes---not taking rush orders, restarting mindfulness tools. During this session, this clinician used the following therapeutic modalities: Client-centered Therapy, Cognitive Behavioral Therapy, and Dialectical Behavior Therapy    Substance Abuse was not addressed during this session. If the client is diagnosed with a co-occurring substance use disorder, please indicate any changes in the frequency or amount of use: na. Stage of change for addressing substance use diagnoses: No substance use/Not applicable    ASSESSMENT:  Jannette Baugh presents with a Anxious and Depressed mood. her affect is Tearful, which is congruent, with her mood and the content of the session. The client has made progress on their goals. Jannette Baugh presents with a none risk of suicide, none risk of self-harm, and none risk of harm to others. For any risk assessment that surpasses a "low" rating, a safety plan must be developed. A safety plan was indicated: no  If yes, describe in detail na    PLAN: Between sessions, Jannette Baugh will practice skills to decrease symptoms of anxiety and depression. At the next session, the therapist will use Client-centered Therapy, Cognitive Behavioral Therapy, Dialectical Behavior Therapy, and Mindfulness-based Strategies to address depression and anxiety. Behavioral Health Treatment Plan and Discharge Planning: Leslie Vivar is aware of and agrees to continue to work on their treatment plan. They have identified and are working toward their discharge goals.  yes    Visit start and stop times:    10/18/23  Start Time: 0955  Stop Time: 1040  Total Visit Time: 45 minutes

## 2023-10-24 ENCOUNTER — OFFICE VISIT (OUTPATIENT)
Dept: BEHAVIORAL/MENTAL HEALTH CLINIC | Facility: CLINIC | Age: 43
End: 2023-10-24
Payer: COMMERCIAL

## 2023-10-24 DIAGNOSIS — F33.9 DEPRESSION, RECURRENT (HCC): ICD-10-CM

## 2023-10-24 DIAGNOSIS — F41.1 GENERALIZED ANXIETY DISORDER: Primary | ICD-10-CM

## 2023-10-24 PROCEDURE — 90853 GROUP PSYCHOTHERAPY: CPT | Performed by: SOCIAL WORKER

## 2023-10-25 NOTE — PSYCH
Behavioral Health Psychotherapy Group Progress Note    Psychotherapy Provided: Group Therapy    1. Generalized anxiety disorder        2. Depression, recurrent ([address])            Goals addressed in session: Goal 1     Group Name: Depression Support Group    Topic(s) covered: cognitive distortions and gratitude    Skill(s) covered: putting one's thoughts on trial    Group summary:  Group began with meditation called "Tornado."  Therapist seeks feedback from last session and the week. Therapist presents worksheet on cognitive distortions and participants are in agreement that they often engage in many of them. Therapist discusses challenging one's thoughts and examples are reviewed. We conclude with exercise involving writing on note cards one thing for which one is grateful. Group participants then write an affirmation for a peer. Both cards are taken home to be reviewed during the week. Data: Jannette attended today's group. She was noticeably quiet today, only referencing the fact that "[she] lives in the past" and expects to die young as did family members. Substance Abuse was not addressed during this session. If the client is diagnosed with a co-occurring substance use disorder, please indicate any changes in the frequency or amount of use: NA. Stage of change for addressing substance use diagnoses: No substance use/Not applicable    ASSESSMENT:  Jannette appeared  with a depressed mood. Her affect is Normal range and intensity, which is congruent, with his mood and the content of the session. She appeared to remained quiet throughout the group and was supportive of the other group members. Jannette Baugh presents with a lowrisk of suicide,lowrisk of self-harm, and low risk of harm to others. For any risk assessment that surpasses a "low" rating, a safety plan must be developed. A safety plan was indicated: no  If yes, describe in detail NA    PLAN: Jannette will practice challenging her thoughts.  The next group is scheduled for October 31 and will cover the topic of writing coping cards. Behavioral Health Treatment Plan and Discharge Planning: Marco Sanderson is aware of and agrees to continue to work on their treatment plan. They have identified and are working toward their discharge goals.  YES    Visit start and stop times:    10/24/23  Start Time: 1600  Stop Time: 1649  Total Visit Time: 49 minutes

## 2023-10-31 ENCOUNTER — SOCIAL WORK (OUTPATIENT)
Dept: BEHAVIORAL/MENTAL HEALTH CLINIC | Facility: CLINIC | Age: 43
End: 2023-10-31
Payer: COMMERCIAL

## 2023-10-31 DIAGNOSIS — F33.1 MODERATE RECURRENT MAJOR DEPRESSION (HCC): Primary | ICD-10-CM

## 2023-10-31 DIAGNOSIS — F41.1 GENERALIZED ANXIETY DISORDER: ICD-10-CM

## 2023-10-31 PROCEDURE — 90832 PSYTX W PT 30 MINUTES: CPT | Performed by: SOCIAL WORKER

## 2023-10-31 PROCEDURE — 90853 GROUP PSYCHOTHERAPY: CPT | Performed by: SOCIAL WORKER

## 2023-11-01 ENCOUNTER — SOCIAL WORK (OUTPATIENT)
Dept: BEHAVIORAL/MENTAL HEALTH CLINIC | Facility: CLINIC | Age: 43
End: 2023-11-01
Payer: COMMERCIAL

## 2023-11-01 DIAGNOSIS — F33.1 MODERATE RECURRENT MAJOR DEPRESSION (HCC): Primary | ICD-10-CM

## 2023-11-01 DIAGNOSIS — F41.1 GENERALIZED ANXIETY DISORDER: ICD-10-CM

## 2023-11-01 PROCEDURE — 90834 PSYTX W PT 45 MINUTES: CPT | Performed by: SOCIAL WORKER

## 2023-11-01 NOTE — PSYCH
Behavioral Health Psychotherapy Group Progress Note    Psychotherapy Provided: Group Therapy    1. Moderate recurrent major depression ([address])        2. Generalized anxiety disorder            Goals addressed in session: Goal 1     Group Name: Depression Support Group    Topic(s) covered: Cognitive Distortions and Coping Cards    Skill(s) covered: Challenging distortions and writing/using coping cards    Group summary:  Therapist began session with meditation and asked for feedback regarding last session. This was discussed. Each group participant then was asked to share a challenge from the week (eg a cognitive distortion). Volunteers were asked to provide constructive feedback, including considering evidence for/against thoughts. Coping cards were completed. Therapist concluded session by introducing mindfulness. Data: Jannette attended today's group. She was quiet today, only sharing that she was having difficulty with her mother. She did not wish to share more. Substance Abuse was not addressed during this session. If the client is diagnosed with a co-occurring substance use disorder, please indicate any changes in the frequency or amount of use: NA. Stage of change for addressing substance use diagnoses: No substance use/Not applicable    ASSESSMENT:  Jannette appeared  with a depressed/tearful mood. Her affect is Tearful, which is congruent, with his mood and the content of the session. She appeared to remained quiet throughout the group and was supportive of the other group members. Jannette Baugh presents with a lowrisk of suicide,lowrisk of self-harm, and low risk of harm to others. For any risk assessment that surpasses a "low" rating, a safety plan must be developed. A safety plan was indicated: no  If yes, describe in detail NA    PLAN: Jannette will explore writing coping cards for herself. The next group is scheduled for November 14 and will cover the topic of mindfulness.     Behavioral Health Treatment Plan and Discharge Planning: Javier Merlos is aware of and agrees to continue to work on their treatment plan. They have identified and are working toward their discharge goals.  yes    Visit start and stop times:    10/31/23  Start Time: 1600  Stop Time: 1656  Total Visit Time: 56 minutes

## 2023-11-01 NOTE — PSYCH
This note was not shared with the patient due to this is a psychotherapy note      Behavioral Health Psychotherapy Progress Note    Psychotherapy Provided: Individual Psychotherapy     1. Moderate recurrent major depression ([address])        2. Generalized anxiety disorder            Goals addressed in session: Goal 1     DATA:   Met with Jannette. Jannette states she is doing better than yesterday, feeling emotionally exhausted today. Began session by discussing yesterday's session---Jannette stated it was the first time she cried in months, how she talked to her mom about some of her childhood. LCSW encouraged Jannette to look at these behaviors as progress in her mental health recovery. Jannette spoke in depth about her relationship with her mother after her father ; specifically being the support for her mother, then her mother dating a new man and not being home anymore. She then became the mother figure to her brothers. Jannette states, "I also lost my mother."   She expressed her thoughts and feelings. Validation and support therapy was provided. LCSW helped Jannette gain awareness and insight into learned behaviors as a child, continuing as an adult---putting others before herself, not allowing herself to feel emotions. She shared several examples and how it increases her anxiety and depression symptoms. She shared some ways she's begun setting limits with herself and others. We discussed breaking these habits, patterns. Discussed positive coping statements. We discussed using EMDR to work through presenting issues surrounding the death of her father, stating she feels like she is 'stuck there'. She would like to process the targeted memory of finding her father on the bathroom floor after his heart attack age 12.   She reports trauma symptoms surrounding this including repeated disturbing thoughts, images, sounds from the event, suddenly feeling as if the event is happening again, feeling very upset when something reminds her of the event, having physical reactions when something reminds her of the event, feeling like her future will be cut short, and feeling easily startled. Plan to use EMDR for targeted memory next session. Goal to then work towards other connected memories-- mother's response from her father's death making her feel unworthy. During this session, this clinician used the following therapeutic modalities: Client-centered Therapy, Cognitive Behavioral Therapy, and Supportive Psychotherapy    Substance Abuse was not addressed during this session. If the client is diagnosed with a co-occurring substance use disorder, please indicate any changes in the frequency or amount of use: na. Stage of change for addressing substance use diagnoses: No substance use/Not applicable    ASSESSMENT:  Jannette Baugh presents with a Anxious and Depressed mood. her affect is Normal range and intensity, which is congruent, with her mood and the content of the session. The client has made progress on their goals. Jannette Baugh presents with a none risk of suicide, none risk of self-harm, and none risk of harm to others. For any risk assessment that surpasses a "low" rating, a safety plan must be developed. A safety plan was indicated: no  If yes, describe in detail na    PLAN: Between sessions, Jayesh Frye will continue to use and develop coping skills, continue depression group. At the next session, the therapist will use Client-centered Therapy, Cognitive Behavioral Therapy, EMDR (or other form of bilateral Stimulation), and Mindfulness-based Strategies to address death of father. Behavioral Health Treatment Plan and Discharge Planning: Jayesh Frye is aware of and agrees to continue to work on their treatment plan. They have identified and are working toward their discharge goals.  yes    Visit start and stop times:    11/01/23  Start Time: 0957  Stop Time: 7627  Total Visit Time: 40 minutes

## 2023-11-01 NOTE — PSYCH
This note was not shared with the patient due to this is a psychotherapy note      Behavioral Health Psychotherapy Progress Note    Psychotherapy Provided: Individual Psychotherapy     1. Moderate recurrent major depression ([address])        2. Generalized anxiety disorder            Goals addressed in session: Goal 1 and Goal 2     DATA: Jannette came to the outpatient office today for the Depression Group at [address].   Emergency visit with this LCSW--presenting problem-- Jannette having anxiety, trouble breathing in the waiting room, unsure if she should go to the ED. Met with Jannette where she was tearful. State she was having difficulty breathing; however, no issues observed in her being able to express her thoughts and feelings in session. LCSW helped Jannette identify she was feeling anxious with physiological symptoms from this emotion. Cold water was provided for her to drink. She expressed this was the first time she cried in months-- multiple stressors--medical issues, relationship with mother. She feels she is not getting better emotionally, frustrated with knowing the skills, not using them. Jannette expressed her thoughts and feelings. Support therapy was provided. Jannette denies SI, stating she just wishes she could run away. Jannette was walked through a deep breathing exercise. Used 4 elements exercise, where Jannette continued to report a decrease in anxiety symptoms, feeling better. She wished to still attend the depression group today. She plans to come to therapy with this clinician tomorrow. During this session, this clinician used the following therapeutic modalities: Mindfulness-based Strategies and Supportive Psychotherapy    Substance Abuse was not addressed during this session.  If the client is diagnosed with a co-occurring substance use disorder, please indicate any changes in the frequency or amount of use: na. Stage of change for addressing substance use diagnoses: No substance use/Not applicable    ASSESSMENT:  Jannette Baugh presents with a Anxious and Depressed mood. her affect is Tearful, which is congruent, with her mood and the content of the session. The client has made progress on their goals. Jannette Baugh presents with a none risk of suicide, none risk of self-harm, and none risk of harm to others. For any risk assessment that surpasses a "low" rating, a safety plan must be developed. A safety plan was indicated: no  If yes, describe in detail na    PLAN: Between sessions, Candy Dimas will attend depression group, practice self care. At the next session, the therapist will use Client-centered Therapy, Cognitive Behavioral Therapy, Mindfulness-based Strategies, and Supportive Psychotherapy to address better managing emotions    [address] and Discharge Planning: Candy Dimas is aware of and agrees to continue to work on their treatment plan. They have identified and are working toward their discharge goals.  yes    Visit start and stop times:    10/31/23  Start Time: 1530  Stop Time: 1550  Total Visit Time: 20 minutes

## 2023-11-03 ENCOUNTER — OFFICE VISIT (OUTPATIENT)
Dept: PSYCHIATRY | Facility: CLINIC | Age: 43
End: 2023-11-03
Payer: COMMERCIAL

## 2023-11-03 DIAGNOSIS — F33.1 MODERATE RECURRENT MAJOR DEPRESSION (HCC): Primary | ICD-10-CM

## 2023-11-03 DIAGNOSIS — F41.1 GENERALIZED ANXIETY DISORDER: ICD-10-CM

## 2023-11-03 PROCEDURE — 99213 OFFICE O/P EST LOW 20 MIN: CPT | Performed by: PHYSICIAN ASSISTANT

## 2023-11-03 RX ORDER — VENLAFAXINE HYDROCHLORIDE 75 MG/1
75 CAPSULE, EXTENDED RELEASE ORAL
Qty: 90 CAPSULE | Refills: 0 | Status: SHIPPED | OUTPATIENT
Start: 2023-11-03

## 2023-11-03 RX ORDER — AMITRIPTYLINE HYDROCHLORIDE 75 MG/1
75 TABLET ORAL
Qty: 90 TABLET | Refills: 0 | Status: SHIPPED | OUTPATIENT
Start: 2023-11-03

## 2023-11-03 RX ORDER — VENLAFAXINE HYDROCHLORIDE 150 MG/1
150 CAPSULE, EXTENDED RELEASE ORAL
Qty: 90 CAPSULE | Refills: 0 | Status: SHIPPED | OUTPATIENT
Start: 2023-11-03

## 2023-11-03 NOTE — PSYCH
MEDICATION MANAGEMENT NOTE        [address] [place] [address] [facility] [address] [place] [address] Road [address] [place] [address]  [address] [facility] [address] [place] [address]  [phone]        Name and Date of Birth:  Robin Whitehead 37 y.o. 1980    Date of Visit: Nov 3, 2023/seen with Jagruti Solano with pt's permission    SUBJECTIVE:      Jannette seen by Wilton 8/22 at which time meds unchanged and OA recommended. Jannette has had some increase in anxiety (PTSD) symptoms this week necessitating unscheduled appt with Zita. Symptoms accompanied by  chest tightness, couldn't catch her breath. Symptoms precipitated by thoughts of father's death and strained relationship with her mom. Jannette describes a very positive experience with OA online meeting. She identified with participants and is thinking about asking someone to be her sponsor. Recognizes her tendencies to disregard herself. Review of Systems   Constitutional:  Negative for activity change. Genitourinary:         Exploratory surg for vaginal fistula 11/17. Psychiatric History      This office since 2/25/20 (Dr. Diogenes Nicole). Ind therapy Redia Mention 5/11/20 and Zita Stubits starting 1/22/21. In OP tx since 26 yo. Has seen Dr. Bishop Tafoya in the past.  No IP or suicide attempts. FHx- mother and brother with depression. Trauma/Loss History       Death of father from MI when Jannette was 11 yo. Bullied by brother until age 11 yo. Social History       x 20 yrs. Lives with  and teen-age daughter.       OBJECTIVE:     MENTAL STATUS EXAM  Appearance:  age appropriate, dressed casually   Behavior:  Pleasant & cooperative   Speech:  Normal volume, regular rate and rhythm   Mood:  Euthymic and mildly anxious   Affect:  brighter with some improvement   Language: intact and appropriate for age, education, and intellect   Thought Process:  goal directed   Associations: intact associations Thought Content:  normal and appropriate   Perceptual Disturbances: no auditory or visual hallcunations   Risk Potential / Abnormal Thoughts: Suicidal ideation - None  Homicidal ideation - None  Potential for aggression - No       Consciousness:  Alert & Awake   Sensorium:  Grossly oriented   Attention: attention span and concentration are age appropriate       Fund of Knowledge:  Memory: awareness of current events: yes  recent and remote memory grossly intact   Insight:  good   Judgment: good   Muscle Strength Muscle Tone: Grossly normal  normal   Gait/Station: normal gait/station with good balance   Motor Activity: no abnormal movements       Lab Review: I have reviewed all pertinent labs  Lab Results   Component Value Date    HGBA1C 5.1 08/01/2023     Lab Results   Component Value Date    CHOLESTEROL 181 08/01/2023     Lab Results   Component Value Date    HDL 51 08/01/2023     Lab Results   Component Value Date    TRIG 164 (H) 08/01/2023     Lab Results   Component Value Date    NONHDLC 135 11/15/2022     Lab Results   Component Value Date     04/05/2017    SODIUM 140 03/30/2023    K 3.9 03/30/2023     03/30/2023    CO2 29 03/30/2023    ANIONGAP 7 09/19/2014    AGAP 9 03/30/2023    BUN 13 03/30/2023    CREATININE 0.59 (L) 03/30/2023    GLUC 150 (H) 01/03/2022    GLUF 114 (H) 03/30/2023    CALCIUM 9.4 03/30/2023    AST 20 08/01/2023    ALT 48 08/01/2023    ALKPHOS 62 08/01/2023    PROT 6.7 04/05/2017    TP 7.4 08/01/2023    BILITOT 0.6 04/05/2017    TBILI 0.43 08/01/2023    EGFR 112 03/30/2023     Lab Results   Component Value Date    WBC 7.49 03/30/2023    HGB 14.1 03/30/2023    HCT 42.4 03/30/2023    MCV 92 03/30/2023     03/30/2023     Lab Results   Component Value Date    FPQ8UGSXMLRV 1.750 08/01/2023    TSH 0.85 10/23/2018           ASSESSMENT & PLAN          Diagnoses and all orders for this visit:    Moderate recurrent major depression (HCC)  -     amitriptyline (ELAVIL) 75 mg tablet; Take 1 tablet (75 mg total) by mouth daily at bedtime    Generalized anxiety disorder  -     amitriptyline (ELAVIL) 75 mg tablet; Take 1 tablet (75 mg total) by mouth daily at bedtime  -     venlafaxine (EFFEXOR-XR) 150 mg 24 hr capsule; Take 1 capsule (150 mg total) by mouth daily at bedtime  -     venlafaxine (EFFEXOR-XR) 75 mg 24 hr capsule; Take 1 capsule (75 mg total) by mouth daily at bedtime      Current Outpatient Medications   Medication Sig Dispense Refill    amitriptyline (ELAVIL) 75 mg tablet Take 1 tablet (75 mg total) by mouth daily at bedtime 90 tablet 0    venlafaxine (EFFEXOR-XR) 150 mg 24 hr capsule Take 1 capsule (150 mg total) by mouth daily at bedtime 90 capsule 0    venlafaxine (EFFEXOR-XR) 75 mg 24 hr capsule Take 1 capsule (75 mg total) by mouth daily at bedtime 90 capsule 0    albuterol (PROVENTIL HFA,VENTOLIN HFA) 90 mcg/act inhaler Inhale 2 puffs every 6 (six) hours as needed for wheezing or shortness of breath (cough) 18 g 1    Ascorbic Acid (VITAMIN C) 500 MG CAPS Take 1 capsule by mouth daily at bedtime      aspirin 81 mg chewable tablet Chew 1 tablet (81 mg total) daily (Patient taking differently: Chew 81 mg daily at bedtime) 30 tablet 30    cetirizine (ZyrTEC) 10 mg tablet Take 10 mg by mouth daily      Cholecalciferol (VITAMIN D3) 5000 units CAPS Take 5,000 Units by mouth daily at bedtime      ciprofloxacin-dexamethasone (CIPRODEX) otic suspension Administer 4 drops to the right ear in the morning and 4 drops in the evening.  7.5 mL 3    cyanocobalamin (VITAMIN B-12) 1,000 mcg tablet Take 1,000 mcg by mouth daily       docusate sodium (DULCOLAX) 100 mg capsule Take by mouth daily at bedtime      esomeprazole (NexIUM) 20 mg capsule Take 2 capsules (40 mg total) by mouth in the morning 180 capsule 0    losartan (COZAAR) 100 MG tablet TAKE 1 TABLET BY MOUTH ONCE DAILY (Patient taking differently: 100 mg daily at bedtime) 90 tablet 2    Melatonin 10 MG TABS Take by mouth daily at bedtime metoprolol succinate (TOPROL-XL) 100 mg 24 hr tablet TAKE 1 TABLET BY MOUTH ONCE DAILY (Patient taking differently: 100 mg daily at bedtime) 100 tablet 1    metroNIDAZOLE (FLAGYL) 250 mg tablet Take 250 mg by mouth 3 (three) times a day (Patient not taking: Reported on 9/29/2023)      Multiple Vitamins-Minerals (CENTRUM ADULTS PO) Take 1 tablet by mouth daily at bedtime      neomycin-polymyxin-hydrocortisone (CORTISPORIN) otic solution Administer 4 drops into the left ear 3 (three) times a day for 10 days (Patient taking differently: Administer 4 drops into the left ear 2 (two) times a day as needed) 10 mL 0    nystatin (MYCOSTATIN) cream Apply topically 2 (two) times a day as needed (rash) 30 g 1    Omega-3 Fatty Acids (FISH OIL ADULT GUMMIES PO) Take by mouth daily at bedtime      rosuvastatin (CRESTOR) 40 MG tablet Take 1 tablet (40 mg total) by mouth daily (Patient taking differently: Take 40 mg by mouth daily at bedtime) 30 tablet 0    Zinc 50 MG TABS Take by mouth daily at bedtime       No current facility-administered medications for this visit. Plan:         No med change -cont effexor xr 225 mg/d and elavil 75 mg/d- Pa-C aware of interaction and monitoring. Cont f/u with Sarah Zavala for ind therapy, Tod Favre for support grp and OA 12 step program.     Reviewed risks, benefits, side effects of medications, including no medication. Patient understands and agrees to treatment plan. F/u Pa-C 3 mths, sooner prn         Patient has been informed of 24 hours and weekend coverage for urgent situations accessed by calling the main clinic phone number.      Renee Brunner PA-C  Visit Time    Visit Start Time: 0465  Visit Stop Time: 0952  Total Visit Duration:  20 minutes

## 2023-11-14 ENCOUNTER — OFFICE VISIT (OUTPATIENT)
Dept: BEHAVIORAL/MENTAL HEALTH CLINIC | Facility: CLINIC | Age: 43
End: 2023-11-14
Payer: COMMERCIAL

## 2023-11-14 DIAGNOSIS — F33.1 MODERATE RECURRENT MAJOR DEPRESSION (HCC): Primary | ICD-10-CM

## 2023-11-14 DIAGNOSIS — F41.1 GENERALIZED ANXIETY DISORDER: ICD-10-CM

## 2023-11-14 PROCEDURE — 90853 GROUP PSYCHOTHERAPY: CPT | Performed by: SOCIAL WORKER

## 2023-11-14 NOTE — PSYCH
Behavioral Health Psychotherapy Group Progress Note    Psychotherapy Provided: Group Therapy    1. Moderate recurrent major depression ([address])        2. Generalized anxiety disorder            Goals addressed in session: Goal 1     Group Name: Depression Support Group    Topic(s) covered: Mindfulness and grounding    Skill(s) covered: Mindfully eating and grounding oneself    Group summary:  Therapist began session by following up on use of coping cards completed last session. Feedback was positive and participants shared "wins" from the last two weeks. Session proceeded with grounding exercise and this was processed. Session continued with mindful eating exercise using a raison. Therapist stressed that when we are mindful we are not focused on our problems. We concluded with some discussion of coping with Thanksgiving. Data: Jannette attended today's group. She shared that she has been experiencing a 3 day panic attack which she believes is related to not wanting to address her traumatic past in therapy. Therapist and session participants offered advice. In particular, it was discussed to expose oneself to loss of control. Substance Abuse was not addressed during this session. If the client is diagnosed with a co-occurring substance use disorder, please indicate any changes in the frequency or amount of use: NA. Stage of change for addressing substance use diagnoses: No substance use/Not applicable    ASSESSMENT:  Jannette appeared  with a Anxious mood. Her affect is Normal range and intensity and Tearful, which is congruent, with his mood and the content of the session. She appeared to actively participated in the group and interacted appropriately with and was supportive of the other group members. Jannette Baugh presents with a lowrisk of suicide,lowrisk of self-harm, and low risk of harm to others. For any risk assessment that surpasses a "low" rating, a safety plan must be developed.     A safety plan was indicated: no  If yes, describe in detail NA    PLAN: Jannette will practice mindfulness skills discussed. The next group is scheduled for November 28 and will cover the topic of mindfulness/body scan    Behavioral Health Treatment Plan and Discharge Planning: Fanny Mike is aware of and agrees to continue to work on their treatment plan. They have identified and are working toward their discharge goals.  yes    Visit start and stop times:    11/14/23  Start Time: 1600  Stop Time: 1657  Total Visit Time: 57 minutes

## 2023-11-15 ENCOUNTER — SOCIAL WORK (OUTPATIENT)
Dept: BEHAVIORAL/MENTAL HEALTH CLINIC | Facility: CLINIC | Age: 43
End: 2023-11-15
Payer: COMMERCIAL

## 2023-11-15 DIAGNOSIS — F33.1 MODERATE RECURRENT MAJOR DEPRESSION (HCC): Primary | ICD-10-CM

## 2023-11-15 DIAGNOSIS — F41.1 GENERALIZED ANXIETY DISORDER: ICD-10-CM

## 2023-11-15 PROCEDURE — 90834 PSYTX W PT 45 MINUTES: CPT | Performed by: SOCIAL WORKER

## 2023-11-15 NOTE — PSYCH
This note was not shared with the patient due to this is a psychotherapy note        Behavioral Health Psychotherapy Progress Note    Psychotherapy Provided: Individual Psychotherapy     1. Moderate recurrent major depression ([address])        2. Generalized anxiety disorder            Goals addressed in session: Goal 1 and Goal 2     DATA: Met with Jannette. Jannette came into session today stating she's been feeling overwhelmed with a feeling of hatred toward herself- physically and emotionally. She expressed her thoughts and feelings about this. LCSW suggested to Jannette that her anger that she's feeling is not toward herself but actually toward other people, places, and things. We spoke about this in depth today- discussed grieving the relationship with her mother, her relationship with her uncle, her relationship with her brothers. She shared a recent incident with her mother in which she felt guilty, blamed herself. Developed positive coping statement in session- ex: I am disappointed, but this is how my mom deals with emotions. It is not my fault. Discussed the stages of grief, correlating her anger. Jannette was able to link this. Discussed how everyone ella with grief differently. Jannette struggles with unhelpful thoughts. Throughout session, dialectical thinking was utilized encouraging Jannette to step out of the black and white and into shades of gray. NC I cannot be angry at my mom. Dialectical thought: ex: I love and care for my mom and am still angry at how she treated me,  I talk to my mom and still grieve the mom I wish I had. Dismantled unhelpful thoughts--No one cared to everyone was grieving my dad's death in different ways, even if they weren't the healthiest.      During this session, this clinician used the following therapeutic modalities: Client-centered Therapy, Cognitive Behavioral Therapy, and Dialectical Behavior Therapy    Substance Abuse was not addressed during this session.  If the client is diagnosed with a co-occurring substance use disorder, please indicate any changes in the frequency or amount of use: na. Stage of change for addressing substance use diagnoses: No substance use/Not applicable    ASSESSMENT:  Jannette Baugh presents with a Anxious and Depressed mood. her affect is Tearful, which is congruent, with her mood and the content of the session. The client has made progress on their goals. Jannette Baugh presents with a none risk of suicide, none risk of self-harm, and none risk of harm to others. For any risk assessment that surpasses a "low" rating, a safety plan must be developed. A safety plan was indicated: no  If yes, describe in detail na    PLAN: Between sessions, Mark Khoury will continue to use and develop coping skills. At the next session, the therapist will use Client-centered Therapy, Cognitive Behavioral Therapy, EMDR (or other form of bilateral Stimulation), and Mindfulness-based Strategies to address father's death and mother's behaviors after. Behavioral Health Treatment Plan and Discharge Planning: Mark Khoury is aware of and agrees to continue to work on their treatment plan. They have identified and are working toward their discharge goals.  yes    Visit start and stop times:    11/15/23  Start Time: 1000  Stop Time: 1050  Total Visit Time: 50 minutes

## 2023-11-21 ENCOUNTER — OFFICE VISIT (OUTPATIENT)
Dept: FAMILY MEDICINE CLINIC | Facility: CLINIC | Age: 43
End: 2023-11-21
Payer: COMMERCIAL

## 2023-11-21 VITALS
BODY MASS INDEX: 45.2 KG/M2 | DIASTOLIC BLOOD PRESSURE: 84 MMHG | SYSTOLIC BLOOD PRESSURE: 132 MMHG | TEMPERATURE: 98.2 F | HEIGHT: 67 IN | OXYGEN SATURATION: 99 % | HEART RATE: 89 BPM | WEIGHT: 288 LBS

## 2023-11-21 DIAGNOSIS — F41.1 GENERALIZED ANXIETY DISORDER: ICD-10-CM

## 2023-11-21 DIAGNOSIS — Z00.00 ANNUAL PHYSICAL EXAM: Primary | ICD-10-CM

## 2023-11-21 DIAGNOSIS — I10 PRIMARY HYPERTENSION: ICD-10-CM

## 2023-11-21 PROCEDURE — 99214 OFFICE O/P EST MOD 30 MIN: CPT | Performed by: FAMILY MEDICINE

## 2023-11-21 PROCEDURE — 99396 PREV VISIT EST AGE 40-64: CPT | Performed by: FAMILY MEDICINE

## 2023-11-21 NOTE — PROGRESS NOTES
Name: Mary Anne Lopez      : 1980      MRN: [id number]  Encounter Provider: Maya Harris MD  Encounter Date: 2023   Encounter department: [facility] AT [address] [place] [address]     1. Annual physical exam    2. Primary hypertension    3. Generalized anxiety disorder    Hypertension controlled. Continue losartan. Reports anxiety worsening lately but now stabilized. Recommend to continue current treatment plan follow-up with psychiatry as planned. Subjective      Presents to the office today for annual exam and follow-up. Has been feeling her anxiety much more worse lately. She dealing with that with her fistula and has been bounced around with a few specialist until she found the right one. Her chest has been tight and having some shortness of breath and she feels that her anxiety is making it worse but she is not really sure if her anxiety is causing this as well. She does have asthma has not noticed any wheezing. No need for albuterol inhaler. She did see her therapist and her psychiatrist multiple times and like a week or 2 span because her symptoms are getting worse. Denies any HI or SI today. Blood pressures have been controlled at home. No issues with losartan      Review of Systems   All other systems reviewed and are negative.       Current Outpatient Medications on File Prior to Visit   Medication Sig    albuterol (PROVENTIL HFA,VENTOLIN HFA) 90 mcg/act inhaler Inhale 2 puffs every 6 (six) hours as needed for wheezing or shortness of breath (cough)    amitriptyline (ELAVIL) 75 mg tablet Take 1 tablet (75 mg total) by mouth daily at bedtime    APPLE CIDER VINEGAR PO Take by mouth    Ascorbic Acid (VITAMIN C) 500 MG CAPS Take 1 capsule by mouth daily at bedtime    aspirin 81 mg chewable tablet Chew 1 tablet (81 mg total) daily (Patient taking differently: Chew 81 mg daily at bedtime)    cetirizine (ZyrTEC) 10 mg tablet Take 10 mg by mouth daily Cholecalciferol (VITAMIN D3) 5000 units CAPS Take 5,000 Units by mouth daily at bedtime    ciprofloxacin-dexamethasone (CIPRODEX) otic suspension Administer 4 drops to the right ear in the morning and 4 drops in the evening.     cyanocobalamin (VITAMIN B-12) 1,000 mcg tablet Take 1,000 mcg by mouth daily     docusate sodium (DULCOLAX) 100 mg capsule Take by mouth daily at bedtime    esomeprazole (NexIUM) 20 mg capsule Take 2 capsules (40 mg total) by mouth in the morning    losartan (COZAAR) 100 MG tablet TAKE 1 TABLET BY MOUTH ONCE DAILY (Patient taking differently: 100 mg daily at bedtime)    Melatonin 10 MG TABS Take by mouth daily at bedtime    metoprolol succinate (TOPROL-XL) 100 mg 24 hr tablet TAKE 1 TABLET BY MOUTH ONCE DAILY (Patient taking differently: 100 mg daily at bedtime)    Multiple Vitamins-Minerals (CENTRUM ADULTS PO) Take 1 tablet by mouth daily at bedtime    neomycin-polymyxin-hydrocortisone (CORTISPORIN) otic solution Administer 4 drops into the left ear 3 (three) times a day for 10 days (Patient taking differently: Administer 4 drops into the left ear 2 (two) times a day as needed)    nystatin (MYCOSTATIN) cream Apply topically 2 (two) times a day as needed (rash)    Omega-3 Fatty Acids (FISH OIL ADULT GUMMIES PO) Take by mouth daily at bedtime    rosuvastatin (CRESTOR) 40 MG tablet Take 1 tablet (40 mg total) by mouth daily (Patient taking differently: Take 40 mg by mouth daily at bedtime)    venlafaxine (EFFEXOR-XR) 150 mg 24 hr capsule Take 1 capsule (150 mg total) by mouth daily at bedtime    venlafaxine (EFFEXOR-XR) 75 mg 24 hr capsule Take 1 capsule (75 mg total) by mouth daily at bedtime    Zinc 50 MG TABS Take by mouth daily at bedtime    metroNIDAZOLE (FLAGYL) 250 mg tablet Take 250 mg by mouth 3 (three) times a day (Patient not taking: Reported on 11/21/2023)       Objective     /84 (BP Location: Left arm, Patient Position: Sitting, Cuff Size: Standard)   Pulse 89   Temp 98.2 °F (36.8 °C) (Tympanic)   Ht 5' 7" (1.702 m)   Wt 131 kg (288 lb)   LMP  (LMP Unknown)   SpO2 99%   BMI 45.11 kg/m²     Physical Exam  Vitals and nursing note reviewed. Constitutional:       General: She is not in acute distress. Appearance: Normal appearance. She is not ill-appearing, toxic-appearing or diaphoretic. HENT:      Head: Normocephalic and atraumatic. Right Ear: Tympanic membrane, ear canal and external ear normal.      Left Ear: Tympanic membrane, ear canal and external ear normal.      Nose: Nose normal. No congestion or rhinorrhea. Mouth/Throat:      Mouth: Mucous membranes are moist.      Pharynx: Oropharynx is clear. No oropharyngeal exudate or posterior oropharyngeal erythema. Eyes:      General:         Right eye: No discharge. Left eye: No discharge. Extraocular Movements: Extraocular movements intact. Conjunctiva/sclera: Conjunctivae normal.      Pupils: Pupils are equal, round, and reactive to light. Cardiovascular:      Rate and Rhythm: Normal rate and regular rhythm. Pulses: Normal pulses. Heart sounds: Normal heart sounds. No murmur heard. Pulmonary:      Effort: Pulmonary effort is normal. No respiratory distress. Breath sounds: Normal breath sounds. Abdominal:      General: Abdomen is flat. Bowel sounds are normal. There is no distension. Palpations: There is no mass. Tenderness: There is no abdominal tenderness. There is no guarding or rebound. Hernia: No hernia is present. Musculoskeletal:         General: No swelling, tenderness, deformity or signs of injury. Normal range of motion. Cervical back: Normal range of motion and neck supple. No rigidity or tenderness. Right lower leg: No edema. Left lower leg: No edema. Lymphadenopathy:      Cervical: No cervical adenopathy. Skin:     General: Skin is warm and dry. Capillary Refill: Capillary refill takes less than 2 seconds. Coloration: Skin is not jaundiced or pale. Findings: No bruising, erythema, lesion or rash. Neurological:      General: No focal deficit present. Mental Status: She is alert and oriented to person, place, and time. Cranial Nerves: No cranial nerve deficit. Sensory: No sensory deficit. Motor: No weakness. Coordination: Coordination normal.      Gait: Gait normal.      Deep Tendon Reflexes: Reflexes normal.   Psychiatric:         Mood and Affect: Mood normal.         Behavior: Behavior normal.         Thought Content:  Thought content normal.       Merrick Enamorado MD

## 2023-11-24 ENCOUNTER — TELEPHONE (OUTPATIENT)
Dept: FAMILY MEDICINE CLINIC | Facility: CLINIC | Age: 43
End: 2023-11-24

## 2023-11-24 NOTE — TELEPHONE ENCOUNTER
----- Message from Ian Valdez MD sent at 11/24/2023  9:12 AM EST -----  Regarding: FW: Something for anxiety  Contact: [phone]  Please call patient to schedule an appointment to discuss medical treatment options. Thank you. Virtual is okay.      ----- Message -----  From: Ashok Javier  Sent: 11/22/2023   4:09 PM EST  To: Ian Valdez MD  Subject: FW: Something for anxiety                        Please advise.  ----- Message -----  From: Allan Navarro  Sent: 11/22/2023   4:01 PM EST  To: [facility]  Subject: Something for anxiety                            Carmenza Whipple, is there anything I can take to help with this anxiety? I was hoping I'd feel better after seeing you and get some anxiety calmed but it's not happening. Figured I'd see if there's something that might just take the edge off . Thanks!
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
## 2023-11-28 ENCOUNTER — OFFICE VISIT (OUTPATIENT)
Dept: BEHAVIORAL/MENTAL HEALTH CLINIC | Facility: CLINIC | Age: 43
End: 2023-11-28
Payer: COMMERCIAL

## 2023-11-28 DIAGNOSIS — F33.1 MODERATE RECURRENT MAJOR DEPRESSION (HCC): Primary | ICD-10-CM

## 2023-11-28 DIAGNOSIS — F41.1 GENERALIZED ANXIETY DISORDER: ICD-10-CM

## 2023-11-28 PROCEDURE — 90853 GROUP PSYCHOTHERAPY: CPT | Performed by: SOCIAL WORKER

## 2023-11-29 ENCOUNTER — SOCIAL WORK (OUTPATIENT)
Dept: BEHAVIORAL/MENTAL HEALTH CLINIC | Facility: CLINIC | Age: 43
End: 2023-11-29
Payer: COMMERCIAL

## 2023-11-29 DIAGNOSIS — F41.1 GENERALIZED ANXIETY DISORDER: ICD-10-CM

## 2023-11-29 DIAGNOSIS — F33.1 MODERATE RECURRENT MAJOR DEPRESSION (HCC): Primary | ICD-10-CM

## 2023-11-29 PROCEDURE — 90834 PSYTX W PT 45 MINUTES: CPT | Performed by: SOCIAL WORKER

## 2023-11-29 NOTE — PSYCH
Behavioral Health Psychotherapy Group Progress Note    Psychotherapy Provided: Group Therapy    1. Moderate recurrent major depression ([address])        2. Generalized anxiety disorder            Goals addressed in session: Goal 1     Group Name: Depression Support Group    Topic(s) covered: Mindfulness strategies for addressing depression    Skill(s) covered: Body Scan and various mindfulness strategies including thought diffusion    Group summary:  Group began with processing the events of the last two weeks including Thanksgiving Holiday. Mindfulness skills and benefits were again discussed, and group members agreed that they have been attempting to eat more mindfully following last session's exercise. Therapist proceeded by reading short story regarding finding happiness in the present moment. We concluded with Body Scan exercise and again reviewed mindfulness based strategies for addressing symptoms. Group members were in agreement that they struggle with not attaching to thoughts. Data: Jannette attended today's group. She shared at length that she recognizes (as was pointed out to her last session) that she works very hard to be always in control including experiencing her emotions. She adds that she always thinks negatively and struggles to challenge these cognitions. In addition, she prefers to avoid addressing difficult memories/emotions. Substance Abuse was not addressed during this session. If the client is diagnosed with a co-occurring substance use disorder, please indicate any changes in the frequency or amount of use: NA. Stage of change for addressing substance use diagnoses: No substance use/Not applicable    ASSESSMENT:  Jannette appeared  with a Depressed mood. Her affect is Normal range and intensity, which is congruent, with his mood and the content of the session.  She appeared to actively participated in the group and interacted appropriately with and was supportive of the other group members. Jannette Baugh presents with a lowrisk of suicide,lowrisk of self-harm, and low risk of harm to others. For any risk assessment that surpasses a "low" rating, a safety plan must be developed. A safety plan was indicated: no  If yes, describe in detail NA    PLAN: Jannette will practice challenging cognitions and implementing mindfulness based strategies for addressing symptoms. The next group is scheduled for December 5 and will cover the topic of Body Scan and mindfully walking. Behavioral Health Treatment Plan and Discharge Planning: Yue Frye is aware of and agrees to continue to work on their treatment plan. They have identified and are working toward their discharge goals.  yes    Visit start and stop times:    11/28/23  Start Time: 1600  Stop Time: 1700  Total Visit Time: 60 minutes

## 2023-11-29 NOTE — PSYCH
This note was not shared with the patient due to this is a psychotherapy note      Behavioral Health Psychotherapy Progress Note    Psychotherapy Provided: Individual Psychotherapy     1. Moderate recurrent major depression ([address])        2. Generalized anxiety disorder            Goals addressed in session: Goal 1 and Goal 2     DATA: Met with Jannette. Jannette states,"I am doing better."  She is reporting a decrease in anxiety symptoms. She states that she took a friend's Ativan on Thanksgiving to help alleviate anxiety symptoms. Jannette states since then, she has felt better. We did discuss not taking medications prescribed to others. She was encouraged to explore anxiety-reduction tools she could have used to help decrease anxiety symptoms. Jannette states she has difficulty implementing skills, we discussed importance of making it a daily priority for positive change. She identifies her mother as a recent trigger to her anxiety. She shared 2 recent incidents in which Jannette states her mother made her feel guilty which triggered anxiety, negative view of self. We discussed Jannette's actions in those incidences, she was challenged to identify if she could have handled the situation better/used a coping skill. Jannette was able to identify that she could have set better limits, said no, used assertiveness skills. She recognizes the correlation of this to her anxiety. Jannette wrote a letter to her mom-  she read out loud, becoming tearful. Validation and support therapy was provided. She shared that it was helpful for her to write the letter- describing it as 'a release'. Jannette struggles with unhelpful views of herself due to past experiences-- CBT was utilized throughout session to dismantle unhelpful thoughts, stepping out of the black and white and into shades of gray- encouraging her to be more fair to herself.       During this session, this clinician used the following therapeutic modalities: Client-centered Therapy, Cognitive Behavioral Therapy, Mindfulness-based Strategies, and Supportive Psychotherapy    Substance Abuse was not addressed during this session. If the client is diagnosed with a co-occurring substance use disorder, please indicate any changes in the frequency or amount of use: na. Stage of change for addressing substance use diagnoses: No substance use/Not applicable    ASSESSMENT:  Jannette Baugh presents with a Anxious and Depressed mood. her affect is Constricted, which is congruent, with her mood and the content of the session. The client has made progress on their goals. Jannette Baugh presents with a none risk of suicide, none risk of self-harm, and none risk of harm to others. For any risk assessment that surpasses a "low" rating, a safety plan must be developed. A safety plan was indicated: no  If yes, describe in detail na    PLAN: Between sessions, Yue Frye will continue to use and develop tools from therapy. Continue to attend DBT Skills Group. At the next session, the therapist will use Client-centered Therapy, Cognitive Behavioral Therapy, Dialectical Behavior Therapy, Mindfulness-based Strategies, and Supportive Psychotherapy to address better managing emotions and self worth    Behavioral Health Treatment Plan and Discharge Planning: Yue Frye is aware of and agrees to continue to work on their treatment plan. They have identified and are working toward their discharge goals.  yes    Visit start and stop times:    11/29/23  Start Time: 1000  Stop Time: 1045  Total Visit Time: 45 minutes

## 2023-11-30 ENCOUNTER — TELEPHONE (OUTPATIENT)
Dept: FAMILY MEDICINE CLINIC | Facility: CLINIC | Age: 43
End: 2023-11-30

## 2023-11-30 ENCOUNTER — TELEMEDICINE (OUTPATIENT)
Dept: FAMILY MEDICINE CLINIC | Facility: CLINIC | Age: 43
End: 2023-11-30
Payer: COMMERCIAL

## 2023-11-30 DIAGNOSIS — F41.1 GENERALIZED ANXIETY DISORDER: Primary | ICD-10-CM

## 2023-11-30 PROCEDURE — 99214 OFFICE O/P EST MOD 30 MIN: CPT | Performed by: FAMILY MEDICINE

## 2023-11-30 RX ORDER — LORAZEPAM 0.5 MG/1
0.5 TABLET ORAL EVERY 8 HOURS PRN
Qty: 5 TABLET | Refills: 0 | Status: SHIPPED | OUTPATIENT
Start: 2023-11-30

## 2023-11-30 NOTE — PROGRESS NOTES
Virtual Regular Visit    Verification of patient location:    Patient is located at Home in the following state in which I hold an active license [place]      Assessment/Plan:    Problem List Items Addressed This Visit       Generalized anxiety disorder - Primary    Relevant Medications    LORazepam (Ativan) 0.5 mg tablet     Anxiety uncontrolled. Continue Effexor daily. We will start Ativan 0.5 mg daily as needed. PDMP reviewed no red flags. Prescription sent to pharmacy. Discussed multiple treatment options with patient. Okay to use rescue med 5 or less times a month however if these episodes are happening more frequently I believe she may need an additional maintenance medication. She agreed. Will trial short prescription of Ativan so she has in case of emergency. We will follow-up in 1 month. Continue to follow-up with therapist.       Reason for visit is   Chief Complaint   Patient presents with    Virtual Regular Visit          Encounter provider Angelia Chakraborty MD    Provider located at [address] [place] [address]. [address]      Recent Visits  Date Type Provider Dept   11/24/23 Telephone Angelia Chakraborty MD [facility] [address] [place] [address] recent visits within past 7 days and meeting all other requirements  Today's Visits  Date Type Provider Dept   11/30/23 Telemedicine Angelia Chakraborty MD [facility] [address] [place] [address] today's visits and meeting all other requirements  Future Appointments  No visits were found meeting these conditions. Showing future appointments within next 150 days and meeting all other requirements       The patient was identified by name and date of birth. Jannette Baugh was informed that this is a telemedicine visit and that the visit is being conducted through the [employer]. She agrees to proceed. .  My office door was closed. No one else was in the room.   She acknowledged consent and understanding of privacy and security of the video platform. The patient has agreed to participate and understands they can discontinue the visit at any time. Patient is aware this is a billable service. Subjective  Jannette Jensen is a 37 y.o. female  . Today's visit follow-up on anxiety. She is currently on Effexor and also on amitriptyline for other chronic conditions. Her anxiety has been getting a little worse and having severe anxiety attacks. Over the holiday on Thanksgiving she was at a friend's house and was having a bad anxiety attack and her friend gave her Ativan. It was 0.5 mg. She says she knows she is not supposed to take other peoples meds however she was in a severe attack. She felt much better. Her anxiety resolved. It came back a few days after and had to take another Ativan. She did not have any issues with the Ativan. No side effects. Her last dose was on Saturday and has not had any issues since. She is still doing therapy and feels like she is getting better however a lot of the things that is coming up from her childhood may be causing her triggers.          Past Medical History:   Diagnosis Date    Allergic rhinitis     Anxiety     Arthritis     C. difficile diarrhea 2020    Chest pain     Chronic pain disorder     BACK SHOULDERS NECK    Colon polyp     COPD (chronic obstructive pulmonary disease) ([facility])     CPAP (continuous positive airway pressure) dependence     Depression     Diarrhea     Disease of thyroid gland     nodule    Dysphagia     with pain "feels like a blockage" in the esophagus    Eating disorder     Fibromyalgia, primary     Fissure, anal     GERD (gastroesophageal reflux disease)     Hearing difficulty of right ear     frequent infections-(x1 lead to sepsis)    Hyperlipidemia     Hypertension     Irritable bowel syndrome     Obesity     Perforation of tympanic membrane     Right    PONV (postoperative nausea and vomiting)     RBBB     Right bundle branch blockage     Sleep apnea     no cpap    Thyroid nodule     Wears glasses        Past Surgical History:   Procedure Laterality Date    ANAL FISTULOTOMY N/A 2018    Procedure: FISTULOTOMY;  Surgeon: Lisbet Ward MD;  [facility];  Service: Colorectal    BACK SURGERY      lumbar herniated disc    BREAST CYST EXCISION Left 13 yrs ago  benign    BREAST SURGERY Left 2006    cyst removal    CARPAL TUNNEL RELEASE      CERVICAL FUSION  2023    C5-6     SECTION      CHOLECYSTECTOMY      COLONOSCOPY      CYST REMOVAL      DENTAL SURGERY      ENDOMETRIAL ABLATION N/A 2021    Procedure: D&C, ABLATION ENDOMETRIAL MARIEL;  Surgeon: Hollie Samuels MD;  Location: [address] [place] [address] MAIN OR;  Service: Gynecology    [facility] MYELOGRAM CERVICAL  2022    HAND SURGERY      INCISION AND DRAINAGE OF WOUND N/A 2018    Procedure: INCISION AND DRAINAGE (I&D) BUTTOCK;  Surgeon: Lisbet Ward MD;  [facility]: [facility];  Service: Colorectal    MYRINGOTOMY W/ TUBES Right 2015    [place]    IN ANRCT XM SURG REQ ANES GENERAL SPI/EDRL DX N/A 2018    Procedure: EXAM UNDER ANESTHESIA (EUA); Surgeon: Lisbet Ward MD;  Location: [facility];  Service: Colorectal    IN COLONOSCOPY FLX DX W/[facility] WHEN PFRMD N/A 2017    Procedure: COLONOSCOPY;  Surgeon: Jumana Chen MD;  [facility];   Service: Gastroenterology    IN DEBRIDEMENT SUBCUTANEOUS TISSUE 20 SQ CM/< N/A 2022    Procedure: Debridement abdominal wall;  Surgeon: Savannah Live MD;  Location: [facility] MA[facility];  Service: General    IN EXCISION HIDRADENITIS INGUINAL COMPLEX REPAIR Bilateral 2022    Procedure: WIDE EXCISION HIDRADENITIS ABDOMINAL WALL;  Surgeon: Savannah Live MD;  Location: [facility];  Service: General    IN [address] N/A 2022    Procedure: INCISION AND DRAINAGE ABDOMINAL WALL;  Surgeon: Savannah Live MD;  Location: [facility];  Service: General [place] TENDON SHEATH INCISION Right 03/13/2018    Procedure: LONG TRIGGER FINGER RELEASE;  Surgeon: Clive Delcid DO;  [facility];  Service: Orthopedics    [place] TYMPANOPLASTY W/O MASTOIDECT W/O OSSICLE RECNSTJ Right 05/24/2017    Procedure: TYMPANOPLASTY WITH PERICHONDRIN GRAFT;  Surgeon: Margaret Murrell DO;  [facility];  Service: ENT    SHOULDER SURGERY Right     TRIGGER FINGER RELEASE      ULNAR NERVE TRANSPOSITION Left     UMBILICAL HIDRADENITIS EXCISION  79/04/2181    subumbilical hidradenitis exicsion, Dr. Olga Figueroa, [facility]    US GUIDED THYROID BIOPSY  7/3/2023    WISDOM TOOTH EXTRACTION         Current Outpatient Medications   Medication Sig Dispense Refill    LORazepam (Ativan) 0.5 mg tablet Take 1 tablet (0.5 mg total) by mouth every 8 (eight) hours as needed for anxiety 5 tablet 0    albuterol (PROVENTIL HFA,VENTOLIN HFA) 90 mcg/act inhaler Inhale 2 puffs every 6 (six) hours as needed for wheezing or shortness of breath (cough) 18 g 1    amitriptyline (ELAVIL) 75 mg tablet Take 1 tablet (75 mg total) by mouth daily at bedtime 90 tablet 0    APPLE CIDER VINEGAR PO Take by mouth      Ascorbic Acid (VITAMIN C) 500 MG CAPS Take 1 capsule by mouth daily at bedtime      aspirin 81 mg chewable tablet Chew 1 tablet (81 mg total) daily (Patient taking differently: Chew 81 mg daily at bedtime) 30 tablet 30    cetirizine (ZyrTEC) 10 mg tablet Take 10 mg by mouth daily      Cholecalciferol (VITAMIN D3) 5000 units CAPS Take 5,000 Units by mouth daily at bedtime      ciprofloxacin-dexamethasone (CIPRODEX) otic suspension Administer 4 drops to the right ear in the morning and 4 drops in the evening.  7.5 mL 3    cyanocobalamin (VITAMIN B-12) 1,000 mcg tablet Take 1,000 mcg by mouth daily       docusate sodium (DULCOLAX) 100 mg capsule Take by mouth daily at bedtime      esomeprazole (NexIUM) 20 mg capsule Take 2 capsules (40 mg total) by mouth in the morning 180 capsule 0    losartan (COZAAR) 100 MG tablet TAKE 1 TABLET BY MOUTH ONCE DAILY (Patient taking differently: 100 mg daily at bedtime) 90 tablet 2    Melatonin 10 MG TABS Take by mouth daily at bedtime      metoprolol succinate (TOPROL-XL) 100 mg 24 hr tablet TAKE 1 TABLET BY MOUTH ONCE DAILY (Patient taking differently: 100 mg daily at bedtime) 100 tablet 1    metroNIDAZOLE (FLAGYL) 250 mg tablet Take 250 mg by mouth 3 (three) times a day (Patient not taking: Reported on 11/21/2023)      Multiple Vitamins-Minerals (CENTRUM ADULTS PO) Take 1 tablet by mouth daily at bedtime      neomycin-polymyxin-hydrocortisone (CORTISPORIN) otic solution Administer 4 drops into the left ear 3 (three) times a day for 10 days (Patient taking differently: Administer 4 drops into the left ear 2 (two) times a day as needed) 10 mL 0    nystatin (MYCOSTATIN) cream Apply topically 2 (two) times a day as needed (rash) 30 g 1    Omega-3 Fatty Acids (FISH OIL ADULT GUMMIES PO) Take by mouth daily at bedtime      rosuvastatin (CRESTOR) 40 MG tablet Take 1 tablet (40 mg total) by mouth daily (Patient taking differently: Take 40 mg by mouth daily at bedtime) 30 tablet 0    venlafaxine (EFFEXOR-XR) 150 mg 24 hr capsule Take 1 capsule (150 mg total) by mouth daily at bedtime 90 capsule 0    venlafaxine (EFFEXOR-XR) 75 mg 24 hr capsule Take 1 capsule (75 mg total) by mouth daily at bedtime 90 capsule 0    Zinc 50 MG TABS Take by mouth daily at bedtime       No current facility-administered medications for this visit. Allergies   Allergen Reactions    Neurontin [Gabapentin] Other (See Comments)     Other reaction(s): SUICIDE IDEATION  Other reaction(s): Other (See Comments)  suicidal  suicidal    Penicillins Anaphylaxis, Swelling and Angioedema    Aripiprazole Other (See Comments)     Other reaction(s): low dose 2 mg.; curving in and locking in of hands/dystonia  Other reaction(s):  Other (See Comments)  Other reaction(s): low dose 2 mg.; curving in and locking in of hands/dystonia    Lorazepam Other (See Comments)     Other reaction(s): higher dose > anxiety and depression  Other reaction(s): Other (See Comments)  Other reaction(s): higher dose > anxiety and depression    Wound Dressing Adhesive Hives     Hive, redness, swelling/bleeding    Carbamazepine Other (See Comments)     Other reaction(s): Unknown Reaction    Doxycycline Hives    Lamotrigine Rash       Review of Systems   All other systems reviewed and are negative. Video Exam    There were no vitals filed for this visit. Physical Exam  Vitals and nursing note reviewed. Constitutional:       General: She is not in acute distress. Appearance: Normal appearance. She is not ill-appearing, toxic-appearing or diaphoretic. Eyes:      General:         Right eye: No discharge. Left eye: No discharge. Extraocular Movements: Extraocular movements intact. Conjunctiva/sclera: Conjunctivae normal.   Cardiovascular:      Rate and Rhythm: Normal rate. Pulmonary:      Effort: Pulmonary effort is normal.   Musculoskeletal:      Cervical back: Normal range of motion and neck supple. Neurological:      Mental Status: She is alert and oriented to person, place, and time. Psychiatric:         Mood and Affect: Mood normal.         Behavior: Behavior normal.         Thought Content:  Thought content normal.         Judgment: Judgment normal.          Visit Time  Total Visit Duration: 19

## 2023-11-30 NOTE — TELEPHONE ENCOUNTER
----- Message from Tania Mercedes MD sent at 11/30/2023 11:18 AM EST -----  Please call patient and schedule I month virtual visit follow-up for anxiety. Thank you.

## 2023-12-05 ENCOUNTER — OFFICE VISIT (OUTPATIENT)
Dept: BEHAVIORAL/MENTAL HEALTH CLINIC | Facility: CLINIC | Age: 43
End: 2023-12-05
Payer: COMMERCIAL

## 2023-12-05 DIAGNOSIS — F33.1 MODERATE RECURRENT MAJOR DEPRESSION (HCC): Primary | ICD-10-CM

## 2023-12-05 DIAGNOSIS — F41.1 GENERALIZED ANXIETY DISORDER: ICD-10-CM

## 2023-12-05 PROCEDURE — 90853 GROUP PSYCHOTHERAPY: CPT | Performed by: SOCIAL WORKER

## 2023-12-06 NOTE — PSYCH
Behavioral Health Psychotherapy Group Progress Note    Psychotherapy Provided: Group Therapy    1. Moderate recurrent major depression ([address])        2. Generalized anxiety disorder            Goals addressed in session: Goal 1     Group Name: Depression Support Group    Topic(s) covered: Mindfulness based strategies for managing negative thoughts that support depression    Skill(s) covered: STOP technique    Group summary:  Group began by processing events of last week and identifying wins/positives. Therapist checked in on use of mindfulness strategies for managing unwanted thoughts and we discussed the difference between core self and "all the chatter" (thoughts, experiences). A goal discussed was creating a space between core self and thoughts/circumstances, so participants can practice responding skillfully rather than reacting mindlessly. The STOP technique was reviewed and applied to examples given by group participants. Data: Jannette attended today's group. She actively participated, sharing that she recognizes that she continues to engage in cognitive distortions and struggles not to identify with her thoughts. Substance Abuse was not addressed during this session. If the client is diagnosed with a co-occurring substance use disorder, please indicate any changes in the frequency or amount of use: NA. Stage of change for addressing substance use diagnoses: No substance use/Not applicable    ASSESSMENT:  Jannette appeared  with a anxious mood. Her affect is Normal range and intensity, which is congruent, with his mood and the content of the session. She appeared to actively participated in the group and interacted appropriately with and was supportive of the other group members. Jannette Baugh presents with a lowrisk of suicide,lowrisk of self-harm, and low risk of harm to others. For any risk assessment that surpasses a "low" rating, a safety plan must be developed.     A safety plan was indicated: no  If yes, describe in detail NA    PLAN: Jannette will practice STOP technique. The next group is scheduled for December 11, 2023 and will revisit mindfulness based strategies for managing thoughts. Behavioral Health Treatment Plan and Discharge Planning: Mark Senaitdelano is aware of and agrees to continue to work on their treatment plan. They have identified and are working toward their discharge goals.  yes    Visit start and stop times:    12/05/23  Start Time: 1600  Stop Time: 1645  Total Visit Time: 45 minutes

## 2023-12-12 ENCOUNTER — OFFICE VISIT (OUTPATIENT)
Dept: BEHAVIORAL/MENTAL HEALTH CLINIC | Facility: CLINIC | Age: 43
End: 2023-12-12
Payer: COMMERCIAL

## 2023-12-12 DIAGNOSIS — F41.1 GENERALIZED ANXIETY DISORDER: ICD-10-CM

## 2023-12-12 DIAGNOSIS — I10 PRIMARY HYPERTENSION: ICD-10-CM

## 2023-12-12 DIAGNOSIS — E78.2 MIXED HYPERLIPIDEMIA: ICD-10-CM

## 2023-12-12 DIAGNOSIS — I10 HYPERTENSION, UNSPECIFIED TYPE: ICD-10-CM

## 2023-12-12 DIAGNOSIS — F33.1 MODERATE RECURRENT MAJOR DEPRESSION (HCC): Primary | ICD-10-CM

## 2023-12-12 PROCEDURE — 90853 GROUP PSYCHOTHERAPY: CPT | Performed by: SOCIAL WORKER

## 2023-12-12 NOTE — PSYCH
Behavioral Health Psychotherapy Group Progress Note    Psychotherapy Provided: Group Therapy    1. Moderate recurrent major depression ([address])        2. Generalized anxiety disorder            Goals addressed in session: Goal 1     Group Name: Depression Support Group    Topic(s) covered: applying mindfulness based strategies to depression symptoms    Skill(s) covered: revisited STOP technique    Group summary:  Group begins with processing of week's events and identifying positives/wins. Group members join in sharing that they continue to struggle with the [address]. But each is practicing being more mindful/finding loren in the moment. Coping with panic is also reviewed with an emphasis on accepting what one cannot control. Making space between self and thoughts using the STOP technique is revisited. We conclude with processing short story about finding good is what is seemingly bad. Data: Jannette attended today's group. She shared that she continues to struggle with beating up on herself when she feels that she has failed others. She adds that this is rooted in a need to be in control at all times. Substance Abuse was not addressed during this session. If the client is diagnosed with a co-occurring substance use disorder, please indicate any changes in the frequency or amount of use: NA. Stage of change for addressing substance use diagnoses: No substance use/Not applicable    ASSESSMENT:  Jannette appeared  with a anxious mood. Her affect is Normal range and intensity and Tearful, which is congruent, with his mood and the content of the session. She appeared to actively participated in the group and interacted appropriately with and was supportive of the other group members. Jannette Baugh presents with a lowrisk of suicide,lowrisk of self-harm, and low risk of harm to others. For any risk assessment that surpasses a "low" rating, a safety plan must be developed.     A safety plan was indicated: no  If yes, describe in detail NA    PLAN: Jannette will practice mindfulness based interventions. The next group is scheduled for December 19 and will focus on applying mindfulness based strategies to symptoms. Behavioral Health Treatment Plan and Discharge Planning: Mark Peng is aware of and agrees to continue to work on their treatment plan. They have identified and are working toward their discharge goals.  yes    Visit start and stop times:    12/12/23  Start Time: 1600  Stop Time: 1655  Total Visit Time: 55 minutes

## 2023-12-13 ENCOUNTER — SOCIAL WORK (OUTPATIENT)
Dept: BEHAVIORAL/MENTAL HEALTH CLINIC | Facility: CLINIC | Age: 43
End: 2023-12-13
Payer: COMMERCIAL

## 2023-12-13 DIAGNOSIS — F33.1 MODERATE RECURRENT MAJOR DEPRESSION (HCC): Primary | ICD-10-CM

## 2023-12-13 DIAGNOSIS — F41.1 GENERALIZED ANXIETY DISORDER: ICD-10-CM

## 2023-12-13 PROCEDURE — 90834 PSYTX W PT 45 MINUTES: CPT | Performed by: SOCIAL WORKER

## 2023-12-13 RX ORDER — METOPROLOL SUCCINATE 100 MG/1
100 TABLET, EXTENDED RELEASE ORAL DAILY
Qty: 90 TABLET | Refills: 3 | Status: SHIPPED | OUTPATIENT
Start: 2023-12-13

## 2023-12-13 RX ORDER — LOSARTAN POTASSIUM 100 MG/1
100 TABLET ORAL DAILY
Qty: 90 TABLET | Refills: 0 | Status: SHIPPED | OUTPATIENT
Start: 2023-12-13

## 2023-12-13 RX ORDER — ROSUVASTATIN CALCIUM 40 MG/1
40 TABLET, COATED ORAL DAILY
Qty: 30 TABLET | Refills: 0 | Status: SHIPPED | OUTPATIENT
Start: 2023-12-13

## 2023-12-13 NOTE — PSYCH
This note was not shared with the patient due to this is a psychotherapy note      Behavioral Health Psychotherapy Progress Note    Psychotherapy Provided: Individual Psychotherapy     1. Moderate recurrent major depression ([address])        2. Generalized anxiety disorder            Goals addressed in session: Goal 1 and Goal 2     DATA:   Met with Jannette. Jannette states, "I've been okay."  She is reporting persistent anxiety symptoms. States she struggles with use of de-escalation tools. 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 We discussed her successes and challenges of her use of coping skills--goal to to improve awareness and insight and improve accountability with use of skills to help manage emotions. We discussed effective action to meet her emotional needs vs the needs of others. Jannette did express that she feels like she lives in the past.  We discussed this in depth, along with Jannette feeling she is grieving her mother not spending much time with her as a child. Per Jannette her mother is upset that Jannetet doesn't spend much time with her now. It was suggested to Jannette that she focus on the present, what she can do now to help meet with emotional need for herself-- suggested she plan an outting with her mother. Jannette was open to this as long as her step-father was not invited. Throughout session, Jannette struggled with unhelpful thoughts about herself leading to emotional reactivity---all or nothing, comparing, fortune telling, emotional reasoning, disqualifying the positives. Mindfulness and CBT was utilized throughout session to reduce emotional reactivity, encourage Jannette to be more present, less judgemental, fair to herself.    Mina Baumann continues to go to the Depression Support group on site which she enjoys and finds helpful along with providing her peer support and accountability. We discussed Jannette continuing the group 2x a month and individual therapy 1x a month at this time. Jannette agreed to this plan. She was encouraged to call the office if she feels she needs a sooner appt. PHQ-2/9 Depression Screening    Little interest or pleasure in doing things: 3 - nearly every day  Feeling down, depressed, or hopeless: 3 - nearly every day  Trouble falling or staying asleep, or sleeping too much: 1 - several days  Feeling tired or having little energy: 2 - more than half the days  Poor appetite or overeatin - not at all  Feeling bad about yourself - or that you are a failure or have let yourself or your family down: 3 - nearly every day  Trouble concentrating on things, such as reading the newspaper or watching television: 3 - nearly every day  Moving or speaking so slowly that other people could have noticed. Or the opposite - being so fidgety or restless that you have been moving around a lot more than usual: 0 - not at all  Thoughts that you would be better off dead, or of hurting yourself in some way: 0 - not at all  PHQ-9 Score: 15   PHQ-9 Interpretation: Moderately severe depression          MILADYS-7 Flowsheet Screening      Flowsheet Row Most Recent Value   Over the last 2 weeks, how often have you been bothered by any of the following problems? Feeling nervous, anxious, or on edge 3   Not being able to stop or control worrying 3   Worrying too much about different things 3   Trouble relaxing 3   Being so restless that it is hard to sit still 2   Becoming easily annoyed or irritable 3   Feeling afraid as if something awful might happen 3   MILADYS-7 Total Score 20            During this session, this clinician used the following therapeutic modalities: Cognitive Processing Therapy, Dialectical Behavior Therapy, and Mindfulness-based Strategies    Substance Abuse was not addressed during this session.  If the client is diagnosed with a co-occurring substance use disorder, please indicate any changes in the frequency or amount of use: na. Stage of change for addressing substance use diagnoses: No substance use/Not applicable    ASSESSMENT:  Jannette Baugh presents with a Anxious and Depressed mood. her affect is Normal range and intensity, which is congruent, with her mood and the content of the session. The client has not made progress on their goals. Jannette Baugh presents with a none risk of suicide, none risk of self-harm, and none risk of harm to others. For any risk assessment that surpasses a "low" rating, a safety plan must be developed. A safety plan was indicated: no  If yes, describe in detail na    PLAN: Between sessions, Jannette Baugh will practice skills from sessions. Attend Depression Support Group. At the next session, the therapist will use Cognitive Behavioral Therapy, Mindfulness-based Strategies, and Supportive Psychotherapy to address views of self, depression. Behavioral Health Treatment Plan and Discharge Planning: Mary Anne John is aware of and agrees to continue to work on their treatment plan. They have identified and are working toward their discharge goals.  yes    Visit start and stop times:    12/13/23  Start Time: 1002  Stop Time: 1048  Total Visit Time: 46 minutes

## 2023-12-19 ENCOUNTER — OFFICE VISIT (OUTPATIENT)
Dept: BEHAVIORAL/MENTAL HEALTH CLINIC | Facility: CLINIC | Age: 43
End: 2023-12-19
Payer: COMMERCIAL

## 2023-12-19 DIAGNOSIS — F41.1 GENERALIZED ANXIETY DISORDER: ICD-10-CM

## 2023-12-19 DIAGNOSIS — F33.1 MODERATE RECURRENT MAJOR DEPRESSION (HCC): Primary | ICD-10-CM

## 2023-12-19 PROCEDURE — 90853 GROUP PSYCHOTHERAPY: CPT | Performed by: SOCIAL WORKER
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
## 2023-12-21 ENCOUNTER — OFFICE VISIT (OUTPATIENT)
Dept: URGENT CARE | Facility: CLINIC | Age: 43
End: 2023-12-21
Payer: COMMERCIAL

## 2023-12-21 VITALS
TEMPERATURE: 97.2 F | HEIGHT: 67 IN | BODY MASS INDEX: 45.11 KG/M2 | OXYGEN SATURATION: 98 % | HEART RATE: 95 BPM | RESPIRATION RATE: 18 BRPM | DIASTOLIC BLOOD PRESSURE: 72 MMHG | SYSTOLIC BLOOD PRESSURE: 126 MMHG

## 2023-12-21 DIAGNOSIS — J02.9 SORE THROAT: ICD-10-CM

## 2023-12-21 DIAGNOSIS — J02.0 STREP PHARYNGITIS: Primary | ICD-10-CM

## 2023-12-21 LAB — S PYO AG THROAT QL: NEGATIVE

## 2023-12-21 PROCEDURE — 87880 STREP A ASSAY W/OPTIC: CPT | Performed by: NURSE PRACTITIONER

## 2023-12-21 PROCEDURE — G0382 LEV 3 HOSP TYPE B ED VISIT: HCPCS | Performed by: NURSE PRACTITIONER

## 2023-12-21 RX ORDER — CLINDAMYCIN HYDROCHLORIDE 300 MG/1
300 CAPSULE ORAL 4 TIMES DAILY
Qty: 40 CAPSULE | Refills: 0 | Status: SHIPPED | OUTPATIENT
Start: 2023-12-21 | End: 2023-12-31

## 2023-12-21 NOTE — PATIENT INSTRUCTIONS
Take antibiotic as directed. Drink plenty of fluids, rest, saltwater gargles, lozenges, hot tea with honey. Tylenol or motrin for pain.  If you develop a prolonged high fever, difficulty breathing, swallowing, managing secretions, decreased fluid intake, or urination, any new or concerning symptoms please return or proceed ER.  Recommend following up with PCP in 3-5 days.

## 2023-12-21 NOTE — PROGRESS NOTES
[facility]        NAME: Jannette Baugh is a 43 y.o. female  : 1980    MRN: [id number]  DATE: 2023  TIME: 1:34 PM    Assessment and Plan   Strep pharyngitis [J02.0]  1. Strep pharyngitis  clindamycin (CLEOCIN) 300 MG capsule      2. Sore throat  POCT rapid ANTIGEN strepA        Rapid strep negative, will treat based on clinical presentation     Patient Instructions       Patient Instructions   Take antibiotic as directed. Drink plenty of fluids, rest, saltwater gargles, lozenges, hot tea with honey. Tylenol or motrin for pain.  If you develop a prolonged high fever, difficulty breathing, swallowing, managing secretions, decreased fluid intake, or urination, any new or concerning symptoms please return or proceed ER.  Recommend following up with PCP in 3-5 days.      Chief Complaint     Chief Complaint   Patient presents with    Wound Infection     Started Saturday with a lump / pimple on the right side of her temple area on her face. Getting bigger but not coming to a head yet. Feels like there is a lump under the pimple. Right ear is starting to bother her.          History of Present Illness       Sore Throat   This is a new problem. The current episode started in the past 7 days. The problem has been gradually worsening. Neither side of throat is experiencing more pain than the other. There has been no fever. The pain is moderate. Associated symptoms include ear pain. Pertinent negatives include no abdominal pain, congestion, coughing, diarrhea, drooling, ear discharge, headaches, hoarse voice, plugged ear sensation, neck pain, shortness of breath, stridor, swollen glands, trouble swallowing or vomiting. She has had no exposure to strep or mono. She has tried nothing for the symptoms. The treatment provided no relief.     Patient is also c/o a pimple on the right side of her face. Denies any drainage.   Review of Systems   Review of Systems   Constitutional:  Negative for chills,  diaphoresis, fatigue and fever.   HENT:  Positive for ear pain and sore throat. Negative for congestion, drooling, ear discharge, facial swelling, hoarse voice, mouth sores, postnasal drip, rhinorrhea, sinus pressure, sinus pain and trouble swallowing.    Eyes:  Negative for photophobia and visual disturbance.   Respiratory:  Negative for cough, chest tightness, shortness of breath and stridor.    Cardiovascular:  Negative for chest pain and palpitations.   Gastrointestinal:  Negative for abdominal pain, diarrhea, nausea and vomiting.   Genitourinary: Negative.    Musculoskeletal:  Negative for arthralgias, back pain, joint swelling, myalgias, neck pain and neck stiffness.   Skin:  Positive for rash.   Neurological:  Negative for dizziness, facial asymmetry, weakness, light-headedness, numbness and headaches.         Current Medications       Current Outpatient Medications:     albuterol (PROVENTIL HFA,VENTOLIN HFA) 90 mcg/act inhaler, Inhale 2 puffs every 6 (six) hours as needed for wheezing or shortness of breath (cough), Disp: 18 g, Rfl: 1    amitriptyline (ELAVIL) 75 mg tablet, Take 1 tablet (75 mg total) by mouth daily at bedtime, Disp: 90 tablet, Rfl: 0    APPLE CIDER VINEGAR PO, Take by mouth, Disp: , Rfl:     Ascorbic Acid (VITAMIN C) 500 MG CAPS, Take 1 capsule by mouth daily at bedtime, Disp: , Rfl:     aspirin 81 mg chewable tablet, Chew 1 tablet (81 mg total) daily (Patient taking differently: Chew 81 mg daily at bedtime), Disp: 30 tablet, Rfl: 30    cetirizine (ZyrTEC) 10 mg tablet, Take 10 mg by mouth daily, Disp: , Rfl:     Cholecalciferol (VITAMIN D3) 5000 units CAPS, Take 5,000 Units by mouth daily at bedtime, Disp: , Rfl:     ciprofloxacin-dexamethasone (CIPRODEX) otic suspension, Administer 4 drops to the right ear in the morning and 4 drops in the evening., Disp: 7.5 mL, Rfl: 3    clindamycin (CLEOCIN) 300 MG capsule, Take 1 capsule (300 mg total) by mouth 4 (four) times a day for 10 days, Disp:  40 capsule, Rfl: 0    cyanocobalamin (VITAMIN B-12) 1,000 mcg tablet, Take 1,000 mcg by mouth daily , Disp: , Rfl:     docusate sodium (DULCOLAX) 100 mg capsule, Take by mouth daily at bedtime, Disp: , Rfl:     esomeprazole (NexIUM) 20 mg capsule, Take 2 capsules (40 mg total) by mouth in the morning, Disp: 180 capsule, Rfl: 0    LORazepam (Ativan) 0.5 mg tablet, Take 1 tablet (0.5 mg total) by mouth every 8 (eight) hours as needed for anxiety, Disp: 5 tablet, Rfl: 0    losartan (COZAAR) 100 MG tablet, Take 1 tablet (100 mg total) by mouth daily, Disp: 90 tablet, Rfl: 0    Melatonin 10 MG TABS, Take by mouth daily at bedtime, Disp: , Rfl:     metoprolol succinate (TOPROL-XL) 100 mg 24 hr tablet, Take 1 tablet (100 mg total) by mouth daily, Disp: 90 tablet, Rfl: 3    Multiple Vitamins-Minerals (CENTRUM ADULTS PO), Take 1 tablet by mouth daily at bedtime, Disp: , Rfl:     nystatin (MYCOSTATIN) cream, Apply topically 2 (two) times a day as needed (rash), Disp: 30 g, Rfl: 1    Omega-3 Fatty Acids (FISH OIL ADULT GUMMIES PO), Take by mouth daily at bedtime, Disp: , Rfl:     rosuvastatin (CRESTOR) 40 MG tablet, Take 1 tablet (40 mg total) by mouth daily, Disp: 30 tablet, Rfl: 0    venlafaxine (EFFEXOR-XR) 150 mg 24 hr capsule, Take 1 capsule (150 mg total) by mouth daily at bedtime, Disp: 90 capsule, Rfl: 0    venlafaxine (EFFEXOR-XR) 75 mg 24 hr capsule, Take 1 capsule (75 mg total) by mouth daily at bedtime, Disp: 90 capsule, Rfl: 0    Zinc 50 MG TABS, Take by mouth daily at bedtime, Disp: , Rfl:     metroNIDAZOLE (FLAGYL) 250 mg tablet, Take 250 mg by mouth 3 (three) times a day (Patient not taking: Reported on 11/21/2023), Disp: , Rfl:     neomycin-polymyxin-hydrocortisone (CORTISPORIN) otic solution, Administer 4 drops into the left ear 3 (three) times a day for 10 days (Patient taking differently: Administer 4 drops into the left ear 2 (two) times a day as needed), Disp: 10 mL, Rfl: 0    Current Allergies  "    Allergies as of 12/21/2023 - Reviewed 12/21/2023   Allergen Reaction Noted    Neurontin [gabapentin] Other (See Comments) 10/16/2014    Penicillins Anaphylaxis, Swelling, and Angioedema 05/16/2017    Aripiprazole Other (See Comments) 11/06/2013    Lorazepam Other (See Comments) 06/11/2014    Wound dressing adhesive Hives 01/18/2022    Carbamazepine Other (See Comments) 10/16/2012    Doxycycline Hives 04/10/2013    Lamotrigine Rash 08/28/2012            The following portions of the patient's history were reviewed and updated as appropriate: allergies, current medications, past family history, past medical history, past social history, past surgical history and problem list.     Past Medical History:   Diagnosis Date    Allergic rhinitis     Anxiety     Arthritis     C. difficile diarrhea 2020    Chest pain     Chronic pain disorder     BACK SHOULDERS NECK    Colon polyp     COPD (chronic obstructive pulmonary disease) ([facility])     CPAP (continuous positive airway pressure) dependence     Depression     Diarrhea     Disease of thyroid gland     nodule    Dysphagia     with pain \"feels like a blockage\" in the esophagus    Eating disorder     Fibromyalgia, primary     Fissure, anal     GERD (gastroesophageal reflux disease)     Hearing difficulty of right ear     frequent infections-(x1 lead to sepsis)    Hyperlipidemia     Hypertension     Irritable bowel syndrome     Obesity     Perforation of tympanic membrane     Right    PONV (postoperative nausea and vomiting)     RBBB     Right bundle branch blockage     Sleep apnea     no cpap    Thyroid nodule     Wears glasses        Past Surgical History:   Procedure Laterality Date    ANAL FISTULOTOMY N/A 11/02/2018    Procedure: FISTULOTOMY;  Surgeon: Clay Laws MD;  [facility]: [facility];  Service: Colorectal    BACK SURGERY      lumbar herniated disc    BREAST CYST EXCISION Left 13 yrs ago  benign    BREAST SURGERY Left 2006    cyst removal    CARPAL TUNNEL " RELEASE      CERVICAL FUSION  2023    C5-6     SECTION      CHOLECYSTECTOMY      COLONOSCOPY      CYST REMOVAL      DENTAL SURGERY      ENDOMETRIAL ABLATION N/A 2021    Procedure: D&C, ABLATION ENDOMETRIAL MARIEL;  Surgeon: Juan Pablo Doan MD;  Location: [facility];  Service: Gynecology    FL MYELOGRAM CERVICAL  2022    HAND SURGERY      INCISION AND DRAINAGE OF WOUND N/A 2018    Procedure: INCISION AND DRAINAGE (I&D) BUTTOCK;  Surgeon: Clay Laws MD;  [facility]: [facility];  Service: Colorectal    MYRINGOTOMY W/ TUBES Right 2015    [place]    HI ANRCT XM SURG REQ ANES GENERAL SPI/EDRL DX N/A 2018    Procedure: EXAM UNDER ANESTHESIA (EUA);  Surgeon: Clay Laws MD;  [facility]: [facility];  Service: Colorectal    HI COLONOSCOPY FLX DX W/COLLJ SPEC WHEN PFRMD N/A 2017    Procedure: COLONOSCOPY;  Surgeon: Jose Rafael Penn MD;  [facility];  Service: Gastroenterology    HI DEBRIDEMENT SUBCUTANEOUS TISSUE 20 SQ CM/< N/A 2022    Procedure: Debridement abdominal wall;  Surgeon: Robert Bloch, MD;  Location: [facility];  Service: General    HI EXCISION HIDRADENITIS INGUINAL COMPLEX REPAIR Bilateral 2022    Procedure: WIDE EXCISION HIDRADENITIS ABDOMINAL WALL;  Surgeon: Robert Bloch, MD;  Location: [facility];  Service: General    HI INCISION & DRAINAGE ABSCESS SIMPLE/SINGLE N/A 2022    Procedure: INCISION AND DRAINAGE ABDOMINAL WALL;  Surgeon: Robert Bloch, MD;  Location: [facility];  Service: General    HI TENDON SHEATH INCISION Right 2018    Procedure: LONG TRIGGER FINGER RELEASE;  Surgeon: Nickolas Guerrero DO;  [facility];  Service: Orthopedics    HI TYMPANOPLASTY W/O MASTOIDECT W/O OSSICLE RECNSTJ Right 2017    Procedure: TYMPANOPLASTY WITH PERICHONDRIN GRAFT;  Surgeon: Hugo Moreira DO;  [facility];  Service: ENT    SHOULDER SURGERY Right     TRIGGER FINGER RELEASE      ULNAR NERVE  "TRANSPOSITION Left     UMBILICAL HIDRADENITIS EXCISION  01/18/2022    subumbilical hidradenitis exicsion, Dr. Bloch, [facility]    US GUIDED THYROID BIOPSY  7/3/2023    WISDOM TOOTH EXTRACTION         Family History   Problem Relation Age of Onset    Hypertension Mother     Hyperlipidemia Mother     Diabetes Mother     Hypertension Father     Hyperlipidemia Father     Heart disease Father         cardiac disorder-myocardial infarction arrhythmias    Heart attack Father     Crohn's disease Brother     Arthritis Brother     Diabetes unspecified Maternal Grandmother     Thyroid disease Paternal Grandmother     No Known Problems Daughter     No Known Problems Maternal Aunt     No Known Problems Maternal Aunt     No Known Problems Maternal Aunt     No Known Problems Maternal Aunt     No Known Problems Paternal Aunt     No Known Problems Paternal Aunt     Heart attack Paternal Uncle          Medications have been verified.        Objective   /72   Pulse 95   Temp (!) 97.2 °F (36.2 °C)   Resp 18   Ht 5' 7\" (1.702 m)   SpO2 98%   BMI 45.11 kg/m²   No LMP recorded.       Physical Exam     Physical Exam  Constitutional:       General: She is not in acute distress.     Appearance: She is well-developed.   HENT:      Head: Normocephalic and atraumatic.      Right Ear: Hearing, tympanic membrane, ear canal and external ear normal.      Left Ear: Hearing, tympanic membrane, ear canal and external ear normal.      Nose: Nose normal.      Right Sinus: No maxillary sinus tenderness or frontal sinus tenderness.      Left Sinus: No maxillary sinus tenderness or frontal sinus tenderness.      Mouth/Throat:      Mouth: Mucous membranes are moist.      Pharynx: Uvula midline. Posterior oropharyngeal erythema present. No pharyngeal swelling, oropharyngeal exudate or uvula swelling.      Tonsils: Tonsillar exudate present. No tonsillar abscesses. 2+ on the right. 2+ on the left.   Eyes:      Conjunctiva/sclera: " Conjunctivae normal.      Pupils: Pupils are equal, round, and reactive to light.   Cardiovascular:      Rate and Rhythm: Normal rate and regular rhythm.      Heart sounds: Normal heart sounds, S1 normal and S2 normal.   Pulmonary:      Effort: Pulmonary effort is normal.      Breath sounds: Normal breath sounds and air entry.   Lymphadenopathy:      Cervical: No cervical adenopathy.   Skin:     General: Skin is warm and dry.      Capillary Refill: Capillary refill takes less than 2 seconds.   Neurological:      Mental Status: She is alert and oriented to person, place, and time.

## 2023-12-26 DIAGNOSIS — E78.2 MIXED HYPERLIPIDEMIA: ICD-10-CM

## 2023-12-27 RX ORDER — ROSUVASTATIN CALCIUM 40 MG/1
40 TABLET, COATED ORAL DAILY
Qty: 30 TABLET | Refills: 0 | Status: SHIPPED | OUTPATIENT
Start: 2023-12-27

## 2023-12-29 ENCOUNTER — OFFICE VISIT (OUTPATIENT)
Dept: FAMILY MEDICINE CLINIC | Facility: CLINIC | Age: 43
End: 2023-12-29
Payer: COMMERCIAL

## 2023-12-29 VITALS
SYSTOLIC BLOOD PRESSURE: 138 MMHG | OXYGEN SATURATION: 99 % | BODY MASS INDEX: 45.74 KG/M2 | WEIGHT: 291.4 LBS | HEIGHT: 67 IN | TEMPERATURE: 98.4 F | HEART RATE: 90 BPM | DIASTOLIC BLOOD PRESSURE: 90 MMHG

## 2023-12-29 DIAGNOSIS — R06.02 SOB (SHORTNESS OF BREATH): ICD-10-CM

## 2023-12-29 DIAGNOSIS — J02.0 STREP THROAT: ICD-10-CM

## 2023-12-29 DIAGNOSIS — J45.20 MILD INTERMITTENT ASTHMA, UNSPECIFIED WHETHER COMPLICATED: ICD-10-CM

## 2023-12-29 DIAGNOSIS — F41.1 GENERALIZED ANXIETY DISORDER: Primary | ICD-10-CM

## 2023-12-29 PROCEDURE — 99214 OFFICE O/P EST MOD 30 MIN: CPT | Performed by: FAMILY MEDICINE

## 2023-12-29 NOTE — PROGRESS NOTES
Name: Jannette Baugh      : 1980      MRN: [id number]  Encounter Provider: Gustavo Godoy MD  Encounter Date: 2023   Encounter department: [facility]    Assessment & Plan     1. Generalized anxiety disorder    2. Strep throat    3. Mild intermittent asthma, unspecified whether complicated    4. SOB (shortness of breath)    Anxiety controlled.  Continue current treatment plan with maintenance medication and Ativan as needed.  Strep throat resolving.  Continue antibiotic treatment.  Shortness of breath she has been having was thought to be due to anxiety however she is recently found out that the printer solution that she has been using should not be inhaled as has fiberglass in it and thinks that may be what has been causing her shortness of breath.  Her shortness of breath is not persistent it is intermittent.  She does have history of asthma but denies any wheezing.  Recommended to start wearing a mask around solution and use albuterol as needed during shortness of breath episodes.  Follow-up with me or pulmonology as needed for shortness of breath.        Subjective      Presents office to follow-up on anxiety and shortness of breath.  She says she has a new development since her last visit.  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  Her anxiety is well-controlled and has not really had to use any Ativan.  Her shortness of breath is intermittent and not daily.  She thinks it may be linked to the printer ink.  She does have asthma but denies any wheezing or cough.  She has not tried albuterol during episodes of shortness of breath.  She does follow with pulmonology at [facility].      Review of Systems   All other systems reviewed and are negative.      Current Outpatient Medications on File Prior to Visit   Medication Sig     albuterol (PROVENTIL HFA,VENTOLIN HFA) 90 mcg/act inhaler Inhale 2 puffs every 6 (six) hours as needed for wheezing or shortness of breath (cough)    amitriptyline (ELAVIL) 75 mg tablet Take 1 tablet (75 mg total) by mouth daily at bedtime    APPLE CIDER VINEGAR PO Take by mouth    Ascorbic Acid (VITAMIN C) 500 MG CAPS Take 1 capsule by mouth daily at bedtime    aspirin 81 mg chewable tablet Chew 1 tablet (81 mg total) daily (Patient taking differently: Chew 81 mg daily at bedtime)    cetirizine (ZyrTEC) 10 mg tablet Take 10 mg by mouth daily    Cholecalciferol (VITAMIN D3) 5000 units CAPS Take 5,000 Units by mouth daily at bedtime    ciprofloxacin-dexamethasone (CIPRODEX) otic suspension Administer 4 drops to the right ear in the morning and 4 drops in the evening.    clindamycin (CLEOCIN) 300 MG capsule Take 1 capsule (300 mg total) by mouth 4 (four) times a day for 10 days    cyanocobalamin (VITAMIN B-12) 1,000 mcg tablet Take 1,000 mcg by mouth daily     docusate sodium (DULCOLAX) 100 mg capsule Take by mouth daily at bedtime    esomeprazole (NexIUM) 20 mg capsule Take 2 capsules (40 mg total) by mouth in the morning    LORazepam (Ativan) 0.5 mg tablet Take 1 tablet (0.5 mg total) by mouth every 8 (eight) hours as needed for anxiety    losartan (COZAAR) 100 MG tablet Take 1 tablet (100 mg total) by mouth daily    Melatonin 10 MG TABS Take by mouth daily at bedtime    metoprolol succinate (TOPROL-XL) 100 mg 24 hr tablet Take 1 tablet (100 mg total) by mouth daily    metroNIDAZOLE (FLAGYL) 250 mg tablet Take 250 mg by mouth 3 (three) times a day    Multiple Vitamins-Minerals (CENTRUM ADULTS PO) Take 1 tablet by mouth daily at bedtime    neomycin-polymyxin-hydrocortisone (CORTISPORIN) otic solution Administer 4 drops into the left ear 3 (three) times a day for 10 days (Patient taking differently: Administer 4 drops into the left ear 2 (two) times a day as needed)    nystatin (MYCOSTATIN) cream Apply topically 2  "(two) times a day as needed (rash)    Omega-3 Fatty Acids (FISH OIL ADULT GUMMIES PO) Take by mouth daily at bedtime    rosuvastatin (CRESTOR) 40 MG tablet Take 1 tablet (40 mg total) by mouth daily    venlafaxine (EFFEXOR-XR) 150 mg 24 hr capsule Take 1 capsule (150 mg total) by mouth daily at bedtime    venlafaxine (EFFEXOR-XR) 75 mg 24 hr capsule Take 1 capsule (75 mg total) by mouth daily at bedtime    Zinc 50 MG TABS Take by mouth daily at bedtime       Objective     /90 (BP Location: Left arm, Patient Position: Sitting, Cuff Size: Standard)   Pulse 90   Temp 98.4 °F (36.9 °C) (Tympanic)   Ht 5' 7\" (1.702 m)   Wt 132 kg (291 lb 6.4 oz)   SpO2 99%   BMI 45.64 kg/m²     Physical Exam  Vitals and nursing note reviewed.   Constitutional:       General: She is not in acute distress.     Appearance: Normal appearance. She is not ill-appearing, toxic-appearing or diaphoretic.   Eyes:      General:         Right eye: No discharge.         Left eye: No discharge.      Extraocular Movements: Extraocular movements intact.      Conjunctiva/sclera: Conjunctivae normal.   Cardiovascular:      Rate and Rhythm: Normal rate and regular rhythm.      Pulses: Normal pulses.      Heart sounds: Normal heart sounds.   Pulmonary:      Effort: Pulmonary effort is normal.      Breath sounds: Normal breath sounds.   Musculoskeletal:      Cervical back: Normal range of motion and neck supple.   Skin:     Capillary Refill: Capillary refill takes less than 2 seconds.   Neurological:      General: No focal deficit present.      Mental Status: She is alert and oriented to person, place, and time.   Psychiatric:         Mood and Affect: Mood normal.         Behavior: Behavior normal.         Thought Content: Thought content normal.         Judgment: Judgment normal.       Gustavo Godoy MD    "

## 2024-01-02 ENCOUNTER — OFFICE VISIT (OUTPATIENT)
Dept: BEHAVIORAL/MENTAL HEALTH CLINIC | Facility: CLINIC | Age: 44
End: 2024-01-02
Payer: COMMERCIAL

## 2024-01-02 DIAGNOSIS — F33.1 MODERATE RECURRENT MAJOR DEPRESSION (HCC): Primary | ICD-10-CM

## 2024-01-02 DIAGNOSIS — F41.1 GENERALIZED ANXIETY DISORDER: ICD-10-CM

## 2024-01-02 PROCEDURE — 90853 GROUP PSYCHOTHERAPY: CPT | Performed by: SOCIAL WORKER
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
## 2024-01-08 ENCOUNTER — TELEPHONE (OUTPATIENT)
Dept: CARDIOLOGY CLINIC | Facility: CLINIC | Age: 44
End: 2024-01-08

## 2024-01-08 NOTE — TELEPHONE ENCOUNTER
Jamilah calling from the surgery center [facility], following up on a text or an fax that was sent out last Friday around 12:15 and again this morning. The same facts at around 8:30. I need to obtain the most recent EKG, Echo and office note on patient Jannette RICHARDSON as in [place] as in [place] as in [place]. YOB: 1980. My fax number is [phone] 7/3 and my phone number is [phone] option two and this is a stat request as she is coming in for procedure tomorrow. Thank you.    Routed OV, last EKG from Chart and echo.   To [phone]

## 2024-01-09 ENCOUNTER — TELEPHONE (OUTPATIENT)
Dept: CARDIOLOGY CLINIC | Facility: CLINIC | Age: 44
End: 2024-01-09

## 2024-01-09 NOTE — TELEPHONE ENCOUNTER
Patient is scheduled for left thumb surgery on 1/26.    [facility] is requesting a hold on her Aspirin.    Please advise if ok to hold and for how long.

## 2024-01-16 ENCOUNTER — TELEMEDICINE (OUTPATIENT)
Dept: BEHAVIORAL/MENTAL HEALTH CLINIC | Facility: CLINIC | Age: 44
End: 2024-01-16
Payer: COMMERCIAL

## 2024-01-16 DIAGNOSIS — E78.2 MIXED HYPERLIPIDEMIA: ICD-10-CM

## 2024-01-16 DIAGNOSIS — F33.1 MODERATE RECURRENT MAJOR DEPRESSION (HCC): Primary | ICD-10-CM

## 2024-01-16 DIAGNOSIS — F41.1 GENERALIZED ANXIETY DISORDER: ICD-10-CM

## 2024-01-16 PROCEDURE — 90853 GROUP PSYCHOTHERAPY: CPT | Performed by: SOCIAL WORKER

## 2024-01-16 RX ORDER — ROSUVASTATIN CALCIUM 40 MG/1
40 TABLET, COATED ORAL DAILY
Qty: 30 TABLET | Refills: 0 | Status: SHIPPED | OUTPATIENT
Start: 2024-01-16

## 2024-01-17 NOTE — PSYCH
"Behavioral Health Psychotherapy Group Progress Note    Psychotherapy Provided: Group Therapy    1. Moderate recurrent major depression (HCC)        2. Generalized anxiety disorder            Goals addressed in session: Goal 1     Group Name: Depression Support Group    Topic(s) covered: Coping with what cannot be changed/increasing motivation    Skill(s) covered: Thought Diffusion    Group summary:  Group began with discussion of wins and accomplishments from the previous 2 weeks.  These were celebrated, and group members provided validation and encouragement.  We discussed at length accepting what one cannot change/releasing the need to be in control, and therapist reviewed imagining the desire to be in control floating through one's awareness using a thought diffusion technique.  We concluded with a discussion of strategies for increasing motivation.    Data: Jannette attended today's group. She shared that her rectal surgeon seems to have found the cause of her problems and now she is working with gynecology to resolve.  Jannette feels positive about this, but is working to accept that she must continue to depend on the doctors for guidance.    Substance Abuse was not addressed during this session. If the client is diagnosed with a co-occurring substance use disorder, please indicate any changes in the frequency or amount of use: NA. Stage of change for addressing substance use diagnoses: No substance use/Not applicable    ASSESSMENT:  Jannette appeared  with a Euthymic/ normal mood. Her affect is Normal range and intensity, which is congruent, with his mood and the content of the session. She appeared to actively participated in the group and interacted appropriately with and was supportive of the other group members.     Jannette Baugh presents with a lowrisk of suicide,lowrisk of self-harm, and low risk of harm to others.    For any risk assessment that surpasses a \"low\" rating, a safety plan must be developed.    A " safety plan was indicated: no  If yes, describe in detail NA    PLAN: Jannette will employ strategies discussed for accepting what one cannot control and increasing motivation. The next group is scheduled for February 6, 2024 and will cover the topic of values based living.    Behavioral Health Treatment Plan and Discharge Planning: Jannette Baugh is aware of and agrees to continue to work on their treatment plan. They have identified and are working toward their discharge goals. yes    Visit start and stop times:    1/16/24  Start Time: 1555  Stop Time: 1640  Total Visit Time: 45 minutes      Virtual Regular Visit    Verification of patient location:    Patient is located at Home in the following state in which I hold an active license [place]      Assessment/Plan:    Problem List Items Addressed This Visit          Other    Moderate recurrent major depression (HCC) - Primary    Generalized anxiety disorder       Goals addressed in session: Goal 1          Reason for visit is   Chief Complaint   Patient presents with    Virtual Regular Visit          Encounter provider Pascale White LCSW    Provider located at [facility] [place]  [address] [facility] [address]  [phone]      Recent Visits  Date Type Provider Dept   01/16/24 Telemedicine Pascale White LCSW [facility] [place]   Showing recent visits within past 7 days and meeting all other requirements  Future Appointments  No visits were found meeting these conditions.  Showing future appointments within next 150 days and meeting all other requirements       The patient was identified by name and date of birth. Jannette Baugh was informed that this is a telemedicine visit and that the visit is being conducted throughthe Teams platform. She agrees to proceed..  My office door was closed. No one else was in the room.  She acknowledged consent and understanding of  "privacy and security of the video platform. The patient has agreed to participate and understands they can discontinue the visit at any time.    Patient is aware this is a billable service.     Subjective  Jannette aBugh is a 43 y.o. female  .      HPI     Past Medical History:   Diagnosis Date    Allergic rhinitis     Anxiety     Arthritis     C. difficile diarrhea 2020    Chest pain     Chronic pain disorder     BACK SHOULDERS NECK    Colon polyp     COPD (chronic obstructive pulmonary disease) (HCC)     CPAP (continuous positive airway pressure) dependence     Depression     Diarrhea     Disease of thyroid gland     nodule    Dysphagia     with pain \"feels like a blockage\" in the esophagus    Eating disorder     Fibromyalgia, primary     Fissure, anal     GERD (gastroesophageal reflux disease)     Hearing difficulty of right ear     frequent infections-(x1 lead to sepsis)    Hyperlipidemia     Hypertension     Irritable bowel syndrome     Obesity     Perforation of tympanic membrane     Right    PONV (postoperative nausea and vomiting)     RBBB     Right bundle branch blockage     Sleep apnea     no cpap    Thyroid nodule     Wears glasses        Past Surgical History:   Procedure Laterality Date    ANAL FISTULOTOMY N/A 2018    Procedure: FISTULOTOMY;  Surgeon: Clay Laws MD;  [facility];  Service: Colorectal    BACK SURGERY      lumbar herniated disc    BREAST CYST EXCISION Left 13 yrs ago  benign    BREAST SURGERY Left 2006    cyst removal    CARPAL TUNNEL RELEASE      CERVICAL FUSION  2023    C5-6     SECTION      CHOLECYSTECTOMY      COLONOSCOPY      CYST REMOVAL      DENTAL SURGERY      ENDOMETRIAL ABLATION N/A 2021    Procedure: D&C, ABLATION ENDOMETRIAL MARIEL;  Surgeon: Juan Pablo Doan MD;  Location:  MAIN OR;  Service: Gynecology    FL MYELOGRAM CERVICAL  2022    HAND SURGERY      INCISION AND DRAINAGE OF WOUND N/A 2018    Procedure: " INCISION AND DRAINAGE (I&D) BUTTOCK;  Surgeon: Clay Laws MD;  [facility];  Service: Colorectal    MYRINGOTOMY W/ TUBES Right 08/17/2015    [place]    CA ANRCT XM SURG REQ ANES GENERAL SPI/EDRL DX N/A 11/02/2018    Procedure: EXAM UNDER ANESTHESIA (EUA);  Surgeon: Clay Laws MD;  [facility]: [facility];  Service: Colorectal    CA COLONOSCOPY FLX DX W/COLLJ SPEC WHEN PFRMD N/A 08/02/2017    Procedure: COLONOSCOPY;  Surgeon: Jose Rafael Penn MD;  [facility];  Service: Gastroenterology    CA DEBRIDEMENT SUBCUTANEOUS TISSUE 20 SQ CM/< N/A 07/19/2022    Procedure: Debridement abdominal wall;  Surgeon: Robert Bloch, MD;  Location: [facility];  Service: General    CA EXCISION HIDRADENITIS INGUINAL COMPLEX REPAIR Bilateral 01/18/2022    Procedure: WIDE EXCISION HIDRADENITIS ABDOMINAL WALL;  Surgeon: Robert Bloch, MD;  [facility]: [facility];  Service: General    CA INCISION & DRAINAGE ABSCESS SIMPLE/SINGLE N/A 07/19/2022    Procedure: INCISION AND DRAINAGE ABDOMINAL WALL;  Surgeon: Robert Bloch, MD;  [facility]: [facility];  Service: General    CA TENDON SHEATH INCISION Right 03/13/2018    Procedure: LONG TRIGGER FINGER RELEASE;  Surgeon: Nickolas Guerrero DO;  [facility];  Service: Orthopedics    CA TYMPANOPLASTY W/O MASTOIDECT W/O OSSICLE RECNSTJ Right 05/24/2017    Procedure: TYMPANOPLASTY WITH PERICHONDRIN GRAFT;  Surgeon: Hugo Moreira DO;  [facility];  Service: ENT    SHOULDER SURGERY Right     TRIGGER FINGER RELEASE      ULNAR NERVE TRANSPOSITION Left     UMBILICAL HIDRADENITIS EXCISION  01/18/2022    subumbilical hidradenitis exicsion, Dr. Bloch, [facility]    US GUIDED THYROID BIOPSY  7/3/2023    WISDOM TOOTH EXTRACTION         Current Outpatient Medications   Medication Sig Dispense Refill    albuterol (PROVENTIL HFA,VENTOLIN HFA) 90 mcg/act inhaler Inhale 2 puffs every 6 (six) hours as needed for wheezing or shortness of breath (cough) 18 g 1     amitriptyline (ELAVIL) 75 mg tablet Take 1 tablet (75 mg total) by mouth daily at bedtime 90 tablet 0    APPLE CIDER VINEGAR PO Take by mouth      Ascorbic Acid (VITAMIN C) 500 MG CAPS Take 1 capsule by mouth daily at bedtime      aspirin 81 mg chewable tablet Chew 1 tablet (81 mg total) daily (Patient taking differently: Chew 81 mg daily at bedtime) 30 tablet 30    cetirizine (ZyrTEC) 10 mg tablet Take 10 mg by mouth daily      Cholecalciferol (VITAMIN D3) 5000 units CAPS Take 5,000 Units by mouth daily at bedtime      ciprofloxacin-dexamethasone (CIPRODEX) otic suspension Administer 4 drops to the right ear in the morning and 4 drops in the evening. 7.5 mL 3    cyanocobalamin (VITAMIN B-12) 1,000 mcg tablet Take 1,000 mcg by mouth daily       docusate sodium (DULCOLAX) 100 mg capsule Take by mouth daily at bedtime      esomeprazole (NexIUM) 20 mg capsule Take 2 capsules (40 mg total) by mouth in the morning 180 capsule 0    LORazepam (Ativan) 0.5 mg tablet Take 1 tablet (0.5 mg total) by mouth every 8 (eight) hours as needed for anxiety 5 tablet 0    losartan (COZAAR) 100 MG tablet Take 1 tablet (100 mg total) by mouth daily 90 tablet 0    Melatonin 10 MG TABS Take by mouth daily at bedtime      metoprolol succinate (TOPROL-XL) 100 mg 24 hr tablet Take 1 tablet (100 mg total) by mouth daily 90 tablet 3    metroNIDAZOLE (FLAGYL) 250 mg tablet Take 250 mg by mouth 3 (three) times a day      Multiple Vitamins-Minerals (CENTRUM ADULTS PO) Take 1 tablet by mouth daily at bedtime      neomycin-polymyxin-hydrocortisone (CORTISPORIN) otic solution Administer 4 drops into the left ear 3 (three) times a day for 10 days (Patient taking differently: Administer 4 drops into the left ear 2 (two) times a day as needed) 10 mL 0    nystatin (MYCOSTATIN) cream Apply topically 2 (two) times a day as needed (rash) 30 g 1    Omega-3 Fatty Acids (FISH OIL ADULT GUMMIES PO) Take by mouth daily at bedtime      rosuvastatin (CRESTOR) 40  MG tablet Take 1 tablet (40 mg total) by mouth daily 30 tablet 0    venlafaxine (EFFEXOR-XR) 150 mg 24 hr capsule Take 1 capsule (150 mg total) by mouth daily at bedtime 90 capsule 0    venlafaxine (EFFEXOR-XR) 75 mg 24 hr capsule Take 1 capsule (75 mg total) by mouth daily at bedtime 90 capsule 0    Zinc 50 MG TABS Take by mouth daily at bedtime       No current facility-administered medications for this visit.        Allergies   Allergen Reactions    Neurontin [Gabapentin] Other (See Comments)     Other reaction(s): SUICIDE IDEATION  Other reaction(s): Other (See Comments)  suicidal  suicidal    Penicillins Anaphylaxis, Swelling and Angioedema    Aripiprazole Other (See Comments)     Other reaction(s): low dose 2 mg.; curving in and locking in of hands/dystonia  Other reaction(s): Other (See Comments)  Other reaction(s): low dose 2 mg.; curving in and locking in of hands/dystonia    Lorazepam Other (See Comments)     Other reaction(s): higher dose > anxiety and depression  Other reaction(s): Other (See Comments)  Other reaction(s): higher dose > anxiety and depression    Wound Dressing Adhesive Hives     Hive, redness, swelling/bleeding    Carbamazepine Other (See Comments)     Other reaction(s): Unknown Reaction    Doxycycline Hives    Lamotrigine Rash       Review of Systems    Video Exam    There were no vitals filed for this visit.    Physical Exam

## 2024-01-30 ENCOUNTER — OFFICE VISIT (OUTPATIENT)
Dept: FAMILY MEDICINE CLINIC | Facility: CLINIC | Age: 44
End: 2024-01-30
Payer: COMMERCIAL

## 2024-01-30 VITALS
HEART RATE: 81 BPM | BODY MASS INDEX: 45.99 KG/M2 | HEIGHT: 67 IN | WEIGHT: 293 LBS | TEMPERATURE: 97.4 F | DIASTOLIC BLOOD PRESSURE: 76 MMHG | SYSTOLIC BLOOD PRESSURE: 118 MMHG | OXYGEN SATURATION: 96 %

## 2024-01-30 DIAGNOSIS — M54.2 NECK PAIN: ICD-10-CM

## 2024-01-30 DIAGNOSIS — R21 RASH: Primary | ICD-10-CM

## 2024-01-30 PROCEDURE — 3074F SYST BP LT 130 MM HG: CPT | Performed by: FAMILY MEDICINE

## 2024-01-30 PROCEDURE — 3078F DIAST BP <80 MM HG: CPT | Performed by: FAMILY MEDICINE

## 2024-01-30 PROCEDURE — 99214 OFFICE O/P EST MOD 30 MIN: CPT | Performed by: FAMILY MEDICINE

## 2024-01-30 RX ORDER — TRIAMCINOLONE ACETONIDE 5 MG/G
CREAM TOPICAL 3 TIMES DAILY
Qty: 15 G | Refills: 0 | Status: SHIPPED | OUTPATIENT
Start: 2024-01-30

## 2024-01-30 RX ORDER — CLINDAMYCIN HYDROCHLORIDE 300 MG/1
CAPSULE ORAL
COMMUNITY
Start: 2024-01-25

## 2024-01-30 RX ORDER — NAPROXEN 500 MG/1
TABLET ORAL
COMMUNITY
Start: 2024-01-25

## 2024-01-30 RX ORDER — OXYCODONE HYDROCHLORIDE 5 MG/1
TABLET ORAL
COMMUNITY
Start: 2024-01-25

## 2024-01-30 RX ORDER — ESTRADIOL 0.1 MG/G
CREAM VAGINAL
COMMUNITY
Start: 2024-01-19

## 2024-01-30 NOTE — PROGRESS NOTES
Name: Jannette Baugh      : 1980      MRN: [id number]  Encounter Provider: Gustavo Godoy MD  Encounter Date: 2024   Encounter department: [facility]    Assessment & Plan     1. Rash  -     triamcinolone (KENALOG) 0.5 % cream; Apply topically 3 (three) times a day    2. Neck pain    Start Kenalog cream as needed for spot treatment of rash on feet.  Ear exam unremarkable today.  She is likely experiencing referred pain from neck.  She likely has muscle strain.  Recommended supportive care.  Follow-up as needed.       Subjective      Presents office for ear pain as well as rash.  Rash started about 2 weeks ago on her feet.  It itches.  It started to get a little better but there is still some areas that are really bothersome.  No fever or chills.  Rash initially started on the right foot and then jumped to the left foot.  It is not spreading up the foot.  She has left ear pain as well.  She had surge about 2 weeks ago and it started after.  No ringing in ears.  No hearing loss.  No ear discharge.      Review of Systems   All other systems reviewed and are negative.      Current Outpatient Medications on File Prior to Visit   Medication Sig    albuterol (PROVENTIL HFA,VENTOLIN HFA) 90 mcg/act inhaler Inhale 2 puffs every 6 (six) hours as needed for wheezing or shortness of breath (cough)    amitriptyline (ELAVIL) 75 mg tablet Take 1 tablet (75 mg total) by mouth daily at bedtime    APPLE CIDER VINEGAR PO Take by mouth    Ascorbic Acid (VITAMIN C) 500 MG CAPS Take 1 capsule by mouth daily at bedtime    aspirin 81 mg chewable tablet Chew 1 tablet (81 mg total) daily (Patient taking differently: Chew 81 mg daily at bedtime)    cetirizine (ZyrTEC) 10 mg tablet Take 10 mg by mouth daily    Cholecalciferol (VITAMIN D3) 5000 units CAPS Take 5,000 Units by mouth daily at bedtime    ciprofloxacin-dexamethasone (CIPRODEX) otic suspension Administer 4 drops to the right ear in the  morning and 4 drops in the evening.    cyanocobalamin (VITAMIN B-12) 1,000 mcg tablet Take 1,000 mcg by mouth daily     docusate sodium (DULCOLAX) 100 mg capsule Take by mouth daily at bedtime    esomeprazole (NexIUM) 20 mg capsule Take 2 capsules (40 mg total) by mouth in the morning    estradiol (ESTRACE) 0.1 mg/g vaginal cream APPLY PEA SIZED AMOUNT TO AREA OF CONCERN EVERY OTHER DAY    LORazepam (Ativan) 0.5 mg tablet Take 1 tablet (0.5 mg total) by mouth every 8 (eight) hours as needed for anxiety    losartan (COZAAR) 100 MG tablet Take 1 tablet (100 mg total) by mouth daily    Melatonin 10 MG TABS Take by mouth daily at bedtime    metoprolol succinate (TOPROL-XL) 100 mg 24 hr tablet Take 1 tablet (100 mg total) by mouth daily    Misc Natural Products (ELAVIL OTC SLEEP PO) Take by mouth    Multiple Vitamins-Minerals (CENTRUM ADULTS PO) Take 1 tablet by mouth daily at bedtime    naproxen (NAPROSYN) 500 mg tablet take 1 tablet by mouth every 12 hours for 7 days -BEGIN TAKING AFTER SURGERY    neomycin-polymyxin-hydrocortisone (CORTISPORIN) otic solution Administer 4 drops into the left ear 3 (three) times a day for 10 days (Patient taking differently: Administer 4 drops into the left ear 2 (two) times a day as needed)    nystatin (MYCOSTATIN) cream Apply topically 2 (two) times a day as needed (rash)    Omega-3 Fatty Acids (FISH OIL ADULT GUMMIES PO) Take by mouth daily at bedtime    oxyCODONE (ROXICODONE) 5 immediate release tablet take 1 to 2 tablets by mouth every 4 hours if needed    Pyridoxine HCl (VITAMIN B-6 PO) Take by mouth    rosuvastatin (CRESTOR) 40 MG tablet Take 1 tablet (40 mg total) by mouth daily    venlafaxine (EFFEXOR-XR) 150 mg 24 hr capsule Take 1 capsule (150 mg total) by mouth daily at bedtime    venlafaxine (EFFEXOR-XR) 75 mg 24 hr capsule Take 1 capsule (75 mg total) by mouth daily at bedtime    Zinc 50 MG TABS Take by mouth daily at bedtime    clindamycin (CLEOCIN) 300 MG capsule take 1  "capsule by mouth every 6 hours for 2 days (Patient not taking: Reported on 1/30/2024)    metroNIDAZOLE (FLAGYL) 250 mg tablet Take 250 mg by mouth 3 (three) times a day (Patient not taking: Reported on 1/30/2024)       Objective     /76 (BP Location: Left arm, Patient Position: Sitting, Cuff Size: Standard)   Pulse 81   Temp (!) 97.4 °F (36.3 °C) (Tympanic)   Ht 5' 7\" (1.702 m)   Wt 133 kg (294 lb)   SpO2 96%   BMI 46.05 kg/m²     Physical Exam  Vitals and nursing note reviewed.   Constitutional:       General: She is not in acute distress.     Appearance: Normal appearance. She is not ill-appearing, toxic-appearing or diaphoretic.   HENT:      Right Ear: Tympanic membrane, ear canal and external ear normal. There is no impacted cerumen.      Left Ear: Tympanic membrane, ear canal and external ear normal. There is no impacted cerumen.   Eyes:      General:         Right eye: No discharge.         Left eye: No discharge.      Extraocular Movements: Extraocular movements intact.      Conjunctiva/sclera: Conjunctivae normal.   Cardiovascular:      Rate and Rhythm: Normal rate.   Pulmonary:      Effort: Pulmonary effort is normal.   Musculoskeletal:      Cervical back: Normal range of motion and neck supple.      Comments: Pain worse with head movement and resisted shoulder shrug.  No neck cervical adenopathy.   Lymphadenopathy:      Cervical: No cervical adenopathy.   Skin:     Findings: Rash (Feet) present.   Neurological:      Mental Status: She is alert and oriented to person, place, and time.   Psychiatric:         Mood and Affect: Mood normal.         Behavior: Behavior normal.         Thought Content: Thought content normal.         Judgment: Judgment normal.       Gustavo Godoy MD    "

## 2024-02-01 DIAGNOSIS — I10 HYPERTENSION, UNSPECIFIED TYPE: ICD-10-CM

## 2024-02-01 DIAGNOSIS — E78.2 MIXED HYPERLIPIDEMIA: ICD-10-CM

## 2024-02-01 DIAGNOSIS — R13.19 ESOPHAGEAL DYSPHAGIA: ICD-10-CM

## 2024-02-01 DIAGNOSIS — I10 PRIMARY HYPERTENSION: ICD-10-CM

## 2024-02-01 DIAGNOSIS — K21.00 GASTROESOPHAGEAL REFLUX DISEASE WITH ESOPHAGITIS WITHOUT HEMORRHAGE: ICD-10-CM

## 2024-02-01 DIAGNOSIS — K27.9 PUD (PEPTIC ULCER DISEASE): ICD-10-CM

## 2024-02-02 ENCOUNTER — TELEPHONE (OUTPATIENT)
Dept: CARDIOLOGY CLINIC | Facility: CLINIC | Age: 44
End: 2024-02-02

## 2024-02-02 ENCOUNTER — OFFICE VISIT (OUTPATIENT)
Dept: PSYCHIATRY | Facility: CLINIC | Age: 44
End: 2024-02-02

## 2024-02-02 DIAGNOSIS — F41.1 GENERALIZED ANXIETY DISORDER: ICD-10-CM

## 2024-02-02 DIAGNOSIS — E78.2 MIXED HYPERLIPIDEMIA: ICD-10-CM

## 2024-02-02 DIAGNOSIS — F33.1 MODERATE RECURRENT MAJOR DEPRESSION (HCC): Primary | ICD-10-CM

## 2024-02-02 RX ORDER — VENLAFAXINE HYDROCHLORIDE 150 MG/1
150 CAPSULE, EXTENDED RELEASE ORAL
Qty: 90 CAPSULE | Refills: 0 | Status: SHIPPED | OUTPATIENT
Start: 2024-02-02

## 2024-02-02 RX ORDER — AMITRIPTYLINE HYDROCHLORIDE 75 MG/1
75 TABLET ORAL
Qty: 90 TABLET | Refills: 0 | Status: SHIPPED | OUTPATIENT
Start: 2024-02-02

## 2024-02-02 RX ORDER — ROSUVASTATIN CALCIUM 40 MG/1
40 TABLET, COATED ORAL DAILY
Qty: 90 TABLET | Refills: 3 | Status: SHIPPED | OUTPATIENT
Start: 2024-02-02

## 2024-02-02 RX ORDER — ROSUVASTATIN CALCIUM 40 MG/1
40 TABLET, COATED ORAL DAILY
Qty: 30 TABLET | Refills: 0 | Status: SHIPPED | OUTPATIENT
Start: 2024-02-02 | End: 2024-02-02 | Stop reason: SDUPTHER

## 2024-02-02 RX ORDER — VENLAFAXINE HYDROCHLORIDE 75 MG/1
75 CAPSULE, EXTENDED RELEASE ORAL
Qty: 90 CAPSULE | Refills: 0 | Status: SHIPPED | OUTPATIENT
Start: 2024-02-02

## 2024-02-02 RX ORDER — METOPROLOL SUCCINATE 100 MG/1
100 TABLET, EXTENDED RELEASE ORAL DAILY
Qty: 90 TABLET | Refills: 0 | Status: SHIPPED | OUTPATIENT
Start: 2024-02-02

## 2024-02-02 NOTE — PSYCH
MEDICATION MANAGEMENT NOTE        [facility] - [facility]   PSYCHIATRIC ASSOC [facility] [place]  [address] [facility] [place] [address] [phone]    This note was not shared with the patient due to reasonable likelihood of causing patient harm      Name and Date of Birth:  Jannette Baugh 44 y.o. 1980    Date of Visit: February 2, 2024    SUBJECTIVE:      Jannette seen by [facility] 11/3 at which time meds unchanged.  Jannette continues to f/u with Zita for ind therapy and P[facility] for grp. Overall mood has been stable and has been coping with anxiety.  Continues to struggle with food addiction- goes to OA.  Describes need to be continuously active to prevent focusing on distressing thoughts as well as food. Has intermittent periods of forgetfulness and fogginess and has talked to her medical providers about this being secondary to chronic Lyme dx.  States her doctors also suspect Eliz-Danlos.  Has splint on left hand secondary to tenosynovitis and is in PT. Reports getting frustrated by physical limitations. Reviewed anxiety and family dysfunction over the holidays.     Review of Systems   Constitutional:  Negative for unexpected weight change.   Musculoskeletal:  Positive for arthralgias and myalgias.       Psychiatric History      This office since 2/25/20 (Dr. Mathias).  Ind therapy Kailee Chadwick 5/11/20 and Zita Goff starting 1/22/21.  In OP tx since 17 yo.  Has seen Dr. Luque in the past.  No IP or suicide attempts.  FHx- mother and brother with depression.      Trauma/Loss History       Death of father from MI when Jannette was 15 yo.  Bullied by brother until age 15 yo.      Social History       x 20 yrs. Lives with  and teen-age daughter.      OBJECTIVE:     MENTAL STATUS EXAM  Appearance:  age appropriate, dressed casually; left hand/forearm in splint   Behavior:  Pleasant & cooperative   Speech:  Normal volume,  regular rate and rhythm   Mood:  Mildly anxious   Affect:  mood congruent   Language: intact and appropriate for age, education, and intellect   Thought Process:  circumstantial   Associations: circumstantial associations   Thought Content:  negative ruminations   Perceptual Disturbances: no auditory or visual hallcunations   Risk Potential / Abnormal Thoughts: Suicidal ideation - None  Homicidal ideation - None  Potential for aggression - No       Consciousness:  Alert & Awake   Sensorium:  Grossly oriented   Attention: attention span and concentration are age appropriate       Fund of Knowledge:  Memory: awareness of current events: yes  recent and remote memory grossly intact   Insight:  intact   Judgment: intact   Muscle Strength Muscle Tone: Grossly normal  normal   Gait/Station: normal gait/station with good balance   Motor Activity: no abnormal movements       Lab Review: I have reviewed all pertinent labs  Lab Results   Component Value Date    HGBA1C 5.1 08/01/2023     Lab Results   Component Value Date    CHOLESTEROL 181 08/01/2023     Lab Results   Component Value Date    HDL 51 08/01/2023     Lab Results   Component Value Date    TRIG 164 (H) 08/01/2023     Lab Results   Component Value Date    NONHDLC 135 11/15/2022     Lab Results   Component Value Date     04/05/2017    SODIUM 140 06/27/2023    K 4.0 06/27/2023     06/27/2023    CO2 27 06/27/2023    ANIONGAP 7 09/19/2014    AGAP 10 06/27/2023    BUN 12 06/27/2023    CREATININE 0.61 06/27/2023    GLUC 121 (H) 06/27/2023    GLUF 114 (H) 03/30/2023    CALCIUM 10.3 (H) 06/27/2023    AST 20 08/01/2023    ALT 48 08/01/2023    ALKPHOS 62 08/01/2023    PROT 6.7 04/05/2017    TP 7.4 08/01/2023    BILITOT 0.6 04/05/2017    TBILI 0.43 08/01/2023    EGFR 113 06/27/2023     Lab Results   Component Value Date    WBC 7.49 03/30/2023    HGB 14.1 03/30/2023    HCT 42.4 03/30/2023    MCV 92 03/30/2023     03/30/2023     Lab Results   Component Value  "Date    CYVJMSZW74 863 03/02/2020     No results found for: \"FOLATE\"  Lab Results   Component Value Date    SSJ3KWVIMUJL 1.750 08/01/2023    TSH 0.85 10/23/2018           ASSESSMENT & PLAN          Diagnoses and all orders for this visit:    Moderate recurrent major depression (HCC)  -     amitriptyline (ELAVIL) 75 mg tablet; Take 1 tablet (75 mg total) by mouth daily at bedtime    Generalized anxiety disorder  -     amitriptyline (ELAVIL) 75 mg tablet; Take 1 tablet (75 mg total) by mouth daily at bedtime  -     venlafaxine (EFFEXOR-XR) 150 mg 24 hr capsule; Take 1 capsule (150 mg total) by mouth daily at bedtime  -     venlafaxine (EFFEXOR-XR) 75 mg 24 hr capsule; Take 1 capsule (75 mg total) by mouth daily at bedtime      Current Outpatient Medications   Medication Sig Dispense Refill    amitriptyline (ELAVIL) 75 mg tablet Take 1 tablet (75 mg total) by mouth daily at bedtime 90 tablet 0    venlafaxine (EFFEXOR-XR) 150 mg 24 hr capsule Take 1 capsule (150 mg total) by mouth daily at bedtime 90 capsule 0    venlafaxine (EFFEXOR-XR) 75 mg 24 hr capsule Take 1 capsule (75 mg total) by mouth daily at bedtime 90 capsule 0    albuterol (PROVENTIL HFA,VENTOLIN HFA) 90 mcg/act inhaler Inhale 2 puffs every 6 (six) hours as needed for wheezing or shortness of breath (cough) 18 g 1    APPLE CIDER VINEGAR PO Take by mouth      Ascorbic Acid (VITAMIN C) 500 MG CAPS Take 1 capsule by mouth daily at bedtime      aspirin 81 mg chewable tablet Chew 1 tablet (81 mg total) daily (Patient taking differently: Chew 81 mg daily at bedtime) 30 tablet 30    cetirizine (ZyrTEC) 10 mg tablet Take 10 mg by mouth daily      Cholecalciferol (VITAMIN D3) 5000 units CAPS Take 5,000 Units by mouth daily at bedtime      ciprofloxacin-dexamethasone (CIPRODEX) otic suspension Administer 4 drops to the right ear in the morning and 4 drops in the evening. 7.5 mL 3    clindamycin (CLEOCIN) 300 MG capsule take 1 capsule by mouth every 6 hours for 2 " days (Patient not taking: Reported on 1/30/2024)      cyanocobalamin (VITAMIN B-12) 1,000 mcg tablet Take 1,000 mcg by mouth daily       docusate sodium (DULCOLAX) 100 mg capsule Take by mouth daily at bedtime      esomeprazole (NexIUM) 20 mg capsule Take 2 capsules (40 mg total) by mouth in the morning 180 capsule 0    estradiol (ESTRACE) 0.1 mg/g vaginal cream APPLY PEA SIZED AMOUNT TO AREA OF CONCERN EVERY OTHER DAY      losartan (COZAAR) 100 MG tablet Take 1 tablet (100 mg total) by mouth daily 90 tablet 0    Melatonin 10 MG TABS Take by mouth daily at bedtime      metoprolol succinate (TOPROL-XL) 100 mg 24 hr tablet Take 1 tablet (100 mg total) by mouth daily 90 tablet 0    metroNIDAZOLE (FLAGYL) 250 mg tablet Take 250 mg by mouth 3 (three) times a day (Patient not taking: Reported on 1/30/2024)      Misc Natural Products (ELAVIL OTC SLEEP PO) Take by mouth      Multiple Vitamins-Minerals (CENTRUM ADULTS PO) Take 1 tablet by mouth daily at bedtime      naproxen (NAPROSYN) 500 mg tablet take 1 tablet by mouth every 12 hours for 7 days -BEGIN TAKING AFTER SURGERY      neomycin-polymyxin-hydrocortisone (CORTISPORIN) otic solution Administer 4 drops into the left ear 3 (three) times a day for 10 days (Patient taking differently: Administer 4 drops into the left ear 2 (two) times a day as needed) 10 mL 0    nystatin (MYCOSTATIN) cream Apply topically 2 (two) times a day as needed (rash) 30 g 1    Omega-3 Fatty Acids (FISH OIL ADULT GUMMIES PO) Take by mouth daily at bedtime      oxyCODONE (ROXICODONE) 5 immediate release tablet take 1 to 2 tablets by mouth every 4 hours if needed      Pyridoxine HCl (VITAMIN B-6 PO) Take by mouth      rosuvastatin (CRESTOR) 40 MG tablet Take 1 tablet (40 mg total) by mouth daily 90 tablet 3    triamcinolone (KENALOG) 0.5 % cream Apply topically 3 (three) times a day 15 g 0    Zinc 50 MG TABS Take by mouth daily at bedtime       No current facility-administered medications for this  visit.                Plan:        No med change- cont elavil 75 mg/d and effexor xr 225 mg.  Jannette states she has discussed her meds with cardiology in light of RBBB and Qtc in the high 400s. Last EKG was May 2023. She sees cardiology once a year, she says.  [facility] will order EKG at next appt if not already done so.  Cont f/u with ind and grp therapy.  Cont 12 steps meetings- OA or [employer].     Reviewed risks, benefits, side effects of medications, including no medication.  Patient understands and agrees to treatment plan.   F/u [facility] 3 mths, sooner prn       Patient has been informed of 24 hours and weekend coverage for urgent situations accessed by calling the main clinic phone number.     RYAN LawsonC

## 2024-02-05 RX ORDER — LOSARTAN POTASSIUM 100 MG/1
100 TABLET ORAL DAILY
Qty: 90 TABLET | Refills: 4 | Status: SHIPPED | OUTPATIENT
Start: 2024-02-05

## 2024-02-06 ENCOUNTER — OFFICE VISIT (OUTPATIENT)
Dept: BEHAVIORAL/MENTAL HEALTH CLINIC | Facility: CLINIC | Age: 44
End: 2024-02-06
Payer: COMMERCIAL

## 2024-02-06 DIAGNOSIS — F33.1 MODERATE RECURRENT MAJOR DEPRESSION (HCC): Primary | ICD-10-CM

## 2024-02-06 DIAGNOSIS — F41.1 GENERALIZED ANXIETY DISORDER: ICD-10-CM

## 2024-02-06 PROCEDURE — 90853 GROUP PSYCHOTHERAPY: CPT | Performed by: SOCIAL WORKER

## 2024-02-07 ENCOUNTER — SOCIAL WORK (OUTPATIENT)
Dept: BEHAVIORAL/MENTAL HEALTH CLINIC | Facility: CLINIC | Age: 44
End: 2024-02-07
Payer: COMMERCIAL

## 2024-02-07 DIAGNOSIS — F33.1 MODERATE RECURRENT MAJOR DEPRESSION (HCC): Primary | ICD-10-CM

## 2024-02-07 DIAGNOSIS — F41.1 GENERALIZED ANXIETY DISORDER: ICD-10-CM

## 2024-02-07 PROCEDURE — 90834 PSYTX W PT 45 MINUTES: CPT | Performed by: SOCIAL WORKER

## 2024-02-07 NOTE — PSYCH
This note was not shared with the patient due to this is a psychotherapy note      Behavioral Health Psychotherapy Progress Note    Psychotherapy Provided: Individual Psychotherapy     1. Moderate recurrent major depression (HCC)        2. Generalized anxiety disorder            Goals addressed in session: Goal 1 and Goal 2     DATA:   Met with Jannette.  Jannette began session stating she has been overwhelmed with her medical issues. She was mostly negativistic about her medical issues despite sharing how her fistula now healing and resolving, and having a hand injury which she has received surgery on and will be able to have more function in movement in 5 weeks.  Jannette also shared how she feels low self esteem plays a large role in her depression and anxiety.  She is able to identify ways in which she is showing more respect toward herself including--assertiveness and limit setting with girl  troop, assertiveness with family.  She is able to identify some positive changes in these areas with use of skills; however, struggles to give herself credit.  She also is able to identify a decrease in avoidance-- focusing more on her family and friends than girlscouts and needs of others.   She also shared an incident that occurred over the holidays with family conflict and Jannette's ability to effectively cope by expressing her feelings, walking away to de-escalate, and being able to come back and enjoy her night playing games with family. Throughout session, Jannette struggles with unhelpful thoughts including- disqualifying the positives, overgeneralizations, magnification.  Jannette recognizes that she is stuck in the past with these issues, stating it is difficult for her to move forward despite some issues resolving, getting better for her.  We discussed the issues with keeping self stuck in the past and how it can affect her mental health. Mindfulness and CBT was utilized to be more present, decrease judgements, and dismantle  unhelpful thoughts as they arose.  Treatment plan update was due today.   Some positive progress toward goals through discussion of use of skills.  Revisions were made to her tx plan as appropriate.        Tuttle Self Esteem Scale Score: 8    PHQ-2/9 Depression Screening    Little interest or pleasure in doing things: 3 - nearly every day  Feeling down, depressed, or hopeless: 2 - more than half the days  Trouble falling or staying asleep, or sleeping too much: 3 - nearly every day  Feeling tired or having little energy: 3 - nearly every day  Poor appetite or overeating: 3 - nearly every day  Feeling bad about yourself - or that you are a failure or have let yourself or your family down: 3 - nearly every day  Trouble concentrating on things, such as reading the newspaper or watching television: 2 - more than half the days  Moving or speaking so slowly that other people could have noticed. Or the opposite - being so fidgety or restless that you have been moving around a lot more than usual: 0 - not at all  Thoughts that you would be better off dead, or of hurting yourself in some way: 0 - not at all  PHQ-9 Score: 19  PHQ-9 Interpretation: Moderately severe depression       MILADYS-7 Flowsheet Screening      Flowsheet Row Most Recent Value   Over the last 2 weeks, how often have you been bothered by any of the following problems?    Feeling nervous, anxious, or on edge 3   Not being able to stop or control worrying 3   Worrying too much about different things 3   Trouble relaxing 3   Being so restless that it is hard to sit still 1   Becoming easily annoyed or irritable 3   Feeling afraid as if something awful might happen 3   MILADYS-7 Total Score 19            During this session, this clinician used the following therapeutic modalities: Client-centered Therapy, Cognitive Behavioral Therapy, Mindfulness-based Strategies, and Supportive Psychotherapy    Substance Abuse was not addressed during this session. If the client  "is diagnosed with a co-occurring substance use disorder, please indicate any changes in the frequency or amount of use: na. Stage of change for addressing substance use diagnoses: No substance use/Not applicable    ASSESSMENT:  Jannette Baugh presents with a Anxious and Depressed mood.     her affect is Normal range and intensity, which is congruent, with her mood and the content of the session. The client has made progress on their goals.     Jannette Baugh presents with a none risk of suicide, none risk of self-harm, and none risk of harm to others.    For any risk assessment that surpasses a \"low\" rating, a safety plan must be developed.    A safety plan was indicated: no  If yes, describe in detail na    PLAN: Between sessions, Jannette Baugh will continue to use and develop coping skills. Continue to attend Depression Support Group.  At the next session, the therapist will use Client-centered Therapy, Cognitive Behavioral Therapy, Dialectical Behavior Therapy, Mindfulness-based Strategies, and Supportive Psychotherapy to address positive affect intolerance- improve coping.    Behavioral Health Treatment Plan and Discharge Planning: Jannette Baugh is aware of and agrees to continue to work on their treatment plan. They have identified and are working toward their discharge goals. yes    Visit start and stop times:    02/07/24  Start Time: 1000  Stop Time: 1050  Total Visit Time: 50 minutes  "

## 2024-02-07 NOTE — PSYCH
"Behavioral Health Psychotherapy Group Progress Note    Psychotherapy Provided: Group Therapy    1. Moderate recurrent major depression (HCC)        2. Generalized anxiety disorder            Goals addressed in session: Goal 1     Group Name: Depression Support Group    Topic(s) covered: again, accepting what cannot be changed    Skill(s) covered: DBT concept of radical acceptance    Group summary:  Goal for this group was to explore living in accordance with values using a handout.  Session began by therapist checking in with group members regarding events/wins of the last 3 weeks.  Group members were silent except for one member who was visibly upset.  She shared that she \"didn't know what to do\" and she was \"thinking of packing it in.\"  Therapist assessed for suicidal ideation; group member shared that she was experiencing SI but had no plan or intention to act on the thoughts.  Therapist directed her to make an appointment for an individual therapy session with her therapist and to proceed to the ER if thoughts worsened.  She explained that she had lost her insurance and was hoping to process her concerns during this group session.  She shared at length that she was having marital difficulties that were leaving her very depressed.  Therapist reviewed the concept of radically accepting the fact that much of what she described was out of her control.  Therapist explained that asking \"why me\" was not productive, and urged her to consider what is in her control - essentially how she would respond to the situation.  Therapist and group members provided validation and supportive insights.      Data: Jannette attended today's group. She was quiet, stating that she had nothing to share.    Substance Abuse was not addressed during this session. If the client is diagnosed with a co-occurring substance use disorder, please indicate any changes in the frequency or amount of use: NA. Stage of change for addressing substance use " "diagnoses: No substance use/Not applicable    ASSESSMENT:  Jannette appeared  with a Euthymic/ normal mood. Her affect is Normal range and intensity, which is congruent, with his mood and the content of the session. She appeared to remained quiet throughout the group and interacted appropriately with and was supportive of the other group members.     Jannette Baugh presents with a lowrisk of suicide,lowrisk of self-harm, and low risk of harm to others.    For any risk assessment that surpasses a \"low\" rating, a safety plan must be developed.    A safety plan was indicated: no  If yes, describe in detail NA    PLAN: Jannette will consider concept of radical acceptance. The next group is scheduled for February 20, 2024 and will cover the topic of living in accordance with one's values.    Behavioral Health Treatment Plan and Discharge Planning: Jannette Baugh is aware of and agrees to continue to work on their treatment plan. They have identified and are working toward their discharge goals. yes    Visit start and stop times:    02/06/2024  Start Time: 1603  Stop Time: 1658  Total Visit Time: 55 minutes      "

## 2024-02-07 NOTE — BH TREATMENT PLAN
"Outpatient Behavioral Health Psychotherapy Treatment Plan    Jannette Baugh  1980     Date of Initial Psychotherapy Assessment: 1/22/21  Date of Current Treatment Plan: 02/07/24  Treatment Plan Target Date: 7/7/24  Treatment Plan Expiration Date: 7/7/24    Diagnosis:   1. Moderate recurrent major depression (HCC)        2. Generalized anxiety disorder              Area(s) of Need: improve view of self, better manage emotions    Long Term Goal 1 (in the client's own words): \"to not  myself so much\"    Stage of Change: Preparation    Target Date for completion: 7/7/24     Anticipated therapeutic modalities: Client Centered, Cognitive Behavioral Therapy, Mindfulness, Supportive Therapy     People identified to complete this goal: Jannette CYNTHIA Baugh and Zita Goff      Objective 1: (identify the means of measuring success in meeting the objective):   1.1  Jannette will demonstrate openness to engage in therapy as evidenced by regular attendance and communication of her thoughts and feelings.  1.2  Jannette will continue to be able to identify cognitive distortions as they arise, continue to use and develop cognitive coping mechanisms to dismantle unhelpful thoughts as they arise.   1.3  Jannette will continue to use and develop positive self talk, share successes and challenges.    1.4  Jannette will continue to practice setting limits with herself and others.  1.5  Jannette will decrease overcompensating with finding validation through others, focusing on others instead of herself as unhealthy coping mechanisms.    1.6  Jannette will continue to utilize coping strategies to separate responsibility to self vs responsibility to/of others.   Share successes and challenges.  1.7  Jannette will continue to attend Depression Support Group on site for additional support.          Long Term Goal 2 (in the client's own words): \"to be able to de-escalate myself better when I'm overwhelmed\"    Stage of Change: Action    Target Date for " completion: 7/7/24     Anticipated therapeutic modalities: Dialectical Behavioral Therapy, Mindfulness Strategies     People identified to complete this goal: Jannette Baugh and Zita [place]      Objective 1: (identify the means of measuring success in meeting the objective):   1.1  Jannette will demonstrate openness to engage in therapy as evidenced by regular attendance and communication of her thoughts and feelings.  1.2  Jannette will continue to use and develop DBT tools to learn how to better manage her emotions, improve interpersonal skills, improve frustration tolerance.    1.3  Jannette will continue to use and develop calming/relaxations tools to decrease emotional reactivity.  1.4  Jannette will continue to take medications as they arise, share questions and concerns as they arise.       I am currently under the care of a [facility] psychiatric provider: yes    My [facility] psychiatric provider is: Kathleen Beauchamp PA-C    I am currently taking psychiatric medications: Yes, as prescribed    I feel that I will be ready for discharge from mental health care when I reach the following (measurable goal/objective): PHQ9 and GAD7 score decrease each by 5, Tuttle Scale Score increased to within normal range    For children and adults who have a legal guardian:   Has there been any change to custody orders and/or guardianship status? NA. If yes, attach updated documentation.    I have created my Crisis Plan on 5/17/23 and have been offered a copy of this plan    Behavioral Health Treatment Plan [facility]: Diagnosis and Treatment Plan explained to Jannette Baugh acknowledges an understanding of their diagnosis. Jannette Baugh agrees to this treatment plan.    I have been offered a copy of this Treatment Plan. yes

## 2024-02-20 ENCOUNTER — OFFICE VISIT (OUTPATIENT)
Dept: BEHAVIORAL/MENTAL HEALTH CLINIC | Facility: CLINIC | Age: 44
End: 2024-02-20
Payer: COMMERCIAL

## 2024-02-20 DIAGNOSIS — F33.9 DEPRESSION, RECURRENT (HCC): Primary | ICD-10-CM

## 2024-02-20 DIAGNOSIS — F41.1 GENERALIZED ANXIETY DISORDER: ICD-10-CM

## 2024-02-20 PROCEDURE — 90853 GROUP PSYCHOTHERAPY: CPT | Performed by: SOCIAL WORKER

## 2024-02-20 NOTE — PSYCH
Behavioral Health Psychotherapy Group Progress Note    Psychotherapy Provided: Group Therapy    1. Depression, recurrent (HCC)        2. Generalized anxiety disorder            Goals addressed in session: Goal 1     Group Name: Depression Support Group    Topic(s) covered: Values Based Living    Skill(s) covered: Identifying and setting intentions for living in accordance with values    Group summary:  Group began with processing wins/positives of the last two weeks.  Therapist then introduced the topic of values based living.  A worksheet was completed that asked group members to explore their values, define them, and consider when they have/have not living in accordance with their values.  This was processed with supportive feedback from group participants.  An assignment was given to explore how they value areas of their life and to set intentions for each.    Data: Jannette attended today's group. She happily shared at the start of the group session that she has been successful with regard to not being so reactive and she is now setting limits with others.  With regard to the group activity, she reported that she greatly values be kind to others and being for them the person that she wished she had in others.  She acknowledged that often she is too giving to others and is working on decreasing this behavior.    Substance Abuse was not addressed during this session. If the client is diagnosed with a co-occurring substance use disorder, please indicate any changes in the frequency or amount of use: NA. Stage of change for addressing substance use diagnoses: No substance use/Not applicable    ASSESSMENT:  Jannette appeared  with a Euthymic/ normal mood. Her affect is Normal range and intensity, which is congruent, with his mood and the content of the session. She appeared to actively participated in the group and interacted appropriately with and was supportive of the other group members.     Janentte Baugh presents  "with a lowrisk of suicide,lowrisk of self-harm, and low risk of harm to others.    For any risk assessment that surpasses a \"low\" rating, a safety plan must be developed.    A safety plan was indicated: no  If yes, describe in detail NA    PLAN: Jannette will complete assignment. The next group is scheduled for March 12 and will cover the topic of self compassion.    Behavioral Health Treatment Plan and Discharge Planning: Jannette Baugh is aware of and agrees to continue to work on their treatment plan. They have identified and are working toward their discharge goals. yes    Visit start and stop times:    02/20/24  Start Time: 1600  Stop Time: 1646  Total Visit Time: 46 minutes      "

## 2024-02-29 ENCOUNTER — OFFICE VISIT (OUTPATIENT)
Dept: BARIATRICS | Facility: CLINIC | Age: 44
End: 2024-02-29
Payer: COMMERCIAL

## 2024-02-29 VITALS
HEART RATE: 81 BPM | RESPIRATION RATE: 20 BRPM | SYSTOLIC BLOOD PRESSURE: 126 MMHG | TEMPERATURE: 98 F | DIASTOLIC BLOOD PRESSURE: 78 MMHG | WEIGHT: 290.4 LBS | BODY MASS INDEX: 45.58 KG/M2 | HEIGHT: 67 IN | OXYGEN SATURATION: 97 %

## 2024-02-29 DIAGNOSIS — G47.30 SLEEP APNEA: ICD-10-CM

## 2024-02-29 DIAGNOSIS — E66.01 OBESITY, CLASS III, BMI 40-49.9 (MORBID OBESITY) (HCC): Primary | ICD-10-CM

## 2024-02-29 DIAGNOSIS — F33.1 MODERATE RECURRENT MAJOR DEPRESSION (HCC): ICD-10-CM

## 2024-02-29 DIAGNOSIS — K21.00 GASTROESOPHAGEAL REFLUX DISEASE WITH ESOPHAGITIS WITHOUT HEMORRHAGE: ICD-10-CM

## 2024-02-29 PROCEDURE — 99214 OFFICE O/P EST MOD 30 MIN: CPT | Performed by: PHYSICIAN ASSISTANT

## 2024-02-29 NOTE — PROGRESS NOTES
Assessment/Plan:    Obesity, Class III, BMI 40-49.9 (morbid obesity) (HCC)  -Discussed options of HealthyCORE-Intensive Lifestyle Intervention Program, Very Low Calorie Diet-VLCD, Conservative Program, Julio-En-Y Gastric Bypass, and Vertical Sleeve Gastrectomy and the role of weight loss medications.  -not currently interested in bariatric surgery  -had palpitations with Saxenda  -Metformin caused diarrhea  -Initial weight loss goal of 5-10% weight loss for improved health  -Screening labs: reviewed CMP, Lipid panel, TSH, HgbA1c  -Patient is interested in pursuing  conservative program  -Calorie goals, sample menu, portion size guidelines, and food logging reviewed with the patient.    -Declines to see RD  -has interest in AOM. Discussed importance of consistent dietary and lifestyle changes for long term success. Patient will check coverage for Zepbound and Wegovy  -denies personal hx pancreatitis or family hx MEN2 or MTC  -medication agreement signed    Sleep apnea  -compliant with CPAP  -can improve with weight loss    Primary hypertension  -On Losartan and Toprol XL  -can improve with weight loss    Esophageal reflux  -On Nexium  -avoid food triggers, large portion sizes    Moderate recurrent major depression (HCC)  -hx MILADYS  -Managed by psych  -On Effexor    Goals:    Food log (ie.) [email or web address],[email or web address],loseit.[email or web address],[employer]com,etc.   No sugary beverages. At least 64oz of water daily.  Increase physical activity by 10 minutes daily. Gradually increase physical activity to a goal of 5 days per week for 30 minutes of MODERATE intensity PLUS 2 days per week of FULL BODY resistance training  25-35 grams of dietary fiber per day  4631-9666 calories per day  5-10 servings of fruits and vegetables per day  Zepbound, Wegovy    Follow up in approximately 3 months with Non-Surgical Physician/Advanced Practitioner.     Diagnoses and all orders for this visit:    Obesity, Class III, BMI 40-49.9 (morbid  obesity) (HCC)    Sleep apnea    Gastroesophageal reflux disease with esophagitis without hemorrhage    Moderate recurrent major depression (HCC)          Subjective:   Chief Complaint   Patient presents with    Follow-up        Patient ID: Jannette Baugh  is a 44 y.o. female with excess weight/obesity here to pursue weight managment.  Patient is pursuing Conservative Program.     HPI Patient presents for overdue MW follow up. Last seen in 5/2022. Reports she had a cervical fusion 6 months ago. Has been counting her calories since this time on ISBX Pal. Reports she was up to 309 lbs and got down to 280 lbs but now has regained 10lbs. Reports brother had bariatric surgery. She is fearful to pursue the surgery due to her underlying depression. Continues to follow with psych every 3 months. Currently in group therapy and individual therapy    Goal: LTG 190lbs    Food logging around 1800 calories per day  Hydration: water 64-8oz, 12oz coffee + flavored creamer (4 TBSP measured)  ETOH: denies  Exercise: denies; recently had hand surgery  Sleep: 7 hours; wears CPAP  Smoking: denies    B: coffee + flavored creamer + banana  S: skips  L: 2 carb balance wraps with cheese and rotisserie chicken + barbecue sauce + grapes  S: popcorners - 1 serving size bags  D: Stuffed peppers, tacos   S: grapes OR dried fruit    Wt Readings from Last 10 Encounters:   02/29/24 132 kg (290 lb 6.4 oz)   01/30/24 133 kg (294 lb)   12/29/23 132 kg (291 lb 6.4 oz)   11/21/23 131 kg (288 lb)   10/18/23 130 kg (286 lb)   10/04/23 130 kg (286 lb)   09/29/23 130 kg (286 lb)   09/22/23 129 kg (283 lb 12.8 oz)   08/16/23 129 kg (285 lb 6.4 oz)   07/06/23 132 kg (291 lb 7.2 oz)        The following portions of the patient's history were reviewed and updated as appropriate: allergies, current medications, past family history, past medical history, past social history, past surgical history, and problem list.    Review of Systems   Respiratory:   "Negative for cough and shortness of breath.    Cardiovascular:  Negative for chest pain and palpitations.   Gastrointestinal:  Negative for abdominal pain, constipation, diarrhea, nausea and vomiting.   Psychiatric/Behavioral:  Positive for dysphoric mood (reprots mood stable). Negative for suicidal ideas. The patient is nervous/anxious.        Objective:    /78 (BP Location: Right arm, Patient Position: Sitting, Cuff Size: Large)   Pulse 81   Temp 98 °F (36.7 °C)   Resp 20   Ht 5' 7\" (1.702 m)   Wt 132 kg (290 lb 6.4 oz)   SpO2 97%   BMI 45.48 kg/m²      Physical Exam  Vitals and nursing note reviewed.      Constitutional   General appearance: Abnormal.  well developed and morbidly obese.   Eyes No conjunctival pallor.   Ears, Nose, Mouth, and Throat Oral mucosa moist.   Pulmonary   Respiratory effort: No increased work of breathing or signs of respiratory distress.    Auscultation of lungs: Clear to auscultation, equal breath sounds bilaterally, no wheezes, no rales, no rhonci.    Cardiovascular   Auscultation of heart: Normal rate and rhythm, normal S1 and S2, without murmurs.    Examination of extremities for edema and/or varicosities: Normal.  no edema.   Abdomen   Abdomen: Abnormal.  The abdomen was obese. Bowel sounds were normal. The abdomen was soft and nontender.   Musculoskeletal   Gait and station: Normal.    Psychiatric   Orientation to person, place and time: Normal.    Affect: appropriate   "

## 2024-02-29 NOTE — ASSESSMENT & PLAN NOTE
-Discussed options of HealthyCORE-Intensive Lifestyle Intervention Program, Very Low Calorie Diet-VLCD, Conservative Program, Julio-En-Y Gastric Bypass, and Vertical Sleeve Gastrectomy and the role of weight loss medications.  -not currently interested in bariatric surgery  -had palpitations with Saxenda  -Metformin caused diarrhea  -Initial weight loss goal of 5-10% weight loss for improved health  -Screening labs: reviewed CMP, Lipid panel, TSH, HgbA1c  -Patient is interested in pursuing  conservative program  -Calorie goals, sample menu, portion size guidelines, and food logging reviewed with the patient.    -Declines to see RD  -has interest in AOM. Discussed importance of consistent dietary and lifestyle changes for long term success. Patient will check coverage for Zepbound and Wegovy  -denies personal hx pancreatitis or family hx MEN2 or MTC  -medication agreement signed

## 2024-02-29 NOTE — PATIENT INSTRUCTIONS
Goals:    Food log (ie.) [email or web address],[employer].[email or web address],[email or web address],etc.   No sugary beverages. At least 64oz of water daily.  Increase physical activity by 10 minutes daily. Gradually increase physical activity to a goal of 5 days per week for 30 minutes of MODERATE intensity PLUS 2 days per week of FULL BODY resistance training  25-35 grams of dietary fiber per day  7762-5216 calories per day  5-10 servings of fruits and vegetables per day  Zepbound, Wegovy

## 2024-03-06 ENCOUNTER — SOCIAL WORK (OUTPATIENT)
Dept: BEHAVIORAL/MENTAL HEALTH CLINIC | Facility: CLINIC | Age: 44
End: 2024-03-06
Payer: COMMERCIAL

## 2024-03-06 DIAGNOSIS — F41.1 GENERALIZED ANXIETY DISORDER: ICD-10-CM

## 2024-03-06 DIAGNOSIS — F33.1 MODERATE RECURRENT MAJOR DEPRESSION (HCC): Primary | ICD-10-CM

## 2024-03-06 PROCEDURE — 90834 PSYTX W PT 45 MINUTES: CPT | Performed by: SOCIAL WORKER

## 2024-03-06 NOTE — PSYCH
"This note was not shared with the patient due to this is a psychotherapy note      Behavioral Health Psychotherapy Progress Note    Psychotherapy Provided: Individual Psychotherapy     1. Moderate recurrent major depression (HCC)        2. Generalized anxiety disorder            Goals addressed in session: Goal 1     DATA:   Met with Jannette.   Jannette states, \"I've had ups and downs\".   She is reporting some improvement in assertiveness, limiting setting.  She shared examples in session.   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  We discussed different seasons of life coming to an end, grieving, and being excited for new beginnings and opportunities.  We discussed how this can be beneficial to her overall wellness.  Her 16 y/o daughter was recently dx with Autism.  Her daughter's begun receiving OT services.   Jannette is happy that her daughter is getting the support, stating she felt like there was something going on for awhile, Jannette states she just didn't know how to approach it.      Jannette states she continues to struggle with self esteem.  She's been using positive self talk daily.  She continues to struggle with disqualifying the positives, all or nothing thinking, mind reading.  She recognizes she focuses on the negatives in situations and the past in general.  Throughout session, Mindfulness and CBT was utilized to be more present, decrease judgements, and reframe unhelpful thoughts.  Jannette identifies her image as a large trigger.  She states she looks like her  father and lorena.  She states family members see her and tell her this, states her Uncle avoided her for a long time because she looks like her dad, she sees her father's friends in public and they have cried.  Jannette was tearful sharing this, acknowledging this as difficult for her and how she takes on the " "responsibility of the other person's emotions. Support therapy was provided. We discussed grief in depth, along with vulnerabilities, and responsibility to self vs responsibility to/of others.      Jannette was provided a self esteem journal to begin daily.      During this session, this clinician used the following therapeutic modalities: Client-centered Therapy, Cognitive Behavioral Therapy, Mindfulness-based Strategies, and Supportive Psychotherapy    Substance Abuse was not addressed during this session. If the client is diagnosed with a co-occurring substance use disorder, please indicate any changes in the frequency or amount of use: na. Stage of change for addressing substance use diagnoses: No substance use/Not applicable    ASSESSMENT:  Jannette Baugh presents with a Anxious and Depressed mood.     her affect is Normal range and intensity, which is congruent, with her mood and the content of the session. The client has made progress on their goals.     Jannette Baugh presents with a none risk of suicide, none risk of self-harm, and none risk of harm to others.    For any risk assessment that surpasses a \"low\" rating, a safety plan must be developed.    A safety plan was indicated: no  If yes, describe in detail na    PLAN: Between sessions, Jannette Baugh will continue to use and develop coping skills. At the next session, the therapist will use Client-centered Therapy, Cognitive Behavioral Therapy, Mindfulness-based Strategies, and Supportive Psychotherapy to address stressors, improve coping, improve view of self.    Behavioral Health Treatment Plan and Discharge Planning: Jannette Baugh is aware of and agrees to continue to work on their treatment plan. They have identified and are working toward their discharge goals. yes    Visit start and stop times:    03/06/24  Start Time: 0948  Stop Time: 1040  Total Visit Time: 52 minutes  "

## 2024-03-07 DIAGNOSIS — E66.01 OBESITY, CLASS III, BMI 40-49.9 (MORBID OBESITY) (HCC): Primary | ICD-10-CM

## 2024-03-18 ENCOUNTER — TELEPHONE (OUTPATIENT)
Dept: BARIATRICS | Facility: CLINIC | Age: 44
End: 2024-03-18

## 2024-03-18 NOTE — TELEPHONE ENCOUNTER
----- Message from Magali Olea MA sent at 3/18/2024  9:29 AM EDT -----  Regarding: JEFE: Wegovy  Contact: [phone]    ----- Message -----  From: Janet Corea  Sent: 3/18/2024   9:28 AM EDT  To: Weight Management [facility] Clinical  Subject: FW: Wegovy                                         ----- Message -----  From: Jannette Baugh  Sent: 3/16/2024   5:04 PM EDT  To: Weight [facility] Clinical  Subject: Wegovy                                           Did you by chance send that info my insurance needs before approving?

## 2024-03-25 ENCOUNTER — TELEPHONE (OUTPATIENT)
Age: 44
End: 2024-03-25

## 2024-03-25 NOTE — TELEPHONE ENCOUNTER
----- Message from Ginette Cole RN sent at 3/22/2024  5:11 PM EDT -----  Regarding: FW: wegovy Prior auth  Contact: [phone]    ----- Message -----  From: Jannette Baugh  Sent: 3/22/2024   4:27 PM EDT  To: Weight Management [facility] Clinical  Subject: wegovy Prior auth                                Here are photos of my card

## 2024-04-02 DIAGNOSIS — K27.9 PUD (PEPTIC ULCER DISEASE): ICD-10-CM

## 2024-04-02 DIAGNOSIS — K21.00 GASTROESOPHAGEAL REFLUX DISEASE WITH ESOPHAGITIS WITHOUT HEMORRHAGE: ICD-10-CM

## 2024-04-02 DIAGNOSIS — R13.19 ESOPHAGEAL DYSPHAGIA: ICD-10-CM

## 2024-04-02 RX ORDER — ESOMEPRAZOLE 20 MG/1
CAPSULE, DELAYED RELEASE ORAL
Qty: 60 CAPSULE | Refills: 1 | Status: SHIPPED | OUTPATIENT
Start: 2024-04-02

## 2024-04-10 NOTE — PRE-PROCEDURE INSTRUCTIONS
Pre-Surgery Instructions:   Medication Instructions    albuterol (PROVENTIL HFA,VENTOLIN HFA) 90 mcg/act inhaler Uses PRN- OK to take day of surgery    amitriptyline (ELAVIL) 75 mg tablet Take night before surgery    APPLE CIDER VINEGAR PO Take night before surgery    Ascorbic Acid (VITAMIN C) 500 MG CAPS Take night before surgery    aspirin 81 mg chewable tablet Take night before surgery    cetirizine (ZyrTEC) 10 mg tablet Take night before surgery    Cholecalciferol (VITAMIN D3) 5000 units CAPS Take night before surgery    cyanocobalamin (VITAMIN B-12) 1,000 mcg tablet Take night before surgery    docusate sodium (DULCOLAX) 100 mg capsule Take night before surgery    esomeprazole (GNP Esomeprazole Magnesium) 20 mg capsule Take night before surgery    estradiol (ESTRACE) 0.1 mg/g vaginal cream Take night before surgery    losartan (COZAAR) 100 MG tablet Take day of surgery.    Melatonin 10 MG TABS Take night before surgery    metoprolol succinate (TOPROL-XL) 100 mg 24 hr tablet Take night before surgery    Multiple Vitamins-Minerals (CENTRUM ADULTS PO) Take night before surgery    naproxen (NAPROSYN) 500 mg tablet Uses PRN- OK to take day of surgery    Omega-3 Fatty Acids (FISH OIL ADULT GUMMIES PO) Take night before surgery    Pyridoxine HCl (VITAMIN B-6 PO) Take night before surgery    rosuvastatin (CRESTOR) 40 MG tablet Take night before surgery    venlafaxine (EFFEXOR-XR) 150 mg 24 hr capsule Take night before surgery    venlafaxine (EFFEXOR-XR) 75 mg 24 hr capsule Take night before surgery    Zinc 50 MG TABS Take night before surgery    The patient should have nothing to eat or drink after 11 pm the night before the MRI. The patient may take their medications with a sip of water at least 2 hours prior to their arrival time.  You will receive a call the evening before your MRI appointment with additional instructions.      Please leave your jewelry and valuables at home, wedding rings are the exception.    Please bring your physician order, insurance cards, a form of photo ID and a list of your medications with you. Arrive 15 minutes prior to your appointment time in order to register. Please bring any prior CT or MRI studies of this area that were not performed at a [facility].

## 2024-04-18 ENCOUNTER — CONSULT (OUTPATIENT)
Dept: FAMILY MEDICINE CLINIC | Facility: CLINIC | Age: 44
End: 2024-04-18
Payer: COMMERCIAL

## 2024-04-18 VITALS
OXYGEN SATURATION: 98 % | SYSTOLIC BLOOD PRESSURE: 128 MMHG | HEART RATE: 95 BPM | WEIGHT: 282.4 LBS | TEMPERATURE: 98 F | BODY MASS INDEX: 44.32 KG/M2 | HEIGHT: 67 IN | DIASTOLIC BLOOD PRESSURE: 82 MMHG

## 2024-04-18 DIAGNOSIS — H00.013 HORDEOLUM EXTERNUM OF RIGHT EYE, UNSPECIFIED EYELID: ICD-10-CM

## 2024-04-18 DIAGNOSIS — M54.50 CHRONIC RIGHT-SIDED LOW BACK PAIN WITHOUT SCIATICA: ICD-10-CM

## 2024-04-18 DIAGNOSIS — Z01.818 PRE-OP EXAMINATION: ICD-10-CM

## 2024-04-18 DIAGNOSIS — G89.29 CHRONIC RIGHT-SIDED LOW BACK PAIN WITHOUT SCIATICA: ICD-10-CM

## 2024-04-18 DIAGNOSIS — M51.36 BULGING LUMBAR DISC: ICD-10-CM

## 2024-04-18 DIAGNOSIS — M47.26 OSTEOARTHRITIS OF SPINE WITH RADICULOPATHY, LUMBAR REGION: Primary | ICD-10-CM

## 2024-04-18 PROCEDURE — 99213 OFFICE O/P EST LOW 20 MIN: CPT | Performed by: FAMILY MEDICINE

## 2024-04-18 RX ORDER — SEMAGLUTIDE 0.25 MG/.5ML
INJECTION, SOLUTION SUBCUTANEOUS
COMMUNITY
Start: 2024-04-11 | End: 2024-04-25

## 2024-04-18 NOTE — PROGRESS NOTES
" PRE-OPERATIVE EXAMINATION  Jannette Baugh  1980    Jannette Baugh is a 44 y.o. female with chronic lumbar disc disease with radiculopathy who is planning to undergo MRI under general anesthesia on 05/01/2024.Patient had several MRIs under general anesthesia and never had any complications with anesthesia in the past.    ROS:   Chest pain: no   Shortness of breath: no  Shortness of breath with exertion: no  Orthopnea: no  Dizziness: no  Unexplained weight change: no    PMH:  CAD: no  HTN: yes  CKD: no  DM: no on insulin: no  History of CVA: no     reports that she has never smoked. She has never used smokeless tobacco. She reports that she does not currently use alcohol. She reports that she does not use drugs.    /82 (BP Location: Left arm, Patient Position: Sitting, Cuff Size: Standard)   Pulse 95   Temp 98 °F (36.7 °C) (Tympanic)   Ht 5' 7\" (1.702 m)   Wt 128 kg (282 lb 6.4 oz)   SpO2 98%   BMI 44.23 kg/m²   Physical Exam  Vitals and nursing note reviewed.   Constitutional:       General: She is not in acute distress.     Appearance: Normal appearance. She is well-developed. She is not ill-appearing, toxic-appearing or diaphoretic.   HENT:      Head: Normocephalic and atraumatic.      Right Ear: External ear normal.      Left Ear: External ear normal.      Mouth/Throat:      Mouth: Mucous membranes are moist.      Pharynx: Oropharynx is clear. No oropharyngeal exudate.   Eyes:      General: No scleral icterus.        Right eye: No discharge.         Left eye: No discharge.      Extraocular Movements: Extraocular movements intact.      Conjunctiva/sclera: Conjunctivae normal.      Pupils: Pupils are equal, round, and reactive to light.      Comments: Right eye stye    Cardiovascular:      Rate and Rhythm: Normal rate and regular rhythm.      Pulses: Normal pulses.      Heart sounds: Normal heart sounds. No murmur heard.  Pulmonary:      Effort: Pulmonary effort is normal. No respiratory " distress.      Breath sounds: Normal breath sounds.   Abdominal:      General: There is no distension.      Palpations: Abdomen is soft.   Musculoskeletal:         General: No swelling. Normal range of motion.      Cervical back: Normal range of motion and neck supple. No rigidity or tenderness.   Lymphadenopathy:      Cervical: No cervical adenopathy.   Skin:     General: Skin is warm and dry.      Capillary Refill: Capillary refill takes less than 2 seconds.   Neurological:      General: No focal deficit present.      Mental Status: She is alert and oriented to person, place, and time.   Psychiatric:         Mood and Affect: Mood normal.         Behavior: Behavior normal.         Thought Content: Thought content normal.         Judgment: Judgment normal.         Revised Cardiac Risk Index (RCRI) for Pre-Operative Risk   (estimates risk of cardiac complications after noncardiac surgery)    High-risk surgery: No 0 / Yes +1  Intraperitoneal, intrathoracic, suprainguinal vascular  History of ischemic heart disease: No 0 / Yes +1  Hx of MI, (+) exercise test, current chest pain considered due to myocardial ischemia, use of nitrate therapy or ECG with pathological Q waves)  History of CHF: No 0 / Yes +1  Pulmonary edema, B/L rales or S3 gallop; FRIEDMAN, orthopnea, PND, CXR showing pulmonary vascular redistribution)  History of cerebrovascular disease: No 0 / Yes +1  Prior TIA or stroke  Pre-operative treatment with insulin: No 0 / Yes +1  Pre-operative creatinine >2 mg/dL: No 0 / Yes +1    RCRI Scorin points: Class I Risk, 0.4% Risk of Major Cardiac Event  1 point: Class II Risk, 0.9% Risk of Major Cardiac Event  2 points: Class III Risk, 6.6% Risk of Major Cardiac Event  3 points: Class IV Risk, 11% Risk of Major Cardiac Event  4 points: Class IV Risk, 11% Risk of Major Cardiac Event  5 points: Class IV Risk, 11% Risk of Major Cardiac Event  6 points: Class IV Risk, 11% Risk of Major Cardiac Event    Lab Results    Component Value Date    CREATININE 0.61 06/27/2023       Jannette was seen today for pre-op exam and eye problem.    Diagnoses and all orders for this visit:    Osteoarthritis of spine with radiculopathy, lumbar region    Bulging lumbar disc    Chronic right-sided low back pain without sciatica    Pre-op examination    Hordeolum externum of right eye, unspecified eyelid    Continue warm compress supportive care for eye stye.    Recommendations:  Jannette Baugh is undergoing an elective Low Risk surgery. She is RCRI class I risk  for major adverse cardiac event (MACE). She may proceed with surgery as planned without further workup.ana Godoy M.D.   [facility]

## 2024-04-25 DIAGNOSIS — E66.01 OBESITY, CLASS III, BMI 40-49.9 (MORBID OBESITY) (HCC): Primary | ICD-10-CM

## 2024-04-29 DIAGNOSIS — F41.1 GENERALIZED ANXIETY DISORDER: ICD-10-CM

## 2024-04-29 DIAGNOSIS — F33.1 MODERATE RECURRENT MAJOR DEPRESSION (HCC): ICD-10-CM

## 2024-04-30 ENCOUNTER — ANESTHESIA EVENT (OUTPATIENT)
Dept: RADIOLOGY | Facility: HOSPITAL | Age: 44
End: 2024-04-30

## 2024-04-30 NOTE — ANESTHESIA PREPROCEDURE EVALUATION
Procedure:  MRI LUMBAR SPINE WO CONTRAST    Relevant Problems   ANESTHESIA   (+) PONV (postoperative nausea and vomiting)      CARDIO   (+) Atypical chest pain   (+) High triglycerides   (+) Hyperlipidemia   (+) Primary hypertension   (+) RBBB (right bundle branch block)      GI/HEPATIC   (+) Esophageal dysphagia   (+) Esophageal reflux      MUSCULOSKELETAL   (+) Chronic right-sided low back pain without sciatica   (+) Fibromyalgia   (+) Osteoarthritis of spine with radiculopathy, lumbar region   (+) Other cervical disc degeneration at C6-C7 level      NEURO/PSYCH   (+) Chronic headache with new features   (+) Chronic right-sided low back pain without sciatica   (+) Depression, recurrent (HCC)   (+) Fibromyalgia   (+) Generalized anxiety disorder   (+) Moderate recurrent major depression (HCC)      PULMONARY   (+) Asthma   (+) Sleep apnea      Rheumatology   (+) Eliz-Danlos syndrome      Other   (+) Left cervical lymphadenopathy      Stress TTE 6/2023:    Stress ECG: Arrhythmias during recovery: rare PVCs. Right bundle branch block was seen. The stress ECG is negative for ischemia after maximal exercise, without reproduction of symptoms.    Left Ventricle: Left ventricular cavity size is normal. Systolic function is normal. Wall motion is normal.    Peak Stress Echo: Left ventricle cavity has normal reduction in size at peak stress. The left ventricle systolic function is normal at peak stress. The peak stress echo showed normal wall motion.    Echo Post Impression: The study is normal.    Results reviewed with patient.    Lab Results   Component Value Date    WBC 7.49 03/30/2023    HGB 14.1 03/30/2023    HCT 42.4 03/30/2023    MCV 92 03/30/2023     03/30/2023     Lab Results   Component Value Date    SODIUM 140 06/27/2023    K 4.0 06/27/2023     06/27/2023    CO2 27 06/27/2023    BUN 12 06/27/2023    CREATININE 0.61 06/27/2023    GLUC 121 (H) 06/27/2023    CALCIUM 10.3 (H) 06/27/2023     Lab Results    Component Value Date    INR 0.99 09/19/2014    PROTIME 12.9 09/19/2014     Lab Results   Component Value Date    HGBA1C 5.1 08/01/2023          Physical Exam    Airway    Mallampati score: II  TM Distance: >3 FB  Neck ROM: full     Dental   No notable dental hx     Cardiovascular  Cardiovascular exam normal    Pulmonary  Pulmonary exam normal     Other Findings  post-pubertal.      Anesthesia Plan  ASA Score- 3     Anesthesia Type- general with ASA Monitors.         Additional Monitors:     Airway Plan: LMA.           Plan Factors-Exercise tolerance (METS): >4 METS.    Chart reviewed. EKG reviewed.  Existing labs reviewed. Patient summary reviewed.                  Induction- intravenous.    Postoperative Plan-     Informed Consent- Anesthetic plan and risks discussed with patient.  I personally reviewed this patient with the CRNA. Discussed and agreed on the Anesthesia Plan with the CRNA..

## 2024-05-01 ENCOUNTER — HOSPITAL ENCOUNTER (OUTPATIENT)
Dept: RADIOLOGY | Facility: HOSPITAL | Age: 44
Discharge: HOME | End: 2024-05-01
Payer: COMMERCIAL

## 2024-05-01 ENCOUNTER — ANESTHESIA (OUTPATIENT)
Dept: RADIOLOGY | Facility: HOSPITAL | Age: 44
End: 2024-05-01

## 2024-05-01 VITALS
TEMPERATURE: 97.7 F | SYSTOLIC BLOOD PRESSURE: 111 MMHG | RESPIRATION RATE: 20 BRPM | WEIGHT: 282 LBS | HEIGHT: 67 IN | HEART RATE: 86 BPM | DIASTOLIC BLOOD PRESSURE: 59 MMHG | BODY MASS INDEX: 44.26 KG/M2 | OXYGEN SATURATION: 96 %

## 2024-05-01 DIAGNOSIS — M48.062 SPINAL STENOSIS, LUMBAR REGION WITH NEUROGENIC CLAUDICATION: ICD-10-CM

## 2024-05-01 LAB
EXT PREGNANCY TEST URINE: NEGATIVE
EXT. CONTROL: NORMAL

## 2024-05-01 PROCEDURE — 81025 URINE PREGNANCY TEST: CPT | Performed by: STUDENT IN AN ORGANIZED HEALTH CARE EDUCATION/TRAINING PROGRAM

## 2024-05-01 PROCEDURE — 72148 MRI LUMBAR SPINE W/O DYE: CPT

## 2024-05-01 PROCEDURE — G1004 CDSM NDSC: HCPCS

## 2024-05-01 RX ORDER — ONDANSETRON 2 MG/ML
INJECTION INTRAMUSCULAR; INTRAVENOUS AS NEEDED
Status: DISCONTINUED | OUTPATIENT
Start: 2024-05-01 | End: 2024-05-01

## 2024-05-01 RX ORDER — SODIUM CHLORIDE, SODIUM LACTATE, POTASSIUM CHLORIDE, CALCIUM CHLORIDE 600; 310; 30; 20 MG/100ML; MG/100ML; MG/100ML; MG/100ML
INJECTION, SOLUTION INTRAVENOUS CONTINUOUS PRN
Status: DISCONTINUED | OUTPATIENT
Start: 2024-05-01 | End: 2024-05-01

## 2024-05-01 RX ORDER — LIDOCAINE HYDROCHLORIDE 10 MG/ML
INJECTION, SOLUTION EPIDURAL; INFILTRATION; INTRACAUDAL; PERINEURAL AS NEEDED
Status: DISCONTINUED | OUTPATIENT
Start: 2024-05-01 | End: 2024-05-01

## 2024-05-01 RX ORDER — DEXAMETHASONE SODIUM PHOSPHATE 10 MG/ML
INJECTION, SOLUTION INTRAMUSCULAR; INTRAVENOUS AS NEEDED
Status: DISCONTINUED | OUTPATIENT
Start: 2024-05-01 | End: 2024-05-01

## 2024-05-01 RX ORDER — PROPOFOL 10 MG/ML
INJECTION, EMULSION INTRAVENOUS AS NEEDED
Status: DISCONTINUED | OUTPATIENT
Start: 2024-05-01 | End: 2024-05-01

## 2024-05-01 RX ORDER — PHENYLEPHRINE HCL IN 0.9% NACL 1 MG/10 ML
SYRINGE (ML) INTRAVENOUS AS NEEDED
Status: DISCONTINUED | OUTPATIENT
Start: 2024-05-01 | End: 2024-05-01

## 2024-05-01 RX ADMIN — Medication 100 MCG: at 14:39

## 2024-05-01 RX ADMIN — PROPOFOL 250 MG: 10 INJECTION, EMULSION INTRAVENOUS at 14:39

## 2024-05-01 RX ADMIN — SODIUM CHLORIDE, SODIUM LACTATE, POTASSIUM CHLORIDE, AND CALCIUM CHLORIDE: .6; .31; .03; .02 INJECTION, SOLUTION INTRAVENOUS at 14:35

## 2024-05-01 RX ADMIN — DEXAMETHASONE SODIUM PHOSPHATE 10 MG: 10 INJECTION, SOLUTION INTRAMUSCULAR; INTRAVENOUS at 14:39

## 2024-05-01 RX ADMIN — LIDOCAINE HYDROCHLORIDE 5 MG: 10 INJECTION, SOLUTION EPIDURAL; INFILTRATION; INTRACAUDAL; PERINEURAL at 14:39

## 2024-05-01 RX ADMIN — ONDANSETRON 4 MG: 2 INJECTION INTRAMUSCULAR; INTRAVENOUS at 14:39

## 2024-05-01 NOTE — NURSING NOTE
MRI lumbar spine without contrast complete. Pt tolerated well.    VSS post procedure. Pt alert and oriented on room air. No complaints or visible signs of distress.  Report given to YUSUF oLveU EMELYN.  Transported back to [facility].

## 2024-05-02 DIAGNOSIS — Z00.6 ENCOUNTER FOR EXAMINATION FOR NORMAL COMPARISON OR CONTROL IN CLINICAL RESEARCH PROGRAM: ICD-10-CM

## 2024-05-03 ENCOUNTER — TELEPHONE (OUTPATIENT)
Dept: PSYCHIATRY | Facility: CLINIC | Age: 44
End: 2024-05-03

## 2024-05-03 DIAGNOSIS — F41.1 GENERALIZED ANXIETY DISORDER: ICD-10-CM

## 2024-05-03 RX ORDER — VENLAFAXINE HYDROCHLORIDE 75 MG/1
75 CAPSULE, EXTENDED RELEASE ORAL
Qty: 90 CAPSULE | Refills: 0 | Status: SHIPPED | OUTPATIENT
Start: 2024-05-03

## 2024-05-03 RX ORDER — VENLAFAXINE HYDROCHLORIDE 75 MG/1
CAPSULE, EXTENDED RELEASE ORAL
Qty: 90 CAPSULE | Refills: 0 | OUTPATIENT
Start: 2024-05-03

## 2024-05-03 RX ORDER — VENLAFAXINE HYDROCHLORIDE 150 MG/1
CAPSULE, EXTENDED RELEASE ORAL
Qty: 90 CAPSULE | Refills: 0 | OUTPATIENT
Start: 2024-05-03

## 2024-05-03 RX ORDER — VENLAFAXINE HYDROCHLORIDE 150 MG/1
150 CAPSULE, EXTENDED RELEASE ORAL
Qty: 90 CAPSULE | Refills: 0 | Status: SHIPPED | OUTPATIENT
Start: 2024-05-03

## 2024-05-03 NOTE — TELEPHONE ENCOUNTER
Writer rec a call from [facility] regarding med rx that they ordered stating they do not see order for three medications. Meds look like they are pending but need to be for [facility].     EFFEXOR 75mg and EFFEXOR 150MG and ELAVIL 75mg Please refill .

## 2024-05-06 ENCOUNTER — TELEPHONE (OUTPATIENT)
Age: 44
End: 2024-05-06

## 2024-05-06 NOTE — TELEPHONE ENCOUNTER
Received a call from Varsha with [facility] asking if patient may hold ASA for one week prior to surgery scheduled for 5/17/24?  [phone]  [phone] fax#

## 2024-05-08 RX ORDER — AMITRIPTYLINE HYDROCHLORIDE 75 MG/1
TABLET ORAL
Qty: 30 TABLET | Refills: 0 | Status: SHIPPED | OUTPATIENT
Start: 2024-05-08

## 2024-05-10 ENCOUNTER — OFFICE VISIT (OUTPATIENT)
Dept: URGENT CARE | Facility: CLINIC | Age: 44
End: 2024-05-10
Payer: COMMERCIAL

## 2024-05-10 ENCOUNTER — TELEPHONE (OUTPATIENT)
Dept: CARDIOLOGY CLINIC | Facility: CLINIC | Age: 44
End: 2024-05-10

## 2024-05-10 VITALS
TEMPERATURE: 97.5 F | DIASTOLIC BLOOD PRESSURE: 66 MMHG | RESPIRATION RATE: 18 BRPM | WEIGHT: 284.6 LBS | BODY MASS INDEX: 44.67 KG/M2 | HEIGHT: 67 IN | HEART RATE: 102 BPM | OXYGEN SATURATION: 98 % | SYSTOLIC BLOOD PRESSURE: 125 MMHG

## 2024-05-10 DIAGNOSIS — H60.501 ACUTE OTITIS EXTERNA OF RIGHT EAR, UNSPECIFIED TYPE: Primary | ICD-10-CM

## 2024-05-10 PROCEDURE — G0382 LEV 3 HOSP TYPE B ED VISIT: HCPCS | Performed by: ORTHOPAEDIC SURGERY

## 2024-05-10 RX ORDER — NEOMYCIN SULFATE, POLYMYXIN B SULFATE AND HYDROCORTISONE 10; 3.5; 1 MG/ML; MG/ML; [USP'U]/ML
4 SUSPENSION/ DROPS AURICULAR (OTIC) 4 TIMES DAILY
Qty: 10 ML | Refills: 0 | Status: SHIPPED | OUTPATIENT
Start: 2024-05-10 | End: 2024-05-14 | Stop reason: ALTCHOICE

## 2024-05-10 NOTE — TELEPHONE ENCOUNTER
Varsha called again wanting to know about aspirin hold. Surgery is on Friday 5/17.     Can we please reach out to Dr. Pierre to address?    Thank you

## 2024-05-10 NOTE — PROGRESS NOTES
[facility]        NAME: Jannette Baugh is a 44 y.o. female  : 1980    MRN: [id number]  DATE: May 10, 2024  TIME: 3:10 PM    Assessment and Plan   Acute otitis externa of right ear, unspecified type [H60.501]  1. Acute otitis externa of right ear, unspecified type  neomycin-polymyxin-hydrocortisone (CORTISPORIN) 0.35%-10,000 units/mL-1% otic suspension            Patient Instructions     Use antibiotic ear drops as prescribed.   Follow up with ENT  Tylenol/Motrin, warm compresses as needed for pain  Follow up with PCP in 3-5 days.  Proceed to  ER if symptoms worsen.    If tests are performed, our office will contact you with results only if changes need to made to the care plan discussed with you at the visit. [facility]    Chief Complaint     Chief Complaint   Patient presents with    Earache     Since  ear         History of Present Illness       44-year-old female presents to the urgent care for evaluation of acute right ear pain.  The patient states symptoms have been present since Wednesday.  She does have a history of recurrent otitis externa.  The patient does also have a history of a right TM perforation.  The patient has an ENT specialist with which she follows.  Her last known ear infection occurred about 6 months ago.  Patient states her ENT doctor did provide her with a prescription of Ciprodex to use when symptoms flared.  She states she has been using the eardrops since Wednesday without much improvement.  The patient denies any fevers.  She denies any nausea, vomiting, diarrhea.  She does admit to a headache.  She denies any congestion, sore throat, cough.  The patient does not have any history of TM tubes.  She denies any drainage from the ear or hearing loss.        Review of Systems   Review of Systems   Constitutional:  Negative for chills and fever.   HENT:  Positive for ear pain. Negative for sore throat.    Eyes:  Negative for  pain and visual disturbance.   Respiratory:  Negative for cough and shortness of breath.    Cardiovascular:  Negative for chest pain and palpitations.   Gastrointestinal:  Negative for abdominal pain and vomiting.   Genitourinary:  Negative for dysuria and hematuria.   Musculoskeletal:  Negative for arthralgias and back pain.   Skin:  Negative for color change and rash.   Neurological:  Positive for headaches. Negative for seizures and syncope.   All other systems reviewed and are negative.        Current Medications       Current Outpatient Medications:     neomycin-polymyxin-hydrocortisone (CORTISPORIN) 0.35%-10,000 units/mL-1% otic suspension, Administer 4 drops to the right ear 4 (four) times a day, Disp: 10 mL, Rfl: 0    venlafaxine (EFFEXOR-XR) 150 mg 24 hr capsule, Take 1 capsule (150 mg total) by mouth daily at bedtime, Disp: 90 capsule, Rfl: 0    venlafaxine (EFFEXOR-XR) 75 mg 24 hr capsule, Take 1 capsule (75 mg total) by mouth daily at bedtime, Disp: 90 capsule, Rfl: 0    albuterol (PROVENTIL HFA,VENTOLIN HFA) 90 mcg/act inhaler, Inhale 2 puffs every 6 (six) hours as needed for wheezing or shortness of breath (cough), Disp: 18 g, Rfl: 1    amitriptyline (ELAVIL) 75 mg tablet, TAKE 1 TABLET (75 MG TOTAL) BY MOUTH ONCE DAILY AT BEDTIME, Disp: 30 tablet, Rfl: 0    APPLE CIDER VINEGAR PO, Take by mouth, Disp: , Rfl:     Ascorbic Acid (VITAMIN C) 500 MG CAPS, Take 1 capsule by mouth daily at bedtime, Disp: , Rfl:     aspirin 81 mg chewable tablet, Chew 1 tablet (81 mg total) daily (Patient taking differently: Chew 81 mg daily at bedtime), Disp: 30 tablet, Rfl: 30    Berberine Chloride 500 MG CAPS, Take by mouth, Disp: , Rfl:     cetirizine (ZyrTEC) 10 mg tablet, Take 10 mg by mouth daily, Disp: , Rfl:     Cholecalciferol (VITAMIN D3) 5000 units CAPS, Take 5,000 Units by mouth daily at bedtime, Disp: , Rfl:     cyanocobalamin (VITAMIN B-12) 1,000 mcg tablet, Take 1,000 mcg by mouth daily , Disp: , Rfl:      docusate sodium (DULCOLAX) 100 mg capsule, Take by mouth daily at bedtime, Disp: , Rfl:     esomeprazole (GNP Esomeprazole Magnesium) 20 mg capsule, TAKE 2 CAPSULES (40 MG TOTAL) BY MOUTH ONCE DAILY IN THE MORNING, Disp: 60 capsule, Rfl: 1    estradiol (ESTRACE) 0.1 mg/g vaginal cream, APPLY PEA SIZED AMOUNT TO AREA OF CONCERN EVERY OTHER DAY, Disp: , Rfl:     losartan (COZAAR) 100 MG tablet, Take 1 tablet (100 mg total) by mouth daily, Disp: 90 tablet, Rfl: 4    Melatonin 10 MG TABS, Take by mouth daily at bedtime, Disp: , Rfl:     metoprolol succinate (TOPROL-XL) 100 mg 24 hr tablet, Take 1 tablet (100 mg total) by mouth daily, Disp: 90 tablet, Rfl: 0    Misc Natural Products (ELAVIL OTC SLEEP PO), Take by mouth, Disp: , Rfl:     Multiple Vitamins-Minerals (CENTRUM ADULTS PO), Take 1 tablet by mouth daily at bedtime, Disp: , Rfl:     Omega-3 Fatty Acids (FISH OIL ADULT GUMMIES PO), Take by mouth daily at bedtime (Patient not taking: Reported on 5/10/2024), Disp: , Rfl:     Pyridoxine HCl (VITAMIN B-6 PO), Take by mouth, Disp: , Rfl:     rosuvastatin (CRESTOR) 40 MG tablet, Take 1 tablet (40 mg total) by mouth daily, Disp: 90 tablet, Rfl: 3    Semaglutide-Weight Management (WEGOVY) 0.5 MG/0.5ML, Inject 0.5 mL (0.5 mg total) under the skin once a week for 4 doses (Patient not taking: Reported on 5/10/2024), Disp: 2 mL, Rfl: 0    Zinc 50 MG TABS, Take by mouth daily at bedtime, Disp: , Rfl:     Current Allergies     Allergies as of 05/10/2024 - Reviewed 05/10/2024   Allergen Reaction Noted    Neurontin [gabapentin] Other (See Comments) 10/16/2014    Penicillins Anaphylaxis, Swelling, and Angioedema 05/16/2017    Aripiprazole Other (See Comments) 11/06/2013    Lorazepam Other (See Comments) 06/11/2014    Wound dressing adhesive Hives 01/18/2022    Carbamazepine Other (See Comments) 10/16/2012    Doxycycline Hives 04/10/2013    Lamotrigine Rash 08/28/2012            The following portions of the patient's history were  "reviewed and updated as appropriate: allergies, current medications, past family history, past medical history, past social history, past surgical history and problem list.     Past Medical History:   Diagnosis Date    Allergic rhinitis     Anxiety     Arthritis     C. difficile diarrhea 2020    Chest pain     Chronic pain disorder     BACK SHOULDERS NECK    Colon polyp     COPD (chronic obstructive pulmonary disease) (HCC)     CPAP (continuous positive airway pressure) dependence     Depression     Diarrhea     Disease of thyroid gland     nodule    Dysphagia     with pain \"feels like a blockage\" in the esophagus    Eating disorder     Fibromyalgia, primary     Fissure, anal     GERD (gastroesophageal reflux disease)     Hearing difficulty of right ear     frequent infections-(x1 lead to sepsis)    Hyperlipidemia     Hypertension     Irritable bowel syndrome     Obesity     Perforation of tympanic membrane     Right    PONV (postoperative nausea and vomiting)     RBBB     Right bundle branch blockage     Sleep apnea     no cpap    Thyroid nodule     Wears glasses        Past Surgical History:   Procedure Laterality Date    ANAL FISTULOTOMY N/A 2018    Procedure: FISTULOTOMY;  Surgeon: Clay Laws MD;  [facility]: [facility];  Service: Colorectal    BACK SURGERY      lumbar herniated disc    BREAST CYST EXCISION Left 13 yrs ago  benign    BREAST SURGERY Left 2006    cyst removal    CARPAL TUNNEL RELEASE      CERVICAL FUSION  2023    C5-6     SECTION      CHOLECYSTECTOMY      COLONOSCOPY      CYST REMOVAL      DENTAL SURGERY      ENDOMETRIAL ABLATION N/A 2021    Procedure: D&C, ABLATION ENDOMETRIAL MARIEL;  Surgeon: Juan Pablo Doan MD;  Location:  MAIN OR;  Service: Gynecology    FL MYELOGRAM CERVICAL  2022    HAND FUSION Left 2024    HAND SURGERY      INCISION AND DRAINAGE OF WOUND N/A 2018    Procedure: INCISION AND DRAINAGE (I&D) BUTTOCK;  Surgeon: Clay" Sánchez Laws MD;  [facility];  Service: Colorectal    MYRINGOTOMY W/ TUBES Right 08/17/2015    [place]    NM ANRCT XM SURG REQ ANES GENERAL SPI/EDRL DX N/A 11/02/2018    Procedure: EXAM UNDER ANESTHESIA (EUA);  Surgeon: Clay Laws MD;  [facility];  Service: Colorectal    NM COLONOSCOPY FLX DX W/COLLJ SPEC WHEN PFRMD N/A 08/02/2017    Procedure: COLONOSCOPY;  Surgeon: Jose Rafael Penn MD;  [facility];  Service: Gastroenterology    NM DEBRIDEMENT SUBCUTANEOUS TISSUE 1ST 20 SQ CM/< N/A 07/19/2022    Procedure: Debridement abdominal wall;  Surgeon: Robert Bloch, MD;  Location: [facility];  Service: General    NM EXCISION HIDRADENITIS INGUINAL COMPLEX REPAIR Bilateral 01/18/2022    Procedure: WIDE EXCISION HIDRADENITIS ABDOMINAL WALL;  Surgeon: Robert Bloch, MD;  [facility]: [facility];  Service: General    NM INCISION & DRAINAGE ABSCESS SIMPLE/SINGLE N/A 07/19/2022    Procedure: INCISION AND DRAINAGE ABDOMINAL WALL;  Surgeon: Robert Bloch, MD;  [facility];  Service: General    NM TENDON SHEATH INCISION Right 03/13/2018    Procedure: LONG TRIGGER FINGER RELEASE;  Surgeon: Nickolas Guerrero DO;  [facility];  Service: Orthopedics    NM TYMPANOPLASTY W/O MASTOIDECT W/O OSSICLE RECNSTJ Right 05/24/2017    Procedure: TYMPANOPLASTY WITH PERICHONDRIN GRAFT;  Surgeon: Hugo Moreira DO;  [facility];  Service: ENT    SHOULDER SURGERY Right     TRIGGER FINGER RELEASE      ULNAR NERVE TRANSPOSITION Left     UMBILICAL HIDRADENITIS EXCISION  01/18/2022    subumbilical hidradenitis exicsion, Dr. Bloch, [facility]    US GUIDED THYROID BIOPSY  07/03/2023    WISDOM TOOTH EXTRACTION         Family History   Problem Relation Age of Onset    Hypertension Mother     Hyperlipidemia Mother     Diabetes Mother     Hypertension Father     Hyperlipidemia Father     Heart disease Father         cardiac disorder-myocardial infarction arrhythmias    Heart attack Father      "Crohn's disease Brother     Arthritis Brother     Diabetes unspecified Maternal Grandmother     Thyroid disease Paternal Grandmother     No Known Problems Daughter     No Known Problems Maternal Aunt     No Known Problems Maternal Aunt     No Known Problems Maternal Aunt     No Known Problems Maternal Aunt     No Known Problems Paternal Aunt     No Known Problems Paternal Aunt     Heart attack Paternal Uncle          Medications have been verified.        Objective   /66   Pulse 102   Temp 97.5 °F (36.4 °C)   Resp 18   Ht 5' 7\" (1.702 m)   Wt 129 kg (284 lb 9.6 oz)   LMP  (LMP Unknown)   SpO2 98%   BMI 44.57 kg/m²        Physical Exam     Physical Exam  Vitals and nursing note reviewed.   Constitutional:       General: She is not in acute distress.     Appearance: Normal appearance. She is not ill-appearing.   HENT:      Head: Normocephalic and atraumatic.      Right Ear: Tympanic membrane normal.      Left Ear: Tympanic membrane normal.      Ears:      Comments: Right ear: No mastoid tenderness.  No swelling of the ear canal present, though there is some mild purulent material.  The TM is able to be fully visualized which shows scarring from previous perforation though no current perforation.     Nose: Nose normal.      Mouth/Throat:      Mouth: Mucous membranes are moist.      Pharynx: Oropharynx is clear. No oropharyngeal exudate or posterior oropharyngeal erythema.   Eyes:      Extraocular Movements: Extraocular movements intact.      Pupils: Pupils are equal, round, and reactive to light.   Cardiovascular:      Rate and Rhythm: Normal rate and regular rhythm.      Pulses: Normal pulses.      Heart sounds: Normal heart sounds. No murmur heard.  Pulmonary:      Effort: Pulmonary effort is normal. No respiratory distress.      Breath sounds: Normal breath sounds. No wheezing or rhonchi.   Abdominal:      Palpations: Abdomen is soft.      Tenderness: There is no abdominal tenderness. "   Musculoskeletal:         General: Normal range of motion.      Cervical back: Normal range of motion.   Lymphadenopathy:      Cervical: No cervical adenopathy.   Skin:     General: Skin is warm and dry.      Capillary Refill: Capillary refill takes less than 2 seconds.   Neurological:      General: No focal deficit present.      Mental Status: She is alert and oriented to person, place, and time.   Psychiatric:         Mood and Affect: Mood normal.         Behavior: Behavior normal.

## 2024-05-14 PROCEDURE — 87070 CULTURE OTHR SPECIMN AEROBIC: CPT | Performed by: PHYSICIAN ASSISTANT

## 2024-05-14 PROCEDURE — 87205 SMEAR GRAM STAIN: CPT | Performed by: PHYSICIAN ASSISTANT

## 2024-05-20 ENCOUNTER — OFFICE VISIT (OUTPATIENT)
Dept: PSYCHIATRY | Facility: CLINIC | Age: 44
End: 2024-05-20

## 2024-05-20 DIAGNOSIS — F33.1 MODERATE RECURRENT MAJOR DEPRESSION (HCC): Primary | ICD-10-CM

## 2024-05-20 DIAGNOSIS — F41.1 GENERALIZED ANXIETY DISORDER: ICD-10-CM

## 2024-05-20 DIAGNOSIS — Z79.899 ENCOUNTER FOR LONG-TERM (CURRENT) USE OF OTHER MEDICATIONS: ICD-10-CM

## 2024-05-20 RX ORDER — AMITRIPTYLINE HYDROCHLORIDE 75 MG/1
75 TABLET ORAL
Qty: 90 TABLET | Refills: 0 | Status: SHIPPED | OUTPATIENT
Start: 2024-05-20 | End: 2024-05-20 | Stop reason: SDUPTHER

## 2024-05-20 RX ORDER — AMITRIPTYLINE HYDROCHLORIDE 75 MG/1
75 TABLET ORAL
Qty: 90 TABLET | Refills: 0 | Status: SHIPPED | OUTPATIENT
Start: 2024-05-20

## 2024-05-20 NOTE — PSYCH
MEDICATION MANAGEMENT NOTE        [facility]   [facility] [place]  [address] [facility] [place] [address] [phone]  This note was not shared with the patient due to this is a psychotherapy note        Name and Date of Birth:  Jannette Baugh 44 y.o. 1980    Date of Visit: May 20, 2024    SUBJECTIVE:       Jannette seen by [place] 2/2 at which time meds unchanged. Jannette is recovering from shoulder surg last week which she said went well.  She is in PT. Still struggling with pain and has been referred to pain management.  Had a bad experience with Wegovy.  Took x 3 and had acute depression, anxiety, crying spells, hopelessness, easily upset, worthlessness, self-critical, racing/ruminating thoughts and inability to relax.  Still has these but to lesser degree.  No SI.  Expresses frustration over food addiction.  Is no longer going to OA.  Will consider Food Addicts Anonymous and literature given today.     Review of Systems   Constitutional:  Negative for activity change and appetite change.   Musculoskeletal:  Positive for arthralgias and back pain.     Psychiatric History      This office since 2/25/20 (Dr. Mathias).  Ind therapy Kailee Chadwick 5/11/20 and Zita Goff starting 1/22/21.  In OP tx since 19 yo.  Has seen Dr. Luque in the past.  No IP or suicide attempts.  FHx- mother and brother with depression.      Trauma/Loss History       Death of father from MI when Jannette was 17 yo.  Bullied by brother until age 17 yo.      Social History       x 20 yrs. Lives with  and teen-age daughter.      OBJECTIVE:     MENTAL STATUS EXAM  Appearance:  age appropriate, dressed casually   Behavior:  Pleasant & cooperative   Speech:  Normal volume, regular rate and rhythm   Mood:  Mildly dysphoric   Affect:  tearful   Language: intact and appropriate for age, education, and intellect   Thought Process:   goal directed   Associations: intact associations   Thought Content:  negative thinking and cognitive distortions, negative ruminations   Perceptual Disturbances: no auditory or visual hallcunations   Risk Potential / Abnormal Thoughts: Suicidal ideation - None  Homicidal ideation - None  Potential for aggression - No       Consciousness:  Alert & Awake   Sensorium:  Grossly oriented   Attention: attention span and concentration are age appropriate       Fund of Knowledge:  Memory: awareness of current events: yes  recent and remote memory grossly intact   Insight:  fair   Judgment: intact   Muscle Strength Muscle Tone: Grossly normal  normal   Gait/Station: normal gait/station with good balance   Motor Activity: no abnormal movements       Lab Review: I have reviewed all pertinent labs  Lab Results   Component Value Date    HGBA1C 5.1 08/01/2023     Lab Results   Component Value Date    CHOLESTEROL 181 08/01/2023     Lab Results   Component Value Date    HDL 51 08/01/2023     Lab Results   Component Value Date    TRIG 164 (H) 08/01/2023       Lab Results   Component Value Date     04/05/2017    SODIUM 140 06/27/2023    K 4.0 06/27/2023     06/27/2023    CO2 27 06/27/2023    ANIONGAP 7 09/19/2014    AGAP 10 06/27/2023    BUN 12 06/27/2023    CREATININE 0.61 06/27/2023    GLUC 121 (H) 06/27/2023    GLUF 114 (H) 03/30/2023    CALCIUM 10.3 (H) 06/27/2023    AST 20 08/01/2023    ALT 48 08/01/2023    ALKPHOS 62 08/01/2023    PROT 6.7 04/05/2017    TP 7.4 08/01/2023    BILITOT 0.6 04/05/2017    TBILI 0.43 08/01/2023    EGFR 113 06/27/2023       Lab Results   Component Value Date    JQC6IMFFFWBW 1.750 08/01/2023    TSH 0.85 10/23/2018           ASSESSMENT & PLAN          Diagnoses and all orders for this visit:    Moderate recurrent major depression (HCC)  -     Discontinue: amitriptyline (ELAVIL) 75 mg tablet; Take 1 tablet (75 mg total) by mouth daily at bedtime  -     amitriptyline (ELAVIL) 75 mg tablet;  Take 1 tablet (75 mg total) by mouth daily at bedtime    Encounter for long-term (current) use of other medications  -     ECG 12 lead; Future    Generalized anxiety disorder  -     Discontinue: amitriptyline (ELAVIL) 75 mg tablet; Take 1 tablet (75 mg total) by mouth daily at bedtime  -     amitriptyline (ELAVIL) 75 mg tablet; Take 1 tablet (75 mg total) by mouth daily at bedtime      Current Outpatient Medications   Medication Sig Dispense Refill    amitriptyline (ELAVIL) 75 mg tablet Take 1 tablet (75 mg total) by mouth daily at bedtime 90 tablet 0    albuterol (PROVENTIL HFA,VENTOLIN HFA) 90 mcg/act inhaler Inhale 2 puffs every 6 (six) hours as needed for wheezing or shortness of breath (cough) 18 g 1    APPLE CIDER VINEGAR PO Take by mouth      Ascorbic Acid (VITAMIN C) 500 MG CAPS Take 1 capsule by mouth daily at bedtime      aspirin 81 mg chewable tablet Chew 1 tablet (81 mg total) daily (Patient taking differently: Chew 81 mg daily at bedtime) 30 tablet 30    Berberine Chloride 500 MG CAPS Take by mouth      cetirizine (ZyrTEC) 10 mg tablet Take 10 mg by mouth daily      Cholecalciferol (VITAMIN D3) 5000 units CAPS Take 5,000 Units by mouth daily at bedtime      cyanocobalamin (VITAMIN B-12) 1,000 mcg tablet Take 1,000 mcg by mouth daily       docusate sodium (DULCOLAX) 100 mg capsule Take by mouth daily at bedtime      esomeprazole (GNP Esomeprazole Magnesium) 20 mg capsule TAKE 2 CAPSULES (40 MG TOTAL) BY MOUTH ONCE DAILY IN THE MORNING 60 capsule 1    estradiol (ESTRACE) 0.1 mg/g vaginal cream APPLY PEA SIZED AMOUNT TO AREA OF CONCERN EVERY OTHER DAY      losartan (COZAAR) 100 MG tablet Take 1 tablet (100 mg total) by mouth daily 90 tablet 4    Melatonin 10 MG TABS Take by mouth daily at bedtime      metoprolol succinate (TOPROL-XL) 100 mg 24 hr tablet Take 1 tablet (100 mg total) by mouth daily 90 tablet 0    Misc Natural Products (ELAVIL OTC SLEEP PO) Take by mouth      Multiple Vitamins-Minerals  (CENTRUM ADULTS PO) Take 1 tablet by mouth daily at bedtime      ofloxacin (OCUFLOX) 0.3 % ophthalmic solution Instill 5 drops in affected ear two times a day for one week 5 mL 0    Omega-3 Fatty Acids (FISH OIL ADULT GUMMIES PO) Take by mouth daily at bedtime (Patient not taking: Reported on 5/10/2024)      Pyridoxine HCl (VITAMIN B-6 PO) Take by mouth      rosuvastatin (CRESTOR) 40 MG tablet Take 1 tablet (40 mg total) by mouth daily 90 tablet 3    venlafaxine (EFFEXOR-XR) 150 mg 24 hr capsule Take 1 capsule (150 mg total) by mouth daily at bedtime 90 capsule 0    venlafaxine (EFFEXOR-XR) 75 mg 24 hr capsule Take 1 capsule (75 mg total) by mouth daily at bedtime 90 capsule 0    Zinc 50 MG TABS Take by mouth daily at bedtime       No current facility-administered medications for this visit.                Plan:       Yearly EKG ordered.  No med change- cont elavil 75 mg q bedtime and effexor xr 225 mg/d.  Cont ind therapy.  Recommend 12 step grp.     Reviewed risks, benefits, side effects of medications, including no medication.  Patient understands and agrees to treatment plan.   F/u PaDELVIS end of July, sooner prn       Patient has been informed of 24 hours and weekend coverage for urgent situations accessed by calling the main clinic phone number.     Kathleen Beauchamp PA-C

## 2024-05-23 ENCOUNTER — OFFICE VISIT (OUTPATIENT)
Dept: BARIATRICS | Facility: CLINIC | Age: 44
End: 2024-05-23
Payer: COMMERCIAL

## 2024-05-23 VITALS
WEIGHT: 286.4 LBS | OXYGEN SATURATION: 98 % | SYSTOLIC BLOOD PRESSURE: 128 MMHG | RESPIRATION RATE: 20 BRPM | BODY MASS INDEX: 44.95 KG/M2 | HEIGHT: 67 IN | TEMPERATURE: 98.2 F | HEART RATE: 92 BPM | DIASTOLIC BLOOD PRESSURE: 82 MMHG

## 2024-05-23 DIAGNOSIS — I10 PRIMARY HYPERTENSION: ICD-10-CM

## 2024-05-23 DIAGNOSIS — F41.1 GENERALIZED ANXIETY DISORDER: ICD-10-CM

## 2024-05-23 DIAGNOSIS — E66.01 OBESITY, CLASS III, BMI 40-49.9 (MORBID OBESITY) (HCC): Primary | ICD-10-CM

## 2024-05-23 PROCEDURE — 99214 OFFICE O/P EST MOD 30 MIN: CPT | Performed by: PHYSICIAN ASSISTANT

## 2024-05-23 RX ORDER — TIRZEPATIDE 2.5 MG/.5ML
2.5 INJECTION, SOLUTION SUBCUTANEOUS WEEKLY
Qty: 2 ML | Refills: 0 | Status: SHIPPED | OUTPATIENT
Start: 2024-05-23 | End: 2024-06-20

## 2024-05-23 NOTE — PATIENT INSTRUCTIONS
Goals:    Food log (ie.) [email or web address],etc.   No sugary beverages. At least 64oz of water daily.  Increase physical activity by 10 minutes daily. Gradually increase physical activity to a goal of 5 days per week for 30 minutes of MODERATE intensity PLUS 2 days per week of FULL BODY resistance training  25-35 grams of dietary fiber per day  2006-0538 calories per day  5-10 servings of fruits and vegetables per day    Visit [email or web address] for further information/injection instructions. Please eat small frequent meals to help reduce nausea. Lemon water and saltine crackers may help with this. If you experience fever, nausea/vomiting, and pain radiating to your back this may be a sign of pancreatitis. Please have ER evaluation with this occurs.      -Hold medication for 1 week prior to any surgeries

## 2024-05-23 NOTE — ASSESSMENT & PLAN NOTE
-Patient is pursuing Conservative Program  -Initial weight loss goal of 5-10% weight loss for improved health  -not currently interested in bariatric surgery duet to her depression, fearful it will make it worse  -had palpitations with Saxenda  -Metformin caused diarrhea  -Wegovy caused worsening of depression  -Screening labs: up to date  -dietary recall reviewed. Encouraged continued food logging and journaling  -continues to work closely with psychologist for depression  -can consider trial of Zepbound if covered. Patient aware this medication may also affect mood. She was informed to stop the medication and notify office if this occurs  -medication agreement signed    Initial: 297.2 lbs  Current: 286.4 lbs  Change: -11.3 lbs  Goal:   lbs

## 2024-05-23 NOTE — PROGRESS NOTES
Assessment/Plan:    Obesity, Class III, BMI 40-49.9 (morbid obesity) (HCC)  -Patient is pursuing Conservative Program  -Initial weight loss goal of 5-10% weight loss for improved health  -not currently interested in bariatric surgery duet to her depression, fearful it will make it worse  -had palpitations with Saxenda  -Metformin caused diarrhea  -Wegovy caused worsening of depression  -Screening labs: up to date  -dietary recall reviewed. Encouraged continued food logging and journaling  -continues to work closely with psychologist for depression  -can consider trial of Zepbound if covered. Patient aware this medication may also affect mood. She was informed to stop the medication and notify office if this occurs  -medication agreement signed    Initial: 297.2 lbs  Current: 286.4 lbs  Change: -11.3 lbs  Goal:   lbs    Generalized anxiety disorder  -hx MDD  -Managed by psych  -On Effexor    Primary hypertension  -On Losartan and Toprol XL  -can improve with weight loss    Goals:    Food log (ie.) [email or web address],etc.   No sugary beverages. At least 64oz of water daily.  Increase physical activity by 10 minutes daily. Gradually increase physical activity to a goal of 5 days per week for 30 minutes of MODERATE intensity PLUS 2 days per week of FULL BODY resistance training  25-35 grams of dietary fiber per day  9208-7571 calories per day  5-10 servings of fruits and vegetables per day    Visit [email or web address] for further information/injection instructions. Please eat small frequent meals to help reduce nausea. Lemon water and saltine crackers may help with this. If you experience fever, nausea/vomiting, and pain radiating to your back this may be a sign of pancreatitis. Please have ER evaluation with this occurs.      -Hold medication for 1 week prior to any surgeries      Follow up in approximately 3 months with Non-Surgical Physician/Advanced  Practitioner.     Diagnoses and all orders for this visit:    Obesity, Class III, BMI 40-49.9 (morbid obesity) ([facility])  -     tirzepatide (Zepbound) 2.5 mg/0.5 mL auto-injector; Inject 0.5 mL (2.5 mg total) under the skin once a week for 28 days    Generalized anxiety disorder    Primary hypertension          Subjective:   Chief Complaint   Patient presents with    Follow-up        Patient ID: Jannette Baugh  is a 44 y.o. female with excess weight/obesity here to pursue weight managment.  Patient is pursuing Conservative Program.     HPI Patient presents for MW follow up. Was started on Wegovy at last visit. Reports she took 3 doses of Wegovy 0.25mg and reports it made her feel more depressed, caused insomnia. She is now off the medication for past 3 weeks and feels much better, feels back at baseline from a mental health standpoint. She did not notify office she was feeling this on the medication, but did discuss it with her psychiatrist    Food logging: On My Fitness Pal sporadically  Hydration: water 64oz, 1 cup of coffee + Flavored creamer (4 TBSP)  Sleep: wearing CPAP  Exercise: walking more; 3x per week    B: Coffee + flavored creamer + banana  S: skips  L: Chicken/cheese quesadilla + Fruit  S: 1 single serving bag of chips  D: Chicken + veggies OR Stuffed pepper casserole, tacos  S: chips OR granola bar, struggles to control portion      Wt Readings from Last 10 Encounters:   05/23/24 130 kg (286 lb 6.4 oz)   05/14/24 129 kg (284 lb)   05/10/24 129 kg (284 lb 9.6 oz)   05/01/24 128 kg (282 lb)   04/18/24 128 kg (282 lb 6.4 oz)   02/29/24 132 kg (290 lb 6.4 oz)   01/30/24 133 kg (294 lb)   12/29/23 132 kg (291 lb 6.4 oz)   11/21/23 131 kg (288 lb)   10/18/23 130 kg (286 lb)        The following portions of the patient's history were reviewed and updated as appropriate: allergies, current medications, past family history, past medical history, past social history, past surgical history, and problem  "list.    Review of Systems   Cardiovascular:  Negative for chest pain.   Gastrointestinal:  Negative for abdominal pain, nausea and vomiting.   Psychiatric/Behavioral:  Negative for dysphoric mood.        Objective:    /82 (BP Location: Right arm, Patient Position: Sitting, Cuff Size: Large)   Pulse 92   Temp 98.2 °F (36.8 °C)   Resp 20   Ht 5' 7\" (1.702 m)   Wt 130 kg (286 lb 6.4 oz)   LMP  (LMP Unknown)   SpO2 98%   BMI 44.86 kg/m²      Physical Exam  Vitals and nursing note reviewed.   Constitutional:       General: She is not in acute distress.     Appearance: She is obese. She is not ill-appearing or toxic-appearing.   HENT:      Head: Normocephalic and atraumatic.      Mouth/Throat:      Mouth: Mucous membranes are moist.   Eyes:      General: No scleral icterus.  Pulmonary:      Effort: Pulmonary effort is normal. No respiratory distress.   Abdominal:      Comments: Obese. protuberant   Musculoskeletal:         General: Normal range of motion.   Skin:     General: Skin is dry.      Coloration: Skin is not jaundiced.   Neurological:      General: No focal deficit present.      Mental Status: She is alert and oriented to person, place, and time.   Psychiatric:         Mood and Affect: Mood normal.         Behavior: Behavior normal.         Thought Content: Thought content normal.         Judgment: Judgment normal.        "

## 2024-05-29 ENCOUNTER — APPOINTMENT (OUTPATIENT)
Dept: LAB | Facility: CLINIC | Age: 44
End: 2024-05-29
Payer: COMMERCIAL

## 2024-05-29 ENCOUNTER — OFFICE VISIT (OUTPATIENT)
Dept: CARDIOLOGY CLINIC | Facility: CLINIC | Age: 44
End: 2024-05-29
Payer: COMMERCIAL

## 2024-05-29 VITALS
OXYGEN SATURATION: 98 % | HEIGHT: 67 IN | WEIGHT: 285 LBS | SYSTOLIC BLOOD PRESSURE: 124 MMHG | BODY MASS INDEX: 44.73 KG/M2 | DIASTOLIC BLOOD PRESSURE: 84 MMHG | HEART RATE: 90 BPM

## 2024-05-29 DIAGNOSIS — R37 SEXUAL DYSFUNCTION: ICD-10-CM

## 2024-05-29 DIAGNOSIS — E78.2 MIXED HYPERLIPIDEMIA: ICD-10-CM

## 2024-05-29 DIAGNOSIS — I45.10 RBBB (RIGHT BUNDLE BRANCH BLOCK): ICD-10-CM

## 2024-05-29 DIAGNOSIS — Z00.6 ENCOUNTER FOR EXAMINATION FOR NORMAL COMPARISON OR CONTROL IN CLINICAL RESEARCH PROGRAM: ICD-10-CM

## 2024-05-29 DIAGNOSIS — I10 PRIMARY HYPERTENSION: Primary | ICD-10-CM

## 2024-05-29 DIAGNOSIS — E78.1 HIGH TRIGLYCERIDES: ICD-10-CM

## 2024-05-29 LAB
EST. AVERAGE GLUCOSE BLD GHB EST-MCNC: 111 MG/DL
FSH SERPL-ACNC: 4.2 MIU/ML
HBA1C MFR BLD: 5.5 %
LH SERPL-ACNC: 5.1 MIU/ML
TESTOST SERPL-MSCNC: <10 NG/DL
TSH SERPL DL<=0.05 MIU/L-ACNC: 1.84 UIU/ML (ref 0.45–4.5)

## 2024-05-29 PROCEDURE — 99213 OFFICE O/P EST LOW 20 MIN: CPT | Performed by: INTERNAL MEDICINE

## 2024-05-29 PROCEDURE — 83001 ASSAY OF GONADOTROPIN (FSH): CPT

## 2024-05-29 PROCEDURE — 84443 ASSAY THYROID STIM HORMONE: CPT

## 2024-05-29 PROCEDURE — 36415 COLL VENOUS BLD VENIPUNCTURE: CPT

## 2024-05-29 PROCEDURE — 83002 ASSAY OF GONADOTROPIN (LH): CPT

## 2024-05-29 PROCEDURE — 83036 HEMOGLOBIN GLYCOSYLATED A1C: CPT

## 2024-05-29 PROCEDURE — 84403 ASSAY OF TOTAL TESTOSTERONE: CPT

## 2024-05-29 NOTE — PROGRESS NOTES
Cardiology Follow Up    Jannette Baugh  1980  [id number]  [facility]  [address]  [place] [id number] [phone]    1. Primary hypertension        2. RBBB (right bundle branch block)        3. High triglycerides        4. Mixed hyperlipidemia              Discussion/Summary: All of her assessed cardiac problems are stable. I have reviewed her medications and made no changes. No cardiac testing is ordered.  RTO 1 year.      Interval History: She has not had any cardiac problems since her last office visit.   She denies CP, SOB.  She had recent shoulder surgery and is now having problems with a vaginal fistula.  Stress echo 6/2023 - good exercise tolerance, no ischemia.    Cardiac cath 2009 - normal    /84  Trigs 164  LDL 97    Wt 285 lbs         Patient Active Problem List   Diagnosis    Thyroid nodule    Trigger finger, right middle finger    Carpal tunnel syndrome, bilateral    Primary hypertension    Breast hypertrophy    Bulging lumbar disc    Calcific tendinitis of right shoulder    Chronic right-sided low back pain without sciatica    Chronic sinusitis    Dermatitis, eczematoid    Esophageal reflux    Fibromyalgia    H/O laminectomy    Hemorrhoids    Hidradenitis suppurativa    Hyperlipidemia    Mammographic microcalcification    Obesity, Class III, BMI 40-49.9 (morbid obesity) (HCC)    Sleep apnea    Pars defect of lumbar spine    RBBB (right bundle branch block)    Osteoarthritis of spine with radiculopathy, lumbar region    Vitamin D insufficiency    Atypical chest pain    Sebaceous cyst of breast, left    Left cervical lymphadenopathy    Eliz-Danlos syndrome    History of sepsis    Asthma    High triglycerides    Chronic headache with new features    Impaired fasting glucose    Moderate recurrent major depression (HCC)    Generalized anxiety disorder    Depression, recurrent (HCC)    Cubital tunnel  "syndrome    Sprain of rotator cuff capsule    PONV (postoperative nausea and vomiting)    Open wound    Fluid collection at surgical site    Other cervical disc degeneration at C6-C7 level    Esophageal dysphagia    Elevated ALT measurement    Pain of right lower extremity     Past Medical History:   Diagnosis Date    Allergic rhinitis     Anxiety     Arthritis     C. difficile diarrhea 2020    Chest pain     Chronic pain disorder     BACK SHOULDERS NECK    Colon polyp     COPD (chronic obstructive pulmonary disease) ([facility])     CPAP (continuous positive airway pressure) dependence     Depression     Diarrhea     Disease of thyroid gland     nodule    Dysphagia     with pain \"feels like a blockage\" in the esophagus    Eating disorder     Fibromyalgia, primary     Fissure, anal     GERD (gastroesophageal reflux disease)     Hearing difficulty of right ear     frequent infections-(x1 lead to sepsis)    Hyperlipidemia     Hypertension     Irritable bowel syndrome     Obesity     Perforation of tympanic membrane     Right    PONV (postoperative nausea and vomiting)     RBBB     Right bundle branch blockage     Sleep apnea     no cpap    Thyroid nodule     Wears glasses      Social History     Socioeconomic History    Marital status: /Civil Union     Spouse name: Not on file    Number of children: Not on file    Years of education: Not on file    Highest education level: Not on file   Occupational History    Not on file   Tobacco Use    Smoking status: Never    Smokeless tobacco: Never   Vaping Use    Vaping status: Never Used   Substance and Sexual Activity    Alcohol use: Not Currently     Comment: NICKI     Drug use: Never    Sexual activity: Yes     Partners: Male     Birth control/protection: Condom Male     Comment: ablation   Other Topics Concern    Not on file   Social History Narrative    Daily caffeine use- 1 cup of coffee     Social Determinants of Health     Financial Resource Strain: Low Risk  (4/17/2023) "    [facility]    Overall Financial Resource Strain (CARDIA)     Difficulty of Paying Living Expenses: Not hard at all   Food Insecurity: No Food Insecurity (4/17/2023)    [facility]    Hunger Vital Sign     Worried About Running Out of Food in the Last Year: Never true     Ran Out of Food in the Last Year: Never true   Transportation Needs: No Transportation Needs (4/17/2023)    [facility]    PRAPARE - Transportation     Lack of Transportation (Medical): No     Lack of Transportation (Non-Medical): No   Physical Activity: Unknown (5/10/2021)    Exercise Vital Sign     Days of Exercise per Week: 2 days     Minutes of Exercise per Session: Not asked   Stress: Not on file   Social Connections: Not on file   Intimate Partner Violence: Not At Risk (4/17/2023)    [facility]    Humiliation, Afraid, Rape, and Kick questionnaire     Fear of Current or Ex-Partner: No     Emotionally Abused: No     Physically Abused: No     Sexually Abused: No   Housing Stability: Low Risk  (4/17/2023)    [facility]    Housing Stability Vital Sign     Unable to Pay for Housing in the Last Year: No     Number of Places Lived in the Last Year: 1     Unstable Housing in the Last Year: No      Family History   Problem Relation Age of Onset    Hypertension Mother     Hyperlipidemia Mother     Diabetes Mother     Hypertension Father     Hyperlipidemia Father     Heart disease Father         cardiac disorder-myocardial infarction arrhythmias    Heart attack Father     Crohn's disease Brother     Arthritis Brother     Diabetes unspecified Maternal Grandmother     Thyroid disease Paternal Grandmother     No Known Problems Daughter     No Known  Problems Maternal Aunt     No Known Problems Maternal Aunt     No Known Problems Maternal Aunt     No Known Problems Maternal Aunt     No Known Problems Paternal Aunt     No Known Problems Paternal Aunt     Heart attack Paternal Uncle      Past Surgical History:   Procedure Laterality Date    ANAL FISTULOTOMY N/A 2018    Procedure: FISTULOTOMY;  Surgeon: Clay Laws MD;  [facility];  Service: Colorectal    BACK SURGERY      lumbar herniated disc    BREAST CYST EXCISION Left 13 yrs ago  benign    BREAST SURGERY Left 2006    cyst removal    CARPAL TUNNEL RELEASE      CERVICAL FUSION  2023    C5-6     SECTION      CHOLECYSTECTOMY      COLONOSCOPY      CYST REMOVAL      DENTAL SURGERY      ENDOMETRIAL ABLATION N/A 2021    Procedure: D&C, ABLATION ENDOMETRIAL MARIEL;  Surgeon: Juan Pablo Doan MD;  Location:  MAIN OR;  Service: Gynecology    FL MYELOGRAM CERVICAL  2022    HAND FUSION Left 2024    HAND SURGERY      INCISION AND DRAINAGE OF WOUND N/A 2018    Procedure: INCISION AND DRAINAGE (I&D) BUTTOCK;  Surgeon: Clay Laws MD;  [facility];  Service: Colorectal    MYRINGOTOMY W/ TUBES Right 2015    [place]    ID ANRCT XM SURG REQ ANES GENERAL SPI/EDRL DX N/A 2018    Procedure: EXAM UNDER ANESTHESIA (EUA);  Surgeon: Clay Laws MD;  [facility];  Service: Colorectal    ID COLONOSCOPY FLX DX W/COLLJ SPEC WHEN PFRMD N/A 2017    Procedure: COLONOSCOPY;  Surgeon: Jose Rafael Penn MD;  [facility];  Service: Gastroenterology    ID DEBRIDEMENT SUBCUTANEOUS TISSUE 1ST 20 SQ CM/< N/A 2022    Procedure: Debridement abdominal wall;  Surgeon: Robert Bloch, MD;  Location: [facility];  Service: General    ID EXCISION HIDRADENITIS INGUINAL COMPLEX REPAIR Bilateral 2022    Procedure: WIDE EXCISION HIDRADENITIS ABDOMINAL WALL;  Surgeon: Robert Bloch, MD;  [facility];  Service:  General    WA INCISION & DRAINAGE ABSCESS SIMPLE/SINGLE N/A 07/19/2022    Procedure: INCISION AND DRAINAGE ABDOMINAL WALL;  Surgeon: Robert Bloch, MD;  [facility];  Service: General    WA TENDON SHEATH INCISION Right 03/13/2018    Procedure: LONG TRIGGER FINGER RELEASE;  Surgeon: Nickolas Guerrero DO;  [facility];  Service: Orthopedics    WA TYMPANOPLASTY W/O MASTOIDECT W/O OSSICLE RECNSTJ Right 05/24/2017    Procedure: TYMPANOPLASTY WITH PERICHONDRIN GRAFT;  Surgeon: Hugo Moreira DO;  [facility];  Service: ENT    SHOULDER SURGERY Right     TRIGGER FINGER RELEASE      ULNAR NERVE TRANSPOSITION Left     UMBILICAL HIDRADENITIS EXCISION  01/18/2022    subumbilical hidradenitis exicsion, Dr. Bloch, [facility]    US GUIDED THYROID BIOPSY  07/03/2023    WISDOM TOOTH EXTRACTION         Current Outpatient Medications:     albuterol (PROVENTIL HFA,VENTOLIN HFA) 90 mcg/act inhaler, Inhale 2 puffs every 6 (six) hours as needed for wheezing or shortness of breath (cough), Disp: 18 g, Rfl: 1    amitriptyline (ELAVIL) 75 mg tablet, Take 1 tablet (75 mg total) by mouth daily at bedtime, Disp: 90 tablet, Rfl: 0    APPLE CIDER VINEGAR PO, Take by mouth, Disp: , Rfl:     Ascorbic Acid (VITAMIN C) 500 MG CAPS, Take 1 capsule by mouth daily at bedtime, Disp: , Rfl:     aspirin 81 mg chewable tablet, Chew 1 tablet (81 mg total) daily (Patient taking differently: Chew 81 mg daily at bedtime), Disp: 30 tablet, Rfl: 30    Berberine Chloride 500 MG CAPS, Take by mouth, Disp: , Rfl:     cetirizine (ZyrTEC) 10 mg tablet, Take 10 mg by mouth daily, Disp: , Rfl:     Cholecalciferol (VITAMIN D3) 5000 units CAPS, Take 5,000 Units by mouth daily at bedtime, Disp: , Rfl:     cyanocobalamin (VITAMIN B-12) 1,000 mcg tablet, Take 1,000 mcg by mouth daily , Disp: , Rfl:     docusate sodium (DULCOLAX) 100 mg capsule, Take by mouth daily at bedtime, Disp: , Rfl:     esomeprazole (GNP Esomeprazole Magnesium) 20 mg  capsule, TAKE 2 CAPSULES (40 MG TOTAL) BY MOUTH ONCE DAILY IN THE MORNING, Disp: 60 capsule, Rfl: 1    estradiol (ESTRACE) 0.1 mg/g vaginal cream, APPLY PEA SIZED AMOUNT TO AREA OF CONCERN EVERY OTHER DAY, Disp: , Rfl:     losartan (COZAAR) 100 MG tablet, Take 1 tablet (100 mg total) by mouth daily, Disp: 90 tablet, Rfl: 4    Melatonin 10 MG TABS, Take by mouth daily at bedtime, Disp: , Rfl:     metoprolol succinate (TOPROL-XL) 100 mg 24 hr tablet, Take 1 tablet (100 mg total) by mouth daily, Disp: 90 tablet, Rfl: 0    Misc Natural Products (ELAVIL OTC SLEEP PO), Take by mouth, Disp: , Rfl:     Multiple Vitamins-Minerals (CENTRUM ADULTS PO), Take 1 tablet by mouth daily at bedtime, Disp: , Rfl:     Pyridoxine HCl (VITAMIN B-6 PO), Take by mouth, Disp: , Rfl:     rosuvastatin (CRESTOR) 40 MG tablet, Take 1 tablet (40 mg total) by mouth daily, Disp: 90 tablet, Rfl: 3    venlafaxine (EFFEXOR-XR) 150 mg 24 hr capsule, Take 1 capsule (150 mg total) by mouth daily at bedtime, Disp: 90 capsule, Rfl: 0    venlafaxine (EFFEXOR-XR) 75 mg 24 hr capsule, Take 1 capsule (75 mg total) by mouth daily at bedtime, Disp: 90 capsule, Rfl: 0    Zinc 50 MG TABS, Take by mouth daily at bedtime, Disp: , Rfl:     ofloxacin (OCUFLOX) 0.3 % ophthalmic solution, Instill 5 drops in affected ear two times a day for one week (Patient not taking: Reported on 5/29/2024), Disp: 5 mL, Rfl: 0    Omega-3 Fatty Acids (FISH OIL ADULT GUMMIES PO), Take by mouth daily at bedtime (Patient not taking: Reported on 5/10/2024), Disp: , Rfl:     tirzepatide (Zepbound) 2.5 mg/0.5 mL auto-injector, Inject 0.5 mL (2.5 mg total) under the skin once a week for 28 days (Patient not taking: Reported on 5/29/2024), Disp: 2 mL, Rfl: 0  Allergies   Allergen Reactions    Neurontin [Gabapentin] Other (See Comments)     Other reaction(s): SUICIDE IDEATION  Other reaction(s): Other (See Comments)  suicidal  suicidal    Penicillins Anaphylaxis, Swelling and Angioedema     "Aripiprazole Other (See Comments)     Other reaction(s): low dose 2 mg.; curving in and locking in of hands/dystonia  Other reaction(s): Other (See Comments)  Other reaction(s): low dose 2 mg.; curving in and locking in of hands/dystonia    Lorazepam Other (See Comments)     Other reaction(s): higher dose > anxiety and depression  Other reaction(s): Other (See Comments)  Other reaction(s): higher dose > anxiety and depression    Wound Dressing Adhesive Hives     Hive, redness, swelling/bleeding    Carbamazepine Other (See Comments)     Other reaction(s): Unknown Reaction    Doxycycline Hives    Lamotrigine Rash     Vitals:    05/29/24 1530   BP: 124/84   BP Location: Left arm   Patient Position: Sitting   Cuff Size: Large   Pulse: 90   SpO2: 98%   Weight: 129 kg (285 lb)   Height: 5' 7\" (1.702 m)     Weight (last 2 days)       Date/Time Weight    05/29/24 1530 129 (285)           Blood pressure 124/84, pulse 90, height 5' 7\" (1.702 m), weight 129 kg (285 lb), SpO2 98%., Body mass index is 44.64 kg/m².    Labs:  Office Visit on 05/14/2024   Component Date Value    Wound Culture 05/14/2024 1+ Growth of     Gram Stain Result 05/14/2024 No polys seen (A)     Gram Stain Result 05/14/2024 2+ Gram positive cocci in pairs, chains and clusters (A)     Gram Stain Result 05/14/2024 2+ Gram positive rods (A)     Gram Stain Result 05/14/2024 Rare Budding yeast (A)    Hospital Outpatient Visit on 05/01/2024   Component Date Value    EXT Preg Test, Ur 05/01/2024 Negative     Control 05/01/2024 Valid    Office Visit on 12/21/2023   Component Date Value     RAPID STREP A 12/21/2023 Negative      Imaging: MRI lumbar spine wo contrast    Result Date: 5/7/2024  Narrative: MRI LUMBAR SPINE WITHOUT CONTRAST INDICATION: M48.062: Spinal stenosis, lumbar region with neurogenic claudication. COMPARISON: CT lumbar spine 3/9/2017 TECHNIQUE:  Multiplanar, multisequence imaging of the lumbar spine was performed. . IMAGE QUALITY:  Diagnostic " FINDINGS: VERTEBRAL BODIES:  There are 5 lumbar type vertebral bodies. Postsurgical changes status post posterior instrumented fusion with interbody disc spacer at level L5-S1. There is preserved normal lumbar lordosis. There is grade 1 anterolisthesis at L5-S1. There are multilevel endplate Schmorl's nodes. There is chronic mild anterior T11 compression deformity. SACRUM:  Normal signal within the sacrum. No evidence of insufficiency or stress fracture. DISTAL CORD AND CONUS:  Normal size and signal within the distal cord and conus. PARASPINAL SOFT TISSUES:  Paraspinal soft tissues are unremarkable. LOWER THORACIC DISC SPACES:  Normal disc height and signal.  No disc herniation, canal stenosis or foraminal narrowing. LUMBAR DISC SPACES: L1-L2: No disc bulge. No canal or foraminal stenosis. L2-L3: No disc bulge. Mild facet arthropathy. No canal or foraminal stenosis. L3-L4: Disc bulge and superimposed small right foraminal disc protrusion. Moderate right and mild left facet arthropathy. Minimal canal narrowing. Mild to moderate right foraminal stenosis. L4-L5: Mild retrolisthesis. Minimal disc bulge. Mild facet arthropathy. No significant canal or foraminal stenosis. L5-S1: Grade 1 anterolisthesis. Posterior and interbody instrumented fusion. No canal or significant foraminal stenosis. OTHER FINDINGS: Bilateral renal cysts. Incompletely imaged right ovary prominent follicle or cyst (series 2 image 9).     Impression: 1.  Posterior and interbody instrumented fusion at level L5-S1. Grade 1 anterolisthesis at this level. 2.  Chronic mild anterior T11 vertebral body compression deformity. 3.  Degenerative change without high-grade canal stenosis as described. Mild to moderate right foraminal stenosis at level L3-4. 4.  Incompletely imaged right ovary prominent follicle or cyst. Workstation performed: [id number]     Review of Systems:  Review of Systems   Constitutional:  Negative for diaphoresis, fatigue, fever and  unexpected weight change.   HENT: Negative.     Respiratory:  Negative for cough, shortness of breath and wheezing.    Cardiovascular:  Negative for chest pain, palpitations and leg swelling.   Gastrointestinal:  Negative for abdominal pain, diarrhea and nausea.   Musculoskeletal:  Negative for gait problem and myalgias.   Skin:  Negative for rash.   Neurological:  Negative for dizziness and numbness.   Psychiatric/Behavioral: Negative.         Physical Exam:  Physical Exam  Constitutional:       Appearance: She is well-developed.   HENT:      Head: Normocephalic and atraumatic.   Eyes:      Pupils: Pupils are equal, round, and reactive to light.   Neck:      Vascular: No JVD.   Cardiovascular:      Rate and Rhythm: Regular rhythm.      Pulses: Normal pulses.           Carotid pulses are 2+ on the right side and 2+ on the left side.     Heart sounds: S1 normal and S2 normal.   Pulmonary:      Effort: Pulmonary effort is normal.      Breath sounds: Normal breath sounds. No wheezing or rales.   Abdominal:      General: Bowel sounds are normal.      Palpations: Abdomen is soft.   Musculoskeletal:         General: No tenderness. Normal range of motion.      Cervical back: Normal range of motion and neck supple.   Skin:     General: Skin is warm.   Neurological:      Mental Status: She is alert and oriented to person, place, and time.      Cranial Nerves: No cranial nerve deficit.      Deep Tendon Reflexes: Reflexes are normal and symmetric.

## 2024-06-03 ENCOUNTER — SOCIAL WORK (OUTPATIENT)
Dept: BEHAVIORAL/MENTAL HEALTH CLINIC | Facility: CLINIC | Age: 44
End: 2024-06-03
Payer: COMMERCIAL

## 2024-06-03 DIAGNOSIS — F41.1 GENERALIZED ANXIETY DISORDER: ICD-10-CM

## 2024-06-03 DIAGNOSIS — F33.1 MODERATE RECURRENT MAJOR DEPRESSION (HCC): Primary | ICD-10-CM

## 2024-06-03 PROCEDURE — 90837 PSYTX W PT 60 MINUTES: CPT | Performed by: SOCIAL WORKER

## 2024-06-03 NOTE — PSYCH
This note was not shared with the patient due to this is a psychotherapy note      Behavioral Health Psychotherapy Progress Note    Psychotherapy Provided: Individual Psychotherapy     1. Moderate recurrent major depression (HCC)        2. Generalized anxiety disorder            Goals addressed in session: Goal 1     DATA: Met with Jannette.   Jannette states that she has been struggling managing her depression; specifically since she tried Wegovy and had acute side effects.   2 weeks ago she drove to [place] just to cry, states she texted her friend about [place].  She is unable to identify a specific trigger to this.  Denies active SI, protective factors include her  daughter.  She does state she's been overwhelmed with doctor appts.  She minimizes the positives-- shoulder surgery went very well, has a plan with OBGYN with fistula.   A current goal she has is to be able to take a walk- a recent fall after her shoulder surgery has created somewhat of a barrier toward this.   She spoke about girlscouts graduation and beginning a new season of like.  She states she was acknowledged at graduation which made her feel good, becoming tearful sharing she never feels acknowledged.   We discussed her home life, in which she shared is not the best.  She states her and her  are like roommates, she states she cannot talk to her  about her emotions, feeling invalidated.   We discussed how this can be a trigger to her depression, encouraging open discussion with her  of how he can support her, along with encouraging a referral to couples counseling as Jannette agrees they can work on improving communication.        During this session, this clinician used the following therapeutic modalities: Engagement Strategies, Client-centered Therapy, Cognitive Behavioral Therapy, Dialectical Behavior Therapy, Mindfulness-based Strategies, Motivational Interviewing, and Supportive  "Psychotherapy    Substance Abuse was not addressed during this session. If the client is diagnosed with a co-occurring substance use disorder, please indicate any changes in the frequency or amount of use: na. Stage of change for addressing substance use diagnoses: No substance use/Not applicable    ASSESSMENT:  Jannette Baugh presents with a Anxious and Depressed mood.     her affect is Normal range and intensity, which is congruent, with her mood and the content of the session. The client has made progress on their goals.     Jannette Baugh presents with a none risk of suicide, none risk of self-harm, and none risk of harm to others.    For any risk assessment that surpasses a \"low\" rating, a safety plan must be developed.    A safety plan was indicated: no  If yes, describe in detail na    PLAN: Between sessions, Jannette Baugh will continue to use and develop coping skills. At the next session, the therapist will use Engagement Strategies, Client-centered Therapy, Cognitive Behavioral Therapy, Dialectical Behavior Therapy, Mindfulness-based Strategies, and Supportive Psychotherapy to address depression and anxiety.    Behavioral Health Treatment Plan and Discharge Planning: Jannette Baugh is aware of and agrees to continue to work on their treatment plan. They have identified and are working toward their discharge goals. yes    Visit start and stop times:    06/03/24  Start Time: 1448  Stop Time: 1542  Total Visit Time: 54 minutes  "

## 2024-06-06 ENCOUNTER — DOCUMENTATION (OUTPATIENT)
Dept: PSYCHIATRY | Facility: CLINIC | Age: 44
End: 2024-06-06

## 2024-06-15 LAB
APOB+LDLR+PCSK9 GENE MUT ANL BLD/T: NOT DETECTED
BRCA1+BRCA2 DEL+DUP + FULL MUT ANL BLD/T: NOT DETECTED
MLH1+MSH2+MSH6+PMS2 GN DEL+DUP+FUL M: NOT DETECTED

## 2024-06-24 DIAGNOSIS — M47.26 OSTEOARTHRITIS OF SPINE WITH RADICULOPATHY, LUMBAR REGION: Primary | ICD-10-CM

## 2024-06-27 DIAGNOSIS — I10 HYPERTENSION, UNSPECIFIED TYPE: ICD-10-CM

## 2024-06-28 DIAGNOSIS — R13.19 ESOPHAGEAL DYSPHAGIA: ICD-10-CM

## 2024-06-28 DIAGNOSIS — K27.9 PUD (PEPTIC ULCER DISEASE): ICD-10-CM

## 2024-06-28 DIAGNOSIS — K21.00 GASTROESOPHAGEAL REFLUX DISEASE WITH ESOPHAGITIS WITHOUT HEMORRHAGE: ICD-10-CM

## 2024-06-28 RX ORDER — METOPROLOL SUCCINATE 100 MG/1
100 TABLET, EXTENDED RELEASE ORAL DAILY
Qty: 90 TABLET | Refills: 1 | Status: SHIPPED | OUTPATIENT
Start: 2024-06-28

## 2024-07-05 ENCOUNTER — DOCUMENTATION (OUTPATIENT)
Dept: BEHAVIORAL/MENTAL HEALTH CLINIC | Facility: CLINIC | Age: 44
End: 2024-07-05

## 2024-07-05 DIAGNOSIS — F41.1 GENERALIZED ANXIETY DISORDER: ICD-10-CM

## 2024-07-05 DIAGNOSIS — F33.9 DEPRESSION, RECURRENT (HCC): Primary | ICD-10-CM

## 2024-07-05 NOTE — PROGRESS NOTES
Psychotherapy Discharge Summary    Preferred Name: Jannette Baugh  YOB: 1980    Admission date to psychotherapy: 10/3/23    Referred by: Zita Goff LCSW    Presenting Problem: Referred for participation in depression support group    Course of treatment included : group counseling    Progress/Outcome of Treatment Goals (brief summary of course of treatment) Jannette made good progress participating in group sessions aimed at teaching skills for managing depression symptoms and building a supports.  Jannette actively participated and provided useful feedback to her peers.    Treatment Complications (if any): None noted    Treatment Progress: good    Current [facility] Psych[facility] Provider: Kathleen Beauchamp PA-C    Discharge Medications include: Elavil, Effexor    Discharge Date: 7/5/24    Discharge Diagnosis:   1. Depression, recurrent (HCC)        2. Generalized anxiety disorder            Criteria for Discharge:  Therapist went out on leave 3/11/24-6/3/24.  A letter was sent to Jannette regarding group options.  A second letter announced therapist's impending resignation effective 7/11/24.  Jannette was invited to make appointments for termination sessions.  To date she has not called the office.    Aftercare recommendations include (include specific referral names and phone numbers, if appropriate): Continue med management and psychotherapy with Zita Goff LCSW    Prognosis: good

## 2024-07-09 ENCOUNTER — TELEPHONE (OUTPATIENT)
Dept: CARDIOLOGY CLINIC | Facility: CLINIC | Age: 44
End: 2024-07-09

## 2024-07-11 ENCOUNTER — TELEPHONE (OUTPATIENT)
Age: 44
End: 2024-07-11

## 2024-07-11 DIAGNOSIS — K21.00 GASTROESOPHAGEAL REFLUX DISEASE WITH ESOPHAGITIS WITHOUT HEMORRHAGE: ICD-10-CM

## 2024-07-11 DIAGNOSIS — R13.19 ESOPHAGEAL DYSPHAGIA: ICD-10-CM

## 2024-07-11 DIAGNOSIS — K27.9 PUD (PEPTIC ULCER DISEASE): ICD-10-CM

## 2024-07-11 NOTE — TELEPHONE ENCOUNTER
Patients GI provider:  Dr. Maguire    Number to return call: ([phone]    Reason for call: Pt called to schedule an appt for a med check and the first available is 11/19/24. Can we please send an RX for her Esomeprazole to hold her over til then.    Scheduled procedure/appointment date if applicable: Apt/procedure 11/19/24

## 2024-07-19 ENCOUNTER — OFFICE VISIT (OUTPATIENT)
Dept: FAMILY MEDICINE CLINIC | Facility: CLINIC | Age: 44
End: 2024-07-19
Payer: COMMERCIAL

## 2024-07-19 VITALS
HEART RATE: 88 BPM | SYSTOLIC BLOOD PRESSURE: 122 MMHG | OXYGEN SATURATION: 99 % | HEIGHT: 67 IN | WEIGHT: 293 LBS | DIASTOLIC BLOOD PRESSURE: 80 MMHG | BODY MASS INDEX: 45.99 KG/M2

## 2024-07-19 DIAGNOSIS — Z13.220 SCREENING FOR HYPERLIPIDEMIA: ICD-10-CM

## 2024-07-19 DIAGNOSIS — Z13.1 SCREENING FOR DIABETES MELLITUS: ICD-10-CM

## 2024-07-19 DIAGNOSIS — F41.1 GENERALIZED ANXIETY DISORDER: ICD-10-CM

## 2024-07-19 DIAGNOSIS — Z13.29 SCREENING FOR THYROID DISORDER: ICD-10-CM

## 2024-07-19 DIAGNOSIS — I10 PRIMARY HYPERTENSION: Primary | ICD-10-CM

## 2024-07-19 DIAGNOSIS — Z12.31 BREAST CANCER SCREENING BY MAMMOGRAM: ICD-10-CM

## 2024-07-19 DIAGNOSIS — F33.1 MODERATE RECURRENT MAJOR DEPRESSION (HCC): ICD-10-CM

## 2024-07-19 PROCEDURE — 99214 OFFICE O/P EST MOD 30 MIN: CPT | Performed by: FAMILY MEDICINE

## 2024-07-19 RX ORDER — LAMOTRIGINE 25 MG/1
TABLET ORAL
COMMUNITY
Start: 2024-07-07

## 2024-07-19 RX ORDER — NAPROXEN 500 MG/1
TABLET ORAL
COMMUNITY
Start: 2024-07-11

## 2024-07-19 NOTE — ASSESSMENT & PLAN NOTE
Following with psych now including therapist, . Started on Lamictal and tapering off effexor. They plan to intiate  Wellbutrin soon as well.

## 2024-07-19 NOTE — PROGRESS NOTES
"Ambulatory Visit  Name: Jannette Baugh      : 1980      MRN: [id number]  Encounter Provider: Gustavo Godoy MD  Encounter Date: 2024   Encounter department: [facility] [place]    Assessment & Plan   1. Primary hypertension  Assessment & Plan:  Controlled. Continue current meds.     2. Breast cancer screening by mammogram  -     Mammo screening bilateral w 3d & cad; Future; Expected date: 2024  3. Screening for hyperlipidemia  -     Lipid panel; Future  4. Screening for thyroid disorder  5. Screening for diabetes mellitus  -     Comprehensive metabolic panel; Future  6. Moderate recurrent major depression (HCC)  Assessment & Plan:  Following with psych now including therapist, . Started on Lamictal and tapering off effexor. They plan to intiate  Wellbutrin soon as well.   7. Generalized anxiety disorder       History of Present Illness     here for follow up today period since our last office visit she saw psychiatry and established care with her new psychiatrist and therapist. She's doing well. Reports she's been started on Lamictal and they're planning to taper effexor. They also plan to start Wellbutrin at some point as well. Blood pressures are controlled at home. No issues with Lamictal.        Review of Systems   All other systems reviewed and are negative.      Objective     /80 (BP Location: Left arm, Patient Position: Sitting, Cuff Size: Standard)   Pulse 88   Ht 5' 7\" (1.702 m)   Wt 133 kg (294 lb)   SpO2 99%   BMI 46.05 kg/m²     Physical Exam  Vitals and nursing note reviewed.   Constitutional:       General: She is not in acute distress.     Appearance: Normal appearance. She is well-developed. She is not ill-appearing, toxic-appearing or diaphoretic.   HENT:      Head: Normocephalic and atraumatic.   Eyes:      General:         Right eye: No discharge.         Left eye: No discharge.      Extraocular Movements: Extraocular movements intact.      " Conjunctiva/sclera: Conjunctivae normal.   Cardiovascular:      Rate and Rhythm: Normal rate.   Pulmonary:      Effort: Pulmonary effort is normal.   Skin:     General: Skin is warm and dry.      Capillary Refill: Capillary refill takes less than 2 seconds.   Neurological:      Mental Status: She is alert and oriented to person, place, and time.   Psychiatric:         Mood and Affect: Mood normal.         Behavior: Behavior normal.         Thought Content: Thought content normal.         Judgment: Judgment normal.       Administrative Statements

## 2024-08-04 ENCOUNTER — OFFICE VISIT (OUTPATIENT)
Dept: URGENT CARE | Facility: CLINIC | Age: 44
End: 2024-08-04
Payer: COMMERCIAL

## 2024-08-04 VITALS
HEIGHT: 67 IN | HEART RATE: 88 BPM | BODY MASS INDEX: 45.83 KG/M2 | WEIGHT: 292 LBS | SYSTOLIC BLOOD PRESSURE: 116 MMHG | TEMPERATURE: 97.4 F | DIASTOLIC BLOOD PRESSURE: 64 MMHG | OXYGEN SATURATION: 99 % | RESPIRATION RATE: 18 BRPM

## 2024-08-04 DIAGNOSIS — J02.9 ACUTE VIRAL PHARYNGITIS: Primary | ICD-10-CM

## 2024-08-04 LAB — S PYO AG THROAT QL: NEGATIVE

## 2024-08-04 PROCEDURE — 99213 OFFICE O/P EST LOW 20 MIN: CPT | Performed by: STUDENT IN AN ORGANIZED HEALTH CARE EDUCATION/TRAINING PROGRAM

## 2024-08-04 PROCEDURE — 87070 CULTURE OTHR SPECIMN AEROBIC: CPT

## 2024-08-04 PROCEDURE — 87880 STREP A ASSAY W/OPTIC: CPT

## 2024-08-04 RX ORDER — PREDNISONE 20 MG/1
40 TABLET ORAL DAILY
Qty: 10 TABLET | Refills: 0 | Status: SHIPPED | OUTPATIENT
Start: 2024-08-04 | End: 2024-08-09

## 2024-08-04 RX ORDER — PREDNISONE 20 MG/1
40 TABLET ORAL DAILY
Qty: 10 TABLET | Refills: 0 | Status: SHIPPED | OUTPATIENT
Start: 2024-08-04 | End: 2024-08-04

## 2024-08-04 NOTE — PATIENT INSTRUCTIONS
"You had testing performed here today which will be sent out for results. You can see your results in your MyChart if you have one, and you will also be contacted by the office if any treatments are needed or anything need to change with your care.     You may take over the counter Tylenol (Acetaminophen) and/or Motrin (Ibuprofen) as needed, as directed on packaging.     You may continue taking OTC coricidin to control your symptoms. I would also advise purchasing OTC fluticasone (Flonase) nasal spray and using as directed on product packaging. This will help cut down on the postnasal drip you are experiencing and will therefore help alleviate the sore throat.     If you are not better in 3-5 days I would advise you to make a follow up appt with your PCP for a recheck.    Rapid strep test is negative. The symptoms appear viral at this time.   Recommend supportive care at this time.  Pt appears to have a viral upper respiratory infection and no antibiotic is indicated at this time.      Although the symptoms are troublesome, usually the patient's body is able to recover from a viral infection on an average time of 7-10 days. Fever, if any, typically resolves after 3-5 days. May use over the counter fever reducing medications to alleviate (such as motrin or tylenol). If the patient has a sore throat, typically this resolves within 3-5 days.  Any nasal congestion, runny nose, post nasal drip typically begin to  improve after 7-10 days. Any cough may linger over a couple weeks. Please note that having a cough is not necessarily a bad thing. It often times is part of our body's protective mechanism to help keep our airways clear.      Please note that yellow mucous doesn't necessarily mean a \"bacterial\" infection. Yellow mucous doesn't automatically mean that an antibiotic is needed. It is not unusual for mucus to become more discolored in the days after the start of an upper respiratory infection. Often times this is due " to mucous that has thickened with white blood cells that have flooded the mucosa to try and fight the viral infection.      Ear Pain may occur when the eustachian tubes become blocked with mucous or are swollen due to acute inflammation from illness. Just like you may experience discomfort in your ears when diving under water or at higher elevations (ie. Flying in airplane, climbing in mountains), babies/children may experience ear discomfort with upper respiratory illnesses. May give Ibuprofen or Tylenol as needed for discomfort. May also use warm compress against ear for comfort. If ear ache is persisting and not improving over 2-3 days or if there is any gross drainage coming from ear, please seek further evaluation.

## 2024-08-04 NOTE — PROGRESS NOTES
[facility]        NAME: Jannette Baugh is a 44 y.o. female  : 1980    MRN: [id number]  DATE: 2024  TIME: 10:09 AM    Assessment and Plan   Acute viral pharyngitis [J02.9]  1. Acute viral pharyngitis  POCT rapid ANTIGEN strepA    predniSONE 20 mg tablet    Throat culture        POCT rapid strep: NEG  Will send throat culture for evaluation.  Will discuss symptom management and supportive care with patient.  Will place patient on short course of steroids to help alleviate irritation and inflammation.  Discussed red flag symptoms with patient and when she would need to go to the emergency room for further evaluation.  She verbalizes understanding.    Patient Instructions     You had testing performed here today which will be sent out for results. You can see your results in your MyChart if you have one, and you will also be contacted by the office if any treatments are needed or anything need to change with your care.     You may take over the counter Tylenol (Acetaminophen) and/or Motrin (Ibuprofen) as needed, as directed on packaging.     You may continue taking OTC coricidin to control your symptoms. I would also advise purchasing OTC fluticasone (Flonase) nasal spray and using as directed on product packaging. This will help cut down on the postnasal drip you are experiencing and will therefore help alleviate the sore throat.     If you are not better in 3-5 days I would advise you to make a follow up appt with your PCP for a recheck.    Rapid strep test is negative. The symptoms appear viral at this time.   Recommend supportive care at this time.  Pt appears to have a viral upper respiratory infection and no antibiotic is indicated at this time.      Although the symptoms are troublesome, usually the patient's body is able to recover from a viral infection on an average time of 7-10 days. Fever, if any, typically resolves after 3-5 days. May use over the counter fever reducing  "medications to alleviate (such as motrin or tylenol). If the patient has a sore throat, typically this resolves within 3-5 days.  Any nasal congestion, runny nose, post nasal drip typically begin to  improve after 7-10 days. Any cough may linger over a couple weeks. Please note that having a cough is not necessarily a bad thing. It often times is part of our body's protective mechanism to help keep our airways clear.      Please note that yellow mucous doesn't necessarily mean a \"bacterial\" infection. Yellow mucous doesn't automatically mean that an antibiotic is needed. It is not unusual for mucus to become more discolored in the days after the start of an upper respiratory infection. Often times this is due to mucous that has thickened with white blood cells that have flooded the mucosa to try and fight the viral infection.      Ear Pain may occur when the eustachian tubes become blocked with mucous or are swollen due to acute inflammation from illness. Just like you may experience discomfort in your ears when diving under water or at higher elevations (ie. Flying in airplane, climbing in mountains), babies/children may experience ear discomfort with upper respiratory illnesses. May give Ibuprofen or Tylenol as needed for discomfort. May also use warm compress against ear for comfort. If ear ache is persisting and not improving over 2-3 days or if there is any gross drainage coming from ear, please seek further evaluation.      Follow up with PCP in 3-5 days.  Proceed to  ER if symptoms worsen.    If tests are performed, our office will contact you with results only if changes need to made to the care plan discussed with you at the visit. [facility]    Chief Complaint     Chief Complaint   Patient presents with    Sore Throat     Started Friday, some post nasal drip.   No cough         History of Present Illness       44-year-old female patient with past medical history " significant for hypertension, RBBB, sleep apnea, asthma, esophageal reflux presents with complaint of sore throat that started on Friday accompanied by some postnasal drip.  Denies cough, ear pain, fever, or chills.  Denies abdominal pain, nausea, vomiting, or diarrhea.  Denies any sick contacts or recent travel.        Review of Systems   Review of Systems   Constitutional: Negative.    HENT:  Positive for postnasal drip and sore throat.    Eyes: Negative.    Respiratory: Negative.     Cardiovascular: Negative.    Gastrointestinal: Negative.    Genitourinary: Negative.    Musculoskeletal: Negative.    Skin: Negative.    Neurological: Negative.    Hematological: Negative.          Current Medications       Current Outpatient Medications:     albuterol (PROVENTIL HFA,VENTOLIN HFA) 90 mcg/act inhaler, Inhale 2 puffs every 6 (six) hours as needed for wheezing or shortness of breath (cough), Disp: 18 g, Rfl: 1    amitriptyline (ELAVIL) 75 mg tablet, Take 1 tablet (75 mg total) by mouth daily at bedtime, Disp: 90 tablet, Rfl: 0    APPLE CIDER VINEGAR PO, Take by mouth, Disp: , Rfl:     Ascorbic Acid (VITAMIN C) 500 MG CAPS, Take 1 capsule by mouth daily at bedtime, Disp: , Rfl:     aspirin 81 mg chewable tablet, Chew 1 tablet (81 mg total) daily (Patient taking differently: Chew 81 mg daily at bedtime), Disp: 30 tablet, Rfl: 30    Berberine Chloride 500 MG CAPS, Take by mouth, Disp: , Rfl:     cetirizine (ZyrTEC) 10 mg tablet, Take 10 mg by mouth daily, Disp: , Rfl:     Cholecalciferol (VITAMIN D3) 5000 units CAPS, Take 5,000 Units by mouth daily at bedtime, Disp: , Rfl:     cyanocobalamin (VITAMIN B-12) 1,000 mcg tablet, Take 1,000 mcg by mouth daily , Disp: , Rfl:     docusate sodium (DULCOLAX) 100 mg capsule, Take by mouth daily at bedtime, Disp: , Rfl:     esomeprazole (GNP Esomeprazole Magnesium) 20 mg capsule, Take 2 capsules (40 mg total) by mouth daily before breakfast, Disp: 60 capsule, Rfl: 3    lamoTRIgine  (LaMICtal) 25 mg tablet, take 1 tablet by mouth once daily for 14 days then 2 tablets once daily, Disp: , Rfl:     losartan (COZAAR) 100 MG tablet, Take 1 tablet (100 mg total) by mouth daily, Disp: 90 tablet, Rfl: 4    Melatonin 10 MG TABS, Take by mouth daily at bedtime, Disp: , Rfl:     metoprolol succinate (TOPROL-XL) 100 mg 24 hr tablet, Take 1 tablet (100 mg total) by mouth daily, Disp: 90 tablet, Rfl: 1    Misc Natural Products (ELAVIL OTC SLEEP PO), Take by mouth, Disp: , Rfl:     Multiple Vitamins-Minerals (CENTRUM ADULTS PO), Take 1 tablet by mouth daily at bedtime, Disp: , Rfl:     ofloxacin (OCUFLOX) 0.3 % ophthalmic solution, Instill 5 drops in affected ear two times a day for one week, Disp: 5 mL, Rfl: 0    Omega-3 Fatty Acids (FISH OIL ADULT GUMMIES PO), Take by mouth daily at bedtime, Disp: , Rfl:     predniSONE 20 mg tablet, Take 2 tablets (40 mg total) by mouth daily for 5 days, Disp: 10 tablet, Rfl: 0    rosuvastatin (CRESTOR) 40 MG tablet, Take 1 tablet (40 mg total) by mouth daily, Disp: 90 tablet, Rfl: 3    venlafaxine (EFFEXOR-XR) 150 mg 24 hr capsule, Take 1 capsule (150 mg total) by mouth daily at bedtime, Disp: 90 capsule, Rfl: 0    venlafaxine (EFFEXOR-XR) 75 mg 24 hr capsule, Take 1 capsule (75 mg total) by mouth daily at bedtime, Disp: 90 capsule, Rfl: 0    Zinc 50 MG TABS, Take by mouth daily at bedtime, Disp: , Rfl:     estradiol (ESTRACE) 0.1 mg/g vaginal cream, APPLY PEA SIZED AMOUNT TO AREA OF CONCERN EVERY OTHER DAY (Patient not taking: Reported on 8/4/2024), Disp: , Rfl:     naproxen (NAPROSYN) 500 mg tablet, take 1 tablet by mouth every 12 hours for 7 days BEGIN TAKING AFTER SURGERY (Patient not taking: Reported on 8/4/2024), Disp: , Rfl:     Pyridoxine HCl (VITAMIN B-6 PO), Take by mouth (Patient not taking: Reported on 8/4/2024), Disp: , Rfl:     Current Allergies     Allergies as of 08/04/2024 - Reviewed 08/04/2024   Allergen Reaction Noted    Neurontin [gabapentin] Other (See  "Comments) 10/16/2014    Penicillins Anaphylaxis, Swelling, and Angioedema 2017    Aripiprazole Other (See Comments) 2013    Lorazepam Other (See Comments) 2014    Wound dressing adhesive Hives 2022    Carbamazepine Other (See Comments) 10/16/2012    Doxycycline Hives 04/10/2013    Lamotrigine Rash 2012            The following portions of the patient's history were reviewed and updated as appropriate: allergies, current medications, past family history, past medical history, past social history, past surgical history and problem list.     Past Medical History:   Diagnosis Date    Allergic rhinitis     Anxiety     Arthritis     C. difficile diarrhea 2020    Chest pain     Chronic pain disorder     BACK SHOULDERS NECK    Colon polyp     COPD (chronic obstructive pulmonary disease) ([facility])     CPAP (continuous positive airway pressure) dependence     Depression     Diarrhea     Disease of thyroid gland     nodule    Dysphagia     with pain \"feels like a blockage\" in the esophagus    Eating disorder     Fibromyalgia, primary     Fissure, anal     GERD (gastroesophageal reflux disease)     Hearing difficulty of right ear     frequent infections-(x1 lead to sepsis)    Hyperlipidemia     Hypertension     Irritable bowel syndrome     Obesity     Perforation of tympanic membrane     Right    PONV (postoperative nausea and vomiting)     RBBB     Right bundle branch blockage     Sleep apnea     no cpap    Thyroid nodule     Wears glasses        Past Surgical History:   Procedure Laterality Date    ANAL FISTULOTOMY N/A 2018    Procedure: FISTULOTOMY;  Surgeon: Clay Laws MD;  [facility]: [facility];  Service: Colorectal    BACK SURGERY      lumbar herniated disc    BREAST CYST EXCISION Left 13 yrs ago  benign    BREAST SURGERY Left 2006    cyst removal    CARPAL TUNNEL RELEASE      CERVICAL FUSION  2023    C5-6     SECTION      CHOLECYSTECTOMY      COLONOSCOPY      CYST " REMOVAL      DENTAL SURGERY      ENDOMETRIAL ABLATION N/A 09/20/2021    Procedure: D&C, ABLATION ENDOMETRIAL MARIEL;  Surgeon: Juan Pablo Doan MD;  Location:  MAIN OR;  Service: Gynecology    FL MYELOGRAM CERVICAL  11/11/2022    HAND FUSION Left 01/26/2024    HAND SURGERY      INCISION AND DRAINAGE OF WOUND N/A 11/02/2018    Procedure: INCISION AND DRAINAGE (I&D) BUTTOCK;  Surgeon: Clay Laws MD;  [facility]: [facility];  Service: Colorectal    MYRINGOTOMY W/ TUBES Right 08/17/2015    [place]    UT ANRCT XM SURG REQ ANES GENERAL SPI/EDRL DX N/A 11/02/2018    Procedure: EXAM UNDER ANESTHESIA (EUA);  Surgeon: Clay Laws MD;  [facility]: [facility];  Service: Colorectal    UT COLONOSCOPY FLX DX W/COLLJ SPEC WHEN PFRMD N/A 08/02/2017    Procedure: COLONOSCOPY;  Surgeon: Jose Rafael Penn MD;  [facility];  Service: Gastroenterology    UT DEBRIDEMENT SUBCUTANEOUS TISSUE 1ST 20 SQ CM/< N/A 07/19/2022    Procedure: Debridement abdominal wall;  Surgeon: Robert Bloch, MD;  Location: [facility];  Service: General    UT EXCISION HIDRADENITIS INGUINAL COMPLEX REPAIR Bilateral 01/18/2022    Procedure: WIDE EXCISION HIDRADENITIS ABDOMINAL WALL;  Surgeon: Robert Bloch, MD;  Location: [facility];  Service: General    UT INCISION & DRAINAGE ABSCESS SIMPLE/SINGLE N/A 07/19/2022    Procedure: INCISION AND DRAINAGE ABDOMINAL WALL;  Surgeon: Robert Bloch, MD;  [facility]: [facility];  Service: General    UT TENDON SHEATH INCISION Right 03/13/2018    Procedure: LONG TRIGGER FINGER RELEASE;  Surgeon: Nickolas Guerrero DO;  [facility]: [facility];  Service: Orthopedics    UT TYMPANOPLASTY W/O MASTOIDECT W/O OSSICLE RECNSTJ Right 05/24/2017    Procedure: TYMPANOPLASTY WITH PERICHONDRIN GRAFT;  Surgeon: Hugo Moreira DO;  [facility];  Service: ENT    SHOULDER SURGERY Right     TRIGGER FINGER RELEASE      ULNAR NERVE TRANSPOSITION Left     UMBILICAL HIDRADENITIS EXCISION  01/18/2022    subumbilical  "hidradenitis exnadyaion, Dr. Bloch, [facility]    US GUIDED THYROID BIOPSY  07/03/2023    WISDOM TOOTH EXTRACTION         Family History   Problem Relation Age of Onset    Hypertension Mother     Hyperlipidemia Mother     Diabetes Mother     Hypertension Father     Hyperlipidemia Father     Heart disease Father         cardiac disorder-myocardial infarction arrhythmias    Heart attack Father     Crohn's disease Brother     Arthritis Brother     Diabetes unspecified Maternal Grandmother     Thyroid disease Paternal Grandmother     No Known Problems Daughter     No Known Problems Maternal Aunt     No Known Problems Maternal Aunt     No Known Problems Maternal Aunt     No Known Problems Maternal Aunt     No Known Problems Paternal Aunt     No Known Problems Paternal Aunt     Heart attack Paternal Uncle          Medications have been verified.        Objective   /64   Pulse 88   Temp (!) 97.4 °F (36.3 °C)   Resp 18   Ht 5' 7\" (1.702 m)   Wt 132 kg (292 lb)   SpO2 99%   BMI 45.73 kg/m²        Physical Exam     Physical Exam  Vitals and nursing note reviewed.   Constitutional:       General: She is not in acute distress.     Appearance: She is well-developed. She is obese. She is not ill-appearing or diaphoretic.   HENT:      Head: Normocephalic and atraumatic.      Right Ear: Hearing, ear canal and external ear normal. A middle ear effusion is present. Tympanic membrane is injected. Tympanic membrane is not erythematous or bulging.      Left Ear: Hearing, ear canal and external ear normal. A middle ear effusion is present. Tympanic membrane is injected. Tympanic membrane is not erythematous or bulging.      Nose: Rhinorrhea present. Rhinorrhea is clear.      Right Turbinates: Enlarged.      Left Turbinates: Enlarged.      Right Sinus: No maxillary sinus tenderness or frontal sinus tenderness.      Left Sinus: No maxillary sinus tenderness or frontal sinus tenderness.      Mouth/Throat:      Lips: Pink. "      Mouth: Mucous membranes are moist. No oral lesions.      Tongue: No lesions. Tongue does not deviate from midline.      Palate: No mass and lesions.      Pharynx: Oropharynx is clear. Uvula midline. Posterior oropharyngeal erythema and postnasal drip present. No pharyngeal swelling, oropharyngeal exudate or uvula swelling.      Tonsils: No tonsillar exudate or tonsillar abscesses. 1+ on the right. 1+ on the left.   Eyes:      Conjunctiva/sclera: Conjunctivae normal.      Pupils: Pupils are equal, round, and reactive to light.      Comments: Glasses for vision correction   Cardiovascular:      Rate and Rhythm: Normal rate and regular rhythm.      Heart sounds: Normal heart sounds.   Pulmonary:      Effort: Pulmonary effort is normal.      Breath sounds: Normal breath sounds.   Abdominal:      General: Bowel sounds are normal.      Palpations: Abdomen is soft.   Musculoskeletal:      Cervical back: Normal range of motion and neck supple.   Lymphadenopathy:      Cervical: No cervical adenopathy.   Skin:     General: Skin is warm and dry.      Capillary Refill: Capillary refill takes less than 2 seconds.   Neurological:      General: No focal deficit present.      Mental Status: She is alert and oriented to person, place, and time.   Psychiatric:         Mood and Affect: Mood normal.         Behavior: Behavior normal.

## 2024-08-06 LAB — BACTERIA THROAT CULT: NORMAL

## 2024-09-03 ENCOUNTER — TELEPHONE (OUTPATIENT)
Age: 44
End: 2024-09-03

## 2024-09-03 DIAGNOSIS — K21.00 GASTROESOPHAGEAL REFLUX DISEASE WITH ESOPHAGITIS WITHOUT HEMORRHAGE: ICD-10-CM

## 2024-09-03 DIAGNOSIS — R13.19 ESOPHAGEAL DYSPHAGIA: ICD-10-CM

## 2024-09-03 DIAGNOSIS — K27.9 PUD (PEPTIC ULCER DISEASE): ICD-10-CM

## 2024-09-03 NOTE — TELEPHONE ENCOUNTER
Message from [employer]    LOCAL [facility] <BIN#[id number]> - 70: NDC not covered. The drug you are attempting to dispense is not covered as submitted. Review all processor messages that were returned with the rejection. Call agency to [employer].     Called  and closed.

## 2024-09-03 NOTE — LETTER
[facility] GASTROENTEROLOGY POD  [address] [address] [phone]  Dept: [phone]    September 6, 2024     Patient: Jannette Baugh   YOB: 1980   Date of Visit:    ID [id number]     To Whom it May Concern:    Jannette Baugh is under my professional care. Please consider this letter as a prior authorization request for the medication esomeprazole (NexIUM) 40 MG capsule to be taken daily.  This medication therapy is medically necessary for this patient.  This is a continuation of therapy with a positive result from 5/23/2013.    Discontinuing this medication will be detrimental to the patient's health and well being and negatively impact daily living activities causing symptoms to return.  She has tried and failed in the past Omeprazole, Pantoprazole, Pepcid and Ranitidine with ineffective results.      The last EGD results are as follows    IMPRESSION:  External bulge noted at 26 cm from incisors with mild narrowing distally just below the bulge, therefore an empiric dilation was performed.  Dilated in the esophagus with Lam dilator. Dilation caused improved passage of the scope  Single small, superficial, linear ulcer in the fundus of the stomach with clean base (Cristian III)  Ten or more semi-pedunculated polypsfundic gland  measuring smaller than 5 mm in the fundus of the stomach and body of the stomach; performed partial removal   The duodenal bulb, 1st part of the duodenum and 2nd part of the duodenum appeared normal.  Moderate, patchy edematous and erythematous mucosa with erosion in the antrum and prepyloric region      Please fax determination to [phone].  Thank you    If you have any questions or concerns, please don't hesitate to call.       Sincerely,      ELAN Isaac  [address] [address]  Phone: [phone]   Fax: [phone]  JOVANNA #: [id number]   NPI: [id number]

## 2024-09-04 ENCOUNTER — DOCUMENTATION (OUTPATIENT)
Dept: PSYCHIATRY | Facility: CLINIC | Age: 44
End: 2024-09-04

## 2024-09-04 ENCOUNTER — DOCUMENTATION (OUTPATIENT)
Dept: BEHAVIORAL/MENTAL HEALTH CLINIC | Facility: CLINIC | Age: 44
End: 2024-09-04

## 2024-09-04 DIAGNOSIS — F33.1 MODERATE RECURRENT MAJOR DEPRESSION (HCC): Primary | ICD-10-CM

## 2024-09-04 DIAGNOSIS — F41.1 GENERALIZED ANXIETY DISORDER: ICD-10-CM

## 2024-09-04 NOTE — TELEPHONE ENCOUNTER
Called Local 888  Need letter of med necesity  Tired and failed  And is medically necessary  Fax to     Put ID on letter [id number]     Routing to GI office  The script is for 40 mg daily however states 20 mg and to take 2 capsules daily before breakfast  This usually gets denied.  Is there a reason why the script can't be written for 40 mg and one capsule daily?  Please advise the pa team.Thank you    Pt also may need an ov, last seen for an EGD 3/12/2023

## 2024-09-04 NOTE — PROGRESS NOTES
"This note was not shared with the patient due to this is a psychotherapy note      Psychotherapy Discharge Summary    Preferred Name: Jannette Baugh  YOB: 1980    Admission date to psychotherapy: 1/22/21    Referred by: transfer from [facility]    Presenting Problem:   Per [facility]'s eval 5/11/20:  As per Dr. Mathias's initial assessment of 2/25/20, she reported: \"40-year-old female with past psychiatric history of depression and anxiety since the age of 16 presents to this office for initial psychiatric evaluation.  Patient reports she feels she needs appropriate medication management of her psychiatric medication.Patient reports she has been in treatment for psychiatric treatment since the age of 18.  She reports the death of her father at age 16 was when she felt she 1st started getting anxious and depressed.  Patient reports symptoms of depression including sadness at times and periods of having crying spells, with decreased energy and motivation.  She states when she is more increasingly depressed she tends to eat more and has excessive guilt about her ability as a mother and general activities.  She states at times she has felt hopeless but she denies any suicidal or homicidal ideations.  She said she had a brief period where she felt suicidal when she was tried on Neurontin otherwise gives no history of SI or HI.  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  She states when her anxiety peaks she gets short of breath and has heaviness in her chest.  She has had a panic attack in the past when dealing with family issues.  She does not appear to have panic attacks due to social anxiety.  She feels constantly overwhelmed by her everyday life and often wants to avoid doing things " "however when pushes herself is in the process of getting things done she does enjoy them.  No evidence of psychosis siva or agitation.  No evidence of PTSD.  She does seem to have some prolonged grief issues surrounding the death of her father at the age of 16.   Patient reports being seen by a psychiatrist last in December.  She felt uncomfortable with her provider due to the number of medications he was giving to her and despite being on 3 medications with suggestions to add 2 more she felt she was gaining no improvement.\"     Per [facility]: Jannette is prescribed 187.5 mg of Effexor. Jannette reported this increase seemed to help. Her dog had another seizure, and she was able to cope with this better, and get the dog medical attention. She reported brighter mood, motivation remains low, still feeling she has to \"force myself to do anything.\" Her sleep is off mostly due to the dog illness, as she is monitoring the dog. Appetite has increased due to increased anxiety around the dog's health. She felt she was doing better prior to the dog having the seizure. She continues to worry constantly, she denied panic attacks. Jannette reported she continues to feel guilty around not doing enough in her house. She denies SI/HI, self harm, denies A/V hallucinations.     Course of treatment included : group counseling and individual therapy     Progress/Outcome of Treatment Goals (brief summary of course of treatment) Jannette Baugh was a transfer from Prisc[facility]wick, they completed 14 sessions today, EMDR utilized.   Jannette attended 62 individual therapy sessions with this clinician.  Treatment plan goals focused on improving depression, reducing anxiety, improving self worth, and improving coping skills.  She felt comfortable enough in session to talk about her upbringing, her relationships, and views of herself.  Modalities utilized include: Client Centered, Cognitive Behavioral Therapy, Dialectical " Behavioral Therapy, Motivational Interviewing, ACT, Mindfulness, Supportive Therapy.     During her time in therapy, she also received EMDR with Pascale White- 4 sessions in March 2022,  attended this clinician's DBT Skills Group- Spring 2023, and Pascale White's depression support group Fall 2023.  Marriage counseling was also encouraged.      Beginning in December 2023, Jannette began to be more sporadic with her appts.  Attending appts 12/13/23, 2/7/24, 3/6/24, and 6/3/24.  Attendance and engagement were reviewed with patient.     Treatment Complications (if any): Jannette was able to see improvement in her symptoms with use of coping skills outside of therapy sessions; however, she struggles with accountability to self. Other barriers in therapy include: avoidance of therapeutic topics in session, deeply rooted core beliefs, and sporadic attendance in therapy in 2024.      Treatment Progress: fair    Current [facility] Psychiatric Provider: Kathleen Beauchamp PA-C    Discharge Medications include: see medication list in chart    Discharge Date: 9/4/24    Discharge Diagnosis:   1. Moderate recurrent major depression (HCC)        2. Generalized anxiety disorder            Criteria for Discharge:  last appt 6/3/24- over 90 days ago, with following appts being cancelled 7/15/24 and 8/1/24 via [employer].  Patient did not reschedule.    Aftercare recommendations include (include specific referral names and phone numbers, if appropriate):   Continue OP psychiatry with Kathleen Beauchamp PA-C  Recommend individual therapy and marriage counseling    Prognosis: fair

## 2024-09-05 RX ORDER — ESOMEPRAZOLE MAGNESIUM 40 MG/1
40 CAPSULE, DELAYED RELEASE ORAL
Qty: 90 CAPSULE | Refills: 1 | Status: SHIPPED | OUTPATIENT
Start: 2024-09-05

## 2024-09-06 NOTE — TELEPHONE ENCOUNTER
PA for esomeprazole 40 mg SUBMITTED     via    []CMM-KEY:   [][employer] logo-Case ID #   [x]Faxed to [facility]    []Other website   []Phone call Case ID #     Office notes sent, clinical questions answered. Awaiting determination    Turnaround time for your insurance to make a decision on your Prior Authorization can take 7-21 business days.

## 2024-09-19 ENCOUNTER — OFFICE VISIT (OUTPATIENT)
Dept: BARIATRICS | Facility: CLINIC | Age: 44
End: 2024-09-19
Payer: COMMERCIAL

## 2024-09-19 VITALS
RESPIRATION RATE: 16 BRPM | WEIGHT: 293 LBS | DIASTOLIC BLOOD PRESSURE: 70 MMHG | BODY MASS INDEX: 45.99 KG/M2 | TEMPERATURE: 97.5 F | SYSTOLIC BLOOD PRESSURE: 122 MMHG | OXYGEN SATURATION: 98 % | HEART RATE: 97 BPM | HEIGHT: 67 IN

## 2024-09-19 DIAGNOSIS — R63.8 ABNORMAL CRAVING: ICD-10-CM

## 2024-09-19 DIAGNOSIS — I10 PRIMARY HYPERTENSION: ICD-10-CM

## 2024-09-19 DIAGNOSIS — F33.1 MODERATE RECURRENT MAJOR DEPRESSION (HCC): ICD-10-CM

## 2024-09-19 DIAGNOSIS — E66.01 OBESITY, CLASS III, BMI 40-49.9 (MORBID OBESITY) (HCC): Primary | ICD-10-CM

## 2024-09-19 PROCEDURE — 99214 OFFICE O/P EST MOD 30 MIN: CPT | Performed by: PHYSICIAN ASSISTANT

## 2024-09-19 RX ORDER — BUPROPION HYDROCHLORIDE 100 MG/1
100 TABLET, EXTENDED RELEASE ORAL DAILY
COMMUNITY
Start: 2024-08-04 | End: 2024-09-19

## 2024-09-19 RX ORDER — NALTREXONE HYDROCHLORIDE 50 MG/1
TABLET, FILM COATED ORAL
Qty: 30 TABLET | Refills: 2 | Status: SHIPPED | OUTPATIENT
Start: 2024-09-19

## 2024-09-19 RX ORDER — BUPROPION HYDROCHLORIDE 150 MG/1
150 TABLET ORAL DAILY
COMMUNITY

## 2024-09-19 RX ORDER — VENLAFAXINE HYDROCHLORIDE 37.5 MG/1
37.5 CAPSULE, EXTENDED RELEASE ORAL DAILY
COMMUNITY

## 2024-09-19 NOTE — ASSESSMENT & PLAN NOTE
-Patient is pursuing Conservative Program  -Initial weight loss goal of 5-10% weight loss for improved health  -not currently interested in bariatric surgery due to her depression, fearful it will make it worse  -had palpitations with Saxenda  -Metformin caused diarrhea  -Wegovy caused worsening of depression  -Screening labs: up to date  -dietary recall reviewed. Encouraged continued food logging and journaling  -continues to work closely with psychologist for depression  -can consider trial of Zepbound if covered. Never received medication. Prior auth was never initiated. Will hold off for now since she has had worsening depression with recent med changes. Will trial Naltrexone in the mean time. Informed patient she can not take narcotics while on Naltrone and should stop it one week prior to surgery. She is new to Wellbutrin this past month  -medication agreement signed    Initial: 297.2 lbs  Current: 293.4 lbs  Change: -3.8 lbs  Goal:   lbs

## 2024-09-19 NOTE — PROGRESS NOTES
Assessment/Plan:    Obesity, Class III, BMI 40-49.9 (morbid obesity) ([facility])  -Patient is pursuing Conservative Program  -Initial weight loss goal of 5-10% weight loss for improved health  -not currently interested in bariatric surgery due to her depression, fearful it will make it worse  -had palpitations with Saxenda  -Metformin caused diarrhea  -Wegovy caused worsening of depression  -Screening labs: up to date  -dietary recall reviewed. Encouraged continued food logging and journaling  -continues to work closely with psychologist for depression  -can consider trial of Zepbound if covered. Never received medication. Prior auth was never initiated. Will hold off for now since she has had worsening depression with recent med changes. Will trial Naltrexone in the mean time. Informed patient she can not take narcotics while on Naltrone and should stop it one week prior to surgery. She is new to Wellbutrin this past month  -medication agreement signed    Initial: 297.2 lbs  Current: 293.4 lbs  Change: -3.8 lbs  Goal:   lbs    Moderate recurrent major depression (HCC)  -hx MILADYS  -Managed by psych  -Weaning off Effexor and started on Wellbutrin and Lamictal    Primary hypertension  -On Losartan and Toprol XL  -can improve with weight loss    Goals:    Food log (ie.) www.[employer].com,sparkpeople.com,CPUsage.com,[employer].com,etc.   No sugary beverages. At least 64oz of water daily.  Increase physical activity by 10 minutes daily. Gradually increase physical activity to a goal of 5 days per week for 30 minutes of MODERATE intensity PLUS 2 days per week of FULL BODY resistance training  4205-0171 calories per day  5-10 servings of fruits and vegetables per day  25-35 grams of dietary fiber per day      Follow up in approximately  4 months  with Non-Surgical Physician/Advanced Practitioner.     Diagnoses and all orders for this visit:    Obesity, Class III, BMI 40-49.9 (morbid obesity) ([facility])  -     naltrexone  (REVIA) 50 mg tablet; Take 1/2 tab po daily in AM and then increase to 1/2 tab po BID    Moderate recurrent major depression (HCC)    Primary hypertension    Abnormal craving  -     naltrexone (REVIA) 50 mg tablet; Take 1/2 tab po daily in AM and then increase to 1/2 tab po BID    Other orders  -     venlafaxine (EFFEXOR-XR) 37.5 mg 24 hr capsule; Take 37.5 mg by mouth daily  -     Discontinue: buPROPion (WELLBUTRIN SR) 100 mg 12 hr tablet; Take 100 mg by mouth daily  -     buPROPion (WELLBUTRIN XL) 150 mg 24 hr tablet; Take 150 mg by mouth daily Take 1 tab po daily          Subjective:   Chief Complaint   Patient presents with    Follow-up        Patient ID: Jannette Baugh  is a 44 y.o. female with excess weight/obesity here to pursue weight managment.  Patient is pursuing Conservative Program.     HPI Patient presents for [facility] follow up. Has not started Zepbound since last visit. Reports pharmacy never filled it.    Seeing new psychiatrist. She is weaning off Effexor and started on Wellbutrin and Lamictal. On Wellbutrin for past month. Does not notice any decrease in appetite    Food logging: Logging daily on MFP - around 2200 calories, measures portions  Increased appetite/cravings: snacks in the evening  Exercise: not walking as much due to back pain  Hydration: meeting water goals, 1 cup of cofffee + flavored creamer (3 TBSP)  ETOH: denies    Wt Readings from Last 10 Encounters:   09/19/24 133 kg (293 lb 6.4 oz)   08/04/24 132 kg (292 lb)   07/19/24 133 kg (294 lb)   05/29/24 129 kg (285 lb)   05/23/24 130 kg (286 lb 6.4 oz)   05/14/24 129 kg (284 lb)   05/10/24 129 kg (284 lb 9.6 oz)   05/01/24 128 kg (282 lb)   04/18/24 128 kg (282 lb 6.4 oz)   02/29/24 132 kg (290 lb 6.4 oz)        The following portions of the patient's history were reviewed and updated as appropriate: allergies, current medications, past family history, past medical history, past social history, past surgical history, and problem  "list.    Review of Systems   Cardiovascular:  Negative for chest pain and palpitations.   Gastrointestinal:  Negative for abdominal pain, diarrhea, nausea and vomiting.   Psychiatric/Behavioral:  Positive for dysphoric mood.        Objective:    /70 (BP Location: Right arm, Patient Position: Sitting, Cuff Size: Adult)   Pulse 97   Temp 97.5 °F (36.4 °C) (Temporal)   Resp 16   Ht 5' 7\" (1.702 m)   Wt 133 kg (293 lb 6.4 oz)   SpO2 98%   BMI 45.95 kg/m²      Physical Exam  Vitals and nursing note reviewed.   Constitutional:       General: She is not in acute distress.     Appearance: She is obese. She is not ill-appearing or toxic-appearing.   HENT:      Head: Normocephalic and atraumatic.      Nose: Nose normal.      Mouth/Throat:      Mouth: Mucous membranes are moist.   Eyes:      General: No scleral icterus.  Pulmonary:      Effort: Pulmonary effort is normal. No respiratory distress.   Abdominal:      Comments: protuberant   Musculoskeletal:      Right lower leg: No edema.      Left lower leg: No edema.   Skin:     General: Skin is dry.      Coloration: Skin is not jaundiced.   Neurological:      General: No focal deficit present.      Mental Status: She is alert and oriented to person, place, and time. Mental status is at baseline.   Psychiatric:         Mood and Affect: Mood normal.         Behavior: Behavior normal.         Thought Content: Thought content normal.         Judgment: Judgment normal.        "

## 2024-09-20 ENCOUNTER — TELEPHONE (OUTPATIENT)
Age: 44
End: 2024-09-20

## 2024-09-20 NOTE — TELEPHONE ENCOUNTER
Patient called check if we send her script for   naltrexone (REVIA) 50 mg tablet   Advised patient script was sent to [id number] - [place] - [address] [place] [address]   E-Prescribing Status: Receipt confirmed by pharmacy (9/19/2024  1:52 PM EDT)

## 2024-10-02 ENCOUNTER — APPOINTMENT (OUTPATIENT)
Dept: LAB | Facility: CLINIC | Age: 44
End: 2024-10-02
Payer: COMMERCIAL

## 2024-10-02 ENCOUNTER — LAB (OUTPATIENT)
Dept: LAB | Facility: CLINIC | Age: 44
End: 2024-10-02
Payer: COMMERCIAL

## 2024-10-02 DIAGNOSIS — M25.512 LEFT SHOULDER PAIN, UNSPECIFIED CHRONICITY: ICD-10-CM

## 2024-10-02 DIAGNOSIS — Z13.220 SCREENING FOR HYPERLIPIDEMIA: ICD-10-CM

## 2024-10-02 DIAGNOSIS — Z13.1 SCREENING FOR DIABETES MELLITUS: ICD-10-CM

## 2024-10-02 LAB
ALBUMIN SERPL BCG-MCNC: 4.6 G/DL (ref 3.5–5)
ALP SERPL-CCNC: 60 U/L (ref 34–104)
ALT SERPL W P-5'-P-CCNC: 31 U/L (ref 7–52)
ANION GAP SERPL CALCULATED.3IONS-SCNC: 10 MMOL/L (ref 4–13)
AST SERPL W P-5'-P-CCNC: 20 U/L (ref 13–39)
BASOPHILS # BLD AUTO: 0.08 THOUSANDS/ΜL (ref 0–0.1)
BASOPHILS NFR BLD AUTO: 1 % (ref 0–1)
BILIRUB SERPL-MCNC: 0.5 MG/DL (ref 0.2–1)
BUN SERPL-MCNC: 15 MG/DL (ref 5–25)
CALCIUM SERPL-MCNC: 9.5 MG/DL (ref 8.4–10.2)
CHLORIDE SERPL-SCNC: 100 MMOL/L (ref 96–108)
CHOLEST SERPL-MCNC: 192 MG/DL
CO2 SERPL-SCNC: 31 MMOL/L (ref 21–32)
CREAT SERPL-MCNC: 0.68 MG/DL (ref 0.6–1.3)
CRP SERPL QL: 2.4 MG/L
EOSINOPHIL # BLD AUTO: 0.14 THOUSAND/ΜL (ref 0–0.61)
EOSINOPHIL NFR BLD AUTO: 1 % (ref 0–6)
ERYTHROCYTE [DISTWIDTH] IN BLOOD BY AUTOMATED COUNT: 14.3 % (ref 11.6–15.1)
ERYTHROCYTE [SEDIMENTATION RATE] IN BLOOD: 9 MM/HOUR (ref 0–19)
GFR SERPL CREATININE-BSD FRML MDRD: 106 ML/MIN/1.73SQ M
GLUCOSE P FAST SERPL-MCNC: 115 MG/DL (ref 65–99)
HCT VFR BLD AUTO: 45.5 % (ref 34.8–46.1)
HDLC SERPL-MCNC: 60 MG/DL
HGB BLD-MCNC: 14.6 G/DL (ref 11.5–15.4)
IMM GRANULOCYTES # BLD AUTO: 0.03 THOUSAND/UL (ref 0–0.2)
IMM GRANULOCYTES NFR BLD AUTO: 0 % (ref 0–2)
LDLC SERPL CALC-MCNC: 99 MG/DL (ref 0–100)
LYMPHOCYTES # BLD AUTO: 2.74 THOUSANDS/ΜL (ref 0.6–4.47)
LYMPHOCYTES NFR BLD AUTO: 25 % (ref 14–44)
MCH RBC QN AUTO: 30.2 PG (ref 26.8–34.3)
MCHC RBC AUTO-ENTMCNC: 32.1 G/DL (ref 31.4–37.4)
MCV RBC AUTO: 94 FL (ref 82–98)
MONOCYTES # BLD AUTO: 0.67 THOUSAND/ΜL (ref 0.17–1.22)
MONOCYTES NFR BLD AUTO: 6 % (ref 4–12)
NEUTROPHILS # BLD AUTO: 7.41 THOUSANDS/ΜL (ref 1.85–7.62)
NEUTS SEG NFR BLD AUTO: 67 % (ref 43–75)
NONHDLC SERPL-MCNC: 132 MG/DL
NRBC BLD AUTO-RTO: 0 /100 WBCS
PLATELET # BLD AUTO: 214 THOUSANDS/UL (ref 149–390)
PMV BLD AUTO: 11.2 FL (ref 8.9–12.7)
POTASSIUM SERPL-SCNC: 4 MMOL/L (ref 3.5–5.3)
PROT SERPL-MCNC: 7 G/DL (ref 6.4–8.4)
RBC # BLD AUTO: 4.83 MILLION/UL (ref 3.81–5.12)
SODIUM SERPL-SCNC: 141 MMOL/L (ref 135–147)
TRIGL SERPL-MCNC: 164 MG/DL
WBC # BLD AUTO: 11.07 THOUSAND/UL (ref 4.31–10.16)

## 2024-10-02 PROCEDURE — 36415 COLL VENOUS BLD VENIPUNCTURE: CPT

## 2024-10-02 PROCEDURE — 80061 LIPID PANEL: CPT

## 2024-10-02 PROCEDURE — 85652 RBC SED RATE AUTOMATED: CPT

## 2024-10-02 PROCEDURE — 80053 COMPREHEN METABOLIC PANEL: CPT

## 2024-10-02 PROCEDURE — 86140 C-REACTIVE PROTEIN: CPT

## 2024-10-02 PROCEDURE — 85025 COMPLETE CBC W/AUTO DIFF WBC: CPT

## 2024-10-03 DIAGNOSIS — F41.1 GENERALIZED ANXIETY DISORDER: ICD-10-CM

## 2024-10-03 DIAGNOSIS — F33.1 MODERATE RECURRENT MAJOR DEPRESSION (HCC): ICD-10-CM

## 2024-10-03 RX ORDER — AMITRIPTYLINE HYDROCHLORIDE 75 MG/1
TABLET ORAL
Qty: 100 TABLET | Refills: 0 | Status: SHIPPED | OUTPATIENT
Start: 2024-10-03

## 2024-10-03 NOTE — RESULT ENCOUNTER NOTE
Labs show some mild abnormalities.  There is no need for concern . We can review these at your upcoming visit.

## 2024-10-11 ENCOUNTER — OFFICE VISIT (OUTPATIENT)
Dept: URGENT CARE | Facility: CLINIC | Age: 44
End: 2024-10-11
Payer: COMMERCIAL

## 2024-10-11 VITALS
WEIGHT: 288.8 LBS | BODY MASS INDEX: 45.23 KG/M2 | TEMPERATURE: 97.9 F | RESPIRATION RATE: 16 BRPM | DIASTOLIC BLOOD PRESSURE: 78 MMHG | OXYGEN SATURATION: 100 % | SYSTOLIC BLOOD PRESSURE: 130 MMHG | HEART RATE: 115 BPM

## 2024-10-11 DIAGNOSIS — S90.812A FOOT ABRASION, INFECTED, LEFT, INITIAL ENCOUNTER: ICD-10-CM

## 2024-10-11 DIAGNOSIS — L08.9 FOOT ABRASION, INFECTED, LEFT, INITIAL ENCOUNTER: ICD-10-CM

## 2024-10-11 DIAGNOSIS — R42 DIZZINESS AND GIDDINESS: Primary | ICD-10-CM

## 2024-10-11 PROCEDURE — 99214 OFFICE O/P EST MOD 30 MIN: CPT | Performed by: PHYSICIAN ASSISTANT

## 2024-10-11 RX ORDER — MECLIZINE HYDROCHLORIDE 25 MG/1
25 TABLET ORAL 3 TIMES DAILY PRN
Qty: 30 TABLET | Refills: 0 | Status: SHIPPED | OUTPATIENT
Start: 2024-10-11 | End: 2024-10-21

## 2024-10-11 RX ORDER — MUPIROCIN 20 MG/G
OINTMENT TOPICAL 2 TIMES DAILY
Qty: 30 G | Refills: 0 | Status: SHIPPED | OUTPATIENT
Start: 2024-10-11 | End: 2024-10-21

## 2024-10-11 NOTE — PROGRESS NOTES
[facility]        NAME: Jannette Baugh is a 44 y.o. female  : 1980    MRN: [id number]  DATE: 2024  TIME: 9:50 AM    Assessment and Plan   Dizziness and giddiness [R42]  1. Dizziness and giddiness  meclizine (ANTIVERT) 25 mg tablet      2. Foot abrasion, infected, left, initial encounter  mupirocin (BACTROBAN) 2 % ointment            Patient Instructions     For dizziness:  Start Meclizine 1 pill every 8 hrs as needed.  If symptoms worsen or persist for greater than week follow up with PCP, wound consider ENT referral.  If dizziness worsens significantly or severe headache develops or vision changes go to ER immediately.    For foot:  Apply antibiotic ointment twice a day  Try CeraVe or Cetaphil cream - apply at least twice a day.  At night apply a thick coating of vaseline and cover with sock    Follow up with PCP in 3-5 days.  Proceed to  ER if symptoms worsen.    If tests have been performed at [facility], our office will contact you with results if changes need to be made to the care plan discussed with you at the visit.  You can review your full results on [facility].    Chief Complaint     Chief Complaint   Patient presents with    Headache     Headache / dizziness started on and off for 3 weeks     Foot Pain     Left heel is cracked started a week ago. Saw Her Dr with this same issue last year same foot/ heel  Dry skin         History of Present Illness       Headache    43 y/o female presents for evaluation of two separate complaints;  1) dizziness and HA x 3 weeks - she describes constant, low level HA - describes a band around the sides of her head wrapping around to the front.  Minimal improvement with Tylenol/Advil.  She denies associated visual changes.    She does report a h/o C spine fusion 9 months ago.  She is experiencing some shoulder pain but denies any neck pain.  Dizziness is describes as a lightheaded/off balance feeling.  She denies vertigo.  She states  this has worsened in the past few days and now notes nausea as well.  Initially dizziness only occurred when looking up - moving her head up, not just her eyes.  Now it occurs without positional changes.  She denies h/o dizziness/vertigo.  She denies hearing loss/tinnitus.  She denies nasal congestion,ST, cough.  She takes Zyrtec daily.  She is unable to use nasal sprays due to nose bleeds.  2) left heel crack which she noted one week ago but it has become painful in the past few days.  She is using Kamea cream which her podiatrist prescribed last year for a similar problem.  This time she states the crack remains very painful.    She denies h/o DM  PMH: depression, Htn, hypercholesterolemia      Review of Systems   Constitutional:  Negative for chills and fever.   HENT:  Negative for congestion, ear pain, hearing loss, sinus pressure, sore throat and tinnitus.    Eyes:  Negative for photophobia, pain and visual disturbance.   Respiratory:  Negative for cough and shortness of breath.    Cardiovascular:  Negative for chest pain and palpitations.   Gastrointestinal:  Positive for nausea. Negative for abdominal pain and vomiting.   Genitourinary:  Negative for dysuria and hematuria.   Musculoskeletal:  Negative for arthralgias and back pain.   Skin:  Positive for wound. Negative for color change and rash.   Neurological:  Positive for dizziness, light-headedness and headaches. Negative for seizures and syncope.   All other systems reviewed and are negative.        Current Medications       Current Outpatient Medications:     albuterol (PROVENTIL HFA,VENTOLIN HFA) 90 mcg/act inhaler, Inhale 2 puffs every 6 (six) hours as needed for wheezing or shortness of breath (cough), Disp: 18 g, Rfl: 1    amitriptyline (ELAVIL) 75 mg tablet, TAKE 1 TABLET (75 MG) BY MOUTH ONCE DAILY AT BEDTIME, Disp: 100 tablet, Rfl: 0    APPLE CIDER VINEGAR PO, Take by mouth, Disp: , Rfl:     Ascorbic Acid (VITAMIN C) 500 MG CAPS, Take 1 capsule  by mouth daily at bedtime, Disp: , Rfl:     aspirin 81 mg chewable tablet, Chew 1 tablet (81 mg total) daily (Patient taking differently: Chew 81 mg daily at bedtime), Disp: 30 tablet, Rfl: 30    Berberine Chloride 500 MG CAPS, Take by mouth, Disp: , Rfl:     buPROPion (WELLBUTRIN XL) 150 mg 24 hr tablet, Take 150 mg by mouth daily Take 1 tab po daily, Disp: , Rfl:     cetirizine (ZyrTEC) 10 mg tablet, Take 10 mg by mouth daily, Disp: , Rfl:     Cholecalciferol (VITAMIN D3) 5000 units CAPS, Take 5,000 Units by mouth daily at bedtime, Disp: , Rfl:     cyanocobalamin (VITAMIN B-12) 1,000 mcg tablet, Take 1,000 mcg by mouth daily , Disp: , Rfl:     docusate sodium (DULCOLAX) 100 mg capsule, Take by mouth daily at bedtime, Disp: , Rfl:     esomeprazole (NexIUM) 40 MG capsule, Take 1 capsule (40 mg total) by mouth daily before breakfast, Disp: 90 capsule, Rfl: 1    lamoTRIgine (LaMICtal) 25 mg tablet, 100 mg daily, Disp: , Rfl:     losartan (COZAAR) 100 MG tablet, Take 1 tablet (100 mg total) by mouth daily, Disp: 90 tablet, Rfl: 4    meclizine (ANTIVERT) 25 mg tablet, Take 1 tablet (25 mg total) by mouth 3 (three) times a day as needed for dizziness for up to 10 days, Disp: 30 tablet, Rfl: 0    Melatonin 10 MG TABS, Take by mouth daily at bedtime, Disp: , Rfl:     metoprolol succinate (TOPROL-XL) 100 mg 24 hr tablet, Take 1 tablet (100 mg total) by mouth daily, Disp: 90 tablet, Rfl: 1    Misc Natural Products (ELAVIL OTC SLEEP PO), Take by mouth, Disp: , Rfl:     Multiple Vitamins-Minerals (CENTRUM ADULTS PO), Take 1 tablet by mouth daily at bedtime, Disp: , Rfl:     mupirocin (BACTROBAN) 2 % ointment, Apply topically 2 (two) times a day for 10 days Apply to left heel twice a day for 10 days, Disp: 30 g, Rfl: 0    naltrexone (REVIA) 50 mg tablet, Take 1/2 tab po daily in AM and then increase to 1/2 tab po BID, Disp: 30 tablet, Rfl: 2    ofloxacin (OCUFLOX) 0.3 % ophthalmic solution, Instill 5 drops in affected ear two  times a day for one week, Disp: 5 mL, Rfl: 0    Omega-3 Fatty Acids (FISH OIL ADULT GUMMIES PO), Take by mouth daily at bedtime, Disp: , Rfl:     rosuvastatin (CRESTOR) 40 MG tablet, Take 1 tablet (40 mg total) by mouth daily, Disp: 90 tablet, Rfl: 3    venlafaxine (EFFEXOR-XR) 37.5 mg 24 hr capsule, Take 37.5 mg by mouth daily, Disp: , Rfl:     venlafaxine (EFFEXOR-XR) 75 mg 24 hr capsule, Take 1 capsule (75 mg total) by mouth daily at bedtime, Disp: 90 capsule, Rfl: 0    Zinc 50 MG TABS, Take by mouth daily at bedtime, Disp: , Rfl:     estradiol (ESTRACE) 0.1 mg/g vaginal cream, APPLY PEA SIZED AMOUNT TO AREA OF CONCERN EVERY OTHER DAY (Patient not taking: Reported on 8/4/2024), Disp: , Rfl:     naproxen (NAPROSYN) 500 mg tablet, take 1 tablet by mouth every 12 hours for 7 days BEGIN TAKING AFTER SURGERY (Patient not taking: Reported on 8/4/2024), Disp: , Rfl:     Pyridoxine HCl (VITAMIN B-6 PO), Take by mouth (Patient not taking: Reported on 8/4/2024), Disp: , Rfl:     venlafaxine (EFFEXOR-XR) 150 mg 24 hr capsule, Take 1 capsule (150 mg total) by mouth daily at bedtime (Patient not taking: Reported on 9/19/2024), Disp: 90 capsule, Rfl: 0    Current Allergies     Allergies as of 10/11/2024 - Reviewed 10/11/2024   Allergen Reaction Noted    Neurontin [gabapentin] Other (See Comments) 10/16/2014    Penicillins Anaphylaxis, Swelling, and Angioedema 05/16/2017    Aripiprazole Other (See Comments) 11/06/2013    Lorazepam Other (See Comments) 06/11/2014    Wound dressing adhesive Hives 01/18/2022    Carbamazepine Other (See Comments) 10/16/2012    Doxycycline Hives 04/10/2013    Lamotrigine Rash 08/28/2012            The following portions of the patient's history were reviewed and updated as appropriate: allergies, current medications, past family history, past medical history, past social history, past surgical history and problem list.     Past Medical History:   Diagnosis Date    Allergic rhinitis     Anxiety      "Arthritis     C. difficile diarrhea     Chest pain     Chronic pain disorder     BACK SHOULDERS NECK    Colon polyp     COPD (chronic obstructive pulmonary disease) ([facility])     CPAP (continuous positive airway pressure) dependence     Depression     Diarrhea     Disease of thyroid gland     nodule    Dysphagia     with pain \"feels like a blockage\" in the esophagus    Eating disorder     Fibromyalgia, primary     Fissure, anal     GERD (gastroesophageal reflux disease)     Hearing difficulty of right ear     frequent infections-(x1 lead to sepsis)    Hyperlipidemia     Hypertension     Irritable bowel syndrome     Obesity     Perforation of tympanic membrane     Right    PONV (postoperative nausea and vomiting)     RBBB     Right bundle branch blockage     Sleep apnea     no cpap    Thyroid nodule     Wears glasses        Past Surgical History:   Procedure Laterality Date    ANAL FISTULOTOMY N/A 2018    Procedure: FISTULOTOMY;  Surgeon: Clay Laws MD;  [facility]: [facility];  Service: Colorectal    BACK SURGERY      lumbar herniated disc    BREAST CYST EXCISION Left 13 yrs ago  benign    BREAST SURGERY Left 2006    cyst removal    CARPAL TUNNEL RELEASE      CERVICAL FUSION  2023    C5-6     SECTION      CHOLECYSTECTOMY      COLONOSCOPY      CYST REMOVAL      DENTAL SURGERY      ENDOMETRIAL ABLATION N/A 2021    Procedure: D&C, ABLATION ENDOMETRIAL MARIEL;  Surgeon: Juan Pablo Doan MD;  Location:  MAIN OR;  Service: Gynecology    FL MYELOGRAM CERVICAL  2022    HAND FUSION Left 2024    HAND SURGERY      INCISION AND DRAINAGE OF WOUND N/A 2018    Procedure: INCISION AND DRAINAGE (I&D) BUTTOCK;  Surgeon: Clay Laws MD;  Location: [facility];  Service: Colorectal    MYRINGOTOMY W/ TUBES Right 2015    [place]    [place] ANRCT XM SURG REQ ANES GENERAL SPI/EDRL DX N/A 2018    Procedure: EXAM UNDER ANESTHESIA (EUA);  Surgeon: Clay Laws, " MD;  Location: [facility];  Service: Colorectal    NM COLONOSCOPY FLX DX W/COLLJ SPEC WHEN PFRMD N/A 08/02/2017    Procedure: COLONOSCOPY;  Surgeon: Jose Rafael Penn MD;  [facility];  Service: Gastroenterology    NM DEBRIDEMENT SUBCUTANEOUS TISSUE 1ST 20 SQ CM/< N/A 07/19/2022    Procedure: Debridement abdominal wall;  Surgeon: Robert Bloch, MD;  Location: [facility];  Service: General    NM EXCISION HIDRADENITIS INGUINAL COMPLEX REPAIR Bilateral 01/18/2022    Procedure: WIDE EXCISION HIDRADENITIS ABDOMINAL WALL;  Surgeon: Robert Bloch, MD;  Location: [facility];  Service: General    NM INCISION & DRAINAGE ABSCESS SIMPLE/SINGLE N/A 07/19/2022    Procedure: INCISION AND DRAINAGE ABDOMINAL WALL;  Surgeon: Robert Bloch, MD;  Location: [facility];  Service: General    NM TENDON SHEATH INCISION Right 03/13/2018    Procedure: LONG TRIGGER FINGER RELEASE;  Surgeon: Nickolas Guerrero DO;  [facility];  Service: Orthopedics    NM TYMPANOPLASTY W/O MASTOIDECT W/O OSSICLE RECNSTJ Right 05/24/2017    Procedure: TYMPANOPLASTY WITH PERICHONDRIN GRAFT;  Surgeon: Hugo Moreira DO;  [facility];  Service: ENT    SHOULDER SURGERY Right     SPINE SURGERY      TRIGGER FINGER RELEASE      ULNAR NERVE TRANSPOSITION Left     UMBILICAL HIDRADENITIS EXCISION  01/18/2022    subumbilical hidradenitis exicsion, Dr. Bloch, [facility]    US GUIDED THYROID BIOPSY  07/03/2023    WISDOM TOOTH EXTRACTION         Family History   Problem Relation Age of Onset    Hypertension Mother     Hyperlipidemia Mother     Diabetes Mother     Arthritis Mother     Depression Mother     Hypertension Father     Hyperlipidemia Father     Heart disease Father         cardiac disorder-myocardial infarction arrhythmias    Heart attack Father     Early death Father     Crohn's disease Brother     Arthritis Brother     Diabetes unspecified Maternal Grandmother     Thyroid disease Paternal Grandmother     Crohn's disease Daughter     No  Known Problems Maternal Aunt     No Known Problems Maternal Aunt     No Known Problems Maternal Aunt     No Known Problems Maternal Aunt     No Known Problems Paternal Aunt     No Known Problems Paternal Aunt     Heart attack Paternal Uncle          Medications have been verified.        Objective   /78   Pulse (!) 115   Temp 97.9 °F (36.6 °C)   Resp 16   Wt 131 kg (288 lb 12.8 oz)   SpO2 100%   BMI 45.23 kg/m²   No LMP recorded. Patient has had an ablation.       Physical Exam     Physical Exam  Vitals and nursing note reviewed.   Constitutional:       General: She is not in acute distress.     Appearance: Normal appearance. She is obese.   HENT:      Head: Normocephalic and atraumatic.      Right Ear: Ear canal normal.      Left Ear: Tympanic membrane and ear canal normal.      Ears:      Comments: Right TM with otosclerosis - well circumscribed area around 7 0'clock on the TM, no erythema, no middle ear effusion     Nose:      Comments: Left turbinate with moderate edema/erythema  Right turbinate with mild turbinate edema/erythema     Mouth/Throat:      Mouth: Mucous membranes are moist.      Pharynx: No oropharyngeal exudate or posterior oropharyngeal erythema.   Eyes:      Extraocular Movements: Extraocular movements intact.      Conjunctiva/sclera: Conjunctivae normal.      Pupils: Pupils are equal, round, and reactive to light.      Comments: No nystagmus   Cardiovascular:      Rate and Rhythm: Normal rate and regular rhythm.      Pulses: Normal pulses.      Heart sounds: Normal heart sounds.   Pulmonary:      Effort: Pulmonary effort is normal.      Breath sounds: Normal breath sounds.   Musculoskeletal:      Cervical back: Neck supple. No tenderness.   Lymphadenopathy:      Cervical: No cervical adenopathy.   Skin:     General: Skin is warm and dry.      Comments: Left heel:  vertical 2 cm deep skin fissure which is tender to palpation without drainage, or erythema.  Callus formation present on  heel    Neurological:      Mental Status: She is alert and oriented to person, place, and time.      Coordination: Coordination normal.   Psychiatric:         Mood and Affect: Mood normal.         Behavior: Behavior normal.

## 2024-10-11 NOTE — PATIENT INSTRUCTIONS
For dizziness:  Start Meclizine 1 pill every 8 hrs as needed.  If symptoms worsen or persist for greater than week follow up with PCP, wound consider ENT referral.  If dizziness worsens significantly or severe headache develops or vision changes go to ER immediately.    For foot:  Apply antibiotic ointment twice a day  Try CeraVe or Cetaphil cream - apply at least twice a day.  At night apply a thick coating of vaseline and cover with sock    Follow up with PCP in 3-5 days.  Proceed to  ER if symptoms worsen.    If tests have been performed at [facility], our office will contact you with results if changes need to be made to the care plan discussed with you at the visit.  You can review your full results on [facility] MyChart.

## 2024-10-14 DIAGNOSIS — R42 DIZZINESS AND GIDDINESS: ICD-10-CM

## 2024-10-18 ENCOUNTER — HOSPITAL ENCOUNTER (OUTPATIENT)
Dept: MAMMOGRAPHY | Facility: HOSPITAL | Age: 44
End: 2024-10-18
Payer: COMMERCIAL

## 2024-10-18 DIAGNOSIS — Z12.31 BREAST CANCER SCREENING BY MAMMOGRAM: ICD-10-CM

## 2024-10-18 PROCEDURE — 77063 BREAST TOMOSYNTHESIS BI: CPT

## 2024-10-18 PROCEDURE — 77067 SCR MAMMO BI INCL CAD: CPT

## 2024-10-26 ENCOUNTER — AMB VIDEO VISIT (OUTPATIENT)
Dept: OTHER | Facility: HOSPITAL | Age: 44
End: 2024-10-26
Payer: COMMERCIAL

## 2024-10-26 PROCEDURE — 99212 OFFICE O/P EST SF 10 MIN: CPT | Performed by: FAMILY MEDICINE

## 2024-10-26 NOTE — CARE ANYWHERE EVISITS
Visit Summary for RAND SIMPSON - Gender: Female - Date of Birth: 1980  Date: [id number] - Duration: 6 minutes  Patient: RAND SIMPSON  Provider: Andrew Jordan    Patient Contact Information  Address  [address] [facility]; [place] [address]  [phone]    Visit Topics  white sores on tongue  [Added By: Self - 2024-10-26]    Triage Questions   What is your current physical address in the event of a medical emergency? Answer []  Are you allergic to any medications? Answer []  Are you now or could you be pregnant? Answer []  Do you have any immune system compromise or chronic lung   disease? Answer []  Do you have any vulnerable family members in the home (infant, pregnant, cancer, elderly)? Answer []     Conversation Transcripts  [0A][0A] [Notification] You are connected with Andrew Jordan, Family Physician.[0A][Notification] RAND SIMPSON is located in [place].[0A][Notification] RAND SIMPSON has shared health history...[0A][Notification] Andrew Jordan has added a   diagnosis/procedure code.[0A][Notification] Andrew Jordan has added a diagnosis/procedure code.[0A][Notification] Andrew Jordan has added a prescription.[0A]    Diagnosis  Other forms of stomatitis    Procedures  Value: [id number] Code: CPT-4 OL DIG E/M SVC 11-20 MIN    Medications Prescribed    Lidocaine Viscous      Frequency :   Patient Instructions : Swish and spit 1 teaspoon or dab on with Q-tip QID prn sore mouth for 3-5 days. Disp: 150 mls   Refills : 0  Instructions to the Pharmacist : Lidocaine 2% muc sol. 150 mL Recipe: Equal parts benedryl 12.5/5 mL, lido, maalox. Substitutions allowed      Provider Notes  [0A][0A] The caller confirms they are the patient on the registration, and that they are calling from [place][0A]Visit Mode: Video[0A]HPI: Onset 2 weeks ago of painful sores to tongue. Presumed related to dry mouth from starting Bupropion and lamictal while   weaning off of effexor. Still on all three.  Similar in the past  but  resolved in a couple of days. [0A]PMH:Anxiety/Depression, GERD, Chol, HTN  [0A]Meds:  Bupropion, lamicata, weaning off effector , Metoprolol, nexium, lipitor[0A]Allergies: PCN   [0A]Nonsmoking[0A]PSHx:[0A]Not pregnant or nursing. 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 small papules 4 or 5 noted to tip and described to sides and under tongue. Pale. [0A]Assessment:   stomatitis - etiology unclear, may be medication related. [0A]Plan:  magic mouthwash [0A]follow up with psychiatrist and PCP to investigate further. [0A]See a doctor in person at any time if worsening or new symptoms or if not improving as expected.    [0A]Discussed the differential diagnosis, risks/benefits for treatment options and when to seek in person care. All questions were addressed. Patient voiced understanding and agreement with plan.[0A]=================================[0A]1.  For   PRESCRIPTION OR SICK SLIP ISSUES please call [phone] for assistance.[0A]2. Please print a copy of this note and send it to your regular doctor, or take it to your next visit so it may be included in your medical record[0A]3. Please see your primary   care provider on an annual basis or more frequently if directed[0A]    Electronically signed by: Andrew Jordan(NPI [id number])

## 2024-10-27 ENCOUNTER — OFFICE VISIT (OUTPATIENT)
Dept: URGENT CARE | Facility: CLINIC | Age: 44
End: 2024-10-27
Payer: COMMERCIAL

## 2024-10-27 VITALS
WEIGHT: 290 LBS | BODY MASS INDEX: 45.52 KG/M2 | TEMPERATURE: 97.8 F | HEIGHT: 67 IN | RESPIRATION RATE: 16 BRPM | HEART RATE: 73 BPM | OXYGEN SATURATION: 99 %

## 2024-10-27 DIAGNOSIS — K12.1 STOMATITIS: Primary | ICD-10-CM

## 2024-10-27 DIAGNOSIS — R68.2 DRY MOUTH: ICD-10-CM

## 2024-10-27 PROCEDURE — 99213 OFFICE O/P EST LOW 20 MIN: CPT | Performed by: NURSE PRACTITIONER

## 2024-10-27 NOTE — PATIENT INSTRUCTIONS
Try to increase your water intake. Use a humidifier in your bedroom. Suck on hard candy or throat lozenges. You can purchase mouth rinse over the counter for dry mouth. Continue to use magic mouthwash as needed. Stay away from hot foods and beverages. Take tylenol/motrin for pain.    Follow up with PCP and psych provider tomorrow to determine need for additional testing or medication changes.     Go to ED if symptoms worsen.

## 2024-10-27 NOTE — PROGRESS NOTES
[facility]        NAME: Jannette Baugh is a 44 y.o. female  : 1980    MRN: [id number]  DATE: 2024  TIME: 10:02 AM    Assessment and Plan   Stomatitis [K12.1]  1. Stomatitis        2. Dry mouth              Patient Instructions   Try to increase your water intake. Use a humidifier in your bedroom. Suck on hard candy or throat lozenges. You can purchase mouth rinse over the counter for dry mouth. Continue to use magic mouthwash as needed. Stay away from hot foods and beverages. Take tylenol/motrin for pain.    Follow up with PCP and psych provider tomorrow to determine need for additional testing or medication changes.     Go to ED if symptoms worsen.     Follow up with PCP in 3-5 days.  Proceed to  ER if symptoms worsen.    If tests have been performed at [facility], our office will contact you with results if changes need to be made to the care plan discussed with you at the visit.  [facility]    Chief Complaint     Chief Complaint   Patient presents with    Oral Pain     Sores on tongue started 2 weeks ago         History of Present Illness       43 y/o female here with complains of sores on tongue for the past 2 weeks. She had a virtual physician visit yesterday and was prescribed magic mouthwash which she says is not really helping. She complains of dry mouth and is on several medications contributing to this symptom. She reports 5 similar episodes in the past but none have lasted this long or have been this pronounced. She reports increase in amount of ulcers since onset of symptoms. She wants to make sure there is no infection. She has not seen her PCP for this issue. She denies any signs of illness including N/V/D, fever, or additional lesions elsewhere on her body. She is not currently pregnant.   She does admit to starting lamictal and wellbutrin 3 mos ago.   PMH is listed and reviewed     Oral Pain         Review of Systems   Review  of Systems   Constitutional: Negative.    HENT:  Positive for mouth sores.    Eyes: Negative.    Respiratory: Negative.     Cardiovascular: Negative.    Endocrine: Negative.    Genitourinary: Negative.    Musculoskeletal: Negative.    Skin: Negative.    Allergic/Immunologic: Negative.    Neurological: Negative.    Hematological: Negative.    Psychiatric/Behavioral: Negative.           Current Medications       Current Outpatient Medications:     albuterol (PROVENTIL HFA,VENTOLIN HFA) 90 mcg/act inhaler, Inhale 2 puffs every 6 (six) hours as needed for wheezing or shortness of breath (cough), Disp: 18 g, Rfl: 1    amitriptyline (ELAVIL) 75 mg tablet, TAKE 1 TABLET (75 MG) BY MOUTH ONCE DAILY AT BEDTIME, Disp: 100 tablet, Rfl: 0    APPLE CIDER VINEGAR PO, Take by mouth, Disp: , Rfl:     Ascorbic Acid (VITAMIN C) 500 MG CAPS, Take 1 capsule by mouth daily at bedtime, Disp: , Rfl:     aspirin 81 mg chewable tablet, Chew 1 tablet (81 mg total) daily (Patient taking differently: Chew 81 mg daily at bedtime), Disp: 30 tablet, Rfl: 30    Berberine Chloride 500 MG CAPS, Take by mouth, Disp: , Rfl:     buPROPion (WELLBUTRIN XL) 150 mg 24 hr tablet, Take 150 mg by mouth daily Take 1 tab po daily, Disp: , Rfl:     cetirizine (ZyrTEC) 10 mg tablet, Take 10 mg by mouth daily, Disp: , Rfl:     Cholecalciferol (VITAMIN D3) 5000 units CAPS, Take 5,000 Units by mouth daily at bedtime, Disp: , Rfl:     cyanocobalamin (VITAMIN B-12) 1,000 mcg tablet, Take 1,000 mcg by mouth daily , Disp: , Rfl:     docusate sodium (DULCOLAX) 100 mg capsule, Take by mouth daily at bedtime, Disp: , Rfl:     esomeprazole (NexIUM) 40 MG capsule, Take 1 capsule (40 mg total) by mouth daily before breakfast, Disp: 90 capsule, Rfl: 1    lamoTRIgine (LaMICtal) 25 mg tablet, 100 mg daily, Disp: , Rfl:     losartan (COZAAR) 100 MG tablet, Take 1 tablet (100 mg total) by mouth daily, Disp: 90 tablet, Rfl: 4    Melatonin 10 MG TABS, Take by mouth daily at bedtime,  Disp: , Rfl:     metoprolol succinate (TOPROL-XL) 100 mg 24 hr tablet, Take 1 tablet (100 mg total) by mouth daily, Disp: 90 tablet, Rfl: 1    Misc Natural Products (ELAVIL OTC SLEEP PO), Take by mouth, Disp: , Rfl:     Multiple Vitamins-Minerals (CENTRUM ADULTS PO), Take 1 tablet by mouth daily at bedtime, Disp: , Rfl:     naltrexone (REVIA) 50 mg tablet, Take 1/2 tab po daily in AM and then increase to 1/2 tab po BID, Disp: 30 tablet, Rfl: 2    ofloxacin (OCUFLOX) 0.3 % ophthalmic solution, Instill 5 drops in affected ear two times a day for one week, Disp: 5 mL, Rfl: 0    Omega-3 Fatty Acids (FISH OIL ADULT GUMMIES PO), Take by mouth daily at bedtime, Disp: , Rfl:     rosuvastatin (CRESTOR) 40 MG tablet, Take 1 tablet (40 mg total) by mouth daily, Disp: 90 tablet, Rfl: 3    venlafaxine (EFFEXOR-XR) 37.5 mg 24 hr capsule, Take 37.5 mg by mouth daily, Disp: , Rfl:     venlafaxine (EFFEXOR-XR) 75 mg 24 hr capsule, Take 1 capsule (75 mg total) by mouth daily at bedtime, Disp: 90 capsule, Rfl: 0    Zinc 50 MG TABS, Take by mouth daily at bedtime, Disp: , Rfl:     estradiol (ESTRACE) 0.1 mg/g vaginal cream, APPLY PEA SIZED AMOUNT TO AREA OF CONCERN EVERY OTHER DAY (Patient not taking: Reported on 8/4/2024), Disp: , Rfl:     meclizine (ANTIVERT) 25 mg tablet, Take 1 tablet (25 mg total) by mouth 3 (three) times a day as needed for dizziness for up to 10 days, Disp: 30 tablet, Rfl: 0    mupirocin (BACTROBAN) 2 % ointment, Apply topically 2 (two) times a day for 10 days Apply to left heel twice a day for 10 days, Disp: 30 g, Rfl: 0    naproxen (NAPROSYN) 500 mg tablet, take 1 tablet by mouth every 12 hours for 7 days BEGIN TAKING AFTER SURGERY (Patient not taking: Reported on 8/4/2024), Disp: , Rfl:     Pyridoxine HCl (VITAMIN B-6 PO), Take by mouth (Patient not taking: Reported on 8/4/2024), Disp: , Rfl:     venlafaxine (EFFEXOR-XR) 150 mg 24 hr capsule, Take 1 capsule (150 mg total) by mouth daily at bedtime (Patient  "not taking: Reported on 9/19/2024), Disp: 90 capsule, Rfl: 0    Current Allergies     Allergies as of 10/27/2024 - Reviewed 10/27/2024   Allergen Reaction Noted    Neurontin [gabapentin] Other (See Comments) 10/16/2014    Penicillins Anaphylaxis, Swelling, and Angioedema 05/16/2017    Aripiprazole Other (See Comments) 11/06/2013    Lorazepam Other (See Comments) 06/11/2014    Wound dressing adhesive Hives 01/18/2022    Carbamazepine Other (See Comments) 10/16/2012    Doxycycline Hives 04/10/2013    Lamotrigine Rash 08/28/2012            The following portions of the patient's history were reviewed and updated as appropriate: allergies, current medications, past family history, past medical history, past social history, past surgical history and problem list.     Past Medical History:   Diagnosis Date    Allergic rhinitis     Anxiety     Arthritis     C. difficile diarrhea 2020    Chest pain     Chronic pain disorder     BACK SHOULDERS NECK    Colon polyp     COPD (chronic obstructive pulmonary disease) ([facility])     CPAP (continuous positive airway pressure) dependence     Depression     Diarrhea     Disease of thyroid gland     nodule    Dysphagia     with pain \"feels like a blockage\" in the esophagus    Eating disorder     Fibromyalgia, primary     Fissure, anal     GERD (gastroesophageal reflux disease)     Hearing difficulty of right ear     frequent infections-(x1 lead to sepsis)    Hyperlipidemia     Hypertension     Irritable bowel syndrome     Obesity     Perforation of tympanic membrane     Right    PONV (postoperative nausea and vomiting)     RBBB     Right bundle branch blockage     Sleep apnea     no cpap    Thyroid nodule     Wears glasses        Past Surgical History:   Procedure Laterality Date    ANAL FISTULOTOMY N/A 11/02/2018    Procedure: FISTULOTOMY;  Surgeon: Clay Laws MD;  [facility]: [facility];  Service: Colorectal    BACK SURGERY      lumbar herniated disc    BREAST CYST EXCISION Left " 13 yrs ago  benign    BREAST SURGERY Left 2006    cyst removal    CARPAL TUNNEL RELEASE      CERVICAL FUSION  2023    C5-6     SECTION      CHOLECYSTECTOMY      COLONOSCOPY      CYST REMOVAL      DENTAL SURGERY      ENDOMETRIAL ABLATION N/A 2021    Procedure: D&C, ABLATION ENDOMETRIAL MARIEL;  Surgeon: Juan Pablo Doan MD;  Location: [facility];  Service: Gynecology    FL MYELOGRAM CERVICAL  2022    HAND FUSION Left 2024    HAND SURGERY      INCISION AND DRAINAGE OF WOUND N/A 2018    Procedure: INCISION AND DRAINAGE (I&D) BUTTOCK;  Surgeon: Clay Laws MD;  [facility];  Service: Colorectal    MYRINGOTOMY W/ TUBES Right 2015    [place]    LA ANRCT XM SURG REQ ANES GENERAL SPI/EDRL DX N/A 2018    Procedure: EXAM UNDER ANESTHESIA (EUA);  Surgeon: Clay Laws MD;  [facility]: [facility];  Service: Colorectal    LA COLONOSCOPY FLX DX W/COLLJ SPEC WHEN PFRMD N/A 2017    Procedure: COLONOSCOPY;  Surgeon: Jose Rafael Penn MD;  [facility];  Service: Gastroenterology    LA DEBRIDEMENT SUBCUTANEOUS TISSUE 1ST 20 SQ CM/< N/A 2022    Procedure: Debridement abdominal wall;  Surgeon: Robert Bloch, MD;  Location: [facility];  Service: General    LA EXCISION HIDRADENITIS INGUINAL COMPLEX REPAIR Bilateral 2022    Procedure: WIDE EXCISION HIDRADENITIS ABDOMINAL WALL;  Surgeon: Robert Bloch, MD;  Location: [facility];  Service: General    LA INCISION & DRAINAGE ABSCESS SIMPLE/SINGLE N/A 2022    Procedure: INCISION AND DRAINAGE ABDOMINAL WALL;  Surgeon: Robert Bloch, MD;  Location: [facility];  Service: General    LA TENDON SHEATH INCISION Right 2018    Procedure: LONG TRIGGER FINGER RELEASE;  Surgeon: Nickolas Guerrero DO;  [facility];  Service: Orthopedics    LA TYMPANOPLASTY W/O MASTOIDECT W/O OSSICLE RECNSTJ Right 2017    Procedure: TYMPANOPLASTY WITH PERICHONDRIN GRAFT;  Surgeon: Hugo Moreira DO;   "[facility];  Service: ENT    SHOULDER SURGERY Right     SPINE SURGERY      TRIGGER FINGER RELEASE      ULNAR NERVE TRANSPOSITION Left     UMBILICAL HIDRADENITIS EXCISION  01/18/2022    subumbilical hidradenitis exicsion, Dr. Bloch, [facility]    US GUIDED THYROID BIOPSY  07/03/2023    WISDOM TOOTH EXTRACTION         Family History   Problem Relation Age of Onset    Hypertension Mother     Hyperlipidemia Mother     Diabetes Mother     Arthritis Mother     Depression Mother     Hypertension Father     Hyperlipidemia Father     Heart disease Father         cardiac disorder-myocardial infarction arrhythmias    Heart attack Father     Early death Father     Crohn's disease Brother     Arthritis Brother     Diabetes unspecified Maternal Grandmother     Thyroid disease Paternal Grandmother     Crohn's disease Daughter     No Known Problems Maternal Aunt     No Known Problems Maternal Aunt     No Known Problems Maternal Aunt     No Known Problems Maternal Aunt     No Known Problems Paternal Aunt     No Known Problems Paternal Aunt     Heart attack Paternal Uncle          Medications have been verified.        Objective   Pulse 73   Temp 97.8 °F (36.6 °C)   Resp 16   Ht 5' 7\" (1.702 m)   Wt 132 kg (290 lb)   SpO2 99%   BMI 45.42 kg/m²   No LMP recorded. Patient has had an ablation.       Physical Exam     Physical Exam  Vitals and nursing note reviewed.   Constitutional:       General: She is not in acute distress.     Appearance: Normal appearance. She is not ill-appearing or toxic-appearing.   HENT:      Head: Normocephalic and atraumatic.      Nose: Nose normal.      Mouth/Throat:      Mouth: Mucous membranes are dry. No oral lesions.      Dentition: No gum lesions.      Tongue: Lesions present.      Palate: No mass.      Pharynx: Oropharynx is clear.        Comments: White ulceration on tip and right lateral tongue. No erythema, or bleeding.   No lesions noted on palate or bucca   Eyes:      " Extraocular Movements: Extraocular movements intact.      Conjunctiva/sclera: Conjunctivae normal.      Pupils: Pupils are equal, round, and reactive to light.   Cardiovascular:      Rate and Rhythm: Normal rate.      Pulses: Normal pulses.   Pulmonary:      Effort: Pulmonary effort is normal.   Musculoskeletal:         General: Normal range of motion.      Cervical back: Normal range of motion and neck supple.   Skin:     General: Skin is warm and dry.      Capillary Refill: Capillary refill takes less than 2 seconds.   Neurological:      General: No focal deficit present.      Mental Status: She is alert and oriented to person, place, and time. Mental status is at baseline.   Psychiatric:         Mood and Affect: Mood normal.         Behavior: Behavior normal.         Thought Content: Thought content normal.         Judgment: Judgment normal.           Diagnosis:  stomatitis, dry mouth  Test ordered/reviewed:     External documents reviewed:  [facility] EMR   History from:  patient   Discussion with other provider:  Independent visualization and interpretation:    Plan:    Strict oral care  Follow up with PCP  Home plan on discharge instructions     Risk of complications/and or morbidity or mortality:    Straight Forward  Low  Moderate  High

## 2024-10-27 NOTE — PROGRESS NOTES
[facility]        NAME: Jannette Baugh is a 44 y.o. female  : 1980    MRN: [id number]  DATE: 2024  TIME: 9:51 AM    Assessment and Plan   Stomatitis [K12.1]  1. Stomatitis        2. Dry mouth              Patient Instructions   Try to increase your water intake. Use a humidifier in your bedroom. Suck on hard candy or throat lozenges. You can purchase mouth rinse over the counter for dry mouth. Continue to use magic mouthwash as needed. Stay away from hot foods and beverages. Take tylenol/motrin for pain.    Follow up with PCP and psych provider tomorrow to determine need for additional testing or medication changes.     Go to ED if symptoms worsen.     Follow up with PCP in 3-5 days.  Proceed to  ER if symptoms worsen.    If tests have been performed at [facility], our office will contact you with results if changes need to be made to the care plan discussed with you at the visit.  You can [facility] full [facility] on [facility].    Chief Complaint     Chief Complaint   Patient presents with    Oral Pain     Sores on tongue started 2 weeks ago         History of Present Illness       43 y/o female here with complains of sores on tongue for the past 2 weeks. She had a virtual physician visit yesterday and was prescribed magic mouthwash which she says is not really helping. She complains of dry mouth and is on several medications contributing to this symptom. She reports 5 similar episodes in the past but none have lasted this long or have been this pronounced. She reports increase in amount of ulcers since onset of symptoms. She wants to make sure there is no infection. She has not seen her PCP for this issue. She denies any signs of illness including N/V/D, fever, or additional lesions elsewhere on her body. She is not currently pregnant.     Oral Pain         Review of Systems   Review of Systems   Constitutional: Negative.    HENT:  Positive for mouth sores.    Eyes: Negative.     Respiratory: Negative.     Cardiovascular: Negative.    Endocrine: Negative.    Genitourinary: Negative.    Musculoskeletal: Negative.    Skin: Negative.    Allergic/Immunologic: Negative.    Neurological: Negative.    Hematological: Negative.    Psychiatric/Behavioral: Negative.           Current Medications       Current Outpatient Medications:     albuterol (PROVENTIL HFA,VENTOLIN HFA) 90 mcg/act inhaler, Inhale 2 puffs every 6 (six) hours as needed for wheezing or shortness of breath (cough), Disp: 18 g, Rfl: 1    amitriptyline (ELAVIL) 75 mg tablet, TAKE 1 TABLET (75 MG) BY MOUTH ONCE DAILY AT BEDTIME, Disp: 100 tablet, Rfl: 0    APPLE CIDER VINEGAR PO, Take by mouth, Disp: , Rfl:     Ascorbic Acid (VITAMIN C) 500 MG CAPS, Take 1 capsule by mouth daily at bedtime, Disp: , Rfl:     aspirin 81 mg chewable tablet, Chew 1 tablet (81 mg total) daily (Patient taking differently: Chew 81 mg daily at bedtime), Disp: 30 tablet, Rfl: 30    Berberine Chloride 500 MG CAPS, Take by mouth, Disp: , Rfl:     buPROPion (WELLBUTRIN XL) 150 mg 24 hr tablet, Take 150 mg by mouth daily Take 1 tab po daily, Disp: , Rfl:     cetirizine (ZyrTEC) 10 mg tablet, Take 10 mg by mouth daily, Disp: , Rfl:     Cholecalciferol (VITAMIN D3) 5000 units CAPS, Take 5,000 Units by mouth daily at bedtime, Disp: , Rfl:     cyanocobalamin (VITAMIN B-12) 1,000 mcg tablet, Take 1,000 mcg by mouth daily , Disp: , Rfl:     docusate sodium (DULCOLAX) 100 mg capsule, Take by mouth daily at bedtime, Disp: , Rfl:     esomeprazole (NexIUM) 40 MG capsule, Take 1 capsule (40 mg total) by mouth daily before breakfast, Disp: 90 capsule, Rfl: 1    lamoTRIgine (LaMICtal) 25 mg tablet, 100 mg daily, Disp: , Rfl:     losartan (COZAAR) 100 MG tablet, Take 1 tablet (100 mg total) by mouth daily, Disp: 90 tablet, Rfl: 4    Melatonin 10 MG TABS, Take by mouth daily at bedtime, Disp: , Rfl:     metoprolol succinate (TOPROL-XL) 100 mg 24 hr tablet, Take 1 tablet (100 mg  total) by mouth daily, Disp: 90 tablet, Rfl: 1    Misc Natural Products (ELAVIL OTC SLEEP PO), Take by mouth, Disp: , Rfl:     Multiple Vitamins-Minerals (CENTRUM ADULTS PO), Take 1 tablet by mouth daily at bedtime, Disp: , Rfl:     naltrexone (REVIA) 50 mg tablet, Take 1/2 tab po daily in AM and then increase to 1/2 tab po BID, Disp: 30 tablet, Rfl: 2    ofloxacin (OCUFLOX) 0.3 % ophthalmic solution, Instill 5 drops in affected ear two times a day for one week, Disp: 5 mL, Rfl: 0    Omega-3 Fatty Acids (FISH OIL ADULT GUMMIES PO), Take by mouth daily at bedtime, Disp: , Rfl:     rosuvastatin (CRESTOR) 40 MG tablet, Take 1 tablet (40 mg total) by mouth daily, Disp: 90 tablet, Rfl: 3    venlafaxine (EFFEXOR-XR) 37.5 mg 24 hr capsule, Take 37.5 mg by mouth daily, Disp: , Rfl:     venlafaxine (EFFEXOR-XR) 75 mg 24 hr capsule, Take 1 capsule (75 mg total) by mouth daily at bedtime, Disp: 90 capsule, Rfl: 0    Zinc 50 MG TABS, Take by mouth daily at bedtime, Disp: , Rfl:     estradiol (ESTRACE) 0.1 mg/g vaginal cream, APPLY PEA SIZED AMOUNT TO AREA OF CONCERN EVERY OTHER DAY (Patient not taking: Reported on 8/4/2024), Disp: , Rfl:     meclizine (ANTIVERT) 25 mg tablet, Take 1 tablet (25 mg total) by mouth 3 (three) times a day as needed for dizziness for up to 10 days, Disp: 30 tablet, Rfl: 0    mupirocin (BACTROBAN) 2 % ointment, Apply topically 2 (two) times a day for 10 days Apply to left heel twice a day for 10 days, Disp: 30 g, Rfl: 0    naproxen (NAPROSYN) 500 mg tablet, take 1 tablet by mouth every 12 hours for 7 days BEGIN TAKING AFTER SURGERY (Patient not taking: Reported on 8/4/2024), Disp: , Rfl:     Pyridoxine HCl (VITAMIN B-6 PO), Take by mouth (Patient not taking: Reported on 8/4/2024), Disp: , Rfl:     venlafaxine (EFFEXOR-XR) 150 mg 24 hr capsule, Take 1 capsule (150 mg total) by mouth daily at bedtime (Patient not taking: Reported on 9/19/2024), Disp: 90 capsule, Rfl: 0    Current Allergies     Allergies  "as of 10/27/2024 - Reviewed 10/27/2024   Allergen Reaction Noted    Neurontin [gabapentin] Other (See Comments) 10/16/2014    Penicillins Anaphylaxis, Swelling, and Angioedema 05/16/2017    Aripiprazole Other (See Comments) 11/06/2013    Lorazepam Other (See Comments) 06/11/2014    Wound dressing adhesive Hives 01/18/2022    Carbamazepine Other (See Comments) 10/16/2012    Doxycycline Hives 04/10/2013    Lamotrigine Rash 08/28/2012            The following portions of the patient's history were reviewed and updated as appropriate: allergies, current medications, past family history, past medical history, past social history, past surgical history and problem list.     Past Medical History:   Diagnosis Date    Allergic rhinitis     Anxiety     Arthritis     C. difficile diarrhea 2020    Chest pain     Chronic pain disorder     BACK SHOULDERS NECK    Colon polyp     COPD (chronic obstructive pulmonary disease) ([facility])     CPAP (continuous positive airway pressure) dependence     Depression     Diarrhea     Disease of thyroid gland     nodule    Dysphagia     with pain \"feels like a blockage\" in the esophagus    Eating disorder     Fibromyalgia, primary     Fissure, anal     GERD (gastroesophageal reflux disease)     Hearing difficulty of right ear     frequent infections-(x1 lead to sepsis)    Hyperlipidemia     Hypertension     Irritable bowel syndrome     Obesity     Perforation of tympanic membrane     Right    PONV (postoperative nausea and vomiting)     RBBB     Right bundle branch blockage     Sleep apnea     no cpap    Thyroid nodule     Wears glasses        Past Surgical History:   Procedure Laterality Date    ANAL FISTULOTOMY N/A 11/02/2018    Procedure: FISTULOTOMY;  Surgeon: Clay Laws MD;  [facility];  Service: Colorectal    BACK SURGERY      lumbar herniated disc    BREAST CYST EXCISION Left 13 yrs ago  benign    BREAST SURGERY Left 2006    cyst removal    CARPAL TUNNEL RELEASE      " CERVICAL FUSION  2023    C5-6     SECTION      CHOLECYSTECTOMY      COLONOSCOPY      CYST REMOVAL      DENTAL SURGERY      ENDOMETRIAL ABLATION N/A 2021    Procedure: D&C, ABLATION ENDOMETRIAL MARIEL;  Surgeon: Juan Pablo Doan MD;  Location:  MAIN OR;  Service: Gynecology    FL MYELOGRAM CERVICAL  2022    HAND FUSION Left 2024    HAND SURGERY      INCISION AND DRAINAGE OF WOUND N/A 2018    Procedure: INCISION AND DRAINAGE (I&D) BUTTOCK;  Surgeon: Clay Laws MD;  [facility];  Service: Colorectal    MYRINGOTOMY W/ TUBES Right 2015    [place]    NC ANRCT XM SURG REQ ANES GENERAL SPI/EDRL DX N/A 2018    Procedure: EXAM UNDER ANESTHESIA (EUA);  Surgeon: Clay Laws MD;  [facility];  Service: Colorectal    NC COLONOSCOPY FLX DX W/COLLJ SPEC WHEN PFRMD N/A 2017    Procedure: COLONOSCOPY;  Surgeon: Jose Rafael Penn MD;  [facility];  Service: Gastroenterology    NC DEBRIDEMENT SUBCUTANEOUS TISSUE 1ST 20 SQ CM/< N/A 2022    Procedure: Debridement abdominal wall;  Surgeon: Robert Bloch, MD;  Location: [facility];  Service: General    NC EXCISION HIDRADENITIS INGUINAL COMPLEX REPAIR Bilateral 2022    Procedure: WIDE EXCISION HIDRADENITIS ABDOMINAL WALL;  Surgeon: Robert Bloch, MD;  Location: [facility];  Service: General    NC INCISION & DRAINAGE ABSCESS SIMPLE/SINGLE N/A 2022    Procedure: INCISION AND DRAINAGE ABDOMINAL WALL;  Surgeon: Robert Bloch, MD;  [facility]: [facility];  Service: General    NC TENDON SHEATH INCISION Right 2018    Procedure: LONG TRIGGER FINGER RELEASE;  Surgeon: Nickolas Guerrero DO;  [facility];  Service: Orthopedics    NC TYMPANOPLASTY W/O MASTOIDECT W/O OSSICLE RECNSTJ Right 2017    Procedure: TYMPANOPLASTY WITH PERICHONDRIN GRAFT;  Surgeon: Hugo Moreira DO;  [facility];  Service: ENT    SHOULDER SURGERY Right     SPINE SURGERY      TRIGGER  "FINGER RELEASE      ULNAR NERVE TRANSPOSITION Left     UMBILICAL HIDRADENITIS EXCISION  01/18/2022    subumbilical hidradenitis exicsion, Dr. Bloch, [facility]    US GUIDED THYROID BIOPSY  07/03/2023    WISDOM TOOTH EXTRACTION         Family History   Problem Relation Age of Onset    Hypertension Mother     Hyperlipidemia Mother     Diabetes Mother     Arthritis Mother     Depression Mother     Hypertension Father     Hyperlipidemia Father     Heart disease Father         cardiac disorder-myocardial infarction arrhythmias    Heart attack Father     Early death Father     Crohn's disease Brother     Arthritis Brother     Diabetes unspecified Maternal Grandmother     Thyroid disease Paternal Grandmother     Crohn's disease Daughter     No Known Problems Maternal Aunt     No Known Problems Maternal Aunt     No Known Problems Maternal Aunt     No Known Problems Maternal Aunt     No Known Problems Paternal Aunt     No Known Problems Paternal Aunt     Heart attack Paternal Uncle          Medications have been verified.        Objective   Pulse 73   Temp 97.8 °F (36.6 °C)   Resp 16   Ht 5' 7\" (1.702 m)   Wt 132 kg (290 lb)   SpO2 99%   BMI 45.42 kg/m²   No LMP recorded. Patient has had an ablation.       Physical Exam     Physical Exam  Vitals and nursing note reviewed.   Constitutional:       General: She is not in acute distress.     Appearance: Normal appearance. She is not ill-appearing or toxic-appearing.   HENT:      Head: Normocephalic and atraumatic.      Nose: Nose normal.      Mouth/Throat:      Mouth: Mucous membranes are dry. No oral lesions.      Dentition: No gum lesions.      Tongue: Lesions present.      Palate: No mass.      Pharynx: Oropharynx is clear.        Comments: White ulceration on tip and right lateral tongue. No erythema, or bleeding.   Eyes:      Extraocular Movements: Extraocular movements intact.      Conjunctiva/sclera: Conjunctivae normal.      Pupils: Pupils are equal, round, " and reactive to light.   Cardiovascular:      Rate and Rhythm: Normal rate.      Pulses: Normal pulses.   Pulmonary:      Effort: Pulmonary effort is normal.   Musculoskeletal:         General: Normal range of motion.      Cervical back: Normal range of motion and neck supple.   Skin:     General: Skin is warm and dry.      Capillary Refill: Capillary refill takes less than 2 seconds.   Neurological:      General: No focal deficit present.      Mental Status: She is alert and oriented to person, place, and time. Mental status is at baseline.   Psychiatric:         Mood and Affect: Mood normal.         Behavior: Behavior normal.         Thought Content: Thought content normal.         Judgment: Judgment normal.

## 2024-10-29 ENCOUNTER — IMMUNIZATIONS (OUTPATIENT)
Dept: FAMILY MEDICINE CLINIC | Facility: CLINIC | Age: 44
End: 2024-10-29
Payer: COMMERCIAL

## 2024-10-29 DIAGNOSIS — Z23 ENCOUNTER FOR IMMUNIZATION: Primary | ICD-10-CM

## 2024-10-29 PROCEDURE — 96372 THER/PROPH/DIAG INJ SC/IM: CPT

## 2024-10-31 DIAGNOSIS — E66.01 OBESITY, CLASS III, BMI 40-49.9 (MORBID OBESITY) (HCC): Primary | ICD-10-CM

## 2024-10-31 RX ORDER — TIRZEPATIDE 2.5 MG/.5ML
2.5 INJECTION, SOLUTION SUBCUTANEOUS WEEKLY
Qty: 2 ML | Refills: 0 | Status: SHIPPED | OUTPATIENT
Start: 2024-10-31 | End: 2024-11-28

## 2024-11-08 ENCOUNTER — HOSPITAL ENCOUNTER (OUTPATIENT)
Dept: MAMMOGRAPHY | Facility: HOSPITAL | Age: 44
End: 2024-11-08
Attending: FAMILY MEDICINE
Payer: COMMERCIAL

## 2024-11-08 ENCOUNTER — HOSPITAL ENCOUNTER (OUTPATIENT)
Dept: ULTRASOUND IMAGING | Facility: HOSPITAL | Age: 44
End: 2024-11-08
Attending: FAMILY MEDICINE
Payer: COMMERCIAL

## 2024-11-08 DIAGNOSIS — R92.8 ABNORMAL MAMMOGRAM: ICD-10-CM

## 2024-11-08 PROCEDURE — 77065 DX MAMMO INCL CAD UNI: CPT

## 2024-11-08 PROCEDURE — G0279 TOMOSYNTHESIS, MAMMO: HCPCS

## 2024-11-08 PROCEDURE — 76642 ULTRASOUND BREAST LIMITED: CPT

## 2024-11-22 ENCOUNTER — OFFICE VISIT (OUTPATIENT)
Dept: FAMILY MEDICINE CLINIC | Facility: CLINIC | Age: 44
End: 2024-11-22
Payer: COMMERCIAL

## 2024-11-22 VITALS
RESPIRATION RATE: 17 BRPM | OXYGEN SATURATION: 99 % | WEIGHT: 189.6 LBS | DIASTOLIC BLOOD PRESSURE: 80 MMHG | HEART RATE: 80 BPM | TEMPERATURE: 96.9 F | BODY MASS INDEX: 29.76 KG/M2 | HEIGHT: 67 IN | SYSTOLIC BLOOD PRESSURE: 118 MMHG

## 2024-11-22 DIAGNOSIS — R59.0 LEFT CERVICAL LYMPHADENOPATHY: ICD-10-CM

## 2024-11-22 DIAGNOSIS — E04.1 THYROID NODULE: ICD-10-CM

## 2024-11-22 DIAGNOSIS — F32.2 SEVERE MAJOR DEPRESSIVE DISORDER (HCC): ICD-10-CM

## 2024-11-22 DIAGNOSIS — J45.20 MILD INTERMITTENT ASTHMA, UNSPECIFIED WHETHER COMPLICATED: ICD-10-CM

## 2024-11-22 DIAGNOSIS — R92.8 ABNORMAL MAMMOGRAM: ICD-10-CM

## 2024-11-22 DIAGNOSIS — Z23 NEED FOR COVID-19 VACCINE: ICD-10-CM

## 2024-11-22 DIAGNOSIS — Q79.60 EHLERS-DANLOS SYNDROME: ICD-10-CM

## 2024-11-22 DIAGNOSIS — I10 PRIMARY HYPERTENSION: ICD-10-CM

## 2024-11-22 DIAGNOSIS — Z00.00 ANNUAL PHYSICAL EXAM: Primary | ICD-10-CM

## 2024-11-22 PROCEDURE — 99215 OFFICE O/P EST HI 40 MIN: CPT | Performed by: FAMILY MEDICINE

## 2024-11-22 PROCEDURE — 91320 SARSCV2 VAC 30MCG TRS-SUC IM: CPT

## 2024-11-22 PROCEDURE — 99396 PREV VISIT EST AGE 40-64: CPT | Performed by: FAMILY MEDICINE

## 2024-11-22 PROCEDURE — 90480 ADMN SARSCOV2 VAC 1/ONLY CMP: CPT

## 2024-11-22 NOTE — ASSESSMENT & PLAN NOTE
Patient: Josefina Bryant    MRN: [id number]    Date of Procedure: 8/20/2021     Pre-Operative Diagnosis: Scalp cyst x 3    Post-Operative Diagnosis: Scalp cyst x 3    Procedure: Excision of scalp cyst x 3    Surgeon: Neetu Barry MD, MS, FACS    Assistant: None    Assistant Tasks: None    Anesthesia: Local    EBL: 5 ml    Specimen:   ID Type Source Tests Collected by Time   A : Scalp cysts Tissue Scalp SURGICAL PATHOLOGY Neetu Barry MD 8/20/2021 0743     Implant: None    Drains: None    Complication: None    Findings: Approximately 1 cm cyst x 2 in occipital scalp, and 5 mm cyst in the frontal scalp.    Indication: This is a 79 year old woman who presented to the clinic with scalp cysts which was increasing in size and discomfort. Therefore, excision was recommended. The risks, benefits, complication as well as details of the operation was discussed. All questions were answered to the patient's satisfaction, who wished to proceed with surgery.    Technique: After informed consent was obtained, the patient was brought to the operating room and placed on the operating table in prone position. Monitors were applied. Then, the scap was prepped and draped in standard sterile fashion. A time-out was performed.    Attention was first directed at the left occipital scalp lesion. The skin around the lesion was infiltrated with 0.5% marcaine. After adequate anesthesia was achieved, an incision was made sharply with a #15 blade. The incision was deepen bluntly with Metzenbaum scissors until the cystic lesion was encountered. Circumferential dissection was performed around the cysts and the specimen was passed off the field. The wound was packed with gauze.    Next, attention was directed at the right occipital scalp lesion. The skin around the lesion was infiltrated with 0.5% marcaine. After adequate anesthesia was achieved, an incision was made sharply with a #15 blade. The incision was deepen bluntly with Metzenbaum scissors  Stable. Contiue the same           until the cystic lesion was encountered. Circumferential dissection was performed around the cysts and the specimen was passed off the field. The wound was packed with gauze.    Lastly, attention was directed at the frontal scalp lesion. The skin around the lesion was infiltrated with 0.5% marcaine. After adequate anesthesia was achieved, an incision was made sharply with a #15 blade. The incision was deepen bluntly with Metzenbaum scissors until the cystic lesion was encountered. Circumferential dissection was performed around the cysts and the specimen was passed off the field. The wound was packed with gauze.    The wounds were irrigated and appeared hemostatic. The skin was closed with 3-0 Prolene and bacitracin was applied to the wounds.    The patient tolerated the procedure well and was transferred to the post-anesthesia care unit in stable condition. All sponges, sharps and instrument counts were correct at the end of the case prior to closure.    I was scrubbed and present for the entire duration of the procedure.

## 2024-11-22 NOTE — PROGRESS NOTES
Elma Garvin Patient Age: 42 year old  MESSAGE: Interpreting service used: No    Insurance on file confirmed with caller: No-outside source calling in.    Obstetrics & Gynecology- Reason for call: The patient is in need of a tooth removal.    Loida would like to speak to clinical regarding the numbing agents of Lidocaine & Septocaine being used on the patient.    Message read back to caller for accuracy: Yes       ALLERGIES:  Patient has no known allergies.  Current Outpatient Medications   Medication Sig Dispense Refill   • Prenatal Vit-Fe Fumarate-FA (PRENATAL 1 PLUS 1 PO)        No current facility-administered medications for this visit.     PHARMACY to use: tbd          Pharmacy preference(s) on file:   [id number] - [place], [place] - [address]   [address] [facility] [address]  Phone: [phone] Fax: [phone]      CALL BACK INFO: Ok to leave response (including medical information) on answering machine      PCP: Other Other         INS: Payor: PATIENCE/KAUSHAL / Plan: [id number] / Product Type: PPO MISC   PATIENT ADDRESS:  [address] [place] [address] [address]  [facility] [address]     Name: Jannette Baugh      : 1980      MRN: [id number]  Encounter Provider: Gustavo Godoy MD  Encounter Date: 2024   Encounter department: [facility]  :  Assessment & Plan  Annual physical exam         Left cervical lymphadenopathy  Jannette Baugh is undergoing an elective Low Risk procedure, Cervical Mri under anesthesia. She is RCRI class I risk  for major adverse cardiac event (MACE). She may proceed with surgery as planned without further workup.    ROS:   Chest pain: no   Shortness of breath: no  Shortness of breath with exertion: no  Orthopnea: no  Dizziness: no  Unexplained weight change: no     PMH:  CAD: no  HTN: yes  CKD: no  DM: no on insulin: no  History of CVA: no           Thyroid nodule  Biopsy in 2023 was benign.  No new symptoms we will get new ultrasound for maintenance and follow-up.  Orders:    US thyroid; Future    Abnormal mammogram  Initial mammogram on 10/08/2024 showed left breast asymmetry.  Follow-up left breast ultrasound and diagnostic mammogram on 2024 showed likely benign findings.  Follow up 6 month mammo ordered today.     Orders:    Mammo diagnostic left w 3d and cad; Future    Primary hypertension  Controlled. Continue the same          Severe major depressive disorder (HCC)  Stable. Contiue the same          Mild intermittent asthma, unspecified whether complicated  Stable. Continue current inhaler regiment.        Eliz-Danlos syndrome         Need for COVID-19 vaccine    Orders:    COVID-19 Pfizer mRNA vaccine 12 yr and older (Comirnaty pre-filled syringe)           History of Present Illness     Patient presents office today for annual exam.  She also needs preop clearance for MRI under anesthesia for her neck.  She has had these multiple times in the past and never had any issues.  She reports no new concerns or complaints today.  Reports her chronic conditions are well-controlled and stable.  She still seeing her  "therapist and psychiatry who are managing her meds now.      Review of Systems   All other systems reviewed and are negative.         Objective   /80 (BP Location: Left arm, Patient Position: Sitting, Cuff Size: Large)   Pulse 80   Temp (!) 96.9 °F (36.1 °C) (Tympanic)   Resp 17   Ht 5' 7\" (1.702 m)   Wt 86 kg (189 lb 9.6 oz)   SpO2 99%   BMI 29.70 kg/m²      Physical Exam  Vitals and nursing note reviewed.   Constitutional:       General: She is not in acute distress.     Appearance: Normal appearance. She is well-developed. She is not ill-appearing, toxic-appearing or diaphoretic.   HENT:      Head: Normocephalic and atraumatic.      Nose: Nose normal.      Mouth/Throat:      Mouth: Mucous membranes are moist.      Pharynx: Oropharynx is clear. No oropharyngeal exudate.   Eyes:      General:         Right eye: No discharge.         Left eye: No discharge.      Extraocular Movements: Extraocular movements intact.      Conjunctiva/sclera: Conjunctivae normal.      Pupils: Pupils are equal, round, and reactive to light.   Cardiovascular:      Rate and Rhythm: Normal rate and regular rhythm.      Pulses: Normal pulses.      Heart sounds: Normal heart sounds. No murmur heard.  Pulmonary:      Effort: Pulmonary effort is normal. No respiratory distress.      Breath sounds: Normal breath sounds.   Abdominal:      General: There is no distension.      Palpations: Abdomen is soft.      Tenderness: There is no abdominal tenderness.   Musculoskeletal:         General: No swelling. Normal range of motion.      Cervical back: Normal range of motion and neck supple. No rigidity or tenderness.   Lymphadenopathy:      Cervical: No cervical adenopathy.   Skin:     General: Skin is warm and dry.      Capillary Refill: Capillary refill takes less than 2 seconds.      Coloration: Skin is not jaundiced or pale.      Findings: No bruising, erythema, lesion or rash.   Neurological:      General: No focal deficit present.    "   Mental Status: She is alert and oriented to person, place, and time.      Cranial Nerves: No cranial nerve deficit.      Sensory: No sensory deficit.      Motor: No weakness.      Coordination: Coordination normal.      Gait: Gait normal.      Deep Tendon Reflexes: Reflexes normal.   Psychiatric:         Mood and Affect: Mood normal.         Behavior: Behavior normal.         Thought Content: Thought content normal.         Judgment: Judgment normal.       Administrative Statements   I have spent a total time of 45 minutes in caring for this patient on the day of the visit/encounter including Diagnostic results, Prognosis, Risks and benefits of tx options, Instructions for management, Patient and family education, Risk factor reductions, Counseling / Coordination of care, Documenting in the medical record, Reviewing / ordering tests, medicine, procedures  , and Obtaining or reviewing history  .

## 2024-11-28 PROBLEM — R92.8 ABNORMAL MAMMOGRAM: Status: ACTIVE | Noted: 2024-11-28

## 2024-11-28 NOTE — ASSESSMENT & PLAN NOTE
Biopsy in July 2023 was benign.  No new symptoms we will get new ultrasound for maintenance and follow-up.  Orders:    US thyroid; Future

## 2024-11-28 NOTE — ASSESSMENT & PLAN NOTE
Jannette Baugh is undergoing an elective Low Risk procedure, Cervical Mri under anesthesia. She is RCRI class I risk  for major adverse cardiac event (MACE). She may proceed with surgery as planned without further workup.    ROS:   Chest pain: no   Shortness of breath: no  Shortness of breath with exertion: no  Orthopnea: no  Dizziness: no  Unexplained weight change: no     PMH:  CAD: no  HTN: yes  CKD: no  DM: no on insulin: no  History of CVA: no

## 2024-11-28 NOTE — ASSESSMENT & PLAN NOTE
Initial mammogram on 10/08/2024 showed left breast asymmetry.  Follow-up left breast ultrasound and diagnostic mammogram on 11/18/2024 showed likely benign findings.  Follow up 6 month mammo ordered today.     Orders:    Mammo diagnostic left w 3d and cad; Future

## 2024-12-13 RX ORDER — MECLIZINE HYDROCHLORIDE 25 MG/1
TABLET ORAL
Qty: 30 TABLET | Refills: 0 | OUTPATIENT
Start: 2024-12-13

## 2024-12-17 ENCOUNTER — TELEPHONE (OUTPATIENT)
Age: 44
End: 2024-12-17

## 2024-12-17 NOTE — TELEPHONE ENCOUNTER
Joel stated PA needed for esomeprazole but could not submit through [facility]  Faxed letter of med necesity to [phone]   ID [id number]

## 2025-01-06 ENCOUNTER — TELEMEDICINE (OUTPATIENT)
Dept: GASTROENTEROLOGY | Facility: CLINIC | Age: 45
End: 2025-01-06
Payer: COMMERCIAL

## 2025-01-06 ENCOUNTER — TELEPHONE (OUTPATIENT)
Age: 45
End: 2025-01-06

## 2025-01-06 ENCOUNTER — TELEPHONE (OUTPATIENT)
Dept: GASTROENTEROLOGY | Facility: CLINIC | Age: 45
End: 2025-01-06

## 2025-01-06 DIAGNOSIS — K21.00 GASTROESOPHAGEAL REFLUX DISEASE WITH ESOPHAGITIS WITHOUT HEMORRHAGE: ICD-10-CM

## 2025-01-06 DIAGNOSIS — R13.19 ESOPHAGEAL DYSPHAGIA: ICD-10-CM

## 2025-01-06 DIAGNOSIS — K27.9 PUD (PEPTIC ULCER DISEASE): ICD-10-CM

## 2025-01-06 PROCEDURE — 99214 OFFICE O/P EST MOD 30 MIN: CPT | Performed by: INTERNAL MEDICINE

## 2025-01-06 RX ORDER — ESOMEPRAZOLE MAGNESIUM 40 MG/1
40 CAPSULE, DELAYED RELEASE ORAL
Qty: 90 CAPSULE | Refills: 2 | Status: SHIPPED | OUTPATIENT
Start: 2025-01-06

## 2025-01-06 RX ORDER — SODIUM CHLORIDE, SODIUM LACTATE, POTASSIUM CHLORIDE, CALCIUM CHLORIDE 600; 310; 30; 20 MG/100ML; MG/100ML; MG/100ML; MG/100ML
125 INJECTION, SOLUTION INTRAVENOUS CONTINUOUS
OUTPATIENT
Start: 2025-01-06

## 2025-01-06 NOTE — TELEPHONE ENCOUNTER
Our mutual patient is scheduled for procedure: EGD    On: February 20 , 2025     With: Dr. Elva Maguire MD    He/She is taking the following medication: Wellbutrin    Our new medication hold list requires Wellbutrin to be held prior to procedure at prescribing physicians discretion. How many days can pt hold Wellbutrin before EGD?

## 2025-01-06 NOTE — PROGRESS NOTES
Virtual Regular Visit  Name: Jannette Baugh      : 1980      MRN: [id number]  Encounter Provider: Elva Maguire MD  Encounter Date: 2025   Encounter department: [facility]      Verification of patient location:  Patient is located at {[facility] Virtual Patient Location:[address]3298} in the following state in which I hold an active license {[facility] virtual patient location:97061} :Assessment & Plan  Gastroesophageal reflux disease with esophagitis without hemorrhage    Orders:  •  esomeprazole (NexIUM) 40 MG capsule; Take 1 capsule (40 mg total) by mouth daily before breakfast    PUD (peptic ulcer disease)    Orders:  •  esomeprazole (NexIUM) 40 MG capsule; Take 1 capsule (40 mg total) by mouth daily before breakfast  •  EGD; Future    Esophageal dysphagia    Orders:  •  esomeprazole (NexIUM) 40 MG capsule; Take 1 capsule (40 mg total) by mouth daily before breakfast  •  EGD; Future          Encounter provider Elva Maguire MD    The patient was identified by name and date of birth. Jannette Baugh was informed that this is a telemedicine visit and that the visit is being conducted through {[facility] VIRTUAL VISIT MEDIUM:05187}.  {Telemedicine confidentiality :30840} {Telemedicine participants:65844}  She acknowledged consent and understanding of privacy and security of the video platform. The patient has agreed to participate and understands they can discontinue the visit at any time.    Patient is aware this is a billable service.     History of Present Illness {?Quick Links Encounters * My Last Note * Last Note in Specialty * Snapshot * Since Last Visit * History :16518}    HPI  Review of Systems    Objective {?Quick Links Trend Vitals * Enter New Vitals * Results Review * Timeline (Adult) * Labs * Imaging * Cardiology * Procedures * Lung Cancer Screening * Surgical eConsent :34318}  There were no vitals taken for this visit.    Physical Exam    Visit Time  Total Visit  Duration: ***

## 2025-01-06 NOTE — ASSESSMENT & PLAN NOTE
-Symptoms well-controlled with esomeprazole 40 mg on daily basis.  Will continue this.  -Prescription renewed.  -Avoid NSAIDs.  -Renal function preserved.  Orders:    esomeprazole (NexIUM) 40 MG capsule; Take 1 capsule (40 mg total) by mouth daily before breakfast

## 2025-01-06 NOTE — ASSESSMENT & PLAN NOTE
-Likely due to recurrent external bulge as evident on prior EGD.  Patient did have symptomatic improvement after empiric dilation therefore we will proceed with repeat EGD with dilation again.  -Meanwhile continue Nexium 40 mg on daily basis.  -Patient was explained about the risks and benefits of the procedure. Risks including but not limited to bleeding, infection, perforation were explained in detail. Also explained about less than 100% sensitivity with the exam and other alternatives.  Orders:    esomeprazole (NexIUM) 40 MG capsule; Take 1 capsule (40 mg total) by mouth daily before breakfast    EGD; Future

## 2025-01-06 NOTE — PATIENT INSTRUCTIONS
Scheduled date of EGD(as of today): 2/20/25  Physician performing EGD: Dr. Maugire  Location of EGD: [facility]  Instructions reviewed with patient by: N.M.  Clearances: Wellbutrin

## 2025-01-06 NOTE — TELEPHONE ENCOUNTER
Patients GI provider:  Dr. Maguire    Number to return call: [phone]    Reason for call: Pt calling requesting a virtual appt  / Message sent  To the officce told they will reach out to her after speaking with the doctor    Scheduled procedure/appointment date if applicable: 1/6/2025 @ 1:40

## 2025-01-06 NOTE — TELEPHONE ENCOUNTER
Spoke with Dr Maguire who gave ok. Cherry TOSCANO, our manager, also approved virtuals for the inclement weather. Called and spoke with pt to let her know, she was grateful. Advised pt I will give her a call 15min prior to check her in

## 2025-01-06 NOTE — ASSESSMENT & PLAN NOTE
Orders:  •  esomeprazole (NexIUM) 40 MG capsule; Take 1 capsule (40 mg total) by mouth daily before breakfast

## 2025-01-06 NOTE — PROGRESS NOTES
Name: Jannette Baugh      : 1980      MRN: [id number]  Encounter Provider: Elva Maguire MD  Encounter Date: 2025   Encounter department: [facility]  :      Assessment & Plan  Gastroesophageal reflux disease with esophagitis without hemorrhage  -Symptoms well-controlled with esomeprazole 40 mg on daily basis.  Will continue this.  -Prescription renewed.  -Avoid NSAIDs.  -Renal function preserved.  Orders:    esomeprazole (NexIUM) 40 MG capsule; Take 1 capsule (40 mg total) by mouth daily before breakfast    PUD (peptic ulcer disease)  -Continue to avoid NSAIDs, no evidence of H. pylori bacteria on prior biopsies.  -Previous EGD notable for peptic ulcer disease therefore reasonable to repeat EGD to assess for resolution of the ulcer.  Orders:    esomeprazole (NexIUM) 40 MG capsule; Take 1 capsule (40 mg total) by mouth daily before breakfast    EGD; Future    Esophageal dysphagia  -Likely due to recurrent external bulge as evident on prior EGD.  Patient did have symptomatic improvement after empiric dilation therefore we will proceed with repeat EGD with dilation again.  -Meanwhile continue Nexium 40 mg on daily basis.  -Patient was explained about the risks and benefits of the procedure. Risks including but not limited to bleeding, infection, perforation were explained in detail. Also explained about less than 100% sensitivity with the exam and other alternatives.  Orders:    esomeprazole (NexIUM) 40 MG capsule; Take 1 capsule (40 mg total) by mouth daily before breakfast    EGD; Future    Verification of patient location:  Patient is located at home in the following state in which I hold an active license, [place].    The patient was identified by name and date of birth.  Jannette Baugh was informed that this is a telemedicine visit and that this visit is being conducted through the epic embedded platform.  She agrees to proceed.  My office door was closed.  No one  else was in the room.  She acknowledged consent and understanding of privacy and security of the video platform.  The patient has agreed to participate and understands they can discontinue the visit at any time.    Patient is aware this is a billable service.    History of Present Illness   HPI  Jannette Baugh is a 44 y.o. female with history of peptic ulcer disease, dysphagia, anxiety, depression, presents virtually for a follow-up visit.  Patient reports that she has been doing well from symptoms of acid reflux, currently on Nexium 40 mg that she takes on daily basis.  She denies any melena or hematochezia.  She does tell me that she has been having  recurrent symptoms of dysphagia to pills as of the past 1 month.  Patient reports that she had significant symptomatic improvement after an empiric dilation that was done in 2023.  She developed symptoms of dysphagia in 2020 after undergoing cervical spinal fusion surgery.  She underwent barium swallow notable for external hardware compressing the esophagus.  She subsequently underwent EGD with empiric dilation of external bulge.  Patient reports that she did have symptomatic improvement since undergoing dilation however is now noting that the symptoms of dysphagia are starting to come back again for the past 1 month.  Patient reports that symptoms are predominant with pills at this time.  She denies any change in bowel habits, hematochezia, abdominal pain or unintentional weight loss otherwise.  Labs are reviewed, liver enzymes are normal, hemoglobin normal, platelets normal.        Review of Systems       Objective   There were no vitals taken for this visit.     Physical Exam    REVIEW OF SYSTEMS:    CONSTITUTIONAL: Denies any fever, chills, rigors, and weight loss.  HEENT: No earache or tinnitus. Denies hearing loss or visual disturbances.  CARDIOVASCULAR: No chest pain or palpitations.   RESPIRATORY: Denies any cough, hemoptysis, shortness of breath or dyspnea  on exertion.  GASTROINTESTINAL: As noted in the History of Present Illness.   GENITOURINARY: No problems with urination. Denies any hematuria or dysuria.  NEUROLOGIC: No dizziness or vertigo, denies headaches.   MUSCULOSKELETAL: Denies any muscle or joint pain.   SKIN: Denies skin rashes or itching.   ENDOCRINE: Denies excessive thirst. Denies intolerance to heat or cold.  PSYCHOSOCIAL: Denies depression or anxiety. Denies any recent memory loss.       PHYSICAL EXAMINATION:  Appearance and vitals taken from home devices    General Appearance:   Alert, cooperative, no distress   HEENT:  Normocephalic, atraumatic, anicteric. Neck supple, symmetrical, trachea midline.   Lungs:   Equal chest rise and unlabored breathing, normal effort, no coughing.   Cardiovascular:   No visualized JVD.   Abdomen:   No abdominal distension.   Skin:   No jaundice, rashes, or lesions.    Musculoskeletal:   Normal range of motion visualized.   Psych:  Normal affect and normal insight.   Neuro:  Alert and appropriate.

## 2025-01-06 NOTE — ASSESSMENT & PLAN NOTE
Orders:  •  esomeprazole (NexIUM) 40 MG capsule; Take 1 capsule (40 mg total) by mouth daily before breakfast  •  EGD; Future

## 2025-01-08 ENCOUNTER — HOSPITAL ENCOUNTER (OUTPATIENT)
Dept: ULTRASOUND IMAGING | Facility: HOSPITAL | Age: 45
Discharge: HOME | End: 2025-01-08
Payer: COMMERCIAL

## 2025-01-08 DIAGNOSIS — E04.1 THYROID NODULE: ICD-10-CM

## 2025-01-08 PROCEDURE — 76536 US EXAM OF HEAD AND NECK: CPT

## 2025-01-09 NOTE — TELEPHONE ENCOUNTER
Spoke with Manager and Kenya Olivas, because Wellbutrin is not used for weight loss purposes, pt does not need to hold Wellbutrin prior to procedure. Pt is aware to continue taking Wellbutrin as normal.

## 2025-01-13 ENCOUNTER — RESULTS FOLLOW-UP (OUTPATIENT)
Dept: FAMILY MEDICINE CLINIC | Facility: CLINIC | Age: 45
End: 2025-01-13

## 2025-01-13 ENCOUNTER — TELEMEDICINE (OUTPATIENT)
Dept: FAMILY MEDICINE CLINIC | Facility: CLINIC | Age: 45
End: 2025-01-13
Payer: COMMERCIAL

## 2025-01-13 DIAGNOSIS — E04.1 THYROID NODULE: Primary | ICD-10-CM

## 2025-01-13 PROCEDURE — 99214 OFFICE O/P EST MOD 30 MIN: CPT | Performed by: FAMILY MEDICINE

## 2025-01-15 NOTE — PROGRESS NOTES
Virtual Regular Visit  Name: Jannette Baugh      : 1980      MRN: [id number]  Encounter Provider: Gustavo Godoy MD  Encounter Date: 2025   Encounter department: [facility] [place]      Verification of patient location:  Patient is located at Home in the following state in which I hold an active license [place] :  Assessment & Plan  Thyroid nodule  Pt had thyroid  biopsy in  an it was benign.     Follow up US thyroid in  ,  and  did not show any nodules large enough to biopsy    US thyroid  23 showed previous nodule in  R lower lobe  was larger. Biopsy was done and it benign . [place] [facility]    Follow up US thyroid  2025 showed significant increase in size of R thyroid nodule now measuring 3.8 cm ( previously 2.3 cm ) now with lobular margins and suspicion for extrathyroidal extension.     I recommended patient call her ENT and see if they can get her in for a thyroid nodule biopsy.  I have also referred her to endocrinology.  Her thyroid labs up until this point have been euthyroid but will recheck again with TSH, T3 and T4.  If she cannot be seen by ENT or endocrinology within the next 2 weeks I have instructed her to call me back and I will order thyroid biopsy for her.    Orders:    Ambulatory Referral to Endocrinology; Future    TSH, 3rd generation; Future    T3; Future    T4; Future        Encounter provider Gustavo Godoy MD    The patient was identified by name and date of birth. Jannette Baugh was informed that this is a telemedicine visit and that the visit is being conducted through the Epic Embedded platform. She agrees to proceed..  My office door was closed. No one else was in the room.  She acknowledged consent and understanding of privacy and security of the video platform. The patient has agreed to participate and understands they can discontinue the visit at any time.    Patient is aware this is a billable service.     History of Present  Illness     Today's office visit to follow-up on recent ultrasound findings.  Thyroid ultrasound showed enlarged right lobe nodule.  Patient reports that she has been having some difficulty with swallowing and some neck pain.  It is not too bad but she has been following with her orthopedic doc because they thought it may be from her previous neck surgeries.  She does not regurgitate undigested food.  She does follow with the ENT for sinus issues but not thyroid nodule.  She does not see endocrinology.  Denies any symptoms of hyperthyroidism or hypothyroidism today.  Her thyroid labs in the past have been normal.      Review of Systems   All other systems reviewed and are negative.      Objective   There were no vitals taken for this visit.    Physical Exam  Constitutional:       General: She is not in acute distress.     Appearance: Normal appearance. She is not ill-appearing, toxic-appearing or diaphoretic.   Pulmonary:      Effort: Pulmonary effort is normal.   Neurological:      Mental Status: She is alert.   Psychiatric:         Mood and Affect: Mood normal.         Behavior: Behavior normal.         Thought Content: Thought content normal.         Judgment: Judgment normal.         Visit Time  Total Visit Duration: 30

## 2025-01-15 NOTE — ASSESSMENT & PLAN NOTE
Pt had thyroid  biopsy in 2017 an it was benign.     Follow up US thyroid in 2018 , 2019 and 2020 did not show any nodules large enough to biopsy    US thyroid  05/31/23 showed previous nodule in  R lower lobe  was larger. Biopsy was done and it benign . [place] 2    Follow up US thyroid  01/08/2025 showed significant increase in size of R thyroid nodule now measuring 3.8 cm ( previously 2.3 cm ) now with lobular margins and suspicion for extrathyroidal extension.     I recommended patient call her ENT and see if they can get her in for a thyroid nodule biopsy.  I have also referred her to endocrinology.  Her thyroid labs up until this point have been euthyroid but will recheck again with TSH, T3 and T4.  If she cannot be seen by ENT or endocrinology within the next 2 weeks I have instructed her to call me back and I will order thyroid biopsy for her.    Orders:    Ambulatory Referral to Endocrinology; Future    TSH, 3rd generation; Future    T3; Future    T4; Future

## 2025-01-27 DIAGNOSIS — I10 HYPERTENSION, UNSPECIFIED TYPE: ICD-10-CM

## 2025-01-27 DIAGNOSIS — E78.2 MIXED HYPERLIPIDEMIA: ICD-10-CM

## 2025-01-28 RX ORDER — METOPROLOL SUCCINATE 100 MG/1
100 TABLET, EXTENDED RELEASE ORAL DAILY
Qty: 90 TABLET | Refills: 0 | Status: SHIPPED | OUTPATIENT
Start: 2025-01-28

## 2025-01-28 RX ORDER — ROSUVASTATIN CALCIUM 40 MG/1
40 TABLET, COATED ORAL DAILY
Qty: 90 TABLET | Refills: 1 | Status: SHIPPED | OUTPATIENT
Start: 2025-01-28

## 2025-02-06 ENCOUNTER — TELEPHONE (OUTPATIENT)
Dept: RADIOLOGY | Facility: HOSPITAL | Age: 45
End: 2025-02-06

## 2025-02-06 NOTE — NURSING NOTE
Call placed to pt to discuss upcoming appointment at [facility] Radiology Department and consultation completed.  Pt is having a Thyroid Biopsy with Molecular Testing completed on 2/7/2025.  Allergies reviewed and verified only anticoagulant medication pt taking is ASA which pt may continue taking.  Pre procedure instructions including diet and taking own medications discussed with pt.  Pt instructed that she may eat normally and take medications as usual before the procedure.  Pt verbalized understanding of instructions given.  Reminded pt of the location, date and time of procedure.  My number was given to call if any questions or concerns arise pre or post procedure.

## 2025-02-07 ENCOUNTER — HOSPITAL ENCOUNTER (OUTPATIENT)
Dept: ULTRASOUND IMAGING | Facility: HOSPITAL | Age: 45
Discharge: HOME | End: 2025-02-07
Attending: OTOLARYNGOLOGY
Payer: COMMERCIAL

## 2025-02-07 DIAGNOSIS — D44.0 NEOPLASM OF UNCERTAIN BEHAVIOR OF THYROID GLAND: ICD-10-CM

## 2025-02-07 PROCEDURE — 10005 FNA BX W/US GDN 1ST LES: CPT

## 2025-02-07 PROCEDURE — 88173 CYTOPATH EVAL FNA REPORT: CPT | Performed by: STUDENT IN AN ORGANIZED HEALTH CARE EDUCATION/TRAINING PROGRAM

## 2025-02-07 RX ORDER — LIDOCAINE HYDROCHLORIDE 10 MG/ML
5 INJECTION, SOLUTION EPIDURAL; INFILTRATION; INTRACAUDAL; PERINEURAL ONCE
Status: COMPLETED | OUTPATIENT
Start: 2025-02-07 | End: 2025-02-07

## 2025-02-07 RX ADMIN — LIDOCAINE HYDROCHLORIDE 5 ML: 10 INJECTION, SOLUTION EPIDURAL; INFILTRATION; INTRACAUDAL; PERINEURAL at 13:05

## 2025-02-12 PROCEDURE — 88173 CYTOPATH EVAL FNA REPORT: CPT | Performed by: STUDENT IN AN ORGANIZED HEALTH CARE EDUCATION/TRAINING PROGRAM

## 2025-02-17 ENCOUNTER — ANESTHESIA (OUTPATIENT)
Dept: ANESTHESIOLOGY | Facility: HOSPITAL | Age: 45
End: 2025-02-17

## 2025-02-17 ENCOUNTER — ANESTHESIA EVENT (OUTPATIENT)
Dept: ANESTHESIOLOGY | Facility: HOSPITAL | Age: 45
End: 2025-02-17

## 2025-02-20 ENCOUNTER — ANESTHESIA EVENT (OUTPATIENT)
Dept: GASTROENTEROLOGY | Facility: AMBULARY SURGERY CENTER | Age: 45
End: 2025-02-20
Payer: COMMERCIAL

## 2025-02-20 ENCOUNTER — HOSPITAL ENCOUNTER (OUTPATIENT)
Dept: GASTROENTEROLOGY | Facility: AMBULARY SURGERY CENTER | Age: 45
Setting detail: OUTPATIENT SURGERY
End: 2025-02-20
Attending: INTERNAL MEDICINE
Payer: COMMERCIAL

## 2025-02-20 VITALS
SYSTOLIC BLOOD PRESSURE: 109 MMHG | TEMPERATURE: 97.2 F | RESPIRATION RATE: 18 BRPM | DIASTOLIC BLOOD PRESSURE: 62 MMHG | HEART RATE: 82 BPM | OXYGEN SATURATION: 95 %

## 2025-02-20 DIAGNOSIS — R13.19 ESOPHAGEAL DYSPHAGIA: ICD-10-CM

## 2025-02-20 DIAGNOSIS — K27.9 PUD (PEPTIC ULCER DISEASE): ICD-10-CM

## 2025-02-20 LAB
EXT PREGNANCY TEST URINE: NEGATIVE
EXT. CONTROL: NORMAL

## 2025-02-20 PROCEDURE — 43248 EGD GUIDE WIRE INSERTION: CPT | Performed by: INTERNAL MEDICINE

## 2025-02-20 PROCEDURE — 81025 URINE PREGNANCY TEST: CPT | Performed by: INTERNAL MEDICINE

## 2025-02-20 PROCEDURE — 43251 EGD REMOVE LESION SNARE: CPT | Performed by: INTERNAL MEDICINE

## 2025-02-20 PROCEDURE — 88305 TISSUE EXAM BY PATHOLOGIST: CPT | Performed by: PATHOLOGY

## 2025-02-20 PROCEDURE — 43239 EGD BIOPSY SINGLE/MULTIPLE: CPT | Performed by: INTERNAL MEDICINE

## 2025-02-20 RX ORDER — LIDOCAINE HYDROCHLORIDE 10 MG/ML
INJECTION, SOLUTION EPIDURAL; INFILTRATION; INTRACAUDAL; PERINEURAL AS NEEDED
Status: DISCONTINUED | OUTPATIENT
Start: 2025-02-20 | End: 2025-02-20

## 2025-02-20 RX ORDER — SODIUM CHLORIDE, SODIUM LACTATE, POTASSIUM CHLORIDE, CALCIUM CHLORIDE 600; 310; 30; 20 MG/100ML; MG/100ML; MG/100ML; MG/100ML
INJECTION, SOLUTION INTRAVENOUS CONTINUOUS PRN
Status: DISCONTINUED | OUTPATIENT
Start: 2025-02-20 | End: 2025-02-20

## 2025-02-20 RX ORDER — PROPOFOL 10 MG/ML
INJECTION, EMULSION INTRAVENOUS AS NEEDED
Status: DISCONTINUED | OUTPATIENT
Start: 2025-02-20 | End: 2025-02-20

## 2025-02-20 RX ORDER — SODIUM CHLORIDE, SODIUM LACTATE, POTASSIUM CHLORIDE, CALCIUM CHLORIDE 600; 310; 30; 20 MG/100ML; MG/100ML; MG/100ML; MG/100ML
125 INJECTION, SOLUTION INTRAVENOUS CONTINUOUS
Status: DISPENSED | OUTPATIENT
Start: 2025-02-20

## 2025-02-20 RX ADMIN — PROPOFOL 50 MG: 10 INJECTION, EMULSION INTRAVENOUS at 14:10

## 2025-02-20 RX ADMIN — PROPOFOL 100 MG: 10 INJECTION, EMULSION INTRAVENOUS at 14:09

## 2025-02-20 RX ADMIN — PROPOFOL 100 MG: 10 INJECTION, EMULSION INTRAVENOUS at 14:02

## 2025-02-20 RX ADMIN — TOPICAL ANESTHETIC 1 SPRAY: 200 SPRAY DENTAL; PERIODONTAL at 13:56

## 2025-02-20 RX ADMIN — SODIUM CHLORIDE, SODIUM LACTATE, POTASSIUM CHLORIDE, AND CALCIUM CHLORIDE: .6; .31; .03; .02 INJECTION, SOLUTION INTRAVENOUS at 13:40

## 2025-02-20 RX ADMIN — PROPOFOL 150 MG: 10 INJECTION, EMULSION INTRAVENOUS at 13:59

## 2025-02-20 RX ADMIN — LIDOCAINE HYDROCHLORIDE 100 MG: 10 INJECTION, SOLUTION EPIDURAL; INFILTRATION; INTRACAUDAL; PERINEURAL at 13:59

## 2025-02-20 NOTE — ANESTHESIA POSTPROCEDURE EVALUATION
Post-Op Assessment Note    CV Status:  Stable  Pain Score: 0    Pain management: adequate       Mental Status:  Sleepy   Hydration Status:  Stable   PONV Controlled:  None   Airway Patency:  Patent     Post Op Vitals Reviewed: Yes    No anethesia notable event occurred.    Staff: CRNA           Last Filed PACU Vitals:  Vitals Value Taken Time   Temp     Pulse 67    /60    Resp 18    SpO2 97

## 2025-02-20 NOTE — ANESTHESIA PREPROCEDURE EVALUATION
Procedure:  EGD    Relevant Problems   ANESTHESIA   (+) PONV (postoperative nausea and vomiting)      CARDIO   (+) Atypical chest pain   (+) Hemorrhoids   (+) High triglycerides   (+) Hyperlipidemia   (+) Primary hypertension   (+) RBBB (right bundle branch block)      GI/HEPATIC   (+) Esophageal dysphagia   (+) Esophageal reflux      MUSCULOSKELETAL   (+) Chronic right-sided low back pain without sciatica   (+) Fibromyalgia   (+) Osteoarthritis of spine with radiculopathy, lumbar region   (+) Other cervical disc degeneration at C6-C7 level      NEURO/PSYCH   (+) Chronic headache with new features   (+) Chronic right-sided low back pain without sciatica   (+) Depression, recurrent (HCC)   (+) Fibromyalgia   (+) Generalized anxiety disorder   (+) Moderate recurrent major depression (HCC)   (+) Severe major depressive disorder (HCC)      PULMONARY   (+) Asthma   (+) Sleep apnea      Other   (+) Left cervical lymphadenopathy        Physical Exam    Airway    Mallampati score: II  TM Distance: >3 FB  Neck ROM: full     Dental   No notable dental hx     Cardiovascular  Cardiovascular exam normal    Pulmonary  Pulmonary exam normal     Other Findings  post-pubertal.      Anesthesia Plan  ASA Score- 3     Anesthesia Type- IV sedation with anesthesia with ASA Monitors.         Additional Monitors:     Airway Plan:            Plan Factors-Exercise tolerance (METS): >4 METS.    Chart reviewed.   Existing labs reviewed. Patient summary reviewed.    Patient is not a current smoker.      Obstructive sleep apnea risk education given perioperatively.        Induction-     Postoperative Plan-     Perioperative Resuscitation Plan - Level 1 - Full Code.       Informed Consent- Anesthetic plan and risks discussed with patient.  I personally reviewed this patient with the CRNA. Discussed and agreed on the Anesthesia Plan with the CRNA..      NPO Status:  Vitals Value Taken Time   Date of last liquid 02/20/25 02/20/25 1343   Time of  last liquid 2200 02/20/25 1343   Date of last solid 02/19/25 02/20/25 1343   Time of last solid 2200 02/20/25 1343

## 2025-02-20 NOTE — H&P
"History and Physical -  [facility]  Jannette Baugh 45 y.o. female MRN: [id number]    HPI: Jannette Baugh is a 45 y.o. year old female who presents evaluation of GERD and esophageal dysphagia symptoms.      Review of Systems    Historical Information   Past Medical History:   Diagnosis Date    Allergic rhinitis     Anxiety     Arthritis     C. difficile diarrhea 2020    Chest pain     Chronic pain disorder     BACK SHOULDERS NECK    Colon polyp     COPD (chronic obstructive pulmonary disease) (HCC)     CPAP (continuous positive airway pressure) dependence     Depression     Diarrhea     Disease of thyroid gland     nodule    Dysphagia     with pain \"feels like a blockage\" in the esophagus    Eating disorder     Fibromyalgia, primary     Fissure, anal     GERD (gastroesophageal reflux disease)     Hearing difficulty of right ear     frequent infections-(x1 lead to sepsis)    Hyperlipidemia     Hypertension     Irritable bowel syndrome     Obesity     Perforation of tympanic membrane     Right    PONV (postoperative nausea and vomiting)     RBBB     Right bundle branch blockage     Sleep apnea     no cpap    Thyroid nodule     Wears glasses      Past Surgical History:   Procedure Laterality Date    ANAL FISTULOTOMY N/A 2018    Procedure: FISTULOTOMY;  Surgeon: Clay Laws MD;  [facility]: [facility];  Service: Colorectal    BACK SURGERY      lumbar herniated disc    BREAST CYST EXCISION Left 13 yrs ago  benign    BREAST SURGERY Left 2006    cyst removal    CARPAL TUNNEL RELEASE      CERVICAL FUSION  2023    C5-6     SECTION      CHOLECYSTECTOMY      COLONOSCOPY      CYST REMOVAL      DENTAL SURGERY      ENDOMETRIAL ABLATION N/A 2021    Procedure: D&C, ABLATION ENDOMETRIAL MARIEL;  Surgeon: Juan Pablo Doan MD;  Location:  MAIN OR;  Service: Gynecology    FL MYELOGRAM CERVICAL  2022    HAND FUSION Left 2024    HAND SURGERY      INCISION AND " DRAINAGE OF WOUND N/A 11/02/2018    Procedure: INCISION AND DRAINAGE (I&D) BUTTOCK;  Surgeon: Clay Laws MD;  [facility]: [facility];  Service: Colorectal    MYRINGOTOMY W/ TUBES Right 08/17/2015    [place]    MN ANRCT XM SURG REQ ANES GENERAL SPI/EDRL DX N/A 11/02/2018    Procedure: EXAM UNDER ANESTHESIA (EUA);  Surgeon: Clay Laws MD;  [facility]: [facility];  Service: Colorectal    MN COLONOSCOPY FLX DX W/COLLJ SPEC WHEN PFRMD N/A 08/02/2017    Procedure: COLONOSCOPY;  Surgeon: Jose Rafael Penn MD;  [facility];  Service: Gastroenterology    MN DEBRIDEMENT SUBCUTANEOUS TISSUE 1ST 20 SQ CM/< N/A 07/19/2022    Procedure: Debridement abdominal wall;  Surgeon: Robert Bloch, MD;  Location: [facility];  Service: General    MN EXCISION HIDRADENITIS INGUINAL COMPLEX REPAIR Bilateral 01/18/2022    Procedure: WIDE EXCISION HIDRADENITIS ABDOMINAL WALL;  Surgeon: Robert Bloch, MD;  Location: [facility];  Service: General    MN INCISION & DRAINAGE ABSCESS SIMPLE/SINGLE N/A 07/19/2022    Procedure: INCISION AND DRAINAGE ABDOMINAL WALL;  Surgeon: Robert Bloch, MD;  [facility]: [facility];  Service: General    MN TENDON SHEATH INCISION Right 03/13/2018    Procedure: LONG TRIGGER FINGER RELEASE;  Surgeon: Nickolas Guerrero DO;  [facility];  Service: Orthopedics    MN TYMPANOPLASTY W/O MASTOIDECT W/O OSSICLE RECNSTJ Right 05/24/2017    Procedure: TYMPANOPLASTY WITH PERICHONDRIN GRAFT;  Surgeon: Hugo Moreira DO;  [facility];  Service: ENT    SHOULDER SURGERY Right     SPINE SURGERY      TRIGGER FINGER RELEASE      ULNAR NERVE TRANSPOSITION Left     UMBILICAL HIDRADENITIS EXCISION  01/18/2022    subumbilical hidradenitis exicsion, Dr. Bloch, [facility]    US GUIDED THYROID BIOPSY  07/03/2023    US GUIDED THYROID BIOPSY  2/7/2025    WISDOM TOOTH EXTRACTION       Social History   Social History     Substance and Sexual Activity   Alcohol Use Not Currently    Comment: NICKI      Social  History     Substance and Sexual Activity   Drug Use Never     Social History     Tobacco Use   Smoking Status Never   Smokeless Tobacco Never     Family History   Problem Relation Age of Onset    Hypertension Mother     Hyperlipidemia Mother     Diabetes Mother     Arthritis Mother     Depression Mother     Hypertension Father     Hyperlipidemia Father     Heart disease Father         cardiac disorder-myocardial infarction arrhythmias    Heart attack Father     Early death Father     Crohn's disease Brother     Arthritis Brother     Diabetes unspecified Maternal Grandmother     Thyroid disease Paternal Grandmother     Crohn's disease Daughter     No Known Problems Maternal Aunt     No Known Problems Maternal Aunt     No Known Problems Maternal Aunt     No Known Problems Maternal Aunt     No Known Problems Paternal Aunt     No Known Problems Paternal Aunt     Heart attack Paternal Uncle        Meds/Allergies     Not in a hospital admission.    Allergies   Allergen Reactions    Neurontin [Gabapentin] Other (See Comments)     Other reaction(s): SUICIDE IDEATION  Other reaction(s): Other (See Comments)  suicidal  suicidal    Penicillins Anaphylaxis, Swelling and Angioedema    Aripiprazole Other (See Comments)     Other reaction(s): low dose 2 mg.; curving in and locking in of hands/dystonia  Other reaction(s): Other (See Comments)  Other reaction(s): low dose 2 mg.; curving in and locking in of hands/dystonia    Lorazepam Other (See Comments)     Other reaction(s): higher dose > anxiety and depression  Other reaction(s): Other (See Comments)  Other reaction(s): higher dose > anxiety and depression    Wound Dressing Adhesive Hives     Hive, redness, swelling/bleeding    Carbamazepine Other (See Comments)     Other reaction(s): Unknown Reaction    Doxycycline Hives    Lamotrigine Rash       Objective     There were no vitals taken for this visit.      PHYSICAL EXAM    Gen: NAD  CV: RRR  CHEST: Clear  ABD: soft,  NT/ND  EXT: no edema  Neuro: AAO      ASSESSMENT/PLAN:  This is a 45 y.o. year old female here for evaluation of GERD and esophageal dysphagia symptoms.  Patient was explained about the risks and benefits of the procedure. Risks including but not limited to bleeding, infection, perforation were explained in detail.     PLAN:   Procedure: EGD with possible dilation.

## 2025-02-21 ENCOUNTER — OFFICE VISIT (OUTPATIENT)
Dept: ENDOCRINOLOGY | Facility: CLINIC | Age: 45
End: 2025-02-21
Payer: COMMERCIAL

## 2025-02-21 VITALS
OXYGEN SATURATION: 99 % | BODY MASS INDEX: 45.14 KG/M2 | SYSTOLIC BLOOD PRESSURE: 108 MMHG | DIASTOLIC BLOOD PRESSURE: 70 MMHG | WEIGHT: 287.6 LBS | HEART RATE: 69 BPM | RESPIRATION RATE: 18 BRPM | TEMPERATURE: 97.8 F | HEIGHT: 67 IN

## 2025-02-21 DIAGNOSIS — E04.1 THYROID NODULE: ICD-10-CM

## 2025-02-21 PROCEDURE — 99204 OFFICE O/P NEW MOD 45 MIN: CPT | Performed by: STUDENT IN AN ORGANIZED HEALTH CARE EDUCATION/TRAINING PROGRAM

## 2025-02-21 NOTE — ASSESSMENT & PLAN NOTE
Long history of right-sided thyroid nodule with recent significant growth 3.8 x 1.8 x 2.5 cm (previously 1.9 x 1.4 x 2.3) with TR 5(solid, hypoechoic), she undergone FNA biopsy which showed [place] category II, and it was showed in 2 other previous biopsies as well.  We did discuss pathophysiology of thyroid nodule, her FNA results interpretation and management options.  She has no risk factor for thyroid cancers including family history of thyroid cancer as well as head or neck radiation.  She has been experiencing dysphagia to solid food, and is following closely with GI, deemed to be to hardware pressure from her previous cervical fusion procedure, she undergone esophageal dilation in the past which helps her symptoms, otherwise she does not have any other compressive symptoms therefore unsure if her dysphagia is related to thyroid nodule.  Despite significant growth, with history of 3 benign biopsies, I do agree to continue monitoring further growth with surveillance ultrasound in around one year, or sooner if she noticed further growth or compressive symptoms.  She is clinically and biochemically euthyroid, no need for thyroid scan uptake.  Return back in 8 months.  No need for TFT at this time.      Orders:    Ambulatory Referral to Endocrinology    US thyroid; Future

## 2025-02-21 NOTE — PROGRESS NOTES
Name: Jannette Baugh      : 1980      MRN: [id number]  Encounter Provider: Alma Ahuja MD  Encounter Date: 2025   Encounter department: [facility] [place]  :  Assessment & Plan  Thyroid nodule  Long history of right-sided thyroid nodule with recent significant growth 3.8 x 1.8 x 2.5 cm (previously 1.9 x 1.4 x 2.3) with TR 5(solid, hypoechoic), she undergone FNA biopsy which showed [place] category II, and it was showed in 2 other previous biopsies as well.  We did discuss pathophysiology of thyroid nodule, her FNA results interpretation and management options.  She has no risk factor for thyroid cancers including family history of thyroid cancer as well as head or neck radiation.  She has been experiencing dysphagia to solid food, and is following closely with GI, deemed to be to hardware pressure from her previous cervical fusion procedure, she undergone esophageal dilation in the past which helps her symptoms, otherwise she does not have any other compressive symptoms therefore unsure if her dysphagia is related to thyroid nodule.  Despite significant growth, with history of 3 benign biopsies, I do agree to continue monitoring further growth with surveillance ultrasound in around one year, or sooner if she noticed further growth or compressive symptoms.  She is clinically and biochemically euthyroid, no need for thyroid scan uptake.  Return back in 8 months.  No need for TFT at this time.      Orders:    Ambulatory Referral to Endocrinology    US thyroid; Future        History of Present Illness   HPI  Jannette Baugh is a 45 y.o. female who presents for evaluation of thyroid nodule at the request of Gustavo Godoy MD.    Long history of right sided thyroid nodule, initially was reported in thyroid ultrasound in  according to available information to her chart.) which was 1.2 cm nodule, she is status post ultrasound-guided FNA biopsy on 2017,  and 7/3/2023 both came back benign with [place] category II,     Ultrasound was repeated on 1/80/2025 which showed significant growth, right mid gland nodule measuring 3.8 x 1.8 x 2.5 cm (previously 1.9 x 1.4 x 2.3 cm), she undergone another FNA biopsy and result reported benign.      She does report chronic dysphagia to solid food, following with GI, it was deemed to be due to hardware from her previous cervical fusion surgery, history of dilation of esophagus which helped to improve her symptoms temporarily.  She reports change in her voice, denies difficulty swallowing.    Hoarseness/change in voice:  Yes  Trouble breathing  No     Modifying factors which can cause nodule formation:  H/o Radiation to the head or neck region No  History of thyroid cancer in one or more first degree relativesNo  Prior hemithyroidectomy with discovery of thyroid cancer No  Family history of thyroid disease: No    History obtained from: patient    Review of Systems   Constitutional:  Positive for fatigue. Negative for unexpected weight change.     Medical History Reviewed by provider this encounter:     .  Current Outpatient Medications on File Prior to Visit   Medication Sig Dispense Refill    albuterol (PROVENTIL HFA,VENTOLIN HFA) 90 mcg/act inhaler Inhale 2 puffs every 6 (six) hours as needed for wheezing or shortness of breath (cough) 18 g 1    amitriptyline (ELAVIL) 75 mg tablet TAKE 1 TABLET (75 MG) BY MOUTH ONCE DAILY AT BEDTIME 100 tablet 0    Ascorbic Acid (VITAMIN C) 500 MG CAPS Take 1 capsule by mouth daily at bedtime      aspirin 81 mg chewable tablet Chew 1 tablet (81 mg total) daily 30 tablet 30    buPROPion (WELLBUTRIN XL) 150 mg 24 hr tablet Take 150 mg by mouth daily Take 1 tab po daily      cetirizine (ZyrTEC) 10 mg tablet Take 10 mg by mouth daily      Cholecalciferol (VITAMIN D3) 5000 units CAPS Take 5,000 Units by mouth daily at bedtime      cyanocobalamin (VITAMIN B-12) 1,000 mcg tablet Take 1,000 mcg by mouth  daily       docusate sodium (DULCOLAX) 100 mg capsule Take by mouth daily at bedtime      esomeprazole (NexIUM) 40 MG capsule Take 1 capsule (40 mg total) by mouth daily before breakfast 90 capsule 2    lamoTRIgine (LaMICtal) 25 mg tablet 100 mg daily      losartan (COZAAR) 100 MG tablet Take 1 tablet (100 mg total) by mouth daily 90 tablet 4    Melatonin 10 MG TABS Take by mouth daily at bedtime      metoprolol succinate (TOPROL-XL) 100 mg 24 hr tablet Take 1 tablet (100 mg total) by mouth daily 90 tablet 0    Misc Natural Products (ELAVIL OTC SLEEP PO) Take by mouth      Multiple Vitamins-Minerals (CENTRUM ADULTS PO) Take 1 tablet by mouth daily at bedtime      mupirocin (BACTROBAN) 2 % ointment Apply topically 2 (two) times a day for 10 days Apply to left heel twice a day for 10 days 30 g 0    rosuvastatin (CRESTOR) 40 MG tablet Take 1 tablet (40 mg total) by mouth daily 90 tablet 1    Zinc 50 MG TABS Take by mouth daily at bedtime      APPLE CIDER VINEGAR PO Take by mouth (Patient not taking: Reported on 2/20/2025)      [place] Chloride 500 MG CAPS Take by mouth      estradiol (ESTRACE) 0.1 mg/g vaginal cream APPLY PEA SIZED AMOUNT TO AREA OF CONCERN EVERY OTHER DAY (Patient not taking: Reported on 8/4/2024)      meclizine (ANTIVERT) 25 mg tablet Take 1 tablet (25 mg total) by mouth 3 (three) times a day as needed for dizziness for up to 10 days (Patient not taking: Reported on 2/21/2025) 30 tablet 0    naproxen (NAPROSYN) 500 mg tablet take 1 tablet by mouth every 12 hours for 7 days BEGIN TAKING AFTER SURGERY (Patient not taking: Reported on 8/4/2024)      ofloxacin (OCUFLOX) 0.3 % ophthalmic solution Instill 5 drops in affected ear two times a day for one week (Patient not taking: Reported on 2/21/2025) 5 mL 0    Omega-3 Fatty Acids (FISH OIL ADULT GUMMIES PO) Take by mouth daily at bedtime (Patient not taking: Reported on 2/21/2025)      Pyridoxine HCl (VITAMIN B-6 PO) Take by mouth (Patient not taking:  "Reported on 8/4/2024)      venlafaxine (EFFEXOR-XR) 150 mg 24 hr capsule Take 1 capsule (150 mg total) by mouth daily at bedtime (Patient not taking: Reported on 9/19/2024) 90 capsule 0    venlafaxine (EFFEXOR-XR) 37.5 mg 24 hr capsule Take 37.5 mg by mouth daily      venlafaxine (EFFEXOR-XR) 75 mg 24 hr capsule Take 1 capsule (75 mg total) by mouth daily at bedtime (Patient not taking: Reported on 2/20/2025) 90 capsule 0     Current Facility-Administered Medications on File Prior to Visit   Medication Dose Route Frequency Provider Last Rate Last Admin    lactated ringers infusion  125 mL/hr Intravenous Continuous Elva Maguire MD        [DISCONTINUED] benzocaine (HURRICAINE) 20 % mucosal spray   Mucosal PRN Mela Kim Sweet, CRNA   1 spray at 02/20/25 1356    [DISCONTINUED] lactated ringers infusion   Intravenous Continuous PRN Mela Kim Sweet, CRNA   New Bag at 02/20/25 1340    [DISCONTINUED] lidocaine (PF) (XYLOCAINE-MPF) 1 % injection   Intravenous PRN Mela Kim Cotager, CRNA   100 mg at 02/20/25 1359    [DISCONTINUED] propofol (DIPRIVAN) 1000 mg in 100 mL infusion (premix)   Intravenous PRN Mela Kim Sweet, CRNA   50 mg at 02/20/25 1410         Objective   /70 (BP Location: Right arm, Patient Position: Sitting, Cuff Size: Adult)   Pulse 69   Temp 97.8 °F (36.6 °C) (Temporal)   Resp 18   Ht 5' 7\" (1.702 m)   Wt 130 kg (287 lb 9.6 oz)   SpO2 99%   BMI 45.04 kg/m²      Physical Exam  Vitals and nursing note reviewed.   Constitutional:       General: She is not in acute distress.     Appearance: She is well-developed. She is obese.   HENT:      Head: Normocephalic and atraumatic.   Neck:      Comments: Right-sided thyroid nodule palpated, which is not firm and it is mobile.  Cardiovascular:      Rate and Rhythm: Normal rate and regular rhythm.   Pulmonary:      Effort: Pulmonary effort is normal. No respiratory distress.   Musculoskeletal:         General: No swelling.      " Cervical back: Neck supple.   Skin:     General: Skin is warm and dry.   Neurological:      Mental Status: She is alert.         Administrative Statements   I have spent a total time of 40 minutes in caring for this patient on the day of the visit/encounter including Diagnostic results, Prognosis, Risks and benefits of tx options, Impressions, Counseling / Coordination of care, Documenting in the medical record, Reviewing/placing orders in the medical record (including tests, medications, and/or procedures), and Obtaining or reviewing history  .      Final Diagnosis   A - B. Thyroid, Right, lower pole: (Thin prep and smear preparations)  Negative for malignancy ([place] Category II) - See note.  Findings consistent with a benign follicular nodule.  Groups of benign follicular cells and  colloid.     Final Diagnosis   A-B. Thyroid, Right, Lower pole (ThinPrep and smear preparations):  Benign ([place] Category II) - See note.  Benign appearing follicular cells, some in spherules with perifollicular fibrosis.  Fibrosis and coarse calcifications present.     Final Diagnosis   A.B. Thyroid, Right, mid-gland (ThinPrep and Smear preparations):  Benign ([place] Category II) - See note.  - Predominantly flat sheets of follicular cells and abundant colloid.     THYROID ULTRASOUND     INDICATION: E04.1: Nontoxic single thyroid nodule.     COMPARISON: Thyroid ultrasound 5/31/2023, biopsy 7/3/2023.     TECHNIQUE: Ultrasound of the thyroid was performed with a high frequency linear transducer in transverse and sagittal planes including volumetric imaging sweeps as well as traditional still imaging technique.     FINDINGS:  Thyroid texture: Normal homogeneous smooth echotexture.     Right lobe: 6.8 x 1.9 x 2.5 cm. Volume 15.6 mL  Left lobe: 5.2 x 1.4 x 1.9 cm. Volume 6.7 mL  Isthmus: 0.6 cm.     Nodule #1. Image 11.  RIGHT midgland nodule measuring 3.8 x 1.8 x 2.5 cm, 8.0 mL (previously 1.9 x 1.4 x 2.3 cm, 2.9 mL). This nodule  has significantly increased in size from prior.  COMPOSITION: 2 points, solid or almost completely solid.  ECHOGENICITY: 2 points, hypoechoic.  SHAPE: 0 points, wider-than-tall.  MARGIN: 3 points, extrathyroidal extension  ECHOGENIC FOCI: 0 points, none or large comet-tail artifacts.  TI-RADS Classification: TR 5 (7 or > points), Highly suspicious. FNA if >/= 1 cm. Follow if >/= 0.5 cm. This nodule has prior benign biopsy ([place] Category II, 7/3/2023).     There are additional nodules of lesser size and/or TI-RADS score which do not warrant further delineation based on ACR parameters.     IMPRESSION:     Significantly increased size of a right thyroid nodule now measuring up to 3.8 cm (previously 2.3 cm) now with lobular margins and suspicion for extrathyroidal extension. Rebiopsy is recommended.        Reference: ACR Thyroid Imaging, Reporting and Data System (TI-RADS): White Paper of the ACR TI-RADS Committee. J AM America Radiol 2017;14:587-595. Additional recommendations based on American Thyroid Association 2015 guidelines.     The study was marked in EPIC for significant notification.

## 2025-02-24 ENCOUNTER — RESULTS FOLLOW-UP (OUTPATIENT)
Age: 45
End: 2025-02-24

## 2025-02-24 PROCEDURE — 88305 TISSUE EXAM BY PATHOLOGIST: CPT | Performed by: PATHOLOGY

## 2025-02-25 ENCOUNTER — TELEPHONE (OUTPATIENT)
Age: 45
End: 2025-02-25

## 2025-02-25 DIAGNOSIS — J45.20 MILD INTERMITTENT REACTIVE AIRWAY DISEASE WITHOUT COMPLICATION: ICD-10-CM

## 2025-02-25 NOTE — TELEPHONE ENCOUNTER
Patient tested pos for Covid today. Symptoms started yesterday. Asking for medication. no appts available.

## 2025-02-26 ENCOUNTER — TELEMEDICINE (OUTPATIENT)
Dept: FAMILY MEDICINE CLINIC | Facility: CLINIC | Age: 45
End: 2025-02-26
Payer: COMMERCIAL

## 2025-02-26 DIAGNOSIS — E66.01 MORBID OBESITY WITH BMI OF 45.0-49.9, ADULT (HCC): ICD-10-CM

## 2025-02-26 DIAGNOSIS — I10 PRIMARY HYPERTENSION: ICD-10-CM

## 2025-02-26 DIAGNOSIS — J45.20 MILD INTERMITTENT ASTHMA, UNSPECIFIED WHETHER COMPLICATED: ICD-10-CM

## 2025-02-26 DIAGNOSIS — U07.1 COVID-19 VIRUS INFECTION: Primary | ICD-10-CM

## 2025-02-26 PROCEDURE — 99214 OFFICE O/P EST MOD 30 MIN: CPT | Performed by: FAMILY MEDICINE

## 2025-02-26 RX ORDER — NIRMATRELVIR AND RITONAVIR 300-100 MG
3 KIT ORAL 2 TIMES DAILY
Qty: 30 TABLET | Refills: 0 | Status: SHIPPED | OUTPATIENT
Start: 2025-02-26 | End: 2025-03-03

## 2025-02-26 RX ORDER — ALBUTEROL SULFATE 90 UG/1
2 INHALANT RESPIRATORY (INHALATION) EVERY 6 HOURS PRN
Qty: 18 G | Refills: 0 | Status: SHIPPED | OUTPATIENT
Start: 2025-02-26

## 2025-02-26 NOTE — ASSESSMENT & PLAN NOTE
Orders:    nirmatrelvir & ritonavir (Paxlovid, 300/100,) tablet therapy pack; Take 3 tablets by mouth 2 (two) times a day for 5 days Take 2 nirmatrelvir tablets + 1 ritonavir tablet together per dose    albuterol (PROVENTIL HFA,VENTOLIN HFA) 90 mcg/act inhaler; Inhale 2 puffs every 6 (six) hours as needed for shortness of breath or wheezing (or cough)

## 2025-02-26 NOTE — TELEPHONE ENCOUNTER
Called and left vm for pt informing her we have a few openings today we can do a virtual. Pt is booked at 10am for a virtual, still needs to confirm.

## 2025-02-26 NOTE — PROGRESS NOTES
COVID-19 Outpatient Progress Note  Name: Jannette Baugh      : 1980      MRN: [id number]  Encounter Provider: Janneth Love DO  Encounter Date: 2025   Encounter department: [facility] [place]  :  Assessment & Plan  COVID-19 virus infection    Orders:    nirmatrelvir & ritonavir (Paxlovid, 300/100,) tablet therapy pack; Take 3 tablets by mouth 2 (two) times a day for 5 days Take 2 nirmatrelvir tablets + 1 ritonavir tablet together per dose    Primary hypertension    Orders:    nirmatrelvir & ritonavir (Paxlovid, 300/100,) tablet therapy pack; Take 3 tablets by mouth 2 (two) times a day for 5 days Take 2 nirmatrelvir tablets + 1 ritonavir tablet together per dose    Morbid obesity with BMI of 45.0-49.9, adult (HCC)      Orders:    nirmatrelvir & ritonavir (Paxlovid, 300/100,) tablet therapy pack; Take 3 tablets by mouth 2 (two) times a day for 5 days Take 2 nirmatrelvir tablets + 1 ritonavir tablet together per dose    Mild intermittent asthma, unspecified whether complicated    Orders:    nirmatrelvir & ritonavir (Paxlovid, 300/100,) tablet therapy pack; Take 3 tablets by mouth 2 (two) times a day for 5 days Take 2 nirmatrelvir tablets + 1 ritonavir tablet together per dose    albuterol (PROVENTIL HFA,VENTOLIN HFA) 90 mcg/act inhaler; Inhale 2 puffs every 6 (six) hours as needed for shortness of breath or wheezing (or cough)        Disposition:     Discussed symptom directed medication options with patient. Discussed vitamin D, vitamin C, and/or zinc supplementation with patient.   PAXLOVID Dynamic Medication Interaction Guide  Current medication list will be searched to evaluate for possible drug interactions. If there are no results below, that means that no medications were found on the med list where there would be an interaction with Paxlovid. If there is clinical concern that there might be a medication that the patient is taking that may interact with Paxlovid that did not show  up on this report, a drug interaction search should be completed by 3rd party website.               HMG-CoA Reductase Inhibitors (Atorvastatin & Rosuvastatin): Caution  Effect on Drug Level Possible Effect Recommendation During Paxlovid Treatment  Increased Increased risk for myopathy and rhabdomyolysis Consider temporary discontinuation of atorvastatin and rosuvastatin during treatment with Paxlovid. Atorvastatin and rosuvastatin do not need to be held prior to or after completing Paxlovid          Patient meets criteria for Paxlovid and they have been counseled appropriately regarding risks, benefits, side effects, and alternative treatment options. After discussion, patient agrees to treatment.    Possible side effects of Paxlovid?    Possible side effects of Paxlovid are:  - Liver Problems. Notify us right away if you start to experience loss of appetite, yellowing of your skin and the whites of eyes (jaundice), dark-colored urine, pale colored stools and itchy skin, stomach area (abdominal) pain.  - Resistance to HIV Medicines. If you have untreated HIV infection, Paxlovid may lead to some HIV medicines not working as well in the future.  - Other possible side effects include: altered sense of taste, diarrhea, high blood pressure, or muscle aches.         Recent Visits  Date Type Provider Dept   02/21/25 Office Visit Alma Ahuja MD [facility] [place]   Showing recent visits within past 7 days and meeting all other requirements  Today's Visits  Date Type Provider Dept   02/26/25 Telemedicine Janneth Love DO [facility]   Showing today's visits and meeting all other requirements  Future Appointments  No visits were found meeting these conditions.  Showing future appointments within next 150 days and meeting all other requirements    History of Present Illness         Subjective:   Jannette Baugh is a 45 y.o. female who has been screened for COVID-19. Symptom change since  last report: worsening. Patient's symptoms include nasal congestion, cough and headache. Patient denies fever, chills, shortness of breath, vomiting and diarrhea.     - Date of symptom onset: 2/23/2025  - Date of positive COVID-19 test: 2/25/2025. Type of test: Home antigen. Patient with typical symptoms of COVID-19 and they attest that they were positive on home rapid antigen testing. Image of positive result is not able to be uploaded into their chart.     COVID-19 vaccination status: Fully vaccinated with booster    Jannette has been staying home and has isolated themselves in her home. She is taking care to not share personal items and     States her  was sick 5 days ago, but did not test  Admits did have COVID about 2 yrs ago and was rx'd Paxlovid- did ok with the med just a little acid reflux side effect      Lab Results   Component Value Date    SARSCOVAG Negative 03/02/2023       Review of Systems   Constitutional:  Negative for chills and fever.   HENT:  Positive for congestion.    Respiratory:  Positive for cough. Negative for shortness of breath.    Gastrointestinal:  Negative for diarrhea and vomiting.   Neurological:  Positive for headaches.     Objective   There were no vitals taken for this visit.    Physical Exam  Vitals and nursing note reviewed.   Constitutional:       General: She is not in acute distress.     Appearance: She is well-groomed. She is ill-appearing (mild to moderately). She is not toxic-appearing or diaphoretic.   HENT:      Head: Normocephalic and atraumatic.      Nose: Nose normal.   Eyes:      General: Lids are normal.      Conjunctiva/sclera: Conjunctivae normal.      Pupils: Pupils are equal, round, and reactive to light.   Pulmonary:      Effort: Pulmonary effort is normal.   Skin:     Coloration: Skin is not pale.   Neurological:      Mental Status: She is alert and oriented to person, place, and time.   Psychiatric:         Mood and Affect: Mood normal.         Behavior:  Behavior is cooperative.         Cognition and Memory: Cognition and memory normal.         Administrative Statements   Encounter provider Janneth Love, DO    The Patient is located at Home and in the following state in which I hold an active license [place].    The patient was identified by name and date of birth. Jannette Baugh was informed that this is a telemedicine visit and that the visit is being conducted through the Epic Embedded platform. She agrees to proceed..  My office door was closed. No one else was in the room.  She acknowledged consent and understanding of privacy and security of the video platform. The patient has agreed to participate and understands they can discontinue the visit at any time.    I have spent a total time of 20 minutes in caring for this patient on the day of the visit/encounter

## 2025-02-27 RX ORDER — ALBUTEROL SULFATE 90 UG/1
2 INHALANT RESPIRATORY (INHALATION) EVERY 6 HOURS PRN
Qty: 18 G | Refills: 0 | OUTPATIENT
Start: 2025-02-27

## 2025-04-01 DIAGNOSIS — I10 HYPERTENSION, UNSPECIFIED TYPE: ICD-10-CM

## 2025-04-01 DIAGNOSIS — I10 PRIMARY HYPERTENSION: ICD-10-CM

## 2025-04-02 RX ORDER — METOPROLOL SUCCINATE 100 MG/1
100 TABLET, EXTENDED RELEASE ORAL DAILY
Qty: 180 TABLET | Refills: 1 | Status: SHIPPED | OUTPATIENT
Start: 2025-04-02

## 2025-04-02 RX ORDER — LOSARTAN POTASSIUM 100 MG/1
100 TABLET ORAL DAILY
Qty: 90 TABLET | Refills: 1 | Status: SHIPPED | OUTPATIENT
Start: 2025-04-02

## 2025-04-13 ENCOUNTER — HOSPITAL ENCOUNTER (EMERGENCY)
Facility: HOSPITAL | Age: 45
Discharge: HOME | End: 2025-04-13
Attending: EMERGENCY MEDICINE
Payer: COMMERCIAL

## 2025-04-13 ENCOUNTER — APPOINTMENT (EMERGENCY)
Dept: CT IMAGING | Facility: HOSPITAL | Age: 45
End: 2025-04-13
Payer: COMMERCIAL

## 2025-04-13 VITALS
OXYGEN SATURATION: 95 % | DIASTOLIC BLOOD PRESSURE: 67 MMHG | HEART RATE: 87 BPM | WEIGHT: 280 LBS | SYSTOLIC BLOOD PRESSURE: 126 MMHG | TEMPERATURE: 97.9 F | BODY MASS INDEX: 43.95 KG/M2 | RESPIRATION RATE: 18 BRPM | HEIGHT: 67 IN

## 2025-04-13 DIAGNOSIS — R00.2 PALPITATIONS: ICD-10-CM

## 2025-04-13 DIAGNOSIS — N39.0 UTI (URINARY TRACT INFECTION): Primary | ICD-10-CM

## 2025-04-13 DIAGNOSIS — R42 VERTIGO: ICD-10-CM

## 2025-04-13 LAB
ALBUMIN SERPL BCG-MCNC: 4.6 G/DL (ref 3.5–5)
ALP SERPL-CCNC: 72 U/L (ref 34–104)
ALT SERPL W P-5'-P-CCNC: 30 U/L (ref 7–52)
ANION GAP SERPL CALCULATED.3IONS-SCNC: 9 MMOL/L (ref 4–13)
APTT PPP: 29 SECONDS (ref 23–34)
AST SERPL W P-5'-P-CCNC: 20 U/L (ref 13–39)
ATRIAL RATE: 100 BPM
ATRIAL RATE: 90 BPM
BACTERIA UR QL AUTO: ABNORMAL /HPF
BASOPHILS # BLD AUTO: 0.03 THOUSANDS/ÂΜL (ref 0–0.1)
BASOPHILS NFR BLD AUTO: 0 % (ref 0–1)
BILIRUB SERPL-MCNC: 0.51 MG/DL (ref 0.2–1)
BILIRUB UR QL STRIP: NEGATIVE
BUN SERPL-MCNC: 10 MG/DL (ref 5–25)
CALCIUM SERPL-MCNC: 9.2 MG/DL (ref 8.4–10.2)
CARDIAC TROPONIN I PNL SERPL HS: <2 NG/L (ref ?–50)
CARDIAC TROPONIN I PNL SERPL HS: <2 NG/L (ref ?–50)
CHLORIDE SERPL-SCNC: 102 MMOL/L (ref 96–108)
CLARITY UR: CLEAR
CO2 SERPL-SCNC: 29 MMOL/L (ref 21–32)
COLOR UR: YELLOW
CREAT SERPL-MCNC: 0.64 MG/DL (ref 0.6–1.3)
EOSINOPHIL # BLD AUTO: 0.26 THOUSAND/ÂΜL (ref 0–0.61)
EOSINOPHIL NFR BLD AUTO: 3 % (ref 0–6)
ERYTHROCYTE [DISTWIDTH] IN BLOOD BY AUTOMATED COUNT: 13.6 % (ref 11.6–15.1)
GFR SERPL CREATININE-BSD FRML MDRD: 108 ML/MIN/1.73SQ M
GLUCOSE SERPL-MCNC: 148 MG/DL (ref 65–140)
GLUCOSE UR STRIP-MCNC: NEGATIVE MG/DL
HCT VFR BLD AUTO: 42.7 % (ref 34.8–46.1)
HGB BLD-MCNC: 13.9 G/DL (ref 11.5–15.4)
HGB UR QL STRIP.AUTO: ABNORMAL
IMM GRANULOCYTES # BLD AUTO: 0.02 THOUSAND/UL (ref 0–0.2)
IMM GRANULOCYTES NFR BLD AUTO: 0 % (ref 0–2)
INR PPP: 0.92 (ref 0.85–1.19)
KETONES UR STRIP-MCNC: NEGATIVE MG/DL
LEUKOCYTE ESTERASE UR QL STRIP: ABNORMAL
LYMPHOCYTES # BLD AUTO: 1.71 THOUSANDS/ÂΜL (ref 0.6–4.47)
LYMPHOCYTES NFR BLD AUTO: 21 % (ref 14–44)
MCH RBC QN AUTO: 29.9 PG (ref 26.8–34.3)
MCHC RBC AUTO-ENTMCNC: 32.6 G/DL (ref 31.4–37.4)
MCV RBC AUTO: 92 FL (ref 82–98)
MONOCYTES # BLD AUTO: 0.59 THOUSAND/ÂΜL (ref 0.17–1.22)
MONOCYTES NFR BLD AUTO: 7 % (ref 4–12)
NEUTROPHILS # BLD AUTO: 5.66 THOUSANDS/ÂΜL (ref 1.85–7.62)
NEUTS SEG NFR BLD AUTO: 69 % (ref 43–75)
NITRITE UR QL STRIP: NEGATIVE
NON-SQ EPI CELLS URNS QL MICRO: ABNORMAL /HPF
NRBC BLD AUTO-RTO: 0 /100 WBCS
P AXIS: 17 DEGREES
P AXIS: 33 DEGREES
PH UR STRIP.AUTO: 7.5 [PH]
PLATELET # BLD AUTO: 189 THOUSANDS/UL (ref 149–390)
PMV BLD AUTO: 10.8 FL (ref 8.9–12.7)
POTASSIUM SERPL-SCNC: 3.8 MMOL/L (ref 3.5–5.3)
PR INTERVAL: 152 MS
PR INTERVAL: 154 MS
PROT SERPL-MCNC: 7.1 G/DL (ref 6.4–8.4)
PROT UR STRIP-MCNC: NEGATIVE MG/DL
PROTHROMBIN TIME: 12.9 SECONDS (ref 12.3–15)
QRS AXIS: -35 DEGREES
QRS AXIS: -39 DEGREES
QRSD INTERVAL: 124 MS
QRSD INTERVAL: 126 MS
QT INTERVAL: 388 MS
QT INTERVAL: 392 MS
QTC INTERVAL: 474 MS
QTC INTERVAL: 505 MS
RBC # BLD AUTO: 4.65 MILLION/UL (ref 3.81–5.12)
RBC #/AREA URNS AUTO: ABNORMAL /HPF
SODIUM SERPL-SCNC: 140 MMOL/L (ref 135–147)
SP GR UR STRIP.AUTO: 1.01
T WAVE AXIS: 10 DEGREES
T WAVE AXIS: 25 DEGREES
UROBILINOGEN UR QL STRIP.AUTO: 0.2 E.U./DL
VENTRICULAR RATE: 100 BPM
VENTRICULAR RATE: 90 BPM
WBC # BLD AUTO: 8.27 THOUSAND/UL (ref 4.31–10.16)
WBC #/AREA URNS AUTO: ABNORMAL /HPF

## 2025-04-13 PROCEDURE — 84484 ASSAY OF TROPONIN QUANT: CPT | Performed by: EMERGENCY MEDICINE

## 2025-04-13 PROCEDURE — 85610 PROTHROMBIN TIME: CPT | Performed by: EMERGENCY MEDICINE

## 2025-04-13 PROCEDURE — 80053 COMPREHEN METABOLIC PANEL: CPT | Performed by: EMERGENCY MEDICINE

## 2025-04-13 PROCEDURE — 99285 EMERGENCY DEPT VISIT HI MDM: CPT | Performed by: EMERGENCY MEDICINE

## 2025-04-13 PROCEDURE — 36415 COLL VENOUS BLD VENIPUNCTURE: CPT | Performed by: EMERGENCY MEDICINE

## 2025-04-13 PROCEDURE — 85025 COMPLETE CBC W/AUTO DIFF WBC: CPT | Performed by: EMERGENCY MEDICINE

## 2025-04-13 PROCEDURE — 87086 URINE CULTURE/COLONY COUNT: CPT | Performed by: EMERGENCY MEDICINE

## 2025-04-13 PROCEDURE — 81003 URINALYSIS AUTO W/O SCOPE: CPT | Performed by: EMERGENCY MEDICINE

## 2025-04-13 PROCEDURE — 81001 URINALYSIS AUTO W/SCOPE: CPT | Performed by: EMERGENCY MEDICINE

## 2025-04-13 PROCEDURE — 93005 ELECTROCARDIOGRAM TRACING: CPT

## 2025-04-13 PROCEDURE — 70450 CT HEAD/BRAIN W/O DYE: CPT

## 2025-04-13 PROCEDURE — 85730 THROMBOPLASTIN TIME PARTIAL: CPT | Performed by: EMERGENCY MEDICINE

## 2025-04-13 PROCEDURE — 96361 HYDRATE IV INFUSION ADD-ON: CPT

## 2025-04-13 PROCEDURE — 99285 EMERGENCY DEPT VISIT HI MDM: CPT

## 2025-04-13 PROCEDURE — 96374 THER/PROPH/DIAG INJ IV PUSH: CPT

## 2025-04-13 PROCEDURE — 93010 ELECTROCARDIOGRAM REPORT: CPT | Performed by: INTERNAL MEDICINE

## 2025-04-13 RX ORDER — SULFAMETHOXAZOLE AND TRIMETHOPRIM 800; 160 MG/1; MG/1
1 TABLET ORAL ONCE
Status: COMPLETED | OUTPATIENT
Start: 2025-04-13 | End: 2025-04-13

## 2025-04-13 RX ORDER — SULFAMETHOXAZOLE AND TRIMETHOPRIM 800; 160 MG/1; MG/1
1 TABLET ORAL 2 TIMES DAILY
Qty: 10 TABLET | Refills: 0 | Status: SHIPPED | OUTPATIENT
Start: 2025-04-13 | End: 2025-04-18

## 2025-04-13 RX ORDER — MECLIZINE HYDROCHLORIDE 25 MG/1
25 TABLET ORAL 3 TIMES DAILY PRN
Qty: 12 TABLET | Refills: 0 | Status: SHIPPED | OUTPATIENT
Start: 2025-04-13 | End: 2025-04-16 | Stop reason: SDUPTHER

## 2025-04-13 RX ORDER — MECLIZINE HCL 12.5 MG 12.5 MG/1
25 TABLET ORAL ONCE
Status: COMPLETED | OUTPATIENT
Start: 2025-04-13 | End: 2025-04-13

## 2025-04-13 RX ORDER — ONDANSETRON 2 MG/ML
4 INJECTION INTRAMUSCULAR; INTRAVENOUS ONCE
Status: COMPLETED | OUTPATIENT
Start: 2025-04-13 | End: 2025-04-13

## 2025-04-13 RX ADMIN — SODIUM CHLORIDE 1000 ML: 0.9 INJECTION, SOLUTION INTRAVENOUS at 13:32

## 2025-04-13 RX ADMIN — MECLIZINE HYDROCHLORIDE 25 MG: 12.5 TABLET ORAL at 13:53

## 2025-04-13 RX ADMIN — ONDANSETRON 4 MG: 2 INJECTION INTRAMUSCULAR; INTRAVENOUS at 13:53

## 2025-04-13 RX ADMIN — SULFAMETHOXAZOLE AND TRIMETHOPRIM 1 TABLET: 800; 160 TABLET ORAL at 17:00

## 2025-04-13 NOTE — DISCHARGE INSTRUCTIONS
Increase fluids over the next 48 hours.    For recurrent symptoms of dizziness or vertigo, use meclizine 1 pill every 8 hours as needed.    Return to the ER for any new, concerning, worsening issues.    For your urine use Bactrim DS 1 pill twice a day for 5 days.  Finish antibiotics even if feeling better.    Recheck with your family doctor in 2 to 4 days for repeat evaluation.

## 2025-04-13 NOTE — ED PROVIDER NOTES
Time reflects when diagnosis was documented in both MDM as applicable and the Disposition within this note       Time User Action Codes Description Comment    4/13/2025  4:50 PM Brutico Dilan Add [R42] Vertigo     4/13/2025  4:51 PM BruticDilan mistry Add [R00.2] Palpitations     4/13/2025  4:52 PM Brutico Dilan Modify [R42] Vertigo     4/13/2025  4:52 PM Bruticfidelia Dilan Add [N39.0] UTI (urinary tract infection)           ED Disposition       ED Disposition   Discharge    Condition   Stable    Date/Time   Sun Apr 13, 2025  4:50 PM    Comment   Jannette Baugh discharge to home/self care.                   Assessment & Plan       Medical Decision Making  45-year-old female presents emergency room complaining of dizziness described as the sensation of the room spinning, notably worse when changing positions.  The patient notes that when she lays still and stares into 1 area without moving, she has no symptoms.  Patient also was complaining of palpitations and feeling lightheaded nauseated and also having pain in her left shoulder.  Differential diagnosis based on my evaluation is electrolyte abnormalities dehydration, acute coronary syndrome, arrhythmia, vertigo, stroke, or dehydration.  Patient was given IV fluids as well as meclizine which improved symptoms.  The patient's troponin is negative x 2 and EKG is nonacute for definitive ST or T wave changes.   CT scan was nonacute as well as the remainder of the patient's electrolytes.  Patient was given Bactrim DS for elevated white cells in the urine and will be discharged with 5 days of treatment as well as meclizine as needed for what appears to be positional vertigo.  Patient told to return to the ER with new or concerning issues.  Patient discharged        Amount and/or Complexity of Data Reviewed  Labs: ordered. Decision-making details documented in ED Course.  Radiology: ordered.    Risk  Prescription drug management.        ED Course as of 04/13/25 0857  "  Sun Apr 13, 2025   1413 hs TnI 0hr: <2   1609 WBC, UA(!): 10-20   1636 Patient notes she feels improved after medication.  Patient ambulated without difficulty.       Medications   sodium chloride 0.9 % bolus 1,000 mL (0 mL Intravenous Stopped 4/13/25 1554)   meclizine (ANTIVERT) tablet 25 mg (25 mg Oral Given 4/13/25 1353)   ondansetron (ZOFRAN) injection 4 mg (4 mg Intravenous Given 4/13/25 1353)   sulfamethoxazole-trimethoprim (BACTRIM DS) 800-160 mg per tablet 1 tablet (1 tablet Oral Given 4/13/25 1700)       ED Risk Strat Scores                    No data recorded        SBIRT 20yo+      Flowsheet Row Most Recent Value   Initial Alcohol Screen: US AUDIT-C     1. How often do you have a drink containing alcohol? 0 Filed at: 04/13/2025 1315   2. How many drinks containing alcohol do you have on a typical day you are drinking?  0 Filed at: 04/13/2025 1315   3a. Male UNDER 65: How often do you have five or more drinks on one occasion? 0 Filed at: 04/13/2025 1315   3b. FEMALE Any Age, or MALE 65+: How often do you have 4 or more drinks on one occassion? 0 Filed at: 04/13/2025 1315   Audit-C Score 0 Filed at: 04/13/2025 1315   KARYN: How many times in the past year have you...    Used an illegal drug or used a prescription medication for non-medical reasons? Never Filed at: 04/13/2025 1315                            History of Present Illness       Chief Complaint   Patient presents with    Dizziness    Palpitations       Past Medical History:   Diagnosis Date    Allergic rhinitis     Anxiety     Arthritis     C. difficile diarrhea 2020    Chest pain     Chronic pain disorder     BACK SHOULDERS NECK    Colon polyp     COPD (chronic obstructive pulmonary disease) (HCC)     CPAP (continuous positive airway pressure) dependence     Depression     Diarrhea     Disease of thyroid gland     nodule    Dysphagia     with pain \"feels like a blockage\" in the esophagus    Eating disorder     Fibromyalgia, primary     Fissure, " anal     GERD (gastroesophageal reflux disease)     Hearing difficulty of right ear     frequent infections-(x1 lead to sepsis)    Hyperlipidemia     Hypertension     Irritable bowel syndrome     Obesity     Perforation of tympanic membrane     Right    PONV (postoperative nausea and vomiting)     RBBB     Right bundle branch blockage     Sleep apnea     no cpap    Thyroid nodule     Wears glasses       Past Surgical History:   Procedure Laterality Date    ANAL FISTULOTOMY N/A 2018    Procedure: FISTULOTOMY;  Surgeon: Clay Laws MD;  [facility]: [facility];  Service: Colorectal    BACK SURGERY      lumbar herniated disc    BREAST CYST EXCISION Left 13 yrs ago  benign    BREAST SURGERY Left 2006    cyst removal    CARPAL TUNNEL RELEASE      CERVICAL FUSION  2023    C5-6     SECTION      CHOLECYSTECTOMY      COLONOSCOPY      CYST REMOVAL      DENTAL SURGERY      ENDOMETRIAL ABLATION N/A 2021    Procedure: D&C, ABLATION ENDOMETRIAL MARIEL;  Surgeon: Juan Pablo Doan MD;  Location:  MAIN OR;  Service: Gynecology    FL MYELOGRAM CERVICAL  2022    HAND FUSION Left 2024    HAND SURGERY      INCISION AND DRAINAGE OF WOUND N/A 2018    Procedure: INCISION AND DRAINAGE (I&D) BUTTOCK;  Surgeon: Clay Laws MD;  [facility];  Service: Colorectal    MYRINGOTOMY W/ TUBES Right 2015    [place]    [place] ANRCT XM SURG REQ ANES GENERAL SPI/EDRL DX N/A 2018    Procedure: EXAM UNDER ANESTHESIA (EUA);  Surgeon: Clay Laws MD;  [facility]: [facility];  Service: Colorectal    KY COLONOSCOPY FLX DX W/COLLJ SPEC WHEN PFRMD N/A 2017    Procedure: COLONOSCOPY;  Surgeon: Jose Rafael Penn MD;  [facility];  Service: Gastroenterology    KY DEBRIDEMENT SUBCUTANEOUS TISSUE 1ST 20 SQ CM/< N/A 2022    Procedure: Debridement abdominal wall;  Surgeon: Robert Bloch, MD;  Location: [facility];  Service: General    KY EXCISION HIDRADENITIS  INGUINAL COMPLEX REPAIR Bilateral 01/18/2022    Procedure: WIDE EXCISION HIDRADENITIS ABDOMINAL WALL;  Surgeon: Robert Bloch, MD;  Location: [facility];  Service: General    OK INCISION & DRAINAGE ABSCESS SIMPLE/SINGLE N/A 07/19/2022    Procedure: INCISION AND DRAINAGE ABDOMINAL WALL;  Surgeon: Robert Bloch, MD;  Location: [facility];  Service: General    OK TENDON SHEATH INCISION Right 03/13/2018    Procedure: LONG TRIGGER FINGER RELEASE;  Surgeon: Nickolas Guerrero DO;  [facility];  Service: Orthopedics    OK TYMPANOPLASTY W/O MASTOIDECT W/O OSSICLE RECNSTJ Right 05/24/2017    Procedure: TYMPANOPLASTY WITH PERICHONDRIN GRAFT;  Surgeon: Hugo Moreira DO;  [facility];  Service: ENT    SHOULDER SURGERY Right     SPINE SURGERY      TRIGGER FINGER RELEASE      ULNAR NERVE TRANSPOSITION Left     UMBILICAL HIDRADENITIS EXCISION  01/18/2022    subumbilical hidradenitis exicsion, Dr. Bloch, [facility]    US GUIDED THYROID BIOPSY  07/03/2023    US GUIDED THYROID BIOPSY  2/7/2025    WISDOM TOOTH EXTRACTION        Family History   Problem Relation Age of Onset    Hypertension Mother     Hyperlipidemia Mother     Diabetes Mother     Arthritis Mother     Depression Mother     Hypertension Father     Hyperlipidemia Father     Heart disease Father         cardiac disorder-myocardial infarction arrhythmias    Heart attack Father     Early death Father     Crohn's disease Brother     Arthritis Brother     Diabetes unspecified Maternal Grandmother     Thyroid disease Paternal Grandmother     Crohn's disease Daughter     No Known Problems Maternal Aunt     No Known Problems Maternal Aunt     No Known Problems Maternal Aunt     No Known Problems Maternal Aunt     No Known Problems Paternal Aunt     No Known Problems Paternal Aunt     Heart attack Paternal Uncle       Social History     Tobacco Use    Smoking status: Never    Smokeless tobacco: Never   Vaping Use    Vaping status: Never Used   Substance Use  Topics    Alcohol use: Not Currently     Comment: NICKI     Drug use: Never      E-Cigarette/Vaping    E-Cigarette Use Never User       E-Cigarette/Vaping Substances    Nicotine No     THC No     CBD No     Flavoring No     Other No       I have reviewed and agree with the history as documented.     45-year-old female presents emergency department complaining of left shoulder pain dizziness palpitations and nausea.  Patient notes that symptoms started yesterday while she was at the computer and she noted that things felt like they started spinning.  The patient notes that if she keeps her eyes open and holds still she does not have these sensations but otherwise if she changes position or tries to get up it gets worse.  The patient also felt like she was having palpitations and stated that her blood pressure was 100 systolic when she last checked it.  In triage today is 149/79.  The patient notes that she has pain in her left shoulder and feels nauseated.  The patient came to the hospital for evaluation of the symptoms.        Review of Systems   Constitutional:  Positive for activity change and fatigue. Negative for chills and fever.   HENT:  Negative for ear pain and sore throat.    Eyes:  Negative for pain and visual disturbance.   Respiratory:  Negative for cough and shortness of breath.    Cardiovascular:  Positive for palpitations. Negative for chest pain.   Gastrointestinal:  Negative for abdominal pain and vomiting.   Genitourinary:  Negative for dysuria and hematuria.   Musculoskeletal:  Positive for arthralgias and myalgias. Negative for back pain.   Skin:  Negative for color change and rash.   Neurological:  Positive for dizziness. Negative for seizures and syncope.   All other systems reviewed and are negative.          Objective       ED Triage Vitals   Temperature Pulse Blood Pressure Respirations SpO2 Patient Position - Orthostatic VS   04/13/25 1315 04/13/25 1315 04/13/25 1315 04/13/25 1315 04/13/25  1315 04/13/25 1556   97.9 °F (36.6 °C) 100 147/79 20 99 % Lying - Orthostatic VS      Temp src Heart Rate Source BP Location FiO2 (%) Pain Score    -- 04/13/25 1315 -- -- 04/13/25 1314     Monitor   6      Vitals      Date and Time Temp Pulse SpO2 Resp BP Pain Score FACES Pain Rating User   04/13/25 1700 -- 87 95 % 18 126/67 -- -- RTM   04/13/25 1630 -- 87 93 % 15 112/59 -- -- RTM   04/13/25 1615 -- 87 94 % 22 119/61 -- -- RTM   04/13/25 1600 -- 95 96 % 18 101/60 -- -- RTM   04/13/25 1557 -- 89 95 % 18 103/54 -- -- RTM   04/13/25 1556 -- 87 96 % 18 109/55 -- -- RTM   04/13/25 1530 -- 96 95 % 18 105/55 -- -- RTM   04/13/25 1500 -- 87 95 % 19 99/57 -- -- RTM   04/13/25 1445 -- 88 97 % 14 89/52 -- -- RTM   04/13/25 1415 -- 92 95 % 19 116/59 -- -- RTM   04/13/25 1330 -- 98 98 % 14 142/71 -- -- RTM   04/13/25 1315 97.9 °F (36.6 °C) 100 99 % 20 147/79 -- -- TAB   04/13/25 1314 -- -- -- -- -- 6 -- NAD            Physical Exam  Vitals and nursing note reviewed.   Constitutional:       General: She is not in acute distress.     Appearance: Normal appearance. She is well-developed.   HENT:      Head: Normocephalic and atraumatic.      Right Ear: Tympanic membrane and external ear normal.      Left Ear: Tympanic membrane and external ear normal.      Nose: Nose normal.      Mouth/Throat:      Mouth: Mucous membranes are moist.   Eyes:      Conjunctiva/sclera: Conjunctivae normal.   Cardiovascular:      Rate and Rhythm: Normal rate and regular rhythm.      Pulses: Normal pulses.      Heart sounds: Normal heart sounds. No murmur heard.  Pulmonary:      Effort: Pulmonary effort is normal. No respiratory distress.      Breath sounds: Normal breath sounds.   Abdominal:      General: Bowel sounds are normal.      Palpations: Abdomen is soft.      Tenderness: There is no abdominal tenderness.   Musculoskeletal:         General: Tenderness present. No swelling or deformity.      Cervical back: Neck supple. No rigidity or tenderness.       Right lower leg: No edema.      Left lower leg: No edema.      Comments: Patient with reproducible pain to palpation to the left anterior deltoid.   Skin:     General: Skin is warm and dry.      Capillary Refill: Capillary refill takes less than 2 seconds.      Coloration: Skin is not pale.   Neurological:      General: No focal deficit present.      Mental Status: She is alert and oriented to person, place, and time. Mental status is at baseline.      Cranial Nerves: No cranial nerve deficit.      Sensory: No sensory deficit.      Motor: No weakness.      Coordination: Coordination normal.   Psychiatric:         Mood and Affect: Mood normal.         Results Reviewed       Procedure Component Value Units Date/Time    HS Troponin I 2hr [[id number]] Collected: 04/13/25 1550    Lab Status: Final result Specimen: Blood from Arm, Left Updated: 04/13/25 1620     hs TnI 2hr <2 ng/L      Delta 2hr hsTnI --    Urine Microscopic [[id number]]  (Abnormal) Collected: 04/13/25 1445    Lab Status: Final result Specimen: Urine, Clean Catch Updated: 04/13/25 1544     RBC, UA 2-4 /hpf      WBC, UA 10-20 /hpf      Epithelial Cells Occasional /hpf      Bacteria, UA Occasional /hpf     Urine culture [[id number]] Collected: 04/13/25 1445    Lab Status: In process Specimen: Urine, Clean Catch Updated: 04/13/25 1544    UA w Reflex to Microscopic w Reflex to Culture [[id number]]  (Abnormal) Collected: 04/13/25 1445    Lab Status: Final result Specimen: Urine, Clean Catch Updated: 04/13/25 1526     Color, UA Yellow     Clarity, UA Clear     Specific Gravity, UA 1.015     pH, UA 7.5     Leukocytes, UA 1+     Nitrite, UA Negative     Protein, UA Negative mg/dl      Glucose, UA Negative mg/dl      Ketones, UA Negative mg/dl      Urobilinogen, UA 0.2 E.U./dl      Bilirubin, UA Negative     Occult Blood, UA 1+    HS Troponin 0hr (reflex protocol) [[id number]]  (Normal) Collected: 04/13/25 1329    Lab Status: Final result Specimen: Blood from Arm,  Left Updated: 04/13/25 1403     hs TnI 0hr <2 ng/L     Comprehensive metabolic panel [[id number]]  (Abnormal) Collected: 04/13/25 1329    Lab Status: Final result Specimen: Blood from Arm, Left Updated: 04/13/25 1359     Sodium 140 mmol/L      Potassium 3.8 mmol/L      Chloride 102 mmol/L      CO2 29 mmol/L      ANION GAP 9 mmol/L      BUN 10 mg/dL      Creatinine 0.64 mg/dL      Glucose 148 mg/dL      Calcium 9.2 mg/dL      AST 20 U/L      ALT 30 U/L      Alkaline Phosphatase 72 U/L      Total Protein 7.1 g/dL      Albumin 4.6 g/dL      Total Bilirubin 0.51 mg/dL      eGFR 108 ml/min/1.73sq m     Narrative:      National Kid[employer] Disease Foundation guidelines for Chronic Kidney Disease (CKD):     Stage 1 with normal or high GFR (GFR > 90 mL/min/1.73 square meters)    Stage 2 Mild CKD (GFR = 60-89 mL/min/1.73 square meters)    Stage 3A Moderate CKD (GFR = 45-59 mL/min/1.73 square meters)    Stage 3B Moderate CKD (GFR = 30-44 mL/min/1.73 square meters)    Stage 4 Severe CKD (GFR = 15-29 mL/min/1.73 square meters)    Stage 5 End Stage CKD (GFR <15 mL/min/1.73 square meters)  Note: GFR calculation is accurate only with a steady state creatinine    Protime-INR [[id number]]  (Normal) Collected: 04/13/25 1329    Lab Status: Final result Specimen: Blood from Arm, Left Updated: 04/13/25 1351     Protime 12.9 seconds      INR 0.92    Narrative:      INR Therapeutic Range    Indication                                             INR Range      Atrial Fibrillation                                               2.0-3.0  Hypercoagulable State                                    2.0.2.3  Left Ventricular Asist Device                            2.0-3.0  Mechanical Heart Valve                                  -    Aortic(with afib, MI, embolism, HF, LA enlargement,    and/or coagulopathy)                                     2.0-3.0 (2.5-3.5)     Mitral                                                              2.5-3.5  Prosthetic/Bioprosthetic Heart Valve               2.0-3.0  Venous thromboembolism (VTE: VT, PE        2.0-3.0    APTT [[id number]]  (Normal) Collected: 04/13/25 1329    Lab Status: Final result Specimen: Blood from Arm, Left Updated: 04/13/25 1351     PTT 29 seconds     CBC and differential [[id number]] Collected: 04/13/25 1329    Lab Status: Final result Specimen: Blood from Arm, Left Updated: 04/13/25 1338     WBC 8.27 Thousand/uL      RBC 4.65 Million/uL      Hemoglobin 13.9 g/dL      Hematocrit 42.7 %      MCV 92 fL      MCH 29.9 pg      MCHC 32.6 g/dL      RDW 13.6 %      MPV 10.8 fL      Platelets 189 Thousands/uL      nRBC 0 /100 WBCs      Segmented % 69 %      Immature Grans % 0 %      Lymphocytes % 21 %      Monocytes % 7 %      Eosinophils Relative 3 %      Basophils Relative 0 %      Absolute Neutrophils 5.66 Thousands/µL      Absolute Immature Grans 0.02 Thousand/uL      Absolute Lymphocytes 1.71 Thousands/µL      Absolute Monocytes 0.59 Thousand/µL      Eosinophils Absolute 0.26 Thousand/µL      Basophils Absolute 0.03 Thousands/µL             CT head without contrast   Final Interpretation by Jose Manuel Rodriguez MD (04/13 1401)      No acute intracranial abnormality.                  Workstation performed: [id number]             ECG 12 Lead Documentation Only    Date/Time: 4/13/2025 1:37 PM    Performed by: Dilan Nelson Jr., DO  Authorized by: Dilan Nelson Jr., DO    ECG reviewed by me, the ED Provider: yes    Patient location:  ED  Comments:      EKG is a sinus tachycardia at 100 beats a minute with a normal axis.  There is late transition and nonspecific anterior T wave flattening, however otherwise no other definitive acute ST or T wave changes present.      ED Medication and Procedure Management   Prior to Admission Medications   Prescriptions Last Dose Informant Patient Reported? Taking?   Ascorbic Acid (VITAMIN C) 500 MG CAPS  Self Yes No   Sig: Take 1 capsule by mouth daily at  bedtime   Cholecalciferol (VITAMIN D3) 5000 units CAPS  Self Yes No   Sig: Take 5,000 Units by mouth daily at bedtime   Melatonin 10 MG TABS  Self Yes No   Sig: Take by mouth daily at bedtime   Multiple Vitamins-Minerals (CENTRUM ADULTS PO)  Self Yes No   Sig: Take 1 tablet by mouth daily at bedtime   Zinc 50 MG TABS  Self Yes No   Sig: Take by mouth daily at bedtime   albuterol (PROVENTIL HFA,VENTOLIN HFA) 90 mcg/act inhaler   No No   Sig: Inhale 2 puffs every 6 (six) hours as needed for shortness of breath or wheezing (or cough)   amitriptyline (ELAVIL) 75 mg tablet  Self No No   Sig: TAKE 1 TABLET (75 MG) BY MOUTH ONCE DAILY AT BEDTIME   aspirin 81 mg chewable tablet  Self No No   Sig: Chew 1 tablet (81 mg total) daily   cetirizine (ZyrTEC) 10 mg tablet  Self Yes No   Sig: Take 10 mg by mouth daily   cyanocobalamin (VITAMIN B-12) 1,000 mcg tablet  Self Yes No   Sig: Take 1,000 mcg by mouth daily    docusate sodium (DULCOLAX) 100 mg capsule  Self Yes No   Sig: Take by mouth daily at bedtime   esomeprazole (NexIUM) 40 MG capsule   No No   Sig: Take 1 capsule (40 mg total) by mouth daily before breakfast   lamoTRIgine (LaMICtal) 25 mg tablet  Self Yes No   Si mg daily   losartan (COZAAR) 100 MG tablet 2025  No Yes   Sig: TAKE 1 TABLET (100 MG TOTAL) BY MOUTH ONCE DAILY   metoprolol succinate (TOPROL-XL) 100 mg 24 hr tablet 2025 Bedtime  No Yes   Sig: TAKE 1 TABLET (100 MG TOTAL) BY MOUTH ONCE DAILY   mupirocin (BACTROBAN) 2 % ointment   No No   Sig: Apply topically 2 (two) times a day for 10 days Apply to left heel twice a day for 10 days   rosuvastatin (CRESTOR) 40 MG tablet   No No   Sig: Take 1 tablet (40 mg total) by mouth daily      Facility-Administered Medications: None     Discharge Medication List as of 2025  5:00 PM        START taking these medications    Details   meclizine (ANTIVERT) 25 mg tablet Take 1 tablet (25 mg total) by mouth 3 (three) times a day as needed for dizziness for  up to 12 doses, Starting Sun 4/13/2025, Normal      sulfamethoxazole-trimethoprim (BACTRIM DS) 800-160 mg per tablet Take 1 tablet by mouth 2 (two) times a day for 5 days smx-tmp DS (BACTRIM) 800-160 mg tabs (1tab q12 D10), Starting Sun 4/13/2025, Until Fri 4/18/2025, Normal           CONTINUE these medications which have NOT CHANGED    Details   losartan (COZAAR) 100 MG tablet TAKE 1 TABLET (100 MG TOTAL) BY MOUTH ONCE DAILY, Starting Wed 4/2/2025, Normal      metoprolol succinate (TOPROL-XL) 100 mg 24 hr tablet TAKE 1 TABLET (100 MG TOTAL) BY MOUTH ONCE DAILY, Starting Wed 4/2/2025, Normal      albuterol (PROVENTIL HFA,VENTOLIN HFA) 90 mcg/act inhaler Inhale 2 puffs every 6 (six) hours as needed for shortness of breath or wheezing (or cough), Starting Wed 2/26/2025, Normal      amitriptyline (ELAVIL) 75 mg tablet TAKE 1 TABLET (75 MG) BY MOUTH ONCE DAILY AT BEDTIME, Normal      Ascorbic Acid (VITAMIN C) 500 MG CAPS Take 1 capsule by mouth daily at bedtime, Historical Med      aspirin 81 mg chewable tablet Chew 1 tablet (81 mg total) daily, Starting Thu 7/22/2021, Normal      cetirizine (ZyrTEC) 10 mg tablet Take 10 mg by mouth daily, Starting Sat 11/2/2019, Historical Med      Cholecalciferol (VITAMIN D3) 5000 units CAPS Take 5,000 Units by mouth daily at bedtime, Historical Med      cyanocobalamin (VITAMIN B-12) 1,000 mcg tablet Take 1,000 mcg by mouth daily , Historical Med      docusate sodium (DULCOLAX) 100 mg capsule Take by mouth daily at bedtime, Historical Med      esomeprazole (NexIUM) 40 MG capsule Take 1 capsule (40 mg total) by mouth daily before breakfast, Starting Mon 1/6/2025, Normal      lamoTRIgine (LaMICtal) 25 mg tablet 100 mg daily, Starting Sun 7/7/2024, Historical Med      Melatonin 10 MG TABS Take by mouth daily at bedtime, Historical Med      Multiple Vitamins-Minerals (CENTRUM ADULTS PO) Take 1 tablet by mouth daily at bedtime, Historical Med      mupirocin (BACTROBAN) 2 % ointment Apply  topically 2 (two) times a day for 10 days Apply to left heel twice a day for 10 days, Starting Fri 10/11/2024, Until Fri 2/21/2025, Normal      rosuvastatin (CRESTOR) 40 MG tablet Take 1 tablet (40 mg total) by mouth daily, Starting Tue 1/28/2025, Normal      Zinc 50 MG TABS Take by mouth daily at bedtime, Historical Med             ED SEPSIS DOCUMENTATION   Time reflects when diagnosis was documented in both MDM as applicable and the Disposition within this note       Time User Action Codes Description Comment    4/13/2025  4:50 PM BruDilan williamson Add [R42] Vertigo     4/13/2025  4:51 PM BruDilan williamson Add [R00.2] Palpitations     4/13/2025  4:52 PM BruDilan williamson Modify [R42] Vertigo     4/13/2025  4:52 PM BruDilan williamson Add [N39.0] UTI (urinary tract infection)                  Dilan Nelson Jr.,   04/13/25 8422

## 2025-04-13 NOTE — ED NOTES
Affect is bland mood depressed; thought process linear; content significant for SI denies plan; reports no HI denies a/v hallucinations at this time. Offered nicotine patch per patient request.. at time of administration pt declined patched. Ongoing monitoring will continue      Fran Chaudhary RN  04/13/25 0189

## 2025-04-14 ENCOUNTER — VBI (OUTPATIENT)
Dept: FAMILY MEDICINE CLINIC | Facility: CLINIC | Age: 45
End: 2025-04-14

## 2025-04-14 ENCOUNTER — TELEPHONE (OUTPATIENT)
Age: 45
End: 2025-04-14

## 2025-04-14 ENCOUNTER — NURSE TRIAGE (OUTPATIENT)
Age: 45
End: 2025-04-14

## 2025-04-14 NOTE — TELEPHONE ENCOUNTER
"FOLLOW UP: pt called to make an appt with Dr Pierre    REASON FOR CONVERSATION: hospital f/u    SYMPTOMS: feels like the room is spinning, concerned BP's were low at home yesterday. Pt was seen in the ED and is being treated for UTI, vertigo. SBP this morning 111    DISPOSITION: schedule an appt    Answer Assessment - Initial Assessment Questions  1. REASON FOR CALL: \"What is the main reason for your call?\" or \"How can I best help you?\"      Schedule an hospital f/u  2. SYMPTOMS : \"Do you have any symptoms?\"       Dizziness, vertigo  3. OTHER QUESTIONS: \"Do you have any other questions?\"      no    Protocols used: Information Only Call - No Triage-Adult-[place]    "

## 2025-04-14 NOTE — TELEPHONE ENCOUNTER
Patient called to schedule ED F/U; appointment scheduled for soonest availability for 5/1/25. Patient put on waitlist per request. She would like Dr Godoy to know that she is concerned about BP while at ED; was 149/79. This morning, when she got up, she felt dizzy. She is hoping to be seen sooner than 5/1/25.

## 2025-04-15 LAB — BACTERIA UR CULT: NORMAL

## 2025-04-16 ENCOUNTER — OFFICE VISIT (OUTPATIENT)
Dept: FAMILY MEDICINE CLINIC | Facility: CLINIC | Age: 45
End: 2025-04-16
Payer: COMMERCIAL

## 2025-04-16 VITALS
SYSTOLIC BLOOD PRESSURE: 118 MMHG | WEIGHT: 290 LBS | BODY MASS INDEX: 45.52 KG/M2 | OXYGEN SATURATION: 98 % | HEART RATE: 71 BPM | DIASTOLIC BLOOD PRESSURE: 74 MMHG | HEIGHT: 67 IN | TEMPERATURE: 97.6 F

## 2025-04-16 DIAGNOSIS — R00.2 PALPITATIONS: ICD-10-CM

## 2025-04-16 DIAGNOSIS — R42 VERTIGO: Primary | ICD-10-CM

## 2025-04-16 DIAGNOSIS — F32.2 SEVERE MAJOR DEPRESSION, SINGLE EPISODE (HCC): ICD-10-CM

## 2025-04-16 PROCEDURE — 99214 OFFICE O/P EST MOD 30 MIN: CPT | Performed by: FAMILY MEDICINE

## 2025-04-16 RX ORDER — MECLIZINE HYDROCHLORIDE 25 MG/1
25 TABLET ORAL 3 TIMES DAILY PRN
Qty: 30 TABLET | Refills: 0 | Status: SHIPPED | OUTPATIENT
Start: 2025-04-16

## 2025-04-16 NOTE — PROGRESS NOTES
"Name: Jannette Baugh      : 1980      MRN: [id number]  Encounter Provider: Gustavo Godoy MD  Encounter Date: 2025   Encounter department: [facility]  :  Assessment & Plan  Vertigo  Reports sensation of room spinning worse with head movement.  I recommend patient follow-up with physical therapy for vestibular therapy.  Trial Antivert.  Watch when getting up.  I do not believe her symptoms are related to orthostatic hypotension because they have occurred at rest.  BP is 118/74 today.  I recommended she half her losartan from 100 mg daily to 50 mg daily to see if this improves her symptoms.  Orders:    meclizine (ANTIVERT) 25 mg tablet; Take 1 tablet (25 mg total) by mouth 3 (three) times a day as needed for dizziness    Severe major depression, single episode (HCC)  Depression Screening Follow-up Plan: Patient's depression screening was positive with a PHQ-9 score of 23. Continue regular follow-up with their psychologist/therapist/psychiatrist who is managing their mental health condition(s).         Palpitations  EKG showed NSR. Get Holter monitor.   Orders:    Holter monitor; Future           History of Present Illness   Patient presents to the office today for ED follow-up.  She has been having episodes of dizziness and palpitation.  This recently just started.  Worse with head movement.  She says she feels like the room is spinning around her.  She was given some meclizine which did help with it.  Requests refill today.  Did not start physical therapy yet.  Along with palpitations which are intermittent she does not have any chest pain or shortness of breath.  Her cardiac workup in the emergency room was negative.  No diaphoresis.  No nausea or vomiting or loss of consciousness.      Review of Systems   All other systems reviewed and are negative.      Objective   /74   Pulse 71   Temp 97.6 °F (36.4 °C) (Tympanic)   Ht 5' 7\" (1.702 m)   Wt 132 kg (290 lb)   " SpO2 98%   BMI 45.42 kg/m²      Physical Exam  Vitals and nursing note reviewed.   Constitutional:       General: She is not in acute distress.     Appearance: Normal appearance. She is well-developed. She is not ill-appearing, toxic-appearing or diaphoretic.   HENT:      Head: Normocephalic and atraumatic.   Eyes:      General:         Right eye: No discharge.         Left eye: No discharge.      Extraocular Movements: Extraocular movements intact.      Conjunctiva/sclera: Conjunctivae normal.      Pupils: Pupils are equal, round, and reactive to light.   Cardiovascular:      Rate and Rhythm: Normal rate and regular rhythm.      Pulses: Normal pulses.      Heart sounds: Normal heart sounds.   Pulmonary:      Effort: Pulmonary effort is normal.      Breath sounds: Normal breath sounds.   Musculoskeletal:         General: Normal range of motion.   Skin:     General: Skin is warm and dry.      Capillary Refill: Capillary refill takes less than 2 seconds.   Neurological:      General: No focal deficit present.      Mental Status: She is alert and oriented to person, place, and time.   Psychiatric:         Mood and Affect: Mood normal.         Behavior: Behavior normal.         Thought Content: Thought content normal.         Judgment: Judgment normal.

## 2025-04-16 NOTE — PATIENT INSTRUCTIONS
"Patient Education     Depression in adults   The Basics   Written by the doctors and editors at [facility]   What is depression? -- Depression is a disorder that makes you sad, but it is different from normal sadness. Depression can make it hard for you to work, study, or do everyday tasks.  What causes depression? -- Depression is caused by problems with chemicals in the brain called \"neurotransmitters.\" Some people might be more likely to have depression if it runs in their family. Other things might also play a role, including hormones, certain health problems, medicines, stress, being mistreated as a child, family problems, and problems with friends or at school or work.  How do I know if I am depressed? -- People with depression feel down most of the time for at least 2 weeks. They also have at least 1 of these 2 symptoms:   They no longer enjoy or care about doing the things that they used to like to do.   They feel sad, down, hopeless, or cranky most of the day, almost every day.  People with depression can also have other symptoms. Examples include:   Changes in your appetite or weight. You might eat too little or too much, or gain or lose weight without trying.   Sleeping too much or too little   Feeling tired or like you have no energy   Feeling guilty, helpless, or like you are worth nothing   Trouble with concentration or memory   Acting restless or have trouble staying still, or moving or speaking more slowly than normal   Repeated thoughts of death or killing yourself  If you think that you might be depressed, see your doctor or nurse. Only someone trained in mental health can tell for sure if you are depressed.  How is depression diagnosed? -- Your doctor or nurse will do a physical exam, ask you questions, and might order tests. Depression can have a big impact on your life. Luckily, depression can be treated, and the sooner treatment is started, the better it works.  Get help right away if you are " "thinking of hurting or killing yourself! -- If you ever feel like you might hurt yourself or someone else, help is available:   In the [place], contact the 078 Suicide & Crisis Lifeline:   To speak to someone, call or text 847.   To talk to someone online, go to www.[employer].org/chat.   Call your doctor or nurse, and tell them it is urgent.   Call for an ambulance (in the [place] and [place], call 9-1-1).   Go to the emergency department at the nearest hospital.  What are the treatments for depression? -- Your doctor or nurse will work with you to make a treatment plan. Treatment can include:   Helping you learn more about depression   Counseling (with a psychiatrist, psychologist, nurse, or )   Medicines that relieve depression   Creating a plan to limit access to items that you might use to harm yourself   Other treatments that pass magnetic waves or electricity into the brain  In addition to treatment, getting regular physical activity can also help you feel better.  People with depression that is not too severe can get better by taking medicines or talking with a counselor. People with severe depression usually need medicines to get better, and might also need to see a counselor.  Another treatment involves placing a device against the scalp to pass magnetic waves into the brain. This is called \"transcranial magnetic stimulation\" (\"TMS\"). Doctors might suggest TMS if medicines and counseling have not helped.  Some people with severe depression might need a treatment called \"electroconvulsive therapy\" (\"ECT\"). During ECT, doctors pass an electric current through a person's brain in a safe way.  When will I feel better? -- Most treatment options take a little while to start working.   Many people who take medicines start to feel better within 2 weeks, but it might be 4 to 8 weeks before the medicine has its full effect.   Many people who see a counselor start to feel better within a few weeks, but it might " take 8 to 10 weeks to get the greatest benefit.  If the first treatment you try does not help you, tell your doctor or nurse, but do not give up. Some people need to try different treatments or combinations of treatments before they find an approach that works. Your doctor, nurse, or counselor can work with you to find the treatment that is right for you. They can also help you figure out how to cope while you search for the right treatment or are waiting for your treatment to start working.  How do I decide which treatment to have? -- You and your doctor or nurse will need to work together to choose a treatment for you. Medicines might work a little faster than counseling. But medicines can also cause side effects. Plus, some people do not like the idea of taking medicine.  Seeing a counselor involves talking about your feelings. That is also hard for some people.  What if I take medicine for depression and I want to have a baby? -- Some depression medicines can cause problems for an unborn baby. But having untreated depression during pregnancy can also cause problems. If you want to get pregnant, tell your doctor but do not stop taking your medicines. Together, you can plan the safest way for you to have your baby.  It's also important to talk with your doctor if you want to breastfeed after your baby is born. Breastfeeding has lots of benefits for both mother and baby. Some depression medicines are safer than others to use while breastfeeding. But having untreated depression after giving birth can also cause problems, so do not stop taking your medicines. Your doctor can work with you to plan the safest way for you to feed your baby.  All topics are updated as new evidence becomes available and our peer review process is complete.  This topic retrieved from [employer] on: Mar 06, 2024.  Topic 31050 Version 22.0  Release: 32.2.4 - C32.64  © 2024 UpToDate, Inc. and/or its affiliates. All rights reserved.  figure 1:  "Mood disorders caused by problems in the brain     Mooddisorders, such as depression and bipolar disorder, are caused by problems with\"neurotransmitters.\" These are chemicals in the brain that can affect your emotions.Treatments for mood disorders seem to work by changing the levels of certainneurotransmitters.  Graphic 41500 Version 4.0  Consumer Information Use and Disclaimer   Disclaimer: This generalized information is a limited summary of diagnosis, treatment, and/or medication information. It is not meant to be comprehensive and should be used as a tool to help the user understand and/or assess potential diagnostic and treatment options. It does NOT include all information about conditions, treatments, medications, side effects, or risks that may apply to a specific patient. It is not intended to be medical advice or a substitute for the medical advice, diagnosis, or treatment of a health care provider based on the health care provider's examination and assessment of a patient's specific and unique circumstances. Patients must speak with a health care provider for complete information about their health, medical questions, and treatment options, including any risks or benefits regarding use of medications. This information does not endorse any treatments or medications as safe, effective, or approved for treating a specific patient. UpToDate, Inc. and its affiliates disclaim any warranty or liability relating to this information or the use thereof.The use of this information is governed by the Terms of Use, available at https://www.[employer].com/en/know/clinical-effectiveness-terms. 2024© UpToDate, Inc. and its affiliates and/or licensors. All rights reserved.  Copyright   © 2024 UpToDate, Inc. and/or its affiliates. All rights reserved.    "

## 2025-04-22 ENCOUNTER — EVALUATION (OUTPATIENT)
Dept: PHYSICAL THERAPY | Facility: CLINIC | Age: 45
End: 2025-04-22
Payer: COMMERCIAL

## 2025-04-22 DIAGNOSIS — R42 VERTIGO: Primary | ICD-10-CM

## 2025-04-22 PROCEDURE — 97140 MANUAL THERAPY 1/> REGIONS: CPT | Performed by: PHYSICAL THERAPIST

## 2025-04-22 PROCEDURE — 97163 PT EVAL HIGH COMPLEX 45 MIN: CPT | Performed by: PHYSICAL THERAPIST

## 2025-04-22 NOTE — PROGRESS NOTES
PT Evaluation     Today's date: 2025  Patient name: Jannette Baugh  : 1980  MRN: [id number]  Referring provider: Gustavo Godoy, *  Dx:   Encounter Diagnosis     ICD-10-CM    1. Vertigo  R42           Start Time: 1400  Stop Time: 1445  Total time in clinic (min): 45 minutes    Assessment  Impairments: abnormal gait, abnormal movement, activity intolerance, impaired balance, lacks appropriate home exercise program, safety issue and poor posture     Assessment details: Jannette Baugh was seen for an initial PT evaluation today. Patient is a 45 y.o. female with diagnosis of dizziness and past medical history significant for HTN, right bundle branch block, sleep apnea, asthma, carpal tunnel bilat, OA of spine with radic, trigger finger, felicitas-danlos syndrome, R rotator cuff capsulitis, cervical lymphadenopathy, depression, anxiety, hyperlipidemia, obesity, fibromyalgia, laminectomy. High complexity evaluation  due to number of participation restrictions, functional outcome measure of 61% limitation, and unstable clinical presentation. Findings today shows no symptom provocation or nystagmus with positional testing indicating absent BPPV, more likely vestibular hypofunction contributing to symptoms at this time. Did note impaired balance/proprioception and VOR deficits impacting their ability to perform dynamic functional activities including walking, transfers, and bed mobility tasks. Skilled PT indicated to treat at this time to address above stated deficits and return patient to PLOF. Will re-assess for vertigo at next session and if negative treat with HEP for vestibular hypofunction.       Goals  STG (6 weeks)  1. Reduction in dizziness symptoms by 75% for increased independence with daily tasks.   2. Patient will be able to verbalize understanding mechanical dysfunction of vertigo and be demonstrate home canalith repositioning as needed.   LTG (12 weeks)  1. Reduction in dizziness symptoms by  100% for full return to PLOF.   2. Patient will meet FOTO prediction score for return to full function.  3. Patient will be independent with advanced home exercise program for continued maintenance post discharge.      Plan  Patient would benefit from: skilled physical therapy    Planned therapy interventions: manual therapy, neuromuscular re-education, canalith repositioning, self care, therapeutic activities, therapeutic exercise and home exercise program    Frequency: 2x week  Duration in weeks: 12  Plan of Care beginning date: 4/22/2025  Plan of Care expiration date: 7/22/2025  Treatment plan discussed with: patient  Plan details: Progress note in 4 weeks or upon symptom resolution.         Subjective Evaluation    History of Present Illness  Date of onset: 4/13/2025  Mechanism of injury: Jannette Baugh is a 45 y.o. female who presents to outpatient Physical Therapy today with complaints of dizziness. States chronic dizziness, but 4/13 had extreme dizziness with room spinning that was different than normal dizziness she occasionally feels when anxious. ED same day that did cardiac work up and CT scan, all (-). Has been on meclazine that doesn't seem to help a lot with dizziness, but does help with head pressure. Currently continues to have dizziness worse when getting up in am.       Patient Goals  Patient goal: get rid of dizziness, get out of bed with no issues  Pain  No pain reported    Social Support  Steps to enter house: yes  Stairs in house: yes   Lives in: multiple-level home  Lives with: spouse and young children    Employment status: working (computer and press to make shirts.)    Diagnostic Tests  CT scan: normal        Objective     Concurrent Complaints  Positive for headaches (daily), nausea/motion sickness (nausea), hearing loss and aural fullness. Negative for tinnitus, visual change and memory loss    Active Range of Motion   Cervical/Thoracic Spine       Cervical    Flexion: Neck active  flexion: WNL.   Extension: Neck active extension: WNL.      Left lateral flexion: Neck active lateral bend left: min limitation.      Right lateral flexion: Neck active lateral bend right: min limitation.      Left rotation: Neck active rotation left: WNL.  Right rotation: Neck active rotation right: WNL.     Neuro Exam:     Dizziness  Positive for disequilibrium, vertigo, oscillopsia and motion sickness (chronic).   Negative for diplopia.     Exacerbating factors  Positive for rolling in bed, looking up, turning head (chronic), supine to/from sitting and optokinetic movement.   Negative for bending over, walking and walking in busy environment.     Headaches   Patient reports headaches: Yes (daily).     Cervical exam   Ligament Laxity Testing   Alar ligament: WNL  Sharp Jeannine: WNL  Modified VBI   Right: symptomatic  Seated posture: forward head posture and internally rotated shoulders    Oculomotor exam   Oculomotor ROM: WNL  Resting nystagmus: not present   Gaze holding nystagmus: present right  Gaze holding nystagmus: not present left   Smooth pursuits: saccadic smooth pursuit  Vertical saccades: normal  Horizontal saccades: normal  Convergence: normal and to nose  Cover test: normal  Crossover test: normal  Head thrust: left normal and right normal  Dynamic head: VORx1 symptomatic (nausea)    Positional testing   Wisner-Hallpike   Left posterior canal: symptomatic and WNL  Right posterior canal: symptomatic and WNL  Roll test   Left horizontal canal: WNL  Right horizontal canal: WNL  Positional testing comment: (-) roll testing bilaterally    Functional outcomes   Functional outcome gait comment: Tandem L fwd=10 sec, dizzy R=30sec             Precautions: PMH      Manuals 4/22            Canalith repositioning Patient education                                                   Neuro Re-Ed                                                                                                        Ther Ex                                                                                                                      Ther Activity                                       Gait Training                                       Modalities

## 2025-04-28 ENCOUNTER — OFFICE VISIT (OUTPATIENT)
Dept: PHYSICAL THERAPY | Facility: CLINIC | Age: 45
End: 2025-04-28
Attending: EMERGENCY MEDICINE
Payer: COMMERCIAL

## 2025-04-28 DIAGNOSIS — R42 VERTIGO: Primary | ICD-10-CM

## 2025-04-28 PROCEDURE — 97140 MANUAL THERAPY 1/> REGIONS: CPT | Performed by: PHYSICAL THERAPIST

## 2025-04-28 PROCEDURE — 97112 NEUROMUSCULAR REEDUCATION: CPT | Performed by: PHYSICAL THERAPIST

## 2025-04-28 NOTE — PROGRESS NOTES
"Daily Note     Today's date: 2025  Patient name: Jannette Baugh  : 1980  MRN: [id number]  Referring provider: Gustavo Godoy, *  Dx:   Encounter Diagnosis     ICD-10-CM    1. Vertigo  R42           Start Time: 1200  Stop Time: 1245  Total time in clinic (min): 45 minutes    Subjective: States dizziness \"hasn't been too bad\". HA daily, the other day was 10/10, usually is around 5/10. Anabelle horn pain location she thinks is irritated by neck dysfunction.       Objective: See treatment diary below      Assessment: Tolerated treatment fair. Increased symptoms with L sided HA after attempting walking with HN with GS. Some relief post MT on C/S. Remainder of session avoided head movement during exercise and worked on tracing and standing balance/stability. Also noted some difficulty with downward eye movement increasing pressure in head. Patient would benefit from continued PT      Plan: Continue per plan of care.  Progress treatment as tolerated.       Precautions: PMH      Manuals            Canalith repositioning Patient education            Cervical traction  :30x4           SOR  4x:30                        Neuro Re-Ed             UBE (standing stability)  90/705 min standing on foam           ABC tracking  1x- pressure           VOR horiz             VOR vert   *          Walking HT  4x  GS4x           Walking HN  1x  symptom provocation with GS hold          Step balance  :30x2 6\"           Tandem balance  :30x2 bilat           Backwards walking  //4x           Tandem walking  // 4x           airex  nv                        Ther Ex                                                                                                                     Ther Activity                                       Gait Training                                       Modalities                                            "

## 2025-05-01 ENCOUNTER — TELEMEDICINE (OUTPATIENT)
Dept: FAMILY MEDICINE CLINIC | Facility: CLINIC | Age: 45
End: 2025-05-01
Payer: COMMERCIAL

## 2025-05-01 DIAGNOSIS — R51.9 CHRONIC NONINTRACTABLE HEADACHE, UNSPECIFIED HEADACHE TYPE: Primary | ICD-10-CM

## 2025-05-01 DIAGNOSIS — I10 PRIMARY HYPERTENSION: ICD-10-CM

## 2025-05-01 DIAGNOSIS — G89.29 CHRONIC NONINTRACTABLE HEADACHE, UNSPECIFIED HEADACHE TYPE: Primary | ICD-10-CM

## 2025-05-01 DIAGNOSIS — R42 VERTIGO: ICD-10-CM

## 2025-05-01 DIAGNOSIS — R92.8 ABNORMAL MAMMOGRAM: ICD-10-CM

## 2025-05-01 PROCEDURE — 99214 OFFICE O/P EST MOD 30 MIN: CPT | Performed by: FAMILY MEDICINE

## 2025-05-01 RX ORDER — LOSARTAN POTASSIUM 25 MG/1
25 TABLET ORAL DAILY
Qty: 90 TABLET | Refills: 0 | Status: SHIPPED | OUTPATIENT
Start: 2025-05-01

## 2025-05-01 NOTE — ASSESSMENT & PLAN NOTE
She had an abnormal mammogram in November 2024 with recommendations to repeat diagnostic left mammogram 6 months later but has not done so. I have recommended she get mammogram done. I have given her central scheduling number to call and schedule as soon as possible.

## 2025-05-01 NOTE — PROGRESS NOTES
Virtual Regular Visit  Name: Jannette Baugh      : 1980      MRN: [id number]  Encounter Provider: Gustavo Godoy MD  Encounter Date: 2025   Encounter department: [facility] [place]  :  Assessment & Plan  Chronic nonintractable headache, unspecified headache type  Unclear etiology to patient's headaches.  May be related to cervicogenic etiology or hypotension.  She will continue chiropractic care to see if this improves I recommended further evaluation with neurology see other problems for plan  Orders:    Ambulatory Referral to Neurology; Future    Primary hypertension  Patient is dizziness and lightheadedness improved when we decrease losartan from 100 mg to 50 mg daily at last visit 2 weeks ago.  She still experiencing some frontal headaches and blood pressures are in the low 100s systolically so we will decrease losartan to 25 mg daily to see if this improves.  She has appoint with cardiology on May 12.  We will follow-up at her already scheduled visit on May 22 or sooner if needed  Orders:    losartan (COZAAR) 25 mg tablet; Take 1 tablet (25 mg total) by mouth daily    Abnormal mammogram  She had an abnormal mammogram in 2024 with recommendations to repeat diagnostic left mammogram 6 months later but has not done so. I have recommended she get mammogram done. I have given her central scheduling number to call and schedule as soon as possible.       Vertigo  Her dizziness has improved with physical therapy and decreasing losartan from 100 to 50 mg daily.  She still has a mild headache.  Blood pressure still on the soft side being in the low 100s.  I recommended given her symptoms to decrease losartan to 25 mg daily to see if this improves.  Advised schedule Holter monitor.         Assessment & Plan                        History of Present Illness     HPI  History of Present Illness  The patient presents for a follow-up from an office visit 2 weeks ago regarding  dizziness and lightheadedness. At the last office visit, her losartan was decreased from 100 mg to 50 mg daily due to hypotension. She started physical therapy for the dizziness as well and reports that her symptoms have gotten better. She is still having a headache at the front portion of her head. Her chiropractor thinks it is from her neck and her physical therapist thinks it may be from inner ear pathology. The patient's symptoms of dizziness and lightheadedness have gotten much better. Her blood pressures also went up a bit. They are finally above 100 systolic. She reports blood pressures now in the low 100s to 118 being the highest. She is still on losartan 50 mg daily. She has an appointment with cardiology coming up on 05/12/2025. She still did not complete Holter monitor.    She reports an improvement in her symptoms of dizziness and lightheadedness following the initiation of physical therapy. She continues to experience frontal headaches, which her chiropractor attributes to neck issues, while her physical therapist suspects a possible inner ear pathology.    Her blood pressure has shown a slight increase, with systolic readings now ranging from the low 100s to a maximum of 118. She is currently on a daily dose of losartan 50 mg and has an upcoming appointment with cardiology on 05/12/2025. She has not yet completed the Holter monitor test.    She had an abnormal mammogram in November 2024 with recommendations to repeat diagnostic left mammogram 6 months later but has not done so. I have recommended she get mammogram done. I have given her central scheduling number to call and schedule as soon as possible.  Review of Systems   All other systems reviewed and are negative.      Objective   There were no vitals taken for this visit.    Physical Exam    Physical Exam  Vitals reviewed.   Constitutional:       General: She is not in acute distress.     Appearance: Normal appearance. She is not toxic-appearing  or diaphoretic.   Neurological:      Mental Status: She is alert and oriented to person, place, and time.   Psychiatric:         Mood and Affect: Mood normal.         Behavior: Behavior normal.         Thought Content: Thought content normal.         Judgment: Judgment normal.         Administrative Statements   Encounter provider Gustavo Godoy MD    The Patient is located at Home and in the following state in which I hold an active license [place].    The patient was identified by name and date of birth. Jannette Baugh was informed that this is a telemedicine visit and that the visit is being conducted through the Epic Embedded platform. She agrees to proceed..  My office door was closed. No one else was in the room.  She acknowledged consent and understanding of privacy and security of the video platform. The patient has agreed to participate and understands they can discontinue the visit at any time.    I have spent a total time of 15  minutes in caring for this patient on the day of the visit/encounter including Prognosis, Risks and benefits of tx options, Instructions for management, Counseling / Coordination of care, Documenting in the medical record, Reviewing/placing orders in the medical record (including tests, medications, and/or procedures), and Obtaining or reviewing history  , not including the time spent for establishing the audio/video connection.

## 2025-05-01 NOTE — ASSESSMENT & PLAN NOTE
Patient is dizziness and lightheadedness improved when we decrease losartan from 100 mg to 50 mg daily at last visit 2 weeks ago.  She still experiencing some frontal headaches and blood pressures are in the low 100s systolically so we will decrease losartan to 25 mg daily to see if this improves.  She has appoint with cardiology on May 12.  We will follow-up at her already scheduled visit on May 22 or sooner if needed  Orders:    losartan (COZAAR) 25 mg tablet; Take 1 tablet (25 mg total) by mouth daily

## 2025-05-08 ENCOUNTER — HOSPITAL ENCOUNTER (OUTPATIENT)
Dept: NON INVASIVE DIAGNOSTICS | Facility: HOSPITAL | Age: 45
Discharge: HOME | End: 2025-05-08
Attending: FAMILY MEDICINE
Payer: COMMERCIAL

## 2025-05-08 ENCOUNTER — OFFICE VISIT (OUTPATIENT)
Dept: PHYSICAL THERAPY | Facility: CLINIC | Age: 45
End: 2025-05-08
Attending: EMERGENCY MEDICINE
Payer: COMMERCIAL

## 2025-05-08 DIAGNOSIS — R42 VERTIGO: Primary | ICD-10-CM

## 2025-05-08 DIAGNOSIS — R00.2 PALPITATIONS: ICD-10-CM

## 2025-05-08 PROCEDURE — 93225 XTRNL ECG REC<48 HRS REC: CPT

## 2025-05-08 PROCEDURE — 93226 XTRNL ECG REC<48 HR SCAN A/R: CPT

## 2025-05-08 PROCEDURE — 97140 MANUAL THERAPY 1/> REGIONS: CPT | Performed by: PHYSICAL THERAPIST

## 2025-05-08 PROCEDURE — 97112 NEUROMUSCULAR REEDUCATION: CPT | Performed by: PHYSICAL THERAPIST

## 2025-05-08 NOTE — PROGRESS NOTES
Daily Note     Today's date: 2025  Patient name: Jannette Baugh  : 1980  MRN: [id number]  Referring provider: Gustavo Godoy, *  Dx:   Encounter Diagnosis     ICD-10-CM    1. Vertigo  R42           Start Time: 1400  Stop Time: 1445  Total time in clinic (min): 45 minutes    Subjective: Patient states HA today 3/10. Spoke to PCP who expressed HA likely due to neck pain. Currently wearing heart monitor and to consult with neurology in a month. Did have 2 episodes where she was bending down to pick something up since last session and had episode of dizziness. Dizziness described as room spinning for 5-10 seconds and will then have increased HA. Did see ENT earlier today who removed cerumen.       Objective: See treatment diary below  Loaded cynthia-hallpike L=(-) R=(-)    Vitals R UE  Supine: 114/75 mmhg, 83 bpm  Immediate sit: 119/84 mmhg, 80 bpm    Assessment: Tolerated treatment well. No significant increase in HA or pressure in head today, avoided repetitive head movements to not provoke pain symptoms. Re-tested vertigo positional testing today and was (-) for BPPV. Also checked vitals in supine and sitting with no drop, r/o orthostatic hypertension.       Plan:  Discussed POC with patient today and due to severity of neck/head symptoms and short duration of dizziness symptoms we will hold PT since treatment for vestibular hypofunction has high potential of irritating head/neck.  Will check back with patient via phone in 2 weeks or prn to determine need for continuation vs DC.      Precautions: PMH      Manuals           Canalith repositioning Patient education            Cervical traction  :30x4 :30x4          SOR  4x:30 4x:30                       Neuro Re-Ed             UBE (standing stability)  90/705 min standing on foam 90/705 min standing on foam          ABC tracking  1x- pressure           VOR horiz             VOR vert   *          Walking HT  4x  GS4x           Walking HN  1x  " symptom provocation with GS hold          Step balance  :30x2 6\" :30x2 6\"          Tandem balance  :30x2 bilat :30x2 bilat          Backwards walking  //4x //4x          Tandem walking  // 4x //4x          airex  nv Standing EC :30x3                       Ther Ex                                                                                                                     Ther Activity             [place]   1x symptom free                       Gait Training                                       Modalities                                              "

## 2025-05-15 ENCOUNTER — OFFICE VISIT (OUTPATIENT)
Dept: CARDIOLOGY CLINIC | Facility: CLINIC | Age: 45
End: 2025-05-15
Payer: COMMERCIAL

## 2025-05-15 VITALS
OXYGEN SATURATION: 98 % | BODY MASS INDEX: 45.3 KG/M2 | TEMPERATURE: 97.8 F | HEART RATE: 87 BPM | SYSTOLIC BLOOD PRESSURE: 110 MMHG | DIASTOLIC BLOOD PRESSURE: 78 MMHG | WEIGHT: 288.6 LBS | HEIGHT: 67 IN

## 2025-05-15 DIAGNOSIS — E78.2 MIXED HYPERLIPIDEMIA: ICD-10-CM

## 2025-05-15 DIAGNOSIS — I25.10 CORONARY ARTERY CALCIFICATION SEEN ON CT SCAN: ICD-10-CM

## 2025-05-15 DIAGNOSIS — R42 DIZZINESS: ICD-10-CM

## 2025-05-15 DIAGNOSIS — Z82.49 FAMILY HISTORY OF EARLY CAD: ICD-10-CM

## 2025-05-15 DIAGNOSIS — I10 PRIMARY HYPERTENSION: ICD-10-CM

## 2025-05-15 DIAGNOSIS — R00.2 PALPITATIONS: ICD-10-CM

## 2025-05-15 DIAGNOSIS — I49.3 PVC'S (PREMATURE VENTRICULAR CONTRACTIONS): ICD-10-CM

## 2025-05-15 PROCEDURE — 99214 OFFICE O/P EST MOD 30 MIN: CPT

## 2025-05-15 RX ORDER — LORAZEPAM 1 MG/1
TABLET ORAL
COMMUNITY
Start: 2024-10-07

## 2025-05-15 RX ORDER — DULOXETIN HYDROCHLORIDE 60 MG/1
1 CAPSULE, DELAYED RELEASE ORAL DAILY
COMMUNITY
Start: 2025-04-21

## 2025-05-15 NOTE — PROGRESS NOTES
Jannette Baugh  1980  [id number]  [facility] [place] [facility] [id number] [phone]    1. Coronary artery calcification seen on CT scan  CTA Cardiac w FFR if needed      2. Family history of early CAD  CTA Cardiac w FFR if needed      3. Primary hypertension  CTA Cardiac w FFR if needed      4. Mixed hyperlipidemia        5. Palpitations        6. PVC's (premature ventricular contractions)        7. Dizziness            Summary/Discussion:  Hypertension  - reports low blood pressure readings at home   - blood pressures appears labile based on chart review-- 110/78 today   - her losartan has been recently titrated by her PCP. Currently on 25 mg daily-- will discontinue to see if this helps improve her symptoms and borderline low blood pressures   - continue metoprolol 100 mg daily   - lifestyle modification   - close blood pressure monitoring   - echo stress (6/2023): negative for ischemia, normal LV function, no WMA-- technically difficult study due to patient's body habitus     Dizziness  - s/p ED visit on 4/13/2025 for symptoms of dizziness suspected to likely be vertigo-- on meclizine 25 mg TID PRN  cardiac work up unremarkable   symptoms improved with IV fluids and meclizine   - currently attending PT   - reports mild improvement in her symptoms   - 48 hour holter ordered by her PCP pending    Palpitations/hx of PVCs  - s/p ED visit on 4/13/2025 with reports of palpitations and dizziness   - rare PVCs noted on echo stress test   - Holter pending   - continue metoprolol as noted above     Family hx of early coronary artery disease  Coronary artery calcification seen on CT  - recommend CTA cardiac for further evaluation  - currently without symptoms of angina   - continue statin and beta blocker     Dyslipidemia:  - Lipid Profile:    Latest Reference Range & Units 10/02/24 08:34   Cholesterol See Comment mg/dL 192   Triglycerides See Comment mg/dL 164  (H)   HDL >=50 mg/dL 60   Non-HDL Cholesterol mg/dl 132   LDL Calculated 0 - 100 mg/dL 99   - continue rosuvastatin 40 mg daily   - PCP follows  - encouraged low cholesterol, mediterranean diet, and annual lipid follow up  - cardiac catheterization noted to be normal in 2008      Interval History: Jannette Baugh is a 45 y.o. year old female with history mentioned in problem list who presents to the office today for routine follow up s/p ED visit for dizziness and palpitations.     Today, she reports continued symptoms of dizziness as well as low blood pressure readings at home despite recent titration in her losartan by her PCP. Her palpitations are generally controlled on metoprolol. She denies any chest pain/pressure/discomfort or shortness of breath. She denies lower extremity edema, orthopnea, and PND. She denies near syncope and syncope.       She will RTO in 1 year with Dr. Pierre or sooner if necessary. She will call with any concerns.         Medical Problems       Problem List       Thyroid nodule    Trigger finger, right middle finger    Carpal tunnel syndrome, bilateral    Primary hypertension    Breast hypertrophy    Bulging lumbar disc    Calcific tendinitis of right shoulder    Chronic right-sided low back pain without sciatica    Chronic sinusitis    Dermatitis, eczematoid    Esophageal reflux    Fibromyalgia    H/O laminectomy    Hemorrhoids    Hidradenitis suppurativa    Hyperlipidemia    Mammographic microcalcification    Obesity, Class III, BMI 40-49.9 (morbid obesity)    Sleep apnea    Pars defect of lumbar spine    RBBB (right bundle branch block)    Osteoarthritis of spine with radiculopathy, lumbar region    Overview Signed 9/16/2021  9:47 AM by Moreno Vitale MD   Formatting of this note might be different from the original.  Added automatically from request for surgery [id number]         Vitamin D insufficiency    Atypical chest pain    Sebaceous cyst of breast, left    Left cervical  "lymphadenopathy    Eliz-Danlos syndrome    History of sepsis    Asthma    High triglycerides    Chronic headache with new features    Impaired fasting glucose    Moderate recurrent major depression (HCC)    Generalized anxiety disorder    Depression, recurrent (HCC)    Cubital tunnel syndrome    Sprain of rotator cuff capsule    PONV (postoperative nausea and vomiting)    Open wound    Fluid collection at surgical site    Other cervical disc degeneration at C6-C7 level    Esophageal dysphagia    Elevated ALT measurement    Pain of right lower extremity    Severe major depressive disorder ([facility])    Abnormal mammogram        Past Medical History:   Diagnosis Date    Allergic     Allergic rhinitis     Anxiety     Arthritis     Breast cyst     C. difficile diarrhea 2020    Chest pain     Chronic pain disorder     BACK SHOULDERS NECK    Colon polyp     COPD (chronic obstructive pulmonary disease) (HCC)     CPAP (continuous positive airway pressure) dependence     Dental disease     Depression     Diarrhea     Disease of thyroid gland     nodule    Dizziness     Dysphagia     with pain \"feels like a blockage\" in the esophagus    Ear problems     Eating disorder     Fatigue     Fibromyalgia, primary     Fissure, anal     GERD (gastroesophageal reflux disease)     Headache(784.0)     Hearing difficulty of right ear     frequent infections-(x1 lead to sepsis)    Hyperlipidemia     Hypertension     Irritable bowel syndrome     Nosebleed     Obesity     Otitis media     Panic attack     Perforation of tympanic membrane     Right    PONV (postoperative nausea and vomiting)     RBBB     Right bundle branch blockage     Sleep apnea     no cpap    Thyroid nodule     TMJ dysfunction     Wears glasses      Social History     Socioeconomic History    Marital status: /Civil Union     Spouse name: Not on file    Number of children: Not on file    Years of education: Not on file    Highest education level: Not on file "   Occupational History    Not on file   Tobacco Use    Smoking status: Never    Smokeless tobacco: Never   Vaping Use    Vaping status: Never Used   Substance and Sexual Activity    Alcohol use: Not Currently     Comment: NICKI     Drug use: Never    Sexual activity: Yes     Partners: Male     Birth control/protection: Condom Male     Comment: ablation   Other Topics Concern    Not on file   Social History Narrative    Daily caffeine use- 1 cup of coffee     Social Drivers of Health     Financial Resource Strain: Low Risk  (4/17/2023)    Received from [facility]    Overall Financial Resource Strain (CARDIA)     Difficulty of Paying Living Expenses: Not hard at all   Food Insecurity: No Food Insecurity (4/17/2023)    Received from [facility]    Hunger Vital Sign     Within the past 12 months, you worried that your food would run out before you got the money to buy more.: Never true     Within the past 12 months, the food you bought just didn't last and you didn't have money to get more.: Never true   Transportation Needs: No Transportation Needs (4/17/2023)    Received from [facility]    PRAPARE - Transportation     Lack of Transportation (Medical): No     Lack of Transportation (Non-Medical): No   Physical Activity: Unknown (5/10/2021)    Exercise Vital Sign     Days of Exercise per Week: 2 days     Minutes of Exercise per Session: Not asked   Stress: Not on file   Social Connections: Not on file   Intimate Partner Violence: Not At Risk (4/17/2023)    Received from [facility]    Humiliation, Afraid, Rape, and Kick questionnaire     Fear of Current or Ex-Partner: No     Emotionally Abused: No     Physically Abused: No     Sexually Abused: No   Housing Stability: Low Risk  (4/17/2023)    Received from [facility]    Housing Stability Vital Sign     Unable to Pay for Housing in the Last Year: No     Number of Places Lived in the Last  Year: 1     Unstable Housing in the Last Year: No      Family History   Problem Relation Age of Onset    Hypertension Mother     Hyperlipidemia Mother     Diabetes Mother     Arthritis Mother     Depression Mother     Hypothyroidism Mother     Irritable bowel syndrome Mother     Hypertension Father     Hyperlipidemia Father     Heart disease Father         cardiac disorder-myocardial infarction arrhythmias    Heart attack Father     Early death Father         46 of massive heart attack    Hypothyroidism Father     Hearing loss Father     Irritable bowel syndrome Father     Crohn's disease Brother         My daughter    Arthritis Brother     Diabetes Brother     Diabetes unspecified Maternal Grandmother     Thyroid disease Paternal Grandmother     Crohn's disease Daughter     No Known Problems Maternal Aunt     No Known Problems Maternal Aunt     No Known Problems Maternal Aunt     No Known Problems Maternal Aunt     No Known Problems Paternal Aunt     No Known Problems Paternal Aunt     Heart attack Paternal Uncle     Crohn's disease Brother     Depression Brother     Diabetes Brother     Inflammatory bowel disease Brother     Crohn's disease Brother     Depression Brother      Past Surgical History:   Procedure Laterality Date    ANAL FISTULOTOMY N/A 2018    Procedure: FISTULOTOMY;  Surgeon: Clay Laws MD;  [facility]: [facility];  Service: Colorectal    BACK SURGERY      lumbar herniated disc    BREAST CYST ASPIRATION      BREAST CYST EXCISION Left 13 yrs ago  benign    BREAST LUMPECTOMY      BREAST SURGERY Left 2006    cyst removal    CARPAL TUNNEL RELEASE      CERVICAL FUSION  2023    C5-6     SECTION      CHOLECYSTECTOMY      COLONOSCOPY      CYST REMOVAL      DENTAL SURGERY      ENDOMETRIAL ABLATION N/A 2021    Procedure: D&C, ABLATION ENDOMETRIAL MAIREL;  Surgeon: Juan Pablo Doan MD;  Location:  MAIN OR;  Service: Gynecology    FL MYELOGRAM CERVICAL  2022    HAND  FUSION Left 01/26/2024    HAND SURGERY      INCISION AND DRAINAGE OF WOUND N/A 11/02/2018    Procedure: INCISION AND DRAINAGE (I&D) BUTTOCK;  Surgeon: Clay Laws MD;  [facility]: [facility];  Service: Colorectal    MYRINGOTOMY W/ TUBES Right 08/17/2015    [place]    WA ANRCT XM SURG REQ ANES GENERAL SPI/EDRL DX N/A 11/02/2018    Procedure: EXAM UNDER ANESTHESIA (EUA);  Surgeon: Clay Laws MD;  [facility];  Service: Colorectal    WA COLONOSCOPY FLX DX W/COLLJ SPEC WHEN PFRMD N/A 08/02/2017    Procedure: COLONOSCOPY;  Surgeon: Jose Rafael Penn MD;  [facility];  Service: Gastroenterology    WA DEBRIDEMENT SUBCUTANEOUS TISSUE 1ST 20 SQ CM/< N/A 07/19/2022    Procedure: Debridement abdominal wall;  Surgeon: Robert Bloch, MD;  Location: [facility];  Service: General    WA EXCISION HIDRADENITIS INGUINAL COMPLEX REPAIR Bilateral 01/18/2022    Procedure: WIDE EXCISION HIDRADENITIS ABDOMINAL WALL;  Surgeon: Robert Bloch, MD;  Location: [facility];  Service: General    WA INCISION & DRAINAGE ABSCESS SIMPLE/SINGLE N/A 07/19/2022    Procedure: INCISION AND DRAINAGE ABDOMINAL WALL;  Surgeon: Robert Bloch, MD;  [facility]: [facility];  Service: General    WA TENDON SHEATH INCISION Right 03/13/2018    Procedure: LONG TRIGGER FINGER RELEASE;  Surgeon: Nickolas Guerrero DO;  [facility];  Service: Orthopedics    WA TYMPANOPLASTY W/O MASTOIDECT W/O OSSICLE RECNSTJ Right 05/24/2017    Procedure: TYMPANOPLASTY WITH PERICHONDRIN GRAFT;  Surgeon: Hugo Moreira DO;  [facility];  Service: ENT    SHOULDER SURGERY Right     SPINE SURGERY      TRIGGER FINGER RELEASE      ULNAR NERVE TRANSPOSITION Left     UMBILICAL HIDRADENITIS EXCISION  01/18/2022    subumbilical hidradenitis exicsion, Dr. Bloch, [facility]    US GUIDED THYROID BIOPSY  07/03/2023    US GUIDED THYROID BIOPSY  02/07/2025    WISDOM TOOTH EXTRACTION       Current Medications[1]  Allergies   Allergen Reactions     Gabapentin Other (See Comments)     Other reaction(s): SUICIDE IDEATION    Other reaction(s): Other (See Comments)    suicidal    Neurontin    Penicillins Anaphylaxis, Swelling and Angioedema    Aripiprazole Other (See Comments)     Other reaction(s): low dose 2 mg.; curving in and locking in of hands/dystonia  Other reaction(s): Other (See Comments)  Other reaction(s): low dose 2 mg.; curving in and locking in of hands/dystonia    Lorazepam Other (See Comments)     Other reaction(s): higher dose > anxiety and depression  Other reaction(s): Other (See Comments)  Other reaction(s): higher dose > anxiety and depression    Wound Dressing Adhesive Hives     Hive, redness, swelling/bleeding    Carbamazepine Other (See Comments)     Other reaction(s): Unknown Reaction    Doxycycline Hives    Lamotrigine Rash       Labs:     Chemistry        Component Value Date/Time     04/05/2017 1034    K 3.8 04/13/2025 1329    K 4.0 06/27/2023 1307     04/13/2025 1329     06/27/2023 1307    CO2 29 04/13/2025 1329    CO2 27 06/27/2023 1307    BUN 10 04/13/2025 1329    BUN 12 06/27/2023 1307    CREATININE 0.64 04/13/2025 1329    CREATININE 0.61 06/27/2023 1307        Component Value Date/Time    CALCIUM 9.2 04/13/2025 1329    CALCIUM 10.3 (H) 06/27/2023 1307    ALKPHOS 72 04/13/2025 1329    ALKPHOS 69 06/27/2023 1307    AST 20 04/13/2025 1329    AST 20 06/27/2023 1307    ALT 30 04/13/2025 1329    ALT 35 06/27/2023 1307    BILITOT 0.6 04/05/2017 1034            Lab Results   Component Value Date    CHOL 187 04/05/2017    CHOL 161 10/28/2016    CHOL 149 11/17/2015     Lab Results   Component Value Date    HDL 60 10/02/2024    HDL 51 08/01/2023    HDL 40 (L) 11/15/2022     Lab Results   Component Value Date    LDLCALC 99 10/02/2024    LDLCALC 97 08/01/2023    LDLCALC 94 11/15/2022     Lab Results   Component Value Date    TRIG 164 (H) 10/02/2024    TRIG 164 (H) 08/01/2023    TRIG 203 (H) 11/15/2022     No results found  "for: \"CHOLHDL\"    Imaging: No results found.    ECG:  n/a    Review of Systems   Cardiovascular:  Positive for palpitations.   Neurological:  Positive for dizziness.   All other systems reviewed and are negative.      Vitals:    05/15/25 1044   BP: 110/78   Pulse: 87   Temp: 97.8 °F (36.6 °C)   SpO2: 98%     Vitals:    05/15/25 1044   Weight: 131 kg (288 lb 9.6 oz)     Height: 5' 7\" (170.2 cm)   Body mass index is 45.2 kg/m².    Physical Exam  Vitals and nursing note reviewed.   Constitutional:       General: She is not in acute distress.     Appearance: Normal appearance.   HENT:      Head: Normocephalic.      Nose: Nose normal.      Mouth/Throat:      Mouth: Mucous membranes are moist.      Pharynx: Oropharynx is clear.     Cardiovascular:      Rate and Rhythm: Normal rate and regular rhythm.      Pulses: Normal pulses.      Heart sounds: Normal heart sounds. No murmur heard.  Pulmonary:      Effort: Pulmonary effort is normal.      Breath sounds: Normal breath sounds.     Musculoskeletal:         General: Normal range of motion.      Cervical back: Normal range of motion.      Right lower leg: No edema.      Left lower leg: No edema.     Skin:     General: Skin is warm and dry.     Neurological:      Mental Status: She is alert and oriented to person, place, and time.     Psychiatric:         Mood and Affect: Mood normal.         Behavior: Behavior normal.               [1]   Current Outpatient Medications:     albuterol (PROVENTIL HFA,VENTOLIN HFA) 90 mcg/act inhaler, Inhale 2 puffs every 6 (six) hours as needed for shortness of breath or wheezing (or cough), Disp: 18 g, Rfl: 0    amitriptyline (ELAVIL) 75 mg tablet, TAKE 1 TABLET (75 MG) BY MOUTH ONCE DAILY AT BEDTIME, Disp: 100 tablet, Rfl: 0    Ascorbic Acid (VITAMIN C) 500 MG CAPS, Take 1 capsule by mouth daily at bedtime, Disp: , Rfl:     aspirin 81 mg chewable tablet, Chew 1 tablet (81 mg total) daily, Disp: 30 tablet, Rfl: 30    cetirizine (ZyrTEC) 10 mg " tablet, Take 10 mg by mouth in the morning., Disp: , Rfl:     Cholecalciferol (VITAMIN D3) 5000 units CAPS, Take 5,000 Units by mouth daily at bedtime, Disp: , Rfl:     cyanocobalamin (VITAMIN B-12) 1,000 mcg tablet, Take 1,000 mcg by mouth in the morning., Disp: , Rfl:     docusate sodium (DULCOLAX) 100 mg capsule, Take by mouth daily at bedtime, Disp: , Rfl:     DULoxetine (CYMBALTA) 60 mg delayed release capsule, Take 1 capsule by mouth in the morning, Disp: , Rfl:     Escitalopram Oxalate (LEXAPRO PO), , Disp: , Rfl:     esomeprazole (NexIUM) 40 MG capsule, Take 1 capsule (40 mg total) by mouth daily before breakfast, Disp: 90 capsule, Rfl: 2    lamoTRIgine (LaMICtal) 25 mg tablet, 100 mg in the morning., Disp: , Rfl:     LORazepam (ATIVAN) 1 mg tablet, take 2 tablets one hour prior to mri and may use one or two additional tablets 5 min before mri if needed, Disp: , Rfl:     meclizine (ANTIVERT) 25 mg tablet, Take 1 tablet (25 mg total) by mouth 3 (three) times a day as needed for dizziness, Disp: 30 tablet, Rfl: 0    Melatonin 10 MG TABS, Take by mouth daily at bedtime, Disp: , Rfl:     metoprolol succinate (TOPROL-XL) 100 mg 24 hr tablet, TAKE 1 TABLET (100 MG TOTAL) BY MOUTH ONCE DAILY, Disp: 180 tablet, Rfl: 1    Multiple Vitamins-Minerals (CENTRUM ADULTS PO), Take 1 tablet by mouth daily at bedtime, Disp: , Rfl:     rosuvastatin (CRESTOR) 40 MG tablet, Take 1 tablet (40 mg total) by mouth daily, Disp: 90 tablet, Rfl: 1    mupirocin (BACTROBAN) 2 % ointment, Apply topically 2 (two) times a day for 10 days Apply to left heel twice a day for 10 days, Disp: 30 g, Rfl: 0    Zinc 50 MG TABS, Take by mouth daily at bedtime, Disp: , Rfl:

## 2025-05-19 ENCOUNTER — TELEPHONE (OUTPATIENT)
Age: 45
End: 2025-05-19

## 2025-05-19 NOTE — TELEPHONE ENCOUNTER
Patient called in stated she accepted a wait list offer for an appointment today which was accidental.     Was able to schedule patients original appointment on 5/30/2025 at 2:30 pm with Epifanio Cagle in [place].

## 2025-05-20 ENCOUNTER — APPOINTMENT (OUTPATIENT)
Dept: LAB | Facility: CLINIC | Age: 45
End: 2025-05-20
Payer: COMMERCIAL

## 2025-05-20 DIAGNOSIS — E04.1 THYROID NODULE: ICD-10-CM

## 2025-05-20 PROCEDURE — 84436 ASSAY OF TOTAL THYROXINE: CPT

## 2025-05-20 PROCEDURE — 84480 ASSAY TRIIODOTHYRONINE (T3): CPT

## 2025-05-20 PROCEDURE — 36415 COLL VENOUS BLD VENIPUNCTURE: CPT

## 2025-05-20 PROCEDURE — 84443 ASSAY THYROID STIM HORMONE: CPT

## 2025-05-21 ENCOUNTER — RESULTS FOLLOW-UP (OUTPATIENT)
Dept: FAMILY MEDICINE CLINIC | Facility: CLINIC | Age: 45
End: 2025-05-21

## 2025-05-21 LAB
T3 SERPL-MCNC: 1.5 NG/ML (ref 0.9–1.8)
T4 SERPL-MCNC: 7.3 UG/DL (ref 6.09–12.23)
TSH SERPL DL<=0.05 MIU/L-ACNC: 1.34 UIU/ML (ref 0.45–4.5)

## 2025-05-22 ENCOUNTER — OFFICE VISIT (OUTPATIENT)
Dept: FAMILY MEDICINE CLINIC | Facility: CLINIC | Age: 45
End: 2025-05-22
Payer: COMMERCIAL

## 2025-05-22 VITALS
WEIGHT: 288 LBS | SYSTOLIC BLOOD PRESSURE: 118 MMHG | BODY MASS INDEX: 45.2 KG/M2 | DIASTOLIC BLOOD PRESSURE: 80 MMHG | TEMPERATURE: 98.4 F | HEIGHT: 67 IN | OXYGEN SATURATION: 97 % | HEART RATE: 92 BPM

## 2025-05-22 DIAGNOSIS — F33.1 MODERATE RECURRENT MAJOR DEPRESSION (HCC): Primary | ICD-10-CM

## 2025-05-22 DIAGNOSIS — R42 LIGHTHEADED: ICD-10-CM

## 2025-05-22 DIAGNOSIS — M79.643 PAIN OF HAND, UNSPECIFIED LATERALITY: ICD-10-CM

## 2025-05-22 DIAGNOSIS — R00.2 PALPITATIONS: ICD-10-CM

## 2025-05-22 DIAGNOSIS — I10 PRIMARY HYPERTENSION: ICD-10-CM

## 2025-05-22 DIAGNOSIS — R42 VERTIGO: ICD-10-CM

## 2025-05-22 PROCEDURE — 99214 OFFICE O/P EST MOD 30 MIN: CPT | Performed by: FAMILY MEDICINE

## 2025-05-22 NOTE — PROGRESS NOTES
Name: Jannette Baugh      : 1980      MRN: [id number]  Encounter Provider: Gustavo Godoy MD  Encounter Date: 2025   Encounter department: [facility]    :  Assessment & Plan  Lightheaded         Primary hypertension         Palpitations         Moderate recurrent major depression (HCC)      Orders:    Ambulatory referral to Psych Services; Future    Vertigo         Pain of hand, unspecified laterality           Assessment & Plan  1. Lightheadedness.  - Blood pressure readings today are within the normal range, albeit slightly on the lower end at 118/80.  - Potential causes of symptoms could be vertigo or hypotension, accounting for lightheadedness or fainting sensations.  - Discontinue the use of losartan and monitor blood pressure at home.  - Virtual follow-up visit scheduled in 2 weeks to assess progress, specifically to determine if blood pressure remains controlled and if episodes of lightheadedness continue.    2. Cardiac evaluation.  - Holter monitor results did not reveal any significant abnormalities, showing a normal sinus rhythm with beats ranging from  per minute and an average heart rate of 80/min.  - Some supraventricular ectopic beats totaling seven beats, but no consistent runs;  - Cardiac CTA scan scheduled next week for further evaluation ordered by cardiology.  - Depending on results, a ZIO patch monitor may be considered for a more extended monitoring period.  - Continue to follow with cardiology    3. Hand pain.  - Significant pain in the hand potentially due to irritation from a plate and screws previously inserted.  - She reports her orthopedic surgeon told her x-ray showed an area that may not have fused properly.  - Hand CT scan scheduled next Wednesday to further evaluate the issue.  - Cardiac clearance needed before proceeding with surgery if required.    4. Psychiatric referral.  - Referral to [facility] in [place] for psychiatric  services to help manage medications and provide further psychiatric care.  - Referral will be provided today.    Follow-up  - Follow-up in 2 weeks via virtual visit to assess blood pressure control and lightheadedness episodes.           History of Present Illness     History of Present Illness  The patient presents for a 6-month follow-up.    She has been experiencing episodes of lightheadedness, which she describes as a sensation of dizziness without the accompanying room spinning. She has an upcoming appointment with neurology on 05/30/2025. She has a blood pressure cuff at home.    She has been under the care of a cardiologist, who has scheduled a cardiac CT scan for next week. Her cardiologist has informed her of a right bundle branch block. She has expressed concerns about potential findings from her upcoming tests, given her family history of cardiac issues. She has been engaging in physical activity, specifically treadmill exercises, for approximately 10 to 15 minutes per session. She recently increased this duration to 25 minutes, which resulted in discomfort the following day. She also had five medical appointments, including physical therapy, while wearing the Holter monitor. Her cardiologist has suggested the possibility of using a Zio patch for further monitoring, depending on the results of her current tests.    She is currently on losartan but missed her dose last night. She has expressed concerns about potential findings from her upcoming tests, given her family history of cardiac issues.    She is having significant pain in her hand. She saw a hand surgeon yesterday who noted that there is a plate and screws in her hand. He mentioned that there is a chance it would need to be removed as it might be causing irritation. However, the x-ray showed an area where he is unsure if it has fused. She has a hand CAT scan scheduled for Wednesday. She anticipates that she will need surgery.    Her psychiatrist,  "a nurse practitioner, wanted to refer her to [facility] for medication management. She completed the orientation but decided against it. She is interested in trying [facility] in [place], where there are five psychiatrists, but they require a referral.    FAMILY HISTORY  Her father  at age 46.     Review of Systems   All other systems reviewed and are negative.    Objective   /80   Pulse 92   Temp 98.4 °F (36.9 °C) (Tympanic)   Ht 5' 7\" (1.702 m)   Wt 131 kg (288 lb)   SpO2 97%   BMI 45.11 kg/m²     Physical Exam  Cardiovascular: Regular rate and rhythm, no murmurs, rubs, or gallops  Physical Exam  Vitals and nursing note reviewed.   Constitutional:       General: She is not in acute distress.     Appearance: Normal appearance. She is well-developed. She is not ill-appearing, toxic-appearing or diaphoretic.   HENT:      Head: Normocephalic and atraumatic.     Eyes:      General:         Right eye: No discharge.         Left eye: No discharge.      Extraocular Movements: Extraocular movements intact.      Conjunctiva/sclera: Conjunctivae normal.       Cardiovascular:      Rate and Rhythm: Normal rate.   Pulmonary:      Effort: Pulmonary effort is normal.     Skin:     General: Skin is warm and dry.      Capillary Refill: Capillary refill takes less than 2 seconds.     Neurological:      Mental Status: She is alert and oriented to person, place, and time.     Psychiatric:         Mood and Affect: Mood normal.         Behavior: Behavior normal.         Thought Content: Thought content normal.         Judgment: Judgment normal.         "

## 2025-05-27 ENCOUNTER — HOSPITAL ENCOUNTER (OUTPATIENT)
Dept: CT IMAGING | Facility: HOSPITAL | Age: 45
Discharge: HOME | End: 2025-05-27
Payer: COMMERCIAL

## 2025-05-27 ENCOUNTER — PATIENT MESSAGE (OUTPATIENT)
Dept: CARDIOLOGY CLINIC | Facility: CLINIC | Age: 45
End: 2025-05-27

## 2025-05-27 VITALS — DIASTOLIC BLOOD PRESSURE: 56 MMHG | HEART RATE: 88 BPM | SYSTOLIC BLOOD PRESSURE: 100 MMHG

## 2025-05-27 DIAGNOSIS — I25.10 CORONARY ARTERY CALCIFICATION SEEN ON CT SCAN: ICD-10-CM

## 2025-05-27 DIAGNOSIS — I10 PRIMARY HYPERTENSION: ICD-10-CM

## 2025-05-27 DIAGNOSIS — Z82.49 FAMILY HISTORY OF EARLY CAD: ICD-10-CM

## 2025-05-27 PROCEDURE — 75574 CT ANGIO HRT W/3D IMAGE: CPT

## 2025-05-27 PROCEDURE — 75580 N-INVAS EST C FFR SW ALY CTA: CPT

## 2025-05-27 RX ORDER — NITROGLYCERIN 0.4 MG/1
0.8 TABLET SUBLINGUAL ONCE
Status: COMPLETED | OUTPATIENT
Start: 2025-05-27 | End: 2025-05-27

## 2025-05-27 RX ORDER — METOPROLOL TARTRATE 1 MG/ML
5 INJECTION, SOLUTION INTRAVENOUS
Status: DISCONTINUED | OUTPATIENT
Start: 2025-05-27 | End: 2025-05-31 | Stop reason: HOSPADM

## 2025-05-27 RX ADMIN — METOPROLOL TARTRATE 5 MG: 5 INJECTION INTRAVENOUS at 12:11

## 2025-05-27 RX ADMIN — IOHEXOL 118 ML: 350 INJECTION, SOLUTION INTRAVENOUS at 12:37

## 2025-05-27 RX ADMIN — NITROGLYCERIN 0.8 MG: 0.4 TABLET SUBLINGUAL at 12:27

## 2025-05-27 RX ADMIN — METOPROLOL TARTRATE 5 MG: 5 INJECTION INTRAVENOUS at 12:08

## 2025-05-27 RX ADMIN — METOPROLOL TARTRATE 5 MG: 5 INJECTION INTRAVENOUS at 12:15

## 2025-05-27 NOTE — LETTER
[facility]  [address] [place] [address]      June 2, 2025    MRN: [id number]     Phone: [phone]     Dear Ms. Kimbleraghu,    You recently had a(n) Cat Scan performed on 5/27/2025 at  [facility] that was requested by ELAN Lowe. The study was reviewed by a radiologist, which is a physician who specializes in medical imaging. The radiologist issued a report describing his or her findings. In that report there was a finding that the radiologist felt warranted further discussion with your health care provider and that discussion would be beneficial to you.      The results were sent to ELAN Lowe on 05/27/2025  3:40 PM. We recommend that you contact ELAN Lowe at [phone] or set up an appointment to discuss the results of the imaging test. If you have already heard from ELAN Lowe regarding the results of your study, you can disregard this letter.     This letter is not meant to alarm you, but intended to encourage you to follow-up on your results with the provider that sent you for the imaging study. In addition, we have enclosed answers to frequently asked questions by other patients who have also received a letter to review results with their health care provider (see page two).      Thank you for choosing [facility] for your medical imaging needs.                                                                                                                                                        FREQUENTLY ASKED QUESTIONS    Why am I receiving this letter?  [place] State Law requires us to notify patients who have findings on imaging exams that may require more testing or follow-up with a health professional within the next 3 months.        How serious is the finding on the imaging test?  This letter is sent to all patients who may need follow-up or more testing within the  next 3 months.  Receiving this letter does not necessarily mean you have a life-threatening imaging finding or disease.  Recommendations in the radiologist’s imaging report are general in nature and it is up to your healthcare provider to say whether those recommendations make sense for your situation.  You are strongly encouraged to talk to your health care provider about the results and ask whether additional steps need to be taken.    Where can I get a copy of the final report for my recent radiology exam?  To get a full copy of the report you can access your records online at [email or web address] or please contact [facility]’s Medical Records Department at [phone] Monday through Friday between 8 am and 6 pm.         What do I need to do now?           Please contact your health care provider who requested the imaging study to discuss what further actions (if any) are needed.  You may have already heard from (your ordering provider) in regard to this test in which case you can disregard this letter.        NOTICE IN ACCORDANCE WITH THE [place] “PATIENT TEST RESULT INFORMATION ACT OF 2018”    You are receiving this notice as a result of a determination by your diagnostic imaging service that further discussions of your test results are warranted and would be beneficial to you.    The complete results of your test or tests have been or will be sent to the health care practitioner that ordered the test or tests. It is recommended that you contact your health care practitioner to discuss your results as soon as possible.

## 2025-05-27 NOTE — NURSING NOTE
Patient arrived for coronary CT angiography. Test education reviewed with patient. Medications and allergies verified. Pt denies PDE5 inhibitor use. Patient took usual dose of toprol xl. Patient given metoprolol 15mg total iv to reach target HR. SL nitro given per protocol. See vitals flowsheet. Tolerated test well. Instructed to increase fluid intake remainder of day. Vitals stable, patient asymptomatic upon departure with friend

## 2025-05-28 ENCOUNTER — TELEPHONE (OUTPATIENT)
Age: 45
End: 2025-05-28

## 2025-05-28 NOTE — TELEPHONE ENCOUNTER
Pt called to review cardiac CTA results with provider. She would like a return call when possible. She is very anxious due to seeing the results in [facility]

## 2025-05-29 ENCOUNTER — RESULTS FOLLOW-UP (OUTPATIENT)
Dept: CARDIOLOGY CLINIC | Facility: CLINIC | Age: 45
End: 2025-05-29

## 2025-05-30 ENCOUNTER — OFFICE VISIT (OUTPATIENT)
Dept: NEUROLOGY | Facility: CLINIC | Age: 45
End: 2025-05-30
Attending: FAMILY MEDICINE
Payer: COMMERCIAL

## 2025-05-30 VITALS
WEIGHT: 287.7 LBS | OXYGEN SATURATION: 96 % | BODY MASS INDEX: 45.06 KG/M2 | HEART RATE: 85 BPM | DIASTOLIC BLOOD PRESSURE: 80 MMHG | SYSTOLIC BLOOD PRESSURE: 130 MMHG | TEMPERATURE: 97.3 F

## 2025-05-30 DIAGNOSIS — G43.709 CHRONIC MIGRAINE WITHOUT AURA WITHOUT STATUS MIGRAINOSUS, NOT INTRACTABLE: Primary | ICD-10-CM

## 2025-05-30 DIAGNOSIS — R42 DIZZINESS AFTER EXTENSION OF NECK: ICD-10-CM

## 2025-05-30 DIAGNOSIS — M54.2 CERVICALGIA: ICD-10-CM

## 2025-05-30 DIAGNOSIS — Z82.49 FAMILY HISTORY OF CEREBRAL ANEURYSM: ICD-10-CM

## 2025-05-30 DIAGNOSIS — R51.9 WORSENING HEADACHES: ICD-10-CM

## 2025-05-30 PROCEDURE — 99204 OFFICE O/P NEW MOD 45 MIN: CPT

## 2025-05-30 NOTE — PROGRESS NOTES
Name: Jannette Baugh      : 1980      MRN: [id number]  Encounter Provider: Yves Cagle PA-C  Encounter Date: 2025   Encounter department: [facility]  :  Assessment & Plan  Chronic migraine without aura without status migrainosus, not intractable  I had the pleasure of seeing Jannette today in the office at [facility] in [place].  She is presenting today for an initial new patient consultation in regard to her headaches.  The patient states that she started having headaches and neck pain many years ago.  Over the last few months that she started to have worsening generalized dizziness and headaches as well.  The patient notes that her dizziness almost exclusively worsens when she tilts her head back or tries to move her head in any direction.  She is currently experiencing headaches 30/30 days in the mouth.  About 12/30 days a month with more severe headaches.  Will wake up with already having a headache in the morning.  The patient states that the headaches will last throughout the whole day.  Tylenol and ibuprofen will help slightly.  Although this is not always effective for her.  She does not have an aura with the headaches.  She is experiencing pain at the bilateral frontal area of the head and radiating towards the back of the head and around the eyes.  She does have associated symptoms of nausea, stiff sore neck, photophobia, phonophobia, and lightheadedness/dizziness with her headaches when they occur.  Coughing can make the headaches worsen, looking up and down can make the headaches worse as well.  Dizziness can be triggered by going from lying down to standing up position.  She last saw her neurologist about 15 years ago.  She did have a MRI brain with and without contrast in  and a recent CT head without contrast in  when she presented to the ED with a headache.    Based on my evaluation patient, certainly seems that the  patient is having chronic migraine headaches without aura.  It does seem that they may potentially have a cervicogenic component as patient does have a lot of neck pain and cervical spine degenerative disease and stenosis.  The patient also has Eliz-Danlos syndrome as well which causes some of the issues in regards to tissue and also issues with her blood vessels as well.  Therefore, since the patient noted that she has a family history of cerebral aneurysm with her paternal grandmother having a cerebral aneurysm and also due to the fact the patient is having dizziness when extending her head back recommend that she have a CTA head and neck with and without contrast for further evaluation of any intracranial abnormality or any significant vessel compression.  For abortive therapy, recommended that the patient trial Nurtec 75 mg as needed.  Patient contraindicated to triptan medications due to history of hypertension and also the fact that she has a right bundle branch block as well.  For preventative therapy, recommend that the patient trial Botox once every 3-month injections for migraine prevention.  Advised the patient that we would start authorizations for both Nurtec and Botox.  Advised patient that she is to continue with all her same medications and complete the CTA head and neck before her next visit.  Advised patient to follow-up in approximately 3 months time with psych BRANDT.      Orders:    rimegepant sulfate (NURTEC) 75 mg TBDP; Take 1 tablet (75 mg) by mouth once at the onset of a headache. Max dose: 75 mg/day.    Botulinum Toxin Type A SOLR 200 Units    Worsening headaches    Orders:    CTA head and neck w wo contrast; Future    Cervicalgia         Dizziness after extension of neck    Orders:    CTA head and neck w wo contrast; Future    Family history of cerebral aneurysm    Orders:    CTA head and neck w wo contrast; Future      Patient Instructions     Additional Testing:   Neurodiagnostic workup:  CTA head and neck with and without contrast ordered.  Would like to rule out for any acute intracranial normality and would also to about a week for the patient's dizziness after head and neck extension as well to rule out any significant vascular compression.  Also due to the fact that the patient has a family history of cerebral aneurysm as well.    Headache Calendar  Please maintain a headache calendar  Consider using phone applications such as Migraine Buddy or Migraine Diary    Headache/migraine treatment:     Rescue medications (for immediate treatment of a headache):   It is ok to take ibuprofen, acetaminophen or naproxen (Advil, Tylenol,  Aleve, Excedrin) if they help your headaches you should limit these to No more than 3 times a week to avoid medication overuse/rebound headaches.     For your more moderate to severe migraines take this medication early   - Start Nurtec 75 mg, take 1 tablet by mouth once daily as needed for migraine abortive therapy.  Max dose: 75 mg/day.  Contraindicated to triptan medications due to the history of hypertension also due to the fact that the patient has history of right bundle branch block as well.    Prescription preventive medications for headaches/migraines   (to take every day to help prevent headaches - not to take at the time of headache):  - We will also be starting authorization for Botox once every 3-month injections for migraine prevention    *Typically these types of medications take time until you see the benefit, although some may see improvement in days, often it may take weeks, especially if the medication is being titrated up to a beneficial level. Please contact us if there are any concerns or questions regarding the medication.     Over the counter preventive supplements for headaches/migraines (if you try, try for 3 months straight)  (to take every day to help prevent headaches - not to take at the time of headache):  There are combo pills online of these  - none of which regulated by FDA and double check dosing - take appropriate dose only once a day- prevent a migraine, migravent, mind ease, migrelief   [] Magnesium 400mg daily (If any diarrhea or upset stomach, decrease dose  as tolerated)  [] Riboflavin (Vitamin B2) 400mg daily (may make your urine bright/neon yellow)  - All supplements can be purchased online    Lifestyle Recommendations:  [x] SLEEP - Maintain a regular sleep schedule: Adults need at least 7-8 hours of uninterrupted a night. Maintain good sleep hygiene:  Going to bed and waking up at consistent times, avoiding excessive daytime naps, avoiding caffeinated beverages in the evening, avoid excessive stimulation in the evening and generally using bed primarily for sleeping.  One hour before bedtime would recommend turning lights down lower, decreasing your activity (may read quietly, listen to music at a low volume). When you get into bed, should eliminate all technology (no texting, emailing, playing with your phone, iPad or tablet in bed).  [x] HYDRATION - Maintain good hydration.  Drink  2L of fluid a day (4 typical small water bottles)  [x] DIET - Maintain good nutrition. In particular don't skip meals and try and eat healthy balanced meals regularly.  [x] TRIGGERS - Look for other triggers and avoid them: Limit caffeine to 1-2 cups a day or less. Avoid dietary triggers that you have noticed bring on your headaches (this could include aged cheese, peanuts, MSG, aspartame and nitrates).  [x] EXERCISE - physical exercise as we all know is good for you in many ways, and not only is good for your heart, but also is beneficial for your mental health, cognitive health and  chronic pain/headaches. I would encourage at the least 5 days of physical exercise weekly for at least 30 minutes.     Education and Follow-up  [x] Please call with any questions or concerns. Of course if any new concerning symptoms go to the emergency department.  [x] Follow up in 3  months with Epifanio CARLTON        History of Present Illness   HPI   Current medical illnesses  or past medical history include right bundle branch block, primary hypertension, sleep apnea, asthma, esophageal dysphagia, thyroid nodule, cubital tunnel syndrome, carpal tunnel syndrome bilaterally, Eliz-Danlos syndrome, left cervical lymphadenopathy, severe major depressive disorder, generalized anxiety disorder, fibromyalgia, vitamin D deficiency, hyperlipidemia      Interval History:    Headaches started at what age? Started experiencing headaches a few years prior. Over the last few months general dizziness and headaches worsen   How often do the headaches occur?   - as of 5/30/2025: 30/30 days with some type of headache in the month, 12/30 days in the month with more severe headaches   What time of the day do the headaches start?  Will wake up and have a headache already   How long do the headaches last? Headaches last throughout the whole day, tylenol helps and ibuprofen helps sometimes too. Not always effective for her.  Are you ever headache free? No    Aura? without aura     Where is your headache located and pain quality? Bilateral frontal area of the head, radiates towards the back of the head and around the eyes   What is the intensity of pain? Worst 8/10, Average: 3/10  Associated symptoms:   [x] Nausea    [x] Stiff or sore neck   [x] Photophobia     [x]Phonophobia    [x] No blurred vision or loss of vision   [x] Prefer quiet, dark room  [x] Light-headed or dizzy       Things that make the headache worse? Coughing can make the headaches worse, looking up and down can make the headaches worse. Extending head backwards causes dizziness and lightheadedness specifically.     Any positional change headaches? Dizziness can be triggered by going from a laying down to standing up position     Headache triggers:  stress    Have you seen someone else for headaches or pain? Yes, 15 years prior saw a neurologist last    Have you had trigger point injection performed and how often? No  Have you had Botox injection performed and how often? No   Have you had epidural injections or transforaminal injections performed? Yes, history of epidural and spinal cord injections   Are you current pregnant or planning on getting pregnant? Not currently pregnant or planning on pregnancy. Did have endometrial ablation.   Have you ever had any Brain imaging? Yes,     Last eye exam: two months ago     What medications do you take or have you taken for your headaches?   ABORTIVE:    OTC medications: ibuprofen, tylenol   Prescription: meclizine    Past/ failed/contraindicated:  OTC medications: None  Prescription: None    PREVENTIVE:   Amitriptyline 75 mg daily at bedtime, Cymbalta 60 mg daily, Lamictal 100 mg daily, Metoprolol  mg    Past/ failed/contraindicated:  None       LIFESTYLE  Sleep   - averages: 6 hours of sleep at night  Problems falling asleep?:   Yes  Problems staying asleep?:  No    - Does wear her CPAP almost nightly for MARCELINO    Physical activity: physically active around the house and around work     Water: 4-5, 24 ounce water bottles per day  Caffeine: 12 ounces of coffee per day    Mood: Does have severe depression and anxiety she states. Currently taking amitriptyline and cymbalta for her mood along the lamictal as well. Does feel that medications could be working better for her mental health.     The following portions of the patient's history were reviewed and updated as appropriate: allergies, current medications, past family history, past medical history, past social history, past surgical history and problem list.    Pertinent family history:  Family history of headaches:  no known family members with significant headaches  Any family history of aneurysms - Yes, her paternal grandmother had a brain aneurysm     Pertinent social history:  Work: making and selling T-shirts   Education: high school diploma  Lives with   and daughter     Illicit Drugs: denies  Alcohol/tobacco: Denies alcohol use, Denies tobacco use     Review of Systems   Constitutional:  Negative for appetite change, fatigue and fever.   HENT: Negative.  Negative for hearing loss, tinnitus, trouble swallowing and voice change.    Eyes:  Positive for photophobia. Negative for pain and visual disturbance.   Respiratory: Negative.  Negative for shortness of breath.    Cardiovascular: Negative.  Negative for palpitations.   Gastrointestinal: Negative.  Negative for nausea and vomiting.   Endocrine: Negative.  Negative for cold intolerance.   Genitourinary: Negative.  Negative for dysuria, frequency and urgency.   Musculoskeletal:  Positive for neck pain. Negative for back pain, gait problem, myalgias and neck stiffness.   Skin: Negative.  Negative for rash.   Allergic/Immunologic: Negative.    Neurological:  Positive for dizziness and headaches (30/30HA 12/30 worse). Negative for tremors, seizures, syncope, facial asymmetry, speech difficulty, weakness, light-headedness and numbness.   Hematological: Negative.  Does not bruise/bleed easily.   Psychiatric/Behavioral: Negative.  Negative for confusion, hallucinations and sleep disturbance.    All other systems reviewed and are negative.   I have personally reviewed the MA's review of systems and made changes as necessary.    Medical History Reviewed by provider this encounter:     .  Past Medical History   Past Medical History[1]  Past Surgical History[2]  Family History[3]   reports that she has never smoked. She has never used smokeless tobacco. She reports that she does not currently use alcohol. She reports that she does not use drugs.  Current Outpatient Medications   Medication Instructions    albuterol (PROVENTIL HFA,VENTOLIN HFA) 90 mcg/act inhaler 2 puffs, Inhalation, Every 6 hours PRN    amitriptyline (ELAVIL) 75 mg tablet TAKE 1 TABLET (75 MG) BY MOUTH ONCE DAILY AT BEDTIME    Ascorbic Acid (VITAMIN C)  500 MG CAPS 1 capsule, Daily at bedtime    aspirin 81 mg, Oral, Daily    cetirizine (ZYRTEC) 10 mg, Daily    docusate sodium (DULCOLAX) 100 mg capsule Daily at bedtime    DULoxetine (CYMBALTA) 60 mg delayed release capsule 1 capsule, Oral, Daily    esomeprazole (NEXIUM) 40 mg, Oral, Daily before breakfast    lamoTRIgine (LAMICTAL) 100 mg, Daily    LORazepam (ATIVAN) 1 mg tablet take 2 tablets one hour prior to mri and may use one or two additional tablets 5 min before mri if needed    meclizine (ANTIVERT) 25 mg, Oral, 3 times daily PRN    Melatonin 10 MG TABS Daily at bedtime    metoprolol succinate (TOPROL-XL) 100 mg, Oral, Daily    Multiple Vitamins-Minerals (CENTRUM ADULTS PO) 1 tablet, Daily at bedtime    mupirocin (BACTROBAN) 2 % ointment Topical, 2 times daily, Apply to left heel twice a day for 10 days    rosuvastatin (CRESTOR) 40 mg, Oral, Daily    vitamin B-12 (VITAMIN B-12) 1,000 mcg, Daily    Vitamin D3 5,000 Units, Daily at bedtime    Zinc 50 MG TABS Daily at bedtime   Allergies[4]   Medications Ordered Prior to Encounter[5]   Social History[6]     Objective   There were no vitals taken for this visit.    Physical Exam  Neurological Exam    Physical Exam:                                                                 Vitals:            Constitutional:    /80 (BP Location: Left arm, Patient Position: Sitting, Cuff Size: Large)   Pulse 85   Temp (!) 97.3 °F (36.3 °C) (Temporal)   Wt 130 kg (287 lb 11.2 oz)   SpO2 96%   BMI 45.06 kg/m²   BP Readings from Last 3 Encounters:   05/30/25 130/80   05/27/25 100/56   05/22/25 118/80     Pulse Readings from Last 3 Encounters:   05/30/25 85   05/27/25 88   05/22/25 92         Well developed, well nourished, well groomed. No dysmorphic features.       Psychiatric:  Normal behavior and appropriate affect        Neurological Examination:     Mental status/cognitive function:   Orientated to time, place and person. Recent and remote memory intact. Attention  span and concentration as well as fund of knowledge are appropriate for age. Normal language and spontaneous speech.    Cranial Nerves:  II-visual fields full.   III, IV, VI-Pupils were equal, round, and reactive to light and accomodation. Extraocular movements were full and conjugate without nystagmus. Conjugate gaze, normal smooth pursuits, normal saccades   V-facial sensation symmetric.    VII-facial expression symmetric, intact forehead wrinkle, strong eye closure, symmetric smile    VIII-hearing grossly intact bilaterally   IX, X-palate elevation symmetric, no dysarthria.   XI-shoulder shrug strength intact    XII-tongue protrusion midline.    Motor Exam: symmetric bulk and tone throughout, no pronator drift. Power/strength 5/5 bilateral upper and lower extremities, no atrophy, fasciculations or abnormal movements noted.   Sensory: grossly intact light touch in all extremities.   Reflexes: brachioradialis 2+, biceps 2+, knee 2+ bilaterally  Coordination: Finger nose finger intact bilaterally, no apparent dysmetria, ataxia or tremor noted  Gait: steady casual and tandem gait.      Results/Data:    MRI brain with and without contrast, 01/20/2021:    IMPRESSION:     1.  No acute infarction, intracranial hemorrhage or enhancing mass lesion.  2.  Retrocerebellar arachnoid cyst incidentally noted.    CT head without contrast, 04/13/2025:    IMPRESSION:     No acute intracranial abnormality.    Radiology Results Review: I have reviewed radiology reports from 01/20/2021, 04/13/2025 including: CT head and MRI brain.    Administrative Statements   I have spent a total time of 45 minutes in caring for this patient on the day of the visit/encounter including Diagnostic results, Risks and benefits of tx options, Instructions for management, Patient and family education, Importance of tx compliance, Risk factor reductions, Impressions, Counseling / Coordination of care, Documenting in the medical record, Reviewing/placing  "orders in the medical record (including tests, medications, and/or procedures), and Obtaining or reviewing history  .         [1]   Past Medical History:  Diagnosis Date    Allergic     Allergic rhinitis     Anxiety     Arthritis     Breast cyst     C. difficile diarrhea 2020    Chest pain     Chronic pain disorder     BACK SHOULDERS NECK    Colon polyp     COPD (chronic obstructive pulmonary disease) (HCC)     CPAP (continuous positive airway pressure) dependence     Dental disease     Depression     Diarrhea     Disease of thyroid gland     nodule    Dizziness     Dysphagia     with pain \"feels like a blockage\" in the esophagus    Ear problems     Eating disorder     Fatigue     Fibromyalgia, primary     Fissure, anal     GERD (gastroesophageal reflux disease)     Headache(784.0)     Hearing difficulty of right ear     frequent infections-(x1 lead to sepsis)    Hyperlipidemia     Hypertension     Irritable bowel syndrome     Nosebleed     Obesity     Otitis media     Panic attack     Perforation of tympanic membrane     Right    PONV (postoperative nausea and vomiting)     RBBB     Right bundle branch blockage     Sleep apnea     no cpap    Thyroid nodule     TMJ dysfunction     Wears glasses    [2]   Past Surgical History:  Procedure Laterality Date    ANAL FISTULOTOMY N/A 2018    Procedure: FISTULOTOMY;  Surgeon: Clay Laws MD;  [facility];  Service: Colorectal    BACK SURGERY      lumbar herniated disc    BREAST CYST ASPIRATION      BREAST CYST EXCISION Left 13 yrs ago  benign    BREAST LUMPECTOMY      BREAST SURGERY Left 2006    cyst removal    CARPAL TUNNEL RELEASE      CERVICAL FUSION  2023    C5-6     SECTION      CHOLECYSTECTOMY      COLONOSCOPY      CYST REMOVAL      DENTAL SURGERY      ENDOMETRIAL ABLATION N/A 2021    Procedure: D&C, ABLATION ENDOMETRIAL MARIEL;  Surgeon: Juan Pablo Doan MD;  Location:  MAIN OR;  Service: Gynecology    FL MYELOGRAM " CERVICAL  11/11/2022    HAND FUSION Left 01/26/2024    HAND SURGERY      INCISION AND DRAINAGE OF WOUND N/A 11/02/2018    Procedure: INCISION AND DRAINAGE (I&D) BUTTOCK;  Surgeon: Clay Laws MD;  [facility];  Service: Colorectal    MYRINGOTOMY W/ TUBES Right 08/17/2015    [place]    IL ANRCT XM SURG REQ ANES GENERAL SPI/EDRL DX N/A 11/02/2018    Procedure: EXAM UNDER ANESTHESIA (EUA);  Surgeon: Clay Laws MD;  [facility];  Service: Colorectal    IL COLONOSCOPY FLX DX W/COLLJ SPEC WHEN PFRMD N/A 08/02/2017    Procedure: COLONOSCOPY;  Surgeon: Jose Rafael Penn MD;  [facility];  Service: Gastroenterology    IL DEBRIDEMENT SUBCUTANEOUS TISSUE 1ST 20 SQ CM/< N/A 07/19/2022    Procedure: Debridement abdominal wall;  Surgeon: Robert Bloch, MD;  Location: [facility];  Service: General    IL EXCISION HIDRADENITIS INGUINAL COMPLEX REPAIR Bilateral 01/18/2022    Procedure: WIDE EXCISION HIDRADENITIS ABDOMINAL WALL;  Surgeon: Robert Bloch, MD;  [facility]: [facility];  Service: General    IL INCISION & DRAINAGE ABSCESS SIMPLE/SINGLE N/A 07/19/2022    Procedure: INCISION AND DRAINAGE ABDOMINAL WALL;  Surgeon: Robert Bloch, MD;  [facility]: [facility];  Service: General    IL TENDON SHEATH INCISION Right 03/13/2018    Procedure: LONG TRIGGER FINGER RELEASE;  Surgeon: Nickolas Guerrero DO;  [facility];  Service: Orthopedics    IL TYMPANOPLASTY W/O MASTOIDECT W/O OSSICLE RECNSTJ Right 05/24/2017    Procedure: TYMPANOPLASTY WITH PERICHONDRIN GRAFT;  Surgeon: Hugo Moreira DO;  [facility];  Service: ENT    SHOULDER SURGERY Right     SPINE SURGERY      TRIGGER FINGER RELEASE      ULNAR NERVE TRANSPOSITION Left     UMBILICAL HIDRADENITIS EXCISION  01/18/2022    subumbilical hidradenitis exicsion, Dr. Bloch, [facility]    US GUIDED THYROID BIOPSY  07/03/2023    US GUIDED THYROID BIOPSY  02/07/2025    WISDOM TOOTH EXTRACTION     [3]   Family History  Problem  Relation Name Age of Onset    Hypertension Mother Chela     Hyperlipidemia Mother Chela     Diabetes Mother Chela     Arthritis Mother Chela     Depression Mother Chela     Hypothyroidism Mother Chela     Irritable bowel syndrome Mother Chela     Hypertension Father Preston Workman     Hyperlipidemia Father Preston Workman     Heart disease Father Preston Workman         cardiac disorder-myocardial infarction arrhythmias    Heart attack Father Preston Workman     Early death Father Preston Workman         46 of massive heart attack    Hypothyroidism Father Preston Workman     Hearing loss Father Preston Workman     Irritable bowel syndrome Father Preston Workman     Crohn's disease Brother Preston Workman         My daughter    Arthritis Brother Preston Workman     Diabetes Brother Preston Workman     Diabetes unspecified Maternal Grandmother Madalyn Dennis     Thyroid disease Paternal Grandmother Madalyn Dennis     Crohn's disease Daughter jeancarlos     No Known Problems Maternal Aunt maddy     No Known Problems Maternal Aunt christine     No Known Problems Maternal Aunt monse     No Known Problems Maternal Aunt trent     No Known Problems Paternal Aunt      No Known Problems Paternal Aunt      Heart attack Paternal Uncle Wenceslao Workman     Crohn's disease Brother Preston     Depression Brother Preston     Diabetes Brother Preston     Inflammatory bowel disease Brother Preston     Crohn's disease Brother Giancarlo     Depression Brother Giancarlo    [4]   Allergies  Allergen Reactions    Gabapentin Other (See Comments)     Other reaction(s): SUICIDE IDEATION    Other reaction(s): Other (See Comments)    suicidal    Neurontin    Penicillins Anaphylaxis, Swelling and Angioedema    Aripiprazole Other (See Comments)     Other reaction(s): low dose 2 mg.; curving in and locking in of hands/dystonia  Other reaction(s): Other (See Comments)  Other reaction(s): low dose 2 mg.; curving in and locking in of hands/dystonia    Lorazepam Other (See Comments)     Other reaction(s): higher  dose > anxiety and depression  Other reaction(s): Other (See Comments)  Other reaction(s): higher dose > anxiety and depression    Wound Dressing Adhesive Hives     Hive, redness, swelling/bleeding    Carbamazepine Other (See Comments)     Other reaction(s): Unknown Reaction    Doxycycline Hives    Lamotrigine Rash   [5]   Current Outpatient Medications on File Prior to Visit   Medication Sig Dispense Refill    albuterol (PROVENTIL HFA,VENTOLIN HFA) 90 mcg/act inhaler Inhale 2 puffs every 6 (six) hours as needed for shortness of breath or wheezing (or cough) 18 g 0    amitriptyline (ELAVIL) 75 mg tablet TAKE 1 TABLET (75 MG) BY MOUTH ONCE DAILY AT BEDTIME 100 tablet 0    Ascorbic Acid (VITAMIN C) 500 MG CAPS Take 1 capsule by mouth daily at bedtime      aspirin 81 mg chewable tablet Chew 1 tablet (81 mg total) daily 30 tablet 30    cetirizine (ZyrTEC) 10 mg tablet Take 10 mg by mouth in the morning.      Cholecalciferol (VITAMIN D3) 5000 units CAPS Take 5,000 Units by mouth daily at bedtime      cyanocobalamin (VITAMIN B-12) 1,000 mcg tablet Take 1,000 mcg by mouth in the morning.      docusate sodium (DULCOLAX) 100 mg capsule Take by mouth daily at bedtime      DULoxetine (CYMBALTA) 60 mg delayed release capsule Take 1 capsule by mouth in the morning      esomeprazole (NexIUM) 40 MG capsule Take 1 capsule (40 mg total) by mouth daily before breakfast 90 capsule 2    lamoTRIgine (LaMICtal) 25 mg tablet 100 mg in the morning.      meclizine (ANTIVERT) 25 mg tablet Take 1 tablet (25 mg total) by mouth 3 (three) times a day as needed for dizziness 30 tablet 0    Melatonin 10 MG TABS Take by mouth daily at bedtime      metoprolol succinate (TOPROL-XL) 100 mg 24 hr tablet TAKE 1 TABLET (100 MG TOTAL) BY MOUTH ONCE DAILY 180 tablet 1    Multiple Vitamins-Minerals (CENTRUM ADULTS PO) Take 1 tablet by mouth daily at bedtime      mupirocin (BACTROBAN) 2 % ointment Apply topically 2 (two) times a day for 10 days Apply to left  heel twice a day for 10 days 30 g 0    rosuvastatin (CRESTOR) 40 MG tablet Take 1 tablet (40 mg total) by mouth daily 90 tablet 1    Zinc 50 MG TABS Take by mouth daily at bedtime      LORazepam (ATIVAN) 1 mg tablet take 2 tablets one hour prior to mri and may use one or two additional tablets 5 min before mri if needed (Patient not taking: Reported on 5/30/2025)      [DISCONTINUED] Escitalopram Oxalate (LEXAPRO PO)  (Patient not taking: Reported on 5/30/2025)       Current Facility-Administered Medications on File Prior to Visit   Medication Dose Route Frequency Provider Last Rate Last Admin    metoprolol (LOPRESSOR) injection 5 mg  5 mg Intravenous Q3 min PRN Jose Manuel Vasquez MD   5 mg at 05/27/25 1215   [6]   Social History  Tobacco Use    Smoking status: Never    Smokeless tobacco: Never   Vaping Use    Vaping status: Never Used   Substance and Sexual Activity    Alcohol use: Not Currently     Comment: NICKI     Drug use: Never    Sexual activity: Yes     Partners: Male     Birth control/protection: Condom Male     Comment: ar

## 2025-05-30 NOTE — ASSESSMENT & PLAN NOTE
I had the pleasure of seeing Jannette today in the office at [facility] in [place].  She is presenting today for an initial new patient consultation in regard to her headaches.  The patient states that she started having headaches and neck pain many years ago.  Over the last few months that she started to have worsening generalized dizziness and headaches as well.  The patient notes that her dizziness almost exclusively worsens when she tilts her head back or tries to move her head in any direction.  She is currently experiencing headaches 30/30 days in the mouth.  About 12/30 days a month with more severe headaches.  Will wake up with already having a headache in the morning.  The patient states that the headaches will last throughout the whole day.  Tylenol and ibuprofen will help slightly.  Although this is not always effective for her.  She does not have an aura with the headaches.  She is experiencing pain at the bilateral frontal area of the head and radiating towards the back of the head and around the eyes.  She does have associated symptoms of nausea, stiff sore neck, photophobia, phonophobia, and lightheadedness/dizziness with her headaches when they occur.  Coughing can make the headaches worsen, looking up and down can make the headaches worse as well.  Dizziness can be triggered by going from lying down to standing up position.  She last saw her neurologist about 15 years ago.  She did have a MRI brain with and without contrast in 2021 and a recent CT head without contrast in 2025 when she presented to the ED with a headache.    Based on my evaluation patient, certainly seems that the patient is having chronic migraine headaches without aura.  It does seem that they may potentially have a cervicogenic component as patient does have a lot of neck pain and cervical spine degenerative disease and stenosis.  The patient also has Eliz-Danlos syndrome as well which causes some of the  issues in regards to tissue and also issues with her blood vessels as well.  Therefore, since the patient noted that she has a family history of cerebral aneurysm with her paternal grandmother having a cerebral aneurysm and also due to the fact the patient is having dizziness when extending her head back recommend that she have a CTA head and neck with and without contrast for further evaluation of any intracranial abnormality or any significant vessel compression.  For abortive therapy, recommended that the patient trial Nurtec 75 mg as needed.  Patient contraindicated to triptan medications due to history of hypertension and also the fact that she has a right bundle branch block as well.  For preventative therapy, recommend that the patient trial Botox once every 3-month injections for migraine prevention.  Advised the patient that we would start authorizations for both Nurtec and Botox.  Advised patient that she is to continue with all her same medications and complete the CTA head and neck before her next visit.  Advised patient to follow-up in approximately 3 months time with psych BRANDT.      Orders:    rimegepant sulfate (NURTEC) 75 mg TBDP; Take 1 tablet (75 mg) by mouth once at the onset of a headache. Max dose: 75 mg/day.    Botulinum Toxin Type A SOLR 200 Units

## 2025-05-30 NOTE — PATIENT INSTRUCTIONS
Additional Testing:   Neurodiagnostic workup: CTA head and neck with and without contrast ordered.  Would like to rule out for any acute intracranial normality and would also to about a week for the patient's dizziness after head and neck extension as well to rule out any significant vascular compression.  Also due to the fact that the patient has a family history of cerebral aneurysm as well.    Headache Calendar  Please maintain a headache calendar  Consider using phone applications such as Migraine Buddy or Migraine Diary    Headache/migraine treatment:     Rescue medications (for immediate treatment of a headache):   It is ok to take ibuprofen, acetaminophen or naproxen (Advil, Tylenol,  Aleve, Excedrin) if they help your headaches you should limit these to No more than 3 times a week to avoid medication overuse/rebound headaches.     For your more moderate to severe migraines take this medication early   - Start Nurtec 75 mg, take 1 tablet by mouth once daily as needed for migraine abortive therapy.  Max dose: 75 mg/day.  Contraindicated to triptan medications due to the history of hypertension also due to the fact that the patient has history of right bundle branch block as well.    Prescription preventive medications for headaches/migraines   (to take every day to help prevent headaches - not to take at the time of headache):  - We will also be starting authorization for Botox once every 3-month injections for migraine prevention    *Typically these types of medications take time until you see the benefit, although some may see improvement in days, often it may take weeks, especially if the medication is being titrated up to a beneficial level. Please contact us if there are any concerns or questions regarding the medication.     Over the counter preventive supplements for headaches/migraines (if you try, try for 3 months straight)  (to take every day to help prevent headaches - not to take at the time of  headache):  There are combo pills online of these - none of which regulated by FDA and double check dosing - take appropriate dose only once a day- prevent a migraine, migravent, mind ease, migrelief   [] Magnesium 400mg daily (If any diarrhea or upset stomach, decrease dose  as tolerated)  [] Riboflavin (Vitamin B2) 400mg daily (may make your urine bright/neon yellow)  - All supplements can be purchased online    Lifestyle Recommendations:  [x] SLEEP - Maintain a regular sleep schedule: Adults need at least 7-8 hours of uninterrupted a night. Maintain good sleep hygiene:  Going to bed and waking up at consistent times, avoiding excessive daytime naps, avoiding caffeinated beverages in the evening, avoid excessive stimulation in the evening and generally using bed primarily for sleeping.  One hour before bedtime would recommend turning lights down lower, decreasing your activity (may read quietly, listen to music at a low volume). When you get into bed, should eliminate all technology (no texting, emailing, playing with your phone, iPad or tablet in bed).  [x] HYDRATION - Maintain good hydration.  Drink  2L of fluid a day (4 typical small water bottles)  [x] DIET - Maintain good nutrition. In particular don't skip meals and try and eat healthy balanced meals regularly.  [x] TRIGGERS - Look for other triggers and avoid them: Limit caffeine to 1-2 cups a day or less. Avoid dietary triggers that you have noticed bring on your headaches (this could include aged cheese, peanuts, MSG, aspartame and nitrates).  [x] EXERCISE - physical exercise as we all know is good for you in many ways, and not only is good for your heart, but also is beneficial for your mental health, cognitive health and  chronic pain/headaches. I would encourage at the least 5 days of physical exercise weekly for at least 30 minutes.     Education and Follow-up  [x] Please call with any questions or concerns. Of course if any new concerning symptoms go  to the emergency department.  [x] Follow up in 3 months with Epifanio CARLTON

## 2025-06-03 ENCOUNTER — TELEMEDICINE (OUTPATIENT)
Dept: FAMILY MEDICINE CLINIC | Facility: CLINIC | Age: 45
End: 2025-06-03
Payer: COMMERCIAL

## 2025-06-03 DIAGNOSIS — F33.1 MODERATE RECURRENT MAJOR DEPRESSION (HCC): Primary | ICD-10-CM

## 2025-06-03 DIAGNOSIS — R00.2 PALPITATIONS: ICD-10-CM

## 2025-06-03 DIAGNOSIS — F41.1 GENERALIZED ANXIETY DISORDER: ICD-10-CM

## 2025-06-03 DIAGNOSIS — I10 PRIMARY HYPERTENSION: ICD-10-CM

## 2025-06-03 DIAGNOSIS — R42 DIZZINESS: ICD-10-CM

## 2025-06-03 PROCEDURE — 99214 OFFICE O/P EST MOD 30 MIN: CPT | Performed by: FAMILY MEDICINE

## 2025-06-05 NOTE — PROGRESS NOTES
Virtual Regular Visit  Name: Jannette Baugh      : 1980      MRN: [id number]  Encounter Provider: Gustavo Godoy MD  Encounter Date: 6/3/2025   Encounter department: [facility]  :  Assessment & Plan  Moderate recurrent major depression (HCC)           Generalized anxiety disorder         Primary hypertension         Palpitations         Dizziness           Assessment & Plan  1. Anxiety and depression.  - Reports frustration with her current psychiatric practitioner, who is leaving the practice in August. Feels her symptoms are not improving, and her psychiatrist has not made any medication changes.  - Currently on amitriptyline 75 mg at bedtime, Cymbalta 60 mg, and lamotrigine 100 mg. No suicidal ideation.  - Recommended to continue her current treatment plan and follow up with psychiatry. If psychiatry does not make any medication changes, adjustments can be made here but ultimately she should see psych given the severity of her condition.   - Mentioned that she previously did better on Effexor and has asked her psychiatrist to consider this change.    2. Hypertension/ diziness /palpitations   - Hypertension is currently controlled.  - Attempted to hold her losartan as previously discussed but experienced an increase in blood pressure into the 130s and resumed the medication.  - Will continue losartan and monitor for dizziness.  - Recommended to continue to follow up with cardiology for further evaluation.      History of Present Illness     HPI  History of Present Illness  The patient is a 45-year-old female who follows up today on recent episodes of dizziness and lightheadedness. She has a history of hypertension as well as moderate, recurrent major depression and generalized anxiety.    For her dizziness, the plan was to hold her losartan to see if that improved. However, she got scared and did not stop her losartan because her blood pressure started going up into the  130s and did not want to make it go any higher. She reports today that her lightheadedness and dizziness have improved and really has not had any episodes since she had a cardiac workup with cardiology, which she has been following for symptoms.    She reports today that she is a little frustrated by her psychiatric practitioner who will be leaving the practice in 08/2025. She feels like her symptoms are not getting any better and her psychiatrist will not make any changes and told her that she should follow up with PCP. In the past, we were previously treating her conditions but was referred to psychiatry based on her preference as well as severity and no improvement with our treatment plan. She is currently on amitriptyline 75 mg at bedtime, Cymbalta 60 mg, and lamotrigine 100 mg. She reports no suicidal ideation.  Review of Systems   All other systems reviewed and are negative.      Objective   There were no vitals taken for this visit.    Physical Exam    Physical Exam  Vitals and nursing note reviewed.   Constitutional:       General: She is not in acute distress.     Appearance: Normal appearance. She is not ill-appearing, toxic-appearing or diaphoretic.     Neurological:      Mental Status: She is alert.     Psychiatric:         Mood and Affect: Mood normal.         Behavior: Behavior normal.         Thought Content: Thought content normal.         Judgment: Judgment normal.         Administrative Statements   Encounter provider Gustavo Godoy MD    The Patient is located at Home and in the following state in which I hold an active license [place].    The patient was identified by name and date of birth. Jannette Baugh was informed that this is a telemedicine visit and that the visit is being conducted through the Epic Embedded platform. She agrees to proceed..  My office door was closed. No one else was in the room.  She acknowledged consent and understanding of privacy and security of the video platform.  The patient has agreed to participate and understands they can discontinue the visit at any time.    I have spent a total time of 15 minutes in caring for this patient on the day of the visit/encounter including Prognosis, Instructions for management, Patient and family education, Importance of tx compliance, Risk factor reductions, Counseling / Coordination of care, Documenting in the medical record, Reviewing/placing orders in the medical record (including tests, medications, and/or procedures), and Obtaining or reviewing history  , not including the time spent for establishing the audio/video connection.

## 2025-06-09 ENCOUNTER — HOSPITAL ENCOUNTER (OUTPATIENT)
Dept: CT IMAGING | Facility: HOSPITAL | Age: 45
Discharge: HOME | End: 2025-06-09
Payer: COMMERCIAL

## 2025-06-09 DIAGNOSIS — R51.9 WORSENING HEADACHES: ICD-10-CM

## 2025-06-09 DIAGNOSIS — R42 DIZZINESS AFTER EXTENSION OF NECK: ICD-10-CM

## 2025-06-09 DIAGNOSIS — Z82.49 FAMILY HISTORY OF CEREBRAL ANEURYSM: ICD-10-CM

## 2025-06-09 PROCEDURE — 70496 CT ANGIOGRAPHY HEAD: CPT

## 2025-06-09 PROCEDURE — 70498 CT ANGIOGRAPHY NECK: CPT

## 2025-06-09 RX ADMIN — IOHEXOL 85 ML: 350 INJECTION, SOLUTION INTRAVENOUS at 14:12

## 2025-06-11 ENCOUNTER — RESULTS FOLLOW-UP (OUTPATIENT)
Dept: NEUROLOGY | Facility: CLINIC | Age: 45
End: 2025-06-11

## 2025-06-11 DIAGNOSIS — E78.2 MIXED HYPERLIPIDEMIA: ICD-10-CM

## 2025-06-11 DIAGNOSIS — T84.498A LOOSENING OF HARDWARE IN SPINE (HCC): Primary | ICD-10-CM

## 2025-06-11 NOTE — TELEPHONE ENCOUNTER
----- Message from Yves Cagle PA-C sent at 6/11/2025  7:17 AM EDT -----  Please notify patient of CTA head and neck results noting loosening of hardware around one her vertebral screws from previous surgeries. Recommending consultation with neurosurgery/spine surgery for   further evaluation. Consult placed at this time. Thanks!  ----- Message -----  From: Interface, Radiology Results In  Sent: 6/9/2025   2:40 PM EDT  To: Yves Cagle PA-C

## 2025-06-12 RX ORDER — ROSUVASTATIN CALCIUM 40 MG/1
40 TABLET, COATED ORAL DAILY
Qty: 90 TABLET | Refills: 1 | Status: SHIPPED | OUTPATIENT
Start: 2025-06-12

## 2025-07-15 ENCOUNTER — HOSPITAL ENCOUNTER (OUTPATIENT)
Dept: MAMMOGRAPHY | Facility: HOSPITAL | Age: 45
Discharge: HOME | End: 2025-07-15
Attending: FAMILY MEDICINE
Payer: COMMERCIAL

## 2025-07-15 DIAGNOSIS — R92.8 ABNORMAL MAMMOGRAM: ICD-10-CM

## 2025-07-15 PROCEDURE — 77065 DX MAMMO INCL CAD UNI: CPT

## 2025-07-15 PROCEDURE — G0279 TOMOSYNTHESIS, MAMMO: HCPCS

## 2025-07-18 ENCOUNTER — PATIENT MESSAGE (OUTPATIENT)
Dept: FAMILY MEDICINE CLINIC | Facility: CLINIC | Age: 45
End: 2025-07-18

## 2025-07-18 DIAGNOSIS — F33.1 MODERATE RECURRENT MAJOR DEPRESSION (HCC): ICD-10-CM

## 2025-07-18 DIAGNOSIS — F41.1 GENERALIZED ANXIETY DISORDER: ICD-10-CM

## 2025-07-22 RX ORDER — AMITRIPTYLINE HYDROCHLORIDE 75 MG/1
75 TABLET ORAL
Qty: 100 TABLET | Refills: 0 | Status: SHIPPED | OUTPATIENT
Start: 2025-07-22

## 2025-07-28 DIAGNOSIS — R92.8 ABNORMAL MAMMOGRAM: Primary | ICD-10-CM

## 2025-08-20 DIAGNOSIS — I10 PRIMARY HYPERTENSION: Primary | ICD-10-CM

## 2025-08-20 RX ORDER — LOSARTAN POTASSIUM 25 MG/1
25 TABLET ORAL DAILY
Qty: 90 TABLET | Refills: 0 | Status: SHIPPED | OUTPATIENT
Start: 2025-08-20

## (undated) DEVICE — SUT MONOCRYL 4-0 PS-2 18 IN Y496G

## (undated) DEVICE — SYRINGE 1ML TB 25G X 5/8 NON SAFETY

## (undated) DEVICE — SCD SEQUENTIAL COMPRESSION COMFORT SLEEVE MEDIUM KNEE LENGTH: Brand: KENDALL SCD

## (undated) DEVICE — BETHLEHEM MAJOR GENERAL PACK: Brand: CARDINAL HEALTH

## (undated) DEVICE — SUT ETHILON 3-0 PS-1 18 IN 1663H

## (undated) DEVICE — SPONGE STICK WITH PVP-I: Brand: KENDALL

## (undated) DEVICE — DRAPE UTILITY

## (undated) DEVICE — STERILE ALLENTOWN HAND PACK: Brand: CARDINAL HEALTH

## (undated) DEVICE — LIGHT HANDLE COVER SLEEVE DISP BLUE STELLAR

## (undated) DEVICE — SYRINGE 20ML LL

## (undated) DEVICE — STERILE MINERVA DISPOSABLE HANDPIECECONTENTS:(1) ONE SINGLE USE STERILE MINERVA ES DISPOSABLE HANDPIECE (1) ONE SINGLE USE STERILE SYRINGE(1) ONE SINGLE USE STERILE 8MM HEGAR DILATOR(1) ONE SINGLE USE NON-STERILE DESICCANT(1) ONE NON-STERILE HANDPIECE INSTRUCTIONS FOR USE(1)  ONE NON-STERILE DILATOR INSTRUCTIONS FOR USE: Brand: MINERVA SINGLE STERILE DISPOSABLE HANDPIECE

## (undated) DEVICE — SPONGE PVP SCRUB WING STERILE

## (undated) DEVICE — PLUMEPEN PRO 10FT

## (undated) DEVICE — TRANSPARENT DRESSINGOPSITE FLEXIGRID 6CM X 7CM CARTON 100: Brand: OPSITE FLEXIGRID USA 6X7CM-CARTON OF 100

## (undated) DEVICE — NEEDLE 25G X 1 1/2

## (undated) DEVICE — OCCLUSIVE GAUZE STRIP,3% BISMUTH TRIBROMOPHENATE IN PETROLATUM BLEND: Brand: XEROFORM

## (undated) DEVICE — TIBURON TRANSVERSE LAPAROTOMY SHEET: Brand: CONVERTORS

## (undated) DEVICE — SURGIFOAM 8.5 X 12.5

## (undated) DEVICE — INTENDED FOR TISSUE SEPARATION, AND OTHER PROCEDURES THAT REQUIRE A SHARP SURGICAL BLADE TO PUNCTURE OR CUT.: Brand: BARD-PARKER SAFETY BLADES SIZE 15, STERILE

## (undated) DEVICE — BASIC SINGLE BASIN 2-LF: Brand: MEDLINE INDUSTRIES, INC.

## (undated) DEVICE — GLOVE SRG BIOGEL ECLIPSE 7

## (undated) DEVICE — BULB SYRINGE,IRRIGATION WITH PROTECTIVE CAP: Brand: DOVER

## (undated) DEVICE — SILVER-COATED ANTIMICROBIAL BARRIER DRESSING: Brand: ACTICOAT   4" X 8"

## (undated) DEVICE — GAUZE SPONGES,USP TYPE VII GAUZE, 12 PLY: Brand: CURITY

## (undated) DEVICE — NEEDLE HYPO 22G X 1-1/2 IN

## (undated) DEVICE — SUT ETHILON 3-0 PS-1 18 IN 1663G

## (undated) DEVICE — 3M™ TEGADERM™ TRANSPARENT FILM DRESSING FRAME STYLE, 1626W, 4 IN X 4-3/4 IN (10 CM X 12 CM), 50/CT 4CT/CASE: Brand: 3M™ TEGADERM™

## (undated) DEVICE — POOLE SUCTION HANDLE W/TUBING: Brand: CARDINAL HEALTH

## (undated) DEVICE — FABRIC REINFORCED, SURGICAL GOWN, XL: Brand: CONVERTORS

## (undated) DEVICE — GLOVE INDICATOR PI UNDERGLOVE SZ 7.5 BLUE

## (undated) DEVICE — SUT VICRYL 2-0 CT-2 18 IN J726D

## (undated) DEVICE — 2000CC GUARDIAN II: Brand: GUARDIAN

## (undated) DEVICE — SUT VICRYL 3-0 SH 27 IN J416H

## (undated) DEVICE — EYE PADS 1 5/8"X2 5/8": Brand: MCKESSON

## (undated) DEVICE — GAUZE SPONGES,16 PLY: Brand: CURITY

## (undated) DEVICE — VIAL DECANTER

## (undated) DEVICE — GLOVE SRG BIOGEL 7.5

## (undated) DEVICE — PROXIMATE RELOADABLE LINEAR STAPLER, 30MM: Brand: PROXIMATE

## (undated) DEVICE — PAD GROUNDING ADULT

## (undated) DEVICE — GLOVE SRG BIOGEL ECLIPSE 7.5

## (undated) DEVICE — GLOVE INDICATOR PI UNDERGLOVE SZ 8 BLUE

## (undated) DEVICE — ANTIBACTERIAL UNDYED BRAIDED (POLYGLACTIN 910), SYNTHETIC ABSORBABLE SUTURE: Brand: COATED VICRYL

## (undated) DEVICE — Device

## (undated) DEVICE — TUBING SUCTION 5MM X 12 FT

## (undated) DEVICE — D&C PACK: Brand: MEDLINE INDUSTRIES, INC.

## (undated) DEVICE — GLOVE SRG BIOGEL 6.5

## (undated) DEVICE — POOLE SUCTION HANDLE: Brand: CARDINAL HEALTH

## (undated) DEVICE — 3M™ DURAPORE™ SURGICAL TAPE 1538-1, 1 INCH X 10 YARD (2,5CM X 9,1M), 12 ROLLS/BOX: Brand: 3M™ DURAPORE™

## (undated) DEVICE — SPECIMEN CONTAINER STERILE PEEL PACK

## (undated) DEVICE — CHLORAPREP HI-LITE 26ML ORANGE

## (undated) DEVICE — SUT VICRYL 2-0 18 IN J905T

## (undated) DEVICE — BETHLEHEM UNIVERSAL MINOR GEN: Brand: CARDINAL HEALTH

## (undated) DEVICE — CUFF TOURNIQUET 18 X 4 IN QUICK CONNECT DISP 1 BLADDER

## (undated) DEVICE — REM POLYHESIVE ADULT PATIENT RETURN ELECTRODE: Brand: VALLEYLAB

## (undated) DEVICE — ABDOMINAL PAD: Brand: DERMACEA

## (undated) DEVICE — STERILE EAR PACK: Brand: CARDINAL HEALTH

## (undated) DEVICE — SUT CHROMIC 2-0 SH 27 IN G123H

## (undated) DEVICE — 3M™ DURAPORE™ SURGICAL TAPE 1538-3, 3 INCH X 10 YARD (7,5CM X 9,1M), 4 ROLLS/BOX: Brand: 3M™ DURAPORE™

## (undated) DEVICE — DISPOSABLE BRIEF/UNDERWEAR

## (undated) DEVICE — ASTOUND STANDARD SURGICAL GOWN, XXL: Brand: CONVERTORS

## (undated) DEVICE — CONMED ACCESSORY ELECTRODE, NEEDLE WITH EXTENDED INSULATION: Brand: CONMED

## (undated) DEVICE — GLOVE SRG LF STRL BGL SKNSNS 7 PF

## (undated) DEVICE — #1020 STERI DRAPE 41MM X 41MM SMALL: Brand: STERI-DRAPE™

## (undated) DEVICE — SUT VICRYL 0 UR-6 27 IN J603H

## (undated) DEVICE — DRAPE MICROSCOPE ZEISS ENT 306070

## (undated) DEVICE — MEDI-VAC YANK SUCT HNDL W/TPRD BULBOUS TIP: Brand: CARDINAL HEALTH

## (undated) DEVICE — SUT ETHILON 4-0 P-S 18 IN 699H

## (undated) DEVICE — SUT PLAIN 5-0 PC-1 18 IN 1915G

## (undated) DEVICE — STERILE SURGICAL LUBRICANT,  TUBE: Brand: SURGILUBE

## (undated) DEVICE — PREMIUM DRY TRAY LF: Brand: MEDLINE INDUSTRIES, INC.

## (undated) DEVICE — SUT ETHILON 4-0 PS-2 18 IN 1667H

## (undated) DEVICE — SUT MONOCRYL PLUS 4-0 PS-2 18 IN MCP496G

## (undated) DEVICE — GLOVE SRG BIOGEL ECLIPSE 8

## (undated) DEVICE — NONREINFORCED SURGICAL GOWN, 4XLARGE: Brand: CONVERTORS

## (undated) DEVICE — SUT VICRYL 2-0 CT-1 27 IN J259H

## (undated) DEVICE — POV-IOD SOLUTION 4OZ BT

## (undated) DEVICE — JACKSON-PRATT 100CC BULB RESERVOIR: Brand: CARDINAL HEALTH

## (undated) DEVICE — DISPOSABLE EQUIPMENT COVER: Brand: SMALL TOWEL DRAPE

## (undated) DEVICE — IV CATH 16 G SAFETY

## (undated) DEVICE — COTTON BALLS: Brand: DEROYAL

## (undated) DEVICE — SYRINGE 30ML LL

## (undated) DEVICE — DRESSING MEPILEX AG BORDER POST-OP 4 X 10 IN